# Patient Record
Sex: MALE | Race: WHITE | NOT HISPANIC OR LATINO | Employment: OTHER | URBAN - METROPOLITAN AREA
[De-identification: names, ages, dates, MRNs, and addresses within clinical notes are randomized per-mention and may not be internally consistent; named-entity substitution may affect disease eponyms.]

---

## 2017-01-18 ENCOUNTER — ALLSCRIPTS OFFICE VISIT (OUTPATIENT)
Dept: OTHER | Facility: OTHER | Age: 67
End: 2017-01-18

## 2017-01-29 ENCOUNTER — TELEPHONE (OUTPATIENT)
Dept: INPATIENT UNIT | Facility: HOSPITAL | Age: 67
End: 2017-01-29

## 2017-01-30 ENCOUNTER — ANESTHESIA (OUTPATIENT)
Dept: SURGERY | Facility: HOSPITAL | Age: 67
End: 2017-01-30
Payer: MEDICARE

## 2017-01-30 ENCOUNTER — GENERIC CONVERSION - ENCOUNTER (OUTPATIENT)
Dept: OTHER | Facility: OTHER | Age: 67
End: 2017-01-30

## 2017-01-30 ENCOUNTER — ANESTHESIA EVENT (OUTPATIENT)
Dept: SURGERY | Facility: HOSPITAL | Age: 67
End: 2017-01-30
Payer: MEDICARE

## 2017-01-30 ENCOUNTER — HOSPITAL ENCOUNTER (OUTPATIENT)
Dept: NON INVASIVE DIAGNOSTICS | Facility: HOSPITAL | Age: 67
Discharge: HOME | End: 2017-01-30
Attending: INTERNAL MEDICINE | Admitting: INTERNAL MEDICINE
Payer: MEDICARE

## 2017-01-30 VITALS
RESPIRATION RATE: 16 BRPM | BODY MASS INDEX: 31.81 KG/M2 | SYSTOLIC BLOOD PRESSURE: 116 MMHG | HEART RATE: 58 BPM | TEMPERATURE: 98 F | DIASTOLIC BLOOD PRESSURE: 74 MMHG | WEIGHT: 240 LBS | OXYGEN SATURATION: 95 % | HEIGHT: 73 IN

## 2017-01-30 DIAGNOSIS — I48.91 ATRIAL FIBRILLATION (HCC): ICD-10-CM

## 2017-01-30 LAB
ANION GAP SERPL CALCULATED.3IONS-SCNC: 9 MMOL/L (ref 4–13)
ATRIAL RATE: 288 BPM
ATRIAL RATE: 51 BPM
ATRIAL RATE: 58 BPM
BASOPHILS # BLD AUTO: 0.11 THOUSANDS/ΜL (ref 0–0.1)
BASOPHILS NFR BLD AUTO: 2 % (ref 0–1)
BUN SERPL-MCNC: 20 MG/DL (ref 5–25)
CALCIUM SERPL-MCNC: 8.7 MG/DL (ref 8.3–10.1)
CHLORIDE SERPL-SCNC: 106 MMOL/L (ref 100–108)
CO2 SERPL-SCNC: 25 MMOL/L (ref 21–32)
CREAT SERPL-MCNC: 1.06 MG/DL (ref 0.6–1.3)
EOSINOPHIL # BLD AUTO: 0.24 THOUSAND/ΜL (ref 0–0.61)
EOSINOPHIL NFR BLD AUTO: 4 % (ref 0–6)
ERYTHROCYTE [DISTWIDTH] IN BLOOD BY AUTOMATED COUNT: 12.9 % (ref 11.6–15.1)
GFR SERPL CREATININE-BSD FRML MDRD: >60 ML/MIN/1.73SQ M
GLUCOSE SERPL-MCNC: 100 MG/DL (ref 65–140)
HCT VFR BLD AUTO: 48.8 % (ref 36.5–49.3)
HGB BLD-MCNC: 17.1 G/DL (ref 12–17)
LYMPHOCYTES # BLD AUTO: 1.66 THOUSANDS/ΜL (ref 0.6–4.47)
LYMPHOCYTES NFR BLD AUTO: 25 % (ref 14–44)
MAGNESIUM SERPL-MCNC: 2.3 MG/DL (ref 1.6–2.6)
MCH RBC QN AUTO: 30.1 PG (ref 26.8–34.3)
MCHC RBC AUTO-ENTMCNC: 35 G/DL (ref 31.4–37.4)
MCV RBC AUTO: 86 FL (ref 82–98)
MONOCYTES # BLD AUTO: 0.61 THOUSAND/ΜL (ref 0.17–1.22)
MONOCYTES NFR BLD AUTO: 9 % (ref 4–12)
NEUTROPHILS # BLD AUTO: 4 THOUSANDS/ΜL (ref 1.85–7.62)
NEUTS SEG NFR BLD AUTO: 60 % (ref 43–75)
NRBC BLD AUTO-RTO: 0 /100 WBCS
P AXIS: 0 DEGREES
P AXIS: 2 DEGREES
PLATELET # BLD AUTO: 249 THOUSANDS/UL (ref 149–390)
PMV BLD AUTO: 10.5 FL (ref 8.9–12.7)
POTASSIUM SERPL-SCNC: 4.3 MMOL/L (ref 3.5–5.3)
PR INTERVAL: 244 MS
PR INTERVAL: 268 MS
QRS AXIS: -14 DEGREES
QRS AXIS: -30 DEGREES
QRS AXIS: -6 DEGREES
QRSD INTERVAL: 112 MS
QRSD INTERVAL: 116 MS
QRSD INTERVAL: 118 MS
QT INTERVAL: 456 MS
QT INTERVAL: 458 MS
QT INTERVAL: 470 MS
QTC INTERVAL: 422 MS
QTC INTERVAL: 447 MS
QTC INTERVAL: 461 MS
RBC # BLD AUTO: 5.68 MILLION/UL (ref 3.88–5.62)
SODIUM SERPL-SCNC: 140 MMOL/L (ref 136–145)
T WAVE AXIS: 16 DEGREES
T WAVE AXIS: 24 DEGREES
T WAVE AXIS: 6 DEGREES
VENTRICULAR RATE: 51 BPM
VENTRICULAR RATE: 58 BPM
VENTRICULAR RATE: 58 BPM
WBC # BLD AUTO: 6.65 THOUSAND/UL (ref 4.31–10.16)

## 2017-01-30 PROCEDURE — 93005 ELECTROCARDIOGRAM TRACING: CPT | Performed by: PHYSICIAN ASSISTANT

## 2017-01-30 PROCEDURE — 83735 ASSAY OF MAGNESIUM: CPT | Performed by: INTERNAL MEDICINE

## 2017-01-30 PROCEDURE — 80048 BASIC METABOLIC PNL TOTAL CA: CPT | Performed by: INTERNAL MEDICINE

## 2017-01-30 PROCEDURE — 93005 ELECTROCARDIOGRAM TRACING: CPT

## 2017-01-30 PROCEDURE — 92960 CARDIOVERSION ELECTRIC EXT: CPT | Performed by: INTERNAL MEDICINE

## 2017-01-30 PROCEDURE — 93005 ELECTROCARDIOGRAM TRACING: CPT | Performed by: INTERNAL MEDICINE

## 2017-01-30 PROCEDURE — 85025 COMPLETE CBC W/AUTO DIFF WBC: CPT | Performed by: INTERNAL MEDICINE

## 2017-01-30 RX ORDER — FLECAINIDE ACETATE 50 MG/1
50 TABLET ORAL 2 TIMES DAILY
COMMUNITY
End: 2018-03-18 | Stop reason: SDUPTHER

## 2017-01-30 RX ORDER — ATORVASTATIN CALCIUM 20 MG/1
20 TABLET, FILM COATED ORAL DAILY
COMMUNITY
End: 2017-08-05

## 2017-01-30 RX ORDER — METOPROLOL SUCCINATE 25 MG/1
25 TABLET, EXTENDED RELEASE ORAL DAILY
COMMUNITY
End: 2018-07-25

## 2017-01-30 RX ORDER — PROPOFOL 10 MG/ML
INJECTION, EMULSION INTRAVENOUS AS NEEDED
Status: DISCONTINUED | OUTPATIENT
Start: 2017-01-30 | End: 2017-01-30 | Stop reason: SURG

## 2017-01-30 RX ORDER — SODIUM CHLORIDE 9 MG/ML
50 INJECTION, SOLUTION INTRAVENOUS CONTINUOUS
Status: DISCONTINUED | OUTPATIENT
Start: 2017-01-30 | End: 2017-01-30 | Stop reason: HOSPADM

## 2017-01-30 RX ADMIN — SODIUM CHLORIDE 50 ML/HR: 0.9 INJECTION, SOLUTION INTRAVENOUS at 07:15

## 2017-01-30 RX ADMIN — PROPOFOL 80 MG: 10 INJECTION, EMULSION INTRAVENOUS at 08:25

## 2017-02-06 ENCOUNTER — ALLSCRIPTS OFFICE VISIT (OUTPATIENT)
Dept: OTHER | Facility: OTHER | Age: 67
End: 2017-02-06

## 2017-02-27 ENCOUNTER — ALLSCRIPTS OFFICE VISIT (OUTPATIENT)
Dept: OTHER | Facility: OTHER | Age: 67
End: 2017-02-27

## 2017-03-01 ENCOUNTER — ALLSCRIPTS OFFICE VISIT (OUTPATIENT)
Dept: OTHER | Facility: OTHER | Age: 67
End: 2017-03-01

## 2017-03-01 DIAGNOSIS — Z91.89 OTHER SPECIFIED PERSONAL RISK FACTORS, NOT ELSEWHERE CLASSIFIED: ICD-10-CM

## 2017-03-01 DIAGNOSIS — I71.2 THORACIC AORTIC ANEURYSM WITHOUT RUPTURE (HCC): ICD-10-CM

## 2017-03-01 DIAGNOSIS — I48.0 PAROXYSMAL ATRIAL FIBRILLATION (HCC): ICD-10-CM

## 2017-05-01 DIAGNOSIS — N40.0 ENLARGED PROSTATE WITHOUT LOWER URINARY TRACT SYMPTOMS (LUTS): ICD-10-CM

## 2017-05-01 DIAGNOSIS — E03.9 HYPOTHYROIDISM: ICD-10-CM

## 2017-05-01 DIAGNOSIS — I48.0 PAROXYSMAL ATRIAL FIBRILLATION (HCC): ICD-10-CM

## 2017-05-01 DIAGNOSIS — I10 ESSENTIAL (PRIMARY) HYPERTENSION: ICD-10-CM

## 2017-05-09 ENCOUNTER — ALLSCRIPTS OFFICE VISIT (OUTPATIENT)
Dept: OTHER | Facility: OTHER | Age: 67
End: 2017-05-09

## 2017-05-22 ENCOUNTER — HOSPITAL ENCOUNTER (OUTPATIENT)
Dept: NON INVASIVE DIAGNOSTICS | Facility: CLINIC | Age: 67
Discharge: HOME | End: 2017-05-22
Payer: MEDICARE

## 2017-05-22 DIAGNOSIS — I77.819 ECTATIC AORTA (HCC): ICD-10-CM

## 2017-05-22 DIAGNOSIS — I77.811 AORTIC ECTASIA, ABDOMINAL (HCC): ICD-10-CM

## 2017-05-22 DIAGNOSIS — I72.3 ILIAC ARTERY ANEURYSM, BILATERAL (HCC): ICD-10-CM

## 2017-05-22 PROCEDURE — 93978 VASCULAR STUDY: CPT

## 2017-05-22 PROCEDURE — 93923 UPR/LXTR ART STDY 3+ LVLS: CPT

## 2017-05-25 ENCOUNTER — ALLSCRIPTS OFFICE VISIT (OUTPATIENT)
Dept: OTHER | Facility: OTHER | Age: 67
End: 2017-05-25

## 2017-06-13 ENCOUNTER — APPOINTMENT (OUTPATIENT)
Dept: LAB | Facility: CLINIC | Age: 67
End: 2017-06-13
Payer: MEDICARE

## 2017-06-13 ENCOUNTER — GENERIC CONVERSION - ENCOUNTER (OUTPATIENT)
Dept: OTHER | Facility: OTHER | Age: 67
End: 2017-06-13

## 2017-06-13 DIAGNOSIS — I48.0 PAROXYSMAL ATRIAL FIBRILLATION (HCC): ICD-10-CM

## 2017-06-13 DIAGNOSIS — I10 ESSENTIAL (PRIMARY) HYPERTENSION: ICD-10-CM

## 2017-06-13 DIAGNOSIS — I71.2 THORACIC AORTIC ANEURYSM WITHOUT RUPTURE (HCC): ICD-10-CM

## 2017-06-13 DIAGNOSIS — E03.9 HYPOTHYROIDISM: ICD-10-CM

## 2017-06-13 DIAGNOSIS — N40.0 ENLARGED PROSTATE WITHOUT LOWER URINARY TRACT SYMPTOMS (LUTS): ICD-10-CM

## 2017-06-13 DIAGNOSIS — Z91.89 OTHER SPECIFIED PERSONAL RISK FACTORS, NOT ELSEWHERE CLASSIFIED: ICD-10-CM

## 2017-06-13 LAB
ALBUMIN SERPL BCP-MCNC: 3.8 G/DL (ref 3.5–5)
ALP SERPL-CCNC: 77 U/L (ref 46–116)
ALT SERPL W P-5'-P-CCNC: 36 U/L (ref 12–78)
ANION GAP SERPL CALCULATED.3IONS-SCNC: 7 MMOL/L (ref 4–13)
AST SERPL W P-5'-P-CCNC: 23 U/L (ref 5–45)
BASOPHILS # BLD AUTO: 0.05 THOUSANDS/ΜL (ref 0–0.1)
BASOPHILS NFR BLD AUTO: 1 % (ref 0–1)
BILIRUB SERPL-MCNC: 0.59 MG/DL (ref 0.2–1)
BUN SERPL-MCNC: 21 MG/DL (ref 5–25)
CALCIUM SERPL-MCNC: 8.6 MG/DL (ref 8.3–10.1)
CHLORIDE SERPL-SCNC: 108 MMOL/L (ref 100–108)
CHOLEST SERPL-MCNC: 115 MG/DL (ref 50–200)
CO2 SERPL-SCNC: 27 MMOL/L (ref 21–32)
CREAT SERPL-MCNC: 0.95 MG/DL (ref 0.6–1.3)
EOSINOPHIL # BLD AUTO: 0.18 THOUSAND/ΜL (ref 0–0.61)
EOSINOPHIL NFR BLD AUTO: 3 % (ref 0–6)
ERYTHROCYTE [DISTWIDTH] IN BLOOD BY AUTOMATED COUNT: 13 % (ref 11.6–15.1)
GFR SERPL CREATININE-BSD FRML MDRD: >60 ML/MIN/1.73SQ M
GLUCOSE P FAST SERPL-MCNC: 100 MG/DL (ref 65–99)
HCT VFR BLD AUTO: 43.8 % (ref 36.5–49.3)
HDLC SERPL-MCNC: 51 MG/DL (ref 40–60)
HGB BLD-MCNC: 14.8 G/DL (ref 12–17)
LDLC SERPL CALC-MCNC: 47 MG/DL (ref 0–100)
LYMPHOCYTES # BLD AUTO: 1.15 THOUSANDS/ΜL (ref 0.6–4.47)
LYMPHOCYTES NFR BLD AUTO: 20 % (ref 14–44)
MCH RBC QN AUTO: 30.1 PG (ref 26.8–34.3)
MCHC RBC AUTO-ENTMCNC: 33.8 G/DL (ref 31.4–37.4)
MCV RBC AUTO: 89 FL (ref 82–98)
MONOCYTES # BLD AUTO: 0.6 THOUSAND/ΜL (ref 0.17–1.22)
MONOCYTES NFR BLD AUTO: 10 % (ref 4–12)
NEUTROPHILS # BLD AUTO: 3.92 THOUSANDS/ΜL (ref 1.85–7.62)
NEUTS SEG NFR BLD AUTO: 66 % (ref 43–75)
NRBC BLD AUTO-RTO: 0 /100 WBCS
PLATELET # BLD AUTO: 245 THOUSANDS/UL (ref 149–390)
PMV BLD AUTO: 10.8 FL (ref 8.9–12.7)
POTASSIUM SERPL-SCNC: 4.7 MMOL/L (ref 3.5–5.3)
PROT SERPL-MCNC: 6.7 G/DL (ref 6.4–8.2)
PSA SERPL-MCNC: 0.7 NG/ML (ref 0–4)
RBC # BLD AUTO: 4.92 MILLION/UL (ref 3.88–5.62)
SODIUM SERPL-SCNC: 142 MMOL/L (ref 136–145)
T4 FREE SERPL-MCNC: 0.98 NG/DL (ref 0.76–1.46)
TRIGL SERPL-MCNC: 83 MG/DL
TSH SERPL DL<=0.05 MIU/L-ACNC: 2.14 UIU/ML (ref 0.36–3.74)
WBC # BLD AUTO: 5.91 THOUSAND/UL (ref 4.31–10.16)

## 2017-06-13 PROCEDURE — 84443 ASSAY THYROID STIM HORMONE: CPT

## 2017-06-13 PROCEDURE — 36415 COLL VENOUS BLD VENIPUNCTURE: CPT

## 2017-06-13 PROCEDURE — 84439 ASSAY OF FREE THYROXINE: CPT

## 2017-06-13 PROCEDURE — 80061 LIPID PANEL: CPT

## 2017-06-13 PROCEDURE — 80053 COMPREHEN METABOLIC PANEL: CPT

## 2017-06-13 PROCEDURE — 85025 COMPLETE CBC W/AUTO DIFF WBC: CPT

## 2017-06-13 PROCEDURE — 84153 ASSAY OF PSA TOTAL: CPT

## 2017-06-26 ENCOUNTER — ALLSCRIPTS OFFICE VISIT (OUTPATIENT)
Dept: OTHER | Facility: OTHER | Age: 67
End: 2017-06-26

## 2017-06-26 DIAGNOSIS — Z11.59 ENCOUNTER FOR SCREENING FOR OTHER VIRAL DISEASES: ICD-10-CM

## 2017-08-05 ENCOUNTER — APPOINTMENT (EMERGENCY)
Dept: RADIOLOGY | Facility: HOSPITAL | Age: 67
End: 2017-08-05
Payer: MEDICARE

## 2017-08-05 ENCOUNTER — HOSPITAL ENCOUNTER (EMERGENCY)
Facility: HOSPITAL | Age: 67
Discharge: HOME | End: 2017-08-05
Attending: EMERGENCY MEDICINE | Admitting: EMERGENCY MEDICINE
Payer: MEDICARE

## 2017-08-05 ENCOUNTER — GENERIC CONVERSION - ENCOUNTER (OUTPATIENT)
Dept: OTHER | Facility: OTHER | Age: 67
End: 2017-08-05

## 2017-08-05 VITALS
BODY MASS INDEX: 31.14 KG/M2 | SYSTOLIC BLOOD PRESSURE: 124 MMHG | DIASTOLIC BLOOD PRESSURE: 63 MMHG | TEMPERATURE: 97.6 F | HEIGHT: 73 IN | OXYGEN SATURATION: 93 % | WEIGHT: 235 LBS | RESPIRATION RATE: 16 BRPM | HEART RATE: 73 BPM

## 2017-08-05 DIAGNOSIS — I77.73 RENAL ARTERY DISSECTION (HCC): Primary | ICD-10-CM

## 2017-08-05 DIAGNOSIS — R10.32 LLQ PAIN: ICD-10-CM

## 2017-08-05 LAB
ALBUMIN SERPL BCP-MCNC: 3.8 G/DL (ref 3.5–5)
ALP SERPL-CCNC: 88 U/L (ref 46–116)
ALT SERPL W P-5'-P-CCNC: 39 U/L (ref 12–78)
ANION GAP BLD CALC-SCNC: 20 MMOL/L (ref 4–13)
ANION GAP SERPL CALCULATED.3IONS-SCNC: 8 MMOL/L (ref 4–13)
AST SERPL W P-5'-P-CCNC: 17 U/L (ref 5–45)
BASOPHILS # BLD AUTO: 0.02 THOUSANDS/ΜL (ref 0–0.1)
BASOPHILS NFR BLD AUTO: 0 % (ref 0–1)
BILIRUB SERPL-MCNC: 0.78 MG/DL (ref 0.2–1)
BILIRUB UR QL STRIP: NEGATIVE
BUN BLD-MCNC: 23 MG/DL (ref 5–25)
BUN SERPL-MCNC: 24 MG/DL (ref 5–25)
CA-I BLD-SCNC: 1.11 MMOL/L (ref 1.12–1.32)
CALCIUM SERPL-MCNC: 8.8 MG/DL (ref 8.3–10.1)
CHLORIDE BLD-SCNC: 101 MMOL/L (ref 100–108)
CHLORIDE SERPL-SCNC: 106 MMOL/L (ref 100–108)
CLARITY UR: CLEAR
CLARITY, POC: CLEAR
CO2 SERPL-SCNC: 25 MMOL/L (ref 21–32)
COLOR UR: YELLOW
COLOR, POC: YELLOW
CREAT BLD-MCNC: 0.9 MG/DL (ref 0.6–1.3)
CREAT SERPL-MCNC: 0.99 MG/DL (ref 0.6–1.3)
EOSINOPHIL # BLD AUTO: 0.03 THOUSAND/ΜL (ref 0–0.61)
EOSINOPHIL NFR BLD AUTO: 0 % (ref 0–6)
ERYTHROCYTE [DISTWIDTH] IN BLOOD BY AUTOMATED COUNT: 12.8 % (ref 11.6–15.1)
GFR SERPL CREATININE-BSD FRML MDRD: 78 ML/MIN/1.73SQ M
GFR SERPL CREATININE-BSD FRML MDRD: 88 ML/MIN/1.73SQ M
GLUCOSE SERPL-MCNC: 109 MG/DL (ref 65–140)
GLUCOSE SERPL-MCNC: 115 MG/DL (ref 65–140)
GLUCOSE UR STRIP-MCNC: NEGATIVE MG/DL
HCT VFR BLD AUTO: 44.6 % (ref 36.5–49.3)
HCT VFR BLD CALC: 47 % (ref 36.5–49.3)
HGB BLD-MCNC: 15.9 G/DL (ref 12–17)
HGB BLDA-MCNC: 16 G/DL (ref 12–17)
HGB UR QL STRIP.AUTO: NEGATIVE
KETONES UR STRIP-MCNC: NEGATIVE MG/DL
LEUKOCYTE ESTERASE UR QL STRIP: NEGATIVE
LIPASE SERPL-CCNC: 65 U/L (ref 73–393)
LYMPHOCYTES # BLD AUTO: 0.85 THOUSANDS/ΜL (ref 0.6–4.47)
LYMPHOCYTES NFR BLD AUTO: 7 % (ref 14–44)
MCH RBC QN AUTO: 30.5 PG (ref 26.8–34.3)
MCHC RBC AUTO-ENTMCNC: 35.7 G/DL (ref 31.4–37.4)
MCV RBC AUTO: 86 FL (ref 82–98)
MONOCYTES # BLD AUTO: 0.74 THOUSAND/ΜL (ref 0.17–1.22)
MONOCYTES NFR BLD AUTO: 6 % (ref 4–12)
NEUTROPHILS # BLD AUTO: 10.54 THOUSANDS/ΜL (ref 1.85–7.62)
NEUTS SEG NFR BLD AUTO: 87 % (ref 43–75)
NITRITE UR QL STRIP: NEGATIVE
NRBC BLD AUTO-RTO: 0 /100 WBCS
PCO2 BLD: 24 MMOL/L (ref 21–32)
PH UR STRIP.AUTO: 5.5 [PH] (ref 4.5–8)
PLATELET # BLD AUTO: 244 THOUSANDS/UL (ref 149–390)
PMV BLD AUTO: 10.3 FL (ref 8.9–12.7)
POTASSIUM BLD-SCNC: 4.2 MMOL/L (ref 3.5–5.3)
POTASSIUM SERPL-SCNC: 4.1 MMOL/L (ref 3.5–5.3)
PROT SERPL-MCNC: 6.9 G/DL (ref 6.4–8.2)
PROT UR STRIP-MCNC: NEGATIVE MG/DL
RBC # BLD AUTO: 5.21 MILLION/UL (ref 3.88–5.62)
SODIUM BLD-SCNC: 140 MMOL/L (ref 136–145)
SODIUM SERPL-SCNC: 139 MMOL/L (ref 136–145)
SP GR UR STRIP.AUTO: 1.01 (ref 1–1.03)
SPECIMEN SOURCE: ABNORMAL
SPECIMEN SOURCE: NORMAL
TROPONIN I BLD-MCNC: 0.01 NG/ML (ref 0–0.08)
TSH SERPL DL<=0.05 MIU/L-ACNC: 2.43 UIU/ML (ref 0.36–3.74)
UROBILINOGEN UR QL STRIP.AUTO: 0.2 E.U./DL
WBC # BLD AUTO: 12.22 THOUSAND/UL (ref 4.31–10.16)

## 2017-08-05 PROCEDURE — 71020 HB CHEST X-RAY 2VW FRONTAL&LATL: CPT

## 2017-08-05 PROCEDURE — 80047 BASIC METABLC PNL IONIZED CA: CPT

## 2017-08-05 PROCEDURE — 36415 COLL VENOUS BLD VENIPUNCTURE: CPT | Performed by: EMERGENCY MEDICINE

## 2017-08-05 PROCEDURE — 71275 CT ANGIOGRAPHY CHEST: CPT

## 2017-08-05 PROCEDURE — 81003 URINALYSIS AUTO W/O SCOPE: CPT

## 2017-08-05 PROCEDURE — 85014 HEMATOCRIT: CPT

## 2017-08-05 PROCEDURE — 93005 ELECTROCARDIOGRAM TRACING: CPT

## 2017-08-05 PROCEDURE — 81002 URINALYSIS NONAUTO W/O SCOPE: CPT | Performed by: EMERGENCY MEDICINE

## 2017-08-05 PROCEDURE — 83690 ASSAY OF LIPASE: CPT | Performed by: EMERGENCY MEDICINE

## 2017-08-05 PROCEDURE — 84443 ASSAY THYROID STIM HORMONE: CPT | Performed by: EMERGENCY MEDICINE

## 2017-08-05 PROCEDURE — 85025 COMPLETE CBC W/AUTO DIFF WBC: CPT | Performed by: EMERGENCY MEDICINE

## 2017-08-05 PROCEDURE — 80053 COMPREHEN METABOLIC PANEL: CPT | Performed by: EMERGENCY MEDICINE

## 2017-08-05 PROCEDURE — 99285 EMERGENCY DEPT VISIT HI MDM: CPT

## 2017-08-05 PROCEDURE — 84484 ASSAY OF TROPONIN QUANT: CPT

## 2017-08-05 PROCEDURE — 74174 CTA ABD&PLVS W/CONTRAST: CPT

## 2017-08-05 RX ORDER — MORPHINE SULFATE 4 MG/ML
4 INJECTION, SOLUTION INTRAMUSCULAR; INTRAVENOUS ONCE
Status: DISCONTINUED | OUTPATIENT
Start: 2017-08-05 | End: 2017-08-05

## 2017-08-05 RX ORDER — FLECAINIDE ACETATE 50 MG/1
50 TABLET ORAL ONCE
Status: COMPLETED | OUTPATIENT
Start: 2017-08-05 | End: 2017-08-05

## 2017-08-05 RX ORDER — ONDANSETRON 2 MG/ML
4 INJECTION INTRAMUSCULAR; INTRAVENOUS ONCE
Status: DISCONTINUED | OUTPATIENT
Start: 2017-08-05 | End: 2017-08-05 | Stop reason: HOSPADM

## 2017-08-05 RX ADMIN — FLECAINIDE ACETATE 50 MG: 50 TABLET ORAL at 14:57

## 2017-08-05 RX ADMIN — IOHEXOL 100 ML: 350 INJECTION, SOLUTION INTRAVENOUS at 09:14

## 2017-08-05 RX ADMIN — APIXABAN 5 MG: 5 TABLET, FILM COATED ORAL at 14:57

## 2017-08-06 LAB
ATRIAL RATE: 68 BPM
ATRIAL RATE: 68 BPM
P AXIS: 61 DEGREES
P AXIS: 67 DEGREES
PR INTERVAL: 200 MS
PR INTERVAL: 210 MS
QRS AXIS: -22 DEGREES
QRS AXIS: -31 DEGREES
QRSD INTERVAL: 96 MS
QRSD INTERVAL: 96 MS
QT INTERVAL: 410 MS
QT INTERVAL: 416 MS
QTC INTERVAL: 435 MS
QTC INTERVAL: 442 MS
T WAVE AXIS: 17 DEGREES
T WAVE AXIS: 20 DEGREES
VENTRICULAR RATE: 68 BPM
VENTRICULAR RATE: 68 BPM

## 2017-08-07 ENCOUNTER — ALLSCRIPTS OFFICE VISIT (OUTPATIENT)
Dept: OTHER | Facility: OTHER | Age: 67
End: 2017-08-07

## 2017-08-08 DIAGNOSIS — I77.79 DISSECTION OF OTHER SPECIFIED ARTERY (HCC): ICD-10-CM

## 2017-08-08 DIAGNOSIS — R00.1 BRADYCARDIA: ICD-10-CM

## 2017-08-23 ENCOUNTER — HOSPITAL ENCOUNTER (OUTPATIENT)
Dept: NON INVASIVE DIAGNOSTICS | Facility: CLINIC | Age: 67
Discharge: HOME | End: 2017-08-23
Payer: MEDICARE

## 2017-08-23 DIAGNOSIS — R00.1 BRADYCARDIA: ICD-10-CM

## 2017-08-23 PROCEDURE — 93226 XTRNL ECG REC<48 HR SCAN A/R: CPT

## 2017-08-23 PROCEDURE — 93225 XTRNL ECG REC<48 HRS REC: CPT

## 2017-09-07 ENCOUNTER — TRANSCRIBE ORDERS (OUTPATIENT)
Dept: ADMINISTRATIVE | Facility: HOSPITAL | Age: 67
End: 2017-09-07

## 2017-09-07 ENCOUNTER — ALLSCRIPTS OFFICE VISIT (OUTPATIENT)
Dept: OTHER | Facility: OTHER | Age: 67
End: 2017-09-07

## 2017-09-07 DIAGNOSIS — I77.79 HEPATIC ARTERY DISSECTION (HCC): Primary | ICD-10-CM

## 2017-09-20 ENCOUNTER — GENERIC CONVERSION - ENCOUNTER (OUTPATIENT)
Dept: OTHER | Facility: OTHER | Age: 67
End: 2017-09-20

## 2017-09-20 ENCOUNTER — ALLSCRIPTS OFFICE VISIT (OUTPATIENT)
Dept: OTHER | Facility: OTHER | Age: 67
End: 2017-09-20

## 2017-09-29 ENCOUNTER — ALLSCRIPTS OFFICE VISIT (OUTPATIENT)
Dept: OTHER | Facility: OTHER | Age: 67
End: 2017-09-29

## 2017-10-23 ENCOUNTER — ALLSCRIPTS OFFICE VISIT (OUTPATIENT)
Dept: OTHER | Facility: OTHER | Age: 67
End: 2017-10-23

## 2017-10-24 NOTE — PROGRESS NOTES
Assessment  1  Paroxysmal atrial fibrillation (427 31) (I48 0)   2  Hypothyroidism (244 9) (E03 9)   3  Hypertension (401 9) (I10)   4  AAA (abdominal aortic aneurysm) (441 4) (I71 4)   5  Candidate for statin therapy due to risk of future cardiovascular event (V49 89) (Z91 89)    Plan  Atrial fibrillation, persistent, Hepatic artery dissection    · Adult Aspirin EC Low Strength 81 MG Oral Tablet Delayed Release; TAKE 1 TABLET DAILY  Hypothyroidism    · Levothyroxine Sodium 75 MCG Oral Tablet; TAKE 1 TABLET DAILY    Discussion/Summary  Discussion Summary:   HTN: continue blood pressure medication(s)  BP is controlled  as per vasc, and continue statin and ASA  A fib: feels in NSR, continue Eliquis  continue medication, the thyroid replacement med should be taken on an empty stomach a half hour before breakfast for best results  Chief Complaint  Chief Complaint Chronic Condition [facility]: Patient is here today for follow up of chronic conditions described in HPI  Advance Directives  Advance Directive [facility]:   The patient is not in agreement to receive the Advance Care Planning service--    NO - Patient does not have an advance health care directive  History of Present Illness  HPI: Interval history: notes from Vascular, Cardiology, and GI reviewed, pt getting EGD in upcoming future  pain: not currently having abdominal pain  Holter reviewed, pt denies palpitations, no lightheadedness  patient reports compliance with med and no significant signs of overtreatment or undertreatment  fib: pt denies palpitations  Review of Systems  Complete-Male:   Constitutional: no fever,-- no chills-- and-- not feeling tired  Cardiovascular: the heart rate was not slow,-- no chest pain,-- the heart rate was not fast,-- no palpitations-- and-- no extremity edema  Respiratory: no shortness of breath,-- no cough,-- no wheezing-- and-- no shortness of breath during exertion     Gastrointestinal: no constipation-- and-- no diarrhea  Genitourinary: no dysuria  Psychiatric: no anxiety-- and-- no depression  Preventive Quality 65 Older:   Preventive Quality 65 and Older: Falls Risk: The patient fell 0 times in the past 12 months  Active Problems  1  AAA (abdominal aortic aneurysm) (441 4) (I71 4)   2  Abnormal CT scan, small bowel (793 4) (R93 3)   3  Abnormal CT scan, stomach (793 4) (R93 3)   4  Advance directive discussed with patient (V65 49) (Z71 89)   5  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 2) (I72 3)   6  Aneurysm of thoracic aorta (441 2) (I71 2)   7  Atypical cluster headache (339 00) (G44 009)   8  Benign prostatic hypertrophy without urinary obstruction (600 00) (N40 0)   9  Candidate for statin therapy due to risk of future cardiovascular event (V49 89) (Z91 89)   10  Chronic nonintractable headache, unspecified headache type (784 0) (R51)   11  Gastritis (535 50) (K29 70)   12  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)   13  History of colon polyps (V12 72) (Z86 010)   14  Hypertension (401 9) (I10)   15  Hypothyroidism (244 9) (E03 9)   16  Idiopathic peripheral neuropathy (356 9) (G60 9)   17  Left hip pain (719 45) (M25 552)   18  Left lower quadrant pain (789 04) (R10 32)   19  Lumbago (724 2) (M54 5)   20  Mild persistent asthma (493 90) (J45 30)   21  Need for hepatitis C screening test (V73 89) (Z11 59)   22  Need for prophylactic vaccination and inoculation against influenza (V04 81) (Z23)   23  Need for Tdap vaccination (V06 1) (Z23)   24  Need for vaccination with 13-polyvalent pneumococcal conjugate vaccine (V03 82) (Z23)   25  Palpitations (785 1) (R00 2)   26  Paroxysmal atrial fibrillation (427 31) (I48 0)   27  Rectal bleeding (569 3) (K62 5)   28  Screening for genitourinary condition (V81 6) (Z13 89)   29  Sinus bradycardia (427 89) (R00 1)   30  SSS (sick sinus syndrome) (427 81) (I49 5)   31  Tendonitis (216 90) (M77 9)    Past Medical History  1   History of neuropathy (V12 49) (Z86 69)    Surgical History  1  History of Cataract Surgery   2  History of Retinal Detachment Complete Air Fill Confirmed    Family History  Mother    1  Family history of cerebrovascular accident (CVA) (V17 1) (Z82 3)  Father    2  Family history of    3  Family history of abdominal aortic aneurysm (V17 49) (Z82 49)  Grandmother    4  Family history of malignant neoplasm (V16 9) (Z80 9)  Maternal Grandmother    5  Family history of malignant neoplasm (V16 9) (Z80 9)  Paternal Grandmother    10  Family history of malignant neoplasm (V16 9) (Z80 9)  Grandfather    7  Family history of malignant neoplasm (V16 9) (Z80 9)  Maternal Grandfather    8  Family history of malignant neoplasm (V16 9) (Z80 9)  Paternal Grandfather    5  Family history of malignant neoplasm (V16 9) (Z80 9)    Social History   · Never smoked cigarettes (V49 89) (Z78 9)  Social History Reviewed: The social history was reviewed and updated today  Current Meds   1  Atorvastatin Calcium 20 MG Oral Tablet; TAKE 1 TABLET DAILY; Therapy: 87IDC9767 to (Evaluate:16Psx0277)  Requested for: 75Ifl7524; Last Rx:29Pzo4875   Ordered   2  Co Q-10 100 MG Oral Capsule; 1 CAPSULE DAILY; Therapy: 01HRW9270 to (Evaluate:2016) Recorded   3  Daily Multivitamin TABS; TAKE 1 TABLET DAILY; Therapy: (Recorded:2015) to Recorded   4  Eliquis 5 MG Oral Tablet; Take 1 tablet twice daily  Requested for: 60WLO8294; Last Rx:2017   Ordered   5  Felodipine ER 5 MG Oral Tablet Extended Release 24 Hour; Take 1 tablet once daily; Therapy: 43KOX8370 to (Evaluate:18Lgo3595)  Requested for: 36Uat4126; Last Rx:01Ldb2771   Ordered   6  Fish Oil CAPS; Therapy: (Recorded:2015) to Recorded   7  Flecainide Acetate 50 MG Oral Tablet; TAKE 1 TABLET EVERY 12 HOURS DAILY; Therapy: 09KBI3165 to (Lyla Garcia)  Requested for: 00KQL6620; Last Rx:2017   Ordered   8  Gabapentin 100 MG Oral Capsule; TAKE  2 CAPSULES AT BEDTIME;    Therapy: 82JYD3069 to (Evaluate:23Idf0331)  Requested for: 01Oxu6749; Last Rx:05Jyy4366   Ordered   9  Levothyroxine Sodium 75 MCG Oral Tablet; TAKE 1 TABLET DAILY; Therapy: 05JAM9410 to (Evaluate:48Anf6173)  Requested for: 99Kak0259; Last Rx:11Ril8766   Ordered   10  Lutein CAPS; TAKE AS DIRECTED; Therapy: (Recorded:24Nov2015) to Recorded   11  Metoprolol Succinate ER 25 MG Oral Tablet Extended Release 24 Hour; TAKE 1 TABLET DAILY; Therapy: 32ZRX5754 to (Evaluate:37Jlo1025)  Requested for: 38Nll5305; Last Rx:92Tcx1378    Ordered   12  Zoster (Zostavax); INJECT 0 65  ML Subcutaneous; Therapy: 24JCC1108 to (Last Rx:26Jun2017) Ordered  Medication List Reviewed: The medication list was reviewed and updated today  Allergies  1  No Known Drug Allergies  2  Adhesive Tape    Vitals  Vital Signs    Recorded: 00OCB4625 09:41AM   Temperature 98 3 F   Heart Rate 51   Respiration 18   Systolic 275   Diastolic 84   Weight 203 lb    BMI Calculated 32 32   BSA Calculated 2 35   O2 Saturation 96     Physical Exam    Constitutional   General appearance: No acute distress, well appearing and well nourished  Head and Face   Head and face: Normal     Eyes   Conjunctiva and lids: No erythema, swelling or discharge  Ears, Nose, Mouth, and Throat   External inspection of ears and nose: Normal     Neck   Neck: Supple, symmetric, trachea midline, no masses  Thyroid: Normal, no thyromegaly  Pulmonary   Respiratory effort: No increased work of breathing or signs of respiratory distress  Auscultation of lungs: Clear to auscultation  Cardiovascular   Auscultation of heart: Normal rate and rhythm, normal S1 and S2, no murmurs  Carotid pulses: 2+ bilaterally  Examination of extremities for edema and/or varicosities: Normal     Neurologic   Cortical function: Normal mental status  Psychiatric   Judgment and insight: Normal     Orientation to person, place and time: Normal     Recent and remote memory: Intact  Mood and affect: Normal        Results/Data  PHQ-2 Adult Depression Screening 23Oct2017 09:43AM User, Ahs     Test Name Result Flag Reference   PHQ-2 Adult Depression Score 0     Over the last two weeks, how often have you been bothered by any of the following problems?   Little interest or pleasure in doing things: Not at all - 0  Feeling down, depressed, or hopeless: Not at all - 0   PHQ-2 Adult Depression Screening Negative         Future Appointments    Date/Time Provider Specialty Site   05/10/2018 10:00 AM Keo Bartlett MD Neurology [place] [address]   02/15/2018 10:00 AM Nate Vincent MD Vascular Surgery [facility]   11/10/2017 11:15 AM Yana Parker MD Gastroenterology Adult [address] [facility] [address]   12/04/2017 01:30 PM Yana Parker MD Gastroenterology Adult  [address] [facility]   12/13/2017 10:30 AM Kathryn Jimenez DO Cardiac Surgery CT SURGERY [facility]     Signatures   Electronically signed by : CRISSY Wright ; Oct 23 2017  9:56AM EST                       (Author)

## 2017-10-26 NOTE — PROGRESS NOTES
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)    Plan  Hepatic artery dissection    · (1) BASIC METABOLIC PROFILE; Status:Active; Requested WDH:16POR2672;    Perform:[facility]; UEQ:61LGD2041; Ordered;For:Hepatic artery dissection; Ordered By:Fili Esquivel;   · CTA ABDOMEN PELVIS W WO CONTRAST; Status:Hold For - Scheduling; Requested  for:07Mar2018; Perform:[facility]; Mongolian:17LVS8973;NXSQTZG;GWO:HJQRYZE artery dissection; Ordered By:Fili Esquivel; Idiopathic peripheral neuropathy    · Gabapentin 100 MG Oral Capsule; TAKE  2 CAPSULES AT BEDTIME   Rx By: Cezar Shaw; Dispense: 90 Days ; #:180 Capsule; Refill: 3;For: Idiopathic peripheral neuropathy; YOVANNY = N; Verified Transmission to [facility]; Last Updated By: [employer]; 9/5/2017 3:17:51 PM    Discussion/Summary  Discussion Summary:   Recent episode of left flank pain, went to the emergency room where CT angiogram was performed  There was evidence of common hepatic artery and left renal artery focal dissection  History of ascending aortic aneurysm now 4 9 cm and aortoiliac aneurysms which are on the smaller side however a right common iliac artery aneurysm is now 3 cm  He is currently on elbow quickest would like to start him on a baby aspirin at least 3 times a week or daily pending GI consult for upper abdominal discomfort  If okay with GI he will start a baby aspirin enteric-coated daily  He will have a follow-up CT angiogram in 6 months an office visit that time  Counseling Documentation With Imm: The patient, patient's family was counseled regarding diagnostic results,-- instructions for management,-- risk factor reductions,-- prognosis,-- risks and benefits of treatment options,-- importance of compliance with treatment  total time of encounter was 20 minutes-- and-- 15 minutes was spent counseling        Chief Complaint  Chief Complaint Free Text Note Form: follow up from ER visit [facility] er review CTA Chest abdomen pelvis 08/05/2017 Went to the ER for left flank pain  pain is no longer there  History of Present Illness  HPI: Recent episode of left flank pain      Review of Systems  Complete Male - Vasc:   Constitutional: No fever or chills, feels well, no tiredness, no recent weight gain or weight loss  Eyes: No sudden vision loss, no blurred vision, no double vision  ENT: no loss of hearing, no nosebleeds, no hoarseness  Cardiovascular: no chest pain, regular heart rate  Respiratory: No sob, no wheezing, no cough, no sob with exertion, no orthopnea  Gastrointestinal: No nausea, No vomiting, no diarrhea, no blood in stool  Genitourinary: no dysuria, no hematuria, No urinary incontinence, no erectile dysfunction  Musculoskeletal: no limb pain, no limb swelling  Integumentary: no rash, no lesions, no wounds, no ulcer  Neurological: no dementia, no headache, no numbness, no limb weakness, no dizziness, no difficulty walking  Psychiatric: no depression, no mood disorders, no anxiety  Hematologic/Lymphatic: no bleeding disorder, no easy bruising  ROS Reviewed:   ROS reviewed  Active Problems  1  AAA (abdominal aortic aneurysm) (441 4) (I71 4)   2  Advance directive discussed with patient (V65 49) (Z71 89)   3  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 2) (I72 3)   4  Aneurysm of thoracic aorta (441 2) (I71 2)   5  Atrial fibrillation, persistent (427 31) (I48 1)   6  Atypical cluster headache (339 00) (G44 009)   7  Benign prostatic hypertrophy without urinary obstruction (600 00) (N40 0)   8  Candidate for statin therapy due to risk of future cardiovascular event (V49 89) (Z91 89)   9  Chronic nonintractable headache, unspecified headache type (784 0) (R51)   10  Gastritis (535 50) (K29 70)   11  Hypertension (401 9) (I10)   12  Hypothyroidism (244 9) (E03 9)   13  Idiopathic peripheral neuropathy (356 9) (G60 9)   14  Left hip pain (719 45) (M25 552)   15   Left lower quadrant pain (789 04) (R10 32)   16  Lumbago (724 2) (M54 5)   17  Mild persistent asthma (493 90) (J45 30)   18  Need for hepatitis C screening test (V73 89) (Z11 59)   19  Need for prophylactic vaccination and inoculation against influenza (V04 81) (Z23)   20  Need for Tdap vaccination (V06 1) (Z23)   21  Need for vaccination with 13-polyvalent pneumococcal conjugate vaccine (V03 82) (Z23)   22  Palpitations (785 1) (R00 2)   23  Paroxysmal atrial fibrillation (427 31) (I48 0)   24  Rectal bleeding (569 3) (K62 5)   25  Screening for genitourinary condition (V81 6) (Z13 89)   26  Sinus bradycardia (427 89) (R00 1)   27  Tendonitis (726 90) (M77 9)    Past Medical History  1  History of neuropathy (V12 49) (Z86 69)  Active Problems And Past Medical History Reviewed: The active problems and past medical history were reviewed and updated today  Surgical History  1  History of Cataract Surgery   2  History of Retinal Detachment Complete Air Fill Confirmed  Surgical History Reviewed: The surgical history was reviewed and updated today  Family History  Mother    1  Family history of cerebrovascular accident (CVA) (V17 1) (Z82 3)  Father    2  Family history of    3  Family history of abdominal aortic aneurysm (V17 49) (Z82 49)  Grandmother    4  Family history of malignant neoplasm (V16 9) (Z80 9)  Maternal Grandmother    5  Family history of malignant neoplasm (V16 9) (Z80 9)  Paternal Grandmother    10  Family history of malignant neoplasm (V16 9) (Z80 9)  Grandfather    7  Family history of malignant neoplasm (V16 9) (Z80 9)  Maternal Grandfather    8  Family history of malignant neoplasm (V16 9) (Z80 9)  Paternal Grandfather    5  Family history of malignant neoplasm (V16 9) (Z80 9)  Family History Reviewed: The family history was reviewed and updated today  Social History   · Never smoked cigarettes (V49 89) (Z78 9)  Social History Reviewed: The social history was reviewed and updated today  Current Meds   1  Atorvastatin Calcium 20 MG Oral Tablet; TAKE 1 TABLET DAILY; Therapy: 17ACY9500 to (Evaluate:97Wdz3999)  Requested for: 21Mpo7906; Last   Rx:30Ija8824 Ordered   2  Co Q-10 100 MG Oral Capsule; 1 CAPSULE DAILY; Therapy: 03WDD1314 to (Evaluate:31Oct2016) Recorded   3  Daily Multivitamin TABS; TAKE 1 TABLET DAILY; Therapy: (Recorded:24Nov2015) to Recorded   4  Eliquis 5 MG Oral Tablet; Take 1 tablet twice daily  Requested for: 52YUK6825; Last   Rx:22Mar2017 Ordered   5  Felodipine ER 5 MG Oral Tablet Extended Release 24 Hour; Take 1 tablet once daily; Therapy: 95ZDY6723 to (Evaluate:36Ceq2537)  Requested for: 31Fmh5176; Last   Rx:92Xaz2107 Ordered   6  Fish Oil CAPS; Therapy: (Recorded:24Nov2015) to Recorded   7  Flecainide Acetate 50 MG Oral Tablet; TAKE 1 TABLET EVERY 12 HOURS DAILY; Therapy: 27DEG2473 to ([place])  Requested for: 18OPC7750; Last   Rx:31Mar2017 Ordered   8  Gabapentin 100 MG Oral Capsule; TAKE 1 TO 2 CAPSULES AT BEDTIME; Therapy: 70KKU5799 to [phone])  Requested for: 78OXA4725; Last   Rx:33Mri0409 Ordered   9  Levothyroxine Sodium 75 MCG Oral Tablet; TAKE 1 TABLET DAILY; Therapy: 33EEQ7966 to (Evaluate:68Gjg4203)  Requested for: 35Oip6554; Last   Rx:25Yrk7026 Ordered   10  Lutein CAPS; TAKE AS DIRECTED; Therapy: (Recorded:24Nov2015) to Recorded   11  Metoprolol Succinate ER 25 MG Oral Tablet Extended Release 24 Hour; TAKE 1 TABLET    DAILY; Therapy: 53PRB3921 to (Evaluate:77Smg2026)  Requested for: 73Teg5846; Last    Rx:09Lts3474 Ordered   12  Zoster (Zostavax); INJECT 0 65  ML Subcutaneous; Therapy: 74PLO2569 to (Last Rx:26Jun2017) Ordered  Medication List Reviewed: The medication list was reviewed and updated today  Allergies  1  No Known Drug Allergies  2   Adhesive Tape    Vitals  Vital Signs    Recorded: 07Sep2017 02:47PM   Heart Rate 64, R Radial   Pulse Quality Normal, R Radial   Respiration Quality Normal   Respiration 18   Systolic 994, RUE, Sitting   Diastolic 58, RUE, Sitting   Height 6 ft 1 in   Weight 249 lb 3 2 oz   BMI Calculated 32 88   BSA Calculated 2 36     Physical Exam    Carotid: right 2+,-- no bruit heard on the right,-- left 2+-- and-- no bruit on the left  Brachial: right 2+-- and-- left 2+  Radial: right 2+-- and-- left 2+  Femoral: right 2+,-- no bruit heard on the right,-- left 2+-- and-- no bruit on the left  Popliteal: right 2+-- and-- left 2+  Posterior tibialis: right 2+-- and-- left 2+  Dorsalis pedis: right 2+-- and-- left 2+  Distal Pulse Exam: Normal Capillary Refill  Pulmonary   Respiratory effort: No increased work of breathing or signs of respiratory distress  Abdomen   Abdomen: Abdomen soft, non-tender, no masses, non distended, no rebound tenderness  No palpable aneurysm  Musculoskeletal   Gait and station: Normal       Results/Data  Diagnostic Studies Reviewed Vasc: I personally reviewed the films/images/results in the office today  My interpretation follows  CT Scan Review CTA reviewed  Future Appointments    Date/Time Provider Specialty Site   05/10/2018 10:00 AM Cezar Shaw MD Neurology  [address] [facility]   09/29/2017 12:40 PM Juan Mike MD Gastroenterology Adult [address]   12/13/2017 10:30 AM Abelino Rogers DO Cardiac Surgery CT SURGERY [facility]   10/23/2017 09:30 AM CRISSY Gould  Internal Medicine [facility]   09/20/2017 11:00 AM CRISSY Morales   Cardiology [facility]     Signatures   Electronically signed by : Sharmila Ac MD; Sep  7 2017  3:34PM EST                       (Author)

## 2017-11-10 ENCOUNTER — HOSPITAL ENCOUNTER (OUTPATIENT)
Facility: HOSPITAL | Age: 67
Setting detail: OUTPATIENT SURGERY
Discharge: HOME | End: 2017-11-10
Attending: INTERNAL MEDICINE | Admitting: INTERNAL MEDICINE
Payer: MEDICARE

## 2017-11-10 ENCOUNTER — ANESTHESIA EVENT (OUTPATIENT)
Dept: GASTROENTEROLOGY | Facility: HOSPITAL | Age: 67
End: 2017-11-10
Payer: MEDICARE

## 2017-11-10 ENCOUNTER — ANESTHESIA (OUTPATIENT)
Dept: GASTROENTEROLOGY | Facility: HOSPITAL | Age: 67
End: 2017-11-10
Payer: MEDICARE

## 2017-11-10 VITALS
WEIGHT: 240 LBS | HEART RATE: 53 BPM | HEIGHT: 73 IN | SYSTOLIC BLOOD PRESSURE: 120 MMHG | RESPIRATION RATE: 16 BRPM | TEMPERATURE: 96.7 F | BODY MASS INDEX: 31.81 KG/M2 | DIASTOLIC BLOOD PRESSURE: 73 MMHG | OXYGEN SATURATION: 97 %

## 2017-11-10 DIAGNOSIS — R93.3 ABNORMAL FINDINGS ON DIAGNOSTIC IMAGING OF OTHER PARTS OF DIGESTIVE TRACT: ICD-10-CM

## 2017-11-10 PROCEDURE — 88342 IMHCHEM/IMCYTCHM 1ST ANTB: CPT | Performed by: INTERNAL MEDICINE

## 2017-11-10 PROCEDURE — 88305 TISSUE EXAM BY PATHOLOGIST: CPT | Performed by: INTERNAL MEDICINE

## 2017-11-10 RX ORDER — SODIUM CHLORIDE 9 MG/ML
50 INJECTION, SOLUTION INTRAVENOUS CONTINUOUS
Status: DISCONTINUED | OUTPATIENT
Start: 2017-11-10 | End: 2017-11-10 | Stop reason: HOSPADM

## 2017-11-10 RX ORDER — ASPIRIN 81 MG/1
81 TABLET ORAL DAILY
COMMUNITY

## 2017-11-10 RX ORDER — PROPOFOL 10 MG/ML
INJECTION, EMULSION INTRAVENOUS CONTINUOUS PRN
Status: DISCONTINUED | OUTPATIENT
Start: 2017-11-10 | End: 2017-11-10 | Stop reason: SURG

## 2017-11-10 RX ORDER — PROPOFOL 10 MG/ML
INJECTION, EMULSION INTRAVENOUS AS NEEDED
Status: DISCONTINUED | OUTPATIENT
Start: 2017-11-10 | End: 2017-11-10 | Stop reason: SURG

## 2017-11-10 RX ORDER — ATORVASTATIN CALCIUM 20 MG/1
20 TABLET, FILM COATED ORAL DAILY
COMMUNITY
End: 2019-01-02 | Stop reason: SDUPTHER

## 2017-11-10 RX ADMIN — PROPOFOL 50 MG: 10 INJECTION, EMULSION INTRAVENOUS at 11:15

## 2017-11-10 RX ADMIN — PROPOFOL 100 MCG/KG/MIN: 10 INJECTION, EMULSION INTRAVENOUS at 11:18

## 2017-11-10 RX ADMIN — PROPOFOL 50 MG: 10 INJECTION, EMULSION INTRAVENOUS at 11:17

## 2017-11-10 RX ADMIN — PROPOFOL 50 MG: 10 INJECTION, EMULSION INTRAVENOUS at 11:25

## 2017-11-10 RX ADMIN — SODIUM CHLORIDE 50 ML/HR: 0.9 INJECTION, SOLUTION INTRAVENOUS at 10:41

## 2017-11-10 NOTE — ANESTHESIA POSTPROCEDURE EVALUATION
Post-Op Assessment Note      CV Status:  Stable    Mental Status:  Awake    Hydration Status:  Euvolemic    PONV Controlled:  Controlled    Airway Patency:  Patent    Post Op Vitals Reviewed: Yes          Staff: CRNA           BP      Temp     Pulse    Resp      SpO2

## 2017-11-10 NOTE — ANESTHESIA PREPROCEDURE EVALUATION
Review of Systems/Medical History  Patient summary reviewed  Chart reviewed  No history of anesthetic complications     Cardiovascular  EKG reviewed, Exercise tolerance: good,  Hypertension controlled, Valvular heart disease , mitral regurgitation, Dysrhythmias, atrial fibrillation,    Pulmonary       GI/Hepatic            Endo/Other  History of thyroid disease , hypothyroidism,      GYN       Hematology   Musculoskeletal       Neurology   Psychology           Physical Exam    Airway    Mallampati score: II  TM Distance: >3 FB  Neck ROM: full     Dental   No notable dental hx     Cardiovascular      Pulmonary      Other Findings        Anesthesia Plan  ASA Score- 2       Anesthesia Type- IV sedation with anesthesia with ASA Monitors  Additional Monitors:   Airway Plan:           Induction- intravenous  Informed Consent- Anesthetic plan and risks discussed with patient and spouse  I personally reviewed this patient with the CRNA  Discussed and agreed on the Anesthesia Plan with the CRNA  Sagrario Abraham

## 2017-11-10 NOTE — OP NOTE
**** GI/ENDOSCOPY REPORT ****     PATIENT NAME: Gaudencio GUTIERREZ - VISIT ID:  Patient ID: [id number]   YOB: 1950     INTRODUCTION: Upper Endoscopic [ULTRASOUND] - A 79 male patient presents   for an outpatient Upper Endoscopic [ULTRASOUND] at [facility]  INDICATIONS: Abnormal CT scan (thickened wall of stomach and duodenum)  CONSENT: The benefits, risks, and alternatives to the procedure were   discussed and informed consent was obtained from the patient  PREPARATION:  EKG, pulse, pulse oximetry and blood pressure were monitored   throughout the procedure  ASA Classification: Class 2 - Patient has mild   to moderate systemic disturbance that may or may not be related to the   disorder requiring surgery  MEDICATIONS: Anesthesia-check records     PROCEDURE:  The ultrasound gastroscope was passed with ease through the   mouth under direct visualization and advanced to the 3rd portion of the   duodenum  The scope was withdrawn and the mucosa was carefully examined  The views were good  The patient's toleration of the procedure was good  FINDINGS: Visual Exam:   Esophagus: The esophagus appeared to be normal       GE junction: There was a medium-sized hiatus hernia visible in the GE   junction  Stomach: Gastritis was found in the stomach  Multiple   biopsies was taken  Duodenum: The duodenum appeared to be normal      Multiple biopsies was taken  EUS EXAM: There were no mass lesions in the   wall of the stomach or duodenum  The stomach and duodenal wall were not   thickened  There were no enlarged lymph nodes in the perigastric,   periduodenal, or celiac areas  COMPLICATIONS: There were no complications  IMPRESSIONS: Normal esophagus  A hiatus hernia found  Gastritis found  Multiple biopsies taken  Normal duodenum  Multiple biopsies taken  The   stomach and duodenal wall were not thickened       RECOMMENDATIONS: Follow-up on the results of the biopsy specimens  Follow-up appointment with endoscopist in 1 month  ESTIMATED BLOOD LOSS:     PATHOLOGY SPECIMENS: Multiple biopsies taken  Associated finding:   Gastritis  Multiple biopsies taken  Associated finding: Normal      PROCEDURE CODES:     ICD-9 Codes: 553 3 Diaphragmatic hernia without mention of obstruction or   gangrene 535 50 Unspecified gastritides and gastroduodenitis, without   mention of hemorrhage     ICD-10 Codes: K44 Diaphragmatic hernia K29 Gastritis and duodenitis     PERFORMED BY: CRISSY Olmedo  on 11/10/2017  Version 1, electronically signed by CRISSY Woo  on 11/10/2017   at 11:57

## 2017-11-15 ENCOUNTER — GENERIC CONVERSION - ENCOUNTER (OUTPATIENT)
Dept: OTHER | Facility: OTHER | Age: 67
End: 2017-11-15

## 2017-11-30 ENCOUNTER — APPOINTMENT (OUTPATIENT)
Dept: LAB | Facility: CLINIC | Age: 67
End: 2017-11-30
Payer: MEDICARE

## 2017-11-30 ENCOUNTER — GENERIC CONVERSION - ENCOUNTER (OUTPATIENT)
Dept: OTHER | Facility: OTHER | Age: 67
End: 2017-11-30

## 2017-11-30 DIAGNOSIS — Z11.59 ENCOUNTER FOR SCREENING FOR OTHER VIRAL DISEASES: ICD-10-CM

## 2017-11-30 DIAGNOSIS — I77.79 DISSECTION OF OTHER SPECIFIED ARTERY (HCC): ICD-10-CM

## 2017-11-30 LAB
ANION GAP SERPL CALCULATED.3IONS-SCNC: 4 MMOL/L (ref 4–13)
BUN SERPL-MCNC: 19 MG/DL (ref 5–25)
CALCIUM SERPL-MCNC: 8.9 MG/DL (ref 8.3–10.1)
CHLORIDE SERPL-SCNC: 108 MMOL/L (ref 100–108)
CO2 SERPL-SCNC: 30 MMOL/L (ref 21–32)
CREAT SERPL-MCNC: 0.9 MG/DL (ref 0.6–1.3)
GFR SERPL CREATININE-BSD FRML MDRD: 88 ML/MIN/1.73SQ M
GLUCOSE P FAST SERPL-MCNC: 100 MG/DL (ref 65–99)
HCV AB SER QL: NORMAL
POTASSIUM SERPL-SCNC: 4.9 MMOL/L (ref 3.5–5.3)
SODIUM SERPL-SCNC: 142 MMOL/L (ref 136–145)

## 2017-11-30 PROCEDURE — 36415 COLL VENOUS BLD VENIPUNCTURE: CPT

## 2017-11-30 PROCEDURE — 86803 HEPATITIS C AB TEST: CPT

## 2017-11-30 PROCEDURE — 80048 BASIC METABOLIC PNL TOTAL CA: CPT

## 2017-12-05 ENCOUNTER — HOSPITAL ENCOUNTER (OUTPATIENT)
Dept: RADIOLOGY | Age: 67
Discharge: HOME | End: 2017-12-05
Payer: MEDICARE

## 2017-12-05 DIAGNOSIS — I71.2 THORACIC AORTIC ANEURYSM WITHOUT RUPTURE (HCC): ICD-10-CM

## 2017-12-05 DIAGNOSIS — Z91.89 OTHER SPECIFIED PERSONAL RISK FACTORS, NOT ELSEWHERE CLASSIFIED: ICD-10-CM

## 2017-12-05 DIAGNOSIS — I48.0 PAROXYSMAL ATRIAL FIBRILLATION (HCC): ICD-10-CM

## 2017-12-05 PROCEDURE — 71275 CT ANGIOGRAPHY CHEST: CPT

## 2017-12-05 RX ADMIN — IOHEXOL 100 ML: 350 INJECTION, SOLUTION INTRAVENOUS at 08:49

## 2017-12-07 ENCOUNTER — GENERIC CONVERSION - ENCOUNTER (OUTPATIENT)
Dept: OTHER | Facility: OTHER | Age: 67
End: 2017-12-07

## 2017-12-13 ENCOUNTER — GENERIC CONVERSION - ENCOUNTER (OUTPATIENT)
Dept: OTHER | Facility: OTHER | Age: 67
End: 2017-12-13

## 2018-01-09 NOTE — MISCELLANEOUS
Provider Comments  Provider Comments:   Pt was a n/s  SWP he forgot, but he did set up a new appt        Signatures   Electronically signed by : CRISSY Zhu ; Oct 20 2016 11:51AM EST                       (Author)

## 2018-01-11 NOTE — PROGRESS NOTES
Chief Complaint  BP: 126/82, HR: 48  All medications and allergies reviewed  No complaints of chest pain, palpitations, swelling in LE  Pt states symptoms have improved since cardioversion  Active Problems    1  AAA (abdominal aortic aneurysm) (441 4) (I71 4)   2  Advance directive discussed with patient (V65 49) (Z71 89)   3  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 2) (I72 3)   4  Aneurysm of thoracic aorta (441 2) (I71 2)   5  Atrial fibrillation, persistent (427 31) (I48 1)   6  Atypical cluster headache (339 00) (G44 009)   7  Benign prostatic hypertrophy without urinary obstruction (600 00) (N40 0)   8  Candidate for statin therapy due to risk of future cardiovascular event (V49 89) (Z91 89)   9  Chronic nonintractable headache, unspecified headache type (784 0) (R51)   10  Hypertension (401 9) (I10)   11  Hypothyroidism (244 9) (E03 9)   12  Idiopathic peripheral neuropathy (356 9) (G60 9)   13  Lumbago (724 2) (M54 5)   14  Mild persistent asthma (493 90) (J45 30)   15  Need for prophylactic vaccination and inoculation against influenza (V04 81) (Z23)   16  Need for Tdap vaccination (V06 1) (Z23)   17  Need for vaccination with 13-polyvalent pneumococcal conjugate vaccine (V03 82) (Z23)   18  Palpitations (785 1) (R00 2)   19  Paroxysmal atrial fibrillation (427 31) (I48 0)   20  Rectal bleeding (569 3) (K62 5)   21  Screening for genitourinary condition (V81 6) (Z13 89)   22  Tendonitis (726 90) (M77 9)    Current Meds   1  Atorvastatin Calcium 20 MG Oral Tablet; TAKE 1 TABLET DAILY; Therapy: 20CXU8743 to (Evaluate:42Hzt9806)  Requested for: 28Mfh2861; Last   Rx:09Kwa8783 Ordered   2  Co Q-10 100 MG Oral Capsule; 1 CAPSULE DAILY; Therapy: 40GBQ3338 to (Evaluate:31Oct2016) Recorded   3  Daily Multivitamin TABS; TAKE 1 TABLET DAILY; Therapy: (Recorded:24Nov2015) to Recorded   4  Eliquis 5 MG Oral Tablet; Take 1 tablet twice daily  Requested for: 18DEL7360; Last   Rx:21Mar2016 Ordered   5   Felodipine ER 5 MG Oral Tablet Extended Release 24 Hour; Take 1 tablet daily; Therapy: 53NZQ6991 to (Evaluate:92Loz2733); Last Rx:64Fdk9512 Ordered   6  Fish Oil CAPS; Therapy: (Recorded:24Nov2015) to Recorded   7  Flecainide Acetate 100 MG Oral Tablet; TAKE 1 TABLET EVERY 12 HOURS DAILY; Therapy: 50RQV8906 to (Evaluate:19Mar2017)  Requested for: 39MGB7759; Last   Rx:18Jan2017 Ordered   8  Gabapentin 100 MG Oral Capsule; take 1 capsule daily; Therapy: 41OWQ0346 to  Requested for: 55KFP6914 Recorded   9  Levothyroxine Sodium 75 MCG Oral Tablet; TAKE 1 TABLET DAILY; Therapy: 26NYB7128 to (Evaluate:21Zwq1297)  Requested for: 00Pbs4503; Last   Rx:19Ola6364 Ordered   10  Lutein CAPS; TAKE AS DIRECTED; Therapy: (Recorded:24Nov2015) to Recorded   11  Metoprolol Succinate ER 25 MG Oral Tablet Extended Release 24 Hour; TAKE 1 TABLET    DAILY; Therapy: 57GMB7847 to (Evaluate:23Fvq2222)  Requested for: 28Saz7203; Last    Rx:65Zvh5142 Ordered    Allergies    1  No Known Drug Allergies    2  Adhesive Tape    Vitals  Signs    Heart Rate: 48  Systolic: 012, RUE, Sitting  Diastolic: 82, RUE, Sitting    Plan  Atrial fibrillation, persistent    · EKG/ECG- POC; Status:Complete;   Done: 60ASF9329    Future Appointments    Date/Time Provider Specialty Site   05/09/2017 10:00 AM Bonny Garrido MD Neurology [address]   02/27/2017 10:00 AM CRISSY Alvarez  Internal Medicine [facility]   03/01/2017 09:00 AM CRISSY Amado   Cardiology [facility]     Signatures   Electronically signed by : CRISSY Anna ; Feb 6 2017  3:47PM EST

## 2018-01-11 NOTE — RESULT NOTES
Discussion/Summary   Negative     Verified Results  (1) TISSUE EXAM [id number] 11:28AM Deon Koenig     Test Name Result Flag Reference   LAB AP CASE REPORT (Report)     Surgical Pathology Report             Case: [id number]                   Authorizing Provider: Livier Dawn MD      Collected:      11/10/2017 1128        Ordering Location:   [address] [place] [address]   Received:      11/10/2017 Alejandro Alvarez [facility]                               Pathologist:      Kandice Grace MD                             Specimens:  A) - Duodenum, r/o celiac                                       B) - Stomach, Gastritis r/o H pylori   LAB AP FINAL DIAGNOSIS (Report)     A  Duodenum, biopsy:  - Benign duodenal mucosa  - No villous atrophy, intraepithelial lymphocytosis or crypt hyperplasia;   negative for features of malabsorptive enteropathy   - Negative for chronic or active duodenitis, dysplasia or malignancy  B  Stomach, biopsy:  - Chronic inactive oxyntic and antral gastritis  - Negative for Helicobacter pylori, confirmed by immunohistochemical   stain, evaluated with appropriate positive controls  - Negative for atrophy, intestinal metaplasia, dysplasia or carcinoma  Electronically signed by Kandice Grace MD on 11/14/2017 at 3:54 PM   LAB AP SURGICAL ADDITIONAL INFORMATION (Report)     All controls performed with the immunohistochemical stains reported above   reacted appropriately  These tests were developed and their performance   characteristics determined by nelly [facility] or   [facility]  They may not be cleared or approved by the [place]  Food and Drug Administration  The FDA has determined that such clearance   or approval is not necessary  These tests are used for clinical purposes  They should not be regarded as investigational or for research   This   laboratory has been approved by CLIA 88, designated as a high-complexity   laboratory and is qualified to perform these tests  Interpretation performed at [facility], [address] [place] [address], [address]   LAB AP GROSS DESCRIPTION (Report)     A  The specimen is received in formalin, labeled with the patient's name   and hospital number, and is designated Duodenum rule out celiac  The   specimen consists of 2 tan soft tissue fragments each measuring 0 3 cm  Entirely submitted  One cassette  B  The specimen is received in formalin, labeled with the patient's name   and hospital number, and is designated Stomach gastritis rule out H    Pylori  The specimen consists of 2 tan soft tissue fragments measuring   0 2 and 0 4 cm  Entirely submitted  One cassette  Note: The estimated total formalin fixation time based upon information   provided by the submitting clinician and the standard processing schedule   is less than 72 hours      [facility]

## 2018-01-11 NOTE — PROCEDURES
Procedures by Lina Ridley MD  at 1/30/2017  8:45 AM      Author:  Lina Ridley MD Service:  Cardiology Author Type:  Physician     Filed:  1/30/2017  8:48 AM Date of Service:  1/30/2017  8:45 AM Status:  Signed     :  Lina Ridley MD (Physician)         Pre-procedure Diagnoses:       1  Atrial fibrillation [I48 91]                Post-procedure Diagnoses:       1  Atrial fibrillation status post cardioversion [I48 91]                Procedures:       1  ELECTRICAL CARDIOVERSION [EUO123 (Custom)]                   Indication: Symptomatic atrial flutter/ fibrillation with left ventricular systolic dysfunction  Anti-coagulation: Apixaban  Anesthesia: Conscious sedation provided by anesthesiology  propofol 80 mg IV  Electrical Pad Placement: Anteriorly LV apical region and upper sternum  Successful cardioversion following two synchronized biphasic shock at 175 and 300 J  Complications: None  Sinus rhythm documented by 12 lead ECG  Disposition: Cath lab recovery area    Further management per admitting team            Received for:Virgilio Lou MD  Jan 30 2017  8:49AM [place] Standard Time

## 2018-01-12 NOTE — RESULT NOTES
Verified Results  (1) LIPID PANEL FASTING W DIRECT LDL REFLEX [id number] 07:59AM Lian Hamilton   Triglyceride:         Normal              <150 mg/dl       Borderline High    150-199 mg/dl       High               200-499 mg/dl       Very High          >499 mg/dl  Cholesterol:         Desirable        <200 mg/dl      Borderline High  200-239 mg/dl      High             >239 mg/dl  HDL Cholesterol:        High    >59 mg/dL      Low     <41 mg/dL  LDL Cholesterol:        Optimal          <100 mg/dl         Near Optimal     100-129 mg/dl        Above Optimal          Borderline High   130-159 mg/dl          High              160-189 mg/dl          Very High        >189 mg/dl  LDL CALCULATED:    This screening LDL is a calculated result  It does not have the accuracy of the Direct Measured LDL in the monitoring of patients with hyperlipidemia and/or statin therapy  Direct Measure LDL (ZJV430) must be ordered separately in these patients  Test Name Result Flag Reference   CHOLESTEROL 120 mg/dL     LDL CHOLESTEROL CALCULATED 49 mg/dL  0-100   TRIGLYCERIDES 77 mg/dL  <=150   Specimen collection should occur prior to N-Acetylcysteine or Metamizole administration due to the potential for falsely depressed results  HDL,DIRECT 56 mg/dL  40-60   E550     (1) COMPREHENSIVE METABOLIC PANEL 58DYO9968 20:92PX [facility] Kidney Disease Education Program recommendations are as follows:  GFR calculation is accurate only with a steady state creatinine  Chronic Kidney disease less than 60 ml/min/1 73 sq  meters  Kidney failure less than 15 ml/min/1 73 sq  meters  Test Name Result Flag Reference   GLUCOSE,RANDM 94 mg/dL     If the patient is fasting, the ADA then defines impaired fasting glucose as > 100 mg/dL and diabetes as > or equal to 123 mg/dL     SODIUM 138 mmol/L  136-145   POTASSIUM 4 6 mmol/L  3 5-5 3   CHLORIDE 104 mmol/L  100-108   CARBON DIOXIDE 29 mmol/L  21-32   ANION GAP (CALC) 5 mmol/L  4-13   BLOOD UREA NITROGEN 18 mg/dL  5-25   CREATININE 0 95 mg/dL  0 60-1 30   Standardized to IDMS reference method   CALCIUM 8 7 mg/dL  8 3-10 1   BILI, TOTAL 0 97 mg/dL  0 20-1 00   ALK PHOSPHATAS 82 U/L     ALT (SGPT) 35 U/L  12-78   AST(SGOT) 18 U/L  5-45   ALBUMIN 3 9 g/dL  3 5-5 0   TOTAL PROTEIN 6 6 g/dL  6 4-8 2   eGFR Non-African American      >60 0 ml/min/1 73sq m

## 2018-01-12 NOTE — RESULT NOTES
Discussion/Summary   will discuss at upcoming appt     Verified Results  (1) T4, FREE 13Jun2017 07:55AM Ale Vuong Order Number: [id number]     Test Name Result Flag Reference   T4,FREE 0 98 ng/dL  0 76-1 46     (1) TSH 39MLR3625 07:55AM Ale Vuong Order Number: [id number]     Test Name Result Flag Reference   TSH 2 140 uIU/mL  0 358-3 740   - Patient Instructions: This bloodwork is non-fasting  Please drink two glasses of water morning of bloodwork  Patients undergoing fluorescein dye angiography may retain small amounts of fluorescein in the body for 48-72 hours post procedure  Samples containing fluorescein can produce falsely depressed TSH values  If the patient had this procedure,a specimen should be resubmitted post fluorescein clearance  (1) LIPID PANEL, FASTING 13Jun2017 07:55AM Ale Vuong Order Number: [id number]     Test Name Result Flag Reference   CHOLESTEROL 115 mg/dL     HDL,DIRECT 51 mg/dL  40-60   Specimen collection should occur prior to Metamizole administration due to the potential for falsely depressed results  LDL CHOLESTEROL CALCULATED 47 mg/dL  0-100   - Patient Instructions: This is a fasting blood test  Water,black tea or black  coffee only after 9:00pm the night before test   Drink 2 glasses of water the morning of test       Triglyceride:         Normal              <150 mg/dl       Borderline High    150-199 mg/dl       High               200-499 mg/dl       Very High          >499 mg/dl  Cholesterol:         Desirable        <200 mg/dl      Borderline High  200-239 mg/dl      High             >239 mg/dl  HDL Cholesterol:        High    >59 mg/dL      Low     <41 mg/dL  LDL CALCULATED:    This screening LDL is a calculated result  It does not have the accuracy of the Direct Measured LDL in the monitoring of patients with hyperlipidemia and/or statin therapy     Direct Measure LDL (HHK022) must be ordered separately in these patients  TRIGLYCERIDES 83 mg/dL  <=150   Specimen collection should occur prior to N-Acetylcysteine or Metamizole administration due to the potential for falsely depressed results  (1) CBC/PLT/DIFF 76PYH7227 07:55AM Awa Small Order Number: [id number]     Test Name Result Flag Reference   WBC COUNT 5 91 Thousand/uL  4 31-10 16   RBC COUNT 4 92 Million/uL  3 88-5 62   HEMOGLOBIN 14 8 g/dL  12 0-17 0   HEMATOCRIT 43 8 %  36 5-49 3   MCV 89 fL  82-98   MCH 30 1 pg  26 8-34 3   MCHC 33 8 g/dL  31 4-37 4   RDW 13 0 %  11 6-15 1   MPV 10 8 fL  8 9-12 7   PLATELET COUNT 997 Thousands/uL  149-390   nRBC AUTOMATED 0 /100 WBCs     NEUTROPHILS RELATIVE PERCENT 66 %  43-75   LYMPHOCYTES RELATIVE PERCENT 20 %  14-44   MONOCYTES RELATIVE PERCENT 10 %  4-12   EOSINOPHILS RELATIVE PERCENT 3 %  0-6   BASOPHILS RELATIVE PERCENT 1 %  0-1   NEUTROPHILS ABSOLUTE COUNT 3 92 Thousands/? ??L  1 85-7 62   LYMPHOCYTES ABSOLUTE COUNT 1 15 Thousands/? ??L  0 60-4 47   MONOCYTES ABSOLUTE COUNT 0 60 Thousand/? ??L  0 17-1 22   EOSINOPHILS ABSOLUTE COUNT 0 18 Thousand/? ??L  0 00-0 61   BASOPHILS ABSOLUTE COUNT 0 05 Thousands/? ??L  0 00-0 10   - Patient Instructions: This bloodwork is non-fasting  Please drink two glasses of water morning of bloodwork       (1) COMPREHENSIVE METABOLIC PANEL 71JPT0967 49:68PI Awa Small Order Number: [id number]     Test Name Result Flag Reference   SODIUM 142 mmol/L  136-145   POTASSIUM 4 7 mmol/L  3 5-5 3   CHLORIDE 108 mmol/L  100-108   CARBON DIOXIDE 27 mmol/L  21-32   ANION GAP (CALC) 7 mmol/L  4-13   BLOOD UREA NITROGEN 21 mg/dL  5-25   CREATININE 0 95 mg/dL  0 60-1 30   Standardized to IDMS reference method   CALCIUM 8 6 mg/dL  8 3-10 1   BILI, TOTAL 0 59 mg/dL  0 20-1 00   ALK PHOSPHATAS 77 U/L     ALT (SGPT) 36 U/L  12-78   AST(SGOT) 23 U/L  5-45   ALBUMIN 3 8 g/dL  3 5-5 0   TOTAL PROTEIN 6 7 g/dL  6 4-8 2   eGFR Non- >60 0 ml/min/1 73sq m   [facility] [employer] Disease Education Program recommendations are as follows:  GFR calculation is accurate only with a steady state creatinine  Chronic Kidney disease less than 60 ml/min/1 73 sq  meters  Kidney failure less than 15 ml/min/1 73 sq  meters  GLUCOSE FASTING 100 mg/dL H 65-99     (1) PSA, DIAGNOSTIC (FOLLOW-UP) [id number] 07:55AM Isla Cranker    Order Number: [id number]     Test Name Result Flag Reference   PSA 0 7 ng/mL  0 0-4 0   American Urological Association Guidelines define biochemical recurrence of prostate cancer as a detectable or rising PSA value post-radical prostatectomy that is greater than or equal to 0 2 ng/mL with a second confirmatory level of greater than or equal to 0 2 ng/mL

## 2018-01-13 VITALS
TEMPERATURE: 98.3 F | WEIGHT: 245 LBS | OXYGEN SATURATION: 96 % | HEART RATE: 51 BPM | BODY MASS INDEX: 32.32 KG/M2 | DIASTOLIC BLOOD PRESSURE: 84 MMHG | SYSTOLIC BLOOD PRESSURE: 120 MMHG | RESPIRATION RATE: 18 BRPM

## 2018-01-13 VITALS
WEIGHT: 246 LBS | RESPIRATION RATE: 16 BRPM | TEMPERATURE: 97.6 F | HEART RATE: 58 BPM | OXYGEN SATURATION: 97 % | BODY MASS INDEX: 32.6 KG/M2 | HEIGHT: 73 IN | DIASTOLIC BLOOD PRESSURE: 78 MMHG | SYSTOLIC BLOOD PRESSURE: 122 MMHG

## 2018-01-13 VITALS
DIASTOLIC BLOOD PRESSURE: 79 MMHG | WEIGHT: 245 LBS | SYSTOLIC BLOOD PRESSURE: 131 MMHG | HEIGHT: 73 IN | RESPIRATION RATE: 16 BRPM | BODY MASS INDEX: 32.47 KG/M2 | HEART RATE: 60 BPM

## 2018-01-13 VITALS
BODY MASS INDEX: 33.14 KG/M2 | HEART RATE: 57 BPM | HEIGHT: 73 IN | WEIGHT: 250.05 LBS | OXYGEN SATURATION: 97 % | RESPIRATION RATE: 16 BRPM | TEMPERATURE: 98 F | DIASTOLIC BLOOD PRESSURE: 80 MMHG | SYSTOLIC BLOOD PRESSURE: 118 MMHG

## 2018-01-13 VITALS
DIASTOLIC BLOOD PRESSURE: 88 MMHG | HEIGHT: 73 IN | HEART RATE: 54 BPM | WEIGHT: 247 LBS | BODY MASS INDEX: 32.74 KG/M2 | SYSTOLIC BLOOD PRESSURE: 130 MMHG

## 2018-01-13 VITALS
BODY MASS INDEX: 32.72 KG/M2 | WEIGHT: 248 LBS | RESPIRATION RATE: 16 BRPM | DIASTOLIC BLOOD PRESSURE: 78 MMHG | TEMPERATURE: 98 F | HEART RATE: 52 BPM | OXYGEN SATURATION: 95 % | SYSTOLIC BLOOD PRESSURE: 120 MMHG

## 2018-01-13 VITALS — HEART RATE: 48 BPM | SYSTOLIC BLOOD PRESSURE: 126 MMHG | DIASTOLIC BLOOD PRESSURE: 82 MMHG

## 2018-01-13 VITALS
HEART RATE: 57 BPM | BODY MASS INDEX: 32.74 KG/M2 | SYSTOLIC BLOOD PRESSURE: 124 MMHG | HEIGHT: 73 IN | WEIGHT: 247 LBS | DIASTOLIC BLOOD PRESSURE: 82 MMHG

## 2018-01-13 VITALS
DIASTOLIC BLOOD PRESSURE: 58 MMHG | SYSTOLIC BLOOD PRESSURE: 110 MMHG | RESPIRATION RATE: 18 BRPM | WEIGHT: 249.2 LBS | HEART RATE: 64 BPM | BODY MASS INDEX: 33.03 KG/M2 | HEIGHT: 73 IN

## 2018-01-13 NOTE — RESULT NOTES
Verified Results  CTA CHEST Kymberly Leghayde CONTRAST [id number] 08:04AM Susan Vivas Order Number: [id number]     Test Name Result Flag Reference   CTA CHEST WO W CONTRAST (Report)     CT ANGIOGRAM OF THE CHEST, WITH AND WITHOUT IV CONTRAST     INDICATION: Thoracic aneurysm follow-up     COMPARISON: March 4, 2016     TECHNIQUE: CT angiogram examination of the chest was performed according to standard protocol  Contrast as well as noncontrast images were obtained  This examination, like all CT scans performed in the [facility], was performed    utilizing techniques to minimize radiation dose exposure, including the use of iterative reconstruction and automated exposure control  3D reconstructions were performed an independent workstation, and are supplied for review  IV Contrast: iohexol (OMNIPAQUE) 350 MG/ML injection (MULTI-DOSE) 100 mL Note: (SINGLE DOSE/MULTI DOSE) information refers to the container from which the contrast was acquired  Contrast was injected one time intravenously without immediate    complication  FINDINGS:     VASCULAR STRUCTURES: There is a fusiform aneurysm of the ascending aorta which is stable from study performed earlier this year  Maximal diameter is measured at 46 mm in the mid descending segment  Proximal and distal measurements are 44 mm and 46 mm,    respectively there is a bovine type aortic arch with no significant proximal great vessel stenosis  The descending thoracic aorta is normal in caliber  No significant atherosclerotic disease is seen  There are separate origins of the common hepatic    artery and a splenogastric trunk  The common hepatic artery contains a short segment dissection proximally  There is a small saccular aneurysm at that level probably mildly dilated focal false lumen measuring 9mm in the coronal plane stable to slightly    decreased from previous  The superior mesenteric artery is widely patent   There is no renal artery stenosis  OTHER FINDINGS:      HEART: Normal cardiac size  No pericardial effusions  LUNGS: Unremarkable  PLEURA: No pleural effusion  MEDIASTINUM AND GERMÁN: No mass or significant lymphadenopathy  CHEST WALL AND LOWER NECK: Unremarkable  VISUALIZED STRUCTURES IN THE UPPER ABDOMEN: Unremarkable  IMPRESSION:       1  Stable 46 mm fusiform aneurysm of the ascending aorta   2  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

## 2018-01-14 VITALS
WEIGHT: 244.13 LBS | HEIGHT: 73 IN | HEART RATE: 58 BPM | SYSTOLIC BLOOD PRESSURE: 124 MMHG | BODY MASS INDEX: 32.36 KG/M2 | DIASTOLIC BLOOD PRESSURE: 82 MMHG

## 2018-01-14 VITALS
SYSTOLIC BLOOD PRESSURE: 123 MMHG | OXYGEN SATURATION: 96 % | DIASTOLIC BLOOD PRESSURE: 83 MMHG | WEIGHT: 242.5 LBS | TEMPERATURE: 98.1 F | HEART RATE: 51 BPM | BODY MASS INDEX: 32.14 KG/M2 | HEIGHT: 73 IN

## 2018-01-15 NOTE — PROGRESS NOTES
Assessment    1  Encounter for preventive health examination (V70 0) (Z00 00)    Plan  Benign prostatic hypertrophy without urinary obstruction    · (1) PSA, DIAGNOSTIC (FOLLOW-UP); Status:Active; Requested for:01May2017;   Hypertension    · (1) CBC/PLT/DIFF; Status:Active; Requested for:01May2017;    · (1) COMPREHENSIVE METABOLIC PANEL; Status:Active; Requested for:01May2017;   Hypertension, Paroxysmal atrial fibrillation    · (1) LIPID PANEL, FASTING; Status:Active; Requested for:01May2017;   Hypothyroidism    · (1) T4, FREE; Status:Active; Requested for:01May2017;    · (1) TSH; Status:Active; Requested for:01May2017;     Discussion/Summary  Impression: Subsequent Annual Wellness Visit, with preventive exam as well as age and risk appropriate counseling completed  Cardiovascular screening and counseling: the risks and benefits of screening were discussed and screening is current  Diabetes screening and counseling: the risks and benefits of screening were discussed and screening is current  Colorectal cancer screening and counseling: the risks and benefits of screening were discussed and screening is current  Prostate cancer screening and counseling: the risks and benefits of screening were discussed  Immunizations: the risks and benefits of influenza vaccination were discussed with the patient, the risks and benefits of pneumococcal vaccination were discussed with the patient, needs Pneumovax 23 in Dec 2017, the risks and benefits of the Zostavax vaccine were discussed with the patient, the risks and benefits of the Tdap vaccine were discussed with the patient and Tdap vaccination up to date  Chief Complaint  The patient presents today for annual Medicare wellness  History of Present Illness  Welcome to Medicare and Wellness Visits: The patient is being seen for the subsequent annual wellness visit     Medicare Screening and Risk Factors   Hospitalizations: he has been previously hospitalizied and one on past year  Medicare Screening Tests Risk Questions   Drug and Alcohol Use: The patient has never smoked cigarettes  The patient reports frequent alcohol use  Alcohol concern:   The patient has no concerns about alcohol abuse  Diet and Physical Activity: Current diet includes well balanced meals  He exercises daily  Exercise: walking, biking  Mood Disorder and Cognitive Impairment Screening: He denies feeling down, depressed, or hopeless over the past two weeks  He denies feeling little interest or pleasure in doing things over the past two weeks  Cognitive impairment screening: denies difficulty learning/retaining new information, denies difficulty handling complex tasks, denies difficulty with reasoning, denies difficulty with spatial ability and orientation, denies difficulty with language and denies difficulty with behavior  Functional Ability/Level of Safety: Hearing is normal bilaterally  The patient is currently able to do activities of daily living without limitations, able to do instrumental activities of daily living without limitations, able to participate in social activities without limitations and able to drive without limitations  Fall risk factors: The patient fell 0 times in the past 12 months  Home safety risk factors:  no grab bars in the bathroom, but no unfamiliar surroundings, no loose rugs, no poor household lighting, no uneven floors, no household clutter and handrails on the stairs  Co-Managers and Medical Equipment/Suppliers: See Patient Care Team      Patient Care Team    Care Team Member Role Specialty Office Number   Tamir Andino MD  Vascular Surgery [phone]   [facility]  Cardiology [phone] MD Referring Internal Medicine [phone], Travel Clinic Specialist  [phone]     Review of Systems    Constitutional: no fever, no chills and no fatigue     Cardiovascular: palpitations, but no chest pain, the heart is not racing, no lightheadedness and no lower extremity edema  Respiratory: no shortness of breath, no wheezing and no cough  Gastrointestinal: no abdominal pain, no nausea, no vomiting, no diarrhea and no constipation  Genitourinary: no dysuria  Psychiatric: no anxiety and no depression  Active Problems    1  AAA (abdominal aortic aneurysm) (441 4) (I71 4)   2  Advance directive discussed with patient (V65 49) (Z71 89)   3  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 2) (I72 3)   4  Aneurysm of thoracic aorta (441 2) (I71 2)   5  Atrial fibrillation, persistent (427 31) (I48 1)   6  Atypical cluster headache (339 00) (G44 009)   7  Benign prostatic hypertrophy without urinary obstruction (600 00) (N40 0)   8  Candidate for statin therapy due to risk of future cardiovascular event (V49 89) (Z91 89)   9  Chronic nonintractable headache, unspecified headache type (784 0) (R51)   10  Hypertension (401 9) (I10)   11  Hypothyroidism (244 9) (E03 9)   12  Idiopathic peripheral neuropathy (356 9) (G60 9)   13  Lumbago (724 2) (M54 5)   14  Mild persistent asthma (493 90) (J45 30)   15  Need for prophylactic vaccination and inoculation against influenza (V04 81) (Z23)   16  Need for Tdap vaccination (V06 1) (Z23)   17  Need for vaccination with 13-polyvalent pneumococcal conjugate vaccine (V03 82) (Z23)   18  Palpitations (785 1) (R00 2)   19  Paroxysmal atrial fibrillation (427 31) (I48 0)   20  Rectal bleeding (569 3) (K62 5)   21  Screening for genitourinary condition (V81 6) (Z13 89)   22   Tendonitis (726 90) (M77 9)    Past Medical History    · History of neuropathy (V12 49) (Z86 69)    Surgical History    · History of Cataract Surgery   · History of Retinal Detachment Complete Air Fill Confirmed    Family History  Mother    · Family history of cerebrovascular accident (CVA) (V17 1) (Z82 3)  Father    · Family history of    · Family history of abdominal aortic aneurysm (V17 49) (Z82 49)  Grandmother · Family history of malignant neoplasm (V16 9) (Z80 9)  Maternal Grandmother    · Family history of malignant neoplasm (V16 9) (Z80 9)  Paternal Grandmother    · Family history of malignant neoplasm (V16 9) (Z80 9)  Grandfather    · Family history of malignant neoplasm (V16 9) (Z80 9)  Maternal Grandfather    · Family history of malignant neoplasm (V16 9) (Z80 9)  Paternal Grandfather    · Family history of malignant neoplasm (V16 9) (Z80 9)    Social History    · Never smoked cigarettes (V49 89) (Z78 9)  The social history was reviewed and updated today  Current Meds   1  Atorvastatin Calcium 20 MG Oral Tablet; TAKE 1 TABLET DAILY; Therapy: 90PJF6139 to (Evaluate:41Jzn0433)  Requested for: 27Mii2212; Last   Rx:23Ezp7155 Ordered   2  Co Q-10 100 MG Oral Capsule; 1 CAPSULE DAILY; Therapy: 20OSN7296 to (Evaluate:31Oct2016) Recorded   3  Daily Multivitamin TABS; TAKE 1 TABLET DAILY; Therapy: (Recorded:24Nov2015) to Recorded   4  Eliquis 5 MG Oral Tablet; Take 1 tablet twice daily  Requested for: 05ICW9873; Last   Rx:21Mar2016 Ordered   5  Felodipine ER 5 MG Oral Tablet Extended Release 24 Hour; Take 1 tablet daily; Therapy: 20HJK7967 to (Evaluate:17Gpy6814); Last Rx:52Hle6030 Ordered   6  Fish Oil CAPS; Therapy: (Recorded:24Nov2015) to Recorded   7  Flecainide Acetate 100 MG Oral Tablet; Therapy: 53Fbb9759 to Recorded   8  Flecainide Acetate 50 MG Oral Tablet; TAKE 1 TABLET EVERY 12 HOURS DAILY; Therapy: 14PDO0190 to (Evaluate:07Jun2017)  Requested for: 60ZTK8348; Last   Rx:05Eap0246 Ordered   9  Gabapentin 100 MG Oral Capsule; take 1 capsule daily; Therapy: 43KXM3284 to  Requested for: 21VQW1967 Recorded   10  Levothyroxine Sodium 75 MCG Oral Tablet; TAKE 1 TABLET DAILY; Therapy: 39NAV2465 to (Evaluate:43Jht5305)  Requested for: 74Dqo2864; Last    Rx:40Iuc7834 Ordered   11  Lutein CAPS; TAKE AS DIRECTED; Therapy: (Recorded:24Nov2015) to Recorded   12   Metoprolol Succinate ER 25 MG Oral Tablet Extended Release 24 Hour; TAKE 1 TABLET    DAILY; Therapy: 09ZZM1452 to (Evaluate:29Sif3280)  Requested for: 46Ozy0496; Last    Rx:19Xxc6208 Ordered    The medication list was reviewed and updated today  Allergies    1  No Known Drug Allergies    2  Adhesive Tape    Immunizations   ** Printed in Appendix #1 below  Vitals  Signs    Temperature: 97 7 F, Oral  Heart Rate: 55  Respiration: 16  Systolic: 816, Sitting  Diastolic: 80, Sitting  Height: 6 ft 1 in  Weight: 248 lb 0 8 oz  BMI Calculated: 32 73  BSA Calculated: 2 36  O2 Saturation: 96    Physical Exam    Constitutional   General appearance: No acute distress, well appearing and well nourished  Head and Face   Head and face: Normal     Eyes   Conjunctiva and lids: No erythema, swelling or discharge  Ears, Nose, Mouth, and Throat   External inspection of ears and nose: Normal     Neck   Neck: Supple, symmetric, trachea midline, no masses  Thyroid: Normal, no thyromegaly  Pulmonary   Respiratory effort: No increased work of breathing or signs of respiratory distress  Auscultation of lungs: Clear to auscultation  Cardiovascular   Auscultation of heart: Normal rate and rhythm, normal S1 and S2, no murmurs  Carotid pulses: 2+ bilaterally  Examination of extremities for edema and/or varicosities: Normal     Musculoskeletal   Gait and station: Normal     Neurologic   Cortical function: Normal mental status  Psychiatric   Recent and remote memory: Intact  Mood and affect: Normal        Future Appointments    Date/Time Provider Specialty Site   05/09/2017 10:00 AM Murali Rivas MD Neurology [address]   03/01/2017 10:00 AM Woo Green DO Cardiology [facility] CARDIOLOG[facility]   03/01/2017 09:00 AM CRISSY Hickey   Cardiology [facility]     Signatures   Electronically signed by : CRISSY Lundy ; Feb 27 2017 10:19AM MOIRA (Author)    Appendix #1     Patient: Mily Boo ; : 1950; MRN: [id number]      1 2 3 4 5    Influenza  16-Nov-2010 19-Sep-2012 24-Sep-2014 16-Sep-2015 29-Nov-2016    PCV  2016        Tdap  2016

## 2018-01-17 NOTE — RESULT NOTES
Verified Results  NM MYOCARDIAL PERFUSION SPECT (RX STRESS AND/OR REST) [id number] 07:30AM Kamaljit Waldrop Order Number: [id number]     Test Name Result Flag Reference   NM MYOCARDIAL PERFUSION SPECT (RX STRESS AND/OR REST) (Report)     [facility] [address] [place] [address]   [phone]     Rest/Stress Gated SPECT Myocardial Perfusion Imaging After Exercise     Patient: Yvette Noguera   MR number: [id number]   Account number: [de-identified]   : 1950   Age: 77 years   Gender: Male   Status: Outpatient   Location: [address] [place] [address] [facility] [place]   Height: 73 in   Weight: 233 lb   BP: 144/ 100 mmHg     Allergies: NO KNOWN ALLERGIES     Referring Physician: Valarie Zarate DO   Primary Physician: Marlin Diggs MD   RN: Alejandro Florez RN   Group: [facility]   Report Prepared by[de-identified] Alejandro Florez RN   RN: Fallon Fine RN   Interpreting Physician: Josee Garcia MD     INDICATIONS: Detection of coronary artery disease  HISTORY: The patient is a 77year old  male  Chest pain status: no   chest pain  Cardiovascular history: arrhythmia  Afib with cardioversion  Previous test results: abnormal ECG  PHYSICAL EXAM: Baseline physical exam screening: normal cardiac exam      REST ECG: Normal sinus rhythm  The ECG showed occasional premature atrial   contractions  PRWP, cannot exclude old anterior infarct  PROCEDURE: The study was performed at the [address]  Treadmill exercise testing was performed, using the Oliverio protocol  Gated SPECT   myocardial perfusion imaging was performed after stress and at rest  Systolic   blood pressure was 144 mmHg, at the start of the study  Diastolic blood   pressure was 100 mmHg, at the start of the study  The heart rate was 68 bpm, at   the start of the study  IV double checked       OLIVERIO PROTOCOL:   HR bpm SBP mmHg DBP mmHg Symptoms   Baseline 68 144 100 none   Stage 1 121 182 98 --   Stage 2 150 184 96 mild fatigue   Recovery 1 114 158 90 subsiding   Recovery 2 100 150 90 none   No medications or fluids given  STRESS SUMMARY: Duration of exercise was 5 min and 30 sec  The patient   exercised to protocol stage 2  Maximal work rate was 7 METs  Maximal heart rate   during stress was 150 bpm ( 97 % of maximal predicted heart rate)  The   rate-pressure product for the peak heart rate and blood pressure was 01686  There was no chest pain during stress  The stress test was terminated due to   mild fatigue  The stress ECG was negative for ischemia  Arrhythmia during   stress:frequent isolated premature ventricular beats and pairs of premature   ventricular beats  ISOTOPE ADMINISTRATION:   Resting isotope administration Stress isotope administration   Agent Tetrofosmin Tetrofosmin   Dose 9 79 mCi 33 mCi   Date 02/09/2016 02/09/2016   Injection time 08:00 09:57   Imaging time 09:10 10:32   Injection-image interval 70 min 35 min     The radiopharmaceutical was injected one minute before the end of exercise  MYOCARDIAL PERFUSION IMAGING:   The image quality was fair  The left ventricle was mildly dilated  The TID   ratio was 1 07  PERFUSION DEFECTS:   - There were no perfusion defects  GATED SPECT:   The calculated left ventricular ejection fraction was 62 %  SUMMARY:   - Rest ECG: The ECG showed occasional premature atrial contractions  PRWP,   cannot exclude old anterior infarct  - Stress results: Duration of exercise   was 5 min and 30 sec  The patient exercised to protocol stage 2  Maximal work   rate was 7 METs  Maximal heart rate during stress was 150 bpm ( 97 % of maximal   predicted heart rate)  Target heart rate was achieved  Maximal systolic blood   pressure during stress was 184 mmHg  There was no chest pain during stress  -    ECG conclusions: The stress ECG was negative for ischemia   Arrhythmia during   stress:frequent isolated premature ventricular beats and pairs of premature   ventricular beats  - Perfusion imaging: The left ventricle was mildly dilated  The TID ratio was 1 07  There were no perfusion defects    - Gated SPECT: The calculated left ventricular ejection fraction was 62 %  IMPRESSIONS: Myocardial perfusion imaging was normal at rest and with stress       Prepared and signed by     Wilian Nassar MD   Signed 02/09/2016 11:46:42

## 2018-01-18 NOTE — RESULT NOTES
Discussion/Summary   Hep C test was negative     Verified Results  (1) HEP C ANTIBODY [id number] 07:16AM [facility] Order Number: [id number]     Test Name Result Flag Reference   HEPATITIS C ANTIBODY Non-reactive  Non-reactive

## 2018-01-22 VITALS
TEMPERATURE: 97.7 F | OXYGEN SATURATION: 96 % | RESPIRATION RATE: 16 BRPM | HEART RATE: 55 BPM | DIASTOLIC BLOOD PRESSURE: 80 MMHG | WEIGHT: 248.05 LBS | HEIGHT: 73 IN | BODY MASS INDEX: 32.87 KG/M2 | SYSTOLIC BLOOD PRESSURE: 122 MMHG

## 2018-01-22 VITALS
BODY MASS INDEX: 32.11 KG/M2 | WEIGHT: 242.31 LBS | HEART RATE: 57 BPM | DIASTOLIC BLOOD PRESSURE: 82 MMHG | SYSTOLIC BLOOD PRESSURE: 130 MMHG | HEIGHT: 73 IN

## 2018-01-23 NOTE — RESULT NOTES
Verified Results  CTA CHEST WO W CONTRAST [id number] 08:22AM Chester Reed Order Number: [id number]    - Patient Instructions: To schedule this appointment, please contact Central Scheduling at [phone]  Test Name Result Flag Reference   CTA CHEST WO W CONTRAST (Report)     CT ANGIOGRAM OF THE CHEST, WITH AND WITHOUT IV CONTRAST     INDICATION:        COMPARISON: August 5, 2017     TECHNIQUE: CT angiogram examination of the chest was performed according to standard protocol  Contrast as well as noncontrast images were obtained  This examination, like all CT scans performed in the [facility], was performed    utilizing techniques to minimize radiation dose exposure, including the use of iterative reconstruction and automated exposure control  3D reconstructions were performed an independent workstation, and are supplied for review  Rad dose 1625 mGy-cm      IV Contrast: 100 mL of iohexol (OMNIPAQUE)       FINDINGS:     VASCULAR STRUCTURES: There is aneurysmal dilatation of the ascending aorta which measures 48 x 47 mm in widest dimension, With no evidence of interval growth  Standard branching anatomy of the great vessels is noted with no stenosis  The descending    thoracic aorta is mildly ectatic, measuring approximately 30 mm  The abdominal aorta is normal in size  Evaluation of the visualized upper double structures demonstrates a small focal dissection involving the proximal common hepatic artery which has    direct origin from the aorta  This dissection appears slightly smaller and less prominent than previously seen  There is also 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 measuring approximately 12 6 mm  Additionally, a focal dissection noted   in the upper pole branch of the left renal artery is again noted, and is stable  OTHER FINDINGS:      HEART: The heart appears globally enlarged  This is a change from the prior study   No pericardial effusions  LUNGS: Unremarkable  PLEURA: No pleural effusion  MEDIASTINUM AND GERMÁN: No mass or significant lymphadenopathy  CHEST WALL AND LOWER NECK: Unremarkable  VISUALIZED STRUCTURES IN THE UPPER ABDOMEN: Unremarkable  IMPRESSION:       1  Ascending aortic aneurysm measuring 48 mm in widest dimension  2  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

## 2018-01-24 VITALS
HEIGHT: 73 IN | SYSTOLIC BLOOD PRESSURE: 140 MMHG | HEART RATE: 52 BPM | DIASTOLIC BLOOD PRESSURE: 76 MMHG | BODY MASS INDEX: 32.6 KG/M2 | WEIGHT: 246 LBS | RESPIRATION RATE: 14 BRPM | TEMPERATURE: 97.7 F | OXYGEN SATURATION: 96 %

## 2018-01-24 VITALS — SYSTOLIC BLOOD PRESSURE: 142 MMHG | DIASTOLIC BLOOD PRESSURE: 78 MMHG

## 2018-02-05 ENCOUNTER — APPOINTMENT (OUTPATIENT)
Dept: LAB | Facility: CLINIC | Age: 68
End: 2018-02-05
Payer: MEDICARE

## 2018-02-05 ENCOUNTER — TRANSCRIBE ORDERS (OUTPATIENT)
Dept: LAB | Facility: CLINIC | Age: 68
End: 2018-02-05

## 2018-02-05 DIAGNOSIS — I77.79 HEPATIC ARTERY DISSECTION (HCC): Primary | ICD-10-CM

## 2018-02-05 LAB
ANION GAP SERPL CALCULATED.3IONS-SCNC: 6 MMOL/L (ref 4–13)
BUN SERPL-MCNC: 19 MG/DL (ref 5–25)
CALCIUM SERPL-MCNC: 9.3 MG/DL (ref 8.3–10.1)
CHLORIDE SERPL-SCNC: 101 MMOL/L (ref 100–108)
CO2 SERPL-SCNC: 31 MMOL/L (ref 21–32)
CREAT SERPL-MCNC: 1.03 MG/DL (ref 0.6–1.3)
GFR SERPL CREATININE-BSD FRML MDRD: 74 ML/MIN/1.73SQ M
GLUCOSE P FAST SERPL-MCNC: 91 MG/DL (ref 65–99)
POTASSIUM SERPL-SCNC: 4.4 MMOL/L (ref 3.5–5.3)
SODIUM SERPL-SCNC: 138 MMOL/L (ref 136–145)

## 2018-02-05 PROCEDURE — 80048 BASIC METABOLIC PNL TOTAL CA: CPT

## 2018-02-08 ENCOUNTER — HOSPITAL ENCOUNTER (OUTPATIENT)
Dept: RADIOLOGY | Age: 68
Discharge: HOME | End: 2018-02-08
Payer: MEDICARE

## 2018-02-08 DIAGNOSIS — I77.79 DISSECTION OF OTHER SPECIFIED ARTERY (HCC): ICD-10-CM

## 2018-02-08 PROCEDURE — 74174 CTA ABD&PLVS W/CONTRAST: CPT

## 2018-02-08 RX ADMIN — IOHEXOL 100 ML: 350 INJECTION, SOLUTION INTRAVENOUS at 09:30

## 2018-02-19 ENCOUNTER — OFFICE VISIT (OUTPATIENT)
Dept: INTERNAL MEDICINE CLINIC | Facility: CLINIC | Age: 68
End: 2018-02-19
Payer: MEDICARE

## 2018-02-19 VITALS
TEMPERATURE: 98.3 F | WEIGHT: 243 LBS | SYSTOLIC BLOOD PRESSURE: 126 MMHG | DIASTOLIC BLOOD PRESSURE: 74 MMHG | HEART RATE: 65 BPM | OXYGEN SATURATION: 96 % | HEIGHT: 73 IN | BODY MASS INDEX: 32.2 KG/M2 | RESPIRATION RATE: 18 BRPM

## 2018-02-19 DIAGNOSIS — I10 HYPERTENSION, UNSPECIFIED TYPE: Primary | ICD-10-CM

## 2018-02-19 DIAGNOSIS — G62.9 NEUROPATHY: ICD-10-CM

## 2018-02-19 DIAGNOSIS — E03.9 HYPOTHYROIDISM, UNSPECIFIED TYPE: ICD-10-CM

## 2018-02-19 DIAGNOSIS — I48.19 ATRIAL FIBRILLATION, PERSISTENT (HCC): ICD-10-CM

## 2018-02-19 PROCEDURE — 99214 OFFICE O/P EST MOD 30 MIN: CPT | Performed by: INTERNAL MEDICINE

## 2018-02-19 NOTE — ASSESSMENT & PLAN NOTE
Patient feels like he is in Afib on exam today, continue current medications as per Cardiology  He will get a EKG when he sees cardiology

## 2018-02-19 NOTE — PROGRESS NOTES
Assessment/Plan:    Neuropathy  Continue Neurontin which he is also taking for the headaches  Hypertension    Controlled, continue meds  Hypothyroidism    Continue levothyroxine    Atrial fibrillation, persistent [facility])   Patient feels like he is in Afib on exam today, continue current medications as per Cardiology  He will get a EKG when he sees cardiology  Diagnoses and all orders for this visit:    Hypertension, unspecified type    Hypothyroidism, unspecified type    Atrial fibrillation, persistent ([address] )    Neuropathy          Subjective:      Patient ID: T[place]ana [place] is a 76 y o  male  Atrial fibrillation:  No bleeding problems or heart palpitations  hypothyroidism: Energy level is good, patient reports good compliance with thyroid med      HTN and Hyperchol: pt tolerating meds and reports compliance        The following portions of the patient's history were reviewed and updated as appropriate: allergies, current medications, past family history, past medical history, past social history, past surgical history and problem list     Family History   Problem Relation Age of Onset    Aortic aneurysm Father      abdominal    Aortic aneurysm Brother     Stroke Mother      CVA    Cancer Maternal Grandmother      malignant neoplasm    Cancer Maternal Grandfather      malignant neoplasm    Cancer Paternal Grandmother      malignant neoplasm    Cancer Paternal Grandfather      malignant neoplasm    Cancer Family      malignant neoplasm    Cancer Family      malignant neoplasm     Past Medical History:   Diagnosis Date    Arrhythmia     Disease of thyroid gland     Gastric wall thickening     Headache     Iliac artery aneurysm, bilateral ([address] )     Irregular heart beat     afib cardioversion 1/30/17    Neuropathy     bilateral feet     Social History     Social History    Marital status: /Civil Union     Spouse name: N/A    Number of children: N/A    Years of education: N/A Occupational History    Not on file  Social History Main Topics    Smoking status: Never Smoker    Smokeless tobacco: Never Used    Alcohol use Yes      Comment: socially     Drug use: No    Sexual activity: Not on file     Other Topics Concern    Not on file     Social History Narrative    No narrative on file       Current Outpatient Prescriptions:     apixaban (ELIQUIS) 5 mg, Take 5 mg by mouth 2 (two) times a day , Disp: , Rfl:     aspirin (ECOTRIN LOW STRENGTH) 81 mg EC tablet, Take 81 mg by mouth daily, Disp: , Rfl:     atorvastatin (LIPITOR) 20 mg tablet, Take 20 mg by mouth daily, Disp: , Rfl:     Co-Enzyme Q-10 100 MG CAPS, Take 100 mg by mouth daily, Disp: , Rfl:     felodipine (PLENDIL) 5 mg 24 hr tablet, Take 5 mg by mouth daily  , Disp: , Rfl:     flecainide (TAMBOCOR) 50 mg tablet, Take 50 mg by mouth 2 (two) times a day  , Disp: , Rfl:     gabapentin (NEURONTIN) 100 mg capsule, Take 100 mg by mouth daily Up to 300mg additional if headaches flaring , Disp: , Rfl:     levothyroxine 75 mcg tablet, Take 75 mcg by mouth daily  , Disp: , Rfl:     metoprolol succinate (TOPROL-XL) 25 mg 24 hr tablet, Take 25 mg by mouth daily, Disp: , Rfl:     Misc Natural Products (LUTEIN 20 PO), Take 20 mg by mouth daily  , Disp: , Rfl:     multivitamin (THERAGRAN) TABS, Take 1 tablet by mouth daily  , Disp: , Rfl:     Omega-3 Fatty Acids (FISH OIL) 1200 MG CAPS, Take 1,200 mg by mouth daily  , Disp: , Rfl:   Allergies   Allergen Reactions    Wound Dressing Adhesive        Review of Systems   Constitutional: Negative for chills, fatigue and fever  HENT: Negative for congestion, nosebleeds, postnasal drip and sore throat  Eyes: Negative for pain and visual disturbance  Respiratory: Negative for cough, chest tightness, shortness of breath and wheezing  Cardiovascular: Negative for chest pain, palpitations and leg swelling     Gastrointestinal: Negative for abdominal pain, constipation, diarrhea, nausea and vomiting  Endocrine: Negative for polydipsia and polyuria  Genitourinary: Negative for dysuria, flank pain and hematuria  Musculoskeletal: Negative for arthralgias  Skin: Negative for rash  Neurological: Positive for headaches (intermittent)  Negative for dizziness and tremors  Hematological: Does not bruise/bleed easily  Psychiatric/Behavioral: Negative for confusion and dysphoric mood  The patient is not nervous/anxious  Objective:      /74   Pulse 65   Temp 98 3 °F (36 8 °C) (Oral)   Resp 18   Ht 6' 1" (1 854 m)   Wt 110 kg (243 lb)   SpO2 96%   BMI 32 06 kg/m²          Physical Exam   Constitutional: He is oriented to person, place, and time  He appears well-developed and well-nourished  No distress  HENT:   Head: Normocephalic and atraumatic  Right Ear: External ear normal    Left Ear: External ear normal    Eyes: Conjunctivae are normal  No scleral icterus  Neck: Normal range of motion  Neck supple  No tracheal deviation present  No thyromegaly present  Cardiovascular: Normal rate and normal heart sounds  An irregularly irregular rhythm present  Pulmonary/Chest: Effort normal and breath sounds normal  No respiratory distress  He has no wheezes  He has no rales  Abdominal: Soft  Bowel sounds are normal  There is no tenderness  There is no rebound and no guarding  Musculoskeletal: He exhibits no edema  Lymphadenopathy:     He has no cervical adenopathy  Neurological: He is alert and oriented to person, place, and time  Psychiatric: He has a normal mood and affect  His behavior is normal  Judgment and thought content normal    Vitals reviewed

## 2018-03-07 DIAGNOSIS — I77.79 DISSECTION OF OTHER SPECIFIED ARTERY (HCC): ICD-10-CM

## 2018-03-08 ENCOUNTER — OFFICE VISIT (OUTPATIENT)
Dept: VASCULAR SURGERY | Facility: CLINIC | Age: 68
End: 2018-03-08
Payer: MEDICARE

## 2018-03-08 ENCOUNTER — TELEPHONE (OUTPATIENT)
Dept: ADMINISTRATIVE | Facility: HOSPITAL | Age: 68
End: 2018-03-08

## 2018-03-08 VITALS
HEIGHT: 73 IN | BODY MASS INDEX: 32.6 KG/M2 | SYSTOLIC BLOOD PRESSURE: 128 MMHG | HEART RATE: 76 BPM | DIASTOLIC BLOOD PRESSURE: 74 MMHG | WEIGHT: 246 LBS

## 2018-03-08 DIAGNOSIS — I72.3 ILIAC ARTERY ANEURYSM, BILATERAL (HCC): ICD-10-CM

## 2018-03-08 DIAGNOSIS — I72.3 ILIAC ARTERY ANEURYSM, RIGHT (HCC): Primary | ICD-10-CM

## 2018-03-08 PROCEDURE — 99214 OFFICE O/P EST MOD 30 MIN: CPT | Performed by: SURGERY

## 2018-03-08 NOTE — ASSESSMENT & PLAN NOTE
Right common iliac artery aneurysm with small focal dissection approximately 3 point 4 centimeters    Planning endo repair possible open [facility] hybrid OR with cardiac risk assessment    Hold Eliquis for 2 days 3rd day surgery

## 2018-03-08 NOTE — TELEPHONE ENCOUNTER
Patient had OV today with Dr Petra Arroyo will be having Enlarging right distal common iliac artery aneurysm with an area of old thrombotic dissection  Cardiac CLR needed prior to procedure appt schedule with Dr Adolfo Ansari on 03/19/2018 @ 2:20PM- [place] Location  Cardiac CLR form Faxed to Manpreet Bosch following fax number [phone]

## 2018-03-08 NOTE — PROGRESS NOTES
Chief Complaint:  "I am here to have my dissection checked and my aneurysms checked "    Patient had a CTA on 2/8/18  He denies any claudication or rest pain  He denies any abdominal pain  He does have periodical lower back pain  He notices that when he wakes up in the morning his back pain is worse  Assessment/Plan:    Iliac artery aneurysm, bilateral (HCC)  Right common iliac artery aneurysm with small focal dissection approximately 3 point 4 centimeters  Planning endo repair possible open [facility] hybrid OR with cardiac risk assessment    Hold Eliquis for 2 days 3rd day surgery       Diagnoses and all orders for this visit:    Iliac artery aneurysm, right [facility])  -     Case request operating room: REPAIR ANEURYSM ILIAC endovascular possible open with coil embolization right hypogastric artery  [facility] OR [facility]; Standing  -     Basic metabolic panel; Future  -     CBC and differential; Future  -     Ambulatory referral to Cardiology; Future  -     Case request operating room: REPAIR ANEURYSM ILIAC endovascular possible open with coil embolization right hypogastric artery  [facility] OR [facility]    Iliac artery aneurysm, bilateral ([address] )    Other orders  -     Diet NPO; Sips with meds; Standing  -     Void on call to OR; Standing  -     Insert peripheral IV; Standing  -     Place sequential compression device; Standing  -     ceFAZolin (ANCEF) IVPB (premix) 2,000 mg; Infuse 2,000 mg into a venous catheter once         Subjective:      Patient ID: Lenny Chavez is a 76 y o  male  HPI    The following portions of the patient's history were reviewed and updated as appropriate: allergies, current medications, past family history, past medical history, past social history, past surgical history and problem list     Review of Systems   Constitutional: Negative for appetite change, chills, fatigue and fever     HENT: Negative for ear pain, sinus pain, sneezing, sore throat and voice change  Eyes: Negative  Respiratory: Positive for shortness of breath (with activity )  Negative for cough and chest tightness  Cardiovascular: Negative for chest pain, palpitations and leg swelling  Irregular heart rate   Gastrointestinal: Negative for abdominal distention, abdominal pain, blood in stool, nausea and vomiting  Endocrine: Negative  Genitourinary: Negative for difficulty urinating, flank pain, frequency and urgency  Musculoskeletal: Positive for back pain  Negative for arthralgias, gait problem, myalgias, neck pain and neck stiffness  Skin: Negative  Allergic/Immunologic: Negative  Neurological: Negative  Hematological: Bruises/bleeds easily  Psychiatric/Behavioral: Negative  systems reviewed    Objective:      /74 (BP Location: Left arm, Patient Position: Sitting)   Pulse 76   Ht 6' 1" (1 854 m)   Wt 112 kg (246 lb)   BMI 32 46 kg/m²          Physical Exam    Carotid pulses equal bilaterally no bruits heard, heart regular rhythm  Abdomen soft without palpable masses, easily palpable femoral popliteal dorsalis pedis pulses no clinical evidence of peripheral aneurysms  Vitals:    03/08/18 1500   BP: 128/74   BP Location: Left arm   Patient Position: Sitting   Pulse: 76   Weight: 112 kg (246 lb)   Height: 6' 1" (1 854 m)       Patient Active Problem List   Diagnosis    Aortic aneurysm ([address] )    Hypertension    Atrial fibrillation, persistent ([address] )    Iliac artery aneurysm, bilateral ([address] )    Hypothyroidism    Neuropathy       Past Surgical History:   Procedure Laterality Date    CARDIOVERSION      CATARACT EXTRACTION      onset 11/17/2011    [place] EDG US EXAM SURGICAL ALTER STOM DUODENUM/JEJUNUM N/A 11/10/2017    Procedure: RADIAL ENDOSCOPIC U/S;  Surgeon: Cassandra Vaca MD;  [facility];   Service: Gastroenterology    RETINAL DETACHMENT SURGERY Right     onset 1992, retinal detachment complete air fill confirmed       Family History   Problem Relation Age of Onset    Aortic aneurysm Father      abdominal    Aortic aneurysm Brother     Stroke Mother      CVA    Cancer Maternal Grandmother      malignant neoplasm    Cancer Maternal Grandfather      malignant neoplasm    Cancer Paternal Grandmother      malignant neoplasm    Cancer Paternal Grandfather      malignant neoplasm    Cancer Family      malignant neoplasm    Cancer Family      malignant neoplasm       Social History     Social History    Marital status: /Civil Union     Spouse name: N/A    Number of children: N/A    Years of education: N/A     Occupational History    Not on file  Social History Main Topics    Smoking status: Never Smoker    Smokeless tobacco: Never Used    Alcohol use Yes      Comment: socially     Drug use: No    Sexual activity: Not on file     Other Topics Concern    Not on file     Social History Narrative    No narrative on file       Allergies   Allergen Reactions    Wound Dressing Adhesive          Current Outpatient Prescriptions:     apixaban (ELIQUIS) 5 mg, Take 5 mg by mouth 2 (two) times a day , Disp: , Rfl:     aspirin (ECOTRIN LOW STRENGTH) 81 mg EC tablet, Take 81 mg by mouth daily, Disp: , Rfl:     atorvastatin (LIPITOR) 20 mg tablet, Take 20 mg by mouth daily, Disp: , Rfl:     Co-Enzyme Q-10 100 MG CAPS, Take 100 mg by mouth daily, Disp: , Rfl:     felodipine (PLENDIL) 5 mg 24 hr tablet, Take 5 mg by mouth daily  , Disp: , Rfl:     flecainide (TAMBOCOR) 50 mg tablet, Take 50 mg by mouth 2 (two) times a day  , Disp: , Rfl:     gabapentin (NEURONTIN) 100 mg capsule, Take 100 mg by mouth daily Up to 300mg additional if headaches flaring , Disp: , Rfl:     levothyroxine 75 mcg tablet, Take 75 mcg by mouth daily  , Disp: , Rfl:     metoprolol succinate (TOPROL-XL) 25 mg 24 hr tablet, Take 25 mg by mouth daily, Disp: , Rfl:     Misc Natural Products (LUTEIN 20 PO), Take 20 mg by mouth daily  , Disp: , Rfl:    multivitamin (THERAGRAN) TABS, Take 1 tablet by mouth daily  , Disp: , Rfl:     Omega-3 Fatty Acids (FISH OIL) 1200 MG CAPS, Take 1,200 mg by mouth daily    , Disp: , Rfl:

## 2018-03-08 NOTE — LETTER
March 8, 2018     Luis Arnold, [id number] [id number]    Patient: Eric Sales   YOB: 1950   Date of Visit: 3/8/2018       Dear Dr Leydi Chanel: Thank you for referring Dunia Polanco to me for evaluation  Below are the relevant portions of my assessment and plan of care  Right common iliac artery aneurysm with small focal dissection approximately 3 point 4 centimeters  Planning endo repair possible open [facility] hybrid OR with cardiac risk assessment    Hold Eliquis for 2 days 3rd day surgery    Iliac artery aneurysm, right ([address] )  If you have questions, please do not hesitate to call me  I look forward to following Royal Thomas along with you           Sincerely,        Richard Mc MD        CC: Crow Batista, DO

## 2018-03-08 NOTE — PATIENT INSTRUCTIONS
Enlarging right distal common iliac artery aneurysm with an area of old thrombotic dissection  Long discussion with the patient and his wife in the office today we are going consider repair which is likely an endovascular with coil embolization of the hypogastric artery on the right  He will undergo cardiac risk assessment and then plan repair in the [address] [place] [address]

## 2018-03-18 DIAGNOSIS — I48.91 A-FIB (HCC): Primary | ICD-10-CM

## 2018-03-19 ENCOUNTER — OFFICE VISIT (OUTPATIENT)
Dept: CARDIOLOGY CLINIC | Facility: CLINIC | Age: 68
End: 2018-03-19
Payer: MEDICARE

## 2018-03-19 VITALS
HEIGHT: 73 IN | HEART RATE: 66 BPM | BODY MASS INDEX: 33.13 KG/M2 | WEIGHT: 250 LBS | SYSTOLIC BLOOD PRESSURE: 130 MMHG | DIASTOLIC BLOOD PRESSURE: 86 MMHG

## 2018-03-19 DIAGNOSIS — I48.0 PAROXYSMAL ATRIAL FIBRILLATION (HCC): Primary | ICD-10-CM

## 2018-03-19 DIAGNOSIS — Z01.810 PRE-OPERATIVE CARDIOVASCULAR EXAMINATION: ICD-10-CM

## 2018-03-19 DIAGNOSIS — I48.19 ATRIAL FIBRILLATION, PERSISTENT (HCC): ICD-10-CM

## 2018-03-19 DIAGNOSIS — I71.2 THORACIC AORTIC ANEURYSM WITHOUT RUPTURE (HCC): ICD-10-CM

## 2018-03-19 DIAGNOSIS — I72.3 ILIAC ARTERY ANEURYSM, BILATERAL (HCC): ICD-10-CM

## 2018-03-19 PROCEDURE — 93000 ELECTROCARDIOGRAM COMPLETE: CPT | Performed by: INTERNAL MEDICINE

## 2018-03-19 PROCEDURE — 99214 OFFICE O/P EST MOD 30 MIN: CPT | Performed by: INTERNAL MEDICINE

## 2018-03-19 RX ORDER — FLECAINIDE ACETATE 50 MG/1
TABLET ORAL
Qty: 180 TABLET | Refills: 3 | Status: SHIPPED | OUTPATIENT
Start: 2018-03-19 | End: 2018-07-25

## 2018-03-19 NOTE — PROGRESS NOTES
Cardiology Consultation      Filiberto Valladares  1950  [id number]  [address] [facility] Bianka Gabriel  [address] [place] [address] [address]    1  Paroxysmal atrial fibrillation (HCC)  POCT ECG    apixaban (ELIQUIS) 5 mg   2  Pre-operative cardiovascular examination  POCT ECG        History:     Patient presents today preoperatively for cardiac evaluation prior to planned endovascular repair of iliac aneurysm  Patient has been following electrophysiology as well as cardiac surgery  Has a history thoracic aortic aneurysm which has been stable by CT scanning o 48mm  He is asymptomatic  He has had 2 DC cardioversions for atrial fibrillation in 2016 and 2017  He is on flecainide low-dose  Higher doses cause bradycardia  Today's ECG does confirm recurrent atrial fibrillation  He does not feel much in the way of any symptoms  He remains on anticoagulation  Patient Active Problem List   Diagnosis    Aortic aneurysm ([address] )    Hypertension    Atrial fibrillation, persistent ([address] )    Iliac artery aneurysm, bilateral ([address] )    Hypothyroidism    Neuropathy     Past Medical History:   Diagnosis Date    Aneurysm of thoracic aorta ([address] )     Arrhythmia     Disease of thyroid gland     Gastric wall thickening     Headache     Hypertension     Iliac artery aneurysm, bilateral (HCC)     Irregular heart beat     afib cardioversion 1/30/17    Neuropathy     bilateral feet    Paroxysmal a-fib (HCC)     Prostatic hypertrophy      Social History     Social History    Marital status: /Civil Union     Spouse name: N/A    Number of children: N/A    Years of education: N/A     Occupational History    Not on file       Social History Main Topics    Smoking status: Never Smoker    Smokeless tobacco: Never Used    Alcohol use Yes      Comment: socially     Drug use: No    Sexual activity: Not on file     Other Topics Concern    Not on file     Social History Narrative    No narrative on file      Family History   Problem Relation Age of Onset    Aortic aneurysm Father      abdominal    Aortic aneurysm Brother     Stroke Mother      CVA    Cancer Maternal Grandmother      malignant neoplasm    Cancer Maternal Grandfather      malignant neoplasm    Cancer Paternal Grandmother      malignant neoplasm    Cancer Paternal Grandfather      malignant neoplasm    Cancer Family      malignant neoplasm    Cancer Family      malignant neoplasm     Past Surgical History:   Procedure Laterality Date    CARDIOVERSION      CATARACT EXTRACTION      onset 11/17/2011    [place] EDG US EXAM SURGICAL ALTER STOM DUODENUM/JEJUNUM N/A 11/10/2017    Procedure: RADIAL ENDOSCOPIC U/S;  Surgeon: Wagner Valdivia MD;  [facility]; Service: Gastroenterology    RETINAL DETACHMENT SURGERY Right     onset 1992, retinal detachment complete air fill confirmed       Current Outpatient Prescriptions:     apixaban (ELIQUIS) 5 mg, Take 1 tablet (5 mg total) by mouth 2 (two) times a day, Disp: 180 tablet, Rfl: 3    aspirin (ECOTRIN LOW STRENGTH) 81 mg EC tablet, Take 81 mg by mouth daily, Disp: , Rfl:     atorvastatin (LIPITOR) 20 mg tablet, Take 20 mg by mouth daily, Disp: , Rfl:     Co-Enzyme Q-10 100 MG CAPS, Take 100 mg by mouth daily, Disp: , Rfl:     felodipine (PLENDIL) 5 mg 24 hr tablet, Take 5 mg by mouth daily  , Disp: , Rfl:     flecainide (TAMBOCOR) 50 mg tablet, Take 50 mg by mouth 2 (two) times a day  , Disp: , Rfl:     gabapentin (NEURONTIN) 100 mg capsule, Take 100 mg by mouth daily Up to 300mg additional if headaches flaring , Disp: , Rfl:     levothyroxine 75 mcg tablet, Take 75 mcg by mouth daily  , Disp: , Rfl:     metoprolol succinate (TOPROL-XL) 25 mg 24 hr tablet, Take 25 mg by mouth daily, Disp: , Rfl:     Misc Natural Products (LUTEIN 20 PO), Take 20 mg by mouth daily  , Disp: , Rfl:     multivitamin (THERAGRAN) TABS, Take 1 tablet by mouth daily  , Disp: , Rfl:   Omega-3 Fatty Acids (FISH OIL) 1200 MG CAPS, Take 1,200 mg by mouth daily  , Disp: , Rfl:   Allergies   Allergen Reactions    Wound Dressing Adhesive        Labs:   Lab Results   Component Value Date     02/05/2018    K 4 4 02/05/2018     02/05/2018    CO2 31 02/05/2018    BUN 19 02/05/2018    CREATININE 1 03 02/05/2018    CREATININE 0 90 11/30/2017    GLUCOSE 115 08/05/2017    CALCIUM 9 3 02/05/2018       Lab Results   Component Value Date    WBC 12 22 (H) 08/05/2017    HGB 16 0 08/05/2017    HGB 15 9 08/05/2017    HCT 44 6 08/05/2017    HCT 43 8 06/13/2017     08/05/2017     06/13/2017       Lab Results   Component Value Date    CHOL 115 06/13/2017    CHOL 120 04/14/2016     Lab Results   Component Value Date    HDL 51 06/13/2017    HDL 56 04/14/2016     Lab Results   Component Value Date    LDLCALC 47 06/13/2017    LDLCALC 49 04/14/2016     Lab Results   Component Value Date    TRIG 83 06/13/2017    TRIG 77 04/14/2016       Lab Results   Component Value Date    ALT 39 08/05/2017    ALT 36 06/13/2017    AST 17 08/05/2017    AST 23 06/13/2017             No results found for: NTBNP    No results found for: HGBA1C    Imaging: Reviewed in epic    Afib, rate controlled, 66bpm    Review of Systems:  14 systems reviewed and negative with exception of the above  Review of Systems    PHYSICAL EXAM:    Physical Exam    Vitals:    03/19/18 1420   BP: 130/86   Pulse: 66     Body mass index is 32 98 kg/m²    Weight (last 2 days)     Date/Time   Weight    03/19/18 1420  113 (250)              Gen: No acute distress  HEENT: anicteric, mucous membranes moist  Neck: supple, no jugular venous distention, or carotid bruit  Heart: irreguar, no murmur  Lungs :clear to auscultation bilaterally, no rales/rhonchi or wheeze  Abdomen: soft nontender, normoactive bowel sounds, no organomegaly  Ext: warm and perfused, normal femoral pulses, no edema, or clubbing  Skin: warm, no rashes  Neuro: AAO x 3, no focal findings  Psychiatric: normal affect  Musculoskeletal: no obvious joint deformities  Discussion/Summary:    1  Persistent atrial fibrillation, recurrent, 2 prior cardioversions 2016 2017, on low-dose flecainide and anticoagulation  A  Normal LVEF, normal nuclear stress 2016    2  Preoperative endovascular repair of iliac aneurysms    3  Ascending aortic thoracic aneurysm, 48mm, stable by CT, follows with cardiac surgery    Plan:  From a cardiac standpoint, no contraindication for planned endovascular repair of iliac aneurysms, this will also serve as potential need for urgent open conversion  He does not he a testing or changes medications prior  I will have him follow with Dr Karin Fam regarding recurrent persistent atrial fibrillation  He has had 2 cardioversions performed in 2016 2017 and low-dose flecainide does not seem to hold him in sinus rhythm long-term  We discussed other antiarrhythmic agents which would need him to be hospitalized as well as consideration of ablation   He doesn't appear to be symptomatic from afib in terms of decreased exercise tolerance etc   This is something that can be discussed with Dr Karin Fam at a later date and his persistent rate controlled afib certainly is not prohibitive for planned vascular procedure

## 2018-03-22 NOTE — TELEPHONE ENCOUNTER
See Dr Nasir Rodríguez ov note, pt cleared  Routed to Dr Andrzej Balderas for rev and paperwork given to surgery sched

## 2018-03-23 ENCOUNTER — PREP FOR PROCEDURE (OUTPATIENT)
Dept: VASCULAR SURGERY | Facility: CLINIC | Age: 68
End: 2018-03-23

## 2018-03-23 DIAGNOSIS — I72.3 ANEURYSM OF ILIAC ARTERY (HCC): Primary | ICD-10-CM

## 2018-04-05 ENCOUNTER — APPOINTMENT (OUTPATIENT)
Dept: LAB | Facility: HOSPITAL | Age: 68
End: 2018-04-05
Attending: SURGERY
Payer: MEDICARE

## 2018-04-05 ENCOUNTER — HOSPITAL ENCOUNTER (OUTPATIENT)
Dept: RADIOLOGY | Facility: HOSPITAL | Age: 68
Discharge: HOME | End: 2018-04-05
Attending: SURGERY
Payer: MEDICARE

## 2018-04-05 ENCOUNTER — TRANSCRIBE ORDERS (OUTPATIENT)
Dept: RADIOLOGY | Facility: HOSPITAL | Age: 68
End: 2018-04-05

## 2018-04-05 DIAGNOSIS — I72.3 ANEURYSM OF ILIAC ARTERY (HCC): ICD-10-CM

## 2018-04-05 DIAGNOSIS — I72.3 ILIAC ARTERY ANEURYSM, RIGHT (HCC): ICD-10-CM

## 2018-04-05 LAB
ABO GROUP BLD: NORMAL
ANION GAP SERPL CALCULATED.3IONS-SCNC: 4 MMOL/L (ref 4–13)
BASOPHILS # BLD AUTO: 0.04 THOUSANDS/ΜL (ref 0–0.1)
BASOPHILS NFR BLD AUTO: 1 % (ref 0–1)
BLD GP AB SCN SERPL QL: NEGATIVE
BUN SERPL-MCNC: 20 MG/DL (ref 5–25)
CALCIUM SERPL-MCNC: 9 MG/DL (ref 8.3–10.1)
CHLORIDE SERPL-SCNC: 108 MMOL/L (ref 100–108)
CO2 SERPL-SCNC: 29 MMOL/L (ref 21–32)
CREAT SERPL-MCNC: 1.05 MG/DL (ref 0.6–1.3)
EOSINOPHIL # BLD AUTO: 0.19 THOUSAND/ΜL (ref 0–0.61)
EOSINOPHIL NFR BLD AUTO: 3 % (ref 0–6)
ERYTHROCYTE [DISTWIDTH] IN BLOOD BY AUTOMATED COUNT: 12.8 % (ref 11.6–15.1)
EST. AVERAGE GLUCOSE BLD GHB EST-MCNC: 117 MG/DL
GFR SERPL CREATININE-BSD FRML MDRD: 73 ML/MIN/1.73SQ M
GLUCOSE P FAST SERPL-MCNC: 91 MG/DL (ref 65–99)
HBA1C MFR BLD: 5.7 % (ref 4.2–6.3)
HCT VFR BLD AUTO: 47.5 % (ref 36.5–49.3)
HGB BLD-MCNC: 16.5 G/DL (ref 12–17)
LYMPHOCYTES # BLD AUTO: 1.57 THOUSANDS/ΜL (ref 0.6–4.47)
LYMPHOCYTES NFR BLD AUTO: 24 % (ref 14–44)
MCH RBC QN AUTO: 30.6 PG (ref 26.8–34.3)
MCHC RBC AUTO-ENTMCNC: 34.7 G/DL (ref 31.4–37.4)
MCV RBC AUTO: 88 FL (ref 82–98)
MONOCYTES # BLD AUTO: 0.54 THOUSAND/ΜL (ref 0.17–1.22)
MONOCYTES NFR BLD AUTO: 8 % (ref 4–12)
NEUTROPHILS # BLD AUTO: 4.09 THOUSANDS/ΜL (ref 1.85–7.62)
NEUTS SEG NFR BLD AUTO: 64 % (ref 43–75)
NRBC BLD AUTO-RTO: 0 /100 WBCS
PLATELET # BLD AUTO: 267 THOUSANDS/UL (ref 149–390)
PMV BLD AUTO: 10.5 FL (ref 8.9–12.7)
POTASSIUM SERPL-SCNC: 5.2 MMOL/L (ref 3.5–5.3)
RBC # BLD AUTO: 5.4 MILLION/UL (ref 3.88–5.62)
RH BLD: POSITIVE
SODIUM SERPL-SCNC: 141 MMOL/L (ref 136–145)
SPECIMEN EXPIRATION DATE: NORMAL
WBC # BLD AUTO: 6.45 THOUSAND/UL (ref 4.31–10.16)

## 2018-04-05 PROCEDURE — 83036 HEMOGLOBIN GLYCOSYLATED A1C: CPT

## 2018-04-05 PROCEDURE — 86850 RBC ANTIBODY SCREEN: CPT

## 2018-04-05 PROCEDURE — 86900 BLOOD TYPING SEROLOGIC ABO: CPT

## 2018-04-05 PROCEDURE — 80048 BASIC METABOLIC PNL TOTAL CA: CPT

## 2018-04-05 PROCEDURE — 36415 COLL VENOUS BLD VENIPUNCTURE: CPT

## 2018-04-05 PROCEDURE — 71046 X-RAY EXAM CHEST 2 VIEWS: CPT

## 2018-04-05 PROCEDURE — 86901 BLOOD TYPING SEROLOGIC RH(D): CPT

## 2018-04-05 PROCEDURE — 85025 COMPLETE CBC W/AUTO DIFF WBC: CPT

## 2018-04-09 ENCOUNTER — TELEPHONE (OUTPATIENT)
Dept: VASCULAR SURGERY | Facility: CLINIC | Age: 68
End: 2018-04-09

## 2018-04-09 NOTE — TELEPHONE ENCOUNTER
LMOM for pt to stop his Eliquis after his dose 4/10 in preparation for his surgery with Dr Deisy Lynn this Friday 4/13/18

## 2018-04-12 ENCOUNTER — ANESTHESIA EVENT (OUTPATIENT)
Dept: PERIOP | Facility: HOSPITAL | Age: 68
DRG: 270 | End: 2018-04-12
Payer: MEDICARE

## 2018-04-13 ENCOUNTER — APPOINTMENT (OUTPATIENT)
Dept: RADIOLOGY | Facility: HOSPITAL | Age: 68
DRG: 270 | End: 2018-04-13
Attending: SURGERY
Payer: MEDICARE

## 2018-04-13 ENCOUNTER — ANESTHESIA (OUTPATIENT)
Dept: PERIOP | Facility: HOSPITAL | Age: 68
DRG: 270 | End: 2018-04-13
Payer: MEDICARE

## 2018-04-13 ENCOUNTER — HOSPITAL ENCOUNTER (INPATIENT)
Facility: HOSPITAL | Age: 68
LOS: 1 days | Discharge: HOME | DRG: 270 | End: 2018-04-14
Attending: SURGERY | Admitting: SURGERY
Payer: MEDICARE

## 2018-04-13 DIAGNOSIS — I10 ESSENTIAL HYPERTENSION: ICD-10-CM

## 2018-04-13 DIAGNOSIS — I48.19 ATRIAL FIBRILLATION, PERSISTENT (HCC): Primary | ICD-10-CM

## 2018-04-13 DIAGNOSIS — I72.3 ILIAC ARTERY ANEURYSM (HCC): ICD-10-CM

## 2018-04-13 LAB
ANION GAP SERPL CALCULATED.3IONS-SCNC: 7 MMOL/L (ref 4–13)
BUN SERPL-MCNC: 17 MG/DL (ref 5–25)
CALCIUM SERPL-MCNC: 8 MG/DL
CHLORIDE SERPL-SCNC: 113 MMOL/L (ref 100–108)
CO2 SERPL-SCNC: 23 MMOL/L (ref 21–32)
CREAT SERPL-MCNC: 0.96 MG/DL (ref 0.6–1.3)
ERYTHROCYTE [DISTWIDTH] IN BLOOD BY AUTOMATED COUNT: 12.8 % (ref 11.6–15.1)
GFR SERPL CREATININE-BSD FRML MDRD: 81 ML/MIN/1.73SQ M
GLUCOSE SERPL-MCNC: 106 MG/DL (ref 65–140)
GLUCOSE SERPL-MCNC: 110 MG/DL (ref 65–140)
HCT VFR BLD AUTO: 41.4 % (ref 36.5–49.3)
HGB BLD-MCNC: 14.5 G/DL (ref 12–17)
MCH RBC QN AUTO: 30.7 PG (ref 26.8–34.3)
MCHC RBC AUTO-ENTMCNC: 35 G/DL (ref 31.4–37.4)
MCV RBC AUTO: 88 FL (ref 82–98)
PLATELET # BLD AUTO: 212 THOUSANDS/UL (ref 149–390)
PMV BLD AUTO: 9.9 FL (ref 8.9–12.7)
POTASSIUM SERPL-SCNC: 4.2 MMOL/L (ref 3.5–5.3)
RBC # BLD AUTO: 4.73 MILLION/UL (ref 3.88–5.62)
SODIUM SERPL-SCNC: 143 MMOL/L (ref 136–145)
WBC # BLD AUTO: 8.67 THOUSAND/UL (ref 4.31–10.16)

## 2018-04-13 PROCEDURE — 37242 VASC EMBOLIZE/OCCLUDE ARTERY: CPT | Performed by: RADIOLOGY

## 2018-04-13 PROCEDURE — C1769 GUIDE WIRE: HCPCS | Performed by: SURGERY

## 2018-04-13 PROCEDURE — C1894 INTRO/SHEATH, NON-LASER: HCPCS | Performed by: SURGERY

## 2018-04-13 PROCEDURE — 85027 COMPLETE CBC AUTOMATED: CPT | Performed by: SURGERY

## 2018-04-13 PROCEDURE — 82948 REAGENT STRIP/BLOOD GLUCOSE: CPT

## 2018-04-13 PROCEDURE — 36245 INS CATH ABD/L-EXT ART 1ST: CPT | Performed by: RADIOLOGY

## 2018-04-13 PROCEDURE — C1760 CLOSURE DEV, VASC: HCPCS | Performed by: SURGERY

## 2018-04-13 PROCEDURE — 76937 US GUIDE VASCULAR ACCESS: CPT

## 2018-04-13 PROCEDURE — 80048 BASIC METABOLIC PNL TOTAL CA: CPT | Performed by: SURGERY

## 2018-04-13 PROCEDURE — 34707 EVASC RPR ILIO-ILIAC NDGFT: CPT | Performed by: SURGERY

## 2018-04-13 PROCEDURE — C1894 INTRO/SHEATH, NON-LASER: HCPCS

## 2018-04-13 PROCEDURE — 04VE3DZ RESTRICTION OF RIGHT INTERNAL ILIAC ARTERY WITH INTRALUMINAL DEVICE, PERCUTANEOUS APPROACH: ICD-10-PCS | Performed by: SURGERY

## 2018-04-13 PROCEDURE — 85347 COAGULATION TIME ACTIVATED: CPT

## 2018-04-13 PROCEDURE — 76937 US GUIDE VASCULAR ACCESS: CPT | Performed by: RADIOLOGY

## 2018-04-13 PROCEDURE — 99232 SBSQ HOSP IP/OBS MODERATE 35: CPT | Performed by: INTERNAL MEDICINE

## 2018-04-13 DEVICE — PERCLOSE PROGLIDE™ SUTURE-MEDIATED CLOSURE SYSTEM
Type: IMPLANTABLE DEVICE | Site: ARTERIAL | Status: FUNCTIONAL
Brand: PERCLOSE PROGLIDE™

## 2018-04-13 RX ORDER — CHLORHEXIDINE GLUCONATE 0.12 MG/ML
15 RINSE ORAL ONCE
Status: DISCONTINUED | OUTPATIENT
Start: 2018-04-13 | End: 2018-04-13 | Stop reason: HOSPADM

## 2018-04-13 RX ORDER — DEXTROSE MONOHYDRATE AND SODIUM CHLORIDE 5; .45 G/100ML; G/100ML
125 INJECTION, SOLUTION INTRAVENOUS CONTINUOUS
Status: DISCONTINUED | OUTPATIENT
Start: 2018-04-13 | End: 2018-04-13

## 2018-04-13 RX ORDER — GABAPENTIN 100 MG/1
100 CAPSULE ORAL DAILY
Status: DISCONTINUED | OUTPATIENT
Start: 2018-04-13 | End: 2018-04-14 | Stop reason: HOSPADM

## 2018-04-13 RX ORDER — OXYCODONE HYDROCHLORIDE AND ACETAMINOPHEN 5; 325 MG/1; MG/1
1 TABLET ORAL EVERY 4 HOURS PRN
Status: DISCONTINUED | OUTPATIENT
Start: 2018-04-13 | End: 2018-04-14 | Stop reason: HOSPADM

## 2018-04-13 RX ORDER — FELODIPINE 5 MG/1
5 TABLET, EXTENDED RELEASE ORAL DAILY
Status: DISCONTINUED | OUTPATIENT
Start: 2018-04-14 | End: 2018-04-14 | Stop reason: HOSPADM

## 2018-04-13 RX ORDER — FENTANYL CITRATE/PF 50 MCG/ML
25 SYRINGE (ML) INJECTION
Status: DISCONTINUED | OUTPATIENT
Start: 2018-04-13 | End: 2018-04-13 | Stop reason: HOSPADM

## 2018-04-13 RX ORDER — METOPROLOL SUCCINATE 25 MG/1
25 TABLET, EXTENDED RELEASE ORAL DAILY
Status: DISCONTINUED | OUTPATIENT
Start: 2018-04-14 | End: 2018-04-14 | Stop reason: HOSPADM

## 2018-04-13 RX ORDER — SODIUM CHLORIDE, SODIUM LACTATE, POTASSIUM CHLORIDE, CALCIUM CHLORIDE 600; 310; 30; 20 MG/100ML; MG/100ML; MG/100ML; MG/100ML
INJECTION, SOLUTION INTRAVENOUS CONTINUOUS PRN
Status: DISCONTINUED | OUTPATIENT
Start: 2018-04-13 | End: 2018-04-13 | Stop reason: SURG

## 2018-04-13 RX ORDER — GLYCOPYRROLATE 0.2 MG/ML
INJECTION INTRAMUSCULAR; INTRAVENOUS AS NEEDED
Status: DISCONTINUED | OUTPATIENT
Start: 2018-04-13 | End: 2018-04-13 | Stop reason: SURG

## 2018-04-13 RX ORDER — HEPARIN SODIUM 1000 [USP'U]/ML
INJECTION, SOLUTION INTRAVENOUS; SUBCUTANEOUS AS NEEDED
Status: DISCONTINUED | OUTPATIENT
Start: 2018-04-13 | End: 2018-04-13 | Stop reason: SURG

## 2018-04-13 RX ORDER — ATORVASTATIN CALCIUM 20 MG/1
20 TABLET, FILM COATED ORAL
Status: DISCONTINUED | OUTPATIENT
Start: 2018-04-13 | End: 2018-04-14 | Stop reason: HOSPADM

## 2018-04-13 RX ORDER — SODIUM CHLORIDE 9 MG/ML
INJECTION, SOLUTION INTRAVENOUS CONTINUOUS PRN
Status: DISCONTINUED | OUTPATIENT
Start: 2018-04-13 | End: 2018-04-13 | Stop reason: SURG

## 2018-04-13 RX ORDER — FENTANYL CITRATE 50 UG/ML
INJECTION, SOLUTION INTRAMUSCULAR; INTRAVENOUS AS NEEDED
Status: DISCONTINUED | OUTPATIENT
Start: 2018-04-13 | End: 2018-04-13 | Stop reason: SURG

## 2018-04-13 RX ORDER — ATORVASTATIN CALCIUM 20 MG/1
20 TABLET, FILM COATED ORAL
Status: DISCONTINUED | OUTPATIENT
Start: 2018-04-14 | End: 2018-04-13

## 2018-04-13 RX ORDER — EPHEDRINE SULFATE 50 MG/ML
INJECTION, SOLUTION INTRAVENOUS AS NEEDED
Status: DISCONTINUED | OUTPATIENT
Start: 2018-04-13 | End: 2018-04-13 | Stop reason: SURG

## 2018-04-13 RX ORDER — FLECAINIDE ACETATE 50 MG/1
50 TABLET ORAL EVERY 12 HOURS
Status: DISCONTINUED | OUTPATIENT
Start: 2018-04-13 | End: 2018-04-14 | Stop reason: HOSPADM

## 2018-04-13 RX ORDER — ROCURONIUM BROMIDE 10 MG/ML
INJECTION, SOLUTION INTRAVENOUS AS NEEDED
Status: DISCONTINUED | OUTPATIENT
Start: 2018-04-13 | End: 2018-04-13 | Stop reason: SURG

## 2018-04-13 RX ORDER — SODIUM CHLORIDE, SODIUM LACTATE, POTASSIUM CHLORIDE, CALCIUM CHLORIDE 600; 310; 30; 20 MG/100ML; MG/100ML; MG/100ML; MG/100ML
20 INJECTION, SOLUTION INTRAVENOUS CONTINUOUS
Status: DISCONTINUED | OUTPATIENT
Start: 2018-04-13 | End: 2018-04-13

## 2018-04-13 RX ORDER — MIDAZOLAM HYDROCHLORIDE 1 MG/ML
INJECTION INTRAMUSCULAR; INTRAVENOUS AS NEEDED
Status: DISCONTINUED | OUTPATIENT
Start: 2018-04-13 | End: 2018-04-13 | Stop reason: SURG

## 2018-04-13 RX ORDER — LEVOTHYROXINE SODIUM 0.07 MG/1
75 TABLET ORAL
Status: DISCONTINUED | OUTPATIENT
Start: 2018-04-14 | End: 2018-04-14 | Stop reason: HOSPADM

## 2018-04-13 RX ORDER — ASPIRIN 81 MG/1
81 TABLET ORAL DAILY
Status: DISCONTINUED | OUTPATIENT
Start: 2018-04-14 | End: 2018-04-14 | Stop reason: HOSPADM

## 2018-04-13 RX ORDER — PROPOFOL 10 MG/ML
INJECTION, EMULSION INTRAVENOUS AS NEEDED
Status: DISCONTINUED | OUTPATIENT
Start: 2018-04-13 | End: 2018-04-13 | Stop reason: SURG

## 2018-04-13 RX ORDER — ONDANSETRON 2 MG/ML
4 INJECTION INTRAMUSCULAR; INTRAVENOUS ONCE AS NEEDED
Status: DISCONTINUED | OUTPATIENT
Start: 2018-04-13 | End: 2018-04-13 | Stop reason: HOSPADM

## 2018-04-13 RX ORDER — ONDANSETRON 2 MG/ML
INJECTION INTRAMUSCULAR; INTRAVENOUS AS NEEDED
Status: DISCONTINUED | OUTPATIENT
Start: 2018-04-13 | End: 2018-04-13 | Stop reason: SURG

## 2018-04-13 RX ORDER — LIDOCAINE HYDROCHLORIDE 10 MG/ML
INJECTION, SOLUTION INFILTRATION; PERINEURAL AS NEEDED
Status: DISCONTINUED | OUTPATIENT
Start: 2018-04-13 | End: 2018-04-13 | Stop reason: SURG

## 2018-04-13 RX ADMIN — DEXAMETHASONE SODIUM PHOSPHATE 5 MG: 10 INJECTION INTRAMUSCULAR; INTRAVENOUS at 08:29

## 2018-04-13 RX ADMIN — ROCURONIUM BROMIDE 10 MG: 10 INJECTION INTRAVENOUS at 09:15

## 2018-04-13 RX ADMIN — ROCURONIUM BROMIDE 50 MG: 10 INJECTION INTRAVENOUS at 07:59

## 2018-04-13 RX ADMIN — EPHEDRINE SULFATE 5 MG: 50 INJECTION, SOLUTION INTRAMUSCULAR; INTRAVENOUS; SUBCUTANEOUS at 08:55

## 2018-04-13 RX ADMIN — ATORVASTATIN CALCIUM 20 MG: 20 TABLET, FILM COATED ORAL at 18:49

## 2018-04-13 RX ADMIN — EPHEDRINE SULFATE 10 MG: 50 INJECTION, SOLUTION INTRAMUSCULAR; INTRAVENOUS; SUBCUTANEOUS at 08:37

## 2018-04-13 RX ADMIN — GLYCOPYRROLATE 0.6 MG: 0.2 INJECTION, SOLUTION INTRAMUSCULAR; INTRAVENOUS at 09:30

## 2018-04-13 RX ADMIN — LIDOCAINE HYDROCHLORIDE 50 MG: 10 INJECTION, SOLUTION INFILTRATION; PERINEURAL at 07:59

## 2018-04-13 RX ADMIN — MIDAZOLAM HYDROCHLORIDE 2 MG: 1 INJECTION, SOLUTION INTRAMUSCULAR; INTRAVENOUS at 07:51

## 2018-04-13 RX ADMIN — FLECAINIDE ACETATE 50 MG: 50 TABLET ORAL at 18:49

## 2018-04-13 RX ADMIN — ROCURONIUM BROMIDE 30 MG: 10 INJECTION INTRAVENOUS at 08:37

## 2018-04-13 RX ADMIN — SODIUM CHLORIDE, SODIUM LACTATE, POTASSIUM CHLORIDE, AND CALCIUM CHLORIDE: .6; .31; .03; .02 INJECTION, SOLUTION INTRAVENOUS at 07:35

## 2018-04-13 RX ADMIN — NEOSTIGMINE METHYLSULFATE 4 MG: 1 INJECTION, SOLUTION INTRAMUSCULAR; INTRAVENOUS; SUBCUTANEOUS at 09:30

## 2018-04-13 RX ADMIN — SODIUM CHLORIDE, SODIUM LACTATE, POTASSIUM CHLORIDE, AND CALCIUM CHLORIDE: .6; .31; .03; .02 INJECTION, SOLUTION INTRAVENOUS at 08:57

## 2018-04-13 RX ADMIN — HEPARIN SODIUM 6000 UNITS: 1000 INJECTION INTRAVENOUS; SUBCUTANEOUS at 09:06

## 2018-04-13 RX ADMIN — SODIUM CHLORIDE: 0.9 INJECTION, SOLUTION INTRAVENOUS at 07:55

## 2018-04-13 RX ADMIN — ONDANSETRON 4 MG: 2 INJECTION INTRAMUSCULAR; INTRAVENOUS at 09:30

## 2018-04-13 RX ADMIN — Medication 2000 MG: at 08:12

## 2018-04-13 RX ADMIN — GABAPENTIN 100 MG: 100 CAPSULE ORAL at 18:49

## 2018-04-13 RX ADMIN — EPHEDRINE SULFATE 10 MG: 50 INJECTION, SOLUTION INTRAMUSCULAR; INTRAVENOUS; SUBCUTANEOUS at 08:52

## 2018-04-13 RX ADMIN — PROPOFOL 200 MG: 10 INJECTION, EMULSION INTRAVENOUS at 07:59

## 2018-04-13 RX ADMIN — FENTANYL CITRATE 100 MCG: 50 INJECTION, SOLUTION INTRAMUSCULAR; INTRAVENOUS at 07:59

## 2018-04-13 RX ADMIN — DEXTROSE AND SODIUM CHLORIDE 125 ML/HR: 5; 450 INJECTION, SOLUTION INTRAVENOUS at 10:58

## 2018-04-13 NOTE — CONSULTS
Inpatient Medical Consultation - [address] [place] [address] Internal Medicine    Patient Information: Kasandra Mueller 76 y o  male MRN: [id number]  Unit/Bed#: [facility] 504-01 Encounter: [id number]  PCP: Dante Conteh MD  Date of Admission:  4/13/2018  Date of Consultation: 04/13/18  Requesting Physician: Luz Chou MD     Reason For Consultation:     Medical management  Assessment/Plan      1  Persistent atrial fibrillation,  status post 2 prior cardioversion,  maintained on low-dose flecainide, stable heart rate, monitor, Eliquis to be  started tomorrow  2  Hypertension, acceptable blood pressure, continue Plendil and Toprol XL  3  Hypothyroidism, continue levothyroxine  4  Neuropathy,  continue Neurontin  5  Asymptomatic Right iliac artery aneurysm status post repair today    Thank you, will follow with you    VTE Prophylaxis:   / sequential compression device     Counseling / Coordination of Care Time: 45 minutes  Greater than 50% of total time spent on patient counseling and coordination of care  Collaboration of Care: Were Recommendations Directly Discussed with Primary Treatment Team? - Yes     History of Present Illness:    Kasandra Mueller is a 76 y o  male who is originally admitted to the vascular service on 4/13/2018 due to elective asymptomatic right iliac artery aneurysm repair today  SLIM  consulted for medical management  Patient with underlying persistent atrial fibrillation with prior cardioversion maintained on low-dose flecainide and Eliquis, stable asymptomatic thoracic aortic aneurysm,  hypertension, hypothyroidism, and neuropathy    Chronic asymptomatic right iliac artery aneurysm with small focal dissection, was cleared by Cardiology for this elective procedure    Patient just came from surgery, denied chest pain or shortness of breath  No nausea vomiting, he is tolerating oral diet  Wife at bedside    Review of Systems:    Review of Systems   Constitutional: Negative for chills and fever     Respiratory: Negative for cough and shortness of breath  Cardiovascular: Negative for chest pain and palpitations  Gastrointestinal: Negative for abdominal pain, nausea and vomiting  Neurological: Negative for headaches  All other systems reviewed and are negative  Past Medical and Surgical History:     Past Medical History:   Diagnosis Date    Aneurysm of [facility] [address] [facility] )     Arrhythmia     Detached retina, right     Disease of thyroid gland     Gastric wall thickening     Headache     Hypertension     Iliac artery aneurysm, bilateral (HCC)     Irregular heart beat     afib cardioversion 1/30/17, x2     Neuropathy     bilateral feet    Paroxysmal A-fib ([facility] )     Prostatic hypertrophy     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[facility])        Past Surgical History:   Procedure Laterality Date    CARDIOVERSION      CATARACT EXTRACTION      onset 11/17/2011    COLONOSCOPY      [place] EDG US EXAM SURGICAL ALTER STOM DUODENUM/JEJUNUM N/A 11/10/2017    Procedure: RADIAL ENDOSCOPIC U/S;  Surgeon: Wild Noriega MD;  [facility]; Service: Gastroenterology    RETINAL DETACHMENT SURGERY Right     onset 1992, retinal detachment complete air fill confirmed       Meds/Allergies:    all medications and allergies reviewed    Allergies:    Allergies   Allergen Reactions    Wound Dressing Adhesive        Social History:     Marital Status: /Civil Union    Substance Use History:   History   Alcohol Use    Yes     Comment: socially      History   Smoking Status    Never Smoker   Smokeless Tobacco    Never Used     History   Drug Use No       Family History:    non-contributory    Physical Exam:     Vitals:   Blood Pressure: 118/70 (04/13/18 1600)  Pulse: 86 (04/13/18 1600)  Temperature: 97 9 °F (36 6 °C) (04/13/18 1640)  Temp Source: Oral (04/13/18 1640)  Respirations: (!) 24 (04/13/18 1600)  Height: 6' 1" (185 4 cm) (04/13/18 8121)  Weight - Scale: 111 kg (245 lb) (04/13/18 0651)  SpO2: 95 % (04/13/18 1600)    Physical Exam   Constitutional: He is oriented to person, place, and time  He appears well-developed  HENT:   Head: Normocephalic and atraumatic  Mouth/Throat: Oropharynx is clear and moist  No oropharyngeal exudate  Eyes: Conjunctivae and EOM are normal  No scleral icterus  Neck: Normal range of motion  Neck supple  No JVD present  Cardiovascular: Normal rate, normal heart sounds and intact distal pulses  No murmur heard  Irregular   Pulmonary/Chest: Effort normal and breath sounds normal  No respiratory distress  He has no wheezes  Clear to auscultation bilateral anteriorly   Abdominal: Soft  Bowel sounds are normal  There is no tenderness  There is no rebound  Obese abdomen   Musculoskeletal: Normal range of motion  He exhibits no edema or tenderness  Lymphadenopathy:     He has no cervical adenopathy  Neurological: He is alert and oriented to person, place, and time  No cranial nerve deficit  Skin: Skin is warm and dry  No rash noted  Additional Data:     Lab Results: I have personally reviewed pertinent reports  Results from last 7 days  Lab Units 04/13/18  1033   WBC Thousand/uL 8 67   HEMOGLOBIN g/dL 14 5   HEMATOCRIT % 41 4   PLATELETS Thousands/uL 212       Results from last 7 days  Lab Units 04/13/18  1033   SODIUM mmol/L 143   POTASSIUM mmol/L 4 2   CHLORIDE mmol/L 113*   CO2 mmol/L 23   BUN mg/dL 17   CREATININE mg/dL 0 96   CALCIUM mg/dL 8 0   GLUCOSE RANDOM mg/dL 110           Imaging: I have personally reviewed pertinent reports  Xr Chest Pa & Lateral    Result Date: 4/7/2018  Narrative: CHEST INDICATION:   I72 3: Aneurysm of iliac artery  Preop evaluation for aneurysm surgery COMPARISON:  August 5, 2017 EXAM PERFORMED/VIEWS:  XR CHEST PA & LATERAL FINDINGS: Heart upper limits of normal in size  Aorta is mildly ectatic  Hilar regions are unremarkable  The lungs are clear  No pneumothorax or pleural effusion   Osseous structures appear within normal limits for patient age  Impression: No acute cardiopulmonary disease  Workstation performed: [id number]       EKG, Pathology, and Other Studies Reviewed on Admission:   · yes    ** Please Note: This note has been constructed using a voice recognition system   **

## 2018-04-13 NOTE — ANESTHESIA PREPROCEDURE EVALUATION
Review of Systems/Medical History  Patient summary reviewed  Chart reviewed  No history of anesthetic complications     Cardiovascular  Exercise tolerance: good,  Hypertension controlled, Dysrhythmias, atrial fibrillation,   Comment: He has had two cardioversions, pAfib,  Pulmonary  Negative pulmonary ROS        GI/Hepatic  Negative GI/hepatic ROS               Endo/Other  History of thyroid disease , hypothyroidism,      GYN       Hematology   Musculoskeletal       Neurology    Headaches,    Psychology           Physical Exam    Airway    Mallampati score: II  TM Distance: >3 FB  Neck ROM: full     Dental   No notable dental hx     Cardiovascular      Pulmonary      Other Findings  beard      Anesthesia Plan  ASA Score- 3     Anesthesia Type- general with ASA Monitors  Additional Monitors: arterial line  Airway Plan:     Comment: General anesthesia, endotracheal tube; standard ASA monitors  Risks and benefits discussed with patient; patient consented and agrees to proceed  I saw and evaluated the patient  If seen with CRNA, we have discussed the anesthetic plan and I am in agreement that the plan is appropriate for the patient  [place]  Goal MAP > 65 mmHg  Plan Factors-    Induction- intravenous  Postoperative Plan- Plan for postoperative opioid use  Informed Consent- Anesthetic plan and risks discussed with patient  I personally reviewed this patient with the CRNA  Discussed and agreed on the Anesthesia Plan with the CRNA  Rosella Goldmann

## 2018-04-13 NOTE — ANESTHESIA PROCEDURE NOTES
Arterial Line Insertion  Date/Time: 4/13/2018 8:04 AM  Performed by: Manuel Lopez  Authorized by: Manuel Lopez   Consent: Verbal consent obtained  Written consent obtained  Risks and benefits: risks, benefits and alternatives were discussed  Consent given by: patient  Patient understanding: patient states understanding of the procedure being performed  Patient consent: the patient's understanding of the procedure matches consent given  Required items: required blood products, implants, devices, and special equipment available  Patient identity confirmed: arm band  Time out: Immediately prior to procedure a "time out" was called to verify the correct patient, procedure, equipment, support staff and site/side marked as required  Preparation: Patient was prepped and draped in the usual sterile fashion  Indications: multiple ABGs and hemodynamic monitoring  Orientation:  RightLocation: radial artery    Anesthesia:  Local anesthetic: ga    Needle gauge: 20  Seldinger technique: Seldinger technique used  Post-procedure: dressing applied  Post-procedure CNS: normal and unchanged  Patient tolerance: Patient tolerated the procedure well with no immediate complications  Comments: Post-induction

## 2018-04-13 NOTE — H&P (VIEW-ONLY)
Cardiology Consultation      Kera Ahuja  1950  [id number]  [address] [facility] Paolo Holley  [address] [place] [address] [address]    1  Paroxysmal atrial fibrillation (HCC)  POCT ECG    apixaban (ELIQUIS) 5 mg   2  Pre-operative cardiovascular examination  POCT ECG        History:     Patient presents today preoperatively for cardiac evaluation prior to planned endovascular repair of iliac aneurysm  Patient has been following electrophysiology as well as cardiac surgery  Has a history thoracic aortic aneurysm which has been stable by CT scanning o 48mm  He is asymptomatic  He has had 2 DC cardioversions for atrial fibrillation in 2016 and 2017  He is on flecainide low-dose  Higher doses cause bradycardia  Today's ECG does confirm recurrent atrial fibrillation  He does not feel much in the way of any symptoms  He remains on anticoagulation  Patient Active Problem List   Diagnosis    Aortic aneurysm ([address] )    Hypertension    Atrial fibrillation, persistent ([address] )    Iliac artery aneurysm, bilateral ([address] )    Hypothyroidism    Neuropathy     Past Medical History:   Diagnosis Date    Aneurysm of thoracic aorta ([address] )     Arrhythmia     Disease of thyroid gland     Gastric wall thickening     Headache     Hypertension     Iliac artery aneurysm, bilateral (HCC)     Irregular heart beat     afib cardioversion 1/30/17    Neuropathy     bilateral feet    Paroxysmal a-fib (HCC)     Prostatic hypertrophy      Social History     Social History    Marital status: /Civil Union     Spouse name: N/A    Number of children: N/A    Years of education: N/A     Occupational History    Not on file       Social History Main Topics    Smoking status: Never Smoker    Smokeless tobacco: Never Used    Alcohol use Yes      Comment: socially     Drug use: No    Sexual activity: Not on file     Other Topics Concern    Not on file     Social History Narrative    No narrative on file      Family History   Problem Relation Age of Onset    Aortic aneurysm Father      abdominal    Aortic aneurysm Brother     Stroke Mother      CVA    Cancer Maternal Grandmother      malignant neoplasm    Cancer Maternal Grandfather      malignant neoplasm    Cancer Paternal Grandmother      malignant neoplasm    Cancer Paternal Grandfather      malignant neoplasm    Cancer Family      malignant neoplasm    Cancer Family      malignant neoplasm     Past Surgical History:   Procedure Laterality Date    CARDIOVERSION      CATARACT EXTRACTION      onset 11/17/2011    AK EDG US EXAM SURGICAL ALTER STOM DUODENUM/JEJUNUM N/A 11/10/2017    Procedure: RADIAL ENDOSCOPIC U/S;  Surgeon: Merlin Lulas, MD;  Location: [facility]; Service: Gastroenterology    RETINAL DETACHMENT SURGERY Right     onset 1992, retinal detachment complete air fill confirmed       Current Outpatient Prescriptions:     apixaban (ELIQUIS) 5 mg, Take 1 tablet (5 mg total) by mouth 2 (two) times a day, Disp: 180 tablet, Rfl: 3    aspirin (ECOTRIN LOW STRENGTH) 81 mg EC tablet, Take 81 mg by mouth daily, Disp: , Rfl:     atorvastatin (LIPITOR) 20 mg tablet, Take 20 mg by mouth daily, Disp: , Rfl:     Co-Enzyme Q-10 100 MG CAPS, Take 100 mg by mouth daily, Disp: , Rfl:     felodipine (PLENDIL) 5 mg 24 hr tablet, Take 5 mg by mouth daily  , Disp: , Rfl:     flecainide (TAMBOCOR) 50 mg tablet, Take 50 mg by mouth 2 (two) times a day  , Disp: , Rfl:     gabapentin (NEURONTIN) 100 mg capsule, Take 100 mg by mouth daily Up to 300mg additional if headaches flaring , Disp: , Rfl:     levothyroxine 75 mcg tablet, Take 75 mcg by mouth daily  , Disp: , Rfl:     metoprolol succinate (TOPROL-XL) 25 mg 24 hr tablet, Take 25 mg by mouth daily, Disp: , Rfl:     Misc Natural Products (LUTEIN 20 PO), Take 20 mg by mouth daily  , Disp: , Rfl:     multivitamin (THERAGRAN) TABS, Take 1 tablet by mouth daily  , Disp: , Rfl:   Omega-3 Fatty Acids (FISH OIL) 1200 MG CAPS, Take 1,200 mg by mouth daily  , Disp: , Rfl:   Allergies   Allergen Reactions    Wound Dressing Adhesive        Labs:   Lab Results   Component Value Date     02/05/2018    K 4 4 02/05/2018     02/05/2018    CO2 31 02/05/2018    BUN 19 02/05/2018    CREATININE 1 03 02/05/2018    CREATININE 0 90 11/30/2017    GLUCOSE 115 08/05/2017    CALCIUM 9 3 02/05/2018       Lab Results   Component Value Date    WBC 12 22 (H) 08/05/2017    HGB 16 0 08/05/2017    HGB 15 9 08/05/2017    HCT 44 6 08/05/2017    HCT 43 8 06/13/2017     08/05/2017     06/13/2017       Lab Results   Component Value Date    CHOL 115 06/13/2017    CHOL 120 04/14/2016     Lab Results   Component Value Date    HDL 51 06/13/2017    HDL 56 04/14/2016     Lab Results   Component Value Date    LDLCALC 47 06/13/2017    LDLCALC 49 04/14/2016     Lab Results   Component Value Date    TRIG 83 06/13/2017    TRIG 77 04/14/2016       Lab Results   Component Value Date    ALT 39 08/05/2017    ALT 36 06/13/2017    AST 17 08/05/2017    AST 23 06/13/2017             No results found for: NTBNP    No results found for: HGBA1C    Imaging: Reviewed in epic    Afib, rate controlled, 66bpm    Review of Systems:  14 systems reviewed and negative with exception of the above  Review of Systems    PHYSICAL EXAM:    Physical Exam    Vitals:    03/19/18 1420   BP: 130/86   Pulse: 66     Body mass index is 32 98 kg/m²    Weight (last 2 days)     Date/Time   Weight    03/19/18 1420  113 (250)              Gen: No acute distress  HEENT: anicteric, mucous membranes moist  Neck: supple, no jugular venous distention, or carotid bruit  Heart: irreguar, no murmur  Lungs :clear to auscultation bilaterally, no rales/rhonchi or wheeze  Abdomen: soft nontender, normoactive bowel sounds, no organomegaly  Ext: warm and perfused, normal femoral pulses, no edema, or clubbing  Skin: warm, no rashes  Neuro: AAO x 3, no focal findings  Psychiatric: normal affect  Musculoskeletal: no obvious joint deformities  Discussion/Summary:    1  Persistent atrial fibrillation, recurrent, 2 prior cardioversions 2016 2017, on low-dose flecainide and anticoagulation  A  Normal LVEF, normal nuclear stress 2016    2  Preoperative endovascular repair of iliac aneurysms    3  Ascending aortic thoracic aneurysm, 48mm, stable by CT, follows with cardiac surgery    Plan:  From a cardiac standpoint, no contraindication for planned endovascular repair of iliac aneurysms, this will also serve as potential need for urgent open conversion  He does not he a testing or changes medications prior  I will have him follow with Dr Baljeet Ying regarding recurrent persistent atrial fibrillation  He has had 2 cardioversions performed in 2016 2017 and low-dose flecainide does not seem to hold him in sinus rhythm long-term  We discussed other antiarrhythmic agents which would need him to be hospitalized as well as consideration of ablation   He doesn't appear to be symptomatic from afib in terms of decreased exercise tolerance etc   This is something that can be discussed with Dr Baljeet Ying at a later date and his persistent rate controlled afib certainly is not prohibitive for planned vascular procedure

## 2018-04-13 NOTE — OP NOTE
OPERATIVE REPORT  PATIENT NAME: Katia Price    :  1950  MRN: [id number]  Pt Location: [facility]    SURGERY DATE: 2018    Surgeon(s) and Role:     * Samson Adams MD - Primary     * Yosvany Hartman MD - Assisting    Preop Diagnosis:  Iliac artery aneurysm, right ([address] ) [I72 3]    Post-Op Diagnosis Codes:     * Iliac artery aneurysm, right ([address] ) [I72 3]    Procedure(s) (LRB):  REPAIR ANEURYSM ILIAC endovascular  with coil embolization right hypogastric artery  (Right)    Specimen(s):  * No specimens in log *    Estimated Blood Loss:   Minimal    Drains:       Anesthesia Type:   General    Operative Indications:  Iliac artery aneurysm, right ([address] ) [I72 3]      Operative Findings:  See op report    Complications:   None      Procedure and Technique:    The patient Katia Price was identified in the operating room after adequate general endotracheal anesthesia was obtained the abdomen and bilateral groins were prepped and draped using chloro prep prep and sterile drapes  Under ultrasound guidance bilateral common femoral arteries were noted to be of good caliber without thrombosis, using a micropuncture needle wire and catheter access was obtained followed by Jr Sicks wire bilaterally  2 Pro-glide closure devices were set in the right groin but not deployed, 8 Western Nyla short sheaths were placed  Coil embolization of the right hypogastric artery was performed by Dr Airam Alamo will be dictated in a separate report  Using a Kumpe catheter a stiff Lunderquist  wire was positioned in the abdominal aorta  An arteriogram was performed identifying the iliac bifurcation  A pigtail catheter was inserted via the right groin for measurement purposes  A  16mm by 14 5 mm by 10 centimeter iliac limb was deployed successfully and post dilated with a Q50 balloon  Repeat arteriogram showed wide patency of the graft with no flow into the hypogastric artery      The  Pro-glide closure devices  worked successfully bilaterally and the patient had easily palpable dorsalis pedis pulses bilaterally  He was taken to the recovery room in stable condition         I was present for the entire procedure, A qualified resident physician was not available and A co-surgeon was required because of skills and techniques relevant to speciality    Patient Disposition:  Critical Care Unit and hemodynamically stable    SIGNATURE: Viktoria Humphrey MD  DATE: April 13, 2018  TIME: 9:59 AM

## 2018-04-13 NOTE — INTERVAL H&P NOTE
H&P reviewed  After examining the patient I find no changes in the patients condition since the H&P had been written  Repair right iliac aneurysm

## 2018-04-14 VITALS
HEIGHT: 73 IN | WEIGHT: 245 LBS | SYSTOLIC BLOOD PRESSURE: 156 MMHG | DIASTOLIC BLOOD PRESSURE: 85 MMHG | RESPIRATION RATE: 18 BRPM | HEART RATE: 63 BPM | OXYGEN SATURATION: 95 % | BODY MASS INDEX: 32.47 KG/M2 | TEMPERATURE: 98.2 F

## 2018-04-14 LAB
ANION GAP SERPL CALCULATED.3IONS-SCNC: 8 MMOL/L (ref 4–13)
BUN SERPL-MCNC: 15 MG/DL (ref 5–25)
CALCIUM SERPL-MCNC: 8.6 MG/DL
CHLORIDE SERPL-SCNC: 110 MMOL/L (ref 100–108)
CO2 SERPL-SCNC: 24 MMOL/L (ref 21–32)
CREAT SERPL-MCNC: 0.9 MG/DL (ref 0.6–1.3)
ERYTHROCYTE [DISTWIDTH] IN BLOOD BY AUTOMATED COUNT: 12.8 % (ref 11.6–15.1)
GFR SERPL CREATININE-BSD FRML MDRD: 87 ML/MIN/1.73SQ M
GLUCOSE SERPL-MCNC: 113 MG/DL (ref 65–140)
HCT VFR BLD AUTO: 45 % (ref 36.5–49.3)
HGB BLD-MCNC: 14.9 G/DL (ref 12–17)
MCH RBC QN AUTO: 29.7 PG (ref 26.8–34.3)
MCHC RBC AUTO-ENTMCNC: 33.1 G/DL (ref 31.4–37.4)
MCV RBC AUTO: 90 FL (ref 82–98)
PLATELET # BLD AUTO: 235 THOUSANDS/UL (ref 149–390)
PMV BLD AUTO: 9.9 FL (ref 8.9–12.7)
POTASSIUM SERPL-SCNC: 3.8 MMOL/L (ref 3.5–5.3)
RBC # BLD AUTO: 5.02 MILLION/UL (ref 3.88–5.62)
SODIUM SERPL-SCNC: 142 MMOL/L (ref 136–145)
WBC # BLD AUTO: 12.66 THOUSAND/UL (ref 4.31–10.16)

## 2018-04-14 PROCEDURE — 99232 SBSQ HOSP IP/OBS MODERATE 35: CPT | Performed by: INTERNAL MEDICINE

## 2018-04-14 PROCEDURE — 80048 BASIC METABOLIC PNL TOTAL CA: CPT | Performed by: SURGERY

## 2018-04-14 PROCEDURE — 85027 COMPLETE CBC AUTOMATED: CPT | Performed by: SURGERY

## 2018-04-14 PROCEDURE — 99024 POSTOP FOLLOW-UP VISIT: CPT | Performed by: SURGERY

## 2018-04-14 RX ORDER — POTASSIUM CHLORIDE 20 MEQ/1
20 TABLET, EXTENDED RELEASE ORAL ONCE
Status: COMPLETED | OUTPATIENT
Start: 2018-04-14 | End: 2018-04-14

## 2018-04-14 RX ADMIN — FLECAINIDE ACETATE 50 MG: 50 TABLET ORAL at 06:06

## 2018-04-14 RX ADMIN — APIXABAN 5 MG: 5 TABLET, FILM COATED ORAL at 08:20

## 2018-04-14 RX ADMIN — POTASSIUM CHLORIDE 20 MEQ: 1500 TABLET, EXTENDED RELEASE ORAL at 08:19

## 2018-04-14 RX ADMIN — LEVOTHYROXINE SODIUM 75 MCG: 75 TABLET ORAL at 06:05

## 2018-04-14 RX ADMIN — ASPIRIN 81 MG: 81 TABLET, COATED ORAL at 08:20

## 2018-04-14 RX ADMIN — FELODIPINE 5 MG: 5 TABLET, FILM COATED, EXTENDED RELEASE ORAL at 08:19

## 2018-04-14 NOTE — DISCHARGE INSTRUCTIONS
Please call the office to follow up with Dr Francia Aschoff  No dressing needed  Take ASA/Eliquis  Call with any concerns

## 2018-04-14 NOTE — PROGRESS NOTES
"Chief Complaint   Patient presents with     Kidney Transplant     POD 25       Initial /75  Pulse 55  Temp 98.1  F (36.7  C) (Oral)  Resp 16  LMP 05/15/2017  SpO2 100% Estimated body mass index is 19.57 kg/(m^2) as calculated from the following:    Height as of 5/16/17: 1.575 m (5' 2\").    Weight as of 5/16/17: 48.5 kg (107 lb).      " S/w Bambi Alejo, patient is ok to be off telemetry and step-down monitor  Will take off  No other orders at this time

## 2018-04-14 NOTE — PROGRESS NOTES
[facility] Progress Note  Patient: Lazaro Perry 76 y o  male   MRN: [id number]  PCP: Shalini Vivas MD  Unit/Bed#: [facility] 504-01 Encounter: [id number]  Date Of Visit: 18    Assessment:    Principal Problem:    Iliac artery aneurysm, right ([address] )      Plan:    1  Persistent atrial fibrillation,  s/p 2 prior cardioversion,  maintained on low-dose flecainide, stable heart rate, monitor, continue Eliquis   2  Hypertension, acceptable blood pressure, continue Plendil and Toprol XL  3  Hypothyroidism, continue levothyroxine  4  Neuropathy,  continue Neurontin  5  Asymptomatic Right iliac artery aneurysm s/p repair     Patient is stable from [facility] for discharge  SLIM will sign off,  please call with questions    VTE Pharmacologic Prophylaxis:   Pharmacologic: Apixaban (Eliquis)  Mechanical VTE Prophylaxis in Place: Yes    Patient Centered Rounds: I have performed bedside rounds with nursing staff today  Discussions with Specialists or Other Care Team Provider:     Education and Discussions with Family / Patient:  Patient and his wife    Time Spent for Care: 30 minutes  More than 50% of total time spent on counseling and coordination of care as described above  Current Length of Stay: 1 day(s)    Current Patient Status: Inpatient     Code Status: Prior      Subjective:   Patient seen and examined  Comfortable sitting in chair  Anxious to go home  No chest pain or shortness of breath    Objective:     Vitals:   Temp (24hrs), Av 2 °F (36 8 °C), Min:97 9 °F (36 6 °C), Max:98 4 °F (36 9 °C)    HR:  [63-92] 63  Resp:  [12-24] 18  BP: (108-156)/(68-93) 156/85  SpO2:  [93 %-97 %] 95 %  Body mass index is 32 32 kg/m²  Input and Output Summary (last 24 hours):        Intake/Output Summary (Last 24 hours) at 18 1116  Last data filed at 18 0930   Gross per 24 hour   Intake          2116 25 ml   Output             4545 ml   Net         -2428 75 ml       Physical Exam: Physical Exam  Patient is awake alert oriented in no acute distress  Lung clear to auscultation bilateral  Heart positive S1-S2 no murmur  Abdomen soft nontender positive bowel sounds  Lower extremities no edema    Additional Data:     Labs:      Results from last 7 days  Lab Units 04/14/18  0623   WBC Thousand/uL 12 66*   HEMOGLOBIN g/dL 14 9   HEMATOCRIT % 45 0   PLATELETS Thousands/uL 235       Results from last 7 days  Lab Units 04/14/18  0623   SODIUM mmol/L 142   POTASSIUM mmol/L 3 8   CHLORIDE mmol/L 110*   CO2 mmol/L 24   BUN mg/dL 15   CREATININE mg/dL 0 90   CALCIUM mg/dL 8 6   GLUCOSE RANDOM mg/dL 113           * I Have Reviewed All Lab Data Listed Above  * Additional Pertinent Lab Tests Reviewed: [facility] Admission Reviewed    Imaging:    Imaging Reports Reviewed Today Include:   Imaging Personally Reviewed by Myself Includes:     Recent Cultures (last 7 days):           Last 24 Hours Medication List:     Current Facility-Administered Medications:  apixaban 5 mg Oral BID Makenna Lay   aspirin 81 mg Oral Daily Ayana Fraire MD   atorvastatin 20 mg Oral Daily With [address]   felodipine 5 mg Oral Daily Ayana Fraire MD   flecainide 50 mg Oral Q12H Ayana Fraire MD   gabapentin 100 mg Oral Daily Ayana Fraire MD   heparin 2000 units and papaverine 60 mg in isolyte equivalent 500 mL irrigation bag  Irrigation Once Ayana Fraire MD   levothyroxine 75 mcg Oral Early Morning Ayana Fraire MD   metoprolol succinate 25 mg Oral Daily Ayana Fraire MD   oxyCODONE-acetaminophen 1 tablet Oral Q4H PRN Ayana Fraire MD        Today, Patient Was Seen By: Pete Majano DO    ** Please Note: This note has been constructed using a voice recognition system   **

## 2018-04-14 NOTE — DISCHARGE SUMMARY
Discharge Summary - Chad Castillo 76 y o  male MRN: [id number]    Unit/Bed#: [facility] 504-01 Encounter: [id number]    Admission Date:   Admission Orders     Ordered        04/13/18 1015  Inpatient Admission  Once               Admitting Diagnosis: Iliac artery aneurysm, right ([address] ) [I72 3]    HPI: Right common iliac artery aneurysm with small focal dissection approximately 3 point 4 centimeters  Planning endo repair possible open [facility] [address] [facility] hybrid OR with cardiac risk assessment  Pt was admitted for 4/13 Endovascular repair of right iliac artery aneurysm w/ coil embolization of right  hypogastric artery  Procedures Performed: No orders of the defined types were placed in this encounter  Hospital Course: Procedure well well, pt was extubated and went to PACU  He was made stepdown 2 and did well overnight  He was having no abdominal pain but complained of a focal spot of tenderness on his right thigh  POD1 parikh and a line were removed, he was made med surg  He tolerated a PO diet, voided on his own, ambulated on his own, eliquis was restarted and he was fit for d/c  He will follow up with Dr Lucina Medrano in 2 weeks in the office  Significant Findings, Care, Treatment and Services Provided: 4/13 Endovascular repair of right iliac artery aneurysm w/ coil embolization of right  hypogastric artery Dr Lucina Medrano    Complications: none    Discharge Diagnosis:  Iliac artery aneurysm, right [facility]) [I72 3]    Resolved Problems  Date Reviewed: 2/19/2018    None          Condition at Discharge: good     Discharge instructions/Information to patient and family:   See after visit summary for information provided to patient and family  Provisions for Follow-Up Care:  See after visit summary for information related to follow-up care and any pertinent home health orders  Disposition: See After Visit Summary for discharge disposition information      Planned Readmission: No    Discharge Statement   I spent 30 minutes discharging the patient  This time was spent on the day of discharge  I had direct contact with the patient on the day of discharge  Additional documentation is required if more than 30 minutes were spent on discharge  Discharge Medications:  See after visit summary for reconciled discharge medications provided to patient and family

## 2018-04-14 NOTE — PROGRESS NOTES
Progress Note - Vascular Surgery   Maykel Gallego 76 y o  male MRN: [id number]  Unit/Bed#: [facility] 504-01 Encounter: [id number]    Assessment:  Pt is a 70y  o  M s/p 4/13 endovascular repair of right iliac artery aneurysm with coil embolization of right hypogastric artery    Plan:  Diet as tolerated  D/c jeannie  D/c parikh  Prn pain control  IS/OOB/ambulate  eliquis to start today  ASA      Subjective/Objective   Chief Complaint:     Subjective: TANNER  B/l palp DP, groins c/d/i soft  Has some gas pain in RLQ  Objective:     Blood pressure 127/85, pulse 70, temperature 98 4 °F (36 9 °C), resp  rate 18, height 6' 1" (1 854 m), weight 111 kg (245 lb), SpO2 96 %  ,Body mass index is 32 32 kg/m²  Intake/Output Summary (Last 24 hours) at 04/14/18 2581  Last data filed at 04/14/18 0431   Gross per 24 hour   Intake          4058 25 ml   Output             5025 ml   Net          -966 75 ml       Invasive Devices     Peripheral Intravenous Line            Peripheral IV 04/13/18 Left Hand less than 1 day    Peripheral IV 04/13/18 Right Arm less than 1 day          Arterial Line            Arterial Line 04/13/18 Right Radial less than 1 day          Drain            Urethral Catheter Non-latex 16 Fr  less than 1 day                Physical Exam: NAD  AAOX3  Normal respiratory effort  Soft, NT, ND  B/l groins soft, c/d/i  B/l +2 DP  No c/c/e    Lab, Imaging and other studies:  I have personally reviewed pertinent lab results    , CBC:   Lab Results   Component Value Date    WBC 8 67 04/13/2018    HGB 14 5 04/13/2018    HCT 41 4 04/13/2018    MCV 88 04/13/2018     04/13/2018    MCH 30 7 04/13/2018    MCHC 35 0 04/13/2018    RDW 12 8 04/13/2018    MPV 9 9 04/13/2018   , CMP:   Lab Results   Component Value Date     04/13/2018    K 4 2 04/13/2018     (H) 04/13/2018    CO2 23 04/13/2018    ANIONGAP 7 04/13/2018    BUN 17 04/13/2018    CREATININE 0 96 04/13/2018    GLUCOSE 110 04/13/2018    CALCIUM 8 0 04/13/2018 EGFR 81 04/13/2018     VTE Pharmacologic Prophylaxis: Heparin  VTE Mechanical Prophylaxis: sequential compression device

## 2018-04-14 NOTE — CASE MANAGEMENT
Initial Clinical Review    Age/Sex: 76 y o  male    Surgery Date: 4/13    Procedure: *REPAIR ANEURYSM ILIAC endovascular  with coil embolization right hypogastric artery  (Right)       Anesthesia: general     Admission Orders: Date/Time/Statement: 4/13/18 @ 1015     Orders Placed This Encounter   Procedures    Inpatient Admission     Standing Status:   Standing     Number of Occurrences:   1     Order Specific Question:   Admitting Physician     Answer:   Richy Belcher     Order Specific Question:   Level of Care     Answer:   Level 1 Stepdown [13]     Order Specific Question:   Bed Type     Answer:   Negative Pressure [6]     Order Specific Question:   Estimated length of stay     Answer:   More than 2 Midnights     Order Specific Question:   Certification     Answer:   I certify that inpatient services are medically necessary for this patient for a duration of greater than two midnights  See H&P and MD Progress Notes for additional information about the patient's course of treatment  Vital Signs: /85   Pulse 63   Temp 98 2 °F (36 8 °C) (Oral)   Resp 18   Ht 6' 1" (1 854 m)   Wt 111 kg (245 lb)   SpO2 95%   BMI 32 32 kg/m²     Diet:        Diet Orders            Start     Ordered    04/13/18 1147  Diet Cardiac; Cardiac Step 1  Diet effective now     Question Answer Comment   Diet Type Cardiac    Cardiac Cardiac Step 1    RD to adjust diet per protocol? Yes        04/13/18 1147          Mobility: elevate HOB     DVT Prophylaxis:SCD  A-line monitoring   IR abd angiography    Neurovascular checks   Inc spirom   Cardiac diet     Pain Control:   Pain Medications             aspirin (ECOTRIN LOW STRENGTH) 81 mg EC tablet Take 81 mg by mouth daily    gabapentin (NEURONTIN) 100 mg capsule Take 100 mg by mouth daily Up to 300mg additional if headaches flaring         IM consult 4/13  Assessment/Plan        1    Persistent atrial fibrillation,  status post 2 prior cardioversion,  maintained on low-dose flecainide, stable heart rate, monitor, Eliquis to be  started tomorrow  2  Hypertension, acceptable blood pressure, continue Plendil and Toprol XL  3  Hypothyroidism, continue levothyroxine  4  Neuropathy,  continue Neurontin  5  Asymptomatic Right iliac artery aneurysm status post repair today     Thank you, will follow with you     VTE Prophylaxis:   / sequential compression device     Vascular note 4/14     Assessment:  Pt is a 68y  o  M s/p 4/13 endovascular repair of right iliac artery aneurysm with coil embolization of right hypogastric artery     Plan:  Diet as tolerated  D/c jeannie  D/c kati  Prn pain control  IS/OOB/ambulate  eliquis to start today  ASA

## 2018-04-16 ENCOUNTER — OFFICE VISIT (OUTPATIENT)
Dept: INTERNAL MEDICINE CLINIC | Facility: CLINIC | Age: 68
End: 2018-04-16
Payer: MEDICARE

## 2018-04-16 ENCOUNTER — TRANSITIONAL CARE MANAGEMENT (OUTPATIENT)
Dept: INTERNAL MEDICINE CLINIC | Facility: CLINIC | Age: 68
End: 2018-04-16

## 2018-04-16 VITALS
OXYGEN SATURATION: 96 % | DIASTOLIC BLOOD PRESSURE: 88 MMHG | WEIGHT: 243.2 LBS | HEIGHT: 73 IN | SYSTOLIC BLOOD PRESSURE: 122 MMHG | BODY MASS INDEX: 32.23 KG/M2 | HEART RATE: 80 BPM | TEMPERATURE: 98.5 F

## 2018-04-16 DIAGNOSIS — I72.3 ILIAC ARTERY ANEURYSM, RIGHT (HCC): ICD-10-CM

## 2018-04-16 DIAGNOSIS — I10 ESSENTIAL HYPERTENSION: ICD-10-CM

## 2018-04-16 DIAGNOSIS — E03.9 HYPOTHYROIDISM, UNSPECIFIED TYPE: ICD-10-CM

## 2018-04-16 DIAGNOSIS — I48.19 ATRIAL FIBRILLATION, PERSISTENT (HCC): Primary | ICD-10-CM

## 2018-04-16 LAB
KCT BLD-ACNC: 143 SEC (ref 89–137)
KCT BLD-ACNC: 177 SEC (ref 89–137)
SPECIMEN SOURCE: ABNORMAL
SPECIMEN SOURCE: ABNORMAL

## 2018-04-16 PROCEDURE — 99496 TRANSJ CARE MGMT HIGH F2F 7D: CPT | Performed by: INTERNAL MEDICINE

## 2018-04-24 ENCOUNTER — OFFICE VISIT (OUTPATIENT)
Dept: VASCULAR SURGERY | Facility: CLINIC | Age: 68
End: 2018-04-24

## 2018-04-24 VITALS
RESPIRATION RATE: 21 BRPM | DIASTOLIC BLOOD PRESSURE: 72 MMHG | HEART RATE: 64 BPM | SYSTOLIC BLOOD PRESSURE: 124 MMHG | BODY MASS INDEX: 32.6 KG/M2 | WEIGHT: 246 LBS | HEIGHT: 73 IN

## 2018-04-24 DIAGNOSIS — I72.3 ILIAC ARTERY ANEURYSM, RIGHT (HCC): Primary | ICD-10-CM

## 2018-04-24 PROCEDURE — 99024 POSTOP FOLLOW-UP VISIT: CPT | Performed by: SURGERY

## 2018-04-24 NOTE — PATIENT INSTRUCTIONS
Recent endovascular repair of right common iliac artery aneurysm  He underwent coil embolization of the right hypogastric artery and has had mild buttock claudication which seems to be improving    Follow-up CTA in 3 months and in general will see on an as needed basis

## 2018-04-24 NOTE — PROGRESS NOTES
Assessment/Plan:    Iliac artery aneurysm, right [facility])  Recent endovascular repair of right common iliac artery aneurysm  He underwent coil embolization of the right hypogastric artery and has had mild buttock claudication which seems to be improving  Follow-up CTA in 3 months and in general will see on an as needed basis     CTA was reviewed with patient and family  Diagnoses and all orders for this visit:    Iliac artery aneurysm, right ([address] )  -     CTA abdomen pelvis w wo contrast; Future  -     Basic metabolic panel; Future        Subjective:      Patient ID: Abhay Haines is a 76 y o  male  HPI doing well status post endovascular right common iliac artery repair with coil embolization right hypogastric artery  His buttock claudication seems to be improving  The following portions of the patient's history were reviewed and updated as appropriate: allergies, current medications, past family history, past medical history, past social history, past surgical history and problem list     Review of Systems  right buttock discomfort all other systems negative    Objective:      /72 (BP Location: Right arm, Patient Position: Sitting, Cuff Size: Standard)   Pulse 64   Resp 21   Ht 6' 1" (1 854 m)   Wt 112 kg (246 lb)   BMI 32 46 kg/m²          Physical Exam      Abdomen soft bilateral ecchymosis from access with no evidence of hematoma easily palpable femoral popliteal and dorsalis pedis on the left and posterior tibial on the right pulses      Vitals:    04/24/18 1429   BP: 124/72   BP Location: Right arm   Patient Position: Sitting   Cuff Size: Standard   Pulse: 64   Resp: 21   Weight: 112 kg (246 lb)   Height: 6' 1" (1 854 m)       Patient Active Problem List   Diagnosis    Aortic aneurysm (HCC)    Essential hypertension    Atrial fibrillation, persistent (HCC)    Iliac artery aneurysm, bilateral (HCC)    Hypothyroidism    Neuropathy    Pre-operative cardiovascular examination    Iliac artery aneurysm, right [facility])       Past Surgical History:   Procedure Laterality Date    ABDOMINAL AORTIC ANEURYSM REPAIR, ENDOVASCULAR Right 4/13/2018    Procedure: REPAIR ANEURYSM ILIAC endovascular  with coil embolization right hypogastric artery ;  Surgeon: Axel Pugh MD;  [facility];  Service: Vascular    CARDIOVERSION      CATARACT EXTRACTION      onset 11/17/2011    COLONOSCOPY      PA EDG US EXAM SURGICAL ALTER STOM DUODENUM/JEJUNUM N/A 11/10/2017    Procedure: RADIAL ENDOSCOPIC U/S;  Surgeon: Flor Ellison MD;  [facility]; Service: Gastroenterology    RETINAL DETACHMENT SURGERY Right     onset 1992, retinal detachment complete air fill confirmed       Family History   Problem Relation Age of Onset    Aortic aneurysm Father      abdominal    Aortic aneurysm Brother     Stroke Mother      CVA    Cancer Maternal Grandmother      malignant neoplasm    Cancer Maternal Grandfather      malignant neoplasm    Cancer Paternal Grandmother      malignant neoplasm    Cancer Paternal Grandfather      malignant neoplasm    Cancer Family      malignant neoplasm    Cancer Family      malignant neoplasm       Social History     Social History    Marital status: /Civil Union     Spouse name: N/A    Number of children: N/A    Years of education: N/A     Occupational History    Not on file       Social History Main Topics    Smoking status: Never Smoker    Smokeless tobacco: Never Used    Alcohol use Yes      Comment: socially     Drug use: No    Sexual activity: Not on file     Other Topics Concern    Not on file     Social History Narrative    No narrative on file       Allergies   Allergen Reactions    Wound Dressing Adhesive          Current Outpatient Prescriptions:     apixaban (ELIQUIS) 5 mg, Take 1 tablet (5 mg total) by mouth 2 (two) times a day (Patient taking differently: Take 5 mg by mouth 2 (two) times a day To stop 2 days prior to procedure on 4/13/18 ), Disp: 180 tablet, Rfl: 3    aspirin (ECOTRIN LOW STRENGTH) 81 mg EC tablet, Take 81 mg by mouth daily, Disp: , Rfl:     atorvastatin (LIPITOR) 20 mg tablet, Take 20 mg by mouth daily, Disp: , Rfl:     Co-Enzyme Q-10 100 MG CAPS, Take 100 mg by mouth daily, Disp: , Rfl:     felodipine (PLENDIL) 5 mg 24 hr tablet, Take 5 mg by mouth daily  , Disp: , Rfl:     flecainide (TAMBOCOR) 50 mg tablet, TAKE 1 TABLET EVERY 12 HOURS DAILY  , Disp: 180 tablet, Rfl: 3    gabapentin (NEURONTIN) 100 mg capsule, Take 100 mg by mouth daily Up to 300mg additional if headaches flaring , Disp: , Rfl:     levothyroxine 75 mcg tablet, Take 75 mcg by mouth daily  , Disp: , Rfl:     metoprolol succinate (TOPROL-XL) 25 mg 24 hr tablet, Take 25 mg by mouth daily, Disp: , Rfl:     Misc Natural Products (LUTEIN 20 PO), Take 20 mg by mouth daily  , Disp: , Rfl:     multivitamin (THERAGRAN) TABS, Take 1 tablet by mouth daily  , Disp: , Rfl:     Omega-3 Fatty Acids (FISH OIL) 1200 MG CAPS, Take 1,200 mg by mouth daily    , Disp: , Rfl:

## 2018-04-26 ENCOUNTER — TELEPHONE (OUTPATIENT)
Dept: VASCULAR SURGERY | Facility: CLINIC | Age: 68
End: 2018-04-26

## 2018-04-26 NOTE — TELEPHONE ENCOUNTER
Pt called to inform us that he will be on vacation the week of his CT of abd and pelvis that is scheduled for May 24th ( a month after his first post op appointment)( aneurysm repair done 4/13/18) Pt wants to know if he should reschedule CT before he goes on vacation or after he comes back which will be after June 1st      Please advise  Thank you!

## 2018-05-24 ENCOUNTER — PATIENT OUTREACH (OUTPATIENT)
Dept: INTERNAL MEDICINE CLINIC | Facility: CLINIC | Age: 68
End: 2018-05-24

## 2018-05-24 NOTE — PROGRESS NOTES
Outreach Tc to patient for Cm assessment  No answer to call  Left message requesting patient return a telephone call to Reji Farnsworth RN, BSN; Outpatient Care Manager @ [phone]  Home patient interview completed by chart review

## 2018-05-31 ENCOUNTER — APPOINTMENT (OUTPATIENT)
Dept: LAB | Facility: CLINIC | Age: 68
End: 2018-05-31
Payer: MEDICARE

## 2018-05-31 DIAGNOSIS — I71.2 THORACIC AORTIC ANEURYSM WITHOUT RUPTURE (HCC): ICD-10-CM

## 2018-05-31 DIAGNOSIS — I72.3 ILIAC ARTERY ANEURYSM, RIGHT (HCC): ICD-10-CM

## 2018-05-31 LAB
ANION GAP SERPL CALCULATED.3IONS-SCNC: 5 MMOL/L (ref 4–13)
BUN SERPL-MCNC: 20 MG/DL (ref 5–25)
CALCIUM SERPL-MCNC: 9.2 MG/DL (ref 8.3–10.1)
CHLORIDE SERPL-SCNC: 106 MMOL/L (ref 100–108)
CO2 SERPL-SCNC: 29 MMOL/L (ref 21–32)
CREAT SERPL-MCNC: 1.06 MG/DL (ref 0.6–1.3)
GFR SERPL CREATININE-BSD FRML MDRD: 72 ML/MIN/1.73SQ M
GLUCOSE P FAST SERPL-MCNC: 99 MG/DL (ref 65–99)
POTASSIUM SERPL-SCNC: 5 MMOL/L (ref 3.5–5.3)
SODIUM SERPL-SCNC: 140 MMOL/L (ref 136–145)

## 2018-05-31 PROCEDURE — 36415 COLL VENOUS BLD VENIPUNCTURE: CPT

## 2018-05-31 PROCEDURE — 80048 BASIC METABOLIC PNL TOTAL CA: CPT

## 2018-06-04 ENCOUNTER — PATIENT OUTREACH (OUTPATIENT)
Dept: INTERNAL MEDICINE CLINIC | Facility: CLINIC | Age: 68
End: 2018-06-04

## 2018-06-04 NOTE — PROGRESS NOTES
Outreach TC to patient for care   No answer to call  Left message with patient's spouse to have patient return a telephone call to [address]; Jesus Mullen RN, BSN @ [phone]

## 2018-06-05 ENCOUNTER — HOSPITAL ENCOUNTER (OUTPATIENT)
Dept: RADIOLOGY | Age: 68
Discharge: HOME | End: 2018-06-05
Payer: MEDICARE

## 2018-06-05 DIAGNOSIS — I72.3 ILIAC ARTERY ANEURYSM, RIGHT (HCC): ICD-10-CM

## 2018-06-05 PROCEDURE — 74174 CTA ABD&PLVS W/CONTRAST: CPT

## 2018-06-05 RX ADMIN — IOHEXOL 100 ML: 350 INJECTION, SOLUTION INTRAVENOUS at 09:56

## 2018-06-19 ENCOUNTER — PATIENT OUTREACH (OUTPATIENT)
Dept: INTERNAL MEDICINE CLINIC | Facility: CLINIC | Age: 68
End: 2018-06-19

## 2018-06-19 NOTE — PROGRESS NOTES
Outreach TC to patient for care   No answer to call  This is the third attempt to reach patient with no contact  Letter sent to patient regarding unable to reach  Will continue to follow case electronically as BPCI bundle does not close until 7/12  left message with contact information for CM

## 2018-07-03 ENCOUNTER — OFFICE VISIT (OUTPATIENT)
Dept: VASCULAR SURGERY | Facility: CLINIC | Age: 68
End: 2018-07-03

## 2018-07-03 VITALS
HEIGHT: 73 IN | TEMPERATURE: 97 F | HEART RATE: 76 BPM | DIASTOLIC BLOOD PRESSURE: 80 MMHG | SYSTOLIC BLOOD PRESSURE: 122 MMHG | RESPIRATION RATE: 20 BRPM | WEIGHT: 245 LBS | BODY MASS INDEX: 32.47 KG/M2

## 2018-07-03 DIAGNOSIS — I72.3 ILIAC ARTERY ANEURYSM, RIGHT (HCC): Primary | ICD-10-CM

## 2018-07-03 PROCEDURE — 99024 POSTOP FOLLOW-UP VISIT: CPT | Performed by: SURGERY

## 2018-07-03 NOTE — PATIENT INSTRUCTIONS
Doing well approximately 3 months status post right common external iliac artery aneurysm repair endovascular with successful coil embolization of the hypogastric artery  He continues to have right buttock claudication but it is improving and only under extreme exercise the seem to bother him  I feel that this will continue to improve to eventually have cessation of claudication symptom    He will have a follow-up CT angiogram in approximately 6 months

## 2018-07-03 NOTE — PROGRESS NOTES
Assessment/Plan:    Iliac artery aneurysm, right (HCC)  Doing well approximately 3 months status post right common external iliac artery aneurysm repair endovascular with successful coil embolization of the hypogastric artery  He continues to have right buttock claudication but it is improving and only under extreme exercise the seem to bother him  I feel that this will continue to improve to eventually have cessation of claudication symptom  He will have a follow-up CT angiogram in approximately 6 months         Diagnoses and all orders for this visit:    Iliac artery aneurysm, right ([address] )  -     Basic metabolic panel; Future  -     CTA abdomen pelvis w wo contrast; Future        Subjective:      Patient ID: Katia Price is a 76 y o  male  HPI    The following portions of the patient's history were reviewed and updated as appropriate: allergies, current medications, past family history, past medical history, past social history, past surgical history and problem list     Review of Systems      Objective:      /80 (BP Location: Right arm, Patient Position: Sitting, Cuff Size: Standard)   Pulse 76   Temp (!) 97 °F (36 1 °C)   Resp 20   Ht 6' 1" (1 854 m)   Wt 111 kg (245 lb)   BMI 32 32 kg/m²          Physical Exam       Physical Exam      Oriented x3 no evidence of clinical depression  Neck is supple carotid pulses equal bilaterally no bruits heard    Chest lungs clear, heart regular rhythm  Abdomen soft nontender no evidence of pulsatile masses  Pulses are palpable bilateral femoral popliteal dorsalis pedis and posterior tibial pulses    Neurological exam intact cranial nerves 2-12 grossly intact no gross motor sensory deficits detected    Review of CTA shows successful endovascular repair of a common iliac artery aneurysm with no endoleak      Vitals:    07/03/18 1133   BP: 122/80   BP Location: Right arm   Patient Position: Sitting   Cuff Size: Standard   Pulse: 76   Resp: 20   Temp: (!) 97 °F (36 1 °C)   Weight: 111 kg (245 lb)   Height: 6' 1" (1 854 m)       Patient Active Problem List   Diagnosis    Aortic aneurysm ([address] )    Essential hypertension    Atrial fibrillation, persistent (HCC)    Iliac artery aneurysm, bilateral (HCC)    Hypothyroidism    Neuropathy    Pre-operative cardiovascular examination    Iliac artery aneurysm, right [facility])       Past Surgical History:   Procedure Laterality Date    ABDOMINAL AORTIC ANEURYSM REPAIR, ENDOVASCULAR Right 4/13/2018    Procedure: REPAIR ANEURYSM ILIAC endovascular  with coil embolization right hypogastric artery ;  Surgeon: Lavell Wade MD;  [facility];  Service: Vascular    CARDIOVERSION      CATARACT EXTRACTION      onset 11/17/2011    COLONOSCOPY      [place] EDG US EXAM SURGICAL ALTER STOM DUODENUM/JEJUNUM N/A 11/10/2017    Procedure: RADIAL ENDOSCOPIC U/S;  Surgeon: Charbel Nunez MD;  [facility]; Service: Gastroenterology    RETINAL DETACHMENT SURGERY Right     onset 1992, retinal detachment complete air fill confirmed       Family History   Problem Relation Age of Onset    Aortic aneurysm Father         abdominal    Aortic aneurysm Brother     Stroke Mother         CVA    Cancer Maternal Grandmother         malignant neoplasm    Cancer Maternal Grandfather         malignant neoplasm    Cancer Paternal Grandmother         malignant neoplasm    Cancer Paternal Grandfather         malignant neoplasm    Cancer Family         malignant neoplasm    Cancer Family         malignant neoplasm       Social History     Social History    Marital status: /Civil Union     Spouse name: N/A    Number of children: N/A    Years of education: N/A     Occupational History    Not on file       Social History Main Topics    Smoking status: Never Smoker    Smokeless tobacco: Never Used    Alcohol use Yes      Comment: socially     Drug use: No    Sexual activity: Not on file     Other Topics Concern    Not on file Social History Narrative    No narrative on file       Allergies   Allergen Reactions    Wound Dressing Adhesive          Current Outpatient Prescriptions:     apixaban (ELIQUIS) 5 mg, Take 1 tablet (5 mg total) by mouth 2 (two) times a day (Patient taking differently: Take 5 mg by mouth 2 (two) times a day To stop 2 days prior to procedure on 4/13/18 ), Disp: 180 tablet, Rfl: 3    aspirin (ECOTRIN LOW STRENGTH) 81 mg EC tablet, Take 81 mg by mouth daily, Disp: , Rfl:     atorvastatin (LIPITOR) 20 mg tablet, Take 20 mg by mouth daily, Disp: , Rfl:     Co-Enzyme Q-10 100 MG CAPS, Take 100 mg by mouth daily, Disp: , Rfl:     felodipine (PLENDIL) 5 mg 24 hr tablet, Take 5 mg by mouth daily  , Disp: , Rfl:     flecainide (TAMBOCOR) 50 mg tablet, TAKE 1 TABLET EVERY 12 HOURS DAILY  , Disp: 180 tablet, Rfl: 3    gabapentin (NEURONTIN) 100 mg capsule, Take 100 mg by mouth daily Up to 300mg additional if headaches flaring , Disp: , Rfl:     levothyroxine 75 mcg tablet, Take 75 mcg by mouth daily  , Disp: , Rfl:     metoprolol succinate (TOPROL-XL) 25 mg 24 hr tablet, Take 25 mg by mouth daily, Disp: , Rfl:     Misc Natural Products (LUTEIN 20 PO), Take 20 mg by mouth daily  , Disp: , Rfl:     multivitamin (THERAGRAN) TABS, Take 1 tablet by mouth daily  , Disp: , Rfl:     Omega-3 Fatty Acids (FISH OIL) 1200 MG CAPS, Take 1,200 mg by mouth daily    , Disp: , Rfl:

## 2018-07-25 ENCOUNTER — OFFICE VISIT (OUTPATIENT)
Dept: CARDIOLOGY CLINIC | Facility: CLINIC | Age: 68
End: 2018-07-25
Payer: MEDICARE

## 2018-07-25 VITALS
DIASTOLIC BLOOD PRESSURE: 68 MMHG | HEART RATE: 47 BPM | WEIGHT: 245 LBS | BODY MASS INDEX: 32.47 KG/M2 | HEIGHT: 73 IN | SYSTOLIC BLOOD PRESSURE: 106 MMHG

## 2018-07-25 DIAGNOSIS — I10 ESSENTIAL HYPERTENSION: ICD-10-CM

## 2018-07-25 DIAGNOSIS — I48.19 PERSISTENT ATRIAL FIBRILLATION (HCC): Primary | ICD-10-CM

## 2018-07-25 PROCEDURE — 93000 ELECTROCARDIOGRAM COMPLETE: CPT | Performed by: INTERNAL MEDICINE

## 2018-07-25 PROCEDURE — 99215 OFFICE O/P EST HI 40 MIN: CPT | Performed by: INTERNAL MEDICINE

## 2018-07-25 NOTE — LETTER
July 25, 2018     Aracelis Larios DO  [facility] [address] [address]    Patient: Agueda Driver   YOB: 1950   Date of Visit: 7/25/2018       Dear Dr Jeffrey Roles:    Thank you for referring Gin Quintana to me for evaluation  Below are my notes for this consultation  If you have questions, please do not hesitate to call me  I look forward to following your patient along with you  Sincerely,        Karen Tavarez MD        CC: No Recipients  Karen Tavarez MD  7/25/2018  2:27 PM  Sign at close encounter  [id number]    Outpatient Follow-up  Today's Date: 07/25/18        Patient name: Agueda Driver  YOB: 1950  Sex: male         Chief Complaint: f/u afib    ASSESSMENT:  Problem List Items Addressed This Visit     None      Visit Diagnoses     Atrial fibrillation, unspecified type [facility])    -  Primary    Relevant Orders    POCT ECG          70yo male   1)Persistent afib, likely since December, 2017, noted to be in afib March 2018 EKG w Dr Jeffrey Roles, slow response  Minimal symptoms, maybe slight increase in fatigue but he doesn't feel his afib, initiallly found incidentally, had DCCV Jan 2016and went back into afib about 9 months later  Has not tried antiarrhythmic  He denies CP/SOB/palpitations, syncope  2)  Sick sinus/tachy anderson: HR 40bpm in ER duriing acute abdominal pain possibly related to renal artery dissection (vs gas, not sure if incidental finding) HR came up on his own  We did holter and HR as low as 39bpm during sleep but average 66bpm and up to 132bpm  Also had NSVT up to 5 beats, rates only 158bpm, no afib (reviewed and interpreted holter myself)   Asymptomatic during anderson    3) AnticoagulationCHADS2Vasc=3 (HTN, vascular disease w aortic anerysm and age) and on Eliquis   No CAD, 2+ MR and moderate LVH on ROSA, normal EF  ) HTN well controlled    4) 4 8cm ascending aortic aneurysm:  5) s/p endovascula repair iliac aneurysm      PLAN:  1  Went over details of Rhythm control vs Rate control strategy:  Advantage of rate control is we can just stop flecainide and metoprolol and likely he will be at good rate (too slow now w the meds) and less procedures  Minimal symptoms from afib  Advantage of rhythm control would be potential for less death, stroke, and heart failure if we can maintain NSR, but disadvantage is we would need likely a Pacemaker for tachy-anderson 1 or more ablations, and antiarrhythmics  Likely  So he would need to be motivated for this multi-step process  We went over various risks of the ablation and the pacemaker in detail  After long discussion he opted for rate control strategy and to remain in afib  I did explain over long term may be permanent afib so if he were to change his mind should do that in next few months  We will STOP flecainide/metoprolol and check 48 holter  Orders Placed This Encounter   Procedures    POCT ECG     There are no discontinued medications    HPI/Subjective:   68yo male   1)Persistent afib, likely since December, 2017, noted to be in afib March 2018 EKG w Dr Linda Reed, slow response  Minimal symptoms, maybe slight increase in fatigue but he doesn't feel his afib, initiallly found incidentally, had DCCV Jan 2016and went back into afib about 9 months later  Has not tried antiarrhythmic  He denies CP/SOB/palpitations, syncope  2)  Sick sinus/tachy anderson: HR 40bpm in ER duriing acute abdominal pain possibly related to renal artery dissection (vs gas, not sure if incidental finding) HR came up on his own  We did holter and HR as low as 39bpm during sleep but average 66bpm and up to 132bpm  Also had NSVT up to 5 beats, rates only 158bpm, no afib (reviewed and interpreted holter myself)    Asymptomatic during anderson    3) AnticoagulationCHADS2Vasc=3 (HTN, vascular disease w aortic anerysm and age) and on Eliquis  No CAD, 2+ MR and moderate LVH on ROSA, normal EF  ) HTN well controlled    4) 4 8cm ascending aortic aneurysm:  5) s/p endovascula repair iliac aneurysm  Please note HPI is listed by problem with with update following it, it is copied again in the assessment above and reflects medical decision making as well  Complete 12 point ROS reviewed and otherwise non pertinent or negative except as per HPI pertinent positives in Cardiovascular and Respiratory emphasized  Please see paper chart for outpatient clinic patients where the patient completed the 12 point ROS survey  Past Medical History:   Diagnosis Date    Aneur[address]m of [address] aorta ([facility] )     Arrhythmia     Detached retina, right     Disease of thyroid gland     Gastric wall thickening     Headache     Hypertension     Iliac artery aneurysm, bilateral (HCC)     Irregular heart beat     afib cardioversion 1/30/17, x2     Neuropathy     bilateral feet    Paroxysmal A-fib ([facility] )     Prostatic hypertrophy     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(HCC)        Allergies   Allergen Reactions    Wound Dressing Adhesive      I reviewed the Home Medication list and Allergies in the chart  Scheduled Meds:  Current Outpatient Prescriptions   Medication Sig Dispense Refill    apixaban (ELIQUIS) 5 mg Take 1 tablet (5 mg total) by mouth 2 (two) times a day (Patient taking differently: Take 5 mg by mouth 2 (two) times a day To stop 2 days prior to procedure on 4/13/18 ) 180 tablet 3    aspirin (ECOTRIN LOW STRENGTH) 81 mg EC tablet Take 81 mg by mouth daily      atorvastatin (LIPITOR) 20 mg tablet Take 20 mg by mouth daily      Co-Enzyme Q-10 100 MG CAPS Take 100 mg by mouth daily      felodipine (PLENDIL) 5 mg 24 hr tablet Take 5 mg by mouth daily   flecainide (TAMBOCOR) 50 mg tablet TAKE 1 TABLET EVERY 12 HOURS DAILY   180 tablet 3    gabapentin (NEURONTIN) 100 mg capsule Take 100 mg by mouth daily Up to 300mg additional if headaches flaring       levothyroxine 75 mcg tablet Take 75 mcg by mouth daily   metoprolol succinate (TOPROL-XL) 25 mg 24 hr tablet Take 25 mg by mouth daily      Misc Natural Products (LUTEIN 20 PO) Take 20 mg by mouth daily   multivitamin (THERAGRAN) TABS Take 1 tablet by mouth daily   Omega-3 Fatty Acids (FISH OIL) 1200 MG CAPS Take 1,200 mg by mouth daily  No current facility-administered medications for this visit  PRN Meds:         Family History   Problem Relation Age of Onset    Aortic aneurysm Father         abdominal    Aortic aneurysm Brother     Stroke Mother         CVA    Cancer Maternal Grandmother         malignant neoplasm    Cancer Maternal Grandfather         malignant neoplasm    Cancer Paternal Grandmother         malignant neoplasm    Cancer Paternal Grandfather         malignant neoplasm    Cancer Family         malignant neoplasm    Cancer Family         malignant neoplasm       Social History     Social History    Marital status: /Civil Union     Spouse name: N/A    Number of children: N/A    Years of education: N/A     Occupational History    Not on file  Social History Main Topics    Smoking status: Never Smoker    Smokeless tobacco: Never Used    Alcohol use Yes      Comment: socially     Drug use: No    Sexual activity: Not on file     Other Topics Concern    Not on file     Social History Narrative    No narrative on file         OBJECTIVE:    /68 (BP Location: Left arm, Patient Position: Sitting, Cuff Size: Large)   Pulse (!) 47   Ht 6' 1" (1 854 m)   Wt 111 kg (245 lb)   BMI 32 32 kg/m²    Vitals:    07/25/18 1310   Weight: 111 kg (245 lb)     GEN: No acute distress, Alert and oriented, well appearing  HEENT:Head, neck, ears, oral pharynx: Mucus membranes moist, oral pharynx clear, nares clear   External ears normal  EYES: Pupils equal, sclera anicteric, midline, normal conjuctiva  NECK: No JVD, supple, no obvious masses or thryomegaly or goiter  CARDIOVASCULAR: irreg irreg No murmur, rub, gallops S1,S2  LUNGS: Clear To auscultation bilaterally, normal effort, no rales, rhonchi, crackles  ABDOMEN:  nondistended,  without obvious organomegaly or ascites  EXTREMITIES/VASCULAR:  No edema  Radial pulses intact, pedal pulses difficult to palpate, warm an well perfused  PSYCH: Normal Affect, no overt suicidal ideation, linear speech pattern without evidence of psychosis  NEURO: Grossly intact, moving all extremiteis equal, face symmetric, alert and responsive, no obvious focal defecits  GAIT:  Ambulates normally without difficulty  HEME: No bleeding, bruising, petechia, purpura  SKIN: No significant rashes, warm, no diaphoresis or pallor  Lab Results:       LABS:      Chemistry        Component Value Date/Time     05/31/2018 0812     01/06/2016 1143    K 5 0 05/31/2018 0812    K 4 8 01/06/2016 1143     05/31/2018 0812     (H) 01/06/2016 1143    CO2 29 05/31/2018 0812    CO2 27 01/06/2016 1143    BUN 20 05/31/2018 0812    BUN 20 01/06/2016 1143    CREATININE 1 06 05/31/2018 0812    CREATININE 1 01 01/06/2016 1143        Component Value Date/Time    CALCIUM 9 2 05/31/2018 0812    CALCIUM 8 9 01/06/2016 1143    ALKPHOS 88 08/05/2017 0813    AST 17 08/05/2017 0813    ALT 39 08/05/2017 0813    BILITOT 0 78 08/05/2017 0813            Lab Results   Component Value Date    CHOL 115 06/13/2017    CHOL 120 04/14/2016     Lab Results   Component Value Date    HDL 51 06/13/2017    HDL 56 04/14/2016     Lab Results   Component Value Date    LDLCALC 47 06/13/2017    LDLCALC 49 04/14/2016     Lab Results   Component Value Date    TRIG 83 06/13/2017    TRIG 77 04/14/2016     No components found for: CHOLHDL    IMAGING: No results found  Cardiac testing:   No results found for this or any previous visit    No results found for this or any previous visit  No results found for this or any previous visit  No results found for this or any previous visit          I reviewed and interpreted the following LABS/EKG/TELE/IMAGING and below is summary of my interpretation (if data available):    LABS:   Current EKG and Rhythm Strip Slow afib 47 bpm  2 5s pause    Over 60 min spent on this encounter, 45 min face to face

## 2018-07-25 NOTE — PROGRESS NOTES
HEART AND VASCULAR  CARDIAC [address] [facility]    Outpatient Follow-up  Today's Date: 07/25/18        Patient name: Gisella Rodrigez  YOB: 1950  Sex: male         Chief Complaint: f/u afib    ASSESSMENT:  Problem List Items Addressed This Visit     None      Visit Diagnoses     Atrial fibrillation, unspecified type [facility])    -  Primary    Relevant Orders    POCT ECG          70yo male   1)Persistent afib, likely since December, 2017, noted to be in afib March 2018 EKG w Dr Emily Cedeno, slow response  Minimal symptoms, maybe slight increase in fatigue but he doesn't feel his afib, initiallly found incidentally, had DCCV Jan 2016and went back into afib about 9 months later  Has not tried antiarrhythmic  He denies CP/SOB/palpitations, syncope  2)  Sick sinus/tachy anderson: HR 40bpm in ER duriing acute abdominal pain possibly related to renal artery dissection (vs gas, not sure if incidental finding) HR came up on his own  We did holter and HR as low as 39bpm during sleep but average 66bpm and up to 132bpm  Also had NSVT up to 5 beats, rates only 158bpm, no afib (reviewed and interpreted holter myself)   Asymptomatic during anderson    3) AnticoagulationCHADS2Vasc=3 (HTN, vascular disease w aortic anerysm and age) and on Eliquis  No CAD, 2+ MR and moderate LVH on ROSA, normal EF  ) HTN well controlled    4) 4 8cm ascending aortic aneurysm:  5) s/p endovascula repair iliac aneurysm      PLAN:  1  Went over details of Rhythm control vs Rate control strategy:  Advantage of rate control is we can just stop flecainide and metoprolol and likely he will be at good rate (too slow now w the meds) and less procedures  Minimal symptoms from afib      Advantage of rhythm control would be potential for less death, stroke, and heart failure if we can maintain NSR, but disadvantage is we would need likely a Pacemaker for tachy-anderson 1 or more ablations, and antiarrhythmics Likely  So he would need to be motivated for this multi-step process  We went over various risks of the ablation and the pacemaker in detail  After long discussion he opted for rate control strategy and to remain in afib  I did explain over long term may be permanent afib so if he were to change his mind should do that in next few months  We will STOP flecainide/metoprolol and check 48 holter  Orders Placed This Encounter   Procedures    POCT ECG     There are no discontinued medications    HPI/Subjective:   70yo male   1)Persistent afib, likely since December, 2017, noted to be in afib March 2018 EKG w Dr Bora Benjamin, slow response  Minimal symptoms, maybe slight increase in fatigue but he doesn't feel his afib, initiallly found incidentally, had DCCV Jan 2016and went back into afib about 9 months later  Has not tried antiarrhythmic  He denies CP/SOB/palpitations, syncope  2)  Sick sinus/tachy anderson: HR 40bpm in ER duriing acute abdominal pain possibly related to renal artery dissection (vs gas, not sure if incidental finding) HR came up on his own  We did holter and HR as low as 39bpm during sleep but average 66bpm and up to 132bpm  Also had NSVT up to 5 beats, rates only 158bpm, no afib (reviewed and interpreted holter myself)   Asymptomatic during anderson    3) AnticoagulationCHADS2Vasc=3 (HTN, vascular disease w aortic anerysm and age) and on Eliquis  No CAD, 2+ MR and moderate LVH on ROSA, normal EF  ) HTN well controlled    4) 4 8cm ascending aortic aneurysm:  5) s/p endovascula repair iliac aneurysm  Please note HPI is listed by problem with with update following it, it is copied again in the assessment above and reflects medical decision making as well         Complete 12 point ROS reviewed and otherwise non pertinent or negative except as per HPI pertinent positives in Cardiovascular and Respiratory emphasized  Please see paper chart for outpatient clinic patients where the patient completed the 12 point ROS survey  Past Medical History:   Diagnosis Date    Aneurysm of thoracic aorta ([facility] [address] )     Arrhythmia     Detached retina, right     Disease of thyroid gland     Gastric wall thickening     Headache     Hypertension     Iliac artery aneurysm, bilateral (HCC)     Irregular heart beat     afib cardioversion 1/30/17, x2     Neuropathy     bilateral feet    Paroxysmal A-fib ([facility] [address] )     Prostatic hypertrophy     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(HCC)        Allergies   Allergen Reactions    Wound Dressing Adhesive      I reviewed the Home Medication list and Allergies in the chart  Scheduled Meds:  Current Outpatient Prescriptions   Medication Sig Dispense Refill    apixaban (ELIQUIS) 5 mg Take 1 tablet (5 mg total) by mouth 2 (two) times a day (Patient taking differently: Take 5 mg by mouth 2 (two) times a day To stop 2 days prior to procedure on 4/13/18 ) 180 tablet 3    aspirin (ECOTRIN LOW STRENGTH) 81 mg EC tablet Take 81 mg by mouth daily      atorvastatin (LIPITOR) 20 mg tablet Take 20 mg by mouth daily      Co-Enzyme Q-10 100 MG CAPS Take 100 mg by mouth daily      felodipine (PLENDIL) 5 mg 24 hr tablet Take 5 mg by mouth daily   flecainide (TAMBOCOR) 50 mg tablet TAKE 1 TABLET EVERY 12 HOURS DAILY  180 tablet 3    gabapentin (NEURONTIN) 100 mg capsule Take 100 mg by mouth daily Up to 300mg additional if headaches flaring       levothyroxine 75 mcg tablet Take 75 mcg by mouth daily   metoprolol succinate (TOPROL-XL) 25 mg 24 hr tablet Take 25 mg by mouth daily      Misc Natural Products (LUTEIN 20 PO) Take 20 mg by mouth daily   multivitamin (THERAGRAN) TABS Take 1 tablet by mouth daily   Omega-3 Fatty Acids (FISH OIL) 1200 MG CAPS Take 1,200 mg by mouth daily  No current facility-administered medications for this visit        PRN Meds:         Family History   Problem Relation Age of Onset    Aortic aneurysm Father         abdominal    Aortic aneurysm Brother     Stroke Mother         CVA    Cancer Maternal Grandmother         malignant neoplasm    Cancer Maternal Grandfather         malignant neoplasm    Cancer Paternal Grandmother         malignant neoplasm    Cancer Paternal Grandfather         malignant neoplasm    Cancer Family         malignant neoplasm    Cancer Family         malignant neoplasm       Social History     Social History    Marital status: /Civil Union     Spouse name: N/A    Number of children: N/A    Years of education: N/A     Occupational History    Not on file  Social History Main Topics    Smoking status: Never Smoker    Smokeless tobacco: Never Used    Alcohol use Yes      Comment: socially     Drug use: No    Sexual activity: Not on file     Other Topics Concern    Not on file     Social History Narrative    No narrative on file         OBJECTIVE:    /68 (BP Location: Left arm, Patient Position: Sitting, Cuff Size: Large)   Pulse (!) 47   Ht 6' 1" (1 854 m)   Wt 111 kg (245 lb)   BMI 32 32 kg/m²   Vitals:    07/25/18 1310   Weight: 111 kg (245 lb)     GEN: No acute distress, Alert and oriented, well appearing  HEENT:Head, neck, ears, oral pharynx: Mucus membranes moist, oral pharynx clear, nares clear  External ears normal  EYES: Pupils equal, sclera anicteric, midline, normal conjuctiva  NECK: No JVD, supple, no obvious masses or thryomegaly or goiter  CARDIOVASCULAR: irreg irreg No murmur, rub, gallops S1,S2  LUNGS: Clear To auscultation bilaterally, normal effort, no rales, rhonchi, crackles  ABDOMEN:  nondistended,  without obvious organomegaly or ascites  EXTREMITIES/VASCULAR:  No edema  Radial pulses intact, pedal pulses difficult to palpate, warm an well perfused  PSYCH: Normal Affect, no overt suicidal ideation, linear speech pattern without evidence of psychosis  NEURO: Grossly intact, moving all extremiteis equal, face symmetric, alert and responsive, no obvious focal defecits  GAIT:  Ambulates normally without difficulty  HEME: No bleeding, bruising, petechia, purpura  SKIN: No significant rashes, warm, no diaphoresis or pallor  Lab Results:       LABS:      Chemistry        Component Value Date/Time     05/31/2018 0812     01/06/2016 1143    K 5 0 05/31/2018 0812    K 4 8 01/06/2016 1143     05/31/2018 0812     (H) 01/06/2016 1143    CO2 29 05/31/2018 0812    CO2 27 01/06/2016 1143    BUN 20 05/31/2018 0812    BUN 20 01/06/2016 1143    CREATININE 1 06 05/31/2018 0812    CREATININE 1 01 01/06/2016 1143        Component Value Date/Time    CALCIUM 9 2 05/31/2018 0812    CALCIUM 8 9 01/06/2016 1143    ALKPHOS 88 08/05/2017 0813    AST 17 08/05/2017 0813    ALT 39 08/05/2017 0813    BILITOT 0 78 08/05/2017 0813            Lab Results   Component Value Date    CHOL 115 06/13/2017    CHOL 120 04/14/2016     Lab Results   Component Value Date    HDL 51 06/13/2017    HDL 56 04/14/2016     Lab Results   Component Value Date    LDLCALC 47 06/13/2017    LDLCALC 49 04/14/2016     Lab Results   Component Value Date    TRIG 83 06/13/2017    TRIG 77 04/14/2016     No components found for: CHOLHDL    IMAGING: No results found  Cardiac testing:   No results found for this or any previous visit  No results found for this or any previous visit  No results found for this or any previous visit  No results found for this or any previous visit          I reviewed and interpreted the following LABS/EKG/TELE/IMAGING and below is summary of my interpretation (if data available):    LABS:   Current EKG and Rhythm Strip Slow afib 47 bpm  2 5s pause    Over 60 min spent on this encounter, 45 min face to face

## 2018-07-27 ENCOUNTER — TELEPHONE (OUTPATIENT)
Dept: CARDIOLOGY CLINIC | Facility: CLINIC | Age: 68
End: 2018-07-27

## 2018-07-27 NOTE — TELEPHONE ENCOUNTER
Pt saw Dr Nissa Mccarty who agreed that he should stop his flecainide and also recommended stopping metoprolol  Blanquita Sagastume would like to know if you agree with stopping the metoprolol  Please advise

## 2018-07-30 NOTE — TELEPHONE ENCOUNTER
Only reason to keep metoprolol would be for Asc Ao aneurysm  If interferes with management of afib however can stop  Orie Coppell just let know if still want stop metoprolol when get back  I believe Dr Niyah Barros gets back next week as he is away until then  Can tell   Maritzanelly Briscoe stop until returns if that was the recommendation

## 2018-08-01 ENCOUNTER — OFFICE VISIT (OUTPATIENT)
Dept: INTERNAL MEDICINE CLINIC | Facility: CLINIC | Age: 68
End: 2018-08-01
Payer: MEDICARE

## 2018-08-01 VITALS
HEART RATE: 80 BPM | DIASTOLIC BLOOD PRESSURE: 84 MMHG | SYSTOLIC BLOOD PRESSURE: 142 MMHG | WEIGHT: 246 LBS | BODY MASS INDEX: 32.6 KG/M2 | TEMPERATURE: 98.3 F | OXYGEN SATURATION: 97 % | HEIGHT: 73 IN

## 2018-08-01 DIAGNOSIS — E03.9 ACQUIRED HYPOTHYROIDISM: ICD-10-CM

## 2018-08-01 DIAGNOSIS — Z23 NEED FOR SHINGLES VACCINE: ICD-10-CM

## 2018-08-01 DIAGNOSIS — Z12.5 SCREENING FOR PROSTATE CANCER: ICD-10-CM

## 2018-08-01 DIAGNOSIS — Z00.00 ENCOUNTER FOR MEDICARE ANNUAL WELLNESS EXAM: Primary | ICD-10-CM

## 2018-08-01 DIAGNOSIS — Z23 NEED FOR 23-POLYVALENT PNEUMOCOCCAL POLYSACCHARIDE VACCINE: ICD-10-CM

## 2018-08-01 DIAGNOSIS — I48.19 ATRIAL FIBRILLATION, PERSISTENT (HCC): ICD-10-CM

## 2018-08-01 DIAGNOSIS — I10 ESSENTIAL HYPERTENSION: ICD-10-CM

## 2018-08-01 PROCEDURE — G0439 PPPS, SUBSEQ VISIT: HCPCS | Performed by: INTERNAL MEDICINE

## 2018-08-01 PROCEDURE — 99214 OFFICE O/P EST MOD 30 MIN: CPT | Performed by: INTERNAL MEDICINE

## 2018-08-01 PROCEDURE — 90732 PPSV23 VACC 2 YRS+ SUBQ/IM: CPT

## 2018-08-01 PROCEDURE — G0009 ADMIN PNEUMOCOCCAL VACCINE: HCPCS

## 2018-08-01 NOTE — PROGRESS NOTES
Assessment/Plan:    Atrial fibrillation, persistent ([facility] [address] )  Follow up cardiology, discussed various options with pt  Discussed factors such as how patient felt on the anti rhythmic drug and whether not he would want to be put back on this, discussed potential return of Afib even after ablation, discussed factor of anticoagulation in both scenarios (lifelone without ablation, a couple years possibly with ablation) , the need for pacemaker after ablation  Acquired hypothyroidism  Continue levothyroxine    Essential hypertension  Controlled, continue meds    Encounter for Medicare annual wellness exam   Discussed preventive health, cancer screening, immunizations, and safety issues  Diagnoses and all orders for this visit:    Encounter for Medicare annual wellness exam    Atrial fibrillation, persistent ([facility] [address] )    Acquired hypothyroidism  -     CBC and differential; Future  -     Comprehensive metabolic panel; Future  -     Lipid Panel with Direct LDL reflex; Future  -     TSH, 3rd generation with Free T4 reflex; Future    Essential hypertension    Screening for prostate cancer  -     PSA, Total Screen; Future    Need for shingles vaccine  -     Zoster Vac Recomb Adjuvanted ([address]) 50 MCG SUSR; Inject 50 mcg into a muscle once for 1 dose Repeat one dose in 2 to 6 months    Need for 23-polyvalent pneumococcal polysaccharide vaccine  -     PNEUMOCOCCAL POLYSACCHARIDE VACCINE 23-VALENT =>1YO SQ IM          Subjective:      Patient ID: Monico Doss is a 76 y o  male  A fib: pt reports he is back in A fib  He has been seeing cardiology and discussing various options including possible ablation  Pt reports his A fib is asymptomatic  Pt is going to get a Holter monitor          The following portions of the patient's history were reviewed and updated as appropriate: allergies, current medications, past family history, past medical history, past social history, past surgical history and problem list     Review of Systems   Constitutional: Negative for chills, fatigue and fever  HENT: Negative for congestion, nosebleeds, postnasal drip, sore throat and trouble swallowing  Eyes: Negative for pain  Respiratory: Negative for cough, chest tightness, shortness of breath and wheezing  Cardiovascular: Negative for chest pain, palpitations and leg swelling  Gastrointestinal: Negative for abdominal pain, constipation, diarrhea, nausea and vomiting  Endocrine: Negative for polydipsia and polyuria  Genitourinary: Negative for dysuria, flank pain and hematuria  Musculoskeletal: Negative for arthralgias  Skin: Negative for rash  Neurological: Negative for dizziness, tremors and headaches  Hematological: Does not bruise/bleed easily  Psychiatric/Behavioral: Negative for confusion and dysphoric mood  The patient is not nervous/anxious  Objective:      /84   Pulse 80   Temp 98 3 °F (36 8 °C)   Ht 6' 1" (1 854 m)   Wt 112 kg (246 lb)   SpO2 97%   BMI 32 46 kg/m²          Physical Exam   Constitutional: He is oriented to person, place, and time  He appears well-developed and well-nourished  No distress  HENT:   Head: Normocephalic and atraumatic  Right Ear: External ear normal    Left Ear: External ear normal    Eyes: Conjunctivae are normal  No scleral icterus  Neck: Normal range of motion  Neck supple  No tracheal deviation present  No thyromegaly present  Cardiovascular: Normal rate and normal heart sounds  An irregularly irregular rhythm present  No murmur heard  Pulmonary/Chest: Effort normal and breath sounds normal  No respiratory distress  He has no wheezes  He has no rales  Abdominal: Soft  Bowel sounds are normal  There is no tenderness  There is no rebound and no guarding  Musculoskeletal: He exhibits no edema  Lymphadenopathy:     He has no cervical adenopathy  Neurological: He is alert and oriented to person, place, and time     Psychiatric: He has a normal mood and affect  His behavior is normal  Judgment and thought content normal    Vitals reviewed  Assessment and Plan:    Problem List Items Addressed This Visit     Essential hypertension     Controlled, continue meds         Atrial fibrillation, persistent ([address] )     Follow up cardiology, discussed various options with pt  Discussed factors such as how patient felt on the anti rhythmic drug and whether not he would want to be put back on this, discussed potential return of Afib even after ablation, discussed factor of anticoagulation in both scenarios (lifelone without ablation, a couple years possibly with ablation) , the need for pacemaker after ablation  Acquired hypothyroidism     Continue levothyroxine         Relevant Orders    CBC and differential    Comprehensive metabolic panel    Lipid Panel with Direct LDL reflex    TSH, 3rd generation with Free T4 reflex    Encounter for Medicare annual wellness exam - Primary      Discussed preventive health, cancer screening, immunizations, and safety issues  Other Visit Diagnoses     Screening for prostate cancer        Relevant Orders    PSA, Total Screen    Need for shingles vaccine        Relevant Medications    Zoster Vac Recomb Adjuvanted ([address]) 50 MCG SUSR    Need for 23-polyvalent pneumococcal polysaccharide vaccine        Relevant Orders    PNEUMOCOCCAL POLYSACCHARIDE VACCINE 23-VALENT =>3YO SQ IM        Health Maintenance Due   Topic Date Due    Depression Screening PHQ-9  1950    Fall Risk  01/19/2015    PNEUMOCOCCAL POLYSACCHARIDE VACCINE AGE 72 AND OVER  01/19/2015    CRC Screening: Colonoscopy  12/14/2016    GLAUCOMA SCREENING 65 + YR  01/19/2017         HPI:  Brigido Shaffer is a 76 y o  male here for his Subsequent Wellness Visit      Patient Active Problem List   Diagnosis    Aortic aneurysm ([facility] [address] )    Essential hypertension    Atrial fibrillation, persistent (HCC)    Iliac artery aneurysm, bilateral ([address] )    Acquired hypothyroidism    Neuropathy    Pre-operative cardiovascular examination    Iliac artery aneurysm, right ([facility] [address] )    Encounter for Medicare annual wellness exam     Past Medical History:   Diagnosis Date    Aneurysm of thoracic aorta ([facility] [address] )     Arrhythmia     Detached retina, right     Disease of thyroid gland     Gastric wall thickening     Headache     Hypertension     Iliac artery aneurysm, bilateral (HCC)     Irregular heart beat     afib cardioversion 1/30/17, x2     Neuropathy     bilateral feet    Paroxysmal A-fib ([facility] [address] )     Prostatic hypertrophy     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[facility])      Past Surgical History:   Procedure Laterality Date    ABDOMINAL AORTIC ANEURYSM REPAIR, ENDOVASCULAR Right 4/13/2018    Procedure: REPAIR ANEURYSM ILIAC endovascular  with coil embolization right hypogastric artery ;  Surgeon: Nereyda Wharton MD;  Location: [facility];  Service: Vascular    CARDIOVERSION      CATARACT EXTRACTION      onset 11/17/2011    COLONOSCOPY      OK EDG US EXAM SURGICAL ALTER STOM DUODENUM/JEJUNUM N/A 11/10/2017    Procedure: RADIAL ENDOSCOPIC U/S;  Surgeon: Ely Barnes MD;  Location: [facility];   Service: Gastroenterology    RETINAL DETACHMENT SURGERY Right     onset 1992, retinal detachment complete air fill confirmed     Family History   Problem Relation Age of Onset    Aortic aneurysm Father         abdominal    Aortic aneurysm Brother     Stroke Mother         CVA    Cancer Maternal Grandmother         malignant neoplasm    Cancer Maternal Grandfather         malignant neoplasm    Cancer Paternal Grandmother         malignant neoplasm    Cancer Paternal Grandfather         malignant neoplasm    Cancer Family         malignant neoplasm    Cancer Family         malignant neoplasm     History   Smoking Status    Never Smoker   Smokeless Tobacco    Never Used     History   Alcohol Use    Yes     Comment: socially       History   Drug Use No       Current Outpatient Prescriptions   Medication Sig Dispense Refill    apixaban (ELIQUIS) 5 mg Take 1 tablet (5 mg total) by mouth 2 (two) times a day (Patient taking differently: Take 5 mg by mouth 2 (two) times a day To stop 2 days prior to procedure on 4/13/18 ) 180 tablet 3    aspirin (ECOTRIN LOW STRENGTH) 81 mg EC tablet Take 81 mg by mouth daily      atorvastatin (LIPITOR) 20 mg tablet Take 20 mg by mouth daily      Co-Enzyme Q-10 100 MG CAPS Take 100 mg by mouth daily      felodipine (PLENDIL) 5 mg 24 hr tablet Take 5 mg by mouth daily   gabapentin (NEURONTIN) 100 mg capsule Take 100 mg by mouth daily Up to 300mg additional if headaches flaring       levothyroxine 75 mcg tablet Take 75 mcg by mouth daily   Misc Natural Products (LUTEIN 20 PO) Take 20 mg by mouth daily   multivitamin (THERAGRAN) TABS Take 1 tablet by mouth daily   Zoster Vac Recomb Adjuvanted (SHINGRIX) 50 MCG SUSR Inject 50 mcg into a muscle once for 1 dose Repeat one dose in 2 to 6 months 1 each 0     No current facility-administered medications for this visit        Allergies   Allergen Reactions    Wound Dressing Adhesive      Immunization History   Administered Date(s) Administered    H1N1, All Formulations 01/09/2010    Influenza 11/29/2016, 10/23/2017    Influenza Split High Dose Preservative Free IM 11/16/2010, 09/19/2012, 09/24/2014, 09/16/2015, 11/29/2016, 10/23/2017    Pneumococcal Conjugate 13-Valent 11/29/2016    Tdap 11/29/2016       Patient Care Team:  Marisol Cano MD as PCP - MD Aracelis Causey DO Deann Shearer, MD Cherrie Savannah, MD Megan Bobo, MD Alphonzo Hollingshead, RN as Care Manager      Medicare Screening Tests and Risk Assessments:  AWV Clinical     ISAR:   Previous hospitalizations?:  Yes       Once in a Lifetime Medicare Screening:       Medicare Screening Tests and Risk Assessment:   AAA Risk Assessment    Osteoporosis Risk Assessment    HIV Risk Assessment        Drug and Alcohol Use:   Tobacco use    Cigarettes:  never smoker    Tobacco use duration    Tobacco Cessation Readiness    Alcohol use    Alcohol use:  occasional use    Alcohol Treatment Readiness   Illicit Drug Use        Diet & Exercise:   Diet   What is your diet?:  Regular   How many servings a day of the following:   Exercise    Do you currently exercise?:  yes    Frequency:  daily    Type of exercise:  walking   biking        Cognitive Impairment Screening:   Cognitive Impairment Screening    Do you have difficulty learning or retaining new information?:  No Do you have difficulty handling new tasks?:  No   Do you have difficulty with reasoning?:  No Do you have difficulty with spatial ability and orientation?:  No   Do you have difficulty with language?:  No Do you have difficulty with behavior?:  No       Functional Ability/Level of Safety:   Hearing    Hearing difficulties:  No    Hearing Impairment Assessment    Current Activities    Status:  unlimited ADL's, unlimited driving, unlimited IADL's, unlimited social activities   Help needed with the folllowing:    Managing Medications:  No    ADL    Fall Risk   Have you fallen in the last 12 months?:  No    Injury History       Home Safety:   Do you feel unsteady when walking?:  No    Home Safety Risk Factors       Advanced Directives:   Advanced Directives    Living Will:  Yes Durable POA for healthcare:   Yes   Advanced directive:  Yes    Patient's End of Life Decisions        Urinary Incontinence:   Do you have urinary incontinence?:  No        Glaucoma:            Provider Screening     Preventative Screening/Counseling:   Cardiovascular Screening/Counseling:   (Labs Q5 years, EKG optional one-time)   General:  Risks and Benefits Discussed, Screening Current           Diabetes Screening/Counseling:   (2 tests/year if Pre-Diabetes or 1 test/year if no Diabetes)   General:  Risks and Benefits Discussed, Screening Current Colorectal Cancer Screening/Counseling:   (FOBT Q1 yr; Flex Sig Q4 yrs or Q10 yrs after Screening Colonoscopy; Screening Colonoscpy Q2 yrs High Risk or Q10 yrs Low Risk; Barium Enema Q2 yrs High Risk or Q4 yrs Low Risk)   General:  Risks and Benefits Discussed, Screening Current           Prostate Cancer Screening/Counseling:   (Annual)    General:  Risks and Benefits Discussed          Breast Cancer Screening/Counseling:   (Baseline Age 28 - 43; Annual Age 36+)         Cervical Cancer Screening/Counseling:   (Annual for High Risk or Childbearing Age with Abnormal Pap in Last 3 yrs; Every 2 all others)         Osteoporosis Screening/Counseling:   (Every 2 Yrs if at risk or more if medically necessary)         AAA Screening/Counseling:   (Once per Lifetime with risk factors)          Glaucoma Screening/Counseling:   (Annual)         HIV Screening/Counseling:   (Voluntary;  Once annually for high risk OR 3 times for Pregnancy at diagnosis of IUP; 3rd trimester; and at Labor         Hepatitis C Screening:             Immunizations:   Influenza (annual):  Risks & Benefits Discussed, Influenza Recommended Annually, Influenza UTD This Year   Pneumococcal (Once in a Lifetime):  Risks & Benefits Discussed   Zostavax (Medicare D Coverage, Pt >70 yo):  Risks & Benefits Discussed   Tdap (Non-Medicare Wellness Visit required):  Risks & Benefits Discussed, Tdap Vaccine UTD       Other Preventative Couseling (Non-Medicare Wellness Visit Required):       Referrals (Non-Medicare Wellness Visit Required):       Medical Equipment/Suppliers:

## 2018-08-01 NOTE — ASSESSMENT & PLAN NOTE
Follow up cardiology, discussed various options with pt  Discussed factors such as how patient felt on the anti rhythmic drug and whether not he would want to be put back on this, discussed potential return of Afib even after ablation, discussed factor of anticoagulation in both scenarios (lifelone without ablation, a couple years possibly with ablation) , the need for pacemaker after ablation

## 2018-08-01 NOTE — PATIENT INSTRUCTIONS
Problem List Items Addressed This Visit     Essential hypertension     Controlled, continue meds         Atrial fibrillation, persistent ([address] )     Follow up cardiology, discussed various options with pt  Discussed factors such as how patient felt on the anti rhythmic drug and whether not he would want to be put back on this, discussed potential return of Afib even after ablation, discussed factor of anticoagulation in both scenarios (lifelone without ablation, a couple years possibly with ablation) , the need for pacemaker after ablation  Acquired hypothyroidism     Continue levothyroxine         Relevant Orders    CBC and differential    Comprehensive metabolic panel    Lipid Panel with Direct LDL reflex    TSH, 3rd generation with Free T4 reflex    Encounter for Medicare annual wellness exam - Primary      Discussed preventive health, cancer screening, immunizations, and safety issues             Other Visit Diagnoses     Screening for prostate cancer        Relevant Orders    PSA, Total Screen    Need for shingles vaccine        Relevant Medications    Zoster Vac Recomb Adjuvanted (SHINGRIX) 50 MCG SUSR    Need for 23-polyvalent pneumococcal polysaccharide vaccine        Relevant Orders    PNEUMOCOCCAL POLYSACCHARIDE VACCINE 23-VALENT =>1YO SQ IM

## 2018-08-06 ENCOUNTER — TELEPHONE (OUTPATIENT)
Dept: INTERNAL MEDICINE CLINIC | Facility: CLINIC | Age: 68
End: 2018-08-06

## 2018-08-06 NOTE — TELEPHONE ENCOUNTER
For now lets stick w plan no metoprolol/flecaindie and repeat holter and see how he looks  I don't think worth placing a pacemaker just for metoprolol for aneurysm

## 2018-08-06 NOTE — TELEPHONE ENCOUNTER
Pt called in stating he [place] bike [place] the [place]    Wanted to know if he should alert dr Sheth Patient because he is on a bloodthinner  Discussed this with dr Sheth Patient and he advised me to tell pt to not take advil, take tylenol for pain, take half his dose of bloodthinners for 3 days, ice it and if he has breathing issues/severe pain to go to the ER; advised pt and he understood

## 2018-08-14 ENCOUNTER — PROCEDURE VISIT (OUTPATIENT)
Dept: CARDIOLOGY CLINIC | Facility: CLINIC | Age: 68
End: 2018-08-14
Payer: MEDICARE

## 2018-08-14 DIAGNOSIS — I48.91 ATRIAL FIBRILLATION, UNSPECIFIED TYPE (HCC): Primary | ICD-10-CM

## 2018-08-14 PROCEDURE — 93224 XTRNL ECG REC UP TO 48 HRS: CPT | Performed by: INTERNAL MEDICINE

## 2018-08-16 ENCOUNTER — APPOINTMENT (OUTPATIENT)
Dept: LAB | Facility: CLINIC | Age: 68
End: 2018-08-16
Payer: MEDICARE

## 2018-08-16 DIAGNOSIS — E03.9 ACQUIRED HYPOTHYROIDISM: ICD-10-CM

## 2018-08-16 DIAGNOSIS — I72.3 ILIAC ARTERY ANEURYSM, RIGHT (HCC): ICD-10-CM

## 2018-08-16 DIAGNOSIS — Z12.5 SCREENING FOR PROSTATE CANCER: ICD-10-CM

## 2018-08-16 LAB
ALBUMIN SERPL BCP-MCNC: 3.9 G/DL (ref 3.5–5)
ALP SERPL-CCNC: 110 U/L (ref 46–116)
ALT SERPL W P-5'-P-CCNC: 28 U/L (ref 12–78)
ANION GAP SERPL CALCULATED.3IONS-SCNC: 7 MMOL/L (ref 4–13)
AST SERPL W P-5'-P-CCNC: 18 U/L (ref 5–45)
BASOPHILS # BLD AUTO: 0.09 THOUSANDS/ΜL (ref 0–0.1)
BASOPHILS NFR BLD AUTO: 1 % (ref 0–1)
BILIRUB SERPL-MCNC: 1.06 MG/DL (ref 0.2–1)
BUN SERPL-MCNC: 20 MG/DL (ref 5–25)
CALCIUM SERPL-MCNC: 8.8 MG/DL (ref 8.3–10.1)
CHLORIDE SERPL-SCNC: 105 MMOL/L (ref 100–108)
CHOLEST SERPL-MCNC: 119 MG/DL (ref 50–200)
CO2 SERPL-SCNC: 27 MMOL/L (ref 21–32)
CREAT SERPL-MCNC: 0.99 MG/DL (ref 0.6–1.3)
EOSINOPHIL # BLD AUTO: 0.2 THOUSAND/ΜL (ref 0–0.61)
EOSINOPHIL NFR BLD AUTO: 3 % (ref 0–6)
ERYTHROCYTE [DISTWIDTH] IN BLOOD BY AUTOMATED COUNT: 12.4 % (ref 11.6–15.1)
GFR SERPL CREATININE-BSD FRML MDRD: 78 ML/MIN/1.73SQ M
GLUCOSE P FAST SERPL-MCNC: 98 MG/DL (ref 65–99)
HCT VFR BLD AUTO: 49.2 % (ref 36.5–49.3)
HDLC SERPL-MCNC: 52 MG/DL (ref 40–60)
HGB BLD-MCNC: 16.5 G/DL (ref 12–17)
IMM GRANULOCYTES # BLD AUTO: 0.02 THOUSAND/UL (ref 0–0.2)
IMM GRANULOCYTES NFR BLD AUTO: 0 % (ref 0–2)
LDLC SERPL CALC-MCNC: 44 MG/DL (ref 0–100)
LYMPHOCYTES # BLD AUTO: 0.98 THOUSANDS/ΜL (ref 0.6–4.47)
LYMPHOCYTES NFR BLD AUTO: 15 % (ref 14–44)
MCH RBC QN AUTO: 29.3 PG (ref 26.8–34.3)
MCHC RBC AUTO-ENTMCNC: 33.5 G/DL (ref 31.4–37.4)
MCV RBC AUTO: 87 FL (ref 82–98)
MONOCYTES # BLD AUTO: 0.65 THOUSAND/ΜL (ref 0.17–1.22)
MONOCYTES NFR BLD AUTO: 10 % (ref 4–12)
NEUTROPHILS # BLD AUTO: 4.81 THOUSANDS/ΜL (ref 1.85–7.62)
NEUTS SEG NFR BLD AUTO: 71 % (ref 43–75)
NRBC BLD AUTO-RTO: 0 /100 WBCS
PLATELET # BLD AUTO: 283 THOUSANDS/UL (ref 149–390)
PMV BLD AUTO: 9.8 FL (ref 8.9–12.7)
POTASSIUM SERPL-SCNC: 4.2 MMOL/L (ref 3.5–5.3)
PROT SERPL-MCNC: 6.9 G/DL (ref 6.4–8.2)
PSA SERPL-MCNC: 0.8 NG/ML (ref 0–4)
RBC # BLD AUTO: 5.64 MILLION/UL (ref 3.88–5.62)
SODIUM SERPL-SCNC: 139 MMOL/L (ref 136–145)
TRIGL SERPL-MCNC: 116 MG/DL
TSH SERPL DL<=0.05 MIU/L-ACNC: 2.03 UIU/ML (ref 0.36–3.74)
WBC # BLD AUTO: 6.75 THOUSAND/UL (ref 4.31–10.16)

## 2018-08-16 PROCEDURE — 84443 ASSAY THYROID STIM HORMONE: CPT

## 2018-08-16 PROCEDURE — G0103 PSA SCREENING: HCPCS

## 2018-08-16 PROCEDURE — 80061 LIPID PANEL: CPT

## 2018-08-16 PROCEDURE — 36415 COLL VENOUS BLD VENIPUNCTURE: CPT

## 2018-08-16 PROCEDURE — 85025 COMPLETE CBC W/AUTO DIFF WBC: CPT

## 2018-08-16 PROCEDURE — 80053 COMPREHEN METABOLIC PANEL: CPT

## 2018-08-17 ENCOUNTER — HOSPITAL ENCOUNTER (OUTPATIENT)
Dept: NON INVASIVE DIAGNOSTICS | Facility: HOSPITAL | Age: 68
Discharge: HOME | End: 2018-08-17
Payer: MEDICARE

## 2018-08-17 DIAGNOSIS — I48.19 PERSISTENT ATRIAL FIBRILLATION (HCC): ICD-10-CM

## 2018-08-17 PROCEDURE — 93226 XTRNL ECG REC<48 HR SCAN A/R: CPT

## 2018-08-17 PROCEDURE — 93225 XTRNL ECG REC<48 HRS REC: CPT

## 2018-08-17 PROCEDURE — 93227 XTRNL ECG REC<48 HR R&I: CPT | Performed by: INTERNAL MEDICINE

## 2018-08-28 DIAGNOSIS — E03.9 ACQUIRED HYPOTHYROIDISM: Primary | ICD-10-CM

## 2018-08-28 DIAGNOSIS — I10 ESSENTIAL HYPERTENSION: ICD-10-CM

## 2018-08-28 RX ORDER — FELODIPINE 5 MG/1
5 TABLET, EXTENDED RELEASE ORAL DAILY
Qty: 90 TABLET | Refills: 1 | Status: SHIPPED | OUTPATIENT
Start: 2018-08-28 | End: 2019-02-26 | Stop reason: SDUPTHER

## 2018-08-28 RX ORDER — LEVOTHYROXINE SODIUM 0.07 MG/1
75 TABLET ORAL DAILY
Qty: 90 TABLET | Refills: 1 | Status: SHIPPED | OUTPATIENT
Start: 2018-08-28 | End: 2019-01-21 | Stop reason: SDUPTHER

## 2018-10-09 ENCOUNTER — TELEPHONE (OUTPATIENT)
Dept: INTERNAL MEDICINE CLINIC | Facility: CLINIC | Age: 68
End: 2018-10-09

## 2018-10-10 ENCOUNTER — OFFICE VISIT (OUTPATIENT)
Dept: INTERNAL MEDICINE CLINIC | Facility: CLINIC | Age: 68
End: 2018-10-10
Payer: MEDICARE

## 2018-10-10 VITALS
SYSTOLIC BLOOD PRESSURE: 112 MMHG | HEART RATE: 58 BPM | HEIGHT: 73 IN | TEMPERATURE: 97.9 F | DIASTOLIC BLOOD PRESSURE: 72 MMHG | BODY MASS INDEX: 31.44 KG/M2 | WEIGHT: 237.2 LBS | OXYGEN SATURATION: 97 %

## 2018-10-10 DIAGNOSIS — T14.8XXA HEMATOMA: Primary | ICD-10-CM

## 2018-10-10 PROCEDURE — 99213 OFFICE O/P EST LOW 20 MIN: CPT | Performed by: INTERNAL MEDICINE

## 2018-10-10 RX ORDER — AMOXICILLIN AND CLAVULANATE POTASSIUM 875; 125 MG/1; MG/1
1 TABLET, FILM COATED ORAL EVERY 12 HOURS SCHEDULED
Qty: 14 TABLET | Refills: 0 | Status: SHIPPED | OUTPATIENT
Start: 2018-10-10 | End: 2018-10-17

## 2018-10-10 NOTE — PATIENT INSTRUCTIONS
Problem List Items Addressed This Visit        Other    Hematoma - Primary     Will refer to vascular for evaluation  Will mild erythema, will treat with Augmentin  Pt is up to date with tetanus shot

## 2018-10-10 NOTE — PROGRESS NOTES
Assessment/Plan:    Hematoma  Will refer to vascular for evaluation  Will mild erythema, will treat with Augmentin  Pt is up to date with tetanus shot  Diagnoses and all orders for this visit:    Hematoma  -     Ambulatory referral to Vascular Surgery; Future          Subjective:      Patient ID: Nata Edgar is a 76 y o  male  Pt was bit by a [place] 4 days ago  He was bit through a heavy pair of jeans, no definite skin break, pt has bruising in area, and dog was clamping down on his leg  Pt feels a lump under the skin  Pt is on Eliquis  No fevers, no chills, no sweats  Pt reports his left leg that was bit is larger than right leg  The following portions of the patient's history were reviewed and updated as appropriate: allergies, current medications, past family history, past medical history, past social history, past surgical history and problem list     Review of Systems   Constitutional: Negative for chills and fever  Respiratory: Negative for chest tightness and shortness of breath  Cardiovascular: Positive for leg swelling  Negative for chest pain  Musculoskeletal: Positive for myalgias  Objective:      /72   Pulse 58   Temp 97 9 °F (36 6 °C)   Ht 6' 1" (1 854 m)   Wt 108 kg (237 lb 3 2 oz)   SpO2 97%   BMI 31 29 kg/m²          Physical Exam   Constitutional: He is oriented to person, place, and time  He appears well-developed and well-nourished  Neurological: He is alert and oriented to person, place, and time  Skin:   3 inch x 4 inch raised smooth rounded area left upper thigh, some tiny scabs with some surrounding erythema, and extensive ecchymosis involving left upper thigh down to medial knee, no significant tenderness   Psychiatric: He has a normal mood and affect  His behavior is normal  Judgment and thought content normal    Vitals reviewed

## 2018-10-10 NOTE — ASSESSMENT & PLAN NOTE
Will refer to vascular for evaluation  Will mild erythema, will treat with Augmentin  Pt is up to date with tetanus shot

## 2018-10-11 ENCOUNTER — OFFICE VISIT (OUTPATIENT)
Dept: VASCULAR SURGERY | Facility: CLINIC | Age: 68
End: 2018-10-11
Payer: MEDICARE

## 2018-10-11 VITALS
BODY MASS INDEX: 31.41 KG/M2 | WEIGHT: 237 LBS | HEIGHT: 73 IN | SYSTOLIC BLOOD PRESSURE: 152 MMHG | DIASTOLIC BLOOD PRESSURE: 86 MMHG | TEMPERATURE: 97.1 F

## 2018-10-11 DIAGNOSIS — I72.3 ILIAC ARTERY ANEURYSM, RIGHT (HCC): ICD-10-CM

## 2018-10-11 DIAGNOSIS — I71.2 THORACIC AORTIC ANEURYSM WITHOUT RUPTURE (HCC): ICD-10-CM

## 2018-10-11 DIAGNOSIS — I48.19 ATRIAL FIBRILLATION, PERSISTENT (HCC): ICD-10-CM

## 2018-10-11 DIAGNOSIS — T14.8XXA HEMATOMA: Primary | ICD-10-CM

## 2018-10-11 PROCEDURE — 99214 OFFICE O/P EST MOD 30 MIN: CPT | Performed by: PHYSICIAN ASSISTANT

## 2018-10-11 NOTE — LETTER
October 11, 2018     [place], [address] [id number]    Patient: Wendi Stuart   YOB: 1950   Date of Visit: 10/11/2018       Dear Dr Iris Rivera: Thank you for referring Reza Bejarano to me for evaluation  Below are my notes for this consultation  If you have questions, please do not hesitate to call me  I look forward to following your patient along with you  Sincerely,        Chula De Luna PA-C        CC: No Recipients  Chula De Luna PA-C  10/11/2018  3:00 PM  Sign at close encounter  Hematoma  Traumatic L anterior thigh subcutaneous hematoma secondary to dog bite  -stable  -recommend supportive conservative measures  -no vascular intervention at the present time  -antibiotics per PCP  -rest LLE and elevated p r n  for symptomatic relief  -continue anticoagulation secondary to persistent atrial fibrillation  -return to office in 1 week for wound recheck  -instructed patient to contact the office in the interim with any questions, concerns, expanding hematoma, increasing pain or significant erythema    Iliac artery aneurysm, right (HCC)  3 cm R MINDY aneurysm, s/p R MINDY endovascular repair with endograft and coil embolization of hypogastric artery (JBF/PP) 4/13/2018  -continue ASA and statin therapy  -surveillance CTA scheduled for 1/3/2019    Aneurysm of thoracic aorta (HCC)  4 8 cm ascending aortic aneurysm  -follow-up with cardiac surgery as scheduled with 1 year surveillance CTA Dec '18    Atrial fibrillation, persistent ([address] )  -continue Eliquis      Assessment/Plan   Diagnoses and all orders for this visit:    Hematoma  -     Ambulatory referral to Vascular Surgery    Iliac artery aneurysm, right [facility])    Atrial fibrillation, persistent ([address] )    Thoracic aortic aneurysm without rupture ([address] )        No chief complaint on file  Subjective   Patient ID: Wendi Stuart is a 76 y o  male    Chief complaint: Pt was referred by PCP for left leg pain and swelling  Pt is on eliquis, asa and statin  25-year-old gentleman with a history of HTN, 4 8 cm ascending aortic aneurysm, hypothyroidism, persistent atrial fibrillation on chronic Eliquis therapy, 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 and R MINDY aneurysm, s/p endovascular R MINDY aneurysm repair with endograft and coil embolization of hypogastric by Dr Amira Bueno 4/13/2018 who is referred by his PCP for evaluation of traumatic left anterior thigh hematoma after dog bite 5 days ago  Patient denies paresthesias, numbness or radicular pain left lower extremity  He complains of resolving tenderness of the left anterior thigh  He reports that the hematoma has decreased slightly in the last 5 days and does not limit activity  He denies expanding thigh hematoma, fever, chills, hemorrhage or drainage  Patient reports over is a small excoriation of the skin but no significant puncture from the dog bite  Patient evaluated by his PCP and started on Augmentin  The following portions of the patient's history were reviewed and updated as appropriate: allergies, current medications, past family history, past medical history, past social history, past surgical history and problem list     Review of Systems   Constitutional: Negative  HENT: Negative  Eyes: Negative  Respiratory: Negative  Cardiovascular: Positive for leg swelling  Gastrointestinal: Negative  Endocrine: Negative  Genitourinary: Negative  Musculoskeletal: Positive for back pain  Leg pain   Skin: Positive for color change  Allergic/Immunologic: Negative  Neurological: Positive for numbness and headaches  Hematological: Negative  Psychiatric/Behavioral: Positive for sleep disturbance  I have personally reviewed the ROS entered by MA and agree as documented      Patient Active Problem List   Diagnosis    Aneurysm of thoracic aorta ([address] )    Essential hypertension    Atrial fibrillation, persistent ([address] )    Iliac artery aneurysm, bilateral ([address] )    Acquired hypothyroidism    Neuropathy    Pre-operative cardiovascular examination    Iliac artery aneurysm, right ([address] )    Encounter for Medicare annual wellness exam    Hematoma       Past Surgical History:   Procedure Laterality Date    ABDOMINAL AORTIC ANEURYSM REPAIR, ENDOVASCULAR Right 4/13/2018    Procedure: REPAIR ANEURYSM ILIAC endovascular  with coil embolization right hypogastric artery ;  Surgeon: Arlyn Renee MD;  [facility];  Service: Vascular    CARDIOVERSION      CATARACT EXTRACTION      onset 11/17/2011    COLONOSCOPY      ID EDG US EXAM SURGICAL ALTER STOM DUODENUM/JEJUNUM N/A 11/10/2017    Procedure: RADIAL ENDOSCOPIC U/S;  Surgeon: Faith Joseph MD;  [facility]; Service: Gastroenterology    RETINAL DETACHMENT SURGERY Right     onset 1992, retinal detachment complete air fill confirmed       Family History   Problem Relation Age of Onset    Aortic aneurysm Father         abdominal    Aortic aneurysm Brother     Stroke Mother         CVA    Cancer Maternal Grandmother         malignant neoplasm    Cancer Maternal Grandfather         malignant neoplasm    Cancer Paternal Grandmother         malignant neoplasm    Cancer Paternal Grandfather         malignant neoplasm    Cancer Family         malignant neoplasm    Cancer Family         malignant neoplasm       Social History     Social History    Marital status: /Civil Union     Spouse name: N/A    Number of children: N/A    Years of education: N/A     Occupational History    Not on file       Social History Main Topics    Smoking status: Never Smoker    Smokeless tobacco: Never Used    Alcohol use Yes      Comment: socially     Drug use: No    Sexual activity: Not on file     Other Topics Concern    Not on file     Social History Narrative    No narrative on file       Allergies   Allergen Reactions    Wound Dressing Adhesive Current Outpatient Prescriptions:     amoxicillin-clavulanate (AUGMENTIN) 875-125 mg per tablet, Take 1 tablet by mouth every 12 (twelve) hours for 7 days, Disp: 14 tablet, Rfl: 0    apixaban (ELIQUIS) 5 mg, Take 1 tablet (5 mg total) by mouth 2 (two) times a day (Patient taking differently: Take 5 mg by mouth 2 (two) times a day To stop 2 days prior to procedure on 4/13/18 ), Disp: 180 tablet, Rfl: 3    aspirin (ECOTRIN LOW STRENGTH) 81 mg EC tablet, Take 81 mg by mouth daily, Disp: , Rfl:     atorvastatin (LIPITOR) 20 mg tablet, Take 20 mg by mouth daily, Disp: , Rfl:     Co-Enzyme Q-10 100 MG CAPS, Take 100 mg by mouth daily, Disp: , Rfl:     felodipine (PLENDIL) 5 mg 24 hr tablet, Take 1 tablet (5 mg total) by mouth daily, Disp: 90 tablet, Rfl: 1    gabapentin (NEURONTIN) 100 mg capsule, Take 100 mg by mouth daily Up to 300mg additional if headaches flaring , Disp: , Rfl:     levothyroxine 75 mcg tablet, Take 1 tablet (75 mcg total) by mouth daily, Disp: 90 tablet, Rfl: 1    Misc Natural Products (LUTEIN 20 PO), Take 20 mg by mouth daily  , Disp: , Rfl:     multivitamin (THERAGRAN) TABS, Take 1 tablet by mouth daily  , Disp: , Rfl:     Objective     Physical Exam:    General appearance: alert and oriented, in no acute distress  Skin: Skin color, texture, turgor normal  No rashes or lesions  Neurologic: Grossly normal  Head: Normocephalic, without obvious abnormality, atraumatic  Eyes: PERRL, EOMI, sclerae nonicteric  Throat: lips, mucosa, and tongue normal; teeth and gums normal  Neck: no adenopathy, no carotid bruit, no JVD, supple, symmetrical, trachea midline and thyroid not enlarged, symmetric, no tenderness/mass/nodules  Back: symmetric, no curvature  ROM normal  No CVA tenderness    Lungs: clear to auscultation bilaterally  Chest wall: no tenderness  Heart: regular rate and rhythm, S1, S2 normal, no murmur, click, rub or gallop  Abdomen: soft, non-tender; bowel sounds normal; no masses,  no organomegaly and No abdominal bruits  Extremities: Please see Clinical images below of left thigh hematoma  Left thigh with 3 x 2 centimeter anterior subcutaneous hematoma, non fluctuant with surrounding ecchymosis  No significant erythema  No significant warmth  No drainage  Several small excoriations secondary to dog bite  Bilateral lower extremities motor and sensory intact  Left lower extremity warm, pink and well perfused      Pulse exam:  Radial: Right: 2+ Left[de-identified] 2+  Popliteal: Right: 1+ Left: 1+  DP: Right: 2+ Left: 2+  PT: Right: 2+ Left: 2+

## 2018-10-11 NOTE — ASSESSMENT & PLAN NOTE
Traumatic L anterior thigh subcutaneous hematoma secondary to dog bite  -stable  -recommend supportive conservative measures  -no vascular intervention at the present time  -antibiotics per PCP  -rest LLE and elevated p r n  for symptomatic relief  -continue anticoagulation secondary to persistent atrial fibrillation  -return to office in 1 week for wound recheck  -instructed patient to contact the office in the interim with any questions, concerns, expanding hematoma, increasing pain or significant erythema

## 2018-10-11 NOTE — PATIENT INSTRUCTIONS
-your left thigh hematoma appears stable  Your body will slowly reabsorbed the hematoma    -continue antibiotics as prescribed by her PCP  -return to office in 1 week for wound recheck  -please contact the office in the interim with any questions, concerns, expanding hematoma, redness or increasing pain

## 2018-10-11 NOTE — ASSESSMENT & PLAN NOTE
3 cm R MINDY aneurysm, s/p R MINDY endovascular repair with endograft and coil embolization of hypogastric artery (JBF/PP) 4/13/2018  -continue ASA and statin therapy  -surveillance CTA scheduled for 1/3/2019

## 2018-10-11 NOTE — PROGRESS NOTES
Hematoma  Traumatic L anterior thigh subcutaneous hematoma secondary to dog bite  -stable  -recommend supportive conservative measures  -no vascular intervention at the present time  -antibiotics per PCP  -rest LLE and elevated p r n  for symptomatic relief  -continue anticoagulation secondary to persistent atrial fibrillation  -return to office in 1 week for wound recheck  -instructed patient to contact the office in the interim with any questions, concerns, expanding hematoma, increasing pain or significant erythema    Iliac artery aneurysm, right (HCC)  3 cm R MNIDY aneurysm, s/p R MINDY endovascular repair with endograft and coil embolization of hypogastric artery (JBF/PP) 4/13/2018  -continue ASA and statin therapy  -surveillance CTA scheduled for 1/3/2019    Aneurysm of thoracic aorta (HCC)  4 8 cm ascending aortic aneurysm  -follow-up with cardiac surgery as scheduled with 1 year surveillance CTA Dec '18    Atrial fibrillation, persistent ([address] )  -continue Eliquis      Assessment/Plan   Diagnoses and all orders for this visit:    Hematoma  -     Ambulatory referral to Vascular Surgery    Iliac artery aneurysm, right [facility])    Atrial fibrillation, persistent ([address] )    Thoracic aortic aneurysm without rupture ([address] )        No chief complaint on file  Subjective   Patient ID: Filiberto Valladares is a 76 y o  male  Chief complaint: Pt was referred by PCP for left leg pain and swelling  Pt is on eliquis, asa and statin  45-year-old gentleman with a history of HTN, 4 8 cm ascending aortic aneurysm, hypothyroidism, persistent atrial fibrillation on chronic Eliquis therapy, 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 and R MINDY aneurysm, s/p endovascular R MINDY aneurysm repair with endograft and coil embolization of hypogastric by Dr Billie Calero 4/13/2018 who is referred by his PCP for evaluation of traumatic left anterior thigh hematoma after dog bite 5 days ago    Patient denies paresthesias, numbness or radicular pain left lower extremity  He complains of resolving tenderness of the left anterior thigh  He reports that the hematoma has decreased slightly in the last 5 days and does not limit activity  He denies expanding thigh hematoma, fever, chills, hemorrhage or drainage  Patient reports over is a small excoriation of the skin but no significant puncture from the dog bite  Patient evaluated by his PCP and started on Augmentin  The following portions of the patient's history were reviewed and updated as appropriate: allergies, current medications, past family history, past medical history, past social history, past surgical history and problem list     Review of Systems   Constitutional: Negative  HENT: Negative  Eyes: Negative  Respiratory: Negative  Cardiovascular: Positive for leg swelling  Gastrointestinal: Negative  Endocrine: Negative  Genitourinary: Negative  Musculoskeletal: Positive for back pain  Leg pain   Skin: Positive for color change  Allergic/Immunologic: Negative  Neurological: Positive for numbness and headaches  Hematological: Negative  Psychiatric/Behavioral: Positive for sleep disturbance  I have personally reviewed the ROS entered by MA and agree as documented      Patient Active Problem List   Diagnosis    [facility] [facility] [address]    Essential hypertension    Atrial fibrillation, persistent (HCC)    Iliac artery aneurysm, bilateral ([address] )    Acquired hypothyroidism    Neuropathy    Pre-operative cardiovascular examination    Iliac artery aneurysm, right ([facility] )    Encounter for Medicare annual wellness exam    Hematoma       Past Surgical History:   Procedure Laterality Date    ABDOMINAL AORTIC ANEURYSM REPAIR, ENDOVASCULAR Right 4/13/2018    Procedure: REPAIR ANEURYSM ILIAC endovascular  with coil embolization right hypogastric artery ;  Surgeon: Corrie Perales MD;  [facility];  Service: Vascular    CARDIOVERSION      CATARACT EXTRACTION      onset 11/17/2011    COLONOSCOPY      [place] EDG US EXAM SURGICAL ALTER STOM DUODENUM/JEJUNUM N/A 11/10/2017    Procedure: RADIAL ENDOSCOPIC U/S;  Surgeon: Vinnie Espinosa MD;  [facility]; Service: Gastroenterology    RETINAL DETACHMENT SURGERY Right     onset 1992, retinal detachment complete air fill confirmed       Family History   Problem Relation Age of Onset    Aortic aneurysm Father         abdominal    Aortic aneurysm Brother     Stroke Mother         CVA    Cancer Maternal Grandmother         malignant neoplasm    Cancer Maternal Grandfather         malignant neoplasm    Cancer Paternal Grandmother         malignant neoplasm    Cancer Paternal Grandfather         malignant neoplasm    Cancer Family         malignant neoplasm    Cancer Family         malignant neoplasm       Social History     Social History    Marital status: /Civil Union     Spouse name: N/A    Number of children: N/A    Years of education: N/A     Occupational History    Not on file       Social History Main Topics    Smoking status: Never Smoker    Smokeless tobacco: Never Used    Alcohol use Yes      Comment: socially     Drug use: No    Sexual activity: Not on file     Other Topics Concern    Not on file     Social History Narrative    No narrative on file       Allergies   Allergen Reactions    Wound Dressing Adhesive          Current Outpatient Prescriptions:     amoxicillin-clavulanate (AUGMENTIN) 875-125 mg per tablet, Take 1 tablet by mouth every 12 (twelve) hours for 7 days, Disp: 14 tablet, Rfl: 0    apixaban (ELIQUIS) 5 mg, Take 1 tablet (5 mg total) by mouth 2 (two) times a day (Patient taking differently: Take 5 mg by mouth 2 (two) times a day To stop 2 days prior to procedure on 4/13/18 ), Disp: 180 tablet, Rfl: 3    aspirin (ECOTRIN LOW STRENGTH) 81 mg EC tablet, Take 81 mg by mouth daily, Disp: , Rfl:     atorvastatin (LIPITOR) 20 mg tablet, Take 20 mg by mouth daily, Disp: , Rfl:     Co-Enzyme Q-10 100 MG CAPS, Take 100 mg by mouth daily, Disp: , Rfl:     felodipine (PLENDIL) 5 mg 24 hr tablet, Take 1 tablet (5 mg total) by mouth daily, Disp: 90 tablet, Rfl: 1    gabapentin (NEURONTIN) 100 mg capsule, Take 100 mg by mouth daily Up to 300mg additional if headaches flaring , Disp: , Rfl:     levothyroxine 75 mcg tablet, Take 1 tablet (75 mcg total) by mouth daily, Disp: 90 tablet, Rfl: 1    Misc Natural Products (LUTEIN 20 PO), Take 20 mg by mouth daily  , Disp: , Rfl:     multivitamin (THERAGRAN) TABS, Take 1 tablet by mouth daily  , Disp: , Rfl:     Objective     Physical Exam:    General appearance: alert and oriented, in no acute distress  Skin: Skin color, texture, turgor normal  No rashes or lesions  Neurologic: Grossly normal  Head: Normocephalic, without obvious abnormality, atraumatic  Eyes: PERRL, EOMI, sclerae nonicteric  Throat: lips, mucosa, and tongue normal; teeth and gums normal  Neck: no adenopathy, no carotid bruit, no JVD, supple, symmetrical, trachea midline and thyroid not enlarged, symmetric, no tenderness/mass/nodules  Back: symmetric, no curvature  ROM normal  No CVA tenderness  Lungs: clear to auscultation bilaterally  Chest wall: no tenderness  Heart: regular rate and rhythm, S1, S2 normal, no murmur, click, rub or gallop  Abdomen: soft, non-tender; bowel sounds normal; no masses,  no organomegaly and No abdominal bruits  Extremities: Please see Clinical images below of left thigh hematoma  Left thigh with 3 x 2 centimeter anterior subcutaneous hematoma, non fluctuant with surrounding ecchymosis  No significant erythema  No significant warmth  No drainage  Several small excoriations secondary to dog bite  Bilateral lower extremities motor and sensory intact  Left lower extremity warm, pink and well perfused       Left thigh      Left thigh    Pulse exam:  Radial: Right: 2+ Left[de-identified] 2+  Popliteal: Right: 1+ Left: 1+  DP: Right: 2+ Left: 2+  PT: Right: 2+ Left: 2+

## 2018-10-11 NOTE — ASSESSMENT & PLAN NOTE
4 8 cm ascending aortic aneurysm  -follow-up with cardiac surgery as scheduled with 1 year surveillance CTA Dec '18

## 2018-10-18 ENCOUNTER — OFFICE VISIT (OUTPATIENT)
Dept: VASCULAR SURGERY | Facility: CLINIC | Age: 68
End: 2018-10-18
Payer: MEDICARE

## 2018-10-18 VITALS
SYSTOLIC BLOOD PRESSURE: 130 MMHG | WEIGHT: 237 LBS | HEIGHT: 73 IN | TEMPERATURE: 96.2 F | BODY MASS INDEX: 31.41 KG/M2 | DIASTOLIC BLOOD PRESSURE: 72 MMHG

## 2018-10-18 DIAGNOSIS — T14.8XXA HEMATOMA: Primary | ICD-10-CM

## 2018-10-18 DIAGNOSIS — I72.3 ILIAC ARTERY ANEURYSM, RIGHT (HCC): ICD-10-CM

## 2018-10-18 PROCEDURE — 99213 OFFICE O/P EST LOW 20 MIN: CPT | Performed by: NURSE PRACTITIONER

## 2018-10-18 NOTE — PROGRESS NOTES
Assessment/Plan:    Hematoma  -traumatic hematoma to left anterior thigh s/p dog bite  -completed ABX per PCP  -no signs of infection, no pain  -recommend conservative therapy with warm compresses  -continue anticoagulation due to chronic Afib  -no vascular intervention  -will allow for self resolution of hematoma  If patient notices any signs of infection to include redness, pain, fever/chills he will notify office immediately      Iliac artery aneurysm, right ([address] )  -s/p repair of 3 cm R MINDY aneurysm with endograft and coil embolization of hypogastric artery 4/13/18 (JBF/PP)  -continue aspirin and statin therapy  -surveillance CT schedule for 1/3/19       Diagnoses and all orders for this visit:    Hematoma    Iliac artery aneurysm, right [facility])           Patient ID: Gerald Savage is a 76 y o  male  Chief complaint: Pt is here for a one week wound recheck on leg leg/ thigh area  Pt was bit by dog 12 days ago  Pt has hematoma and states nothing has changed  Pt states bruising has moved down to knee making knee stiff  Pt is on ASA, eliquis and statin  Patient seen for recheck of left anterior thigh hematoma  He reports being injured by his dog approximately 12 days ago, but denies that he was bit  He was treated with a course of antibiotics by PCP, completed yesterday  He denies any pain to the area, no pain to examination/touch  Denies any fever/chills  He is on Eliquis and aspirin chronic AFib  Denies any other complaints  History R MINDY aneurysm status post repair April 2018, routine follow-up scheduled with CTA in January 2019  Known thoracic aneurysm for which she follows with cardiology  The following portions of the patient's history were reviewed and updated as appropriate: allergies, current medications, past family history, past medical history, past social history, past surgical history and problem list     Review of Systems   Constitutional: Negative  HENT: Negative      Eyes: Negative  Respiratory: Negative  Gastrointestinal: Negative  Endocrine: Negative  Genitourinary: Negative  Musculoskeletal: Negative  Skin: Negative  Allergic/Immunologic: Negative  Neurological: Negative  Hematological: Bruises/bleeds easily  Psychiatric/Behavioral: The patient is nervous/anxious  Objective:       Physical Exam   Constitutional: He is oriented to person, place, and time  He appears well-nourished  No distress  HENT:   Head: Normocephalic and atraumatic  Eyes: No scleral icterus  Neck: Neck supple  Cardiovascular: Normal rate  An irregularly irregular rhythm present  Pulmonary/Chest: Effort normal and breath sounds normal    Abdominal: Soft  He exhibits no distension  There is no tenderness  Musculoskeletal: He exhibits no edema  Neurological: He is alert and oriented to person, place, and time  Skin:   Firm well circumscribed hematoma to left anterior thigh, non-painful, no heat, small dry eschar to periphery of hematoma with no bogginess or drainage   Psychiatric: He has a normal mood and affect           Vitals:    10/18/18 1012   BP: 130/72   BP Location: Right arm   Patient Position: Sitting   Cuff Size: Adult   Temp: (!) 96 2 °F (35 7 °C)   TempSrc: Tympanic   Weight: 108 kg (237 lb)   Height: 6' 1" (1 854 m)       Patient Active Problem List   Diagnosis    Aneurysm of thoracic aorta (HCC)    Essential hypertension    Atrial fibrillation, persistent (HCC)    Iliac artery aneurysm, bilateral (HCC)    Acquired hypothyroidism    Neuropathy    Pre-operative cardiovascular examination    Iliac artery aneurysm, right ([address] )    Encounter for Medicare annual wellness exam    Hematoma       Past Surgical History:   Procedure Laterality Date    ABDOMINAL AORTIC ANEURYSM REPAIR, ENDOVASCULAR Right 4/13/2018    Procedure: REPAIR ANEURYSM ILIAC endovascular  with coil embolization right hypogastric artery ;  Surgeon: Jeanette Gannon MD;  [facility];  Service: Vascular    CARDIOVERSION      CATARACT EXTRACTION      onset 11/17/2011    COLONOSCOPY      MN EDG US EXAM SURGICAL ALTER STOM DUODENUM/JEJUNUM N/A 11/10/2017    Procedure: RADIAL ENDOSCOPIC U/S;  Surgeon: Mick Kulkarni MD;  [facility]; Service: Gastroenterology    RETINAL DETACHMENT SURGERY Right     onset 1992, retinal detachment complete air fill confirmed       Family History   Problem Relation Age of Onset    Aortic aneurysm Father         abdominal    Aortic aneurysm Brother     Stroke Mother         CVA    Cancer Maternal Grandmother         malignant neoplasm    Cancer Maternal Grandfather         malignant neoplasm    Cancer Paternal Grandmother         malignant neoplasm    Cancer Paternal Grandfather         malignant neoplasm    Cancer Family         malignant neoplasm    Cancer Family         malignant neoplasm       Social History     Social History    Marital status: /Civil Union     Spouse name: N/A    Number of children: N/A    Years of education: N/A     Occupational History    Not on file       Social History Main Topics    Smoking status: Never Smoker    Smokeless tobacco: Never Used    Alcohol use Yes      Comment: socially     Drug use: No    Sexual activity: Not on file     Other Topics Concern    Not on file     Social History Narrative    No narrative on file       Allergies   Allergen Reactions    Wound Dressing Adhesive          Current Outpatient Prescriptions:     apixaban (ELIQUIS) 5 mg, Take 1 tablet (5 mg total) by mouth 2 (two) times a day (Patient taking differently: Take 5 mg by mouth 2 (two) times a day To stop 2 days prior to procedure on 4/13/18 ), Disp: 180 tablet, Rfl: 3    aspirin (ECOTRIN LOW STRENGTH) 81 mg EC tablet, Take 81 mg by mouth daily, Disp: , Rfl:     atorvastatin (LIPITOR) 20 mg tablet, Take 20 mg by mouth daily, Disp: , Rfl:     Co-Enzyme Q-10 100 MG CAPS, Take 100 mg by mouth daily, Disp: , Rfl:     felodipine (PLENDIL) 5 mg 24 hr tablet, Take 1 tablet (5 mg total) by mouth daily, Disp: 90 tablet, Rfl: 1    gabapentin (NEURONTIN) 100 mg capsule, Take 100 mg by mouth daily Up to 300mg additional if headaches flaring , Disp: , Rfl:     levothyroxine 75 mcg tablet, Take 1 tablet (75 mcg total) by mouth daily, Disp: 90 tablet, Rfl: 1    Misc Natural Products (LUTEIN 20 PO), Take 20 mg by mouth daily  , Disp: , Rfl:     multivitamin (THERAGRAN) TABS, Take 1 tablet by mouth daily  , Disp: , Rfl:

## 2018-10-18 NOTE — ASSESSMENT & PLAN NOTE
-s/p repair of 3 cm R MINDY aneurysm with endograft and coil embolization of hypogastric artery 4/13/18 (JBF/PP)  -continue aspirin and statin therapy  -surveillance CT schedule for 1/3/19

## 2018-10-18 NOTE — PATIENT INSTRUCTIONS
Hematoma to the left thigh  -advance your activities as tolerated, listen to your body    If you have pain scale back activity  -you can apply warm compresses to the area  -hematoma will take several weeks to months to reabsorb  -if you notice any signs of infection to include pain, fever/chills, or drainage notify the office immediately  -we will plan for follow up with Dr Carter Gan after your CT scan in January

## 2018-10-18 NOTE — ASSESSMENT & PLAN NOTE
-traumatic hematoma to left anterior thigh s/p dog bite  -completed ABX per PCP  -no signs of infection, no pain  -recommend conservative therapy with warm compresses  -continue anticoagulation due to chronic Afib  -no vascular intervention  -will allow for self resolution of hematoma    If patient notices any signs of infection to include redness, pain, fever/chills he will notify office immediately

## 2018-11-02 DIAGNOSIS — I71.2 THORACIC AORTIC ANEURYSM WITHOUT RUPTURE (HCC): Primary | ICD-10-CM

## 2018-11-20 ENCOUNTER — OFFICE VISIT (OUTPATIENT)
Dept: INTERNAL MEDICINE CLINIC | Facility: CLINIC | Age: 68
End: 2018-11-20
Payer: MEDICARE

## 2018-11-20 VITALS
HEIGHT: 73 IN | WEIGHT: 235 LBS | BODY MASS INDEX: 31.14 KG/M2 | SYSTOLIC BLOOD PRESSURE: 134 MMHG | DIASTOLIC BLOOD PRESSURE: 84 MMHG | HEART RATE: 72 BPM | TEMPERATURE: 97.9 F | RESPIRATION RATE: 14 BRPM

## 2018-11-20 DIAGNOSIS — J06.9 UPPER RESPIRATORY TRACT INFECTION, UNSPECIFIED TYPE: Primary | ICD-10-CM

## 2018-11-20 DIAGNOSIS — Z23 NEEDS FLU SHOT: ICD-10-CM

## 2018-11-20 DIAGNOSIS — I48.19 ATRIAL FIBRILLATION, PERSISTENT (HCC): ICD-10-CM

## 2018-11-20 PROCEDURE — 99213 OFFICE O/P EST LOW 20 MIN: CPT | Performed by: INTERNAL MEDICINE

## 2018-11-20 RX ORDER — AMOXICILLIN 500 MG/1
500 CAPSULE ORAL EVERY 8 HOURS SCHEDULED
Qty: 21 CAPSULE | Refills: 0 | Status: SHIPPED | OUTPATIENT
Start: 2018-11-20 | End: 2018-11-27

## 2018-11-20 RX ORDER — PROMETHAZINE HYDROCHLORIDE AND CODEINE PHOSPHATE 6.25; 1 MG/5ML; MG/5ML
5 SYRUP ORAL EVERY 4 HOURS PRN
Qty: 120 ML | Refills: 0 | Status: SHIPPED | OUTPATIENT
Start: 2018-11-20 | End: 2019-03-05

## 2018-11-20 NOTE — ASSESSMENT & PLAN NOTE
Most likely viral, no signs of bacterial infection on exam   Discussed normal course about 7-10 days with symptoms being the worst days 3-5  Patient can do symptomatic treatment for the cough, and follow up if he were to have a 2nd worsening  Written prescription for amoxicillin given in case he were to take a turn for the worse in the next couple of days

## 2018-11-20 NOTE — PROGRESS NOTES
Assessment/Plan:    URI (upper respiratory infection)  Most likely viral, no signs of bacterial infection on exam   Discussed normal course about 7-10 days with symptoms being the worst days 3-5  Patient can do symptomatic treatment for the cough, and follow up if he were to have a 2nd worsening  Written prescription for amoxicillin given in case he were to take a turn for the worse in the next couple of days  Atrial fibrillation, persistent ([address] )  Rate controlled       Diagnoses and all orders for this visit:    Upper respiratory tract infection, unspecified type  -     promethazine-codeine (PHENERGAN WITH CODEINE) 6 25-10 mg/5 mL syrup; Take 5 mL by mouth every 4 (four) hours as needed for cough  -     amoxicillin (AMOXIL) 500 mg capsule; Take 1 capsule (500 mg total) by mouth every 8 (eight) hours for 7 days    Atrial fibrillation, persistent (HCC)          Subjective:      Patient ID: Tony Hernandez is a 76 y o  male  One week ago pt started with sore throat and cough  He was raking leaves and thought it was from that  He has had some white and clear mucus  He is getting some violent coughing episodes, no blood in mucus  No pleuritic pain, no fevers chills or sweats, no aches, no facial pain or pressure  He does have some runny nose, and postnasal drip  The following portions of the patient's history were reviewed and updated as appropriate: allergies, current medications, past family history, past medical history, past social history, past surgical history and problem list     Review of Systems   Constitutional: Negative for chills and fever  HENT: Positive for postnasal drip and sore throat  Negative for sinus pain and sinus pressure  Respiratory: Positive for cough  Negative for shortness of breath  Cardiovascular: Negative for chest pain           Objective:      /84   Pulse 72   Temp 97 9 °F (36 6 °C)   Resp 14   Ht 6' 1" (1 854 m)   Wt 107 kg (235 lb)   BMI 31 00 kg/m²          Physical Exam   Constitutional: He appears well-developed and well-nourished  HENT:   Mouth/Throat: No uvula swelling  Posterior oropharyngeal erythema present  No oropharyngeal exudate  Eyes: Conjunctivae are normal    Neck: Normal range of motion  Neck supple  Cardiovascular: Normal rate  An irregularly irregular rhythm present  No murmur heard  Pulmonary/Chest: Effort normal and breath sounds normal  No respiratory distress  He has no wheezes  He has no rales

## 2018-11-20 NOTE — PATIENT INSTRUCTIONS
Problem List Items Addressed This Visit        Respiratory    URI (upper respiratory infection) - Primary     Most likely viral, no signs of bacterial infection on exam   Discussed normal course about 7-10 days with symptoms being the worst days 3-5  Patient can do symptomatic treatment for the cough, and follow up if he were to have a 2nd worsening  Written prescription for amoxicillin given in case he were to take a turn for the worse in the next couple of days           Relevant Medications    promethazine-codeine (PHENERGAN WITH CODEINE) 6 25-10 mg/5 mL syrup    amoxicillin (AMOXIL) 500 mg capsule       Cardiovascular and Mediastinum    Atrial fibrillation, persistent (HCC)     Rate controlled

## 2018-11-27 ENCOUNTER — APPOINTMENT (OUTPATIENT)
Dept: LAB | Facility: CLINIC | Age: 68
End: 2018-11-27
Payer: MEDICARE

## 2018-11-27 DIAGNOSIS — I71.2 THORACIC AORTIC ANEURYSM WITHOUT RUPTURE (HCC): ICD-10-CM

## 2018-11-27 LAB
ANION GAP SERPL CALCULATED.3IONS-SCNC: 4 MMOL/L (ref 4–13)
BUN SERPL-MCNC: 18 MG/DL (ref 5–25)
CALCIUM SERPL-MCNC: 9.1 MG/DL (ref 8.3–10.1)
CHLORIDE SERPL-SCNC: 105 MMOL/L (ref 100–108)
CO2 SERPL-SCNC: 28 MMOL/L (ref 21–32)
CREAT SERPL-MCNC: 0.91 MG/DL (ref 0.6–1.3)
GFR SERPL CREATININE-BSD FRML MDRD: 86 ML/MIN/1.73SQ M
GLUCOSE P FAST SERPL-MCNC: 95 MG/DL (ref 65–99)
POTASSIUM SERPL-SCNC: 4.6 MMOL/L (ref 3.5–5.3)
SODIUM SERPL-SCNC: 137 MMOL/L (ref 136–145)

## 2018-11-27 PROCEDURE — 36415 COLL VENOUS BLD VENIPUNCTURE: CPT

## 2018-11-27 PROCEDURE — 80048 BASIC METABOLIC PNL TOTAL CA: CPT

## 2018-12-06 ENCOUNTER — HOSPITAL ENCOUNTER (OUTPATIENT)
Dept: RADIOLOGY | Age: 68
Discharge: HOME | End: 2018-12-06
Payer: MEDICARE

## 2018-12-06 DIAGNOSIS — I71.2 THORACIC AORTIC ANEURYSM WITHOUT RUPTURE (HCC): ICD-10-CM

## 2018-12-06 PROCEDURE — 71275 CT ANGIOGRAPHY CHEST: CPT

## 2018-12-06 RX ADMIN — IOHEXOL 100 ML: 350 INJECTION, SOLUTION INTRAVENOUS at 10:37

## 2018-12-12 ENCOUNTER — OFFICE VISIT (OUTPATIENT)
Dept: CARDIAC SURGERY | Facility: CLINIC | Age: 68
End: 2018-12-12
Payer: MEDICARE

## 2018-12-12 VITALS
WEIGHT: 243.8 LBS | TEMPERATURE: 98.5 F | HEART RATE: 84 BPM | BODY MASS INDEX: 32.31 KG/M2 | RESPIRATION RATE: 18 BRPM | HEIGHT: 73 IN | DIASTOLIC BLOOD PRESSURE: 82 MMHG | SYSTOLIC BLOOD PRESSURE: 122 MMHG

## 2018-12-12 DIAGNOSIS — I71.2 THORACIC AORTIC ANEURYSM WITHOUT RUPTURE (HCC): Primary | ICD-10-CM

## 2018-12-12 PROCEDURE — 99213 OFFICE O/P EST LOW 20 MIN: CPT | Performed by: NURSE PRACTITIONER

## 2018-12-12 NOTE — LETTER
December 12, 2018     [facility]  [address]    Patient: Norma Uribe   YOB: 1950   Date of Visit: 12/12/2018       Dear Dr Nitesh Jurado:    Thank you for referring Kailee Sullivan to me for evaluation  Below are my notes for this consultation  If you have questions, please do not hesitate to call me  I look forward to following your patient along with you  Sincerely,        Loni Baez,         CC: Ofelia Saenz MD  [address]  12/12/2018  9:50 AM  Attested  A[facility] Clinic  Norma Uribe 76 y o  male MRN: [id number]      Reason for Consult / Principal Problem: Ascending aortic aneurysm    History of Present Illness: Norma Uribe is a 76y o  year old male who presents today for ongoing surveillance of an ascending aortic aneurysm  This was initially identified in 2016 and at that time his ascending aorta measure a maximal diameter of 40mm  CTA of the chest performed in August 2017 as routine surveillance demonstrated an increase in diameter to 48 mm  He was seen in our aortic clinic in December 2017 for initial out patient consultation  He is also followed by vascular surgery and underwent EVAR of R MINDY aneurysm and coil embolization of R hypogastric artery in April 2018  He returns today for 1 year interval follow up with repeat chest CTA  He reports doing well this past year  He recovered well from his vascular procedure in April  He denies chest pain, SOB,  Lightheadedness, syncope, limb pain or weakness  He has chronic LE neuropathy       Past Medical History:  Past Medical History:   Diagnosis Date    Aneurysm of thoracic aorta ([address] )     Arrhythmia     Detached retina, right     Disease of thyroid gland     Gastric wall thickening     Headache     Hypertension     Iliac artery aneurysm, bilateral (HCC)     Irregular heart beat     afib cardioversion 1/30/17, x2     Neuropathy     bilateral feet    Paroxysmal A-fib [facility])     Prostatic hypertrophy     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[facility])          Past Surgical History:   Past Surgical History:   Procedure Laterality Date    ABDOMINAL AORTIC ANEURYSM REPAIR, ENDOVASCULAR Right 4/13/2018    Procedure: REPAIR ANEURYSM ILIAC endovascular  with coil embolization right hypogastric artery ;  Surgeon: Terrance Dela Cruz MD;  [facility];  Service: Vascular    CARDIOVERSION      CATARACT EXTRACTION      onset 11/17/2011    COLONOSCOPY      AR EDG US EXAM SURGICAL ALTER STOM DUODENUM/JEJUNUM N/A 11/10/2017    Procedure: RADIAL ENDOSCOPIC U/S;  Surgeon: Vinnie Espinosa MD;  [facility]; Service: Gastroenterology    RETINAL DETACHMENT SURGERY Right     onset 1992, retinal detachment complete air fill confirmed         Family History:  Family History   Problem Relation Age of Onset    Aortic aneurysm Father         abdominal    Aortic aneurysm Brother     Stroke Mother         CVA    Cancer Maternal Grandmother         malignant neoplasm    Cancer Maternal Grandfather         malignant neoplasm    Cancer Paternal Grandmother         malignant neoplasm    Cancer Paternal Grandfather         malignant neoplasm    Cancer Family         malignant neoplasm    Cancer Family         malignant neoplasm         Social History:    History   Alcohol Use    Yes     Comment: socially      History   Drug Use No     History   Smoking Status    Never Smoker   Smokeless Tobacco    Never Used         Home Medications:   Prior to Admission medications    Medication Sig Start Date End Date Taking?  Authorizing Provider   apixaban (ELIQUIS) 5 mg Take 1 tablet (5 mg total) by mouth 2 (two) times a day  Patient taking differently: Take 5 mg by mouth 2 (two) times a day To stop 2 days prior to procedure on 4/13/18  3/19/18  Yes Garrett Maurer, DO   aspirin (ECOTRIN LOW STRENGTH) 81 mg EC tablet Take 81 mg by mouth daily   Yes Historical Provider, MD   atorvastatin (LIPITOR) 20 mg tablet Take 20 mg by mouth daily   Yes Historical Provider, MD   Co-Enzyme Q-10 100 MG CAPS Take 100 mg by mouth daily   Yes Historical Provider, MD   felodipine (PLENDIL) 5 mg 24 hr tablet Take 1 tablet (5 mg total) by mouth daily 8/28/18  Yes Luis Arnold MD   gabapentin (NEURONTIN) 100 mg capsule Take 100 mg by mouth daily Up to 300mg additional if headaches flaring    Yes Historical Provider, MD   levothyroxine 75 mcg tablet Take 1 tablet (75 mcg total) by mouth daily 8/28/18  Yes Luis Arnold MD   Misc Natural Products (LUTEIN 20 PO) Take 20 mg by mouth daily  Yes Historical Provider, MD   multivitamin (THERAGRAN) TABS Take 1 tablet by mouth daily  Yes Historical Provider, MD   promethazine-codeine (PHENERGAN WITH CODEINE) 6 25-10 mg/5 mL syrup Take 5 mL by mouth every 4 (four) hours as needed for cough  Patient not taking: Reported on 12/12/2018 11/20/18   Luis Arnold MD       Allergies:   Allergies   Allergen Reactions    Wound Dressing Adhesive        Review of Systems:   Review of Systems - History obtained from chart review and the patient  General ROS: negative  Psychological ROS: negative  Ophthalmic ROS: positive for - uses glasses  ENT ROS: negative  Hematological and Lymphatic ROS: negative for - bleeding problems, blood clots or bruising  Respiratory ROS: no cough, shortness of breath, or wheezing  Cardiovascular ROS: no chest pain or dyspnea on exertion  Gastrointestinal ROS: no abdominal pain, change in bowel habits, or black or bloody stools  Genito-Urinary ROS: no dysuria, trouble voiding, or hematuria  Musculoskeletal ROS: negative  Neurological ROS: no TIA or stroke symptoms    Vital Signs:   Vitals:    12/12/18 0858   BP: 122/82   BP Location: Left arm   Patient Position: Sitting   Cuff Size: Standard   Pulse: 84   Resp: 18   Temp: 98 5 °F (36 9 °C)   TempSrc: Tympanic   Weight: 111 kg (243 lb 12 8 oz)   Height: 6' 1" (1 854 m)       Physical Exam:  General: well developed, no acute distress  HEENT/NECK:  PERRLA  No jugular venous distention  Cardiac:Irregular rhythm, No murmurs rubs or gallops  Carotid arteries: 2+ pulses, no bruits  Pulmonary:  Breath sounds clear bilaterally  Abdomen:  Non-tender, Non-distended  Positive bowel sounds  Upper extremities: 2+ radial pulses; brisk capillary refill  Lower extremities: Extremities warm/dry  PT/DP pulses 2+ bilaterally  No edema B/L  Neuro: Alert and oriented X 3  Sensation is grossly intact  No focal deficits  Musculoskeletal: MAEE, stable gait  Skin: Warm/Dry, without rashes or lesions  Lab Results:       Lab Results   Component Value Date    HGBA1C 5 7 04/05/2018     Lab Results   Component Value Date    CKTOTAL 119 04/14/2016       Imaging Studies:     CT Chest:   Stable size of ascending aorta measuring 4 8 cm    I have personally reviewed pertinent reports  Assessment:  Patient Active Problem List    Diagnosis Date Noted    URI (upper respiratory infection) 11/20/2018    Hematoma 10/10/2018    Encounter for Medicare annual wellness exam 08/01/2018    Iliac artery aneurysm, right ([address] ) 03/23/2018    Pre-operative cardiovascular examination 03/19/2018    Neuropathy 02/19/2018    Iliac artery aneurysm, bilateral ([address] )     Aneurysm of thoracic aorta ([address] ) 01/20/2016    Essential hypertension 01/20/2016    Atrial fibrillation, persistent ([address] ) 01/20/2016    Acquired hypothyroidism 09/23/2014         Plan:    CT imaging performed prior to this visit demonstrates the ascending aorta measuring 48 mm in size at its greatest diameter  This is stable and unchanged in comparison to imaging one year ago  These findings were confirmed and shared with the patient today  He does not meet surgical criteria at this point  He is asymptomatic, with no family history, therefore follow-up monitoring is the treatment plan    Arrangements have been made for future surveillance to be completed with CT chest, without contrast in 1 ariana Mccabe was comfortable with our recommendations, and his questions were answered to his satisfaction  Thank you for allowing us to participate in the care of this patient  Aortic Aneurysm Instructions were provided to the patient as follows:    1  No lifting more than 50 pounds  Regular aerobic exercise permitted and recommended  2  Maintain a controlled blood pressure with a goal of less than 140/80  3  Follow up in Aortic Clinic as recommended with radiology follow up as instructed  4  Report to the ER or call 911 immediately with the following signs / symptoms: sudden onset of back pain, chest pain or shortness of breath  5  Refrain from tobacco use  LORI Chapman  DATE: December 12, 2018  TIME: 9:30 AM  Attestation signed by Magalys Pulido DO at 12/12/2018 10:14 AM:  The patient was seen and examined, and I agree with the midlevel's history, physical exam, assessment and plan with the following additions:    Dwayne was seen in the office today for ongoing surveillance of ascending aortic aneurysm  The CTA was reviewed again which is unchanged from 1 year prior  This still demonstrates 40 millimeters, and the patient has a trileaflet aortic valve  I would recommend continued ongoing surveillance and I have recommended a 1 year repeat CTA  Patient agrees with these recommendations

## 2018-12-12 NOTE — PROGRESS NOTES
Aortic Clinic  Woo Christopher 76 y o  male MRN: [id number]      Reason for Consult / Principal Problem: Ascending aortic aneurysm    History of Present Illness: Woo Christopher is a 76y o  year old male who presents today for ongoing surveillance of an ascending aortic aneurysm  This was initially identified in 2016 and at that time his ascending aorta measure a maximal diameter of 40mm  CTA of the chest performed in August 2017 as routine surveillance demonstrated an increase in diameter to 48 mm  He was seen in our aortic clinic in December 2017 for initial out patient consultation  He is also followed by vascular surgery and underwent EVAR of R MINDY aneurysm and coil embolization of R hypogastric artery in April 2018  He returns today for 1 year interval follow up with repeat chest CTA  He reports doing well this past year  He recovered well from his vascular procedure in April  He denies chest pain, SOB,  Lightheadedness, syncope, limb pain or weakness  He has chronic LE neuropathy       Past Medical History:  Past Medical History:   Diagnosis Date    Aneurysm of thoracic aorta ([address] )     Arrhythmia     Detached retina, right     Disease of thyroid gland     Gastric wall thickening     Headache     Hypertension     Iliac artery aneurysm, bilateral (HCC)     Irregular heart beat     afib cardioversion 1/30/17, x2     Neuropathy     bilateral feet    Paroxysmal A-fib ([address] )     Prostatic hypertrophy     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[facility])          Past Surgical History:   Past Surgical History:   Procedure Laterality Date    ABDOMINAL AORTIC ANEURYSM REPAIR, ENDOVASCULAR Right 4/13/2018    Procedure: REPAIR ANEURYSM ILIAC endovascular  with coil embolization right hypogastric artery ;  Surgeon: Claude Staggers, MD;  [facility];  Service: Vascular    CARDIOVERSION      CATARACT EXTRACTION      onset 11/17/2011    COLONOSCOPY      OK EDG US EXAM SURGICAL ALTER STOM DUODENUM/JEJUNUM N/A 11/10/2017 Procedure: RADIAL ENDOSCOPIC U/S;  Surgeon: Cassandra Vaca MD;  Location: [facility]; Service: Gastroenterology    RETINAL DETACHMENT SURGERY Right     onset 1992, retinal detachment complete air fill confirmed         Family History:  Family History   Problem Relation Age of Onset    Aortic aneurysm Father         abdominal    Aortic aneurysm Brother     Stroke Mother         CVA    Cancer Maternal Grandmother         malignant neoplasm    Cancer Maternal Grandfather         malignant neoplasm    Cancer Paternal Grandmother         malignant neoplasm    Cancer Paternal Grandfather         malignant neoplasm    Cancer Family         malignant neoplasm    Cancer Family         malignant neoplasm         Social History:    History   Alcohol Use    Yes     Comment: socially      History   Drug Use No     History   Smoking Status    Never Smoker   Smokeless Tobacco    Never Used         Home Medications:   Prior to Admission medications    Medication Sig Start Date End Date Taking?  Authorizing Provider   apixaban (ELIQUIS) 5 mg Take 1 tablet (5 mg total) by mouth 2 (two) times a day  Patient taking differently: Take 5 mg by mouth 2 (two) times a day To stop 2 days prior to procedure on 4/13/18  3/19/18  Yes Aleshia Cano, DO   aspirin (ECOTRIN LOW STRENGTH) 81 mg EC tablet Take 81 mg by mouth daily   Yes Historical Provider, MD   atorvastatin (LIPITOR) 20 mg tablet Take 20 mg by mouth daily   Yes Historical Provider, MD   Co-Enzyme Q-10 100 MG CAPS Take 100 mg by mouth daily   Yes Historical Provider, MD   felodipine (PLENDIL) 5 mg 24 hr tablet Take 1 tablet (5 mg total) by mouth daily 8/28/18  Yes Yocasta Echeverria MD   gabapentin (NEURONTIN) 100 mg capsule Take 100 mg by mouth daily Up to 300mg additional if headaches flaring    Yes Historical Provider, MD   levothyroxine 75 mcg tablet Take 1 tablet (75 mcg total) by mouth daily 8/28/18  Yes Yocasta Echeverria MD   Misc Natural Products (LUTEIN 20 PO) Take 20 mg by mouth daily  Yes Historical Provider, MD   multivitamin (THERAGRAN) TABS Take 1 tablet by mouth daily  Yes Historical Provider, MD   promethazine-codeine (PHENERGAN WITH CODEINE) 6 25-10 mg/5 mL syrup Take 5 mL by mouth every 4 (four) hours as needed for cough  Patient not taking: Reported on 12/12/2018 11/20/18   Ofelia Saenz MD       Allergies: Allergies   Allergen Reactions    Wound Dressing Adhesive        Review of Systems:   Review of Systems - History obtained from chart review and the patient  General ROS: negative  Psychological ROS: negative  Ophthalmic ROS: positive for - uses glasses  ENT ROS: negative  Hematological and Lymphatic ROS: negative for - bleeding problems, blood clots or bruising  Respiratory ROS: no cough, shortness of breath, or wheezing  Cardiovascular ROS: no chest pain or dyspnea on exertion  Gastrointestinal ROS: no abdominal pain, change in bowel habits, or black or bloody stools  Genito-Urinary ROS: no dysuria, trouble voiding, or hematuria  Musculoskeletal ROS: negative  Neurological ROS: no TIA or stroke symptoms    Vital Signs:   Vitals:    12/12/18 0858   BP: 122/82   BP Location: Left arm   Patient Position: Sitting   Cuff Size: Standard   Pulse: 84   Resp: 18   Temp: 98 5 °F (36 9 °C)   TempSrc: Tympanic   Weight: 111 kg (243 lb 12 8 oz)   Height: 6' 1" (1 854 m)       Physical Exam:  General: well developed, no acute distress  HEENT/NECK:  PERRLA  No jugular venous distention  Cardiac:Irregular rhythm, No murmurs rubs or gallops  Carotid arteries: 2+ pulses, no bruits  Pulmonary:  Breath sounds clear bilaterally  Abdomen:  Non-tender, Non-distended  Positive bowel sounds  Upper extremities: 2+ radial pulses; brisk capillary refill  Lower extremities: Extremities warm/dry  PT/DP pulses 2+ bilaterally  No edema B/L  Neuro: Alert and oriented X 3  Sensation is grossly intact  No focal deficits    Musculoskeletal: MAEE, stable gait  Skin: Warm/Dry, without rashes or lesions  Lab Results:       Lab Results   Component Value Date    HGBA1C 5 7 04/05/2018     Lab Results   Component Value Date    CKTOTAL 119 04/14/2016       Imaging Studies:     CT Chest:   Stable size of ascending aorta measuring 4 8 cm    I have personally reviewed pertinent reports  Assessment:  Patient Active Problem List    Diagnosis Date Noted    URI (upper respiratory infection) 11/20/2018    Hematoma 10/10/2018    Encounter for Medicare annual wellness exam 08/01/2018    Iliac artery aneurysm, right ([address] ) 03/23/2018    Pre-operative cardiovascular examination 03/19/2018    Neuropathy 02/19/2018    Iliac artery aneurysm, bilateral ([address] )     Aneurysm of [facility] ([facility] ) 01/20/2016    Essential hypertension 01/20/2016    Atrial fibrillation, persistent ([facility] ) 01/20/2016    Acquired hypothyroidism 09/23/2014         Plan:    CT imaging performed prior to this visit demonstrates the ascending aorta measuring 48 mm in size at its greatest diameter  This is stable and unchanged in comparison to imaging one year ago  These findings were confirmed and shared with the patient today  He does not meet surgical criteria at this point  He is asymptomatic, with no family history, therefore follow-up monitoring is the treatment plan  Arrangements have been made for future surveillance to be completed with CT chest, without contrast in 1 year  [place] was comfortable with our recommendations, and his questions were answered to his satisfaction  Thank you for allowing us to participate in the care of this patient  Aortic Aneurysm Instructions were provided to the patient as follows:    1  No lifting more than 50 pounds  Regular aerobic exercise permitted and recommended  2  Maintain a controlled blood pressure with a goal of less than 140/80  3  Follow up in Aortic Clinic as recommended with radiology follow up as instructed  4   Report to the ER or call 911 immediately with the following signs / symptoms: sudden onset of back pain, chest pain or shortness of breath  5  Refrain from tobacco use      SIGNATURE: LORI Monzon  DATE: December 12, 2018  TIME: 9:30 AM

## 2018-12-13 DIAGNOSIS — I71.2 THORACIC AORTIC ANEURYSM WITHOUT RUPTURE (HCC): ICD-10-CM

## 2019-01-02 DIAGNOSIS — I48.0 PAROXYSMAL ATRIAL FIBRILLATION (HCC): ICD-10-CM

## 2019-01-02 DIAGNOSIS — I10 HYPERTENSION, UNSPECIFIED TYPE: Primary | ICD-10-CM

## 2019-01-02 RX ORDER — ATORVASTATIN CALCIUM 20 MG/1
20 TABLET, FILM COATED ORAL DAILY
Qty: 90 TABLET | Refills: 3 | Status: SHIPPED | OUTPATIENT
Start: 2019-01-02 | End: 2019-01-17 | Stop reason: SDUPTHER

## 2019-01-03 ENCOUNTER — HOSPITAL ENCOUNTER (OUTPATIENT)
Dept: RADIOLOGY | Age: 69
Discharge: HOME | End: 2019-01-03
Payer: MEDICARE

## 2019-01-03 DIAGNOSIS — I72.3 ILIAC ARTERY ANEURYSM, RIGHT (HCC): ICD-10-CM

## 2019-01-03 PROCEDURE — 74174 CTA ABD&PLVS W/CONTRAST: CPT

## 2019-01-03 RX ADMIN — IOHEXOL 100 ML: 350 INJECTION, SOLUTION INTRAVENOUS at 10:20

## 2019-01-17 DIAGNOSIS — I10 HYPERTENSION, UNSPECIFIED TYPE: ICD-10-CM

## 2019-01-17 RX ORDER — ATORVASTATIN CALCIUM 20 MG/1
20 TABLET, FILM COATED ORAL DAILY
Qty: 30 TABLET | Refills: 11 | OUTPATIENT
Start: 2019-01-17 | End: 2020-01-02 | Stop reason: SDUPTHER

## 2019-01-17 NOTE — TELEPHONE ENCOUNTER
Pt called, asked for a 14 day refill in case his script do not come in tomorrow for some reason  Will run out of medication as of tomorrow       [facility]

## 2019-01-21 DIAGNOSIS — E03.9 ACQUIRED HYPOTHYROIDISM: ICD-10-CM

## 2019-01-21 DIAGNOSIS — G89.29 CHRONIC NONINTRACTABLE HEADACHE, UNSPECIFIED HEADACHE TYPE: Primary | ICD-10-CM

## 2019-01-21 DIAGNOSIS — R51.9 CHRONIC NONINTRACTABLE HEADACHE, UNSPECIFIED HEADACHE TYPE: Primary | ICD-10-CM

## 2019-01-21 RX ORDER — GABAPENTIN 100 MG/1
100 CAPSULE ORAL 2 TIMES DAILY
Qty: 180 CAPSULE | Refills: 0 | Status: SHIPPED | OUTPATIENT
Start: 2019-01-21 | End: 2019-01-22 | Stop reason: SDUPTHER

## 2019-01-21 RX ORDER — LEVOTHYROXINE SODIUM 0.07 MG/1
75 TABLET ORAL DAILY
Qty: 90 TABLET | Refills: 3 | Status: SHIPPED | OUTPATIENT
Start: 2019-01-21 | End: 2019-12-29 | Stop reason: SDUPTHER

## 2019-01-21 NOTE — TELEPHONE ENCOUNTER
Patient requesting refill on gabapentin 100 mg BID [facility]  He was a previous [address] patient  Advised him that he needs to make a f/u appt with one new physician, patient hasn't been seen since 5/2017  He has a f/u with Dr Oneal Osler on 4/5/19

## 2019-01-21 NOTE — TELEPHONE ENCOUNTER
I refilled his prescriptions and see he has an appt to see me  His main complaint is headaches, I would be happy to see him, but he would be better served if seen by one of the headache colleagues     Thanks

## 2019-01-22 RX ORDER — GABAPENTIN 100 MG/1
100 CAPSULE ORAL 2 TIMES DAILY
Qty: 180 CAPSULE | Refills: 0 | Status: SHIPPED | OUTPATIENT
Start: 2019-01-22 | End: 2019-04-05 | Stop reason: SDUPTHER

## 2019-01-22 NOTE — TELEPHONE ENCOUNTER
Dr Hector Talavera, patient called back stating he would like this prescription to go to [facility] mail order service instead of local [facility]   Order entered, please approve if appropriate

## 2019-02-26 DIAGNOSIS — I10 ESSENTIAL HYPERTENSION: ICD-10-CM

## 2019-02-26 RX ORDER — FELODIPINE 5 MG/1
5 TABLET, EXTENDED RELEASE ORAL DAILY
Qty: 90 TABLET | Refills: 2 | Status: SHIPPED | OUTPATIENT
Start: 2019-02-26 | End: 2020-11-09

## 2019-03-05 ENCOUNTER — APPOINTMENT (EMERGENCY)
Dept: RADIOLOGY | Facility: HOSPITAL | Age: 69
DRG: 415 | End: 2019-03-05
Payer: MEDICARE

## 2019-03-05 ENCOUNTER — HOSPITAL ENCOUNTER (INPATIENT)
Facility: HOSPITAL | Age: 69
LOS: 6 days | Discharge: HOME | DRG: 415 | End: 2019-03-12
Attending: EMERGENCY MEDICINE | Admitting: SURGERY
Payer: MEDICARE

## 2019-03-05 ENCOUNTER — APPOINTMENT (OUTPATIENT)
Dept: RADIOLOGY | Facility: HOSPITAL | Age: 69
DRG: 415 | End: 2019-03-05
Payer: MEDICARE

## 2019-03-05 DIAGNOSIS — N17.9 AKI (ACUTE KIDNEY INJURY) (HCC): Primary | ICD-10-CM

## 2019-03-05 DIAGNOSIS — I48.19 ATRIAL FIBRILLATION, PERSISTENT (HCC): ICD-10-CM

## 2019-03-05 DIAGNOSIS — R11.0 NAUSEA: ICD-10-CM

## 2019-03-05 DIAGNOSIS — R10.11 RIGHT UPPER QUADRANT ABDOMINAL PAIN: ICD-10-CM

## 2019-03-05 DIAGNOSIS — K81.9 CHOLECYSTITIS: ICD-10-CM

## 2019-03-05 DIAGNOSIS — I10 ESSENTIAL HYPERTENSION: ICD-10-CM

## 2019-03-05 PROBLEM — R74.01 ELEVATED AST (SGOT): Status: ACTIVE | Noted: 2019-03-05

## 2019-03-05 LAB
ALBUMIN SERPL BCP-MCNC: 4.2 G/DL (ref 3.5–5)
ALP SERPL-CCNC: 117 U/L (ref 46–116)
ALT SERPL W P-5'-P-CCNC: 43 U/L (ref 12–78)
ANION GAP SERPL CALCULATED.3IONS-SCNC: 9 MMOL/L (ref 4–13)
AST SERPL W P-5'-P-CCNC: 47 U/L (ref 5–45)
ATRIAL RATE: 267 BPM
BASOPHILS # BLD AUTO: 0.07 THOUSANDS/ΜL (ref 0–0.1)
BASOPHILS NFR BLD AUTO: 1 % (ref 0–1)
BILIRUB SERPL-MCNC: 0.46 MG/DL (ref 0.2–1)
BUN SERPL-MCNC: 26 MG/DL (ref 5–25)
CALCIUM SERPL-MCNC: 9 MG/DL (ref 8.3–10.1)
CHLORIDE SERPL-SCNC: 103 MMOL/L (ref 100–108)
CO2 SERPL-SCNC: 27 MMOL/L (ref 21–32)
CREAT SERPL-MCNC: 1.37 MG/DL (ref 0.6–1.3)
EOSINOPHIL # BLD AUTO: 0.25 THOUSAND/ΜL (ref 0–0.61)
EOSINOPHIL NFR BLD AUTO: 3 % (ref 0–6)
ERYTHROCYTE [DISTWIDTH] IN BLOOD BY AUTOMATED COUNT: 12.8 % (ref 11.6–15.1)
GFR SERPL CREATININE-BSD FRML MDRD: 52 ML/MIN/1.73SQ M
GLUCOSE SERPL-MCNC: 117 MG/DL (ref 65–140)
HCT VFR BLD AUTO: 49.6 % (ref 36.5–49.3)
HGB BLD-MCNC: 16.5 G/DL (ref 12–17)
IMM GRANULOCYTES # BLD AUTO: 0.01 THOUSAND/UL (ref 0–0.2)
IMM GRANULOCYTES NFR BLD AUTO: 0 % (ref 0–2)
LACTATE SERPL-SCNC: 1.8 MMOL/L (ref 0.5–2)
LIPASE SERPL-CCNC: 55 U/L (ref 73–393)
LYMPHOCYTES # BLD AUTO: 2.02 THOUSANDS/ΜL (ref 0.6–4.47)
LYMPHOCYTES NFR BLD AUTO: 27 % (ref 14–44)
MCH RBC QN AUTO: 30 PG (ref 26.8–34.3)
MCHC RBC AUTO-ENTMCNC: 33.3 G/DL (ref 31.4–37.4)
MCV RBC AUTO: 90 FL (ref 82–98)
MONOCYTES # BLD AUTO: 0.87 THOUSAND/ΜL (ref 0.17–1.22)
MONOCYTES NFR BLD AUTO: 12 % (ref 4–12)
NEUTROPHILS # BLD AUTO: 4.33 THOUSANDS/ΜL (ref 1.85–7.62)
NEUTS SEG NFR BLD AUTO: 57 % (ref 43–75)
NRBC BLD AUTO-RTO: 0 /100 WBCS
PLATELET # BLD AUTO: 232 THOUSANDS/UL (ref 149–390)
PMV BLD AUTO: 10 FL (ref 8.9–12.7)
POTASSIUM SERPL-SCNC: 4 MMOL/L (ref 3.5–5.3)
PROT SERPL-MCNC: 7.2 G/DL (ref 6.4–8.2)
QRS AXIS: -28 DEGREES
QRSD INTERVAL: 98 MS
QT INTERVAL: 344 MS
QTC INTERVAL: 376 MS
RBC # BLD AUTO: 5.5 MILLION/UL (ref 3.88–5.62)
SODIUM SERPL-SCNC: 139 MMOL/L (ref 136–145)
T WAVE AXIS: 23 DEGREES
TROPONIN I SERPL-MCNC: <0.02 NG/ML
VENTRICULAR RATE: 72 BPM
WBC # BLD AUTO: 7.55 THOUSAND/UL (ref 4.31–10.16)

## 2019-03-05 PROCEDURE — C9113 INJ PANTOPRAZOLE SODIUM, VIA: HCPCS | Performed by: INTERNAL MEDICINE

## 2019-03-05 PROCEDURE — 96374 THER/PROPH/DIAG INJ IV PUSH: CPT

## 2019-03-05 PROCEDURE — 83605 ASSAY OF LACTIC ACID: CPT | Performed by: EMERGENCY MEDICINE

## 2019-03-05 PROCEDURE — 80053 COMPREHEN METABOLIC PANEL: CPT | Performed by: EMERGENCY MEDICINE

## 2019-03-05 PROCEDURE — 96375 TX/PRO/DX INJ NEW DRUG ADDON: CPT

## 2019-03-05 PROCEDURE — 83690 ASSAY OF LIPASE: CPT | Performed by: EMERGENCY MEDICINE

## 2019-03-05 PROCEDURE — 93010 ELECTROCARDIOGRAM REPORT: CPT | Performed by: INTERNAL MEDICINE

## 2019-03-05 PROCEDURE — 99214 OFFICE O/P EST MOD 30 MIN: CPT | Performed by: INTERNAL MEDICINE

## 2019-03-05 PROCEDURE — 99220 PR INITIAL OBSERVATION CARE/DAY 70 MINUTES: CPT | Performed by: INTERNAL MEDICINE

## 2019-03-05 PROCEDURE — 76705 ECHO EXAM OF ABDOMEN: CPT

## 2019-03-05 PROCEDURE — 36415 COLL VENOUS BLD VENIPUNCTURE: CPT | Performed by: EMERGENCY MEDICINE

## 2019-03-05 PROCEDURE — 74174 CTA ABD&PLVS W/CONTRAST: CPT

## 2019-03-05 PROCEDURE — 99285 EMERGENCY DEPT VISIT HI MDM: CPT

## 2019-03-05 PROCEDURE — 85025 COMPLETE CBC W/AUTO DIFF WBC: CPT | Performed by: EMERGENCY MEDICINE

## 2019-03-05 PROCEDURE — 84484 ASSAY OF TROPONIN QUANT: CPT | Performed by: EMERGENCY MEDICINE

## 2019-03-05 PROCEDURE — 93005 ELECTROCARDIOGRAM TRACING: CPT

## 2019-03-05 PROCEDURE — 71275 CT ANGIOGRAPHY CHEST: CPT

## 2019-03-05 RX ORDER — FENTANYL CITRATE 50 UG/ML
75 INJECTION, SOLUTION INTRAMUSCULAR; INTRAVENOUS ONCE
Status: COMPLETED | OUTPATIENT
Start: 2019-03-05 | End: 2019-03-05

## 2019-03-05 RX ORDER — PANTOPRAZOLE SODIUM 40 MG/1
40 INJECTION, POWDER, FOR SOLUTION INTRAVENOUS ONCE
Status: COMPLETED | OUTPATIENT
Start: 2019-03-05 | End: 2019-03-05

## 2019-03-05 RX ORDER — ONDANSETRON 2 MG/ML
4 INJECTION INTRAMUSCULAR; INTRAVENOUS ONCE
Status: COMPLETED | OUTPATIENT
Start: 2019-03-05 | End: 2019-03-05

## 2019-03-05 RX ORDER — MORPHINE SULFATE 10 MG/ML
6 INJECTION, SOLUTION INTRAMUSCULAR; INTRAVENOUS
Status: DISCONTINUED | OUTPATIENT
Start: 2019-03-05 | End: 2019-03-05

## 2019-03-05 RX ORDER — LEVOTHYROXINE SODIUM 0.07 MG/1
75 TABLET ORAL
Status: DISCONTINUED | OUTPATIENT
Start: 2019-03-05 | End: 2019-03-12 | Stop reason: HOSPADM

## 2019-03-05 RX ORDER — PROMETHAZINE HYDROCHLORIDE 25 MG/ML
25 INJECTION, SOLUTION INTRAMUSCULAR; INTRAVENOUS ONCE
Status: COMPLETED | OUTPATIENT
Start: 2019-03-05 | End: 2019-03-05

## 2019-03-05 RX ORDER — MAGNESIUM HYDROXIDE/ALUMINUM HYDROXICE/SIMETHICONE 120; 1200; 1200 MG/30ML; MG/30ML; MG/30ML
30 SUSPENSION ORAL EVERY 6 HOURS PRN
Status: DISCONTINUED | OUTPATIENT
Start: 2019-03-05 | End: 2019-03-12 | Stop reason: HOSPADM

## 2019-03-05 RX ORDER — PROMETHAZINE HYDROCHLORIDE 25 MG/ML
12.5 INJECTION, SOLUTION INTRAMUSCULAR; INTRAVENOUS ONCE
Status: COMPLETED | OUTPATIENT
Start: 2019-03-05 | End: 2019-03-05

## 2019-03-05 RX ORDER — FELODIPINE 5 MG/1
5 TABLET, EXTENDED RELEASE ORAL DAILY
Status: DISCONTINUED | OUTPATIENT
Start: 2019-03-05 | End: 2019-03-12 | Stop reason: HOSPADM

## 2019-03-05 RX ORDER — ASPIRIN 81 MG/1
81 TABLET ORAL DAILY
Status: DISCONTINUED | OUTPATIENT
Start: 2019-03-05 | End: 2019-03-08

## 2019-03-05 RX ORDER — GABAPENTIN 100 MG/1
100 CAPSULE ORAL 2 TIMES DAILY
Status: DISCONTINUED | OUTPATIENT
Start: 2019-03-05 | End: 2019-03-12 | Stop reason: HOSPADM

## 2019-03-05 RX ORDER — SODIUM CHLORIDE 9 MG/ML
100 INJECTION, SOLUTION INTRAVENOUS CONTINUOUS
Status: DISPENSED | OUTPATIENT
Start: 2019-03-05 | End: 2019-03-06

## 2019-03-05 RX ORDER — DOCUSATE SODIUM 100 MG/1
100 CAPSULE, LIQUID FILLED ORAL 2 TIMES DAILY PRN
Status: DISCONTINUED | OUTPATIENT
Start: 2019-03-05 | End: 2019-03-12 | Stop reason: HOSPADM

## 2019-03-05 RX ORDER — CHOLECALCIFEROL (VITAMIN D3) 125 MCG
100 CAPSULE ORAL DAILY
Status: DISCONTINUED | OUTPATIENT
Start: 2019-03-05 | End: 2019-03-08

## 2019-03-05 RX ORDER — ATORVASTATIN CALCIUM 20 MG/1
20 TABLET, FILM COATED ORAL DAILY
Status: DISCONTINUED | OUTPATIENT
Start: 2019-03-05 | End: 2019-03-12 | Stop reason: HOSPADM

## 2019-03-05 RX ORDER — MORPHINE SULFATE 4 MG/ML
4 INJECTION, SOLUTION INTRAMUSCULAR; INTRAVENOUS ONCE
Status: COMPLETED | OUTPATIENT
Start: 2019-03-05 | End: 2019-03-05

## 2019-03-05 RX ORDER — MORPHINE SULFATE 4 MG/ML
4 INJECTION, SOLUTION INTRAMUSCULAR; INTRAVENOUS
Status: DISCONTINUED | OUTPATIENT
Start: 2019-03-05 | End: 2019-03-08

## 2019-03-05 RX ADMIN — SODIUM CHLORIDE 500 ML: 0.9 INJECTION, SOLUTION INTRAVENOUS at 02:36

## 2019-03-05 RX ADMIN — SODIUM CHLORIDE 100 ML/HR: 0.9 INJECTION, SOLUTION INTRAVENOUS at 16:51

## 2019-03-05 RX ADMIN — SODIUM CHLORIDE 100 ML/HR: 0.9 INJECTION, SOLUTION INTRAVENOUS at 06:24

## 2019-03-05 RX ADMIN — PROMETHAZINE HYDROCHLORIDE 25 MG: 25 INJECTION INTRAMUSCULAR; INTRAVENOUS at 04:00

## 2019-03-05 RX ADMIN — FELODIPINE 5 MG: 5 TABLET, FILM COATED, EXTENDED RELEASE ORAL at 09:22

## 2019-03-05 RX ADMIN — SODIUM CHLORIDE 100 ML/HR: 0.9 INJECTION, SOLUTION INTRAVENOUS at 04:32

## 2019-03-05 RX ADMIN — APIXABAN 5 MG: 5 TABLET, FILM COATED ORAL at 17:56

## 2019-03-05 RX ADMIN — Medication 1 TABLET: at 09:21

## 2019-03-05 RX ADMIN — APIXABAN 5 MG: 5 TABLET, FILM COATED ORAL at 09:21

## 2019-03-05 RX ADMIN — ONDANSETRON 4 MG: 2 INJECTION INTRAMUSCULAR; INTRAVENOUS at 01:19

## 2019-03-05 RX ADMIN — GABAPENTIN 100 MG: 100 CAPSULE ORAL at 17:56

## 2019-03-05 RX ADMIN — ATORVASTATIN CALCIUM 20 MG: 20 TABLET, FILM COATED ORAL at 17:56

## 2019-03-05 RX ADMIN — Medication 100 MG: at 09:21

## 2019-03-05 RX ADMIN — PROMETHAZINE HYDROCHLORIDE 12.5 MG: 25 INJECTION INTRAMUSCULAR; INTRAVENOUS at 03:08

## 2019-03-05 RX ADMIN — FENTANYL CITRATE 75 MCG: 50 INJECTION, SOLUTION INTRAMUSCULAR; INTRAVENOUS at 01:20

## 2019-03-05 RX ADMIN — GABAPENTIN 100 MG: 100 CAPSULE ORAL at 09:21

## 2019-03-05 RX ADMIN — MORPHINE SULFATE 4 MG: 4 INJECTION INTRAVENOUS at 02:52

## 2019-03-05 RX ADMIN — PANTOPRAZOLE SODIUM 40 MG: 40 INJECTION, POWDER, FOR SOLUTION INTRAVENOUS at 03:05

## 2019-03-05 RX ADMIN — ASPIRIN 81 MG: 81 TABLET, COATED ORAL at 09:21

## 2019-03-05 RX ADMIN — LEVOTHYROXINE SODIUM 75 MCG: 75 TABLET ORAL at 06:29

## 2019-03-05 RX ADMIN — IOHEXOL 100 ML: 350 INJECTION, SOLUTION INTRAVENOUS at 02:01

## 2019-03-05 NOTE — CONSULTS
Consultation - GI   Andrew Bonilla 71 y o  male MRN: [id number]  Unit/Bed#: [facility] 421-01 Encounter: [id number]    Assessment and Plan:   Right upper quadrant and epigastric pain more likely gastroenteritis  -AST and alk phos normal for male   -Ultrasound of the liver is pending, although not neccessary but ok to get per primary team   -Would continue symptomatic management  -Advance diet as tolerated   -Can have low residue low dairy diet     Hepatic steatosis on CT likely alcohol vs  Overweight   -Check A1c   -albumin 4 2,  platelets 378  -Hep C negative in 2017   -AST/Alk phos could be mildly elevated from this as well     Alcohol dependence   counseling provided, not willing to cut back at this time     Elevated AST/ALT  -Likely 2/2 Vomiting vs SAMARA vs gastritis vs  Hepatic steatosis   -Repeat out patient in 6 weeks     SAMARA on CKD   -Likely secondary to dehydration  -Continue gentle fluid hydration  -Avoid nephrotoxin substances    Atrial fibrillation  -Currently rate controlled on his own  -In the past he was on metoprolol and flecainide which were discontinued    History of Present Illness   Physician Requesting Consult: Judge Stepan MD  Reason for Consult / Principal Problem: RUQ pain   Hx and PE limited by: Nothing   HPI: Andrew Bonilla is a 71y o  year old male with past medical history of thoracic aortic aneurysm, bilateral iliac artery aneurysm, AAA repair, renal artery dissection, hypertension, atrial fibrillation currently anticoagulated on Eliquis who apparently presented to [facility] ED at 11 o'clock last night with complaint of right abdominal pain which woke him up from his sleep  Patient reports that he had pork and potatoes for dinner around 6:00 p m , he went to bed around 8:00 p m , and woke up around 10 30 to use the bathroom and then noticed the pain in his right upper quadrant epigastric area    The pain was 7/10 in intensity, did not radiate to the right shoulder but did go across his abdomen to the left side, patient reported the pain is still going on right now and has not really resolved but the intensity is less, the pain felt sharp and stabbing, sitting up made the pain better, patient also had multiple episodes of emesis at home which contained his dinner, having emesis did make the pain better  Patient reports that he also felt bloated at the time when he woke up and having emesis did take away some of the bloating as well  He denies any blood in his emesis, patient reports he had a bowel movement prior to coming to the hospital, he denied any blood in his bowel movement as well  Patient does not know if anything made the pain worse  He has never had pain like that before he does have history of left upper quadrant pain  Denies history of acid reflux  Pt does endorse alcohol use everyday of the week for many many years  He drinks 2 glasses of wine vs  1-2 beers everyday, he is not willing to cut back  On admissions vitals have been stable, CBC does not show an elevated WBC, on admission he did have mild elevation of AST 47, ALT 43, alk-phos 117, T bili 0 46, Lipase was 55  CTA chest abdomen pelvis showed hepatic steatosis, no calcified gallstones, no pericholecystic inflammatory changes and only post surgical changes in the right common iliac artery  EGD in November 2017 for abnormal CT findings which showed normal esophagus, hiatal hernia, gastritis, normal duodenum, the stomach and duodenal walls were normal and not thickened; pathology showed benign duodenal mucosa, negative for chronic or active duodenitis dysplasia or malignancy, his stomach biopsy was ineffective for antral gastritis, negative for H pylori, atrophy, intestinal metaplasia or dysplasia or carcinoma    Patient had a colonoscopy in July 2016 for average risk screening colonoscopy by Dr Michelle Jerez, that time the were no internal or external hemorrhoids, no prostatic nodules, mildly enlarged prostate, no evidence of polyps, colitis, tumors in the colon, mild diverticulosis was found in the sigmoid colon; at that time the recommendation was to have colonoscopy in 5 years 2021  Patient was admitted for right upper quadrant pain  History:  Marital Status:    Occupation:  retired, IT worker   Patient Pre-hospital Living Situation:  with wife at home   Patient Pre-hospital Level of Mobility:  mobile without assist device   Patient Pre-hospital Diet Restrictions:  none            Inpatient consult to gastroenterology     Performed by  Juliet Connelly DO     Authorized by Lory Schaefer MD              Review of Systems   Constitutional: Positive for appetite change (no appitite )  Negative for activity change, chills and fever  HENT: Negative for sneezing and sore throat  Eyes: Negative for photophobia and visual disturbance  Respiratory: Negative for cough, chest tightness, shortness of breath and wheezing  Cardiovascular: Negative for chest pain, palpitations and leg swelling  Gastrointestinal: Positive for abdominal distention, abdominal pain, nausea and vomiting  Negative for anal bleeding, blood in stool, constipation, diarrhea and rectal pain  Endocrine: Negative for polydipsia, polyphagia and polyuria  Genitourinary: Negative for difficulty urinating and dysuria  Nocturia    Neurological: Negative for dizziness, syncope and light-headedness         Historical Information   Past Medical History:   Diagnosis Date    Aneurysm of thoracic aorta ([address] )     Arrhythmia     Detached retina, right     Disease of thyroid gland     Gastric wall thickening     Headache     Hypertension     Iliac artery aneurysm, bilateral (HCC)     Irregular heart beat     afib cardioversion 1/30/17, x2     Neuropathy     bilateral feet    [facility]     Prostatic hypertrophy     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[facility])      Past Surgical History:   Procedure Laterality Date    ABDOMINAL AORTIC ANEURYSM REPAIR, ENDOVASCULAR Right 4/13/2018    Procedure: REPAIR ANEURYSM ILIAC endovascular  with coil embolization right hypogastric artery ;  Surgeon: Erin Clayton MD;  [facility];  Service: Vascular    CARDIOVERSION      CATARACT EXTRACTION      onset 11/17/2011    COLONOSCOPY      CT EDG US EXAM SURGICAL ALTER STOM DUODENUM/JEJUNUM N/A 11/10/2017    Procedure: RADIAL ENDOSCOPIC U/S;  Surgeon: Abner Neumann MD;  [facility]; Service: Gastroenterology    RETINAL DETACHMENT SURGERY Right     onset 1992, retinal detachment complete air fill confirmed     Social History   Social History     Substance and Sexual Activity   Alcohol Use Yes    Comment: socially      Social History     Substance and Sexual Activity   Drug Use No     Social History     Tobacco Use   Smoking Status Never Smoker   Smokeless Tobacco Never Used     Family History: non-contributory    Meds/Allergies   all current active meds have been reviewed    Allergies   Allergen Reactions    Wound Dressing Adhesive        Objective       Intake/Output Summary (Last 24 hours) at 3/5/2019 0923  Last data filed at 3/5/2019 0432  Gross per 24 hour   Intake 1500 ml   Output    Net 1500 ml       Invasive Devices:   Peripheral IV 03/05/19 Left Antecubital (Active)   Site Assessment Clean;Dry; Intact 3/5/2019  6:26 AM   Dressing Type Transparent 3/5/2019  6:26 AM   Line Status Flushed; Infusing 3/5/2019  6:26 AM   Dressing Status Clean;Dry; Intact 3/5/2019  6:26 AM       Physical Exam   Constitutional: He is oriented to person, place, and time  He appears well-developed and well-nourished  No distress  HENT:   Head: Normocephalic and atraumatic  Eyes: Right eye exhibits no discharge  Left eye exhibits no discharge  No scleral icterus  Neck: Normal range of motion  Neck supple  Pulmonary/Chest: Effort normal  No respiratory distress  He has no wheezes  Abdominal: Soft  He exhibits no distension   There is tenderness (right upper quadrant and epigatric )  There is no rebound and no guarding  Musculoskeletal: Normal range of motion  He exhibits no edema  Neurological: He is alert and oriented to person, place, and time  No cranial nerve deficit  Skin: Skin is warm and dry  He is not diaphoretic  No erythema  Lab Results: I have personally reviewed pertinent reports  Imaging Studies: I have personally reviewed pertinent reports  EKG, Pathology, and Other Studies: I have personally reviewed pertinent reports        VTE Prophylaxis: RX contraindicated due to: Patient is currently on Eliquis

## 2019-03-05 NOTE — ED ATTENDING ATTESTATION
I, [address] [place] [address], DO, saw and evaluated the patient  I have discussed the patient with the resident/non-physician practitioner and agree with the resident's/non-physician practitioner's findings, Plan of Care, and MDM as documented in the resident's/non-physician practitioner's note, except where noted  All available labs and Radiology studies were reviewed  I was present for key portions of any procedure(s) performed by the resident/non-physician practitioner and I was immediately available to provide assistance  At this point I agree with the current assessment done in the Emergency Department  I have conducted an independent evaluation of this patient a history and physical is as follows:    70-year-old male presents with abdominal pain  Patient states felt fine yesterday until after dinner when he felt a little bit full and then went to bed  Patient awoke with abdominal pain and had a bowel movement  Complaining of pain mostly on the right side, does not radiate to his back  Denies chest pain but admits to some shortness of breath  Denies history of similar pain  No fevers or chills, no urinary complaints  On exam-no acute distress, heart irregularly irregular, lungs clear, abdomen soft with tenderness on the right side, no guarding    Plan-CT abdomen, check labs including lactate    Critical Care Time  Procedures

## 2019-03-05 NOTE — SOCIAL WORK
CM met w/ pt to obtain info  Pt reported he resides w/ his wife only in a 1-story house w/ 12 steps down to basement and 2 steps to enter house at front door  Pt's wife Samantha Huizar (c: [phone]) is primary contact  Pt reported he is independent at baseline w/ ambulating and performing his ADLS  Pt reported no DME in home, no hx of HHC services, and no hx of i/p rehab placements  Pt reported no hx of mental health issues nor D&A issues  Pt reported [facility]  [facility] [address] [facility] his Rx needs  Pt reported he is retired and receives [employer] as a source of income  Pt is enrolled in Medicare for healthcare coverage and also has supplemental insurance through [employer] for secondary coverage and Rx benefits  Pt reported he does not have a legally pre-designated medical POA appointed for himself  Pt reported his wife can provide transportation for him at time of d/c     CM reviewed d/c planning process including the following: identifying help at home, patient preference for d/c planning needs, Discharge Lounge, [facility] Meds to Bed program, availability of treatment team to discuss questions or concerns patient and/or family may have regarding understanding medications and recognizing signs and symptoms once discharged  CM also encouraged patient to follow up with all recommended appointments after discharge  Patient advised of importance for patient and family to participate in managing patients medical well being  Patient/caregiver received discharge checklist  Content reviewed  Patient/caregiver encouraged to participate in discharge plan of care prior to discharge home  CM will reassess pt for d/c needs once recommendations for his aftercare are made by the tx team  CM to follow

## 2019-03-05 NOTE — ASSESSMENT & PLAN NOTE
Right upper quadrant discomfort may be secondary to biliary colic? AST ALT elevation noted very mild  GI consult  Continue clear liquids no fat  Intravenous fluids hydration  Monitor metabolic profile especially AST ALT  Pain control  Nausea control  Will continue morphine 4 mg every 3 hours as needed for pain; Zofran for nausea  Patient also given 1 dose of Phenergan 12 5 mg intravenously

## 2019-03-05 NOTE — H&P
H&P- Aster Mccray 1950, 71 y o  male MRN: [id number]    Unit/Bed#: ED 09 Encounter: [id number]    Primary Care Provider: Zuleima Painter MD   Date and time admitted to hospital: 3/5/2019 12:11 AM        * Right upper quadrant abdominal pain  Assessment & Plan  Right upper quadrant discomfort may be secondary to biliary colic? AST ALT elevation noted very mild  GI consult  Continue clear liquids no fat  Intravenous fluids hydration  Monitor metabolic profile especially AST ALT  Pain control  Nausea control  Will continue morphine 4 mg every 3 hours as needed for pain; Zofran for nausea  Patient also given 1 dose of Phenergan 12 5 mg intravenously  Atrial fibrillation, persistent ([address] )  Assessment & Plan  Currently with regular rate  Patient is also on Xarelto  Essential hypertension  Assessment & Plan  Will continue felodipine daily  Acquired hypothyroidism  Assessment & Plan  Continue levothyroxine  Elevated AST (SGOT)  Assessment & Plan  Monitor metabolic profile, AST ALT  GI consult  Clear liquids without fat  Right upper quadrant ultrasound  VTE Prophylaxis: Rivaroxaban (Xarelto)  / sequential compression device   Code Status: Prior full Code as discussed with patient  POLST: There is no POLST form on file for this patient (pre-hospital)    Anticipated Length of Stay:  Patient will be admitted on an Observation basis with an anticipated length of stay of  less than 2 midnights  Justification for Hospital Stay: Please see detailed plans noted above  Chief Complaint:     Right upper quadrant abdominal discomfort  History of Present Illness:  Aster Mccray is a 71 y o  male who has a past medical history of vasculopathy with status post right iliac grafting and stenting  With atrial fibrillation, he is currently on Xarelto  Essential hypertension and hypothyroidism or other medical problems    Patient comes in because of a sudden onset of right upper quadrant abdominal discomfort  Patient denied had pork with potatoes; he went to bed early tonight and was woken from sleep at approximately 11:00 p m  Complaining of right upper quadrant discomfort  There was nausea  One small vomitus of previously digested food  Patient claims no constipation  Had 2 bowel movements  Patient had no sick contacts and only lives with wife  He also drinks occasionally approximately 6 oz of wine and sometimes shot of scotch  Otherwise, the patient denies any chronic GI problems  CT scan of the abdomen did not reveal any acute vascular concerns  Also there was no inflammatory change around the gallbladder however noted presence of fatty liver  After morphine and fentanyl was given he still has a bit of a pain denying any nausea although he was seen to be dry heaving upon examination  Not in cardiorespiratory distress however is complaining of discomfort  Review of Systems:    Constitutional:  Denies fever or chills   Eyes:  Denies change in visual acuity   HENT:  Denies nasal congestion or sore throat   Respiratory:  Denies cough or shortness of breath   Cardiovascular:  Denies chest pain or edema   GI:  Abdominal discomfort right upper quadrant    Nausea, no recent diarrhea or constipation  :  Denies dysuria   Musculoskeletal:  Denies back pain or joint pain   Integument:  Denies rash   Neurologic:  Denies headache, focal weakness or sensory changes   Endocrine:  Denies polyuria or polydipsia   Lymphatic:  Denies swollen glands   Psychiatric:  Denies depression or anxiety     Past Medical and Surgical History:   Past Medical History:   Diagnosis Date    Aneurysm of thoracic aorta ([address] )     Arrhythmia     Detached retina, right     Disease of thyroid gland     Gastric wall thickening     Headache     Hypertension     Iliac artery aneurysm, bilateral (HCC)     Irregular heart beat     afib cardioversion 1/30/17, x2     Neuropathy     bilateral feet    Paroxysmal A-fib [facility])     Prostatic hypertrophy     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[facility])      Past Surgical History:   Procedure Laterality Date    ABDOMINAL AORTIC ANEURYSM REPAIR, ENDOVASCULAR Right 4/13/2018    Procedure: REPAIR ANEURYSM ILIAC endovascular  with coil embolization right hypogastric artery ;  Surgeon: Sebas Saravia MD;  Location: [facility];  Service: Vascular    CARDIOVERSION      CATARACT EXTRACTION      onset 11/17/2011    COLONOSCOPY      AK EDG US EXAM SURGICAL ALTER STOM DUODENUM/JEJUNUM N/A 11/10/2017    Procedure: RADIAL ENDOSCOPIC U/S;  Surgeon: Danielle Coughlin MD;  Location: [facility]; Service: Gastroenterology    RETINAL DETACHMENT SURGERY Right     onset 1992, retinal detachment complete air fill confirmed       Meds/Allergies:    (Not in a hospital admission)    Allergies: Allergies   Allergen Reactions    Wound Dressing Adhesive      apixaban (ELIQUIS) 5 mg Take 1 tablet (5 mg total) by mouth 2 (two) times a day Rancho Zapata MD Reordered   Ordered as: apixaban (ELIQUIS) tablet 5 mg - 5 mg, Oral, 2 times daily, First dose on Tue 3/5/19 at 0900 High alert medication    aspirin (ECOTRIN LOW STRENGTH) 81 mg EC tablet Take 81 mg by mouth daily Rancho Zapata MD Reordered   Ordered as: aspirin (ECOTRIN LOW STRENGTH) EC tablet 81 mg - 81 mg, Oral, Daily, First dose on Tue 3/5/19 at 0900 Do Not Crush - Enteric coated      atorvastatin (LIPITOR) 20 mg tablet Take 1 tablet (20 mg total) by mouth daily Rancho Zapata MD Reordered   Ordered as: atorvastatin (LIPITOR) tablet 20 mg - 20 mg, Oral, Daily, First dose on Tue 3/5/19 at 0900   Co-Enzyme Q-10 100 MG CAPS Take 100 mg by mouth daily Rancho Zapata MD Reordered   Ordered as: Co-Enzyme Q-10 CAPS 1 capsule - 1 capsule (100 mg), Oral, Daily, First dose on Tue 3/5/19 at 0900   felodipine (PLENDIL) 5 mg 24 hr tablet Take 1 tablet (5 mg total) by mouth daily Rancho Zapata MD Reordered   Ordered as: felodipine (PLENDIL) 24 hr tablet 5 mg - 5 mg, Oral, Daily, First dose on Tue 3/5/19 at 0900 Swallow whole; do not crush, chew or split  LOOK ALIKE SOUND ALIKE MED Hold for systolic blood pressure less than (mmHg): 110   gabapentin (NEURONTIN) 100 mg capsule Take 1 capsule (100 mg total) by mouth 2 (two) times a day Up to 300mg additional if headaches flaring Arlen Morton MD Reordered   Ordered as: gabapentin (NEURONTIN) capsule 100 mg - 100 mg, Oral, 2 times daily, First dose on Tue 3/5/19 at 0900 LOOK ALIKE SOUND ALIKE MED    levothyroxine 75 mcg tablet Take 1 tablet (75 mcg total) by mouth daily Arlen Morton MD Reordered   Ordered as: levothyroxine tablet 75 mcg - 75 mcg, Oral, Daily, First dose on Tue 3/5/19 at 0900 Administer in the morning on an empty stomach, at least 30 to 60 minutes before food  Tablets may be crushed and suspended in 5-10mls of water for administration  LOOK ALIKE SOUND ALIKE MED  Misc Natural Products (LUTEIN 20 PO) Take 20 mg by mouth daily  Arlen Morton MD Reordered   Ordered as: LUTEIN 20 CAPS 1 capsule - 1 capsule, Oral, Daily, First dose on Tue 3/5/19 at 0900   multivitamin (THERAGRAN) TABS Take 1 tablet by mouth daily   Arlen Morton MD Reordered   Ordered as: multivitamin [facility]) per tablet 1 tablet - 1 tablet, Oral, Daily, First dose on Tue 3/5/19 at 0900       History:  Marital Status: /Civil Union   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  Patient Pre-hospital Living Situation:  Lives at home with wife  Patient Pre-hospital Level of Mobility:  Ambulatory  Patient Pre-hospital Diet Restrictions:  Eats regular diet  Substance Use History:   Social History     Substance and Sexual Activity   Alcohol Use Yes    Comment: socially      Social History     Tobacco Use   Smoking Status Never Smoker   Smokeless Tobacco Never Used     Social History     Substance and Sexual Activity   Drug Use No       Family History:  Family History   Problem Relation Age of Onset    Aortic aneurysm Father abdominal    Aortic aneurysm Brother     Stroke Mother         CVA    Cancer Maternal Grandmother         malignant neoplasm    Cancer Maternal Grandfather         malignant neoplasm    Cancer Paternal Grandmother         malignant neoplasm    Cancer Paternal Grandfather         malignant neoplasm    Cancer Family         malignant neoplasm    Cancer Family         malignant neoplasm       Physical Exam:     Vitals:   Blood Pressure: 157/92 (03/05/19 0215)  Pulse: 62 (03/05/19 0215)  Temperature: 98 3 °F (36 8 °C) (03/05/19 0026)  Temp Source: Rectal (03/05/19 0026)  Respirations: 20 (03/05/19 0215)  Weight - Scale: 107 kg (235 lb) (03/05/19 0013)  SpO2: 97 % (03/05/19 0215)    Constitutional:  Well developed, well nourished, overweight, no acute distress, non-toxic appearance with dry heaving and mild nausea (although he denies this)  Eyes:  PERRL, conjunctiva normal no icterus noted  HENT:  Atraumatic, external ears normal, nose normal, oropharynx moist, no pharyngeal exudates  Neck- normal range of motion, no tenderness, supple   Respiratory:  No respiratory distress, normal breath sounds, no rales, no wheezing   Cardiovascular:  Normal rate, normal rhythm, no murmurs, no gallops, no rubs   GI:  Soft, nondistended, normal bowel sounds, nontender, although with discomfort right upper quadrant when pressed; and when pressed on the contralateral side will still complained of right upper quadrant discomfort  :  No costovertebral angle tenderness   Musculoskeletal:  No edema, no tenderness, no deformities  Back- no tenderness  Integument:  Well hydrated, no rash   Lymphatic:  No lymphadenopathy noted   Neurologic:  Alert &awake, communicative, CN 2-12 normal, normal motor function, normal sensory function, no focal deficits noted   Psychiatric:  Speech and behavior appropriate       Lab Results: I have personally reviewed pertinent reports        Results from last 7 days   Lab Units 03/05/19 0034 WBC Thousand/uL 7 55   HEMOGLOBIN g/dL 16 5   HEMATOCRIT % 49 6*   PLATELETS Thousands/uL 232   NEUTROS PCT % 57   LYMPHS PCT % 27   MONOS PCT % 12   EOS PCT % 3     Results from last 7 days   Lab Units 03/05/19  0034   POTASSIUM mmol/L 4 0   CHLORIDE mmol/L 103   CO2 mmol/L 27   BUN mg/dL 26*   CREATININE mg/dL 1 37*   CALCIUM mg/dL 9 0   ALK PHOS U/L 117*   ALT U/L 43   AST U/L 47*             Imaging: I have personally reviewed pertinent reports  Cta Dissection Protocol Chest Abdomen Pelvis W Wo Contrast    Result Date: 3/5/2019  Narrative: CT ANGIOGRAM OF THE CHEST, ABDOMEN AND PELVIS WITH AND WITHOUT IV CONTRAST INDICATION:  Abdominal pain COMPARISON: CT scan dated January 3, 2019 TECHNIQUE:  CT angiogram examination of the chest, abdomen and pelvis was performed according to standard protocol  This examination, like all CT scans performed [facility], was performed utilizing techniques to minimize radiation dose exposure, including the use of iterative reconstruction and automated exposure control  Contrast as well as noncontrast images were obtained  3D reconstructions were performed an independent workstation, and are supplied for review  Rad dose 3999 84 mGy-cm IV Contrast:  100 mL of iohexol (OMNIPAQUE) Enteric Contrast:  Not given FINDINGS: VASCULAR STRUCTURES:  Stable postsurgical changes of the right common iliac artery aneurysm stent graft placement  Stable plug within the proximal right internal iliac artery  There is a saccular stable aneurysm involving the left internal iliac artery  with penetrating ulcer  Again visualized bearing traditional anatomy with splenogastric trunk and separate origin of common hepatic artery  SMA, JENNIFER and bilateral renal arteries are patent  Focal area of dilatation of the left renal artery near the hilum is visualized  OTHER FINDINGS: CHEST: HEART: Heart is enlarged    LUNGS:  The trachea and central bronchial tree are patent  Atelectasis is seen within the lung bases  Robert Ingles PLEURA: No pleural effusion  MEDIASTINUM AND GERMÁN:  No mass or significant lymphadenopathy  CHEST WALL AND LOWER NECK: Normal  ABDOMEN LIVER/BILIARY TREE:  Hepatic steatosis GALLBLADDER:  No calcified gallstones  No pericholecystic inflammatory change  SPLEEN:  Unremarkable  Normal size  PANCREAS:  Unremarkable  ADRENAL GLANDS:  Unremarkable  KIDNEYS/URETERS:  No solid renal mass  No hydronephrosis  No urinary tract calculi  PELVIS REPRODUCTIVE ORGANS:  Unremarkable for patient's age  URINARY BLADDER:  Unremarkable  ADDITIONAL ABDOMINAL AND PELVIC STRUCTURES: STOMACH AND BOWEL:  Colonic diverticulosis without evidence of acute diverticulitis  ABDOMINOPELVIC CAVITY:  No pathologically enlarged mesenteric or retroperitoneal lymph nodes  No ascites or free intraperitoneal air  ABDOMINAL WALL/INGUINAL REGIONS:  Small fat-containing umbilical wall hernia is seen  OSSEOUS STRUCTURES:  Degenerative changes in the spine are visualized  Impression: Stable postsurgical changes in the right common iliac artery  Workstation performed: [id number]         ** Please Note: Dragon 360 Dictation voice to text software was used in the creation of this document   **

## 2019-03-05 NOTE — UTILIZATION REVIEW
Initial Clinical Review    Admission: Date/Time/Statement:  Start   Ordered   03/05/19 0259  Place in Observation Once     Transfer Service: General Medicine    Expected Discharge Time: Evening    Expected Discharge Date: 03/06/19       Question Answer Comment   Admitting Physician Lila Ponce    Level of Care Med Surg       Start Status    03/05/19 0259 Completed Details       03/05/19 0306       Orders Placed This Encounter   Procedures    Place in Observation     Standing Status:   Standing     Number of Occurrences:   1     Order Specific Question:   Admitting Physician     Answer:   Lila Ponce [[id number]]     Order Specific Question:   Level of Care     Answer:   Med Surg [16]     ED: Date/Time/Mode of Arrival:   ED Arrival Information     Expected Arrival Acuity Means of Arrival Escorted By Service Admission Type    - 3/5/2019 00:05 Emergent Walk-In Self Hospitalist Emergency    Arrival Complaint    abdominal pain         Chief Complaint:   Chief Complaint   Patient presents with    Abdominal Pain     Pt states he woke from sleep with abdominal pain  Pt had "small bowel movement " Pt states he was tired today and went to bed early  Pt states he tried to vomit but not much came up  Pt has pain in midle of abdomen that does not radiate        Assessment/Plan:   Patient is a 71-year-old male with past medical history of thoracic aortic aneurysm, bilateral iliac artery aneurysm, AAA repair, renal artery dissection, disease with her gland, gastric wall thickening, hypertension, atrial fibrillation presenting to the ED stating that around 2300 today he was woken out from sleep states that the pain is his right abdomen described as crampy/sharp/tightness 7/10 made worse with lying down also states he has been having some nausea with this with 1 episode of minimal vomitus of undigested food, some associated shortness of breath with this and 2 small bowel movements today that were nonbloody and nonmelanotic  TENDERNESS NOTED IN [facility] AND [facility]      ED Vital Signs:   ED Triage Vitals   Temperature Pulse Respirations Blood Pressure SpO2   03/05/19 0026 03/05/19 0013 03/05/19 0013 03/05/19 0013 03/05/19 0013   98 3 °F (36 8 °C) 57 18 (!) 164/101 98 %      Temp Source Heart Rate Source Patient Position - Orthostatic VS BP Location FiO2 (%)   03/05/19 0026 03/05/19 0215 03/05/19 0608 03/05/19 0215 --   Rectal Monitor Lying Right arm       Pain Score       03/05/19 0013       7        Wt Readings from Last 1 Encounters:   03/05/19 107 kg (235 lb)     BUN 26  CREATININE 1 37  AST 47  ALK PHOS 117  LIPASE 55 TROPINON <0 02    CTA DISSECTION PROTOCOL C/P          Stable postsurgical changes in the right common iliac artery         EKG   ED Treatment:   Medication Administration from 03/05/2019 0005 to 03/05/2019 0606       Date/Time Order Dose Route Action Action by Comments     03/05/2019 0119 ondansetron (ZOFRAN) injection 4 mg 4 mg Intravenous Given Alisha Bassett RN      03/05/2019 0120 fentanyl citrate (PF) 100 MCG/2ML 75 mcg 75 mcg Intravenous Given Alisha Bassett RN      03/05/2019 0201 iohexol (OMNIPAQUE) 350 MG/ML injection (MULTI-DOSE) 100 mL 100 mL Intravenous Given Pj Pickering      03/05/2019 0405 sodium chloride 0 9 % bolus 500 mL 0 mL Intravenous Stopped Alisha Bassett RN      03/05/2019 0236 sodium chloride 0 9 % bolus 500 mL 500 mL Intravenous [address] Alisha Bassett RN      03/05/2019 0252 morphine (PF) 4 mg/mL injection 4 mg 4 mg Intravenous Given Alisha Bassett RN      03/05/2019 0308 promethazine (PHENERGAN) injection 12 5 mg 12 5 mg Intravenous Given Alisha Bassett RN      03/05/2019 0305 pantoprazole (PROTONIX) injection 40 mg 40 mg Intravenous Given Alisha Bassett RN      03/05/2019 0432 sodium chloride 0 9 % infusion 100 mL/hr Intravenous [address] Alisha Bassett RN      03/05/2019 0400 promethazine (PHENERGAN) injection 25 mg 25 mg Intravenous Given Alisha Bassett RN         Past Medical/Surgical History:    Active Ambulatory Problems     Diagnosis Date Noted    [facility] ) 01/20/2016    Essential hypertension 01/20/2016    Atrial fibrillation, persistent ([facility] ) 01/20/2016    Iliac artery aneurysm, bilateral ([facility] )     Acquired hypothyroidism 09/23/2014    Neuropathy 02/19/2018    Pre-operative cardiovascular examination 03/19/2018    Iliac artery aneurysm, right ([facility] ) 03/23/2018    Encounter for Medicare annual wellness exam 08/01/2018    Hematoma 10/10/2018    URI (upper respiratory infection) 11/20/2018     Resolved Ambulatory Problems     Diagnosis Date Noted    No Resolved Ambulatory Problems     Past Medical History:   Diagnosis Date    [facility] )     Arrhythmia     Detached retina, right     Disease of thyroid gland     Gastric wall thickening     Headache     Hypertension     Iliac artery aneurysm, bilateral (HCC)     Irregular heart beat     Neuropathy     Paroxysmal A-fib (HCC)     Prostatic hypertrophy     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(HCC)      Admitting Diagnosis: Nausea [R11 0]  Abdominal pain [R10 9]  SAMARA (acute kidney injury) ([facility] ) [N17 9]  Right upper quadrant abdominal pain [R10 11]  Age/Sex: 71 y o  male  Admission Orders:  Scheduled Meds:   Current Facility-Administered Medications:  aluminum-magnesium hydroxide-simethicone 30 mL Oral Q6H PRN Arlen Morton MD    apixaban 5 mg Oral BID Arlen Morton MD    aspirin 81 mg Oral Daily Arlen Morton MD    atorvastatin 20 mg Oral Daily Arlen Morton MD    co-enzyme Q-10 100 mg Oral Daily Arlen Morton MD    docusate sodium 100 mg Oral BID PRN Arlen Morton MD    felodipine 5 mg Oral Daily Arlen Morton MD    gabapentin 100 mg Oral BID Arlen Morton MD    levothyroxine 75 mcg Oral Early Morning Arlen Morton MD    morphine injection 4 mg Intravenous Q3H PRN Arlen Morton MD    multivitamin-minerals 1 tablet Oral Daily Arlen Morton MD sodium chloride 100 mL/hr Intravenous Continuous Greyson Carr MD Last Rate: 100 mL/hr (03/05/19 0624)     NPO  DAILY WT  I/O  U/S RIGHT UPPER QUAD(LIVER)  SCD

## 2019-03-05 NOTE — ASSESSMENT & PLAN NOTE
Monitor metabolic profile, AST ALT  GI consult  Clear liquids without fat  Right upper quadrant ultrasound

## 2019-03-05 NOTE — PROGRESS NOTES
Post admit check  The patient is a 55-year-old male came to the hospital with right upper quadrant pain, nausea, vomiting shortly after his dinner last night  Reports significant improvement, but is currently NPO  Denies any vomiting today, denies any diarrhea, denies any fever, chills  Currently awaiting right upper quadrant ultrasound  Repeat labs in a m     Can likely start clear liquid diet for quadrant ultrasound was unremarkable

## 2019-03-05 NOTE — ED NOTES
Transferred pt into a hospital bed at this time and explained that he will be a hold in the ED tonight  Pt is resting comfortably with no complaints with wife at bedside  Call bell is within reach        Sophia Camacho RN  03/05/19 4240

## 2019-03-05 NOTE — ED PROVIDER NOTES
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, gastric wall thickening, hypertension, atrial fibrillation presenting to the ED stating that around 2300 today he was woken out from sleep states that the pain is his right abdomen described as crampy/sharp/tightness 7/10 made worse with lying down also states he has been having some nausea with this with 1 episode of minimal vomitus of undigested food, some associated shortness of breath with this and 2 small bowel movements today that were nonbloody and nonmelanotic  Denies ever having this pain before, states that him and his wife ate same food and his wife is fine, denies fever, night sweats, chills, headache, dizziness, chest pain, diaphoresis, weakness/fatigue, diarrhea or constipation, dysuria, hematuria, increased leg swelling  History provided by:  Patient   used: No        Prior to Admission Medications   Prescriptions Last Dose Informant Patient Reported? Taking? Co-Enzyme Q-10 100 MG CAPS 3/4/2019 at Unknown time Self Yes Yes   Sig: Take 100 mg by mouth daily   Misc Natural Products (LUTEIN 20 PO) 3/4/2019 at Unknown time Self Yes Yes   Sig: Take 20 mg by mouth daily     apixaban (ELIQUIS) 5 mg 3/4/2019 at Unknown time  No Yes   Sig: Take 1 tablet (5 mg total) by mouth 2 (two) times a day   aspirin (ECOTRIN LOW STRENGTH) 81 mg EC tablet 3/4/2019 at Unknown time Self Yes Yes   Sig: Take 81 mg by mouth daily   atorvastatin (LIPITOR) 20 mg tablet 3/4/2019 at Unknown time  No Yes   Sig: Take 1 tablet (20 mg total) by mouth daily   felodipine (PLENDIL) 5 mg 24 hr tablet 3/4/2019 at Unknown time  No Yes   Sig: Take 1 tablet (5 mg total) by mouth daily   gabapentin (NEURONTIN) 100 mg capsule 3/4/2019 at Unknown time  No Yes   Sig: Take 1 capsule (100 mg total) by mouth 2 (two) times a day Up to 300mg additional if headaches flaring   levothyroxine 75 mcg tablet 3/4/2019 at Unknown time  No Yes   Sig: Take 1 tablet (75 mcg total) by mouth daily   multivitamin (THERAGRAN) TABS 3/4/2019 at Unknown time Self Yes Yes   Sig: Take 1 tablet by mouth daily  promethazine-codeine (PHENERGAN WITH CODEINE) 6 25-10 mg/5 mL syrup Not Taking at Unknown time Self No No   Sig: Take 5 mL by mouth every 4 (four) hours as needed for cough   Patient not taking: Reported on 12/12/2018       Facility-Administered Medications: None       Past Medical History:   Diagnosis Date    Aneurysm of thoracic aorta ([facility] [address] )     Arrhythmia     Detached retina, right     Disease of thyroid gland     Gastric wall thickening     Headache     Hypertension     Iliac artery aneurysm, bilateral (HCC)     Irregular heart beat     afib cardioversion 1/30/17, x2     Neuropathy     bilateral feet    Paroxysmal A-fib ([facility] [address] )     Prostatic hypertrophy     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[facility])        Past Surgical History:   Procedure Laterality Date    ABDOMINAL AORTIC ANEURYSM REPAIR, ENDOVASCULAR Right 4/13/2018    Procedure: REPAIR ANEURYSM ILIAC endovascular  with coil embolization right hypogastric artery ;  Surgeon: Brigid Ramires MD;  [facility]: [facility];  Service: Vascular    CARDIOVERSION      CATARACT EXTRACTION      onset 11/17/2011    COLONOSCOPY      CT EDG US EXAM SURGICAL ALTER STOM DUODENUM/JEJUNUM N/A 11/10/2017    Procedure: RADIAL ENDOSCOPIC U/S;  Surgeon: Diaz Chávez MD;  Location: [facility];   Service: Gastroenterology    RETINAL DETACHMENT SURGERY Right     onset 1992, retinal detachment complete air fill confirmed       Family History   Problem Relation Age of Onset    Aortic aneurysm Father         abdominal    Aortic aneurysm Brother     Stroke Mother         CVA    Cancer Maternal Grandmother         malignant neoplasm    Cancer Maternal Grandfather         malignant neoplasm    Cancer Paternal Grandmother         malignant neoplasm    Cancer Paternal Grandfather         malignant neoplasm    Cancer Family         malignant neoplasm    Cancer Family         malignant neoplasm     I have reviewed and agree with the history as documented  Social History     Tobacco Use    Smoking status: Never Smoker    Smokeless tobacco: Never Used   Substance Use Topics    Alcohol use: Yes     Comment: socially     Drug use: No        Review of Systems   Constitutional: Negative for activity change, appetite change, chills, diaphoresis, fatigue and fever  HENT: Negative for congestion, postnasal drip, rhinorrhea, sinus pressure, sinus pain, sneezing and sore throat  Eyes: Negative for redness and visual disturbance  Respiratory: Positive for shortness of breath  Negative for apnea, cough, chest tightness, wheezing and stridor  Cardiovascular: Negative for chest pain, palpitations and leg swelling  Gastrointestinal: Positive for abdominal pain, nausea and vomiting  Negative for abdominal distention, constipation and diarrhea  Endocrine: Negative for polydipsia, polyphagia and polyuria  Genitourinary: Negative for difficulty urinating, dysuria, frequency and urgency  Musculoskeletal: Negative for arthralgias, back pain, gait problem, joint swelling, myalgias, neck pain and neck stiffness  Skin: Negative for color change, pallor, rash and wound  Neurological: Negative for dizziness, facial asymmetry, weakness, light-headedness, numbness and headaches         Physical Exam  ED Triage Vitals   Temperature Pulse Respirations Blood Pressure SpO2   03/05/19 0026 03/05/19 0013 03/05/19 0013 03/05/19 0013 03/05/19 0013   98 3 °F (36 8 °C) 57 18 (!) 164/101 98 %      Temp Source Heart Rate Source Patient Position - Orthostatic VS BP Location FiO2 (%)   03/05/19 0026 03/05/19 0215 -- 03/05/19 0215 --   Rectal Monitor  Right arm       Pain Score       03/05/19 0013       7           Orthostatic Vital Signs  Vitals:    03/05/19 0013 03/05/19 0215   BP: (!) 164/101 157/92   Pulse: 57 62       Physical Exam   Constitutional: He is oriented to person, place, and time  He appears well-developed and well-nourished  Non-toxic appearance  He does not appear ill  No distress  HENT:   Head: Normocephalic and atraumatic  Right Ear: External ear normal    Left Ear: External ear normal    Nose: Nose normal    Mouth/Throat: Oropharynx is clear and moist  No oropharyngeal exudate  Eyes: Pupils are equal, round, and reactive to light  Conjunctivae are normal  Right eye exhibits no discharge  Left eye exhibits no discharge  No scleral icterus  Neck: Normal range of motion  Neck supple  Cardiovascular: Normal rate, regular rhythm, normal heart sounds and intact distal pulses  Exam reveals no gallop and no friction rub  No murmur heard  Pulmonary/Chest: Effort normal and breath sounds normal  No respiratory distress  He has no wheezes  He has no rales  Abdominal: Soft  Normal appearance and bowel sounds are normal  He exhibits no distension and no mass  There is tenderness in the right upper quadrant, epigastric area and left upper quadrant  There is no rigidity, no rebound, no guarding, no CVA tenderness, no tenderness at McBurney's point and negative Cruz's sign  No hernia  Area of firmness noted on exam that patient states was not painful but is tender  Musculoskeletal: Normal range of motion  He exhibits no edema, tenderness or deformity  Neurological: He is alert and oriented to person, place, and time  Skin: Skin is warm and dry  Capillary refill takes less than 2 seconds  No rash noted  He is not diaphoretic  No erythema  No pallor  Psychiatric: He has a normal mood and affect  His behavior is normal  Judgment and thought content normal    Nursing note and vitals reviewed  ED Medications  Medications   sodium chloride 0 9 % bolus 500 mL (500 mL Intravenous New Bag 3/5/19 0236)   ondansetron (ZOFRAN) injection 4 mg (4 mg Intravenous Given 3/5/19 0119)   fentanyl citrate (PF) 100 MCG/2ML 75 mcg (75 mcg Intravenous Given 3/5/19 0120)   iohexol (OMNIPAQUE) 350 MG/ML injection (MULTI-DOSE) 100 mL (100 mL Intravenous Given 3/5/19 0201)       Diagnostic Studies  Results Reviewed     Procedure Component Value Units Date/Time    Comprehensive metabolic panel [[id number]]  (Abnormal) Collected:  03/05/19 0034    Lab Status:  Final result Specimen:  Blood from Arm, Left Updated:  03/05/19 0106     Sodium 139 mmol/L      Potassium 4 0 mmol/L      Chloride 103 mmol/L      CO2 27 mmol/L      ANION GAP 9 mmol/L      BUN 26 mg/dL      Creatinine 1 37 mg/dL      Glucose 117 mg/dL      Calcium 9 0 mg/dL      AST 47 U/L      ALT 43 U/L      Alkaline Phosphatase 117 U/L      Total Protein 7 2 g/dL      Albumin 4 2 g/dL      Total Bilirubin 0 46 mg/dL      eGFR 52 ml/min/1 73sq m     Narrative:       National Kidney Disease Education Program recommendations are as follows:  GFR calculation is accurate only with a steady state creatinine  Chronic Kidney disease less than 60 ml/min/1 73 sq  meters  Kidney failure less than 15 ml/min/1 73 sq  meters      Lipase [[id number]]  (Abnormal) Collected:  03/05/19 0034    Lab Status:  Final result Specimen:  Blood from Arm, Left Updated:  03/05/19 0106     Lipase 55 u/L     Troponin I [[id number]]  (Normal) Collected:  03/05/19 0034    Lab Status:  Final result Specimen:  Blood from Arm, Left Updated:  03/05/19 0102     Troponin I <0 02 ng/mL     Lactic acid, plasma [[id number]]  (Normal) Collected:  03/05/19 0034    Lab Status:  Final result Specimen:  Blood from Arm, Left Updated:  03/05/19 0101     LACTIC ACID 1 8 mmol/L     Narrative:       Result may be elevated if tourniquet was used during collection  CBC and differential [[id number]]  (Abnormal) Collected:  03/05/19 0034    Lab Status:  Final result Specimen:  Blood from Arm, Left Updated:  03/05/19 0044     WBC 7 55 Thousand/uL      RBC 5 50 Million/uL      Hemoglobin 16 5 g/dL      Hematocrit 49 6 %      MCV 90 fL      MCH 30 0 pg      MCHC 33 3 g/dL      RDW 12 8 %      MPV 10 0 fL      Platelets 057 Thousands/uL      nRBC 0 /100 WBCs      Neutrophils Relative 57 %      Immat GRANS % 0 %      Lymphocytes Relative 27 %      Monocytes Relative 12 %      Eosinophils Relative 3 %      Basophils Relative 1 %      Neutrophils Absolute 4 33 Thousands/µL      Immature Grans Absolute 0 01 Thousand/uL      Lymphocytes Absolute 2 02 Thousands/µL      Monocytes Absolute 0 87 Thousand/µL      Eosinophils Absolute 0 25 Thousand/µL      Basophils Absolute 0 07 Thousands/µL                  CTA dissection protocol chest abdomen pelvis w wo contrast   Final Result by Navin Cm DO (03/05 3706)      Stable postsurgical changes in the right common iliac artery                    Workstation performed: [id number]               Procedures  ECG 12 Lead Documentation  Date/Time: 3/5/2019 1:55 AM  Performed by: Jonathan Lao DO  Authorized by: Jonathan Lao DO     Indications / Diagnosis:  Epigastric pain w/ SOB  ECG reviewed by me, the ED Provider: yes    Patient location:  ED  Previous ECG:     Previous ECG:  Compared to current    Comparison ECG info:  A fib now currently seen    Similarity:  Changes noted    Comparison to cardiac monitor: Yes    Interpretation:     Interpretation: abnormal    Rate:     ECG rate:  72    ECG rate assessment: normal    Rhythm:     Rhythm: atrial fibrillation    Ectopy:     Ectopy: none    QRS:     QRS axis:  Normal    QRS intervals:  Normal  Conduction:     Conduction: normal    ST segments:     ST segments:  Normal  T waves:     T waves: normal            Phone Consults  ED Phone Contact    ED Course                               MDM    Disposition  Final diagnoses:   SAMARA (acute kidney injury) ([facility] [address] )   Right upper quadrant abdominal pain   Nausea     Time reflects when diagnosis was documented in both MDM as applicable and the Disposition within this note     Time User Action Codes Description Comment    3/5/2019  2:44 AM Francetta Maine Add [N17 9] SAMARA (acute kidney injury) ([facility] [address] )     3/5/2019  2:44 AM Francetta Maine Add [R10 11] Right upper quadrant abdominal pain     3/5/2019  2:44 AM Francetta Maine Add [R11 0] Nausea       ED Disposition     ED Disposition Condition Date/Time Comment    Admit Stable Tue Mar 5, 2019  2:44 AM Case was discussed with JANAK and the patient's admission status was agreed to be Admission Status: observation status to the service of Dr Nyasia Mercer   Follow-up Information    None         Patient's Medications   Discharge Prescriptions    No medications on file     No discharge procedures on file  ED Provider  Attending physically available and evaluated Suzie Kennedy I managed the patient along with the ED Attending      Electronically Signed by         Harsh Alvarenga DO  03/05/19 0309

## 2019-03-06 ENCOUNTER — APPOINTMENT (INPATIENT)
Dept: RADIOLOGY | Facility: HOSPITAL | Age: 69
DRG: 415 | End: 2019-03-06
Payer: MEDICARE

## 2019-03-06 LAB
ALBUMIN SERPL BCP-MCNC: 3.3 G/DL (ref 3.5–5)
ALP SERPL-CCNC: 81 U/L (ref 46–116)
ALT SERPL W P-5'-P-CCNC: 31 U/L (ref 12–78)
ANION GAP SERPL CALCULATED.3IONS-SCNC: 6 MMOL/L (ref 4–13)
AST SERPL W P-5'-P-CCNC: 17 U/L (ref 5–45)
BASOPHILS # BLD AUTO: 0.05 THOUSANDS/ΜL (ref 0–0.1)
BASOPHILS NFR BLD AUTO: 0 % (ref 0–1)
BILIRUB SERPL-MCNC: 1.39 MG/DL (ref 0.2–1)
BUN SERPL-MCNC: 13 MG/DL (ref 5–25)
CALCIUM SERPL-MCNC: 8.1 MG/DL (ref 8.3–10.1)
CHLORIDE SERPL-SCNC: 106 MMOL/L (ref 100–108)
CO2 SERPL-SCNC: 27 MMOL/L (ref 21–32)
CREAT SERPL-MCNC: 1.04 MG/DL (ref 0.6–1.3)
EOSINOPHIL # BLD AUTO: 0.01 THOUSAND/ΜL (ref 0–0.61)
EOSINOPHIL NFR BLD AUTO: 0 % (ref 0–6)
ERYTHROCYTE [DISTWIDTH] IN BLOOD BY AUTOMATED COUNT: 13 % (ref 11.6–15.1)
EST. AVERAGE GLUCOSE BLD GHB EST-MCNC: 117 MG/DL
FERRITIN SERPL-MCNC: 135 NG/ML (ref 8–388)
GFR SERPL CREATININE-BSD FRML MDRD: 73 ML/MIN/1.73SQ M
GGT SERPL-CCNC: 37 U/L (ref 5–85)
GLUCOSE SERPL-MCNC: 107 MG/DL (ref 65–140)
HBA1C MFR BLD: 5.7 % (ref 4.2–6.3)
HBV CORE AB SER QL: NORMAL
HBV CORE IGM SER QL: NORMAL
HBV SURFACE AG SER QL: NORMAL
HCT VFR BLD AUTO: 45.8 % (ref 36.5–49.3)
HCV AB SER QL: NORMAL
HGB BLD-MCNC: 15.2 G/DL (ref 12–17)
IMM GRANULOCYTES # BLD AUTO: 0.12 THOUSAND/UL (ref 0–0.2)
IMM GRANULOCYTES NFR BLD AUTO: 1 % (ref 0–2)
IRON SATN MFR SERPL: 8 %
IRON SERPL-MCNC: 24 UG/DL (ref 65–175)
LYMPHOCYTES # BLD AUTO: 0.67 THOUSANDS/ΜL (ref 0.6–4.47)
LYMPHOCYTES NFR BLD AUTO: 4 % (ref 14–44)
MAGNESIUM SERPL-MCNC: 2.3 MG/DL (ref 1.6–2.6)
MCH RBC QN AUTO: 29.9 PG (ref 26.8–34.3)
MCHC RBC AUTO-ENTMCNC: 33.2 G/DL (ref 31.4–37.4)
MCV RBC AUTO: 90 FL (ref 82–98)
MONOCYTES # BLD AUTO: 1.32 THOUSAND/ΜL (ref 0.17–1.22)
MONOCYTES NFR BLD AUTO: 8 % (ref 4–12)
NEUTROPHILS # BLD AUTO: 14.12 THOUSANDS/ΜL (ref 1.85–7.62)
NEUTS SEG NFR BLD AUTO: 87 % (ref 43–75)
NRBC BLD AUTO-RTO: 0 /100 WBCS
PLATELET # BLD AUTO: 210 THOUSANDS/UL (ref 149–390)
PMV BLD AUTO: 10.3 FL (ref 8.9–12.7)
POTASSIUM SERPL-SCNC: 4.3 MMOL/L (ref 3.5–5.3)
PROT SERPL-MCNC: 6 G/DL (ref 6.4–8.2)
RBC # BLD AUTO: 5.08 MILLION/UL (ref 3.88–5.62)
SODIUM SERPL-SCNC: 139 MMOL/L (ref 136–145)
TIBC SERPL-MCNC: 304 UG/DL (ref 250–450)
WBC # BLD AUTO: 16.29 THOUSAND/UL (ref 4.31–10.16)

## 2019-03-06 PROCEDURE — 83540 ASSAY OF IRON: CPT | Performed by: INTERNAL MEDICINE

## 2019-03-06 PROCEDURE — 99232 SBSQ HOSP IP/OBS MODERATE 35: CPT | Performed by: INTERNAL MEDICINE

## 2019-03-06 PROCEDURE — 86705 HEP B CORE ANTIBODY IGM: CPT | Performed by: INTERNAL MEDICINE

## 2019-03-06 PROCEDURE — 86803 HEPATITIS C AB TEST: CPT | Performed by: INTERNAL MEDICINE

## 2019-03-06 PROCEDURE — 85025 COMPLETE CBC W/AUTO DIFF WBC: CPT | Performed by: INTERNAL MEDICINE

## 2019-03-06 PROCEDURE — 87340 HEPATITIS B SURFACE AG IA: CPT | Performed by: INTERNAL MEDICINE

## 2019-03-06 PROCEDURE — 83550 IRON BINDING TEST: CPT | Performed by: INTERNAL MEDICINE

## 2019-03-06 PROCEDURE — A9537 TC99M MEBROFENIN: HCPCS

## 2019-03-06 PROCEDURE — 80053 COMPREHEN METABOLIC PANEL: CPT | Performed by: INTERNAL MEDICINE

## 2019-03-06 PROCEDURE — 78226 HEPATOBILIARY SYSTEM IMAGING: CPT

## 2019-03-06 PROCEDURE — 83735 ASSAY OF MAGNESIUM: CPT | Performed by: INTERNAL MEDICINE

## 2019-03-06 PROCEDURE — 83036 HEMOGLOBIN GLYCOSYLATED A1C: CPT | Performed by: INTERNAL MEDICINE

## 2019-03-06 PROCEDURE — 82977 ASSAY OF GGT: CPT | Performed by: INTERNAL MEDICINE

## 2019-03-06 PROCEDURE — 82728 ASSAY OF FERRITIN: CPT | Performed by: INTERNAL MEDICINE

## 2019-03-06 PROCEDURE — 86704 HEP B CORE ANTIBODY TOTAL: CPT | Performed by: INTERNAL MEDICINE

## 2019-03-06 RX ORDER — SODIUM CHLORIDE 9 MG/ML
100 INJECTION, SOLUTION INTRAVENOUS CONTINUOUS
Status: DISCONTINUED | OUTPATIENT
Start: 2019-03-06 | End: 2019-03-06

## 2019-03-06 RX ADMIN — SINCALIDE 2.2 MCG: 5 INJECTION, POWDER, LYOPHILIZED, FOR SOLUTION INTRAVENOUS at 12:20

## 2019-03-06 RX ADMIN — Medication 100 MG: at 19:43

## 2019-03-06 RX ADMIN — ASPIRIN 81 MG: 81 TABLET, COATED ORAL at 08:33

## 2019-03-06 RX ADMIN — PIPERACILLIN SODIUM AND TAZOBACTAM SODIUM 3.38 G: 36; 4.5 INJECTION, POWDER, FOR SOLUTION INTRAVENOUS at 19:44

## 2019-03-06 RX ADMIN — SODIUM CHLORIDE 100 ML/HR: 0.9 INJECTION, SOLUTION INTRAVENOUS at 04:10

## 2019-03-06 RX ADMIN — LEVOTHYROXINE SODIUM 75 MCG: 75 TABLET ORAL at 05:06

## 2019-03-06 RX ADMIN — ATORVASTATIN CALCIUM 20 MG: 20 TABLET, FILM COATED ORAL at 19:43

## 2019-03-06 RX ADMIN — WATER 5 ML: 1 INJECTION INTRAMUSCULAR; INTRAVENOUS; SUBCUTANEOUS at 12:00

## 2019-03-06 RX ADMIN — SODIUM CHLORIDE 100 ML/HR: 0.9 INJECTION, SOLUTION INTRAVENOUS at 14:00

## 2019-03-06 RX ADMIN — MORPHINE SULFATE 4 MG: 4 INJECTION INTRAVENOUS at 15:35

## 2019-03-06 RX ADMIN — FELODIPINE 5 MG: 5 TABLET, FILM COATED, EXTENDED RELEASE ORAL at 08:34

## 2019-03-06 RX ADMIN — APIXABAN 5 MG: 5 TABLET, FILM COATED ORAL at 08:33

## 2019-03-06 RX ADMIN — GABAPENTIN 100 MG: 100 CAPSULE ORAL at 17:30

## 2019-03-06 RX ADMIN — Medication 1 TABLET: at 08:33

## 2019-03-06 RX ADMIN — PIPERACILLIN SODIUM AND TAZOBACTAM SODIUM 3.38 G: 36; 4.5 INJECTION, POWDER, FOR SOLUTION INTRAVENOUS at 23:51

## 2019-03-06 NOTE — PLAN OF CARE
Problem: Potential for Falls  Goal: Patient will remain free of falls  Description  INTERVENTIONS:  - Assess patient frequently for physical needs  -  Identify cognitive and physical deficits and behaviors that affect risk of falls    -  [place] fall precautions as indicated by assessment   - Educate patient/family on patient safety including physical limitations  - Instruct patient to call for assistance with activity based on assessment  - Modify environment to reduce risk of injury  - Consider OT/PT consult to assist with strengthening/mobility  Outcome: Progressing

## 2019-03-06 NOTE — PLAN OF CARE
Problem: Potential for Falls  Goal: Patient will remain free of falls  Description  INTERVENTIONS:  - Assess patient frequently for physical needs  -  Identify cognitive and physical deficits and behaviors that affect risk of falls    -  [place] fall precautions as indicated by assessment   - Educate patient/family on patient safety including physical limitations  - Instruct patient to call for assistance with activity based on assessment  - Modify environment to reduce risk of injury  - Consider OT/PT consult to assist with strengthening/mobility  Outcome: Progressing     Problem: DISCHARGE PLANNING - CARE MANAGEMENT  Goal: Discharge to post-acute care or home with appropriate resources  Description  INTERVENTIONS:  - Conduct assessment to determine patient/family and health care team treatment goals, and need for post-acute services based on payer coverage, community resources, and patient preferences, and barriers to discharge  - Address psychosocial, clinical, and financial barriers to discharge as identified in assessment in conjunction with the patient/family and health care team  - Arrange appropriate level of post-acute services according to patient's   needs and preference and payer coverage in collaboration with the physician and health care team  - Communicate with and update the patient/family, physician, and health care team regarding progress on the discharge plan  - Arrange appropriate transportation to post-acute venues   Outcome: Progressing

## 2019-03-06 NOTE — PROGRESS NOTES
GI Progress Note   Unit/Bed#: [facility] 421-01 Encounter: [id number]      Viry Myers 71 y o  male [id number]    Hospital Stay Days: 0      Assessment/Plan:    Principal Problem:    Right upper quadrant abdominal pain  Active Problems:    Essential hypertension    Atrial fibrillation, persistent (HCC)    Acquired hypothyroidism    Elevated AST (SGOT)    Assessment and Plan:   Right upper quadrant and epigastric pain acute cholecystitis vs gastroenteritis  -AST and alk phos normal  -Ultrasound of the liver is negative   -Would continue symptomatic management  -Currently NPO for HIDA      Hepatic steatosis on CT likely alcohol vs  Overweight  -A1c 5 7% pre diabetes   -Albumin 4 2,  platelets 680  -Hep C negative in 2017, chronic hep panel is negative   -AST/Alk phos could be mildly elevated from this as well      Alcohol dependence   -Counseling provided     Elevated AST/ALT- resolved   -Likely 2/2 Vomiting vs SAMARA vs gastritis vs  Hepatic steatosis      SAMARA on CKD - improving   -Likely secondary to dehydration  -Continue gentle fluid hydration  -Avoid nephrotoxin substances     Atrial fibrillation  -Currently rate controlled on his own  -In the past he was on metoprolol and flecainide which were discontinued    Subjective: Pt was seen and examined this morning  He reports that his pain is almost resolved  He stated that he is no longer having nausea or vomiting  He stated that he would like to eat and he does not want to spend the night at the hospital anymore          Vitals: Temp (24hrs), Av 5 °F (36 9 °C), Min:97 4 °F (36 3 °C), Max:99 6 °F (37 6 °C)  Current: Temperature: (!) 97 4 °F (36 3 °C)  Vitals:    19 2300 19 0350 19 0600 19 0736   BP: 102/58   120/74   BP Location: Left arm   Right arm   Pulse: 73   88   Resp: 18   18   Temp: 98 °F (36 7 °C)   (!) 97 4 °F (36 3 °C)   TempSrc: Oral   Oral   SpO2: 90% 93%  93%   Weight:   111 kg (244 lb 7 8 oz)     Body mass index is 32 26 kg/m²  I/O last 24 hours: In: 3983 [P O :360; I V :2945]  Out: 9196 [Urine:1375]      Physical Exam   Constitutional: He is oriented to person, place, and time  He appears well-developed and well-nourished  No distress  HENT:   Head: Normocephalic and atraumatic  Eyes: Conjunctivae are normal  Right eye exhibits no discharge  Left eye exhibits no discharge  Neck: Neck supple  No JVD present  Pulmonary/Chest: No respiratory distress  He has no wheezes  Abdominal: Soft  He exhibits no distension  There is no tenderness  There is no rebound and no guarding  Musculoskeletal: Normal range of motion  He exhibits no edema  Neurological: He is alert and oriented to person, place, and time  No cranial nerve deficit  Skin: Skin is warm and dry  No rash noted  He is not diaphoretic  No erythema         Invasive Devices     Peripheral Intravenous Line            Peripheral IV 03/05/19 Left Antecubital 1 day                          Labs:   Recent Results (from the past 24 hour(s))   CBC and differential    Collection Time: 03/06/19 12:00 AM   Result Value Ref Range    WBC 16 29 (H) 4 31 - 10 16 Thousand/uL    RBC 5 08 3 88 - 5 62 Million/uL    Hemoglobin 15 2 12 0 - 17 0 g/dL    Hematocrit 45 8 36 5 - 49 3 %    MCV 90 82 - 98 fL    MCH 29 9 26 8 - 34 3 pg    MCHC 33 2 31 4 - 37 4 g/dL    RDW 13 0 11 6 - 15 1 %    MPV 10 3 8 9 - 12 7 fL    Platelets 704 436 - 964 Thousands/uL    nRBC 0 /100 WBCs    Neutrophils Relative 87 (H) 43 - 75 %    Immat GRANS % 1 0 - 2 %    Lymphocytes Relative 4 (L) 14 - 44 %    Monocytes Relative 8 4 - 12 %    Eosinophils Relative 0 0 - 6 %    Basophils Relative 0 0 - 1 %    Neutrophils Absolute 14 12 (H) 1 85 - 7 62 Thousands/µL    Immature Grans Absolute 0 12 0 00 - 0 20 Thousand/uL    Lymphocytes Absolute 0 67 0 60 - 4 47 Thousands/µL    Monocytes Absolute 1 32 (H) 0 17 - 1 22 Thousand/µL    Eosinophils Absolute 0 01 0 00 - 0 61 Thousand/µL    Basophils Absolute 0 05 0 00 - 0 10 Thousands/µL   Chronic Hepatitis Panel    Collection Time: 03/06/19  5:00 AM   Result Value Ref Range    Hepatitis B Surface Ag Non-reactive Non-reactive, NonReactive - Confirmed    Hepatitis C Ab Non-reactive Non-reactive    Hep B C IgM Non-reactive Non-reactive    Hep B Core Total Ab Non-reactive Non-reactive   Magnesium    Collection Time: 03/06/19  6:14 AM   Result Value Ref Range    Magnesium 2 3 1 6 - 2 6 mg/dL   Gamma GT    Collection Time: 03/06/19  6:14 AM   Result Value Ref Range    GGT 37 5 - 85 U/L   Iron Saturation %    Collection Time: 03/06/19  6:14 AM   Result Value Ref Range    Iron Saturation 8 %    TIBC 304 250 - 450 ug/dL    Iron 24 (L) 65 - 175 ug/dL   Ferritin    Collection Time: 03/06/19  6:14 AM   Result Value Ref Range    Ferritin 135 8 - 388 ng/mL   Comprehensive metabolic panel    Collection Time: 03/06/19  6:43 AM   Result Value Ref Range    Sodium 139 136 - 145 mmol/L    Potassium 4 3 3 5 - 5 3 mmol/L    Chloride 106 100 - 108 mmol/L    CO2 27 21 - 32 mmol/L    ANION GAP 6 4 - 13 mmol/L    BUN 13 5 - 25 mg/dL    Creatinine 1 04 0 60 - 1 30 mg/dL    Glucose 107 65 - 140 mg/dL    Calcium 8 1 (L) 8 3 - 10 1 mg/dL    AST 17 5 - 45 U/L    ALT 31 12 - 78 U/L    Alkaline Phosphatase 81 46 - 116 U/L    Total Protein 6 0 (L) 6 4 - 8 2 g/dL    Albumin 3 3 (L) 3 5 - 5 0 g/dL    Total Bilirubin 1 39 (H) 0 20 - 1 00 mg/dL    eGFR 73 ml/min/1 73sq m   Hemoglobin A1C    Collection Time: 03/06/19  6:43 AM   Result Value Ref Range    Hemoglobin A1C 5 7 4 2 - 6 3 %     mg/dl       Radiology Results: I have personally reviewed pertinent reports  Other Diagnostic Testing:   I have personally reviewed pertinent reports          Active Meds:   Current Facility-Administered Medications   Medication Dose Route Frequency    aluminum-magnesium hydroxide-simethicone (MYLANTA) 200-200-20 mg/5 mL oral suspension 30 mL  30 mL Oral Q6H PRN    apixaban (ELIQUIS) tablet 5 mg  5 mg Oral BID    aspirin (ECOTRIN LOW STRENGTH) EC tablet 81 mg  81 mg Oral Daily    atorvastatin (LIPITOR) tablet 20 mg  20 mg Oral Daily    co-enzyme Q-10 capsule 100 mg  100 mg Oral Daily    docusate sodium (COLACE) capsule 100 mg  100 mg Oral BID PRN    felodipine (PLENDIL) 24 hr tablet 5 mg  5 mg Oral Daily    gabapentin (NEURONTIN) capsule 100 mg  100 mg Oral BID    levothyroxine tablet 75 mcg  75 mcg Oral Early Morning    morphine (PF) 4 mg/mL injection 4 mg  4 mg Intravenous Q3H PRN    multivitamin-minerals (CENTRUM) tablet 1 tablet  1 tablet Oral Daily    sodium chloride 0 9 % infusion  100 mL/hr Intravenous Continuous         VTE Pharmacologic Prophylaxis: RX contraindicated due to: He is anticoagulated on Eliquis   VTE Mechanical Prophylaxis: sequential compression device    Geri Sampson DO

## 2019-03-06 NOTE — UTILIZATION REVIEW
Continued Stay Review   OBSERVATION 03/05/2019 @ 0306, CONVERTED TO INPATIENT ADMISSION 03/06/2019 @ 1117, DUE TO FURTHER DIAGNOSTIC WORKUP, REQUIRING AT LEAST 2 MIDNIGHT STAY  Date: 03/06/2019 03/06/19 1117  Inpatient Admission Once     Transfer Service: Hospitalist       Question Answer Comment   Admitting Physician Kaye Johnston    Level of Care Med Surg    Estimated length of stay More than 2 Midnights    Certification I certify that inpatient services are medically necessary for this patient for a duration of greater than two midnights  See H&P and MD Progress Notes for additional information about the patient's course of treatment            Vital Signs: /74 (BP Location: Right arm) Comment: Map93  Pulse 88   Temp (!) 97 4 °F (36 3 °C) (Oral)   Resp 18   Wt 111 kg (244 lb 7 8 oz)   SpO2 93%   BMI 32 26 kg/m²   Assessment/Plan:   Right upper quadrant and epigastric pain acute cholecystitis vs gastroenteritis  -AST and alk phos normal  -Ultrasound of the liver is negative   -Would continue symptomatic management  -Currently NPO for HIDA      Hepatic steatosis on CT likely alcohol vs  Overweight  -A1c 5 7% pre diabetes   -Albumin 4 2,  platelets 758  -Hep C negative in 2017, chronic hep panel is negative   -AST/Alk phos could be mildly elevated from this as well      Alcohol dependence   -Counseling provided     Elevated AST/ALT- resolved   -Likely 2/2 Vomiting vs SAMARA vs gastritis vs  Hepatic steatosis      SAMARA on CKD - improving   -Likely secondary to dehydration  -Continue gentle fluid hydration  -Avoid nephrotoxin substances    VTE Pharmacologic Prophylaxis: RX contraindicated due to: He is anticoagulated on Eliquis   VTE Mechanical Prophylaxis: sequential compression device  Medications:   Scheduled Meds:   Current Facility-Administered Medications:  aluminum-magnesium hydroxide-simethicone 30 mL Oral Q6H PRN Pinky Andino MD    apixaban 5 mg Oral BID Pinky Andino MD    aspirin 81 mg Oral Daily Sherri Median, MD    atorvastatin 20 mg Oral Daily Sherri Median, MD    co-enzyme Q-10 100 mg Oral Daily Sherri Median, MD    docusate sodium 100 mg Oral BID PRN Sherri Median, MD    felodipine 5 mg Oral Daily Sherri Median, MD    gabapentin 100 mg Oral BID Sherri Median, MD    levothyroxine 75 mcg Oral Early Morning Sherri Median, MD    morphine injection 4 mg Intravenous Q3H PRN Sherri Median, MD    multivitamin-minerals 1 tablet Oral Daily Sherri Median, MD    sodium chloride 100 mL/hr Intravenous Continuous Sherri Median, MD Last Rate: 100 mL/hr (03/06/19 0410)     Continuous Infusions:   sodium chloride 100 mL/hr Last Rate: 100 mL/hr (03/06/19 0410)   Pertinent Labs/Diagnostic Results:    Sodium 139 136 - 145 mmol/L     Potassium 4 3 3 5 - 5 3 mmol/L     Chloride 106 100 - 108 mmol/L     CO2 27 21 - 32 mmol/L     ANION GAP 6 4 - 13 mmol/L     BUN 13 5 - 25 mg/dL     Creatinine 1 04 0 60 - 1 30 mg/dL     Comment: Standardized to IDMS reference method       Glucose 107 65 - 140 mg/dL     Comment:   If the patient is fasting, the ADA then defines impaired fasting glucose as > 100 mg/dL and diabetes as > or equal to 123 mg/dL  Specimen collection should occur prior to Sulfasalazine administration due to the potential for falsely depressed results  Specimen collection should occur prior to Sulfapyridine administration due to the potential for falsely elevated results  Calcium 8 1Low  8 3 - 10 1 mg/dL     AST 17 5 - 45 U/L     Comment:   Specimen collection should occur prior to Sulfasalazine administration due to the potential for falsely depressed results  ALT 31 12 - 78 U/L     Comment:   Specimen collection should occur prior to Sulfasalazine and/or Sulfapyridine administration due to the potential for falsely depressed results          Alkaline Phosphatase 81 46 - 116 U/L     Total Protein 6 0Low  6 4 - 8 2 g/dL     Albumin 3 3Low  3 5 - 5 0 g/dL     Total Bilirubin 1 39High  0 20 - 1 00 mg/dL     eGFR 73 ml/min/1 73sq m    WBC 16 29  Hgl 15 2 / Hct 45 8  Platelets 730  Age/Sex: 71 y o  male   Discharge Plan: TBD    ==============================================================================  ===========================================================================

## 2019-03-07 PROBLEM — R94.5 ABNORMAL LIVER FUNCTION: Status: ACTIVE | Noted: 2019-03-05

## 2019-03-07 PROBLEM — K81.9 CHOLECYSTITIS: Status: ACTIVE | Noted: 2019-03-05

## 2019-03-07 LAB
ALBUMIN SERPL BCP-MCNC: 2.9 G/DL (ref 3.5–5)
ALP SERPL-CCNC: 108 U/L (ref 46–116)
ALT SERPL W P-5'-P-CCNC: 28 U/L (ref 12–78)
ANION GAP SERPL CALCULATED.3IONS-SCNC: 5 MMOL/L (ref 4–13)
AST SERPL W P-5'-P-CCNC: 18 U/L (ref 5–45)
BASOPHILS # BLD AUTO: 0.04 THOUSANDS/ΜL (ref 0–0.1)
BASOPHILS NFR BLD AUTO: 0 % (ref 0–1)
BILIRUB DIRECT SERPL-MCNC: 0.47 MG/DL (ref 0–0.2)
BILIRUB SERPL-MCNC: 1.87 MG/DL (ref 0.2–1)
BUN SERPL-MCNC: 13 MG/DL (ref 5–25)
CALCIUM SERPL-MCNC: 8.2 MG/DL (ref 8.3–10.1)
CHLORIDE SERPL-SCNC: 105 MMOL/L (ref 100–108)
CO2 SERPL-SCNC: 27 MMOL/L (ref 21–32)
CREAT SERPL-MCNC: 1.08 MG/DL (ref 0.6–1.3)
EOSINOPHIL # BLD AUTO: 0.01 THOUSAND/ΜL (ref 0–0.61)
EOSINOPHIL NFR BLD AUTO: 0 % (ref 0–6)
ERYTHROCYTE [DISTWIDTH] IN BLOOD BY AUTOMATED COUNT: 12.8 % (ref 11.6–15.1)
GFR SERPL CREATININE-BSD FRML MDRD: 70 ML/MIN/1.73SQ M
GLUCOSE SERPL-MCNC: 102 MG/DL (ref 65–140)
HCT VFR BLD AUTO: 43.4 % (ref 36.5–49.3)
HGB BLD-MCNC: 14.4 G/DL (ref 12–17)
IMM GRANULOCYTES # BLD AUTO: 0.12 THOUSAND/UL (ref 0–0.2)
IMM GRANULOCYTES NFR BLD AUTO: 1 % (ref 0–2)
LYMPHOCYTES # BLD AUTO: 0.67 THOUSANDS/ΜL (ref 0.6–4.47)
LYMPHOCYTES NFR BLD AUTO: 4 % (ref 14–44)
MCH RBC QN AUTO: 30 PG (ref 26.8–34.3)
MCHC RBC AUTO-ENTMCNC: 33.2 G/DL (ref 31.4–37.4)
MCV RBC AUTO: 90 FL (ref 82–98)
MONOCYTES # BLD AUTO: 1.27 THOUSAND/ΜL (ref 0.17–1.22)
MONOCYTES NFR BLD AUTO: 8 % (ref 4–12)
NEUTROPHILS # BLD AUTO: 13.45 THOUSANDS/ΜL (ref 1.85–7.62)
NEUTS SEG NFR BLD AUTO: 87 % (ref 43–75)
NRBC BLD AUTO-RTO: 0 /100 WBCS
PLATELET # BLD AUTO: 168 THOUSANDS/UL (ref 149–390)
PMV BLD AUTO: 10.3 FL (ref 8.9–12.7)
POTASSIUM SERPL-SCNC: 3.9 MMOL/L (ref 3.5–5.3)
PROT SERPL-MCNC: 6.2 G/DL (ref 6.4–8.2)
RBC # BLD AUTO: 4.8 MILLION/UL (ref 3.88–5.62)
SODIUM SERPL-SCNC: 137 MMOL/L (ref 136–145)
WBC # BLD AUTO: 15.56 THOUSAND/UL (ref 4.31–10.16)

## 2019-03-07 PROCEDURE — 80076 HEPATIC FUNCTION PANEL: CPT | Performed by: INTERNAL MEDICINE

## 2019-03-07 PROCEDURE — 85025 COMPLETE CBC W/AUTO DIFF WBC: CPT | Performed by: INTERNAL MEDICINE

## 2019-03-07 PROCEDURE — 99231 SBSQ HOSP IP/OBS SF/LOW 25: CPT | Performed by: INTERNAL MEDICINE

## 2019-03-07 PROCEDURE — 80048 BASIC METABOLIC PNL TOTAL CA: CPT | Performed by: INTERNAL MEDICINE

## 2019-03-07 PROCEDURE — 99222 1ST HOSP IP/OBS MODERATE 55: CPT | Performed by: SURGERY

## 2019-03-07 PROCEDURE — 99232 SBSQ HOSP IP/OBS MODERATE 35: CPT | Performed by: INTERNAL MEDICINE

## 2019-03-07 RX ADMIN — ATORVASTATIN CALCIUM 20 MG: 20 TABLET, FILM COATED ORAL at 20:18

## 2019-03-07 RX ADMIN — Medication 100 MG: at 20:18

## 2019-03-07 RX ADMIN — PIPERACILLIN SODIUM AND TAZOBACTAM SODIUM 3.38 G: 36; 4.5 INJECTION, POWDER, FOR SOLUTION INTRAVENOUS at 12:03

## 2019-03-07 RX ADMIN — Medication 1 TABLET: at 09:11

## 2019-03-07 RX ADMIN — FELODIPINE 5 MG: 5 TABLET, FILM COATED, EXTENDED RELEASE ORAL at 09:13

## 2019-03-07 RX ADMIN — GABAPENTIN 100 MG: 100 CAPSULE ORAL at 17:25

## 2019-03-07 RX ADMIN — LEVOTHYROXINE SODIUM 75 MCG: 75 TABLET ORAL at 05:06

## 2019-03-07 RX ADMIN — ASPIRIN 81 MG: 81 TABLET, COATED ORAL at 09:11

## 2019-03-07 RX ADMIN — PIPERACILLIN SODIUM AND TAZOBACTAM SODIUM 3.38 G: 36; 4.5 INJECTION, POWDER, FOR SOLUTION INTRAVENOUS at 05:06

## 2019-03-07 RX ADMIN — PIPERACILLIN SODIUM AND TAZOBACTAM SODIUM 3.38 G: 36; 4.5 INJECTION, POWDER, FOR SOLUTION INTRAVENOUS at 17:25

## 2019-03-07 RX ADMIN — GABAPENTIN 100 MG: 100 CAPSULE ORAL at 09:11

## 2019-03-07 NOTE — PROGRESS NOTES
Progress Note - GI   Anna Dietz 71 y o  male MRN: [id number]  Unit/Bed#: [facility] 421-01 Encounter: [id number]      Assessment/Plan        Right upper quadrant and epigastric pain- secondary to acute cholecystitis  · Right upper quadrant pain- Currently resolved  Leukocytosis is trending down  · Continue symptomatic management  Patient is currently hemodynamically stable, afebrile  · Ultrasound of the liver did not reveal any gallstones or choledocholithiasis or signs of cholecystitis  · 3/2019-HIDA scan confirmed diagnosis of acute cholecystitis  · From surgery standpoint will treat symptomatically with likely planned surgery in the future       Hepatic steatosis on CT likely 2/2 Alcohol vs  Obesity  · A1c 5 7% Pre Diabetes   · Albumin 2 9,  platelets 573  · Hep C negative in 2017, chronic hep panel is negative   · AST/Alk phos could be mildly elevated from this as well   · Calculated NAFLD fibrosis score F3-F4 (90% PPV)-      Alcohol dependence   · Counseling provided     Transaminitis  · Likely 2/2 Vomiting vs SAMARA vs gastritis vs  Hepatic steatosis   · Currently resolved       Will sign off at this time  Call with questions      Subjective:   Patient seen examined at bedside  No complaints reported today  All questions and concerns were answered  Review of Systems   Constitutional: Negative for activity change, appetite change, chills, fatigue and fever  Eyes: Negative for visual disturbance  Respiratory: Negative for cough, chest tightness and shortness of breath  Cardiovascular: Negative for chest pain and leg swelling  Gastrointestinal: Negative for abdominal pain, constipation, diarrhea, nausea and vomiting  Endocrine: Negative for cold intolerance and heat intolerance  Genitourinary: Negative for difficulty urinating  Musculoskeletal: Negative for gait problem  Skin: Negative for rash  Allergic/Immunologic: Negative  Neurological: Negative    Negative for dizziness, weakness and light-headedness  Hematological: Negative  Psychiatric/Behavioral: Negative  All other systems reviewed and are negative  Historical Information   Past Medical History:   Diagnosis Date    Aneurysm of thoracic aorta ([facility] [address] )     Arrhythmia     Detached retina, right     Disease of thyroid gland     Gastric wall thickening     Headache     Hypertension     Iliac artery aneurysm, bilateral (HCC)     Irregular heart beat     afib cardioversion 1/30/17, x2     Neuropathy     bilateral feet    Paroxysmal A-fib ([facility] [address] )     Prostatic hypertrophy     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[facility])      Past Surgical History:   Procedure Laterality Date    ABDOMINAL AORTIC ANEURYSM REPAIR, ENDOVASCULAR Right 4/13/2018    Procedure: REPAIR ANEURYSM ILIAC endovascular  with coil embolization right hypogastric artery ;  Surgeon: Arlyn Renee MD;  [facility];  Service: Vascular    CARDIOVERSION      CATARACT EXTRACTION      onset 11/17/2011    COLONOSCOPY      [place] EDG US EXAM SURGICAL ALTER STOM DUODENUM/JEJUNUM N/A 11/10/2017    Procedure: RADIAL ENDOSCOPIC U/S;  Surgeon: Faith Joseph MD;  [facility];   Service: Gastroenterology    RETINAL DETACHMENT SURGERY Right     onset 1992, retinal detachment complete air fill confirmed     Social History   Social History     Substance and Sexual Activity   Alcohol Use Yes    Comment: socially      Social History     Substance and Sexual Activity   Drug Use No     Social History     Tobacco Use   Smoking Status Never Smoker   Smokeless Tobacco Never Used     Family History: non-contributory    Meds/Allergies   all current active meds have been reviewed    Allergies   Allergen Reactions    Wound Dressing Adhesive        Objective       Intake/Output Summary (Last 24 hours) at 3/7/2019 1009  Last data filed at 3/7/2019 0700  Gross per 24 hour   Intake 1125 ml   Output 1625 ml   Net -500 ml       Invasive Devices:   Peripheral IV 03/05/19 Left Antecubital (Active)   Site Assessment Clean;Dry; Intact 3/7/2019  9:19 AM   Dressing Type Transparent 3/7/2019  9:19 AM   Line Status Saline locked; Flushed 3/7/2019  9:19 AM   Dressing Status Clean;Dry; Intact 3/7/2019  9:19 AM   Dressing Change Due 03/09/19 3/5/2019  4:00 PM   Reason Not Rotated Not due 3/6/2019  3:50 AM       Physical Exam   Constitutional: He is oriented to person, place, and time  He appears well-developed and well-nourished  HENT:   Head: Normocephalic  Cardiovascular: Normal rate, regular rhythm, normal heart sounds and intact distal pulses  Pulmonary/Chest: Effort normal and breath sounds normal    Abdominal: Soft  Bowel sounds are normal  He exhibits no distension  There is no tenderness  There is no guarding  Musculoskeletal: He exhibits no edema  Neurological: He is alert and oriented to person, place, and time  Skin: Skin is warm and dry  Lab Results: I have personally reviewed pertinent reports  Imaging Studies: I have personally reviewed pertinent reports  EKG, Pathology, and Other Studies: I have personally reviewed pertinent reports      VTE Prophylaxis: Reason for no pharmacologic prophylaxis HELD

## 2019-03-07 NOTE — PHYSICIAN ADVISOR
Current patient class: Inpatient  The patient is currently on Hospital Day: 2 at [address] [place] [address]      The patient was admitted to the hospital at [phone] on 3/6/19 for the following diagnosis:  Nausea [R11 0]  Abdominal pain [R10 9]  SAMARA (acute kidney injury) ([address] ) [N17 9]  Right upper quadrant abdominal pain [R10 11]       There is documentation in the medical record of an expected length of stay of at least 2 midnights  The patient is therefore expected to satisfy the 2 midnight benchmark and given the 2 midnight presumption is appropriate for INPATIENT ADMISSION  Given this expectation of a satisfying stay, CMS instructs us that the patient is most often appropriate for inpatient admission under part A provided medical necessity is documented in the chart  After review of the relevant documentation, labs, vital signs and test results, the patient is appropriate for INPATIENT ADMISSION  Admission to the hospital as an inpatient is a complex decision making process which requires the practitioner to consider the patients presenting complaint, history and physical examination and all relevant testing  With this in mind, in this case, the patient was deemed appropriate for INPATIENT ADMISSION  After review of the documentation and testing available at the time of the admission I concur with this clinical determination of medical necessity  Rationale is as follows: The patient is a 71 yrs old Male who presented to the ED at 3/5/2019 12:11 AM with a chief complaint of Abdominal Pain (Pt states he woke from sleep with abdominal pain  Pt had "small bowel movement " Pt states he was tired today and went to bed early  Pt states he tried to vomit but not much came up   Pt has pain in midle of abdomen that does not radiate  )     Given the need for further hospitalization, and along with the documentation of medical necessity present in the chart, the patient is appropriate for inpatient admission  The patient is expected to satisfy the 2 midnight benchmark, and will require further acute medical care  The patient does have comorbid conditions which increases the risk for significant adverse outcome  Given this the patient is appropriate for inpatient admission  The patients vitals on arrival were ED Triage Vitals   Temperature Pulse Respirations Blood Pressure SpO2   03/05/19 0026 03/05/19 0013 03/05/19 0013 03/05/19 0013 03/05/19 0013   98 3 °F (36 8 °C) 57 18 (!) 164/101 98 %      Temp Source Heart Rate Source Patient Position - Orthostatic VS BP Location FiO2 (%)   03/05/19 0026 03/05/19 0215 03/05/19 0608 03/05/19 0215 --   Rectal Monitor Lying Right arm       Pain Score       03/05/19 0013       7           Past Medical History:   Diagnosis Date    Aneurysm of thoracic aorta (HCC)     Arrhythmia     Detached retina, right     Disease of thyroid gland     Gastric wall thickening     Headache     Hypertension     Iliac artery aneurysm, bilateral (HCC)     Irregular heart beat     afib cardioversion 1/30/17, x2     Neuropathy     bilateral feet    Paroxysmal A-fib ([address] )     Prostatic hypertrophy     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[facility])      Past Surgical History:   Procedure Laterality Date    ABDOMINAL AORTIC ANEURYSM REPAIR, ENDOVASCULAR Right 4/13/2018    Procedure: REPAIR ANEURYSM ILIAC endovascular  with coil embolization right hypogastric artery ;  Surgeon: Brandy Conklin MD;  [facility];  Service: Vascular    CARDIOVERSION      CATARACT EXTRACTION      onset 11/17/2011    COLONOSCOPY      [place] EDG US EXAM SURGICAL ALTER STOM DUODENUM/JEJUNUM N/A 11/10/2017    Procedure: RADIAL ENDOSCOPIC U/S;  Surgeon: Radha Foster MD;  [facility];   Service: Gastroenterology    RETINAL DETACHMENT SURGERY Right     onset 1992, retinal detachment complete air fill confirmed           Consults have been placed to:   IP CONSULT TO GASTROENTEROLOGY  IP CONSULT TO ACUTE CARE SURGERY    Vitals:    03/06/19 0600 03/06/19 0736 03/06/19 1700 03/06/19 2311   BP:  120/74 115/62    BP Location:  Right arm Right arm    Pulse:  88 89 96   Resp:  18 18 18   Temp:  (!) 97 4 °F (36 3 °C) 97 9 °F (36 6 °C) 100 5 °F (38 1 °C)   TempSrc:  Oral Oral Oral   SpO2:  93% 92%    Weight: 111 kg (244 lb 7 8 oz)      Height:   6' 1" (1 854 m)        Most recent labs:    Recent Labs     03/05/19  0034 03/06/19 03/06/19  0643   WBC 7 55 16 29*  --    HGB 16 5 15 2  --    HCT 49 6* 45 8  --     210  --    K 4 0  --  4 3   CALCIUM 9 0  --  8 1*   BUN 26*  --  13   CREATININE 1 37*  --  1 04   LIPASE 55*  --   --    TROPONINI <0 02  --   --    AST 47*  --  17   ALT 43  --  31   ALKPHOS 117*  --  81       Scheduled Meds:  Current Facility-Administered Medications:  aluminum-magnesium hydroxide-simethicone 30 mL Oral Q6H PRN Lieutenant Josie MD    aspirin 81 mg Oral Daily Lieutenant Josie MD    atorvastatin 20 mg Oral Daily Lieutenant Josie MD    co-enzyme Q-10 100 mg Oral Daily Lieutenant Josie MD    docusate sodium 100 mg Oral BID PRN Lieutenant Josie MD    felodipine 5 mg Oral Daily Lieutenant Josie MD    gabapentin 100 mg Oral BID Lieutenant Josie MD    levothyroxine 75 mcg Oral Early Morning Lieutenant Josie MD    morphine injection 4 mg Intravenous Q3H PRN Lieutenant Josie MD    multivitamin-minerals 1 tablet Oral Daily Lieutenant Josie MD    piperacillin-tazobactam 3 375 g Intravenous Q6H Adrian Lemus MD Last Rate: 3 375 g (03/06/19 1944)     Continuous Infusions:   PRN Meds:   aluminum-magnesium hydroxide-simethicone    docusate sodium    morphine injection    Surgical procedures (if appropriate):

## 2019-03-07 NOTE — CONSULTS
Consultation - General Surgery   Woo Christopher 71 y o  male MRN: [id number]  Unit/Bed#: [facility] 421-01 Encounter: [id number]    Assessment/Plan     Assessment:  69M with abdominal pain, nausea, and vomiting 2 nights ago now without symptoms  Plan:  -Eliquis held since 3/4/19  -f/u AM labs  -NPO  History of Present Illness   HPI:  Woo Christopher is a 71 y o  male who presents with abdominal pain and an abnormal HIDA scan  Patient had 24 hours of abdominal pain associated with nausea and vomiting  Never had pain like this before  It occurred after dinner and persisted through the morning  A RUQ US was negative for acute cholecystitis  The patient's pain resolved and he toelrated a clear liquid diet, but a HIDA was obtained anyway  This demonstrated lack of GB filling  Inpatient consult to Acute Care Surgery     Date/Time 3/6/2019 10:07 PM     Performed by  Mukund Castillo MD     Authorized by Paloma Cortez MD              Review of Systems   Constitutional: Negative  Negative for activity change and appetite change  HENT: Negative  Negative for hearing loss and trouble swallowing  Eyes: Negative  Negative for photophobia and redness  Respiratory: Negative  Negative for chest tightness and shortness of breath  Cardiovascular: Negative  Negative for chest pain and palpitations  Gastrointestinal: Positive for abdominal pain and nausea  Negative for abdominal distention, constipation, diarrhea and vomiting  Endocrine: Negative  Negative for cold intolerance and heat intolerance  Genitourinary: Negative  Negative for flank pain and genital sores  Musculoskeletal: Negative  Negative for arthralgias and back pain  Skin: Negative  Negative for color change and pallor  Allergic/Immunologic: Negative  Neurological: Negative  Negative for facial asymmetry, speech difficulty and light-headedness  Hematological: Negative  Negative for adenopathy  Psychiatric/Behavioral: Negative  Negative for agitation and behavioral problems  Historical Information   Past Medical History:   Diagnosis Date    Aneurysm of [facility] aorta ([facility] [address] )     Arrhythmia     Detached retina, right     Disease of thyroid gland     Gastric wall thickening     Headache     Hypertension     Iliac artery aneurysm, bilateral (HCC)     Irregular heart beat     afib cardioversion 1/30/17, x2     Neuropathy     bilateral feet    Paroxysmal A-fib ([facility] [address] )     Prostatic hypertrophy     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[facility])      Past Surgical History:   Procedure Laterality Date    ABDOMINAL AORTIC ANEURYSM REPAIR, ENDOVASCULAR Right 4/13/2018    Procedure: REPAIR ANEURYSM ILIAC endovascular  with coil embolization right hypogastric artery ;  Surgeon: Adrienne Fry MD;  [facility];  Service: Vascular    CARDIOVERSION      CATARACT EXTRACTION      onset 11/17/2011    COLONOSCOPY      [place] EDG US EXAM SURGICAL ALTER STOM DUODENUM/JEJUNUM N/A 11/10/2017    Procedure: RADIAL ENDOSCOPIC U/S;  Surgeon: Jerrica Paez MD;  [facility];   Service: Gastroenterology    RETINAL DETACHMENT SURGERY Right     onset 1992, retinal detachment complete air fill confirmed     Social History   Social History     Substance and Sexual Activity   Alcohol Use Yes    Comment: socially      Social History     Substance and Sexual Activity   Drug Use No     Social History     Tobacco Use   Smoking Status Never Smoker   Smokeless Tobacco Never Used     Family History:   Family History   Problem Relation Age of Onset    Aortic aneurysm Father         abdominal    Aortic aneurysm Brother     Stroke Mother         CVA    Cancer Maternal Grandmother         malignant neoplasm    Cancer Maternal Grandfather         malignant neoplasm    Cancer Paternal Grandmother         malignant neoplasm    Cancer Paternal Grandfather         malignant neoplasm    Cancer Family         malignant neoplasm    Cancer Family         malignant neoplasm       Meds/Allergies   current meds:   Current Facility-Administered Medications   Medication Dose Route Frequency    aluminum-magnesium hydroxide-simethicone (MYLANTA) 200-200-20 mg/5 mL oral suspension 30 mL  30 mL Oral Q6H PRN    aspirin (ECOTRIN LOW STRENGTH) EC tablet 81 mg  81 mg Oral Daily    atorvastatin (LIPITOR) tablet 20 mg  20 mg Oral Daily    co-enzyme Q-10 capsule 100 mg  100 mg Oral Daily    docusate sodium (COLACE) capsule 100 mg  100 mg Oral BID PRN    felodipine (PLENDIL) 24 hr tablet 5 mg  5 mg Oral Daily    gabapentin (NEURONTIN) capsule 100 mg  100 mg Oral BID    levothyroxine tablet 75 mcg  75 mcg Oral Early Morning    morphine (PF) 4 mg/mL injection 4 mg  4 mg Intravenous Q3H PRN    multivitamin-minerals (CENTRUM) tablet 1 tablet  1 tablet Oral Daily    piperacillin-tazobactam (ZOSYN) 3 375 g in sodium chloride 0 9 % 50 mL IVPB  3 375 g Intravenous Q6H    and PTA meds:   Prior to Admission Medications   Prescriptions Last Dose Informant Patient Reported? Taking? Co-Enzyme Q-10 100 MG CAPS 3/4/2019 at Unknown time Self Yes Yes   Sig: Take 100 mg by mouth daily   Misc Natural Products (LUTEIN 20 PO) 3/4/2019 at Unknown time Self Yes Yes   Sig: Take 20 mg by mouth daily     apixaban (ELIQUIS) 5 mg 3/4/2019 at Unknown time  No Yes   Sig: Take 1 tablet (5 mg total) by mouth 2 (two) times a day   aspirin (ECOTRIN LOW STRENGTH) 81 mg EC tablet 3/4/2019 at Unknown time Self Yes Yes   Sig: Take 81 mg by mouth daily   atorvastatin (LIPITOR) 20 mg tablet 3/4/2019 at Unknown time  No Yes   Sig: Take 1 tablet (20 mg total) by mouth daily   felodipine (PLENDIL) 5 mg 24 hr tablet 3/4/2019 at Unknown time  No Yes   Sig: Take 1 tablet (5 mg total) by mouth daily   gabapentin (NEURONTIN) 100 mg capsule 3/4/2019 at Unknown time  No Yes   Sig: Take 1 capsule (100 mg total) by mouth 2 (two) times a day Up to 300mg additional if headaches flaring   levothyroxine 75 mcg tablet 3/4/2019 at Unknown time  No Yes   Sig: Take 1 tablet (75 mcg total) by mouth daily   multivitamin (THERAGRAN) TABS 3/4/2019 at Unknown time Self Yes Yes   Sig: Take 1 tablet by mouth daily  Facility-Administered Medications: None     Allergies   Allergen Reactions    Wound Dressing Adhesive        Objective   First Vitals:   Blood Pressure: (!) 164/101 (03/05/19 0013)  Pulse: 57 (03/05/19 0013)  Temperature: 98 3 °F (36 8 °C) (03/05/19 0026)  Temp Source: Rectal (03/05/19 0026)  Respirations: 18 (03/05/19 0013)  Height: 6' 1" (185 4 cm) (03/06/19 1700)  Weight - Scale: 107 kg (235 lb) (03/05/19 0013)  SpO2: 98 % (03/05/19 0013)    Current Vitals:   Blood Pressure: 115/62 (03/06/19 1700)  Pulse: 89 (03/06/19 1700)  Temperature: 97 9 °F (36 6 °C) (03/06/19 1700)  Temp Source: Oral (03/06/19 1700)  Respirations: 18 (03/06/19 1700)  Height: 6' 1" (185 4 cm) (03/06/19 1700)  Weight - Scale: 111 kg (244 lb 7 8 oz) (03/06/19 0600)  SpO2: 92 % (03/06/19 1700)      Intake/Output Summary (Last 24 hours) at 3/6/2019 2207  Last data filed at 3/6/2019 1730  Gross per 24 hour   Intake 1910 ml   Output 1225 ml   Net 685 ml       Invasive Devices     Peripheral Intravenous Line            Peripheral IV 03/05/19 Left Antecubital 1 day                Physical Exam   Constitutional: He appears well-developed and well-nourished  No distress  HENT:   Head: Normocephalic and atraumatic  Right Ear: External ear normal    Left Ear: External ear normal    Eyes: Pupils are equal, round, and reactive to light  EOM are normal  Right eye exhibits no discharge  Left eye exhibits no discharge  No scleral icterus  Neck: No tracheal deviation present  Cardiovascular: Normal rate  Pulmonary/Chest: Effort normal  No respiratory distress  Abdominal: Soft  He exhibits no distension and no mass  There is no tenderness  There is no rebound and no guarding  Neurological: He is alert  Coordination normal    Skin: No rash noted   He is not diaphoretic  Vitals reviewed  Lab Results:   CBC: No results found for: WBC, HGB, HCT, MCV, PLT, ADJUSTEDWBC, MCH, MCHC, RDW, MPV, NRBC, CMP:   Lab Results   Component Value Date    SODIUM 139 03/06/2019    K 4 3 03/06/2019     03/06/2019    CO2 27 03/06/2019    BUN 13 03/06/2019    CREATININE 1 04 03/06/2019    CALCIUM 8 1 (L) 03/06/2019    AST 17 03/06/2019    ALT 31 03/06/2019    ALKPHOS 81 03/06/2019    EGFR 73 03/06/2019     Imaging: I have personally reviewed pertinent reports  and I have personally reviewed pertinent films in PACS  EKG, Pathology, and Other Studies: I have personally reviewed pertinent reports

## 2019-03-07 NOTE — ASSESSMENT & PLAN NOTE
Non visualization of the gallbladder on HIDA scan  Continue IV Zosyn  Monitor LFTs  Monitor for tolerance with diet  May require cholecystectomy inpatient, yet to determine

## 2019-03-08 ENCOUNTER — ANESTHESIA (INPATIENT)
Dept: PERIOP | Facility: HOSPITAL | Age: 69
DRG: 415 | End: 2019-03-08
Payer: MEDICARE

## 2019-03-08 ENCOUNTER — ANESTHESIA EVENT (INPATIENT)
Dept: PERIOP | Facility: HOSPITAL | Age: 69
DRG: 415 | End: 2019-03-08
Payer: MEDICARE

## 2019-03-08 ENCOUNTER — APPOINTMENT (INPATIENT)
Dept: RADIOLOGY | Facility: HOSPITAL | Age: 69
DRG: 415 | End: 2019-03-08
Payer: MEDICARE

## 2019-03-08 PROBLEM — D62 ACUTE BLOOD LOSS ANEMIA: Status: ACTIVE | Noted: 2019-03-08

## 2019-03-08 LAB
ABO GROUP BLD: NORMAL
ALBUMIN SERPL BCP-MCNC: 3 G/DL (ref 3.5–5)
ALP SERPL-CCNC: 100 U/L (ref 46–116)
ALT SERPL W P-5'-P-CCNC: 50 U/L (ref 12–78)
ANION GAP SERPL CALCULATED.3IONS-SCNC: 7 MMOL/L (ref 4–13)
ANION GAP SERPL CALCULATED.3IONS-SCNC: 9 MMOL/L (ref 4–13)
ARTERIAL PATENCY WRIST A: YES
AST SERPL W P-5'-P-CCNC: 35 U/L (ref 5–45)
BASE EXCESS BLDA CALC-SCNC: -5 MMOL/L (ref -2–3)
BASE EXCESS BLDA CALC-SCNC: -5 MMOL/L (ref -2–3)
BASE EXCESS BLDA CALC-SCNC: -5.7 MMOL/L
BASOPHILS # BLD AUTO: 0.03 THOUSANDS/ΜL (ref 0–0.1)
BASOPHILS # BLD AUTO: 0.04 THOUSANDS/ΜL (ref 0–0.1)
BASOPHILS NFR BLD AUTO: 0 % (ref 0–1)
BASOPHILS NFR BLD AUTO: 0 % (ref 0–1)
BILIRUB DIRECT SERPL-MCNC: 0.35 MG/DL (ref 0–0.2)
BILIRUB SERPL-MCNC: 1.48 MG/DL (ref 0.2–1)
BLD GP AB SCN SERPL QL: NEGATIVE
BUN SERPL-MCNC: 15 MG/DL (ref 5–25)
BUN SERPL-MCNC: 19 MG/DL (ref 5–25)
CA-I BLD-SCNC: 1.04 MMOL/L (ref 1.12–1.32)
CA-I BLD-SCNC: 1.06 MMOL/L (ref 1.12–1.32)
CALCIUM SERPL-MCNC: 7.2 MG/DL (ref 8.3–10.1)
CALCIUM SERPL-MCNC: 8.4 MG/DL (ref 8.3–10.1)
CHLORIDE SERPL-SCNC: 106 MMOL/L (ref 100–108)
CHLORIDE SERPL-SCNC: 111 MMOL/L (ref 100–108)
CO2 SERPL-SCNC: 20 MMOL/L (ref 21–32)
CO2 SERPL-SCNC: 25 MMOL/L (ref 21–32)
CREAT SERPL-MCNC: 1.02 MG/DL (ref 0.6–1.3)
CREAT SERPL-MCNC: 1.15 MG/DL (ref 0.6–1.3)
EOSINOPHIL # BLD AUTO: 0 THOUSAND/ΜL (ref 0–0.61)
EOSINOPHIL # BLD AUTO: 0.03 THOUSAND/ΜL (ref 0–0.61)
EOSINOPHIL NFR BLD AUTO: 0 % (ref 0–6)
EOSINOPHIL NFR BLD AUTO: 0 % (ref 0–6)
ERYTHROCYTE [DISTWIDTH] IN BLOOD BY AUTOMATED COUNT: 12.4 % (ref 11.6–15.1)
ERYTHROCYTE [DISTWIDTH] IN BLOOD BY AUTOMATED COUNT: 12.6 % (ref 11.6–15.1)
GFR SERPL CREATININE-BSD FRML MDRD: 65 ML/MIN/1.73SQ M
GFR SERPL CREATININE-BSD FRML MDRD: 75 ML/MIN/1.73SQ M
GLUCOSE SERPL-MCNC: 105 MG/DL (ref 65–140)
GLUCOSE SERPL-MCNC: 148 MG/DL (ref 65–140)
GLUCOSE SERPL-MCNC: 149 MG/DL (ref 65–140)
GLUCOSE SERPL-MCNC: 170 MG/DL (ref 65–140)
HCO3 BLDA-SCNC: 18.5 MMOL/L (ref 22–28)
HCO3 BLDA-SCNC: 21.1 MMOL/L (ref 22–28)
HCO3 BLDA-SCNC: 22.1 MMOL/L (ref 22–28)
HCT VFR BLD AUTO: 32.6 % (ref 36.5–49.3)
HCT VFR BLD AUTO: 44.2 % (ref 36.5–49.3)
HCT VFR BLD CALC: 29 % (ref 36.5–49.3)
HCT VFR BLD CALC: 32 % (ref 36.5–49.3)
HGB BLD-MCNC: 10.8 G/DL (ref 12–17)
HGB BLD-MCNC: 14.8 G/DL (ref 12–17)
HGB BLDA-MCNC: 10.9 G/DL (ref 12–17)
HGB BLDA-MCNC: 9.9 G/DL (ref 12–17)
IMM GRANULOCYTES # BLD AUTO: 0.17 THOUSAND/UL (ref 0–0.2)
IMM GRANULOCYTES # BLD AUTO: 0.23 THOUSAND/UL (ref 0–0.2)
IMM GRANULOCYTES NFR BLD AUTO: 1 % (ref 0–2)
IMM GRANULOCYTES NFR BLD AUTO: 2 % (ref 0–2)
LACTATE SERPL-SCNC: 1.3 MMOL/L (ref 0.5–2)
LYMPHOCYTES # BLD AUTO: 0.48 THOUSANDS/ΜL (ref 0.6–4.47)
LYMPHOCYTES # BLD AUTO: 0.71 THOUSANDS/ΜL (ref 0.6–4.47)
LYMPHOCYTES NFR BLD AUTO: 4 % (ref 14–44)
LYMPHOCYTES NFR BLD AUTO: 5 % (ref 14–44)
MAGNESIUM SERPL-MCNC: 2.3 MG/DL (ref 1.6–2.6)
MCH RBC QN AUTO: 30.2 PG (ref 26.8–34.3)
MCH RBC QN AUTO: 30.4 PG (ref 26.8–34.3)
MCHC RBC AUTO-ENTMCNC: 33.1 G/DL (ref 31.4–37.4)
MCHC RBC AUTO-ENTMCNC: 33.5 G/DL (ref 31.4–37.4)
MCV RBC AUTO: 91 FL (ref 82–98)
MCV RBC AUTO: 91 FL (ref 82–98)
MONOCYTES # BLD AUTO: 0.43 THOUSAND/ΜL (ref 0.17–1.22)
MONOCYTES # BLD AUTO: 0.93 THOUSAND/ΜL (ref 0.17–1.22)
MONOCYTES NFR BLD AUTO: 3 % (ref 4–12)
MONOCYTES NFR BLD AUTO: 7 % (ref 4–12)
NASAL CANNULA: 3
NEUTROPHILS # BLD AUTO: 11.14 THOUSANDS/ΜL (ref 1.85–7.62)
NEUTROPHILS # BLD AUTO: 11.83 THOUSANDS/ΜL (ref 1.85–7.62)
NEUTS SEG NFR BLD AUTO: 86 % (ref 43–75)
NEUTS SEG NFR BLD AUTO: 92 % (ref 43–75)
NRBC BLD AUTO-RTO: 0 /100 WBCS
NRBC BLD AUTO-RTO: 0 /100 WBCS
O2 CT BLDA-SCNC: 15.7 ML/DL (ref 16–23)
OXYHGB MFR BLDA: 95.6 % (ref 94–97)
PCO2 BLD: 22 MMOL/L (ref 21–32)
PCO2 BLD: 23 MMOL/L (ref 21–32)
PCO2 BLD: 40.3 MM HG (ref 36–44)
PCO2 BLD: 47.5 MM HG (ref 36–44)
PCO2 BLDA: 31.8 MM HG (ref 36–44)
PH BLD: 7.28 [PH] (ref 7.35–7.45)
PH BLD: 7.33 [PH] (ref 7.35–7.45)
PH BLDA: 7.38 [PH] (ref 7.35–7.45)
PLATELET # BLD AUTO: 189 THOUSANDS/UL (ref 149–390)
PLATELET # BLD AUTO: 192 THOUSANDS/UL (ref 149–390)
PMV BLD AUTO: 10.1 FL (ref 8.9–12.7)
PMV BLD AUTO: 10.2 FL (ref 8.9–12.7)
PO2 BLD: 344 MM HG (ref 75–129)
PO2 BLD: 96 MM HG (ref 75–129)
PO2 BLDA: 98 MM HG (ref 75–129)
POTASSIUM BLD-SCNC: 4.1 MMOL/L (ref 3.5–5.3)
POTASSIUM BLD-SCNC: 4.6 MMOL/L (ref 3.5–5.3)
POTASSIUM SERPL-SCNC: 3.5 MMOL/L (ref 3.5–5.3)
POTASSIUM SERPL-SCNC: 4.3 MMOL/L (ref 3.5–5.3)
PROT SERPL-MCNC: 6.8 G/DL (ref 6.4–8.2)
RBC # BLD AUTO: 3.58 MILLION/UL (ref 3.88–5.62)
RBC # BLD AUTO: 4.87 MILLION/UL (ref 3.88–5.62)
RH BLD: POSITIVE
SAO2 % BLD FROM PO2: 100 % (ref 95–98)
SAO2 % BLD FROM PO2: 97 % (ref 95–98)
SODIUM BLD-SCNC: 138 MMOL/L (ref 136–145)
SODIUM BLD-SCNC: 141 MMOL/L (ref 136–145)
SODIUM SERPL-SCNC: 138 MMOL/L (ref 136–145)
SODIUM SERPL-SCNC: 140 MMOL/L (ref 136–145)
SPECIMEN EXPIRATION DATE: NORMAL
SPECIMEN SOURCE: ABNORMAL
WBC # BLD AUTO: 12.95 THOUSAND/UL (ref 4.31–10.16)
WBC # BLD AUTO: 13.07 THOUSAND/UL (ref 4.31–10.16)

## 2019-03-08 PROCEDURE — 84295 ASSAY OF SERUM SODIUM: CPT

## 2019-03-08 PROCEDURE — 85025 COMPLETE CBC W/AUTO DIFF WBC: CPT | Performed by: SURGERY

## 2019-03-08 PROCEDURE — 99232 SBSQ HOSP IP/OBS MODERATE 35: CPT | Performed by: INTERNAL MEDICINE

## 2019-03-08 PROCEDURE — 47600 CHOLECYSTECTOMY: CPT | Performed by: SURGERY

## 2019-03-08 PROCEDURE — 86901 BLOOD TYPING SEROLOGIC RH(D): CPT | Performed by: SURGERY

## 2019-03-08 PROCEDURE — 82803 BLOOD GASES ANY COMBINATION: CPT

## 2019-03-08 PROCEDURE — 83735 ASSAY OF MAGNESIUM: CPT | Performed by: SURGERY

## 2019-03-08 PROCEDURE — 82330 ASSAY OF CALCIUM: CPT

## 2019-03-08 PROCEDURE — 88304 TISSUE EXAM BY PATHOLOGIST: CPT | Performed by: PATHOLOGY

## 2019-03-08 PROCEDURE — 0DNW4ZZ RELEASE PERITONEUM, PERCUTANEOUS ENDOSCOPIC APPROACH: ICD-10-PCS | Performed by: SURGERY

## 2019-03-08 PROCEDURE — 80076 HEPATIC FUNCTION PANEL: CPT | Performed by: INTERNAL MEDICINE

## 2019-03-08 PROCEDURE — 0FT40ZZ RESECTION OF GALLBLADDER, OPEN APPROACH: ICD-10-PCS | Performed by: SURGERY

## 2019-03-08 PROCEDURE — 86900 BLOOD TYPING SEROLOGIC ABO: CPT | Performed by: SURGERY

## 2019-03-08 PROCEDURE — 80048 BASIC METABOLIC PNL TOTAL CA: CPT | Performed by: SURGERY

## 2019-03-08 PROCEDURE — 83605 ASSAY OF LACTIC ACID: CPT | Performed by: SURGERY

## 2019-03-08 PROCEDURE — 84132 ASSAY OF SERUM POTASSIUM: CPT

## 2019-03-08 PROCEDURE — 86923 COMPATIBILITY TEST ELECTRIC: CPT

## 2019-03-08 PROCEDURE — 82947 ASSAY GLUCOSE BLOOD QUANT: CPT

## 2019-03-08 PROCEDURE — 85014 HEMATOCRIT: CPT

## 2019-03-08 PROCEDURE — 82805 BLOOD GASES W/O2 SATURATION: CPT | Performed by: SURGERY

## 2019-03-08 PROCEDURE — 86850 RBC ANTIBODY SCREEN: CPT | Performed by: SURGERY

## 2019-03-08 RX ORDER — CEFAZOLIN SODIUM 2 G/50ML
SOLUTION INTRAVENOUS AS NEEDED
Status: DISCONTINUED | OUTPATIENT
Start: 2019-03-08 | End: 2019-03-08 | Stop reason: SURG

## 2019-03-08 RX ORDER — BUPIVACAINE HYDROCHLORIDE 5 MG/ML
INJECTION, SOLUTION PERINEURAL AS NEEDED
Status: DISCONTINUED | OUTPATIENT
Start: 2019-03-08 | End: 2019-03-08 | Stop reason: HOSPADM

## 2019-03-08 RX ORDER — HYDROMORPHONE HCL/PF 1 MG/ML
0.5 SYRINGE (ML) INJECTION
Status: DISCONTINUED | OUTPATIENT
Start: 2019-03-08 | End: 2019-03-08 | Stop reason: HOSPADM

## 2019-03-08 RX ORDER — ROCURONIUM BROMIDE 10 MG/ML
INJECTION, SOLUTION INTRAVENOUS AS NEEDED
Status: DISCONTINUED | OUTPATIENT
Start: 2019-03-08 | End: 2019-03-08 | Stop reason: SURG

## 2019-03-08 RX ORDER — FENTANYL CITRATE/PF 50 MCG/ML
25 SYRINGE (ML) INJECTION
Status: DISCONTINUED | OUTPATIENT
Start: 2019-03-08 | End: 2019-03-08 | Stop reason: HOSPADM

## 2019-03-08 RX ORDER — HYDROMORPHONE HCL/PF 1 MG/ML
0.5 SYRINGE (ML) INJECTION ONCE
Status: COMPLETED | OUTPATIENT
Start: 2019-03-08 | End: 2019-03-08

## 2019-03-08 RX ORDER — SODIUM CHLORIDE, SODIUM GLUCONATE, SODIUM ACETATE, POTASSIUM CHLORIDE, MAGNESIUM CHLORIDE, SODIUM PHOSPHATE, DIBASIC, AND POTASSIUM PHOSPHATE .53; .5; .37; .037; .03; .012; .00082 G/100ML; G/100ML; G/100ML; G/100ML; G/100ML; G/100ML; G/100ML
125 INJECTION, SOLUTION INTRAVENOUS CONTINUOUS
Status: DISCONTINUED | OUTPATIENT
Start: 2019-03-08 | End: 2019-03-09

## 2019-03-08 RX ORDER — ALBUMIN, HUMAN INJ 5% 5 %
SOLUTION INTRAVENOUS CONTINUOUS PRN
Status: DISCONTINUED | OUTPATIENT
Start: 2019-03-08 | End: 2019-03-08 | Stop reason: SURG

## 2019-03-08 RX ORDER — ACETAMINOPHEN 325 MG/1
650 TABLET ORAL EVERY 6 HOURS PRN
Status: DISCONTINUED | OUTPATIENT
Start: 2019-03-08 | End: 2019-03-12 | Stop reason: HOSPADM

## 2019-03-08 RX ORDER — ESMOLOL HYDROCHLORIDE 10 MG/ML
INJECTION INTRAVENOUS AS NEEDED
Status: DISCONTINUED | OUTPATIENT
Start: 2019-03-08 | End: 2019-03-08 | Stop reason: SURG

## 2019-03-08 RX ORDER — METOPROLOL TARTRATE 5 MG/5ML
INJECTION INTRAVENOUS AS NEEDED
Status: DISCONTINUED | OUTPATIENT
Start: 2019-03-08 | End: 2019-03-08 | Stop reason: SURG

## 2019-03-08 RX ORDER — ONDANSETRON 2 MG/ML
INJECTION INTRAMUSCULAR; INTRAVENOUS AS NEEDED
Status: DISCONTINUED | OUTPATIENT
Start: 2019-03-08 | End: 2019-03-08 | Stop reason: SURG

## 2019-03-08 RX ORDER — MAGNESIUM SULFATE HEPTAHYDRATE 40 MG/ML
2 INJECTION, SOLUTION INTRAVENOUS ONCE
Status: DISCONTINUED | OUTPATIENT
Start: 2019-03-08 | End: 2019-03-08

## 2019-03-08 RX ORDER — MAGNESIUM HYDROXIDE 1200 MG/15ML
LIQUID ORAL AS NEEDED
Status: DISCONTINUED | OUTPATIENT
Start: 2019-03-08 | End: 2019-03-08 | Stop reason: HOSPADM

## 2019-03-08 RX ORDER — SODIUM CHLORIDE 9 MG/ML
100 INJECTION, SOLUTION INTRAVENOUS CONTINUOUS
Status: DISCONTINUED | OUTPATIENT
Start: 2019-03-08 | End: 2019-03-08

## 2019-03-08 RX ORDER — SODIUM CHLORIDE 9 MG/ML
INJECTION, SOLUTION INTRAVENOUS CONTINUOUS PRN
Status: DISCONTINUED | OUTPATIENT
Start: 2019-03-08 | End: 2019-03-08 | Stop reason: SURG

## 2019-03-08 RX ORDER — PROPOFOL 10 MG/ML
INJECTION, EMULSION INTRAVENOUS AS NEEDED
Status: DISCONTINUED | OUTPATIENT
Start: 2019-03-08 | End: 2019-03-08 | Stop reason: SURG

## 2019-03-08 RX ORDER — HYDROMORPHONE HCL/PF 1 MG/ML
SYRINGE (ML) INJECTION AS NEEDED
Status: DISCONTINUED | OUTPATIENT
Start: 2019-03-08 | End: 2019-03-08 | Stop reason: SURG

## 2019-03-08 RX ORDER — HYDROMORPHONE HCL/PF 1 MG/ML
0.5 SYRINGE (ML) INJECTION ONCE
Status: COMPLETED | OUTPATIENT
Start: 2019-03-09 | End: 2019-03-09

## 2019-03-08 RX ORDER — ALBUTEROL SULFATE 2.5 MG/3ML
2.5 SOLUTION RESPIRATORY (INHALATION) ONCE AS NEEDED
Status: DISCONTINUED | OUTPATIENT
Start: 2019-03-08 | End: 2019-03-08 | Stop reason: HOSPADM

## 2019-03-08 RX ORDER — FENTANYL CITRATE 50 UG/ML
INJECTION, SOLUTION INTRAMUSCULAR; INTRAVENOUS AS NEEDED
Status: DISCONTINUED | OUTPATIENT
Start: 2019-03-08 | End: 2019-03-08 | Stop reason: SURG

## 2019-03-08 RX ADMIN — PROPOFOL 200 MG: 10 INJECTION, EMULSION INTRAVENOUS at 09:40

## 2019-03-08 RX ADMIN — METOROPROLOL TARTRATE 1 MG: 5 INJECTION, SOLUTION INTRAVENOUS at 10:58

## 2019-03-08 RX ADMIN — PHENYLEPHRINE HYDROCHLORIDE 100 MCG: 10 INJECTION INTRAVENOUS at 09:50

## 2019-03-08 RX ADMIN — FENTANYL CITRATE 25 MCG: 50 INJECTION, SOLUTION INTRAMUSCULAR; INTRAVENOUS at 13:52

## 2019-03-08 RX ADMIN — PHENYLEPHRINE HYDROCHLORIDE 100 MCG/MIN: 10 INJECTION INTRAVENOUS at 11:40

## 2019-03-08 RX ADMIN — METOROPROLOL TARTRATE 2 MG: 5 INJECTION, SOLUTION INTRAVENOUS at 10:12

## 2019-03-08 RX ADMIN — ALBUMIN (HUMAN): 12.5 INJECTION, SOLUTION INTRAVENOUS at 11:19

## 2019-03-08 RX ADMIN — PHENYLEPHRINE HYDROCHLORIDE 200 MCG: 10 INJECTION INTRAVENOUS at 11:28

## 2019-03-08 RX ADMIN — FELODIPINE 5 MG: 5 TABLET, FILM COATED, EXTENDED RELEASE ORAL at 08:18

## 2019-03-08 RX ADMIN — FENTANYL CITRATE 100 MCG: 50 INJECTION INTRAMUSCULAR; INTRAVENOUS at 09:40

## 2019-03-08 RX ADMIN — SODIUM CHLORIDE, SODIUM GLUCONATE, SODIUM ACETATE, POTASSIUM CHLORIDE, MAGNESIUM CHLORIDE, SODIUM PHOSPHATE, DIBASIC, AND POTASSIUM PHOSPHATE 125 ML/HR: .53; .5; .37; .037; .03; .012; .00082 INJECTION, SOLUTION INTRAVENOUS at 14:25

## 2019-03-08 RX ADMIN — PIPERACILLIN SODIUM AND TAZOBACTAM SODIUM 3.38 G: 36; 4.5 INJECTION, POWDER, FOR SOLUTION INTRAVENOUS at 17:55

## 2019-03-08 RX ADMIN — SODIUM CHLORIDE 100 ML/HR: 0.9 INJECTION, SOLUTION INTRAVENOUS at 07:05

## 2019-03-08 RX ADMIN — LEVOTHYROXINE SODIUM 75 MCG: 75 TABLET ORAL at 05:36

## 2019-03-08 RX ADMIN — PHENYLEPHRINE HYDROCHLORIDE 400 MCG: 10 INJECTION INTRAVENOUS at 11:36

## 2019-03-08 RX ADMIN — GABAPENTIN 100 MG: 100 CAPSULE ORAL at 17:55

## 2019-03-08 RX ADMIN — ROCURONIUM BROMIDE 40 MG: 10 INJECTION, SOLUTION INTRAVENOUS at 09:40

## 2019-03-08 RX ADMIN — ATORVASTATIN CALCIUM 20 MG: 20 TABLET, FILM COATED ORAL at 19:37

## 2019-03-08 RX ADMIN — SODIUM CHLORIDE: 0.9 INJECTION, SOLUTION INTRAVENOUS at 11:23

## 2019-03-08 RX ADMIN — PIPERACILLIN SODIUM AND TAZOBACTAM SODIUM 3.38 G: 36; 4.5 INJECTION, POWDER, FOR SOLUTION INTRAVENOUS at 05:34

## 2019-03-08 RX ADMIN — PHENYLEPHRINE HYDROCHLORIDE 100 MCG: 10 INJECTION INTRAVENOUS at 11:13

## 2019-03-08 RX ADMIN — Medication 1 TABLET: at 08:19

## 2019-03-08 RX ADMIN — HYDROMORPHONE HYDROCHLORIDE: 10 INJECTION, SOLUTION INTRAMUSCULAR; INTRAVENOUS; SUBCUTANEOUS at 13:45

## 2019-03-08 RX ADMIN — Medication 1 SPRAY: at 22:29

## 2019-03-08 RX ADMIN — ROCURONIUM BROMIDE 10 MG: 10 INJECTION, SOLUTION INTRAVENOUS at 10:12

## 2019-03-08 RX ADMIN — ALBUMIN (HUMAN): 12.5 INJECTION, SOLUTION INTRAVENOUS at 11:47

## 2019-03-08 RX ADMIN — ONDANSETRON 4 MG: 2 INJECTION INTRAMUSCULAR; INTRAVENOUS at 12:30

## 2019-03-08 RX ADMIN — HYDROMORPHONE HYDROCHLORIDE 0.5 MG: 1 INJECTION, SOLUTION INTRAMUSCULAR; INTRAVENOUS; SUBCUTANEOUS at 17:52

## 2019-03-08 RX ADMIN — ESMOLOL HYDROCHLORIDE 40 MG: 100 INJECTION, SOLUTION INTRAVENOUS at 09:58

## 2019-03-08 RX ADMIN — ASPIRIN 81 MG: 81 TABLET, COATED ORAL at 08:19

## 2019-03-08 RX ADMIN — CEFAZOLIN SODIUM 2000 MG: 2 SOLUTION INTRAVENOUS at 09:43

## 2019-03-08 RX ADMIN — GABAPENTIN 100 MG: 100 CAPSULE ORAL at 08:19

## 2019-03-08 RX ADMIN — PHENYLEPHRINE HYDROCHLORIDE 200 MCG: 10 INJECTION INTRAVENOUS at 09:54

## 2019-03-08 RX ADMIN — PHENYLEPHRINE HYDROCHLORIDE 200 MCG: 10 INJECTION INTRAVENOUS at 11:19

## 2019-03-08 RX ADMIN — METOROPROLOL TARTRATE 2 MG: 5 INJECTION, SOLUTION INTRAVENOUS at 10:43

## 2019-03-08 RX ADMIN — ALBUMIN (HUMAN): 12.5 INJECTION, SOLUTION INTRAVENOUS at 11:40

## 2019-03-08 RX ADMIN — Medication 4 MG: at 09:50

## 2019-03-08 RX ADMIN — SODIUM CHLORIDE: 0.9 INJECTION, SOLUTION INTRAVENOUS at 09:32

## 2019-03-08 RX ADMIN — PIPERACILLIN SODIUM AND TAZOBACTAM SODIUM 3.38 G: 36; 4.5 INJECTION, POWDER, FOR SOLUTION INTRAVENOUS at 00:54

## 2019-03-08 RX ADMIN — SODIUM CHLORIDE, SODIUM GLUCONATE, SODIUM ACETATE, POTASSIUM CHLORIDE, MAGNESIUM CHLORIDE, SODIUM PHOSPHATE, DIBASIC, AND POTASSIUM PHOSPHATE 125 ML/HR: .53; .5; .37; .037; .03; .012; .00082 INJECTION, SOLUTION INTRAVENOUS at 22:35

## 2019-03-08 RX ADMIN — HYDROMORPHONE HYDROCHLORIDE 1 MG: 1 INJECTION, SOLUTION INTRAMUSCULAR; INTRAVENOUS; SUBCUTANEOUS at 11:05

## 2019-03-08 RX ADMIN — ROCURONIUM BROMIDE 10 MG: 10 INJECTION, SOLUTION INTRAVENOUS at 11:42

## 2019-03-08 NOTE — PERIOPERATIVE NURSING NOTE
Nile Munguia MD, NOTIFIED THAT PATIENT HAS HX  OF A-FIB (AND IS CURRENTLY PRESENT AT THIS TIME), BUT PATIENT HAVING FREQUENT PVC'S; 22-25 OVER 6 SECONDS  NO NEW ORDERS AS MAGNESIUM AND POTASSIUM ARE WNL  OK TO SEND PATIENT TO THE FLOOR  WILL CONTINUE TO MONITOR

## 2019-03-08 NOTE — ANESTHESIA POSTPROCEDURE EVALUATION
Post-Op Assessment Note    CV Status:  Stable  Pain Score: 1    Pain management: adequate     Mental Status:  Alert and awake   Hydration Status:  Stable   PONV Controlled:  None   Airway Patency:  Patent   Post Op Vitals Reviewed: Yes      Staff: Anesthesiologist           BP   169/69   Temp      Pulse  89 SR   Resp   16   SpO2   93

## 2019-03-08 NOTE — ANESTHESIA PROCEDURE NOTES
Arterial Line Insertion  Performed by: Dallas Guzman CRNA  Authorized by: Eduar Dickson MD   Consent: Written consent obtained  Consent given by: patient  Patient identity confirmed: arm band  Preparation: Patient was prepped and draped in the usual sterile fashion    Indications: multiple ABGs and hemodynamic monitoring  Orientation:  Left  Location: radial artery  Procedure Details:  Needle gauge: 20  Seldinger technique: Seldinger technique used  Number of attempts: 1    Post-procedure:  Post-procedure: chlorhexidine patch applied  Waveform: good waveform  Post-procedure CNS: normal  Patient tolerance: Patient tolerated the procedure well with no immediate complications

## 2019-03-08 NOTE — PROGRESS NOTES
Progress Note - Lenny Chavez 1950, 71 y o  male MRN: [id number]    Unit/Bed#: [address] 421-01 Encounter: [id number]    Primary Care Provider: Sudarshan Fine MD   Date and time admitted to hospital: 3/5/2019 12:11 AM        * Cholecystitis  Assessment & Plan  Non visualization of the gallbladder on HIDA scan  Continue IV Zosyn  Monitor LFTs  Status post cholecystectomy 2019  Postop care per surgery  Currently NPO    Atrial fibrillation, persistent ([address] )  Assessment & Plan  Rate controlled  Continue felodipine  Anticoagulation held for cholecystectomy, restart when cleared from surgical service    Essential hypertension  Assessment & Plan  controlled, continue felodipine    Acute blood loss anemia  Assessment & Plan  Monitor hemoglobin postop  No indication for transfusion at this time        VTE Pharmacologic Prophylaxis:   Pharmacologic: Heparin  Mechanical VTE Prophylaxis in Place: Yes    Patient Centered Rounds: I have performed bedside rounds with nursing staff today  Discussions with Specialists or Other Care Team Provider:  Surgery    Education and Discussions with Family / Patient:  Patient and wife, plan of care    Time Spent for Care: 20 minutes  More than 50% of total time spent on counseling and coordination of care as described above  Current Length of Stay: 2 day(s)    Current Patient Status: Inpatient   Certification Statement: The patient will continue to require additional inpatient hospital stay due to [facility]    Discharge Plan:  Home when stable    Code Status: Level 1 - Full Code      Subjective:   Reports significant right upper quadrant and right shoulder pain as well as sore throat      Objective:     Vitals:   Temp (24hrs), Av °F (36 7 °C), Min:97 4 °F (36 3 °C), Max:99 5 °F (37 5 °C)    Temp:  [97 4 °F (36 3 °C)-99 5 °F (37 5 °C)] 97 8 °F (36 6 °C)  HR:  [78-94] 78  Resp:  [15-26] 19  BP: ()/(48-72) 105/59  SpO2:  [92 %-97 %] 95 %  Body mass index is 31 56 kg/m²  Input and Output Summary (last 24 hours): Intake/Output Summary (Last 24 hours) at 3/8/2019 1715  Last data filed at 3/8/2019 1426  Gross per 24 hour   Intake 3793 33 ml   Output 3225 ml   Net 568 33 ml       Physical Exam:     Physical Exam   Constitutional: He is oriented to person, place, and time  He appears well-developed and well-nourished  HENT:   Head: Normocephalic and atraumatic  Eyes: Pupils are equal, round, and reactive to light  EOM are normal    Neck: Neck supple  Pulmonary/Chest: Effort normal    Abdominal: He exhibits no distension  Musculoskeletal: He exhibits no edema  Neurological: He is alert and oriented to person, place, and time  Skin: Skin is warm and dry  Additional Data:     Labs:    Results from last 7 days   Lab Units 03/08/19  1341   WBC Thousand/uL 12 95*   HEMOGLOBIN g/dL 10 8*   HEMATOCRIT % 32 6*   PLATELETS Thousands/uL 192   NEUTROS PCT % 92*   LYMPHS PCT % 4*   MONOS PCT % 3*   EOS PCT % 0     Results from last 7 days   Lab Units 03/08/19  1331 03/08/19  0453   SODIUM mmol/L 140 138   POTASSIUM mmol/L 4 3 3 5   CHLORIDE mmol/L 111* 106   CO2 mmol/L 20* 25   BUN mg/dL 19 15   CREATININE mg/dL 1 02 1 15   ANION GAP mmol/L 9 7   CALCIUM mg/dL 7 2* 8 4   ALBUMIN g/dL  --  3 0*   TOTAL BILIRUBIN mg/dL  --  1 48*   ALK PHOS U/L  --  100   ALT U/L  --  50   AST U/L  --  35   GLUCOSE RANDOM mg/dL 170* 105             Results from last 7 days   Lab Units 03/06/19  0643   HEMOGLOBIN A1C % 5 7     Results from last 7 days   Lab Units 03/08/19  1331 03/05/19  0034   LACTIC ACID mmol/L 1 3 1 8           * I Have Reviewed All Lab Data Listed Above  * Additional Pertinent Lab Tests Reviewed:  All Labs Within Last 24 Hours Reviewed        Recent Cultures (last 7 days):           Last 24 Hours Medication List:     Current Facility-Administered Medications:  acetaminophen 650 mg Oral Q6H PRN Cayden Live MD    aluminum-magnesium hydroxide-simethicone 30 mL Oral Q6H PRN Danielle Viveros MD    atorvastatin 20 mg Oral Daily Danielle Viveros MD    docusate sodium 100 mg Oral BID PRN Danielle Viveros MD    felodipine 5 mg Oral Daily Danielle Viveros MD    gabapentin 100 mg Oral BID Danielle Viveros MD    HYDROmorphone  Intravenous Continuous Danielle Viveros MD    HYDROmorphone 0 5 mg Intravenous Once Quinn Srivastava MD    levothyroxine 75 mcg Oral Early Morning Danielle Viveros MD    multi-electrolyte 125 mL/hr Intravenous Continuous Danielle Viveros MD Last Rate: 125 mL/hr (03/08/19 1425)   phenol 1 spray Mouth/Throat Q2H PRN Quinn Srivastava MD    piperacillin-tazobactam 3 375 g Intravenous Q6H Danielle Viveros MD Last Rate: 3 375 g (03/08/19 0540)        Today, Patient Was Seen By: Yany Park MD    ** Please Note: Dictation voice to text software may have been used in the creation of this document   **

## 2019-03-08 NOTE — ASSESSMENT & PLAN NOTE
Rate controlled  Continue felodipine  Anticoagulation held for cholecystectomy, restart when cleared from surgical service

## 2019-03-08 NOTE — OP NOTE
OPERATIVE REPORT  PATIENT NAME: Olga Tolentino    :  1950  MRN: [id number]  Pt Location: BE OR ROOM 07    SURGERY DATE: 3/8/2019    Surgeon(s) and Role:     * Hesham Ferguson DO - Primary     * Cande Richardson MD - Assisting     * Dannie Blair PA-C - Assisting    Preop Diagnosis:  Cholecystitis [K81 9]    Post-Op Diagnosis Codes:     * Cholecystitis [K81 9]    Procedure(s) (LRB):  [id number] CHOLECYSTECTOMY (N/A)    Specimen(s):  ID Type Source Tests Collected by Time Destination   1 :  Tissue Gallbladder TISSUE EXAM Hesham Ferguson  3/8/2019 1014        Estimated Blood Loss:   1500 mL    Drains:  * No LDAs found *    Anesthesia Type:   General    Operative Indications:  Cholecystitis [K81 9]      Operative Findings:  Inflamed/gangrenous gallbladder    Complications:   none    Procedure and Technique:  After informed consent was obtained from the preoperative area, patient was taken and placed supine on the OR table  General anesthesia was induced and the patient was prepped and draped in usual fashion  A timeout was completed verifying correct patient, procedure, site and positioning  At this point, a infraumbilical curvilinear incision was made and taken down to the level of the fascia  The fascia was incised and finley cannula inserted  Pneumoperitoneum was achieved without any issues  Three 5mm trocars where then inserted along the right costal margin  There were dense adhesions in the right upper quadrant that were taken down with blunt and sharp dissection  The gallbladder was then identified and was acutely inflamed and gangrenous  A laparoscopic needle was introduced to decompress the gallbladder  The fundus was then grasped and retracted over the liver  However, due to dense adhesions and inability to safely visualize the proper structures the procedure was converted to an open cholecystectomy  A kocher incision was then made and the abdomen was entered   The gallbladder was dissected dome-down from the liver bed  At the time significant bleeding was encountered from the liver bed  The liver was packed temporarily while the gallbladder was dissected down to the cystic duct  The cystic duct and cystic artery where identified and doubly ligated w silk tie and clip  The gallbladder was removed and passed of the table as specimen  At the time attention was turned to the liver bed  Hemostasis was achieved with electrocautery and 10cc of floseal was placed with surgicel  The fascia was then closed with 1-0 PDS in two layers  The umbilical port site fascia was closed with 0-vicryl suture  The skin was then closed with staples  At the end of the procedure all sponge and needle counts where correct  I was present for the entire procedure      Patient Disposition:  PACU     SIGNATURE: Dany Akbar DO  DATE: March 8, 2019  TIME: 1:14 PM

## 2019-03-08 NOTE — PROGRESS NOTES
Progress Note - General Surgery   Bernardino Reasons 71 y o  male MRN: [id number]  Unit/Bed#: [facility] 421-01 Encounter: [id number]    Assessment/Plan:  71 y o  male with acute cholecystitis     · NPO for OR  · To OR for lap cholecystectomy w/ IOC today w/ Dr Bennett Pierson  · Holding Eliquis (Afib)  · Care per primary    Subjective/Objective     Subjective: No acute events overnight  Patient complaining of feeling of "fullness" in RUQ  Was tolerating diet prior to midnight  Denies fever/chills  Objective:     Vitals: Blood pressure 106/58, pulse 78, temperature 99 5 °F (37 5 °C), temperature source Oral, resp  rate 16, height 6' 1" (1 854 m), weight 109 kg (239 lb 3 2 oz), SpO2 94 %  ,Body mass index is 31 56 kg/m²  I/O       03/06 0701 - 03/07 0700 03/07 0701 - 03/08 0700 03/08 0701 - 03/09 0700    P  O  1060 320     I V  (mL/kg) 700 (6 4)      IV Piggyback 75 50     Total Intake(mL/kg) 1835 (16 7) 370 (3 4)     Urine (mL/kg/hr) 1625 (0 6) 1225 (0 5)     Stool  0     Total Output 1625 1225     Net +210 -855            Unmeasured Urine Occurrence  1 x     Unmeasured Stool Occurrence  1 x           Physical Exam:  GEN: NAD  HEENT: MMM  CV: RRR  Lung: Normal effort  Ab: Soft, NT/ND  Extrem: No CCE  Neuro:  A+Ox3    Lab, Imaging and other studies:   CBC with diff:   Lab Results   Component Value Date    WBC 13 07 (H) 03/08/2019    HGB 14 8 03/08/2019    HCT 44 2 03/08/2019    MCV 91 03/08/2019     03/08/2019    MCH 30 4 03/08/2019    MCHC 33 5 03/08/2019    RDW 12 4 03/08/2019    MPV 10 2 03/08/2019    NRBC 0 03/08/2019   , BMP/CMP:   Lab Results   Component Value Date    SODIUM 138 03/08/2019    K 3 5 03/08/2019     03/08/2019    CO2 25 03/08/2019    BUN 15 03/08/2019    CREATININE 1 15 03/08/2019    CALCIUM 8 4 03/08/2019    AST 35 03/08/2019    ALT 50 03/08/2019    ALKPHOS 100 03/08/2019    EGFR 65 03/08/2019   , Coags: No results found for: PT, PTT, INR  VTE Pharmacologic Prophylaxis: Eliquis (held)  VTE Mechanical Prophylaxis: sequential compression device

## 2019-03-08 NOTE — ANESTHESIA PREPROCEDURE EVALUATION
Review of Systems/Medical History          Cardiovascular  Hypertension , Dysrhythmias , atrial fibrillation,    Pulmonary  Negative pulmonary ROS        GI/Hepatic  Negative GI/hepatic ROS          Negative  ROS        Endo/Other  History of thyroid disease , hypothyroidism,      GYN  Negative gynecology ROS          Hematology  Negative hematology ROS      Musculoskeletal  Negative musculoskeletal ROS        Neurology    Headaches,    Psychology   Negative psychology ROS              Physical Exam    Airway    Mallampati score: II  TM Distance: >3 FB  Neck ROM: full     Dental   No notable dental hx     Cardiovascular      Pulmonary      Other Findings        Anesthesia Plan  ASA Score- 2     Anesthesia Type- general with ASA Monitors  Additional Monitors:   Airway Plan: ETT  Plan Factors-    Induction- intravenous      Postoperative Plan-     Informed Consent-

## 2019-03-08 NOTE — ASSESSMENT & PLAN NOTE
Non visualization of the gallbladder on HIDA scan  Continue IV Zosyn  Monitor LFTs  Status post cholecystectomy 03/8/2019  Postop care per surgery    Currently NPO

## 2019-03-09 LAB
ALBUMIN SERPL BCP-MCNC: 2.8 G/DL (ref 3.5–5)
ALP SERPL-CCNC: 114 U/L (ref 46–116)
ALT SERPL W P-5'-P-CCNC: 168 U/L (ref 12–78)
ANION GAP SERPL CALCULATED.3IONS-SCNC: 7 MMOL/L (ref 4–13)
AST SERPL W P-5'-P-CCNC: 129 U/L (ref 5–45)
BILIRUB DIRECT SERPL-MCNC: 0.39 MG/DL (ref 0–0.2)
BILIRUB SERPL-MCNC: 0.8 MG/DL (ref 0.2–1)
BUN SERPL-MCNC: 21 MG/DL (ref 5–25)
CALCIUM SERPL-MCNC: 7.8 MG/DL (ref 8.3–10.1)
CHLORIDE SERPL-SCNC: 111 MMOL/L (ref 100–108)
CO2 SERPL-SCNC: 25 MMOL/L (ref 21–32)
CREAT SERPL-MCNC: 1.12 MG/DL (ref 0.6–1.3)
ERYTHROCYTE [DISTWIDTH] IN BLOOD BY AUTOMATED COUNT: 12.8 % (ref 11.6–15.1)
GFR SERPL CREATININE-BSD FRML MDRD: 67 ML/MIN/1.73SQ M
GLUCOSE SERPL-MCNC: 138 MG/DL (ref 65–140)
HCT VFR BLD AUTO: 31 % (ref 36.5–49.3)
HGB BLD-MCNC: 10.3 G/DL (ref 12–17)
MCH RBC QN AUTO: 30.4 PG (ref 26.8–34.3)
MCHC RBC AUTO-ENTMCNC: 33.2 G/DL (ref 31.4–37.4)
MCV RBC AUTO: 91 FL (ref 82–98)
PLATELET # BLD AUTO: 236 THOUSANDS/UL (ref 149–390)
PMV BLD AUTO: 10 FL (ref 8.9–12.7)
POTASSIUM SERPL-SCNC: 4.9 MMOL/L (ref 3.5–5.3)
PROT SERPL-MCNC: 6 G/DL (ref 6.4–8.2)
RBC # BLD AUTO: 3.39 MILLION/UL (ref 3.88–5.62)
SODIUM SERPL-SCNC: 143 MMOL/L (ref 136–145)
WBC # BLD AUTO: 14.13 THOUSAND/UL (ref 4.31–10.16)

## 2019-03-09 PROCEDURE — 99024 POSTOP FOLLOW-UP VISIT: CPT | Performed by: SURGERY

## 2019-03-09 PROCEDURE — 99232 SBSQ HOSP IP/OBS MODERATE 35: CPT | Performed by: INTERNAL MEDICINE

## 2019-03-09 PROCEDURE — 80076 HEPATIC FUNCTION PANEL: CPT | Performed by: SURGERY

## 2019-03-09 PROCEDURE — 99221 1ST HOSP IP/OBS SF/LOW 40: CPT | Performed by: INTERNAL MEDICINE

## 2019-03-09 PROCEDURE — 80048 BASIC METABOLIC PNL TOTAL CA: CPT | Performed by: SURGERY

## 2019-03-09 PROCEDURE — 85027 COMPLETE CBC AUTOMATED: CPT | Performed by: SURGERY

## 2019-03-09 RX ORDER — ACETAMINOPHEN 325 MG/1
650 TABLET ORAL EVERY 6 HOURS SCHEDULED
Status: DISCONTINUED | OUTPATIENT
Start: 2019-03-09 | End: 2019-03-12 | Stop reason: HOSPADM

## 2019-03-09 RX ORDER — HYDROMORPHONE HCL/PF 1 MG/ML
0.5 SYRINGE (ML) INJECTION EVERY 4 HOURS PRN
Status: DISCONTINUED | OUTPATIENT
Start: 2019-03-09 | End: 2019-03-12 | Stop reason: HOSPADM

## 2019-03-09 RX ORDER — HYDROMORPHONE HCL/PF 1 MG/ML
0.5 SYRINGE (ML) INJECTION ONCE
Status: COMPLETED | OUTPATIENT
Start: 2019-03-09 | End: 2019-03-09

## 2019-03-09 RX ORDER — DIPHENHYDRAMINE HYDROCHLORIDE 50 MG/ML
25 INJECTION INTRAMUSCULAR; INTRAVENOUS EVERY 6 HOURS PRN
Status: DISCONTINUED | OUTPATIENT
Start: 2019-03-09 | End: 2019-03-09

## 2019-03-09 RX ORDER — HEPARIN SODIUM 5000 [USP'U]/ML
5000 INJECTION, SOLUTION INTRAVENOUS; SUBCUTANEOUS EVERY 8 HOURS SCHEDULED
Status: DISCONTINUED | OUTPATIENT
Start: 2019-03-09 | End: 2019-03-09

## 2019-03-09 RX ORDER — OXYCODONE HYDROCHLORIDE 5 MG/1
5 TABLET ORAL EVERY 4 HOURS PRN
Status: DISCONTINUED | OUTPATIENT
Start: 2019-03-09 | End: 2019-03-12 | Stop reason: HOSPADM

## 2019-03-09 RX ORDER — METOPROLOL TARTRATE 5 MG/5ML
5 INJECTION INTRAVENOUS EVERY 6 HOURS PRN
Status: DISCONTINUED | OUTPATIENT
Start: 2019-03-09 | End: 2019-03-12 | Stop reason: HOSPADM

## 2019-03-09 RX ORDER — HEPARIN SODIUM 5000 [USP'U]/ML
5000 INJECTION, SOLUTION INTRAVENOUS; SUBCUTANEOUS EVERY 8 HOURS SCHEDULED
Status: DISCONTINUED | OUTPATIENT
Start: 2019-03-09 | End: 2019-03-12 | Stop reason: HOSPADM

## 2019-03-09 RX ORDER — DIPHENHYDRAMINE HCL 25 MG
25 TABLET ORAL EVERY 6 HOURS PRN
Status: DISCONTINUED | OUTPATIENT
Start: 2019-03-09 | End: 2019-03-12 | Stop reason: HOSPADM

## 2019-03-09 RX ORDER — OXYCODONE HYDROCHLORIDE 10 MG/1
10 TABLET ORAL EVERY 4 HOURS PRN
Status: DISCONTINUED | OUTPATIENT
Start: 2019-03-09 | End: 2019-03-12 | Stop reason: HOSPADM

## 2019-03-09 RX ORDER — DEXTROSE, SODIUM CHLORIDE, AND POTASSIUM CHLORIDE 5; .45; .15 G/100ML; G/100ML; G/100ML
75 INJECTION INTRAVENOUS CONTINUOUS
Status: DISCONTINUED | OUTPATIENT
Start: 2019-03-09 | End: 2019-03-10

## 2019-03-09 RX ORDER — LORATADINE 10 MG/1
10 TABLET ORAL DAILY
Status: DISCONTINUED | OUTPATIENT
Start: 2019-03-09 | End: 2019-03-12 | Stop reason: HOSPADM

## 2019-03-09 RX ADMIN — ACETAMINOPHEN 650 MG: 325 TABLET ORAL at 17:34

## 2019-03-09 RX ADMIN — HEPARIN SODIUM 5000 UNITS: 5000 INJECTION INTRAVENOUS; SUBCUTANEOUS at 21:55

## 2019-03-09 RX ADMIN — LEVOTHYROXINE SODIUM 75 MCG: 75 TABLET ORAL at 05:37

## 2019-03-09 RX ADMIN — PIPERACILLIN SODIUM AND TAZOBACTAM SODIUM 3.38 G: 36; 4.5 INJECTION, POWDER, FOR SOLUTION INTRAVENOUS at 05:37

## 2019-03-09 RX ADMIN — GABAPENTIN 100 MG: 100 CAPSULE ORAL at 17:34

## 2019-03-09 RX ADMIN — HYDROMORPHONE HYDROCHLORIDE 0.5 MG: 1 INJECTION, SOLUTION INTRAMUSCULAR; INTRAVENOUS; SUBCUTANEOUS at 00:08

## 2019-03-09 RX ADMIN — ACETAMINOPHEN 650 MG: 325 TABLET ORAL at 02:01

## 2019-03-09 RX ADMIN — PIPERACILLIN SODIUM AND TAZOBACTAM SODIUM 3.38 G: 36; 4.5 INJECTION, POWDER, FOR SOLUTION INTRAVENOUS at 12:41

## 2019-03-09 RX ADMIN — ACETAMINOPHEN 650 MG: 325 TABLET ORAL at 09:23

## 2019-03-09 RX ADMIN — HEPARIN SODIUM 5000 UNITS: 5000 INJECTION INTRAVENOUS; SUBCUTANEOUS at 09:23

## 2019-03-09 RX ADMIN — LORATADINE 10 MG: 10 TABLET ORAL at 12:30

## 2019-03-09 RX ADMIN — SODIUM CHLORIDE, SODIUM GLUCONATE, SODIUM ACETATE, POTASSIUM CHLORIDE, MAGNESIUM CHLORIDE, SODIUM PHOSPHATE, DIBASIC, AND POTASSIUM PHOSPHATE 125 ML/HR: .53; .5; .37; .037; .03; .012; .00082 INJECTION, SOLUTION INTRAVENOUS at 07:21

## 2019-03-09 RX ADMIN — GABAPENTIN 100 MG: 100 CAPSULE ORAL at 09:24

## 2019-03-09 RX ADMIN — HYDROMORPHONE HYDROCHLORIDE 0.5 MG: 1 INJECTION, SOLUTION INTRAMUSCULAR; INTRAVENOUS; SUBCUTANEOUS at 05:36

## 2019-03-09 RX ADMIN — HEPARIN SODIUM 5000 UNITS: 5000 INJECTION INTRAVENOUS; SUBCUTANEOUS at 15:00

## 2019-03-09 RX ADMIN — PIPERACILLIN SODIUM AND TAZOBACTAM SODIUM 3.38 G: 36; 4.5 INJECTION, POWDER, FOR SOLUTION INTRAVENOUS at 00:08

## 2019-03-09 RX ADMIN — ATORVASTATIN CALCIUM 20 MG: 20 TABLET, FILM COATED ORAL at 21:55

## 2019-03-09 RX ADMIN — DIPHENHYDRAMINE HCL 25 MG: 25 TABLET, FILM COATED ORAL at 17:34

## 2019-03-09 RX ADMIN — DEXTROSE, SODIUM CHLORIDE, AND POTASSIUM CHLORIDE 75 ML/HR: 5; .45; .15 INJECTION INTRAVENOUS at 10:05

## 2019-03-09 NOTE — PROGRESS NOTES
Progress Note - Filiberto Valladares 1950, 71 y o  male MRN: [id number]    Unit/Bed#: [address]01 Encounter: [id number]    Primary Care Provider: Jose Marte MD   Date and time admitted to hospital: 3/5/2019 12:11 AM        * Cholecystitis  Assessment & Plan  Non visualization of the gallbladder on HIDA scan  Status post cholecystectomy on 03/8/2019  Postop care per surgery  Advancing diet per surgical recommendation    Atrial fibrillation, persistent ([address] )  Assessment & Plan  Was formerly taking flecainide and metoprolol was discontinued due to bradycardia  Rates it to be elevated postoperatively today  Will consult Cardiology  Continue to monitor on telemetry  May need intermittent boluses of metoprolol for rate control  Restart anticoagulation when cleared from surgical point of view    Essential hypertension  Assessment & Plan  controlled, continue felodipine    Acute blood loss anemia  Assessment & Plan  Hemoglobin stabilizing around 10  Will continue to monitor  No indication for transfusion at this time        VTE Pharmacologic Prophylaxis:   Pharmacologic: Heparin  Mechanical VTE Prophylaxis in Place: Yes    Patient Centered Rounds: I have performed bedside rounds with nursing staff today  Education and Discussions with Family / Patient:  Patient's wife, plan of care    Time Spent for Care: 20 minutes  More than 50% of total time spent on counseling and coordination of care as described above  Current Length of Stay: 3 day(s)    Current Patient Status: Inpatient   Certification Statement: The patient will continue to require additional inpatient hospital stay due to Postop management    Discharge Plan: To be determined    Code Status: Level 1 - Full Code      Subjective:   Reports that pain is better controlled but does note generalized itching use without rash  He is passing gas but has not bowel movements  He does have an appetite and denies nausea vomiting      Objective:     Vitals: Temp (24hrs), Av 1 °F (36 7 °C), Min:97 7 °F (36 5 °C), Max:98 9 °F (37 2 °C)    Temp:  [97 7 °F (36 5 °C)-98 9 °F (37 2 °C)] 97 7 °F (36 5 °C)  HR:  [] 108  Resp:  [12-19] 18  BP: ()/(48-80) 116/76  SpO2:  [93 %-96 %] 93 %  Body mass index is 31 53 kg/m²  Input and Output Summary (last 24 hours): Intake/Output Summary (Last 24 hours) at 3/9/2019 1651  Last data filed at 3/9/2019 1543  Gross per 24 hour   Intake 2732 4 ml   Output 770 ml   Net 1962 4 ml       Physical Exam:     Physical Exam   Constitutional: He is oriented to person, place, and time  He appears well-developed and well-nourished  HENT:   Head: Normocephalic and atraumatic  Eyes: Pupils are equal, round, and reactive to light  EOM are normal    Neck: Neck supple  Cardiovascular:   Tachycardic  Irregularly irregular   Pulmonary/Chest: Effort normal    Abdominal: Soft  He exhibits no distension  There is tenderness  Musculoskeletal: Normal range of motion  He exhibits no edema  Neurological: He is alert and oriented to person, place, and time  No cranial nerve deficit  Skin: Skin is warm and dry         Additional Data:     Labs:    Results from last 7 days   Lab Units 19  0457 19  1341   WBC Thousand/uL 14 13* 12 95*   HEMOGLOBIN g/dL 10 3* 10 8*   HEMATOCRIT % 31 0* 32 6*   PLATELETS Thousands/uL 236 192   NEUTROS PCT %  --  92*   LYMPHS PCT %  --  4*   MONOS PCT %  --  3*   EOS PCT %  --  0     Results from last 7 days   Lab Units 19  0948 19  0457   SODIUM mmol/L  --  143   POTASSIUM mmol/L  --  4 9   CHLORIDE mmol/L  --  111*   CO2 mmol/L  --  25   BUN mg/dL  --  21   CREATININE mg/dL  --  1 12   ANION GAP mmol/L  --  7   CALCIUM mg/dL  --  7 8*   ALBUMIN g/dL 2 8*  --    TOTAL BILIRUBIN mg/dL 0 80  --    ALK PHOS U/L 114  --    ALT U/L 168*  --    AST U/L 129*  --    GLUCOSE RANDOM mg/dL  --  138             Results from last 7 days   Lab Units 19  0643   HEMOGLOBIN A1C % 5 7 Results from last 7 days   Lab Units 03/08/19  1331 03/05/19  0034   LACTIC ACID mmol/L 1 3 1 8           * I Have Reviewed All Lab Data Listed Above  * Additional Pertinent Lab Tests Reviewed: All Labs Within Last 24 Hours Reviewed        Recent Cultures (last 7 days):           Last 24 Hours Medication List:     Current Facility-Administered Medications:  acetaminophen 650 mg Oral Q6H PRN Katie Sorto MD    acetaminophen 650 mg Oral Q6H Jayshree Macdonald MD    aluminum-magnesium hydroxide-simethicone 30 mL Oral Q6H PRN Nestor Briceño, MD    atorvastatin 20 mg Oral Daily Nestor Briceño MD    dextrose 5 % and sodium chloride 0 45 % with KCl 20 mEq/L 75 mL/hr Intravenous Continuous Nestor Briceño MD Last Rate: 75 mL/hr (03/09/19 1005)   diphenhydrAMINE 25 mg Intravenous Q6H PRN Katie Sorto MD    docusate sodium 100 mg Oral BID PRN Nestor Briceño MD    felodipine 5 mg Oral Daily Nestor Briceño MD    gabapentin 100 mg Oral BID Nestor Briceño MD    heparin (porcine) 5,000 Units Subcutaneous [facility] David Edgar MD    HYDROmorphone 0 5 mg Intravenous Q4H PRN Nestor Briceño MD    levothyroxine 75 mcg Oral Early Morning Nestor Briceño MD    loratadine 10 mg Oral Daily Katie Sorto MD    metoprolol 5 mg Intravenous Q6H PRN Teri Dotson MD    oxyCODONE 10 mg Oral Q4H PRN Nestor Briceño MD    oxyCODONE 5 mg Oral Q4H PRN Nestor Briceño MD    phenol 1 spray Mouth/Throat Q2H PRN Katie Sorto MD    piperacillin-tazobactam 3 375 g Intravenous Q6H Nestor Briceño MD Last Rate: 3 375 g (03/09/19 1241)        Today, Patient Was Seen By: Rebekah Crabtree MD    ** Please Note: Dictation voice to text software may have been used in the creation of this document   **

## 2019-03-09 NOTE — PROGRESS NOTES
Pt went into 4 beat run of v-tach  Vitals stable  Pt asymptomatic  Red surgery paged  No interventions at this time

## 2019-03-09 NOTE — PROGRESS NOTES
At 2110 pt's BP was 70/43 with BP cuff and 90/54 via A-line  BP was rechecked at 2114: BP cuff was 86/48 map 57 and A-line was 105/66 map 81  125 Isolyte infusing along with Dilaudid gtt  Pt voided 125 ml in [place]  Pt otherwise asymptomatic and sleeping in room  Faina Fatima from [facility] AND [facility] Surgery in room assessing patient  Surgical in nature  No interventions needed at this time

## 2019-03-09 NOTE — PROGRESS NOTES
Progress Note - General Surgery   Aster Mccray 71 y o  male MRN: [id number]  Unit/Bed#: [facility] 421-01 Encounter: [id number]    Assessment:  69M s/p lap converted to open gilma  Pain issues overnight    Plan:  -hold Eliquis  -advance diet to clears as tolerated  -OOB AAT  -consider APS consult    Subjective/Objective   Subjective: Passed some gas  Wants to drink water  OOB to chair this AM    Objective:   Blood pressure 120/72, pulse (!) 110, temperature 97 7 °F (36 5 °C), temperature source Oral, resp  rate 18, height 6' 1" (1 854 m), weight 108 kg (239 lb), SpO2 94 %  ,Body mass index is 31 53 kg/m²  Intake/Output Summary (Last 24 hours) at 3/9/2019 0131  Last data filed at 3/9/2019 0556  Gross per 24 hour   Intake 4722 5 ml   Output 3120 ml   Net 1602 5 ml       Invasive Devices     Peripheral Intravenous Line            Peripheral IV 03/07/19 Left Wrist 1 day    Peripheral IV 03/08/19 Left Hand less than 1 day          Arterial Line            Arterial Line 03/08/19 Left Radial less than 1 day          Drain            Urethral Catheter 16 Fr  less than 1 day                Physical Exam:   General: No acute distress  HEENT: Normocephalic, atraumatic  Neck: Normal ROM  No tracheal deviation  Cardio: Normal rate, regular rhythm  Pulm: Normal respiratory effort  Abdomen: Soft, appropriately tender, non-distended   Incisions all CDI  Extremities: MA, No edema  Neuro: Cranial nerves II-XII intact  Psych: Normal affect      Lab, Imaging and other studies:  CBC:   Lab Results   Component Value Date    WBC 14 13 (H) 03/09/2019    HGB 10 3 (L) 03/09/2019    HCT 31 0 (L) 03/09/2019    MCV 91 03/09/2019     03/09/2019    MCH 30 4 03/09/2019    MCHC 33 2 03/09/2019    RDW 12 8 03/09/2019    MPV 10 0 03/09/2019    NRBC 0 03/08/2019   , CMP:   Lab Results   Component Value Date    SODIUM 143 03/09/2019    K 4 9 03/09/2019     (H) 03/09/2019    CO2 25 03/09/2019    CO2 23 03/08/2019    BUN 21 03/09/2019 CREATININE 1 12 03/09/2019    GLUCOSE 149 (H) 03/08/2019    CALCIUM 7 8 (L) 03/09/2019    EGFR 67 03/09/2019     VTE Pharmacologic Prophylaxis: Heparin  VTE Mechanical Prophylaxis: sequential compression device

## 2019-03-09 NOTE — ASSESSMENT & PLAN NOTE
Was formerly taking flecainide and metoprolol was discontinued due to bradycardia  Rates it to be elevated postoperatively today  Will consult Cardiology  Continue to monitor on telemetry    May need intermittent boluses of metoprolol for rate control  Restart anticoagulation when cleared from surgical point of view

## 2019-03-09 NOTE — ASSESSMENT & PLAN NOTE
Non visualization of the gallbladder on HIDA scan  Status post cholecystectomy on 03/8/2019  Postop care per surgery    Advancing diet per surgical recommendation

## 2019-03-09 NOTE — CONSULTS
Consultation - Cardiology   Woo Christopher 71 y o  male MRN: [id number]  Unit/Bed#: [facility] 421-01 Encounter: [id number]      Assessment:  Principal Problem:    Cholecystitis  Active Problems:    Essential hypertension    Atrial fibrillation, persistent (HCC)    Acquired hypothyroidism    Right upper quadrant abdominal pain    Abnormal liver function    Acute blood loss anemia    Assessment and Plan     1  Persistent atrial fibrillation:  Closed in 2016, has had cardioversions in 2016 and 2017, bounced back into AFib after a few months each time, was previously on flecainide and metoprolol which was discontinued in 07/2018 slow ventricular response  A subsequent 48 hour Holter after discontinuation of flecainide and metoprolol revealed persistent atrial fibrillation with average heart rate of 68 beats per minute, with heart rate  beats per minute  He is asymptomatic with Afib and his ventricular rates have been controlled prior to this hospitalization  His rapid rates this a m   in the setting of recent acute cholecystitis, stress of surgery  Continue to monitor on tele, if ventricular rates persistently elevated > 110 bpm, would treat with p r n  IV metoprolol 5 mg  Based on course, would decide patient would need to be on a standing rate control therapy  CHADS2 Vasc score is 3, Restart Eliquis when deemed safe from a surgical standpoint  NSVT - maximal 8 contiguous beats noted on tele  A nuclear stress test in 2016 with no perfusion defects  A pre cardioversion ROSA in 2016 revealed LVEF 65%, moderate concentric hypertrophy, biatrial dilation, moderate MR, trace TR  Replete lytes prn   2  Acute Cholecystitis:  Status post laparoscopic cholecystectomy on 03/08/2019   3  Ascending aortic aneurysm 48 mm, follows up with CT surgery outpatient, undergoing surveillance, last seen by CT surgery in 12/2018, a 1 year repeat CTA recommended  4  Hypertension:  BP goal < 140/90 mmHg, On felodipine at home     5  Hypothyroidism: On Synthroid, recent TFTs within normal limits  6  Iliac artery aneurysm s/p right iliac stent  7  History of renal artery dissection      History of Present Illness   Physician Requesting Consult: Rashid Fajardo DO  Reason for Consult / Principal Problem:   HPI: Vickie Martins is a 71y o  year old male with a past medical history of persistent atrial fibrillation since December 2017, cardioversion x2 2016 and 2017 was in sinus rhythm for 9 and 11 months respectively, currently on anticoagulation with Eliquis at home, previously on flecainide and metoprolol, noted to have slow ventricular response, flecainide and metoprolol were discontinued in 07/2018, a 48 hour Holter in 08/2018 after discontinuation of flecainide and metoprolol revealed persistent atrial fibrillation with average heart rate of 63 beats per minute, currently not on any rate control medications, history of hypothyroidism, hypertension, iliac artery aneurysm status post right iliac stent, ascending aortic aneurysm 48 mm undergoing surveillance, outpatient cardiologist Dr Karin Fam and Dr Vijay Dolan  Patient presented on 03/05/2019 with sudden onset right upper quadrant abdominal pain with nausea and vomiting  Was found to have elevated transaminases, a subsequent HIDA scan was consistent with acute cholecystitis, underwent laparoscopic cholecystectomy yesterday  His Eliquis was held on 03/06/2019 for the surgery  Lajean Cassette He currently notes improving abdominal pain, denies any palpitations, lightheadedness, chest pain  Notes he has been asymptomatic with AFib rates have always been under control on his outpatient clinic visits even after discontinuation of his flecainide and metoprolol           Inpatient consult to Cardiology     Performed by  Prashant Rueda MD     Authorized by Lowell Miranda MD              Review of Systems:  Review of Systems    As per HPI     Historical Information   Past Medical History: Diagnosis Date    Aneurysm of thoracic aorta (HCC)     Arrhythmia     Detached retina, right     Disease of thyroid gland     Gastric wall thickening     Headache     Hypertension     Iliac artery aneurysm, bilateral (HCC)     Irregular heart beat     afib cardioversion 1/30/17, x2     Neuropathy     bilateral feet    Paroxysmal A-fib ([address] )     Prostatic hypertrophy     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[facility])      Past Surgical History:   Procedure Laterality Date    ABDOMINAL AORTIC ANEURYSM REPAIR, ENDOVASCULAR Right 4/13/2018    Procedure: REPAIR ANEURYSM ILIAC endovascular  with coil embolization right hypogastric artery ;  Surgeon: Juan Devine MD;  Location: [facility];  Service: Vascular    CARDIOVERSION      CATARACT EXTRACTION      onset 11/17/2011    COLONOSCOPY      ND EDG US EXAM SURGICAL ALTER STOM DUODENUM/JEJUNUM N/A 11/10/2017    Procedure: RADIAL ENDOSCOPIC U/S;  Surgeon: Robert Salgado MD;  Location: [facility]; Service: Gastroenterology    RETINAL DETACHMENT SURGERY Right     onset 1992, retinal detachment complete air fill confirmed     Social History     Substance and Sexual Activity   Alcohol Use Yes    Comment: socially      Social History     Substance and Sexual Activity   Drug Use No     Social History     Tobacco Use   Smoking Status Never Smoker   Smokeless Tobacco Never Used     Family History: non-contributory    Meds/Allergies   PTA meds:   Prior to Admission Medications   Prescriptions Last Dose Informant Patient Reported? Taking? Co-Enzyme Q-10 100 MG CAPS 3/4/2019 at Unknown time Self Yes Yes   Sig: Take 100 mg by mouth daily   Misc Natural Products (LUTEIN 20 PO) 3/4/2019 at Unknown time Self Yes Yes   Sig: Take 20 mg by mouth daily     apixaban (ELIQUIS) 5 mg 3/4/2019 at Unknown time  No Yes   Sig: Take 1 tablet (5 mg total) by mouth 2 (two) times a day   aspirin (ECOTRIN LOW STRENGTH) 81 mg EC tablet 3/4/2019 at Unknown time Self Yes Yes   Sig: Take 81 mg by mouth daily atorvastatin (LIPITOR) 20 mg tablet 3/4/2019 at Unknown time  No Yes   Sig: Take 1 tablet (20 mg total) by mouth daily   felodipine (PLENDIL) 5 mg 24 hr tablet 3/4/2019 at Unknown time  No Yes   Sig: Take 1 tablet (5 mg total) by mouth daily   gabapentin (NEURONTIN) 100 mg capsule 3/4/2019 at Unknown time  No Yes   Sig: Take 1 capsule (100 mg total) by mouth 2 (two) times a day Up to 300mg additional if headaches flaring   levothyroxine 75 mcg tablet 3/4/2019 at Unknown time  No Yes   Sig: Take 1 tablet (75 mcg total) by mouth daily   multivitamin (THERAGRAN) TABS 3/4/2019 at Unknown time Self Yes Yes   Sig: Take 1 tablet by mouth daily  Facility-Administered Medications: None     Allergies   Allergen Reactions    Wound Dressing Adhesive        Objective   Vitals: Blood pressure 111/67, pulse 96, temperature 98 9 °F (37 2 °C), temperature source Oral, resp  rate 18, height 6' 1" (1 854 m), weight 108 kg (239 lb), SpO2 96 %  , Body mass index is 31 53 kg/m² ,   Orthostatic Blood Pressures      Most Recent Value   Blood Pressure  111/67 filed at 03/09/2019 0720   Patient Position - Orthostatic VS  Sitting filed at 03/09/2019 0720            Intake/Output Summary (Last 24 hours) at 3/9/2019 1223  Last data filed at 3/9/2019 0556  Gross per 24 hour   Intake 1929 17 ml   Output 1420 ml   Net 509 17 ml       Invasive Devices     Peripheral Intravenous Line            Peripheral IV 03/07/19 Left Wrist 1 day          Arterial Line            Arterial Line 03/08/19 Left Radial 1 day          Drain            Urethral Catheter 16 Fr  less than 1 day                  Physical Exam    Gen: No acute distress  HEENT: anicteric, mucous membranes moist  Neck: supple, no jugular venous distention, or carotid bruit  Heart:  Irregularly irregular, no murmur/rub or gallop  Lungs :clear to auscultation bilaterally, no rales/rhonchi or wheeze  Abdomen:  Soft, mild right upper quadrant tenderness, nondistended     Ext: warm and perfused, normal femoral pulses, no edema, clubbing  Skin: warm, no rashes  Neuro: AAO x 3, no focal findings  Psychiatric: normal affect  Musculoskeletal: no obvious joint deformities  Lab Results:     Lab Results   Component Value Date    CKTOTAL 119 04/14/2016    TROPONINI <0 02 03/05/2019       Lab Results   Component Value Date    GLUCOSE 149 (H) 03/08/2019    CALCIUM 7 8 (L) 03/09/2019     01/06/2016    K 4 9 03/09/2019    CO2 25 03/09/2019     (H) 03/09/2019    BUN 21 03/09/2019    CREATININE 1 12 03/09/2019       Lab Results   Component Value Date    WBC 14 13 (H) 03/09/2019    HGB 10 3 (L) 03/09/2019    HCT 31 0 (L) 03/09/2019    MCV 91 03/09/2019     03/09/2019       No results found for: CHOL  Lab Results   Component Value Date    HDL 52 08/16/2018    HDL 51 06/13/2017    HDL 56 04/14/2016     Lab Results   Component Value Date    LDLCALC 44 08/16/2018    LDLCALC 47 06/13/2017    LDLCALC 49 04/14/2016     Lab Results   Component Value Date    TRIG 116 08/16/2018    TRIG 83 06/13/2017    TRIG 77 04/14/2016       Lab Results   Component Value Date     (H) 03/09/2019     (H) 03/09/2019               Imaging: I have personally reviewed pertinent reports

## 2019-03-09 NOTE — ASSESSMENT & PLAN NOTE
Hemoglobin stabilizing around 10  Will continue to monitor    No indication for transfusion at this time

## 2019-03-10 PROBLEM — E83.39 HYPOPHOSPHATEMIA: Status: ACTIVE | Noted: 2019-03-10

## 2019-03-10 LAB
ALBUMIN SERPL BCP-MCNC: 2.4 G/DL (ref 3.5–5)
ALP SERPL-CCNC: 105 U/L (ref 46–116)
ALT SERPL W P-5'-P-CCNC: 106 U/L (ref 12–78)
ANION GAP SERPL CALCULATED.3IONS-SCNC: 4 MMOL/L (ref 4–13)
AST SERPL W P-5'-P-CCNC: 64 U/L (ref 5–45)
BASOPHILS # BLD AUTO: 0.02 THOUSANDS/ΜL (ref 0–0.1)
BASOPHILS NFR BLD AUTO: 0 % (ref 0–1)
BILIRUB DIRECT SERPL-MCNC: 0.3 MG/DL (ref 0–0.2)
BILIRUB SERPL-MCNC: 0.75 MG/DL (ref 0.2–1)
BUN SERPL-MCNC: 18 MG/DL (ref 5–25)
CALCIUM SERPL-MCNC: 7.7 MG/DL (ref 8.3–10.1)
CHLORIDE SERPL-SCNC: 105 MMOL/L (ref 100–108)
CO2 SERPL-SCNC: 28 MMOL/L (ref 21–32)
CREAT SERPL-MCNC: 0.99 MG/DL (ref 0.6–1.3)
EOSINOPHIL # BLD AUTO: 0.06 THOUSAND/ΜL (ref 0–0.61)
EOSINOPHIL NFR BLD AUTO: 1 % (ref 0–6)
ERYTHROCYTE [DISTWIDTH] IN BLOOD BY AUTOMATED COUNT: 12.5 % (ref 11.6–15.1)
GFR SERPL CREATININE-BSD FRML MDRD: 77 ML/MIN/1.73SQ M
GLUCOSE SERPL-MCNC: 110 MG/DL (ref 65–140)
HCT VFR BLD AUTO: 24.4 % (ref 36.5–49.3)
HCT VFR BLD AUTO: 25 % (ref 36.5–49.3)
HGB BLD-MCNC: 8.2 G/DL (ref 12–17)
HGB BLD-MCNC: 8.4 G/DL (ref 12–17)
IMM GRANULOCYTES # BLD AUTO: 0.04 THOUSAND/UL (ref 0–0.2)
IMM GRANULOCYTES NFR BLD AUTO: 1 % (ref 0–2)
LYMPHOCYTES # BLD AUTO: 0.75 THOUSANDS/ΜL (ref 0.6–4.47)
LYMPHOCYTES NFR BLD AUTO: 9 % (ref 14–44)
MAGNESIUM SERPL-MCNC: 2.7 MG/DL (ref 1.6–2.6)
MCH RBC QN AUTO: 30.4 PG (ref 26.8–34.3)
MCHC RBC AUTO-ENTMCNC: 33.6 G/DL (ref 31.4–37.4)
MCV RBC AUTO: 90 FL (ref 82–98)
MONOCYTES # BLD AUTO: 0.77 THOUSAND/ΜL (ref 0.17–1.22)
MONOCYTES NFR BLD AUTO: 9 % (ref 4–12)
NEUTROPHILS # BLD AUTO: 6.89 THOUSANDS/ΜL (ref 1.85–7.62)
NEUTS SEG NFR BLD AUTO: 80 % (ref 43–75)
NRBC BLD AUTO-RTO: 0 /100 WBCS
PHOSPHATE SERPL-MCNC: 1.6 MG/DL (ref 2.3–4.1)
PLATELET # BLD AUTO: 187 THOUSANDS/UL (ref 149–390)
PMV BLD AUTO: 9.9 FL (ref 8.9–12.7)
POTASSIUM SERPL-SCNC: 3.8 MMOL/L (ref 3.5–5.3)
PROT SERPL-MCNC: 5.1 G/DL (ref 6.4–8.2)
RBC # BLD AUTO: 2.7 MILLION/UL (ref 3.88–5.62)
SODIUM SERPL-SCNC: 137 MMOL/L (ref 136–145)
WBC # BLD AUTO: 8.53 THOUSAND/UL (ref 4.31–10.16)

## 2019-03-10 PROCEDURE — 84100 ASSAY OF PHOSPHORUS: CPT | Performed by: INTERNAL MEDICINE

## 2019-03-10 PROCEDURE — 85025 COMPLETE CBC W/AUTO DIFF WBC: CPT | Performed by: INTERNAL MEDICINE

## 2019-03-10 PROCEDURE — 83735 ASSAY OF MAGNESIUM: CPT | Performed by: INTERNAL MEDICINE

## 2019-03-10 PROCEDURE — 85018 HEMOGLOBIN: CPT | Performed by: SURGERY

## 2019-03-10 PROCEDURE — 99232 SBSQ HOSP IP/OBS MODERATE 35: CPT | Performed by: INTERNAL MEDICINE

## 2019-03-10 PROCEDURE — 80048 BASIC METABOLIC PNL TOTAL CA: CPT | Performed by: INTERNAL MEDICINE

## 2019-03-10 PROCEDURE — 99231 SBSQ HOSP IP/OBS SF/LOW 25: CPT | Performed by: INTERNAL MEDICINE

## 2019-03-10 PROCEDURE — 99024 POSTOP FOLLOW-UP VISIT: CPT | Performed by: SURGERY

## 2019-03-10 PROCEDURE — 80076 HEPATIC FUNCTION PANEL: CPT | Performed by: INTERNAL MEDICINE

## 2019-03-10 PROCEDURE — 85014 HEMATOCRIT: CPT | Performed by: SURGERY

## 2019-03-10 RX ADMIN — GABAPENTIN 100 MG: 100 CAPSULE ORAL at 08:52

## 2019-03-10 RX ADMIN — FELODIPINE 5 MG: 5 TABLET, FILM COATED, EXTENDED RELEASE ORAL at 08:52

## 2019-03-10 RX ADMIN — ACETAMINOPHEN 650 MG: 325 TABLET ORAL at 13:45

## 2019-03-10 RX ADMIN — ACETAMINOPHEN 650 MG: 325 TABLET ORAL at 00:52

## 2019-03-10 RX ADMIN — HEPARIN SODIUM 5000 UNITS: 5000 INJECTION INTRAVENOUS; SUBCUTANEOUS at 21:01

## 2019-03-10 RX ADMIN — HEPARIN SODIUM 5000 UNITS: 5000 INJECTION INTRAVENOUS; SUBCUTANEOUS at 06:06

## 2019-03-10 RX ADMIN — GABAPENTIN 100 MG: 100 CAPSULE ORAL at 17:03

## 2019-03-10 RX ADMIN — POTASSIUM & SODIUM PHOSPHATES POWDER PACK 280-160-250 MG 2 PACKET: 280-160-250 PACK at 08:52

## 2019-03-10 RX ADMIN — DEXTROSE, SODIUM CHLORIDE, AND POTASSIUM CHLORIDE 75 ML/HR: 5; .45; .15 INJECTION INTRAVENOUS at 00:40

## 2019-03-10 RX ADMIN — ATORVASTATIN CALCIUM 20 MG: 20 TABLET, FILM COATED ORAL at 20:43

## 2019-03-10 RX ADMIN — HEPARIN SODIUM 5000 UNITS: 5000 INJECTION INTRAVENOUS; SUBCUTANEOUS at 13:45

## 2019-03-10 RX ADMIN — ACETAMINOPHEN 650 MG: 325 TABLET ORAL at 17:03

## 2019-03-10 RX ADMIN — ACETAMINOPHEN 650 MG: 325 TABLET ORAL at 06:05

## 2019-03-10 RX ADMIN — LEVOTHYROXINE SODIUM 75 MCG: 75 TABLET ORAL at 06:05

## 2019-03-10 NOTE — ASSESSMENT & PLAN NOTE
Hemoglobin decreased today to 8 2 with recheck at 8 4   Will continue to monitor  Hemodynamically stable, no indication for transfusion at this time

## 2019-03-10 NOTE — PROGRESS NOTES
Notified by PCA, pt O2 sat 87%, BP 83/48  Reassessed pt  bp sitting - 122/80  Initiated O2 - 2L NC and O2 remained below 90%  Increased to [facility] and O2 sat increased to 94%  Notified Red sx of decreased O2 saturation as per order to maintain O2 sat above 96%  Per red sx, order to be reviewed in am but verbal to maintain O2 above 92%

## 2019-03-10 NOTE — PROGRESS NOTES
Progress Note - Gerald Savage 1950, 71 y o  male MRN: [id number]    Unit/Bed#: [address] 421-01 Encounter: [id number]    Primary Care Provider: Allen Peralta MD   Date and time admitted to hospital: 3/5/2019 12:11 AM        * Cholecystitis  Assessment & Plan  Non visualization of the gallbladder on HIDA scan  Status post cholecystectomy on 03/8/2019  Postop care per surgery  Tolerating solid diet  Continue to monitor inpatient until hemoglobin stabilizes    Atrial fibrillation, persistent (HCC)  Assessment & Plan  Was formerly taking flecainide and metoprolol was discontinued due to bradycardia  Heart rates improving, continue to monitor at this time  Restart anticoagulation when stable from surgical point of view    Essential hypertension  Assessment & Plan  controlled, continue felodipine with holding parameters    Hypophosphatemia  Assessment & Plan  Likely due to missing multiple meals  Continue supplementation and monitor daily until stabilized  Acute blood loss anemia  Assessment & Plan  Hemoglobin decreased today to 8 2 with recheck at 8 4   Will continue to monitor  Hemodynamically stable, no indication for transfusion at this time  VTE Pharmacologic Prophylaxis:   Pharmacologic: Heparin  Mechanical VTE Prophylaxis in Place: Yes    Patient Centered Rounds: I have performed bedside rounds with nursing staff today  Discussions with Specialists or Other Care Team Provider: surgery, plan of care    Education and Discussions with Family / Patient: patient, plan of care    Time Spent for Care: 20 minutes  More than 50% of total time spent on counseling and coordination of care as described above      Current Length of Stay: 4 day(s)    Current Patient Status: Inpatient   Certification Statement: The patient will continue to require additional inpatient hospital stay due to post op care    Discharge Plan: TBD    Code Status: Level 1 - Full Code      Subjective:   Reports significant improvement to right upper quadrant pain  Denies any nausea, vomiting, diarrhea  Tolerating solid diet  Objective:     Vitals:   Temp (24hrs), Av 5 °F (36 9 °C), Min:97 4 °F (36 3 °C), Max:99 4 °F (37 4 °C)    Temp:  [97 4 °F (36 3 °C)-99 4 °F (37 4 °C)] 98 5 °F (36 9 °C)  HR:  [75-88] 79  Resp:  [18-20] 18  BP: ()/(48-80) 120/76  SpO2:  [87 %-96 %] 93 %  Body mass index is 32 94 kg/m²  Input and Output Summary (last 24 hours): Intake/Output Summary (Last 24 hours) at 3/10/2019 1444  Last data filed at 3/10/2019 1426  Gross per 24 hour   Intake 2236 25 ml   Output 4685 ml   Net -2448 75 ml       Physical Exam:     Physical Exam   Constitutional: He is oriented to person, place, and time  He appears well-developed and well-nourished  HENT:   Head: Normocephalic and atraumatic  Eyes: Pupils are equal, round, and reactive to light  EOM are normal  No scleral icterus  Neck: Neck supple  Cardiovascular: Normal rate and regular rhythm  Pulmonary/Chest: Effort normal    Abdominal: Soft  Musculoskeletal: Normal range of motion  He exhibits no edema  Neurological: He is alert and oriented to person, place, and time  Skin: Skin is warm and dry         Additional Data:     Labs:    Results from last 7 days   Lab Units 03/10/19  1203 03/10/19  0500   WBC Thousand/uL  --  8 53   HEMOGLOBIN g/dL 8 4* 8 2*   HEMATOCRIT % 25 0* 24 4*   PLATELETS Thousands/uL  --  187   NEUTROS PCT %  --  80*   LYMPHS PCT %  --  9*   MONOS PCT %  --  9   EOS PCT %  --  1     Results from last 7 days   Lab Units 03/10/19  0500   SODIUM mmol/L 137   POTASSIUM mmol/L 3 8   CHLORIDE mmol/L 105   CO2 mmol/L 28   BUN mg/dL 18   CREATININE mg/dL 0 99   ANION GAP mmol/L 4   CALCIUM mg/dL 7 7*   ALBUMIN g/dL 2 4*   TOTAL BILIRUBIN mg/dL 0 75   ALK PHOS U/L 105   ALT U/L 106*   AST U/L 64*   GLUCOSE RANDOM mg/dL 110             Results from last 7 days   Lab Units 19  0643   HEMOGLOBIN A1C % 5 7     Results from last 7 days   Lab Units 03/08/19  1331 03/05/19  0034   LACTIC ACID mmol/L 1 3 1 8           * I Have Reviewed All Lab Data Listed Above  * Additional Pertinent Lab Tests Reviewed: All Labs Within Last 24 Hours Reviewed      Recent Cultures (last 7 days):           Last 24 Hours Medication List:     Current Facility-Administered Medications:  acetaminophen 650 mg Oral Q6H PRN Natali Copeland MD   acetaminophen 650 mg Oral Q6H Bipin De Oliveira MD   aluminum-magnesium hydroxide-simethicone 30 mL Oral Q6H PRN Denisa Seen, MD   atorvastatin 20 mg Oral Daily Denisa Seen, MD   diphenhydrAMINE 25 mg Oral Q6H PRN Natali Copeland MD   docusate sodium 100 mg Oral BID PRN Denisa Seen, MD   felodipine 5 mg Oral Daily Denisa Seen, MD   gabapentin 100 mg Oral BID Ternelly Seen, MD   heparin (porcine) 5,000 Units Subcutaneous [facility] Germania Allen MD   HYDROmorphone 0 5 mg Intravenous Q4H PRN Denisa Seen, MD   levothyroxine 75 mcg Oral Early Morning Denisa Seen, MD   loratadine 10 mg Oral Daily Natali Copeland MD   metoprolol 5 mg Intravenous Q6H PRN Annie Cons, MD   oxyCODONE 10 mg Oral Q4H PRN Denisa Seen, MD   oxyCODONE 5 mg Oral Q4H PRN Denisa Seen, MD   phenol 1 spray Mouth/Throat Q2H PRN Natali Copeland MD        Today, Patient Was Seen By: Aleida Gee MD    ** Please Note: Dictation voice to text software may have been used in the creation of this document   **

## 2019-03-10 NOTE — ASSESSMENT & PLAN NOTE
Was formerly taking flecainide and metoprolol was discontinued due to bradycardia  Heart rates improving, continue to monitor at this time  Restart anticoagulation when stable from surgical point of view

## 2019-03-10 NOTE — PROGRESS NOTES
General Cardiology Progress Note   Samantha Head 71 y o  male MRN: [id number]  Unit/Bed#: [facility] 421-01 Encounter: [id number]      Assessment:  Principal Problem:    Cholecystitis  Active Problems:    Essential hypertension    Atrial fibrillation, persistent (HCC)    Acute blood loss anemia      Impression:     Cholecystitis status post cholecystectomy   Chronic atrial fibrillation with RVR postoperatively likely driven by pain  o Not on any AV judy blockers as an outpatient, heart rate intrinsically controlled  o Had significant bradycardia with beta-blockers in the past  o Off anticoagulation due to surgery    Plan:     Continue to withhold any AV judy blockers, heart rate is already improved, received no p r n  Drugs yesterday   I suspect as he improves postoperatively, his AFib will become less of an issue   Restart Eliquis when okay with surgery   We will sign off, please call with any questions    Subjective:   Patient seen and examined  AFib has improved nicely overnight, heart rates now mostly 80s, no AV judy blockers given    ROS:     Complains of abdominal pain, splinting, unable to take a deep breath due to abdominal discomfort   No chest pain, no palpitations, no lightheadedness, no shortness of breath    Objective   Vitals: Blood pressure 118/71, pulse 80, temperature 98 7 °F (37 1 °C), temperature source Oral, resp  rate 20, height 6' 1" (1 854 m), weight 113 kg (249 lb 11 2 oz), SpO2 95 %  , Body mass index is 32 94 kg/m² , I/O last 3 completed shifts:   In: 3972 8 [P O :480; I V :3492 8]  Out: 7315 [KWDV]  I/O this shift:  In: 107 5 [I V :107 5]  Out: 500 [Urine:500]  Wt Readings from Last 3 Encounters:   03/10/19 113 kg (249 lb 11 2 oz)   18 111 kg (243 lb 12 8 oz)   18 107 kg (235 lb)       Intake/Output Summary (Last 24 hours) at 3/10/2019 1023  Last data filed at 3/10/2019 1007  Gross per 24 hour   Intake 2082 4 ml   Output 3485 ml   Net -1402 6 ml     I/O last 3 completed shifts:   In: 3972 8 [P O :480; I V :3492 8]  Out: 6180 [[id number]]    Telemetry shows AFib with controlled ventricular response    Physical Exam:    GEN: Lis Fierro appears well, alert and oriented x 3, pleasant and cooperative   HEART: normal rate, irregular rhythm, normal S1 and S2, no murmur  LUNGS: clear to auscultation bilaterally; no wheezes, rales, or rhonchi   ABDOMEN:  Normal bowel sounds, mildly tender  EXTREMITIES: peripheral pulses normal; no clubbing, cyanosis, or edema    Meds/Allergies   Allergies   Allergen Reactions    Wound Dressing Adhesive        Current Facility-Administered Medications:     acetaminophen (TYLENOL) tablet 650 mg, 650 mg, Oral, Q6H PRN, Petra Cruz MD, 650 mg at 03/09/19 0923    acetaminophen (TYLENOL) tablet 650 mg, 650 mg, Oral, Q6H [facility], Charles Santana MD, 650 mg at 03/10/19 0605    aluminum-magnesium hydroxide-simethicone (MYLANTA) 200-200-20 mg/5 mL oral suspension 30 mL, 30 mL, Oral, Q6H PRN, Charles Santana MD    atorvastatin (LIPITOR) tablet 20 mg, 20 mg, Oral, Daily, Charles Santana MD, 20 mg at 03/09/19 2155    diphenhydrAMINE (BENADRYL) tablet 25 mg, 25 mg, Oral, Q6H PRN, Petra Cruz MD, 25 mg at 03/09/19 1734    docusate sodium (COLACE) capsule 100 mg, 100 mg, Oral, BID PRN, Charles Santana MD    felodipine (PLENDIL) 24 hr tablet 5 mg, 5 mg, Oral, Daily, Charles Santana MD, 5 mg at 03/10/19 2073    gabapentin (NEURONTIN) capsule 100 mg, 100 mg, Oral, BID, Charles Santana MD, 100 mg at 03/10/19 0854    heparin (porcine) subcutaneous injection 5,000 Units, 5,000 Units, Subcutaneous, Q8H [facility], Alisha Vazquez MD, 5,000 Units at 03/10/19 0606    HYDROmorphone (DILAUDID) injection 0 5 mg, 0 5 mg, Intravenous, Q4H PRN, Charles Santana MD    levothyroxine tablet 75 mcg, 75 mcg, Oral, Early Morning, Charles Santana MD, 75 mcg at 03/10/19 0605    loratadine (CLARITIN) tablet 10 mg, 10 mg, Oral, Daily, Petra Cruz MD, 10 mg at 03/09/19 1230    metoprolol (LOPRESSOR) injection 5 mg, 5 mg, Intravenous, Q6H PRN, Louie Gomez MD    oxyCODONE (ROXICODONE) immediate release tablet 10 mg, 10 mg, Oral, Q4H PRN, Juan Grissom MD    oxyCODONE (ROXICODONE) IR tablet 5 mg, 5 mg, Oral, Q4H PRN, Juan Grissom MD    phenol (CHLORASEPTIC) 1 4 % mucosal liquid 1 spray, 1 spray, Mouth/Throat, Q2H PRN, Danielle Sparks MD, 1 spray at 03/08/19 9619    Laboratory Results:  Results from last 7 days   Lab Units 03/05/19  0034   TROPONIN I ng/mL <0 02       CBC with diff:   Results from last 7 days   Lab Units 03/10/19  0500 03/09/19  0457 03/08/19  1341 03/08/19  1252 03/08/19  1148 03/08/19  0453 03/07/19  0438 03/06/19 03/05/19  0034   WBC Thousand/uL 8 53 14 13* 12 95*  --   --  13 07* 15 56* 16 29* 7 55   HEMOGLOBIN g/dL 8 2* 10 3* 10 8*  --   --  14 8 14 4 15 2 16 5   I STAT HEMOGLOBIN g/dl  --   --   --  9 9* 10 9*  --   --   --   --    HEMATOCRIT % 24 4* 31 0* 32 6*  --   --  44 2 43 4 45 8 49 6*   HEMATOCRIT, ISTAT %  --   --   --  29* 32*  --   --   --   --    MCV fL 90 91 91  --   --  91 90 90 90   PLATELETS Thousands/uL 187 236 192  --   --  189 168 210 232   MCH pg 30 4 30 4 30 2  --   --  30 4 30 0 29 9 30 0   MCHC g/dL 33 6 33 2 33 1  --   --  33 5 33 2 33 2 33 3   RDW % 12 5 12 8 12 6  --   --  12 4 12 8 13 0 12 8   MPV fL 9 9 10 0 10 1  --   --  10 2 10 3 10 3 10 0   NRBC AUTO /100 WBCs 0  --  0  --   --  0 0 0 0       CMP:  Results from last 7 days   Lab Units 03/10/19  0500 03/09/19  0948 03/09/19  0457 03/08/19  1331 03/08/19  1252 03/08/19  1148 03/08/19  0453 03/07/19  0438 03/06/19  0643 03/05/19  0034   POTASSIUM mmol/L 3 8  --  4 9 4 3  --   --  3 5 3 9 4 3 4 0   CHLORIDE mmol/L 105  --  111* 111*  --   --  106 105 106 103   CO2 mmol/L 28  --  25 20*  --   --  25 27 27 27   CO2, I-STAT mmol/L  --   --   --   --  23 22  --   --   --   --    BUN mg/dL 18  --  21 19  --   --  15 13 13 26*   CREATININE mg/dL 0 99  -- 1  12 1 02  --   --  1 15 1 08 1 04 1 37*   GLUCOSE, ISTAT mg/dl  --   --   --   --  149* 148*  --   --   --   --    CALCIUM mg/dL 7 7*  --  7 8* 7 2*  --   --  8 4 8 2* 8 1* 9 0   AST U/L 64* 129*  --   --   --   --  35 18 17 47*   ALT U/L 106* 168*  --   --   --   --  50 28 31 43   ALK PHOS U/L 105 114  --   --   --   --  100 108 81 117*   EGFR ml/min/1 73sq m 77  --  67 75  --   --  65 70 73 52       BMP:  Results from last 7 days   Lab Units 03/10/19  0500 03/09/19  0457 03/08/19  1331 03/08/19  1252 03/08/19  1148  03/08/19  0453 03/07/19  0438 03/06/19  0643 03/05/19  0034   POTASSIUM mmol/L 3 8 4 9 4 3  --   --   --  3 5 3 9 4 3 4 0   CHLORIDE mmol/L 105 111* 111*  --   --   --  106 105 106 103   CO2 mmol/L 28 25 20*  --   --   --  25 27 27 27   CO2, I-STAT mmol/L  --   --   --  23 22  --   --   --   --   --    BUN mg/dL 18 21 19  --   --   --  15 13 13 26*   CREATININE mg/dL 0 99 1 12 1 02  --   --   --  1 15 1 08 1 04 1 37*   GLUCOSE, ISTAT mg/dl  --   --   --  149* 148*   < >  --   --   --   --    CALCIUM mg/dL 7 7* 7 8* 7 2*  --   --   --  8 4 8 2* 8 1* 9 0    < > = values in this interval not displayed  NT-proBNP: No results for input(s): NTBNP in the last 72 hours       Magnesium:   Results from last 7 days   Lab Units 03/10/19  0500 03/08/19  1331 03/06/19  0614   MAGNESIUM mg/dL 2 7* 2 3 2 3       Coags:       TSH:        Hemoglobin A1C )  Results from last 7 days   Lab Units 03/06/19  0643   HEMOGLOBIN A1C % 5 7       Lipid Profile:   No results found for: CHOL  Lab Results   Component Value Date    HDL 52 08/16/2018    HDL 51 06/13/2017    HDL 56 04/14/2016     Lab Results   Component Value Date    LDLCALC 44 08/16/2018    LDLCALC 47 06/13/2017    LDLCALC 49 04/14/2016     No results found for: LDLDIRECT  Lab Results   Component Value Date    TRIG 116 08/16/2018    TRIG 83 06/13/2017    TRIG 77 04/14/2016       Cardiac testing:   EKG personally reviewed by Sandeep Zabala MD      No results found for this or any previous visit  No results found for this or any previous visit  No results found for this or any previous visit  No results found for this or any previous visit  No results found for this or any previous visit  Results for orders placed during the hospital encounter of 16   NM myocardial perfusion spect (rx stress and/or rest)    Adilson Anand [address]  [address] [place] [address]  [facility], [address] [place] [address]  [phone]    Rest/Stress Gated SPECT Myocardial Perfusion Imaging After Exercise    Patient: Bernard Mattson  MR number: [id number]  Account number: [de-identified]  : 1950  Age: 77 years  Gender: Male  Status: Outpatient  Location: [address] [place] [address] [facility]  Height: 73 in  Weight: 233 lb  BP: 144/ 100 mmHg    Allergies: NO KNOWN ALLERGIES    Referring Physician:  Crow Batista DO  Primary Physician:  Jorge Kessler MD  RN:  Rhonda Keller RN  Group:  [facility]  Report Prepared by[de-identified]  Rhonda Keller RN  RN:  Anastacia Kocher, RN  Interpreting Physician:  Marti Escobar MD    INDICATIONS: Detection of coronary artery disease  HISTORY: The patient is a 77year old  male  Chest pain status: no  chest pain  Cardiovascular history: arrhythmia  Afib with cardioversion  Previous test results: abnormal ECG  PHYSICAL EXAM: Baseline physical exam screening: normal cardiac exam     REST ECG: Normal sinus rhythm  The ECG showed occasional premature atrial  contractions  PRWP, cannot exclude old anterior infarct  PROCEDURE: The study was performed at the [address]  Treadmill exercise testing was performed, using the Oliverio protocol  Gated SPECT  myocardial perfusion imaging was performed after stress and at rest  Systolic  blood pressure was 144 mmHg, at the start of the study  Diastolic blood  pressure was 100 mmHg, at the start of the study  The heart rate was 68 bpm, at  the start of the study  IV double checked  KHOA PROTOCOL:  HR bpm SBP mmHg DBP mmHg Symptoms  Baseline 68 144 100 none  Stage 1 121 182 98 --  Stage 2 150 184 96 mild fatigue  Recovery 1 114 158 90 subsiding  Recovery 2 100 150 90 none  No medications or fluids given  STRESS SUMMARY: Duration of exercise was 5 min and 30 sec  The patient  exercised to protocol stage 2  Maximal work rate was 7 METs  Maximal heart rate  during stress was 150 bpm ( 97 % of maximal predicted heart rate)  The  rate-pressure product for the peak heart rate and blood pressure was 39331  There was no chest pain during stress  The stress test was terminated due to  mild fatigue  The stress ECG was negative for ischemia  Arrhythmia during  stress:frequent isolated premature ventricular beats and pairs of premature  ventricular beats  ISOTOPE ADMINISTRATION:  Resting isotope administration Stress isotope administration  Agent Tetrofosmin Tetrofosmin  Dose 9 79 mCi 33 mCi  Date 02/09/2016 02/09/2016  Injection time 08:00 09:57  Imaging time 09:10 10:32  Injection-image interval 70 min 35 min    The radiopharmaceutical was injected one minute before the end of exercise  MYOCARDIAL PERFUSION IMAGING:  The image quality was fair  The left ventricle was mildly dilated  The TID  ratio was 1 07  PERFUSION DEFECTS:  -  There were no perfusion defects  GATED SPECT:  The calculated left ventricular ejection fraction was 62 %  SUMMARY:  -  Rest ECG: The ECG showed occasional premature atrial contractions  PRWP,  cannot exclude old anterior infarct  -  Stress results: Duration of exercise  was 5 min and 30 sec  The patient exercised to protocol stage 2  Maximal work  rate was 7 METs  Maximal heart rate during stress was 150 bpm ( 97 % of maximal  predicted heart rate)  Target heart rate was achieved  Maximal systolic blood  pressure during stress was 184 mmHg  There was no chest pain during stress  -   ECG conclusions:  The stress ECG was negative for ischemia  Arrhythmia during  stress:frequent isolated premature ventricular beats and pairs of premature  ventricular beats  -  Perfusion imaging: The left ventricle was mildly dilated  The TID ratio was 1 07  There were no perfusion defects   -  Gated SPECT: The calculated left ventricular ejection fraction was 62 %  IMPRESSIONS: Myocardial perfusion imaging was normal at rest and with stress  Prepared and signed by    Carmina Cummins MD  Signed 02/09/2016 11:46:42             Nancy Vallejo MD    Portions of the record may have been created with voice recognition software  Occasional wrong word or "sound a like" substitutions may have occurred due to the inherent limitations of voice recognition software  Read the chart carefully and recognize, using context, where substitutions have occurred

## 2019-03-10 NOTE — ASSESSMENT & PLAN NOTE
Non visualization of the gallbladder on HIDA scan  Status post cholecystectomy on 03/8/2019  Postop care per surgery  Tolerating solid diet    Continue to monitor inpatient until hemoglobin stabilizes

## 2019-03-10 NOTE — PROGRESS NOTES
Progress Note - General Surgery   Areli Griffith 71 y o  male MRN: [id number]  Unit/Bed#: [facility] 421-01 Encounter: [id number]    Assessment:  69M s/p lap converted to open gilma  Pain issues overnight    Plan:  Advance to regular diet  D/c IVF  Continue to hold eliquis  DVT PPx SQH  OOB  Scheduled tylenol, PRN pain meds  Appreciate internal medicine input    Subjective/Objective   Subjective:   No acute events  Passing gas  No longer burping  Hungry  Tolerating clears  Objective:   Blood pressure 122/80, pulse 81, temperature 98 9 °F (37 2 °C), temperature source Oral, resp  rate 20, height 6' 1" (1 854 m), weight 113 kg (249 lb 11 2 oz), SpO2 94 %  ,Body mass index is 32 94 kg/m²        Intake/Output Summary (Last 24 hours) at 3/10/2019 0644  Last data filed at 3/10/2019 0330  Gross per 24 hour   Intake 1801 15 ml   Output 3035 ml   Net -1233 85 ml       Invasive Devices     Peripheral Intravenous Line            Peripheral IV 03/07/19 Left Wrist 2 days                Physical Exam:   Gen: NAD, AAOx3  CV: RRR  Pulm: no resp distress  Abd: Soft, non-distended, non-tender, incisions c/d/i      Lab, Imaging and other studies:  CBC:   Lab Results   Component Value Date    WBC 8 53 03/10/2019    HGB 8 2 (L) 03/10/2019    HCT 24 4 (L) 03/10/2019    MCV 90 03/10/2019     03/10/2019    MCH 30 4 03/10/2019    MCHC 33 6 03/10/2019    RDW 12 5 03/10/2019    MPV 9 9 03/10/2019    NRBC 0 03/10/2019   , CMP:   Lab Results   Component Value Date    SODIUM 137 03/10/2019    K 3 8 03/10/2019     03/10/2019    CO2 28 03/10/2019    BUN 18 03/10/2019    CREATININE 0 99 03/10/2019    CALCIUM 7 7 (L) 03/10/2019    AST 64 (H) 03/10/2019     (H) 03/10/2019    ALKPHOS 105 03/10/2019    EGFR 77 03/10/2019     VTE Pharmacologic Prophylaxis: Heparin  VTE Mechanical Prophylaxis: sequential compression device

## 2019-03-11 LAB
ABO GROUP BLD BPU: NORMAL
ABO GROUP BLD BPU: NORMAL
ANION GAP SERPL CALCULATED.3IONS-SCNC: 8 MMOL/L (ref 4–13)
BASOPHILS # BLD AUTO: 0.04 THOUSANDS/ΜL (ref 0–0.1)
BASOPHILS NFR BLD AUTO: 1 % (ref 0–1)
BPU ID: NORMAL
BPU ID: NORMAL
BUN SERPL-MCNC: 12 MG/DL (ref 5–25)
CALCIUM SERPL-MCNC: 8 MG/DL (ref 8.3–10.1)
CHLORIDE SERPL-SCNC: 108 MMOL/L (ref 100–108)
CO2 SERPL-SCNC: 27 MMOL/L (ref 21–32)
CREAT SERPL-MCNC: 0.71 MG/DL (ref 0.6–1.3)
CROSSMATCH: NORMAL
CROSSMATCH: NORMAL
EOSINOPHIL # BLD AUTO: 0.15 THOUSAND/ΜL (ref 0–0.61)
EOSINOPHIL NFR BLD AUTO: 3 % (ref 0–6)
ERYTHROCYTE [DISTWIDTH] IN BLOOD BY AUTOMATED COUNT: 12.1 % (ref 11.6–15.1)
GFR SERPL CREATININE-BSD FRML MDRD: 96 ML/MIN/1.73SQ M
GLUCOSE SERPL-MCNC: 102 MG/DL (ref 65–140)
HCT VFR BLD AUTO: 26.6 % (ref 36.5–49.3)
HGB BLD-MCNC: 9 G/DL (ref 12–17)
IMM GRANULOCYTES # BLD AUTO: 0.03 THOUSAND/UL (ref 0–0.2)
IMM GRANULOCYTES NFR BLD AUTO: 1 % (ref 0–2)
LYMPHOCYTES # BLD AUTO: 1.21 THOUSANDS/ΜL (ref 0.6–4.47)
LYMPHOCYTES NFR BLD AUTO: 20 % (ref 14–44)
MCH RBC QN AUTO: 30.2 PG (ref 26.8–34.3)
MCHC RBC AUTO-ENTMCNC: 33.8 G/DL (ref 31.4–37.4)
MCV RBC AUTO: 89 FL (ref 82–98)
MONOCYTES # BLD AUTO: 0.48 THOUSAND/ΜL (ref 0.17–1.22)
MONOCYTES NFR BLD AUTO: 8 % (ref 4–12)
NEUTROPHILS # BLD AUTO: 4.08 THOUSANDS/ΜL (ref 1.85–7.62)
NEUTS SEG NFR BLD AUTO: 67 % (ref 43–75)
NRBC BLD AUTO-RTO: 0 /100 WBCS
PHOSPHATE SERPL-MCNC: 2 MG/DL (ref 2.3–4.1)
PLATELET # BLD AUTO: 257 THOUSANDS/UL (ref 149–390)
PMV BLD AUTO: 9.6 FL (ref 8.9–12.7)
POTASSIUM SERPL-SCNC: 3.4 MMOL/L (ref 3.5–5.3)
RBC # BLD AUTO: 2.98 MILLION/UL (ref 3.88–5.62)
SODIUM SERPL-SCNC: 143 MMOL/L (ref 136–145)
UNIT DISPENSE STATUS: NORMAL
UNIT DISPENSE STATUS: NORMAL
UNIT PRODUCT CODE: NORMAL
UNIT PRODUCT CODE: NORMAL
UNIT RH: NORMAL
UNIT RH: NORMAL
WBC # BLD AUTO: 5.99 THOUSAND/UL (ref 4.31–10.16)

## 2019-03-11 PROCEDURE — 97161 PT EVAL LOW COMPLEX 20 MIN: CPT

## 2019-03-11 PROCEDURE — 99232 SBSQ HOSP IP/OBS MODERATE 35: CPT | Performed by: INTERNAL MEDICINE

## 2019-03-11 PROCEDURE — 85025 COMPLETE CBC W/AUTO DIFF WBC: CPT | Performed by: SURGERY

## 2019-03-11 PROCEDURE — G8980 MOBILITY D/C STATUS: HCPCS

## 2019-03-11 PROCEDURE — 99024 POSTOP FOLLOW-UP VISIT: CPT | Performed by: SURGERY

## 2019-03-11 PROCEDURE — G8978 MOBILITY CURRENT STATUS: HCPCS

## 2019-03-11 PROCEDURE — 84100 ASSAY OF PHOSPHORUS: CPT | Performed by: INTERNAL MEDICINE

## 2019-03-11 PROCEDURE — 80048 BASIC METABOLIC PNL TOTAL CA: CPT | Performed by: SURGERY

## 2019-03-11 RX ORDER — POTASSIUM CHLORIDE 20 MEQ/1
40 TABLET, EXTENDED RELEASE ORAL ONCE
Status: COMPLETED | OUTPATIENT
Start: 2019-03-11 | End: 2019-03-11

## 2019-03-11 RX ORDER — DOCUSATE SODIUM 100 MG/1
100 CAPSULE, LIQUID FILLED ORAL 2 TIMES DAILY
Status: DISCONTINUED | OUTPATIENT
Start: 2019-03-11 | End: 2019-03-12 | Stop reason: HOSPADM

## 2019-03-11 RX ORDER — SENNOSIDES 8.6 MG
1 TABLET ORAL
Status: DISCONTINUED | OUTPATIENT
Start: 2019-03-11 | End: 2019-03-12 | Stop reason: HOSPADM

## 2019-03-11 RX ORDER — ASPIRIN 81 MG/1
81 TABLET ORAL DAILY
Status: DISCONTINUED | OUTPATIENT
Start: 2019-03-11 | End: 2019-03-12 | Stop reason: HOSPADM

## 2019-03-11 RX ADMIN — POTASSIUM CHLORIDE 40 MEQ: 1500 TABLET, EXTENDED RELEASE ORAL at 08:13

## 2019-03-11 RX ADMIN — STANDARDIZED SENNA CONCENTRATE 8.6 MG: 8.6 TABLET ORAL at 22:33

## 2019-03-11 RX ADMIN — ATORVASTATIN CALCIUM 20 MG: 20 TABLET, FILM COATED ORAL at 19:42

## 2019-03-11 RX ADMIN — HEPARIN SODIUM 5000 UNITS: 5000 INJECTION INTRAVENOUS; SUBCUTANEOUS at 22:34

## 2019-03-11 RX ADMIN — LEVOTHYROXINE SODIUM 75 MCG: 75 TABLET ORAL at 05:57

## 2019-03-11 RX ADMIN — ACETAMINOPHEN 650 MG: 325 TABLET ORAL at 00:29

## 2019-03-11 RX ADMIN — HEPARIN SODIUM 5000 UNITS: 5000 INJECTION INTRAVENOUS; SUBCUTANEOUS at 05:57

## 2019-03-11 RX ADMIN — DOCUSATE SODIUM 100 MG: 100 CAPSULE, LIQUID FILLED ORAL at 08:13

## 2019-03-11 RX ADMIN — ACETAMINOPHEN 650 MG: 325 TABLET ORAL at 13:20

## 2019-03-11 RX ADMIN — ACETAMINOPHEN 650 MG: 325 TABLET ORAL at 05:56

## 2019-03-11 RX ADMIN — ACETAMINOPHEN 650 MG: 325 TABLET ORAL at 18:44

## 2019-03-11 RX ADMIN — DIBASIC SODIUM PHOSPHATE, MONOBASIC POTASSIUM PHOSPHATE AND MONOBASIC SODIUM PHOSPHATE 1 TABLET: 852; 155; 130 TABLET ORAL at 19:42

## 2019-03-11 RX ADMIN — ASPIRIN 81 MG: 81 TABLET, COATED ORAL at 08:12

## 2019-03-11 RX ADMIN — GABAPENTIN 100 MG: 100 CAPSULE ORAL at 08:12

## 2019-03-11 RX ADMIN — GABAPENTIN 100 MG: 100 CAPSULE ORAL at 18:44

## 2019-03-11 RX ADMIN — HEPARIN SODIUM 5000 UNITS: 5000 INJECTION INTRAVENOUS; SUBCUTANEOUS at 13:20

## 2019-03-11 RX ADMIN — FELODIPINE 5 MG: 5 TABLET, FILM COATED, EXTENDED RELEASE ORAL at 08:13

## 2019-03-11 NOTE — PHYSICAL THERAPY NOTE
Physical Therapy Evaluation     Patient's Name: Lis Fierro    Admitting Diagnosis  Nausea [R11 0]  Abdominal pain [R10 9]  SAMARA (acute kidney injury) ([address] ) [N17 9]  Right upper quadrant abdominal pain [R10 11]    Problem List  Patient Active Problem List   Diagnosis    Aneurysm of thoracic aorta ([address] )    Essential hypertension    Atrial fibrillation, persistent ([address] )    Iliac artery aneurysm, bilateral ([address] )    Acquired hypothyroidism    Neuropathy    Pre-operative cardiovascular examination    Iliac artery aneurysm, right ([address] )    Encounter for Medicare annual wellness exam    Hematoma    URI (upper respiratory infection)    Cholecystitis    Acute blood loss anemia    Hypophosphatemia       Past Medical History  Past Medical History:   Diagnosis Date    Aneurysm of thoracic aorta ([address] )     Arrhythmia     Detached retina, right     Disease of thyroid gland     Gastric wall thickening     Headache     Hypertension     Iliac artery aneurysm, bilateral (HCC)     Irregular heart beat     afib cardioversion 1/30/17, x2     Neuropathy     bilateral feet    [facility]oxysmal A-fib [facility] )     Prostatic hypertrophy     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[facility])        Past Surgical History  Past Surgical History:   Procedure Laterality Date    ABDOMINAL AORTIC ANEURYSM REPAIR, ENDOVASCULAR Right 4/13/2018    Procedure: REPAIR ANEURYSM ILIAC endovascular  with coil embolization right hypogastric artery ;  Surgeon: Norma Mckinnon MD;  [facility];  Service: Vascular    CARDIOVERSION      CATARACT EXTRACTION      onset 11/17/2011    CHOLECYSTECTOMY LAPAROSCOPIC N/A 3/8/2019    Procedure: [id number] CHOLECYSTECTOMY;  Surgeon: Carola Tyson DO;  [facility];  Service: General    COLONOSCOPY      [place] EDG US EXAM SURGICAL ALTER STOM DUODENUM/JEJUNUM N/A 11/10/2017    Procedure: RADIAL ENDOSCOPIC U/S;  Surgeon: Jered John MD;  Location: [facility];   Service: Gastroenterology   Monique Pham RETINAL DETACHMENT SURGERY Right     onset 1992, retinal detachment complete air fill confirmed        03/11/19 1046   Note Type   Note type Eval only   Pain Assessment   Pain Assessment 0-10   Pain Score 2   Pain Type Surgical pain   Pain Location Incision   Pain Orientation Right   Pain Descriptors Aching   Pain Frequency Intermittent   Home Living   Type of [address] One level;Stairs to enter without rails   [address]   (none)   Prior Function   Level of [place] Independent with ADLs and functional mobility   Lives With Spouse   Receives Help From Family   ADL Assistance Independent   IADLs Independent   Falls in the last 6 months 0   Vocational Retired   Comments bikes ~2000miles/yr   Restrictions/Precautions   Wells Sam Bearing Precautions Per Order No   Other Precautions Pain   General   Family/Caregiver Present Yes  (wife)   Cognition   Overall Cognitive Status WFL   Arousal/Participation Cooperative   Orientation Level Oriented X4   Memory Within functional limits   Following Commands Follows all commands and directions without difficulty   RLE Assessment   RLE Assessment WFL   LLE Assessment   LLE Assessment WFL   Coordination   Movements are Fluid and Coordinated 1   Sensation   (neuropathy in feet at baseline)   Bed Mobility   Additional Comments received seated OOB   Transfers   Sit to Stand 5  Supervision   Additional items Armrests   Stand to Sit 5  Supervision   Additional items Armrests   Ambulation/Elevation   Gait pattern Wide LOYD  (toe out R>L)   Gait Assistance 5  Supervision   Assistive Device None   Distance 400'   Stair Management Assistance 5  Supervision   Additional items Assist x 1   Stair Management Technique One rail R;Reciprocal  (increased effort to descend reciprocally)   Number of Stairs 20   Balance   Static Sitting Normal   Dynamic Sitting Good   Static Standing Good   Dynamic Standing Fair +   Ambulatory Good   Endurance Deficit   Endurance Deficit Yes   Endurance Deficit Description not yet amb community distances   Activity Tolerance   Activity Tolerance Patient tolerated treatment well   Nurse Made Aware ok to see per Jud HAMILTON   Assessment   Prognosis Good   Problem List Decreased strength;Decreased endurance; Impaired balance;Decreased mobility;Pain   Assessment Pt is 71 y o  male seen for PT evaluation s/p admit to [facility] on 3/5/2019 w/ Cholecystitis  PT consulted to assess pt's functional mobility and d/c needs  Order placed for PT eval and tx, w/ up and OOB as tolerated order  Comorbidities affecting pt's physical performance at time of assessment include: afib, HTN, anemia  PTA, pt was independent w/ all functional mobility w/ no assistive device    Personal factors affecting pt at time of IE include: inability to navigate community distances and unable to perform dynamic tasks in community  Please find objective findings from PT assessment regarding body systems outlined above with impairments and limitations including weakness, impaired balance, decreased endurance, pain and decreased activity tolerance  The following objective measures performed on IE also reveal limitations: Barthel Index: 70/100  Pt's clinical presentation is currently stable   Pt is cleared from PT as pt has no further skilled needs  He and his wife have no concerns regarding mobility and he has been ambulating in the hallways with his wife  There is no immediate need for pt to go to the basement upon returning home and his wife can provide S and A as needed  From PT/mobility standpoint, recommendation at time of d/c would be home with family support  Barriers to Discharge None   Goals   Patient Goals to go home   STG Expiration Date 03/11/19   Short Term Goal #1 Pt has no additional therapy needs  DC from PT services     Treatment Day 0   Recommendation   Recommendation Home with family support   Equipment Recommended   (none) PT - OK to Discharge Yes   Barthel Index   Feeding 10   Bathing 0   Grooming Score 0   Dressing Score 5   Bladder Score 10   Bowels Score 10   Toilet Use Score 10   Transfers (Bed/Chair) Score 10   Mobility (Level Surface) Score 10   Stairs Score 5   Barthel Index Score 70             Elly Griffin, PT

## 2019-03-11 NOTE — OCCUPATIONAL THERAPY NOTE
Occupational Therapy Cancellation Note        Patient Name: Jerry Hdz  DSVHO'W Date: 3/11/2019    Consult received, chart reviewed  Pt received seated in chair on laptop with wife present  Pt declining evaluation this date and stating "I can do all of that" (toileting, dressing -wife will assist per pt/wife)  Educated pt on compensatory techniques for dressing and toileting, pt verbalize understanding, pt and wife with no further questions  Will discharge pt from caseload

## 2019-03-11 NOTE — PLAN OF CARE
Problem: PHYSICAL THERAPY ADULT  Goal: Performs mobility at highest level of function for planned discharge setting  See evaluation for individualized goals  Description     Equipment Recommended: (none)       See flowsheet documentation for full assessment, interventions and recommendations  Note:   Prognosis: Good  Problem List: Decreased strength, Decreased endurance, Impaired balance, Decreased mobility, Pain  Assessment: Pt is 71 y o  male seen for PT evaluation s/p admit to [facility] on 3/5/2019 w/ Cholecystitis  PT consulted to assess pt's functional mobility and d/c needs  Order placed for PT eval and tx, w/ up and OOB as tolerated order  Comorbidities affecting pt's physical performance at time of assessment include: afib, HTN, anemia  PTA, pt was independent w/ all functional mobility w/ no assistive device    Personal factors affecting pt at time of IE include: inability to navigate community distances and unable to perform dynamic tasks in community  Please find objective findings from PT assessment regarding body systems outlined above with impairments and limitations including weakness, impaired balance, decreased endurance, pain and decreased activity tolerance  The following objective measures performed on IE also reveal limitations: Barthel Index: 70/100  Pt's clinical presentation is currently stable   Pt is cleared from PT as pt has no further skilled needs  He and his wife have no concerns regarding mobility and he has been ambulating in the hallways with his wife  There is no immediate need for pt to go to the basement upon returning home and his wife can provide S and A as needed  From PT/mobility standpoint, recommendation at time of d/c would be home with family support  Barriers to Discharge: None     Recommendation: Home with family support     PT - OK to Discharge: Yes    See flowsheet documentation for full assessment

## 2019-03-11 NOTE — ASSESSMENT & PLAN NOTE
Was formerly taking flecainide and metoprolol, were discontinued due to bradycardia  Heart rates of stabilized  Restart anticoagulation when stable from surgical point of view

## 2019-03-11 NOTE — ASSESSMENT & PLAN NOTE
Significant blood loss noted during surgery  Hemoglobin appears to have stabilized    Will continue to monitor hemoglobin daily until discharge

## 2019-03-11 NOTE — ASSESSMENT & PLAN NOTE
Non visualization of the gallbladder on HIDA scan  Status post cholecystectomy on 03/8/2019  Postop care per surgery  Tolerating solid diet

## 2019-03-11 NOTE — PROGRESS NOTES
Progress Note - General Surgery   Narciso Morrow 71 y o  male MRN: [id number]  Unit/Bed#: [facility] 421-01 Encounter: [id number]    Assessment:  69M s/p lap converted to open gilma  POD3    - Passing gas  Tolerating regular diet  Ambulating   - Hgb stable this AM 9 from 8 4 yesterday afternoon from 8 2 yesterday AM     Plan:  Continue regular diet  Continue to hold Eliquis  Restart ASA  40 PO potassium this AM    Subjective/Objective   Subjective: Tolerating diet  Passing gas  No BM  Ambulating  Objective:   Blood pressure 111/57, pulse 97, temperature 98 2 °F (36 8 °C), temperature source Oral, resp  rate 18, height 6' 1" (1 854 m), weight 113 kg (249 lb 11 2 oz), SpO2 97 %  ,Body mass index is 32 94 kg/m²        Intake/Output Summary (Last 24 hours) at 3/11/2019 0617  Last data filed at 3/11/2019 0440  Gross per 24 hour   Intake 857 5 ml   Output 3475 ml   Net -2617 5 ml       Invasive Devices     Peripheral Intravenous Line            Peripheral IV 03/10/19 Right Forearm less than 1 day                Physical Exam:   Gen: NAD, AAOx3  CV: RRR  Pulm: no resp distress  Abd: Soft, non-distended, appropriately tender, incisions c/d/i        Lab, Imaging and other studies:  CBC:   Lab Results   Component Value Date    WBC 5 99 03/11/2019    HGB 9 0 (L) 03/11/2019    HCT 26 6 (L) 03/11/2019    MCV 89 03/11/2019     03/11/2019    MCH 30 2 03/11/2019    MCHC 33 8 03/11/2019    RDW 12 1 03/11/2019    MPV 9 6 03/11/2019    NRBC 0 03/11/2019   , CMP:   Lab Results   Component Value Date    SODIUM 143 03/11/2019    K 3 4 (L) 03/11/2019     03/11/2019    CO2 27 03/11/2019    BUN 12 03/11/2019    CREATININE 0 71 03/11/2019    CALCIUM 8 0 (L) 03/11/2019    EGFR 96 03/11/2019     VTE Pharmacologic Prophylaxis: Heparin  VTE Mechanical Prophylaxis: sequential compression device

## 2019-03-11 NOTE — PROGRESS NOTES
Progress Note - Paula Quijano 1950, 71 y o  male MRN: [id number]    Unit/Bed#: [address] 421-01 Encounter: [id number]    Primary Care Provider: Sherron Mccabe MD   Date and time admitted to hospital: 3/5/2019 12:11 AM        * Cholecystitis  Assessment & Plan  Non visualization of the gallbladder on HIDA scan  Status post cholecystectomy on 03/8/2019  Postop care per surgery  Tolerating solid diet  Atrial fibrillation, persistent ([address] )  Assessment & Plan  Was formerly taking flecainide and metoprolol, were discontinued due to bradycardia  Heart rates of stabilized  Restart anticoagulation when stable from surgical point of view    Essential hypertension  Assessment & Plan  controlled, continue felodipine with holding parameters    Hypophosphatemia  Assessment & Plan  Likely due to missing multiple meals  Continue supplementation and monitor daily until stabilized  Acute blood loss anemia  Assessment & Plan  Significant blood loss noted during surgery  Hemoglobin appears to have stabilized  Will continue to monitor hemoglobin daily until discharge          VTE Pharmacologic Prophylaxis:   Pharmacologic: Heparin  Mechanical VTE Prophylaxis in Place: Yes    Patient Centered Rounds: I have performed bedside rounds with nursing staff today  Education and Discussions with Family / Patient:  Patient wife, plan of care    Time Spent for Care: 20 minutes  More than 50% of total time spent on counseling and coordination of care as described above  Current Length of Stay: 5 day(s)    Current Patient Status: Inpatient   Certification Statement: The patient will continue to require additional inpatient hospital stay due to Awaiting postop stabilization    Discharge Plan:  Home when stable    Code Status: Level 1 - Full Code      Subjective:   Reports that his incisional pain has significantly improved but is still present especially when coughing  Tolerating diet without difficulty    Denies nausea or vomiting  Objective:     Vitals:   Temp (24hrs), Av 1 °F (36 7 °C), Min:98 °F (36 7 °C), Max:98 2 °F (36 8 °C)    Temp:  [98 °F (36 7 °C)-98 2 °F (36 8 °C)] 98 °F (36 7 °C)  HR:  [88-97] 95  Resp:  [18] 18  BP: (111-122)/(57-74) 115/70  SpO2:  [96 %-97 %] 96 %  Body mass index is 31 97 kg/m²  Input and Output Summary (last 24 hours): Intake/Output Summary (Last 24 hours) at 3/11/2019 1651  Last data filed at 3/11/2019 1511  Gross per 24 hour   Intake 1930 ml   Output 1775 ml   Net 155 ml       Physical Exam:     Physical Exam   Constitutional: He is oriented to person, place, and time  He appears well-developed and well-nourished  HENT:   Head: Normocephalic and atraumatic  Eyes: Pupils are equal, round, and reactive to light  EOM are normal    Neck: Neck supple  Cardiovascular: Normal rate and regular rhythm  Pulmonary/Chest: Effort normal  He has no wheezes  He has no rales  Musculoskeletal: He exhibits no edema  Neurological: He is alert and oriented to person, place, and time  Skin: Skin is warm and dry       Additional Data:     Labs:    Results from last 7 days   Lab Units 19  0444   WBC Thousand/uL 5 99   HEMOGLOBIN g/dL 9 0*   HEMATOCRIT % 26 6*   PLATELETS Thousands/uL 257   NEUTROS PCT % 67   LYMPHS PCT % 20   MONOS PCT % 8   EOS PCT % 3     Results from last 7 days   Lab Units 19  0441 03/10/19  0500   SODIUM mmol/L 143 137   POTASSIUM mmol/L 3 4* 3 8   CHLORIDE mmol/L 108 105   CO2 mmol/L 27 28   BUN mg/dL 12 18   CREATININE mg/dL 0 71 0 99   ANION GAP mmol/L 8 4   CALCIUM mg/dL 8 0* 7 7*   ALBUMIN g/dL  --  2 4*   TOTAL BILIRUBIN mg/dL  --  0 75   ALK PHOS U/L  --  105   ALT U/L  --  106*   AST U/L  --  64*   GLUCOSE RANDOM mg/dL 102 110             Results from last 7 days   Lab Units 19  0643   HEMOGLOBIN A1C % 5 7     Results from last 7 days   Lab Units 19  1331 19  0034   LACTIC ACID mmol/L 1 3 1 8           * I Have Reviewed All Lab Data Listed Above  * Additional Pertinent Lab Tests Reviewed: All Labs Within Last 24 Hours Reviewed      Recent Cultures (last 7 days):           Last 24 Hours Medication List:     Current Facility-Administered Medications:  acetaminophen 650 mg Oral Q6H PRN Jefe Mesa MD   acetaminophen 650 mg Oral Q6H [facility] Curtiss Mata Perez MD   aluminum-magnesium hydroxide-simethicone 30 mL Oral Q6H PRN Mata Perez MD   aspirin 81 mg Oral Daily Talita Nixon MD   atorvastatin 20 mg Oral Daily Mata Perez MD   diphenhydrAMINE 25 mg Oral Q6H PRN Jefe Mesa MD   docusate sodium 100 mg Oral BID PRN Mata Perez MD   docusate sodium 100 mg Oral BID Mata Perez MD   felodipine 5 mg Oral Daily Mata Perez MD   gabapentin 100 mg Oral BID Mata Perez MD   heparin (porcine) 5,000 Units Subcutaneous [facility] Herlinda Corcoran MD   HYDROmorphone 0 5 mg Intravenous Q4H PRN Mata Perez MD   levothyroxine 75 mcg Oral Early Morning Mata Perez MD   loratadine 10 mg Oral Daily Jefe Mesa MD   metoprolol 5 mg Intravenous Q6H PRN Anamika Caldera MD   oxyCODONE 10 mg Oral Q4H PRN Mata Perez MD   oxyCODONE 5 mg Oral Q4H PRN Mata Perez MD   phenol 1 spray Mouth/Throat Q2H PRN Jefe Mesa MD   potassium-sodium phosphateS 1 tablet Oral TID With Meals Jefe Mesa MD   senna 1 tablet Oral HS Mata Perez MD        Today, Patient Was Seen By: Olimpia Cooper MD    ** Please Note: Dictation voice to text software may have been used in the creation of this document   **

## 2019-03-12 ENCOUNTER — TRANSITIONAL CARE MANAGEMENT (OUTPATIENT)
Dept: INTERNAL MEDICINE CLINIC | Facility: CLINIC | Age: 69
End: 2019-03-12

## 2019-03-12 VITALS
WEIGHT: 238.54 LBS | BODY MASS INDEX: 31.61 KG/M2 | SYSTOLIC BLOOD PRESSURE: 111 MMHG | RESPIRATION RATE: 18 BRPM | OXYGEN SATURATION: 98 % | DIASTOLIC BLOOD PRESSURE: 65 MMHG | TEMPERATURE: 98.2 F | HEIGHT: 73 IN | HEART RATE: 92 BPM

## 2019-03-12 LAB
ALBUMIN SERPL BCP-MCNC: 2.8 G/DL (ref 3.5–5)
ALP SERPL-CCNC: 115 U/L (ref 46–116)
ALT SERPL W P-5'-P-CCNC: 94 U/L (ref 12–78)
ANION GAP SERPL CALCULATED.3IONS-SCNC: 6 MMOL/L (ref 4–13)
AST SERPL W P-5'-P-CCNC: 50 U/L (ref 5–45)
BASOPHILS # BLD AUTO: 0.04 THOUSANDS/ΜL (ref 0–0.1)
BASOPHILS NFR BLD AUTO: 1 % (ref 0–1)
BILIRUB DIRECT SERPL-MCNC: 0.18 MG/DL (ref 0–0.2)
BILIRUB SERPL-MCNC: 0.49 MG/DL (ref 0.2–1)
BUN SERPL-MCNC: 13 MG/DL (ref 5–25)
CALCIUM SERPL-MCNC: 8.3 MG/DL (ref 8.3–10.1)
CHLORIDE SERPL-SCNC: 110 MMOL/L (ref 100–108)
CO2 SERPL-SCNC: 28 MMOL/L (ref 21–32)
CREAT SERPL-MCNC: 0.86 MG/DL (ref 0.6–1.3)
EOSINOPHIL # BLD AUTO: 0.26 THOUSAND/ΜL (ref 0–0.61)
EOSINOPHIL NFR BLD AUTO: 3 % (ref 0–6)
ERYTHROCYTE [DISTWIDTH] IN BLOOD BY AUTOMATED COUNT: 12.5 % (ref 11.6–15.1)
GFR SERPL CREATININE-BSD FRML MDRD: 88 ML/MIN/1.73SQ M
GLUCOSE SERPL-MCNC: 100 MG/DL (ref 65–140)
HCT VFR BLD AUTO: 30.1 % (ref 36.5–49.3)
HGB BLD-MCNC: 9.9 G/DL (ref 12–17)
IMM GRANULOCYTES # BLD AUTO: 0.12 THOUSAND/UL (ref 0–0.2)
IMM GRANULOCYTES NFR BLD AUTO: 2 % (ref 0–2)
LYMPHOCYTES # BLD AUTO: 1.32 THOUSANDS/ΜL (ref 0.6–4.47)
LYMPHOCYTES NFR BLD AUTO: 17 % (ref 14–44)
MCH RBC QN AUTO: 30.1 PG (ref 26.8–34.3)
MCHC RBC AUTO-ENTMCNC: 32.9 G/DL (ref 31.4–37.4)
MCV RBC AUTO: 92 FL (ref 82–98)
MONOCYTES # BLD AUTO: 0.75 THOUSAND/ΜL (ref 0.17–1.22)
MONOCYTES NFR BLD AUTO: 10 % (ref 4–12)
NEUTROPHILS # BLD AUTO: 5.42 THOUSANDS/ΜL (ref 1.85–7.62)
NEUTS SEG NFR BLD AUTO: 67 % (ref 43–75)
NRBC BLD AUTO-RTO: 0 /100 WBCS
PHOSPHATE SERPL-MCNC: 2.4 MG/DL (ref 2.3–4.1)
PLATELET # BLD AUTO: 335 THOUSANDS/UL (ref 149–390)
PMV BLD AUTO: 9.9 FL (ref 8.9–12.7)
POTASSIUM SERPL-SCNC: 4 MMOL/L (ref 3.5–5.3)
PROT SERPL-MCNC: 6 G/DL (ref 6.4–8.2)
RBC # BLD AUTO: 3.29 MILLION/UL (ref 3.88–5.62)
SODIUM SERPL-SCNC: 144 MMOL/L (ref 136–145)
WBC # BLD AUTO: 7.91 THOUSAND/UL (ref 4.31–10.16)

## 2019-03-12 PROCEDURE — 85025 COMPLETE CBC W/AUTO DIFF WBC: CPT | Performed by: SURGERY

## 2019-03-12 PROCEDURE — 99024 POSTOP FOLLOW-UP VISIT: CPT | Performed by: SURGERY

## 2019-03-12 PROCEDURE — 99233 SBSQ HOSP IP/OBS HIGH 50: CPT | Performed by: INTERNAL MEDICINE

## 2019-03-12 PROCEDURE — 80048 BASIC METABOLIC PNL TOTAL CA: CPT | Performed by: SURGERY

## 2019-03-12 PROCEDURE — 80076 HEPATIC FUNCTION PANEL: CPT | Performed by: INTERNAL MEDICINE

## 2019-03-12 PROCEDURE — 84100 ASSAY OF PHOSPHORUS: CPT | Performed by: INTERNAL MEDICINE

## 2019-03-12 RX ORDER — ACETAMINOPHEN 325 MG/1
650 TABLET ORAL EVERY 6 HOURS PRN
Qty: 30 TABLET | Refills: 0 | Status: SHIPPED | OUTPATIENT
Start: 2019-03-12

## 2019-03-12 RX ORDER — OXYCODONE HYDROCHLORIDE 5 MG/1
5 TABLET ORAL EVERY 4 HOURS PRN
Qty: 30 TABLET | Refills: 0 | Status: SHIPPED | OUTPATIENT
Start: 2019-03-12 | End: 2019-03-22

## 2019-03-12 RX ORDER — SENNOSIDES 8.6 MG
1 TABLET ORAL
Qty: 120 EACH | Refills: 0 | Status: SHIPPED | OUTPATIENT
Start: 2019-03-12 | End: 2019-07-16 | Stop reason: ALTCHOICE

## 2019-03-12 RX ORDER — OXYCODONE HYDROCHLORIDE 5 MG/1
5 TABLET ORAL EVERY 4 HOURS PRN
Qty: 30 TABLET | Refills: 0 | Status: SHIPPED | OUTPATIENT
Start: 2019-03-12 | End: 2019-03-12

## 2019-03-12 RX ADMIN — ASPIRIN 81 MG: 81 TABLET, COATED ORAL at 08:11

## 2019-03-12 RX ADMIN — DOCUSATE SODIUM 100 MG: 100 CAPSULE, LIQUID FILLED ORAL at 08:11

## 2019-03-12 RX ADMIN — DIBASIC SODIUM PHOSPHATE, MONOBASIC POTASSIUM PHOSPHATE AND MONOBASIC SODIUM PHOSPHATE 1 TABLET: 852; 155; 130 TABLET ORAL at 08:11

## 2019-03-12 RX ADMIN — LEVOTHYROXINE SODIUM 75 MCG: 75 TABLET ORAL at 05:13

## 2019-03-12 RX ADMIN — GABAPENTIN 100 MG: 100 CAPSULE ORAL at 08:11

## 2019-03-12 RX ADMIN — ACETAMINOPHEN 650 MG: 325 TABLET ORAL at 05:13

## 2019-03-12 RX ADMIN — HEPARIN SODIUM 5000 UNITS: 5000 INJECTION INTRAVENOUS; SUBCUTANEOUS at 05:13

## 2019-03-12 RX ADMIN — APIXABAN 5 MG: 5 TABLET, FILM COATED ORAL at 11:44

## 2019-03-12 RX ADMIN — FELODIPINE 5 MG: 5 TABLET, FILM COATED, EXTENDED RELEASE ORAL at 08:11

## 2019-03-12 NOTE — PROGRESS NOTES
Nurse / Provider rounds completed    Patient being discharged to home, instructions reviewed with patient and wife

## 2019-03-12 NOTE — DISCHARGE INSTRUCTIONS
Cholecystitis   WHAT YOU NEED TO KNOW:   Cholecystitis is inflammation of your gallbladder  Your gallbladder stores bile, which helps break down the fat that you eat  Your gallbladder also helps remove certain chemicals from your body  You may have a sudden, severe attack (acute cholecystitis) or several mild attacks (chronic cholecystitis)  DISCHARGE INSTRUCTIONS:   Call 911 for any of the following:   · You have chest pain or trouble breathing  Return to the emergency department if:   · You have severe pain in your abdomen  · You urinate less than usual   Contact your healthcare provider if:   · You have a fever or chills  · You have pain when you urinate  · Your skin or eyes turn yellow  · You have questions or concerns about your condition or care  Medicines: You may need any of the following:  · Antibiotics  treat a bacterial infection  · Prescription pain medicine  may be given  Ask your healthcare provider how to take this medicine safely  Some prescription pain medicines contain acetaminophen  Do not take other medicines that contain acetaminophen without talking to your healthcare provider  Too much acetaminophen may cause liver damage  Prescription pain medicine may cause constipation  Ask your healthcare provider how to prevent or treat constipation  · NSAIDs , such as ibuprofen, help decrease swelling, pain, and fever  This medicine is available with or without a doctor's order  NSAIDs can cause stomach bleeding or kidney problems in certain people  If you take blood thinner medicine, always ask your healthcare provider if NSAIDs are safe for you  Always read the medicine label and follow directions  · Take your medicine as directed  Contact your healthcare provider if you think your medicine is not helping or if you have side effects  Tell him of her if you are allergic to any medicine  Keep a list of the medicines, vitamins, and herbs you take   Include the amounts, and when and why you take them  Bring the list or the pill bottles to follow-up visits  Carry your medicine list with you in case of an emergency  Eat a variety of healthy foods: This may decrease your symptoms  Healthy foods include fruit, vegetables, whole-grain breads, low-fat dairy products, beans, lean meat, and fish  Ask if you need to be on a special diet  Follow up with your healthcare provider as directed:  Write down your questions so you remember to ask them during your visits  © 2017 [employer] Information is for End User's use only and may not be sold, redistributed or otherwise used for commercial purposes  All illustrations and images included in CareNotes® are the copyrighted property of A [employer] M [employer] Inc  or Carmine Jackson  The above information is an  only  It is not intended as medical advice for individual conditions or treatments  Talk to your doctor, nurse or pharmacist before following any medical regimen to see if it is safe and effective for you

## 2019-03-12 NOTE — NURSING NOTE
Patient and wife have multiple questions about plan for discharge, I spoke with red surgery who stated they would be by before lunch time to speak with the patient and wife  Noted  I relayed this information to the patient

## 2019-03-12 NOTE — DISCHARGE SUMMARY
Discharge Summary - Nata Edgar 71 y o  male MRN: [id number]    Unit/Bed#: [facility] 421-01 Encounter: [id number]    Admission Date:   Admission Orders (From admission, onward)    Ordered        03/08/19 1308  Admit Patient to  Once     Order ID Start Status   [id number] 03/08/19 1309 Completed          03/08/19 1253  Admit Patient to  Once     Order ID Start Status   [id number] 03/08/19 1254 Completed          03/06/19 1117  Inpatient Admission  Once     Order ID Start Status   [id number] 03/06/19 1118 Completed          03/05/19 0306  Place in Observation  Once     Order ID Start Status   [id number] 03/05/19 0259 Completed                Admitting Diagnosis: Nausea [R11 0]  Abdominal pain [R10 9]  SAMARA (acute kidney injury) ([address] ) [N17 9]  Right upper quadrant abdominal pain [R10 11]    HPI: per resident Tamica Pill:  "Nata Edgar is a 71 y o  male who presents with abdominal pain and an abnormal HIDA scan  Patient had 24 hours of abdominal pain associated with nausea and vomiting  Never had pain like this before  It occurred after dinner and persisted through the morning  A RUQ US was negative for acute cholecystitis  The patient's pain resolved and he toelrated a clear liquid diet, but a HIDA was obtained anyway  This demonstrated lack of GB filling "          Procedures Performed:   Orders Placed This Encounter   Procedures    ED ECG Documentation Only       Summary of Hospital Course: 70 y/o male admitted with abdominal pain and an abnormal Hida scan  US demonstrated acute cholecystitis, Hida demonstrated lack of GB filling  Had a lap converted ti an open gilma  Did well, tolerated a diet post-op and will be discharged  For details please refer to medical records  Follow up with surgeon in 2 weeks      Significant Findings, Care, Treatment and Services Provided: Nm Hepatobiliary    Result Date: 3/6/2019  Impression: Gallbladder was nonvisualized even after administration of morphine compatible with acute cholecystitis  Enterogastric reflux  These findings were directly related to clinician caring for patient at time of dictation Workstation performed: [id number]     Cta Dissection Protocol Chest Abdomen Pelvis W Wo Contrast    Result Date: 3/5/2019  Impression: Stable postsurgical changes in the right common iliac artery  Workstation performed: [id number]     Us Right Upper Quadrant    Result Date: 3/6/2019  Impression: There appears to be minimal sludge in the gallbladder  There is hepatomegaly  No evidence of acute cholecystitis based on this exam  Workstation performed: [id number]       Complications: none    Discharge Diagnosis: Abdominal Pain  Lap Terrie converted to open Cholecyectomy    Resolved Problems  Date Reviewed: 3/12/2019    None          Condition at Discharge: stable         Discharge instructions/Information to patient and family:   See after visit summary for information provided to patient and family  Provisions for Follow-Up Care:  See after visit summary for information related to follow-up care and any pertinent home health orders  PCP: Rosalia Salamanca MD    Disposition: Home    Planned Readmission: No    Discharge Statement   I spent 30 minutes discharging the patient  This time was spent on the day of discharge  I had direct contact with the patient on the day of discharge  Additional documentation is required if more than 30 minutes were spent on discharge  Discharge Medications:  See after visit summary for reconciled discharge medications provided to patient and family

## 2019-03-12 NOTE — PROGRESS NOTES
Progress Note - General Surgery   Diamond Alvarez 71 y o  male MRN: [id number]  Unit/Bed#: [facility] 421-01 Encounter: [id number]    Assessment:  69M s/p lap converted to open gilma  POD4    - Tolerating diet  Having bowel movements    Plan:  Discharge planning  Continue regular diet  Will restart Eliquis if Hgb stable on this morning CBC  Cont ASA      Subjective/Objective   Subjective: Tolerating diet  Having bowel function  Objective:   Blood pressure 105/56, pulse 86, temperature 98 2 °F (36 8 °C), temperature source Oral, resp  rate 18, height 6' 1" (1 854 m), weight 108 kg (238 lb 8 6 oz), SpO2 95 %  ,Body mass index is 31 47 kg/m²        Intake/Output Summary (Last 24 hours) at 3/12/2019 0551  Last data filed at 3/12/2019 0408  Gross per 24 hour   Intake 1480 ml   Output 1225 ml   Net 255 ml       Invasive Devices     Peripheral Intravenous Line            Peripheral IV 03/10/19 Right Forearm 1 day                Physical Exam:   Gen: NAD, AAOx3  CV: RRR  Pulm: no resp distress  Abd: Soft, non-distended, appropriately tender, incisions c/d/i        Lab, Imaging and other studies:  CBC:   No results found for: WBC, HGB, HCT, MCV, PLT, ADJUSTEDWBC, MCH, MCHC, RDW, MPV, NRBC, CMP:   Lab Results   Component Value Date    SODIUM 144 03/12/2019    K 4 0 03/12/2019     (H) 03/12/2019    CO2 28 03/12/2019    BUN 13 03/12/2019    CREATININE 0 86 03/12/2019    CALCIUM 8 3 03/12/2019    EGFR 88 03/12/2019     VTE Pharmacologic Prophylaxis: Heparin  VTE Mechanical Prophylaxis: sequential compression device

## 2019-03-12 NOTE — PROGRESS NOTES
No significant events occurred during the shift  Pt's abdominal incision stapled, approximated, and MAISHA with no drainage  Vital signs WNL  Pt expressed minimal incisional pain 2/10 with current pain regimen being effective  Hemoglobin stable at 9 0  Pt ready to go home when medically stable

## 2019-03-12 NOTE — PLAN OF CARE
Problem: Potential for Falls  Goal: Patient will remain free of falls  Description  INTERVENTIONS:  - Assess patient frequently for physical needs  -  Identify cognitive and physical deficits and behaviors that affect risk of falls    -  [place] fall precautions as indicated by assessment   - Educate patient/family on patient safety including physical limitations  - Instruct patient to call for assistance with activity based on assessment  - Modify environment to reduce risk of injury  - Consider OT/PT consult to assist with strengthening/mobility  Outcome: Progressing

## 2019-03-12 NOTE — PROGRESS NOTES
Progress Note - Diamond Alvarez 1950, 71 y o  male MRN: [id number]    Unit/Bed#: [address] 421-01 Encounter: [id number]    Primary Care Provider: Lisa Tyler MD   Date and time admitted to hospital: 3/5/2019 12:11 AM        Acute blood loss anemia  Assessment & Plan  Significant blood loss noted during surgery  Hemoglobin appears to have stabilized  Will continue to monitor hemoglobin daily until discharge      Atrial fibrillation, persistent ([address] )  Assessment & Plan  Was formerly taking flecainide and metoprolol, were discontinued due to bradycardia  Heart rates of stabilized  Restart anticoagulation when stable from surgical point of view    Essential hypertension  Assessment & Plan  controlled, continue felodipine with holding parameters    * Cholecystitis  Assessment & Plan  Non visualization of the gallbladder on HIDA scan  Status post cholecystectomy on 03/8/2019  Postop care per surgery  Tolerating solid diet

## 2019-03-12 NOTE — DISCHARGE INSTR - DIET
Please contact Central Scheduling at [phone] to schedule an appointment with a registered dietitian to address any further questions you have on your specific diet

## 2019-03-19 ENCOUNTER — OFFICE VISIT (OUTPATIENT)
Dept: INTERNAL MEDICINE CLINIC | Facility: CLINIC | Age: 69
End: 2019-03-19
Payer: MEDICARE

## 2019-03-19 VITALS
HEART RATE: 77 BPM | HEIGHT: 73 IN | TEMPERATURE: 97.6 F | SYSTOLIC BLOOD PRESSURE: 113 MMHG | OXYGEN SATURATION: 95 % | BODY MASS INDEX: 31.09 KG/M2 | WEIGHT: 234.6 LBS | DIASTOLIC BLOOD PRESSURE: 82 MMHG

## 2019-03-19 DIAGNOSIS — K81.9 CHOLECYSTITIS: ICD-10-CM

## 2019-03-19 DIAGNOSIS — I48.19 ATRIAL FIBRILLATION, PERSISTENT (HCC): Primary | ICD-10-CM

## 2019-03-19 DIAGNOSIS — I10 ESSENTIAL HYPERTENSION: ICD-10-CM

## 2019-03-19 DIAGNOSIS — E03.9 ACQUIRED HYPOTHYROIDISM: ICD-10-CM

## 2019-03-19 PROCEDURE — 1111F DSCHRG MED/CURRENT MED MERGE: CPT | Performed by: INTERNAL MEDICINE

## 2019-03-19 PROCEDURE — 99495 TRANSJ CARE MGMT MOD F2F 14D: CPT | Performed by: INTERNAL MEDICINE

## 2019-03-19 NOTE — PROGRESS NOTES
Assessment/Plan:    Atrial fibrillation, persistent (HCC)  Rate controlled, continue meds including anticoagulation    Essential hypertension  Controlled, continue med    Acquired hypothyroidism  Continue levothyroxine    Cholecystitis  Pt doing well after cholecystectomy       Diagnoses and all orders for this visit:    Atrial fibrillation, persistent ([address] )    Essential hypertension    Acquired hypothyroidism    Cholecystitis          Subjective:   TCM Call (since 2/16/2019)     Date and time call was made  3/12/2019  1:47 PM    Hospital care reviewed  Records reviewed    Patient was hospitialized at  [facility]    Date of Admission  03/05/19    Date of discharge  03/12/19    Diagnosis  Cholecystitis    Disposition  Home    Were the patients medications reviewed and updated  No    Current Symptoms  Fatigue      TCM Call (since 2/16/2019)     Post hospital issues  None    Should patient be enrolled in anticoag monitoring? No    Scheduled for follow up? Yes    I have advised the patient to call PCP with any new or worsening symptoms  Kristine Govea,     Are you recieving any outpatient services  No    Are you recieving home care services  No    Are you using any community resources  No    Have you fallen in the last 12 months  No    Interperter language line needed  No    Counseling  Patient             Patient ID: Harinder Chi is a 71 y o  male  I reviewed patient's hospitalization for abdominal pain with abnormal HIDA scan showing lack of gallbladder filling, he had the lap cholecystectomy converted to open cholecystectomy  Since home, patient denies any fevers chills or sweats, no urine or bowel problems, no calf pain or swelling, no cardiopulmonary complaints  He does have some mild incisional pain for which she takes Tylenol and it helps  He is tolerating a regular diet  He did not fill a prescription for the narcotic      Atrial fibrillation:  No palpitations, no bleeding problems  The following portions of the patient's history were reviewed and updated as appropriate: allergies, current medications, past family history, past medical history, past social history, past surgical history and problem list     Review of Systems   Constitutional: Negative for chills, fatigue and fever  HENT: Negative for congestion, nosebleeds, postnasal drip, sore throat and trouble swallowing  Eyes: Negative for pain  Respiratory: Negative for cough, chest tightness, shortness of breath and wheezing  Cardiovascular: Negative for chest pain, palpitations and leg swelling  Gastrointestinal: Negative for abdominal pain, constipation, diarrhea, nausea and vomiting  Endocrine: Negative for polydipsia and polyuria  Genitourinary: Negative for dysuria, flank pain and hematuria  Musculoskeletal: Negative for arthralgias  Skin: Negative for rash  Neurological: Negative for dizziness, tremors and headaches  Hematological: Does not bruise/bleed easily  Psychiatric/Behavioral: Negative for confusion and dysphoric mood  The patient is not nervous/anxious  Objective:      /82   Pulse 77   Temp 97 6 °F (36 4 °C)   Ht 6' 1" (1 854 m)   Wt 106 kg (234 lb 9 6 oz)   SpO2 95%   BMI 30 95 kg/m²          Physical Exam   Constitutional: He is oriented to person, place, and time  He appears well-developed and well-nourished  No distress  HENT:   Head: Normocephalic and atraumatic  Right Ear: External ear normal    Left Ear: External ear normal    Eyes: Conjunctivae are normal  No scleral icterus  Neck: Normal range of motion  Neck supple  No tracheal deviation present  No thyromegaly present  Cardiovascular: Normal rate and normal heart sounds  An irregularly irregular rhythm present  No murmur heard  Pulmonary/Chest: Effort normal and breath sounds normal  No respiratory distress  He has no wheezes  He has no rales  Abdominal: Soft   Bowel sounds are normal  There is no tenderness  There is no rebound and no guarding  Musculoskeletal: He exhibits no edema  Lymphadenopathy:     He has no cervical adenopathy  Neurological: He is alert and oriented to person, place, and time  Skin:   Incision intact with no significant erythema, no drainage, he does have a slight amount of erythema at each of the staple insertion points   Psychiatric: He has a normal mood and affect  His behavior is normal  Judgment and thought content normal    Vitals reviewed

## 2019-03-19 NOTE — PATIENT INSTRUCTIONS
Problem List Items Addressed This Visit        Digestive    Cholecystitis     Pt doing well after cholecystectomy            Endocrine    Acquired hypothyroidism     Continue levothyroxine            Cardiovascular and Mediastinum    Essential hypertension     Controlled, continue med         Atrial fibrillation, persistent (HCC) - Primary     Rate controlled, continue meds including anticoagulation

## 2019-03-28 ENCOUNTER — OFFICE VISIT (OUTPATIENT)
Dept: SURGERY | Facility: CLINIC | Age: 69
End: 2019-03-28

## 2019-03-28 VITALS
HEIGHT: 73 IN | WEIGHT: 237.4 LBS | BODY MASS INDEX: 31.46 KG/M2 | SYSTOLIC BLOOD PRESSURE: 118 MMHG | DIASTOLIC BLOOD PRESSURE: 88 MMHG | TEMPERATURE: 96.7 F

## 2019-03-28 DIAGNOSIS — K81.9 CHOLECYSTITIS: Primary | ICD-10-CM

## 2019-03-28 PROCEDURE — 99024 POSTOP FOLLOW-UP VISIT: CPT | Performed by: SURGERY

## 2019-03-28 PROCEDURE — 1124F ACP DISCUSS-NO DSCNMKR DOCD: CPT | Performed by: SURGERY

## 2019-04-04 ENCOUNTER — OFFICE VISIT (OUTPATIENT)
Dept: VASCULAR SURGERY | Facility: CLINIC | Age: 69
End: 2019-04-04
Payer: MEDICARE

## 2019-04-04 VITALS
SYSTOLIC BLOOD PRESSURE: 116 MMHG | TEMPERATURE: 97.1 F | DIASTOLIC BLOOD PRESSURE: 82 MMHG | BODY MASS INDEX: 31.14 KG/M2 | RESPIRATION RATE: 18 BRPM | HEIGHT: 73 IN | HEART RATE: 72 BPM | WEIGHT: 235 LBS

## 2019-04-04 DIAGNOSIS — I72.3 ILIAC ARTERY ANEURYSM, RIGHT (HCC): Primary | ICD-10-CM

## 2019-04-04 DIAGNOSIS — I72.3 ILIAC ARTERY ANEURYSM, BILATERAL (HCC): ICD-10-CM

## 2019-04-04 PROCEDURE — 99213 OFFICE O/P EST LOW 20 MIN: CPT | Performed by: SURGERY

## 2019-04-04 RX ORDER — SENNA PLUS 8.6 MG/1
TABLET ORAL
Refills: 0 | COMMUNITY
Start: 2019-03-12 | End: 2019-07-16 | Stop reason: ALTCHOICE

## 2019-04-05 ENCOUNTER — OFFICE VISIT (OUTPATIENT)
Dept: NEUROLOGY | Facility: CLINIC | Age: 69
End: 2019-04-05
Payer: MEDICARE

## 2019-04-05 VITALS
SYSTOLIC BLOOD PRESSURE: 118 MMHG | DIASTOLIC BLOOD PRESSURE: 73 MMHG | WEIGHT: 230.8 LBS | BODY MASS INDEX: 30.59 KG/M2 | HEART RATE: 75 BPM | HEIGHT: 73 IN

## 2019-04-05 DIAGNOSIS — G89.29 CHRONIC NONINTRACTABLE HEADACHE, UNSPECIFIED HEADACHE TYPE: ICD-10-CM

## 2019-04-05 DIAGNOSIS — G62.9 NEUROPATHY: Primary | ICD-10-CM

## 2019-04-05 DIAGNOSIS — R51.9 CHRONIC NONINTRACTABLE HEADACHE, UNSPECIFIED HEADACHE TYPE: ICD-10-CM

## 2019-04-05 PROBLEM — G44.89 OTHER HEADACHE SYNDROME: Status: ACTIVE | Noted: 2019-04-05

## 2019-04-05 PROCEDURE — 99214 OFFICE O/P EST MOD 30 MIN: CPT | Performed by: PSYCHIATRY & NEUROLOGY

## 2019-04-05 RX ORDER — GABAPENTIN 100 MG/1
100 CAPSULE ORAL 2 TIMES DAILY
Qty: 180 CAPSULE | Refills: 3 | Status: SHIPPED | OUTPATIENT
Start: 2019-04-05 | End: 2019-10-15 | Stop reason: SDUPTHER

## 2019-07-16 ENCOUNTER — OFFICE VISIT (OUTPATIENT)
Dept: INTERNAL MEDICINE CLINIC | Facility: CLINIC | Age: 69
End: 2019-07-16
Payer: MEDICARE

## 2019-07-16 VITALS
OXYGEN SATURATION: 96 % | HEIGHT: 73 IN | HEART RATE: 79 BPM | SYSTOLIC BLOOD PRESSURE: 114 MMHG | BODY MASS INDEX: 31.44 KG/M2 | TEMPERATURE: 98.5 F | WEIGHT: 237.2 LBS | DIASTOLIC BLOOD PRESSURE: 80 MMHG

## 2019-07-16 DIAGNOSIS — I48.19 ATRIAL FIBRILLATION, PERSISTENT (HCC): ICD-10-CM

## 2019-07-16 DIAGNOSIS — R21 RASH: ICD-10-CM

## 2019-07-16 DIAGNOSIS — Z12.5 SCREENING FOR PROSTATE CANCER: ICD-10-CM

## 2019-07-16 DIAGNOSIS — E03.9 ACQUIRED HYPOTHYROIDISM: Primary | ICD-10-CM

## 2019-07-16 DIAGNOSIS — I10 ESSENTIAL HYPERTENSION: ICD-10-CM

## 2019-07-16 PROCEDURE — 99214 OFFICE O/P EST MOD 30 MIN: CPT | Performed by: INTERNAL MEDICINE

## 2019-07-16 RX ORDER — NYSTATIN 100000 [USP'U]/G
POWDER TOPICAL 2 TIMES DAILY
Qty: 60 G | Refills: 1 | Status: SHIPPED | OUTPATIENT
Start: 2019-07-16 | End: 2020-11-17 | Stop reason: SDUPTHER

## 2019-07-16 NOTE — PROGRESS NOTES
Assessment/Plan:    Acquired hypothyroidism  Continue levothyroxine, and will recheck TFTs before next visit  Atrial fibrillation, persistent (HCC)  Rate controlled, continue meds including anticoagulation    Essential hypertension  Controlled, continue current meds along with healthy diet and exercise    Rash  Will treat with nystatin powder, follow up if not improved       Diagnoses and all orders for this visit:    Acquired hypothyroidism  -     CBC and differential; Future  -     Comprehensive metabolic panel; Future  -     Lipid Panel with Direct LDL reflex; Future  -     TSH, 3rd generation with Free T4 reflex; Future    Atrial fibrillation, persistent ([address] )    Essential hypertension    Screening for prostate cancer  -     PSA, Total Screen; Future    Rash  -     nystatin (MYCOSTATIN) powder; Apply topically 2 (two) times a day          Subjective:      Patient ID: Katie Medley is a 71 y o  male  Atrial fibrillation:  No bleeding problems, no palpitations  Peripheral neuropathy:  Patient has been following with Neurology for this, he is on gabapentin for this, and this was increased from 100 mg daily to 200 mg daily  Hypothyroidism:  Patient reports compliance with thyroid med, does have some mild fatigue, no constipation, no cold intolerance, no depression  Hypertension:  Patient reports compliance with meds, no ankle swelling, no constipation, no significant lightheadedness  The following portions of the patient's history were reviewed and updated as appropriate: allergies, current medications, past family history, past medical history, past social history, past surgical history and problem list     Review of Systems   Constitutional: Positive for fatigue (mild)  Negative for chills and fever  HENT: Negative for congestion, nosebleeds, postnasal drip, sore throat and trouble swallowing  Eyes: Negative for pain     Respiratory: Negative for cough, chest tightness, shortness of breath and wheezing  Cardiovascular: Negative for chest pain, palpitations and leg swelling  Gastrointestinal: Negative for abdominal pain, constipation, diarrhea, nausea and vomiting  Endocrine: Negative for polydipsia and polyuria  Genitourinary: Negative for dysuria, flank pain and hematuria  Musculoskeletal: Negative for arthralgias  Skin: Positive for rash  Neurological: Negative for dizziness, tremors and headaches  Hematological: Does not bruise/bleed easily  Psychiatric/Behavioral: Negative for confusion and dysphoric mood  The patient is not nervous/anxious  Objective:      /80   Pulse 79   Temp 98 5 °F (36 9 °C)   Ht 6' 1" (1 854 m)   Wt 108 kg (237 lb 3 2 oz)   SpO2 96%   BMI 31 29 kg/m²          Physical Exam   Constitutional: He is oriented to person, place, and time  He appears well-developed and well-nourished  No distress  HENT:   Head: Normocephalic and atraumatic  Right Ear: External ear normal    Left Ear: External ear normal    Eyes: Conjunctivae are normal  No scleral icterus  Neck: Normal range of motion  Neck supple  No tracheal deviation present  No thyromegaly present  Cardiovascular: Normal rate and normal heart sounds  An irregularly irregular rhythm present  No murmur heard  Pulmonary/Chest: Effort normal and breath sounds normal  No respiratory distress  He has no wheezes  He has no rales  Abdominal: Soft  Bowel sounds are normal  There is no tenderness  There is no rebound and no guarding  Musculoskeletal: He exhibits no edema  Lymphadenopathy:     He has no cervical adenopathy  Neurological: He is alert and oriented to person, place, and time  Skin:   Erythematous patches in creases in groin   Psychiatric: He has a normal mood and affect  His behavior is normal  Judgment and thought content normal    Vitals reviewed  BMI Counseling: Body mass index is 31 29 kg/m²  Discussed the patient's BMI with him  The BMI is above average  BMI counseling and education was provided to the patient  Nutrition recommendations include reducing portion sizes, decreasing overall calorie intake, 3-5 servings of fruits/vegetables daily, reducing fast food intake, consuming healthier snacks, decreasing soda and/or juice intake, moderation in carbohydrate intake and increasing intake of lean protein  Exercise recommendations include exercising 3-5 times per week

## 2019-07-16 NOTE — PATIENT INSTRUCTIONS
Problem List Items Addressed This Visit        Endocrine    Acquired hypothyroidism - Primary     Continue levothyroxine, and will recheck TFTs before next visit           Relevant Orders    CBC and differential    Comprehensive metabolic panel    Lipid Panel with Direct LDL reflex    TSH, 3rd generation with Free T4 reflex       Cardiovascular and Mediastinum    Essential hypertension     Controlled, continue current meds along with healthy diet and exercise         Atrial fibrillation, persistent (HCC)     Rate controlled, continue meds including anticoagulation            Musculoskeletal and Integument    Rash     Will treat with nystatin powder, follow up if not improved         Relevant Medications    nystatin (MYCOSTATIN) powder      Other Visit Diagnoses     Screening for prostate cancer        Relevant Orders    PSA, Total Screen

## 2019-09-10 ENCOUNTER — TELEPHONE (OUTPATIENT)
Dept: NEUROLOGY | Facility: CLINIC | Age: 69
End: 2019-09-10

## 2019-09-24 NOTE — TELEPHONE ENCOUNTER
Patient called back to r/s for Dr Chris Chanel 4/7/20 @ [facility] for Narendra to put patient in

## 2019-10-04 ENCOUNTER — LAB (OUTPATIENT)
Dept: LAB | Facility: CLINIC | Age: 69
End: 2019-10-04
Payer: MEDICARE

## 2019-10-04 ENCOUNTER — HOSPITAL ENCOUNTER (OUTPATIENT)
Dept: NON INVASIVE DIAGNOSTICS | Facility: CLINIC | Age: 69
Discharge: HOME | End: 2019-10-04
Payer: MEDICARE

## 2019-10-04 DIAGNOSIS — Z12.5 SCREENING FOR PROSTATE CANCER: ICD-10-CM

## 2019-10-04 DIAGNOSIS — E03.9 ACQUIRED HYPOTHYROIDISM: ICD-10-CM

## 2019-10-04 DIAGNOSIS — I72.3 ILIAC ARTERY ANEURYSM, BILATERAL (HCC): ICD-10-CM

## 2019-10-04 LAB
ALBUMIN SERPL BCP-MCNC: 4.1 G/DL (ref 3.5–5)
ALP SERPL-CCNC: 101 U/L (ref 46–116)
ALT SERPL W P-5'-P-CCNC: 34 U/L (ref 12–78)
ANION GAP SERPL CALCULATED.3IONS-SCNC: 4 MMOL/L (ref 4–13)
AST SERPL W P-5'-P-CCNC: 22 U/L (ref 5–45)
BASOPHILS # BLD AUTO: 0.06 THOUSANDS/ΜL (ref 0–0.1)
BASOPHILS NFR BLD AUTO: 1 % (ref 0–1)
BILIRUB SERPL-MCNC: 0.83 MG/DL (ref 0.2–1)
BUN SERPL-MCNC: 16 MG/DL (ref 5–25)
CALCIUM SERPL-MCNC: 9.3 MG/DL (ref 8.3–10.1)
CHLORIDE SERPL-SCNC: 108 MMOL/L (ref 100–108)
CHOLEST SERPL-MCNC: 149 MG/DL (ref 50–200)
CO2 SERPL-SCNC: 29 MMOL/L (ref 21–32)
CREAT SERPL-MCNC: 0.99 MG/DL (ref 0.6–1.3)
EOSINOPHIL # BLD AUTO: 0.16 THOUSAND/ΜL (ref 0–0.61)
EOSINOPHIL NFR BLD AUTO: 3 % (ref 0–6)
ERYTHROCYTE [DISTWIDTH] IN BLOOD BY AUTOMATED COUNT: 15.8 % (ref 11.6–15.1)
GFR SERPL CREATININE-BSD FRML MDRD: 77 ML/MIN/1.73SQ M
GLUCOSE P FAST SERPL-MCNC: 98 MG/DL (ref 65–99)
HCT VFR BLD AUTO: 50.6 % (ref 36.5–49.3)
HDLC SERPL-MCNC: 66 MG/DL (ref 40–60)
HGB BLD-MCNC: 16 G/DL (ref 12–17)
IMM GRANULOCYTES # BLD AUTO: 0.01 THOUSAND/UL (ref 0–0.2)
IMM GRANULOCYTES NFR BLD AUTO: 0 % (ref 0–2)
LDLC SERPL CALC-MCNC: 57 MG/DL (ref 0–100)
LYMPHOCYTES # BLD AUTO: 0.97 THOUSANDS/ΜL (ref 0.6–4.47)
LYMPHOCYTES NFR BLD AUTO: 19 % (ref 14–44)
MCH RBC QN AUTO: 27.9 PG (ref 26.8–34.3)
MCHC RBC AUTO-ENTMCNC: 31.6 G/DL (ref 31.4–37.4)
MCV RBC AUTO: 88 FL (ref 82–98)
MONOCYTES # BLD AUTO: 0.45 THOUSAND/ΜL (ref 0.17–1.22)
MONOCYTES NFR BLD AUTO: 9 % (ref 4–12)
NEUTROPHILS # BLD AUTO: 3.43 THOUSANDS/ΜL (ref 1.85–7.62)
NEUTS SEG NFR BLD AUTO: 68 % (ref 43–75)
NRBC BLD AUTO-RTO: 0 /100 WBCS
PLATELET # BLD AUTO: 256 THOUSANDS/UL (ref 149–390)
PMV BLD AUTO: 10.1 FL (ref 8.9–12.7)
POTASSIUM SERPL-SCNC: 4.9 MMOL/L (ref 3.5–5.3)
PROT SERPL-MCNC: 7 G/DL (ref 6.4–8.2)
PSA SERPL-MCNC: 0.8 NG/ML (ref 0–4)
RBC # BLD AUTO: 5.73 MILLION/UL (ref 3.88–5.62)
SODIUM SERPL-SCNC: 141 MMOL/L (ref 136–145)
TRIGL SERPL-MCNC: 128 MG/DL
TSH SERPL DL<=0.05 MIU/L-ACNC: 2.45 UIU/ML (ref 0.36–3.74)
WBC # BLD AUTO: 5.08 THOUSAND/UL (ref 4.31–10.16)

## 2019-10-04 PROCEDURE — 84443 ASSAY THYROID STIM HORMONE: CPT

## 2019-10-04 PROCEDURE — 80061 LIPID PANEL: CPT

## 2019-10-04 PROCEDURE — 36415 COLL VENOUS BLD VENIPUNCTURE: CPT

## 2019-10-04 PROCEDURE — 93978 VASCULAR STUDY: CPT | Performed by: SURGERY

## 2019-10-04 PROCEDURE — 85025 COMPLETE CBC W/AUTO DIFF WBC: CPT

## 2019-10-04 PROCEDURE — 80053 COMPREHEN METABOLIC PANEL: CPT

## 2019-10-04 PROCEDURE — 93978 VASCULAR STUDY: CPT

## 2019-10-04 PROCEDURE — G0103 PSA SCREENING: HCPCS

## 2019-10-04 PROCEDURE — 93924 LWR XTR VASC STDY BILAT: CPT

## 2019-10-15 ENCOUNTER — CONSULT (OUTPATIENT)
Dept: INTERNAL MEDICINE CLINIC | Facility: CLINIC | Age: 69
End: 2019-10-15
Payer: MEDICARE

## 2019-10-15 VITALS
SYSTOLIC BLOOD PRESSURE: 132 MMHG | RESPIRATION RATE: 16 BRPM | BODY MASS INDEX: 31.78 KG/M2 | HEART RATE: 55 BPM | WEIGHT: 239.8 LBS | OXYGEN SATURATION: 97 % | DIASTOLIC BLOOD PRESSURE: 84 MMHG | HEIGHT: 73 IN

## 2019-10-15 DIAGNOSIS — I48.19 ATRIAL FIBRILLATION, PERSISTENT (HCC): ICD-10-CM

## 2019-10-15 DIAGNOSIS — Z01.818 PREOP EXAMINATION: Primary | ICD-10-CM

## 2019-10-15 DIAGNOSIS — R51.9 CHRONIC NONINTRACTABLE HEADACHE, UNSPECIFIED HEADACHE TYPE: ICD-10-CM

## 2019-10-15 DIAGNOSIS — Z23 NEEDS FLU SHOT: ICD-10-CM

## 2019-10-15 DIAGNOSIS — I10 ESSENTIAL HYPERTENSION: ICD-10-CM

## 2019-10-15 DIAGNOSIS — G89.29 CHRONIC NONINTRACTABLE HEADACHE, UNSPECIFIED HEADACHE TYPE: ICD-10-CM

## 2019-10-15 PROCEDURE — 90662 IIV NO PRSV INCREASED AG IM: CPT

## 2019-10-15 PROCEDURE — 99214 OFFICE O/P EST MOD 30 MIN: CPT | Performed by: INTERNAL MEDICINE

## 2019-10-15 PROCEDURE — G0008 ADMIN INFLUENZA VIRUS VAC: HCPCS

## 2019-10-15 RX ORDER — NEPAFENAC 0.3 %
SUSPENSION, DROPS(FINAL DOSAGE FORM)(ML) OPHTHALMIC (EYE)
Refills: 0 | COMMUNITY
Start: 2019-09-26 | End: 2019-12-11

## 2019-10-15 RX ORDER — GABAPENTIN 100 MG/1
100 CAPSULE ORAL 2 TIMES DAILY
Qty: 180 CAPSULE | Refills: 3 | Status: SHIPPED | OUTPATIENT
Start: 2019-10-15 | End: 2020-09-14 | Stop reason: SDUPTHER

## 2019-10-15 RX ORDER — TRIPROLIDINE/PSEUDOEPHEDRINE 2.5MG-60MG
TABLET ORAL
Refills: 0 | COMMUNITY
Start: 2019-09-26 | End: 2019-12-11

## 2019-10-15 RX ORDER — CYCLOPENTOLATE HYDROCHLORIDE 10 MG/ML
SOLUTION/ DROPS OPHTHALMIC
Refills: 0 | COMMUNITY
Start: 2019-09-26 | End: 2019-12-11

## 2019-10-15 RX ORDER — BESIFLOXACIN 6 MG/ML
SUSPENSION OPHTHALMIC
Refills: 0 | COMMUNITY
Start: 2019-09-26 | End: 2019-12-11

## 2019-10-15 NOTE — TELEPHONE ENCOUNTER
Patient called requesting refill for gabapentin 90 day supply [facility]  Please sign off If agreeable

## 2019-10-15 NOTE — PROGRESS NOTES
Assessment/Plan:    Preop examination  Patient is medically cleared for cataract surgery  No acute cardiopulmonary complaints, exam today is normal for him  He does have chronic AFib and his rhythm is a regular, but his rate is well controlled  Patient is on anticoagulation with Eliquis, and he was instructed to continue this medication  His other chronic conditions of hypothyroidism, hypertension, and hypercholesterolemia are well controlled, and he was instructed to continue those medications as well  Atrial fibrillation, persistent (HCC)  Rate controlled, continue current medications and follow-up Cardiology    Essential hypertension  Controlled, continue current medication along with healthy diet and exercise       Diagnoses and all orders for this visit:    Needs flu shot  -     influenza vaccine, [id number], high-dose, PF 0 5 mL (FLUZONE HIGH-DOSE)    Preop examination    Atrial fibrillation, persistent ([address] )    Essential hypertension    Other orders  -     ILEVRO 0 3 % SUSP; INSTILL 1 DROP IN LEFT EYE NIGHTLY  -     DUREZOL 0 05 % EMUL; INSTILL 1 DROP INTO LEFT EYE TWICE A DAY  -     cyclopentolate (CYCLOGYL) 1 % ophthalmic solution; INSTILL 1 DROP INTO LEFT EYE 3 TIMES A DAY  -     BESIVANCE 0 6 % SUSP; INSTILL 1 DROP INTO LEFT EYE TWICE A DAY          Subjective:      Patient ID: Shanthi Tinsley is a 71 y o  male  Patient here for preop for cataract surgery  No acute cardiopulmonary complaints, no chest pain, no shortness of breath, no lightheadedness, no bleeding problems  Patient denies any significant palpitations  Patient is on Eliquis for AFib  The following portions of the patient's history were reviewed and updated as appropriate: allergies, current medications, past family history, past medical history, past social history, past surgical history and problem list     Review of Systems   Constitutional: Negative for chills, fatigue and fever     HENT: Negative for congestion, nosebleeds, postnasal drip, sore throat and trouble swallowing  Eyes: Negative for pain  Respiratory: Negative for cough, chest tightness, shortness of breath and wheezing  Cardiovascular: Negative for chest pain, palpitations and leg swelling  Gastrointestinal: Negative for abdominal pain, constipation, diarrhea, nausea and vomiting  Endocrine: Negative for polydipsia and polyuria  Genitourinary: Negative for dysuria, flank pain and hematuria  Musculoskeletal: Negative for arthralgias  Skin: Negative for rash  Neurological: Negative for dizziness, tremors and headaches  Hematological: Does not bruise/bleed easily  Psychiatric/Behavioral: Negative for confusion and dysphoric mood  The patient is not nervous/anxious  Objective:      /84   Pulse 55   Resp 16   Ht 6' 1" (1 854 m)   Wt 109 kg (239 lb 12 8 oz)   SpO2 97%   BMI 31 64 kg/m²          Physical Exam   Constitutional: He is oriented to person, place, and time  He appears well-developed and well-nourished  No distress  HENT:   Head: Normocephalic and atraumatic  Right Ear: External ear normal    Left Ear: External ear normal    Eyes: Conjunctivae are normal  No scleral icterus  Neck: Normal range of motion  Neck supple  No tracheal deviation present  No thyromegaly present  Cardiovascular: Normal rate and normal heart sounds  An irregularly irregular rhythm present  No murmur heard  Pulmonary/Chest: Effort normal and breath sounds normal  No respiratory distress  He has no wheezes  He has no rales  Abdominal: Soft  Bowel sounds are normal  There is no tenderness  There is no rebound and no guarding  Musculoskeletal: He exhibits no edema  Lymphadenopathy:     He has no cervical adenopathy  Neurological: He is alert and oriented to person, place, and time  Psychiatric: He has a normal mood and affect  His behavior is normal  Judgment and thought content normal    Vitals reviewed

## 2019-10-15 NOTE — PATIENT INSTRUCTIONS
Problem List Items Addressed This Visit        Cardiovascular and Mediastinum    Essential hypertension     Controlled, continue current medication along with healthy diet and exercise         Atrial fibrillation, persistent (HCC)     Rate controlled, continue current medications and follow-up Cardiology            Other    Preop examination     Patient is medically cleared for cataract surgery  No acute cardiopulmonary complaints, exam today is normal for him  He does have chronic AFib and his rhythm is a regular, but his rate is well controlled  Patient is on anticoagulation with Eliquis, and he was instructed to continue this medication  His other chronic conditions of hypothyroidism, hypertension, and hypercholesterolemia are well controlled, and he was instructed to continue those medications as well             Other Visit Diagnoses     Needs flu shot    -  Primary    Relevant Orders    influenza vaccine, [id number], high-dose, PF 0 5 mL (FLUZONE HIGH-DOSE)

## 2019-10-15 NOTE — ASSESSMENT & PLAN NOTE
Patient is medically cleared for cataract surgery  No acute cardiopulmonary complaints, exam today is normal for him  He does have chronic AFib and his rhythm is a regular, but his rate is well controlled  Patient is on anticoagulation with Eliquis, and he was instructed to continue this medication  His other chronic conditions of hypothyroidism, hypertension, and hypercholesterolemia are well controlled, and he was instructed to continue those medications as well

## 2019-10-16 ENCOUNTER — TELEPHONE (OUTPATIENT)
Dept: NEUROLOGY | Facility: CLINIC | Age: 69
End: 2019-10-16

## 2019-10-16 NOTE — TELEPHONE ENCOUNTER
Patient called in, states he left a message on the refill line to have his gabapentin refilled and sent to [facility], mail in pharmacy  He just got notified by his local [facility] it was called in and filled there  Asked that this be cancelled and [facility] as he had asked, this is more cost effective  Call to [facility] pharmacy, and spoke with [facility]  She is able to cancel the prescription but cannot fax to [facility], they require office to call in  Call to [facility], reached [facility], pharmacist  Order given for gabapentin 100mg capsule with directions listed as Take 1 capsule (100 mg total) by mouth 2 (two) times a day Up to 300mg additional if headaches flaring  Dose 100mg  Quantity 180  Refills 3    She states that this will be available to patient in 5-7 business days

## 2019-11-11 ENCOUNTER — OFFICE VISIT (OUTPATIENT)
Dept: INTERNAL MEDICINE CLINIC | Facility: CLINIC | Age: 69
End: 2019-11-11
Payer: MEDICARE

## 2019-11-11 VITALS
RESPIRATION RATE: 18 BRPM | SYSTOLIC BLOOD PRESSURE: 120 MMHG | DIASTOLIC BLOOD PRESSURE: 90 MMHG | BODY MASS INDEX: 31.41 KG/M2 | HEIGHT: 73 IN | HEART RATE: 63 BPM | TEMPERATURE: 97.5 F | WEIGHT: 237 LBS | OXYGEN SATURATION: 96 %

## 2019-11-11 DIAGNOSIS — Z00.00 ENCOUNTER FOR MEDICARE ANNUAL WELLNESS EXAM: Primary | ICD-10-CM

## 2019-11-11 PROCEDURE — G0439 PPPS, SUBSEQ VISIT: HCPCS | Performed by: INTERNAL MEDICINE

## 2019-11-11 NOTE — ASSESSMENT & PLAN NOTE
Discussed preventative health, cancer screening, immunizations, and safety issues  Patient is up-to-date with everything except the new shingles shot  I recommend getting the Shingrix shot to help prevent Shingles  You can get it a pharmacy, and they can administer it there  It is a two shot series with the second shot needed between 2-6 months after the first shot  I would not recommend getting the shot before an important or fun event in case you were to have a reaction to the shot like a sore arm or flu-like symptoms  I make the same recommendation about any shot, as people can have a reaction to any shot

## 2019-11-11 NOTE — PATIENT INSTRUCTIONS
Problem List Items Addressed This Visit        Other    Encounter for Medicare annual wellness exam - Primary     Discussed preventative health, cancer screening, immunizations, and safety issues  Patient is up-to-date with everything except the new shingles shot  I recommend getting the Shingrix shot to help prevent Shingles  You can get it a pharmacy, and they can administer it there  It is a two shot series with the second shot needed between 2-6 months after the first shot  I would not recommend getting the shot before an important or fun event in case you were to have a reaction to the shot like a sore arm or flu-like symptoms  I make the same recommendation about any shot, as people can have a reaction to any shot  Medicare Preventive Visit Patient Instructions  Thank you for completing your Welcome to Medicare Visit or Medicare Annual Wellness Visit today  Your next wellness visit will be due in one year (11/11/2020)  The screening/preventive services that you may require over the next 5-10 years are detailed below  Some tests may not apply to you based off risk factors and/or age  Screening tests ordered at today's visit but not completed yet may show as past due  Also, please note that scanned in results may not display below  Preventive Screenings:  Service Recommendations Previous Testing/Comments   Colorectal Cancer Screening  · Colonoscopy    · Fecal Occult Blood Test (FOBT)/Fecal Immunochemical Test (FIT)  · Fecal DNA/Cologuard Test  · Flexible Sigmoidoscopy Age: 54-65 years old   Colonoscopy: every 10 years (May be performed more frequently if at higher risk)  OR  FOBT/FIT: every 1 year  OR  Cologuard: every 3 years  OR  Sigmoidoscopy: every 5 years  Screening may be recommended earlier than age 48 if at higher risk for colorectal cancer   Also, an individualized decision between you and your healthcare provider will decide whether screening between the ages of 74-80 would be appropriate  Colonoscopy: 07/13/2016  FOBT/FIT: Not on file  Cologuard: Not on file  Sigmoidoscopy: Not on file    Screening Current     Prostate Cancer Screening Individualized decision between patient and health care provider in men between ages of 53-78   Medicare will cover every 12 months beginning on the day after your 50th birthday PSA: 0 8 ng/mL     Screening Current     Hepatitis C Screening Once for adults born between 1945 and 1965  More frequently in patients at high risk for Hepatitis C Hep C Antibody: 03/06/2019    Screening Current   Diabetes Screening 1-2 times per year if you're at risk for diabetes or have pre-diabetes Fasting glucose: 98 mg/dL   A1C: 5 7 %    Screening Current   Cholesterol Screening Once every 5 years if you don't have a lipid disorder  May order more often based on risk factors  Lipid panel: 10/04/2019    Screening Current      Other Preventive Screenings Covered by Medicare:  1  Abdominal Aortic Aneurysm (AAA) Screening: covered once if your at risk  You're considered to be at risk if you have a family history of AAA or a male between the age of 73-68 who smoking at least 100 cigarettes in your lifetime  2  Lung Cancer Screening: covers low dose CT scan once per year if you meet all of the following conditions: (1) Age 50-69; (2) No signs or symptoms of lung cancer; (3) Current smoker or have quit smoking within the last 15 years; (4) You have a tobacco smoking history of at least 30 pack years (packs per day x number of years you smoked); (5) You get a written order from a healthcare provider  3  Glaucoma Screening: covered annually if you're considered high risk: (1) You have diabetes OR (2) Family history of glaucoma OR (3)  aged 48 and older OR (3)  American aged 72 and older  3   Osteoporosis Screening: covered every 2 years if you meet one of the following conditions: (1) Have a vertebral abnormality; (2) On glucocorticoid therapy for more than 3 months; (3) Have primary hyperparathyroidism; (4) On osteoporosis medications and need to assess response to drug therapy  5  HIV Screening: covered annually if you're between the age of 12-76  Also covered annually if you are younger than 13 and older than 72 with risk factors for HIV infection  For pregnant patients, it is covered up to 3 times per pregnancy  Immunizations:  Immunization Recommendations   Influenza Vaccine Annual influenza vaccination during flu season is recommended for all persons aged >= 6 months who do not have contraindications   Pneumococcal Vaccine (Prevnar and Pneumovax)  * Prevnar = PCV13  * Pneumovax = PPSV23 Adults 25-60 years old: 1-3 doses may be recommended based on certain risk factors  Adults 72 years old: Prevnar (PCV13) vaccine recommended followed by Pneumovax (PPSV23) vaccine  If already received PPSV23 since turning 65, then PCV13 recommended at least one year after PPSV23 dose  Hepatitis B Vaccine 3 dose series if at intermediate or high risk (ex: diabetes, end stage renal disease, liver disease)   Tetanus (Td) Vaccine - COST NOT COVERED BY MEDICARE PART B Following completion of primary series, a booster dose should be given every 10 years to maintain immunity against tetanus  Td may also be given as tetanus wound prophylaxis  Tdap Vaccine - COST NOT COVERED BY MEDICARE PART B Recommended at least once for all adults  For pregnant patients, recommended with each pregnancy  Shingles Vaccine (Shingrix) - COST NOT COVERED BY MEDICARE PART B  2 shot series recommended in those aged 48 and above     Health Maintenance Due:      Topic Date Due    CRC Screening: Colonoscopy  07/13/2021    Hepatitis C Screening  Completed     Immunizations Due:      Topic Date Due    HEPATITIS B VACCINES (1 of 3 - Risk 3-dose series) 01/19/1969     Advance Directives   What are advance directives?   Advance directives are legal documents that state your wishes and plans for medical care  These plans are made ahead of time in case you lose your ability to make decisions for yourself  Advance directives can apply to any medical decision, such as the treatments you want, and if you want to donate organs  What are the types of advance directives? There are many types of advance directives, and each state has rules about how to use them  You may choose a combination of any of the following:  · Living will: This is a written record of the treatment you want  You can also choose which treatments you do not want, which to limit, and which to stop at a certain time  This includes surgery, medicine, IV fluid, and tube feedings  · Durable power of  for healthcare [facility]): This is a written record that states who you want to make healthcare choices for you when you are unable to make them for yourself  This person, called a proxy, is usually a family member or a friend  You may choose more than 1 proxy  · Do not resuscitate (DNR) order:  A DNR order is used in case your heart stops beating or you stop breathing  It is a request not to have certain forms of treatment, such as CPR  A DNR order may be included in other types of advance directives  · Medical directive: This covers the care that you want if you are in a coma, near death, or unable to make decisions for yourself  You can list the treatments you want for each condition  Treatment may include pain medicine, surgery, blood transfusions, dialysis, IV or tube feedings, and a ventilator (breathing machine)  · Values history: This document has questions about your views, beliefs, and how you feel and think about life  This information can help others choose the care that you would choose  Why are advance directives important? An advance directive helps you control your care  Although spoken wishes may be used, it is better to have your wishes written down  Spoken wishes can be misunderstood, or not followed   Treatments may be given even if you do not want them  An advance directive may make it easier for your family to make difficult choices about your care  Weight Management   Why it is important to manage your weight:  Being overweight increases your risk of health conditions such as heart disease, high blood pressure, type 2 diabetes, and certain types of cancer  It can also increase your risk for osteoarthritis, sleep apnea, and other respiratory problems  Aim for a slow, steady weight loss  Even a small amount of weight loss can lower your risk of health problems  How to lose weight safely:  A safe and healthy way to lose weight is to eat fewer calories and get regular exercise  You can lose up about 1 pound a week by decreasing the number of calories you eat by 500 calories each day  Healthy meal plan for weight management:  A healthy meal plan includes a variety of foods, contains fewer calories, and helps you stay healthy  A healthy meal plan includes the following:  · Eat whole-grain foods more often  A healthy meal plan should contain fiber  Fiber is the part of grains, fruits, and vegetables that is not broken down by your body  Whole-grain foods are healthy and provide extra fiber in your diet  Some examples of whole-grain foods are whole-wheat breads and pastas, oatmeal, brown rice, and bulgur  · Eat a variety of vegetables every day  Include dark, leafy greens such as spinach, kale, danielle greens, and mustard greens  Eat yellow and orange vegetables such as carrots, sweet potatoes, and winter squash  · Eat a variety of fruits every day  Choose fresh or canned fruit (canned in its own juice or light syrup) instead of juice  Fruit juice has very little or no fiber  · Eat low-fat dairy foods  Drink fat-free (skim) milk or 1% milk  Eat fat-free yogurt and low-fat cottage cheese  Try low-fat cheeses such as mozzarella and other reduced-fat cheeses  · Choose meat and other protein foods that are low in fat    Choose beans or other legumes such as split peas or lentils  Choose fish, skinless poultry (chicken or turkey), or lean cuts of red meat (beef or pork)  Before you cook meat or poultry, cut off any visible fat  · Use less fat and oil  Try baking foods instead of frying them  Add less fat, such as margarine, sour cream, regular salad dressing and mayonnaise to foods  Eat fewer high-fat foods  Some examples of high-fat foods include french fries, doughnuts, ice cream, and cakes  · Eat fewer sweets  Limit foods and drinks that are high in sugar  This includes candy, cookies, regular soda, and sweetened drinks  Exercise:  Exercise at least 30 minutes per day on most days of the week  Some examples of exercise include walking, biking, dancing, and swimming  You can also fit in more physical activity by taking the stairs instead of the elevator or parking farther away from stores  Ask your healthcare provider about the best exercise plan for you  © Copyright [employer] 2018 Information is for End User's use only and may not be sold, redistributed or otherwise used for commercial purposes   All illustrations and images included in CareNotes® are the copyrighted property of [employer]  or [address] [place] [address]

## 2019-11-16 DIAGNOSIS — I10 ESSENTIAL HYPERTENSION: Primary | ICD-10-CM

## 2019-11-17 RX ORDER — FELODIPINE 5 MG/1
TABLET, EXTENDED RELEASE ORAL
Qty: 90 TABLET | Refills: 3 | Status: SHIPPED | OUTPATIENT
Start: 2019-11-17 | End: 2019-12-11 | Stop reason: SDUPTHER

## 2019-12-02 ENCOUNTER — HOSPITAL ENCOUNTER (OUTPATIENT)
Dept: RADIOLOGY | Age: 69
Discharge: HOME | End: 2019-12-02
Payer: MEDICARE

## 2019-12-02 DIAGNOSIS — I71.2 THORACIC AORTIC ANEURYSM WITHOUT RUPTURE (HCC): ICD-10-CM

## 2019-12-02 PROCEDURE — 71250 CT THORAX DX C-: CPT

## 2019-12-11 ENCOUNTER — OFFICE VISIT (OUTPATIENT)
Dept: CARDIAC SURGERY | Facility: CLINIC | Age: 69
End: 2019-12-11
Payer: MEDICARE

## 2019-12-11 VITALS
RESPIRATION RATE: 14 BRPM | HEART RATE: 66 BPM | WEIGHT: 237 LBS | BODY MASS INDEX: 31.41 KG/M2 | OXYGEN SATURATION: 96 % | HEIGHT: 73 IN | SYSTOLIC BLOOD PRESSURE: 116 MMHG | DIASTOLIC BLOOD PRESSURE: 72 MMHG | TEMPERATURE: 96.9 F

## 2019-12-11 DIAGNOSIS — I71.2 THORACIC AORTIC ANEURYSM WITHOUT RUPTURE (HCC): Primary | ICD-10-CM

## 2019-12-11 PROCEDURE — 99213 OFFICE O/P EST LOW 20 MIN: CPT | Performed by: PHYSICIAN ASSISTANT

## 2019-12-11 NOTE — PROGRESS NOTES
Consultation - Cardiothoracic Surgery   Steven Smith 71 y o  male MRN: [id number]      Reason for Consult / Principal Problem: Ascending Aortic aneurysm    History of Present Illness: Steven Smith is a 71y o  year old male who presents today for ongoing surveillance of their ascending aortic aneurysm  He was initially seen in our office in 2017  Steven Smith was most recently evaluated in our office in 12/2018  At that time the maximal ascending aortic diameter was measureed at 48 mm  He presents today following a CT chest, which demonstrates a stable ascending aortic aneurysm measuring 48 mm maximally  Since last seen in our office, patient underwent open cholecystectomy in march 2019  He follows with Vascular surgery for history of EVAR of R MINDY aneurysm and coil embolization of R hypogastric artery in April 2018  He follows with neurology for lower extremity neuropathy and headaches with no clear etiology, but is likely due to PAD  He takes Eliquis for chronic atrial fibrillation s/p cardioversion in January 2017           Past Medical History:  Past Medical History:   Diagnosis Date    Aneurysm of thoracic aorta ([address] )     Arrhythmia     Detached retina, right     Disease of thyroid gland     Gastric wall thickening     Headache     Hypertension     Iliac artery aneurysm, bilateral (HCC)     Irregular heart beat     afib cardioversion 1/30/17, x2     Neuropathy     bilateral feet    Paroxysmal A-fib ([address] )     Prostatic hypertrophy     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[facility])          Past Surgical History:   Past Surgical History:   Procedure Laterality Date    ABDOMINAL AORTIC ANEURYSM REPAIR, ENDOVASCULAR Right 4/13/2018    Procedure: REPAIR ANEURYSM ILIAC endovascular  with coil embolization right hypogastric artery ;  Surgeon: Joanna Cushing, MD;  [facility];  Service: Vascular    CARDIOVERSION      CATARACT EXTRACTION      onset 11/17/2011    CHOLECYSTECTOMY LAPAROSCOPIC N/A 3/8/2019    Procedure: [id number] CHOLECYSTECTOMY;  Surgeon: Jessica Banks DO;  [facility];  Service: General    COLONOSCOPY  07/13/2016    CA EDG US EXAM SURGICAL ALTER STOM DUODENUM/JEJUNUM N/A 11/10/2017    Procedure: RADIAL ENDOSCOPIC U/S;  Surgeon: Андрей Carlos MD;  [facility]; Service: Gastroenterology    RETINAL DETACHMENT SURGERY Right     onset 1992, retinal detachment complete air fill confirmed         Family History:  Family History   Problem Relation Age of Onset    Aortic aneurysm Father         abdominal    Aortic aneurysm Brother     Stroke Mother         CVA    Cancer Maternal Grandmother         malignant neoplasm    Cancer Maternal Grandfather         malignant neoplasm    Cancer Paternal Grandmother         malignant neoplasm    Cancer Paternal Grandfather         malignant neoplasm    Cancer Family         malignant neoplasm    Cancer Family         malignant neoplasm         Social History:    Social History     Substance and Sexual Activity   Alcohol Use Yes    Comment: socially      Social History     Substance and Sexual Activity   Drug Use No     Social History     Tobacco Use   Smoking Status Never Smoker   Smokeless Tobacco Never Used           Home Medications:   Prior to Admission medications    Medication Sig Start Date End Date Taking?  Authorizing Provider   acetaminophen (TYLENOL) 325 mg tablet Take 2 tablets (650 mg total) by mouth every 6 (six) hours as needed for mild pain or fever 3/12/19   LORI Dao   apixaban (ELIQUIS) 5 mg Take 1 tablet (5 mg total) by mouth 2 (two) times a day 1/2/19   Imani Samaniego DO   aspirin (ECOTRIN LOW STRENGTH) 81 mg EC tablet Take 81 mg by mouth daily    Historical Provider, MD   atorvastatin (LIPITOR) 20 mg tablet Take 1 tablet (20 mg total) by mouth daily 1/17/19   Imani Samaniego DO   BESIVANCE 0 6 % SUSP INSTILL 1 DROP INTO LEFT EYE TWICE A DAY 9/26/19   Historical Provider, MD   Co-Enzyme Q-10 100 MG CAPS Take 100 mg by mouth daily    Historical Provider, MD   cyclopentolate (CYCLOGYL) 1 % ophthalmic solution INSTILL 1 DROP INTO LEFT EYE 3 TIMES A DAY 9/26/19   Historical Provider, MD   DUREZOL 0 05 % EMUL INSTILL 1 DROP INTO LEFT EYE TWICE A DAY 9/26/19   Historical Provider, MD   felodipine (PLENDIL) 5 mg 24 hr tablet Take 1 tablet (5 mg total) by mouth daily 2/26/19   Gurpreet Roger MD   felodipine (PLENDIL) 5 mg 24 hr tablet TAKE 1 TABLET DAILY 11/17/19   Gurpreet Roger MD   gabapentin (NEURONTIN) 100 mg capsule Take 1 capsule (100 mg total) by mouth 2 (two) times a day Up to 300mg additional if headaches flaring 10/15/19 1/13/20  Deya Valdivia MD   ILEVRO 0 3 % SUSP INSTILL 1 DROP IN LEFT EYE NIGHTLY 9/26/19   Historical Provider, MD   levothyroxine 75 mcg tablet Take 1 tablet (75 mcg total) by mouth daily 1/21/19   Gurpreet Roger MD   Misc Natural Products (LUTEIN 20 PO) Take 20 mg by mouth daily  Historical Provider, MD   multivitamin (THERAGRAN) TABS Take 1 tablet by mouth daily  Historical Provider, MD   nystatin (MYCOSTATIN) powder Apply topically 2 (two) times a day 7/16/19   Gurpreet Roger MD       Allergies:   Allergies   Allergen Reactions    Wound Dressing Adhesive        Review of Systems:  Review of Systems - History obtained from chart review and the patient  General ROS: negative for - chills, fatigue or fever  Psychological ROS: negative  ENT ROS: negative  Endocrine ROS: negative  Respiratory ROS: no cough, shortness of breath, or wheezing  Cardiovascular ROS: positive for - irregular heartbeat  negative for - chest pain or loss of consciousness  Gastrointestinal ROS: no abdominal pain, change in bowel habits, or black or bloody stools  Genito-Urinary ROS: no dysuria, trouble voiding, or hematuria  Musculoskeletal ROS: negative  Neurological ROS: no TIA or stroke symptoms  Dermatological ROS: negative    Vital Signs:     Vitals:    12/11/19 1000 12/11/19 1031   BP: 118/78 116/72   BP Location: Left arm Right arm   Cuff Size: Adult Adult   Pulse: 66    Resp: 14    Temp: (!) 96 9 °F (36 1 °C)    TempSrc: Tympanic    SpO2: 96%    Weight: 108 kg (237 lb)    Height: 6' 1" (1 854 m)      Invasive Devices     Peripheral Intravenous Line            Peripheral IV 03/10/19 Right Forearm 275 days                Physical Exam:    HEENT/NECK:  PERRLA  No jugular venous distention  Cardiac:Irregular rhythm  Pulmonary:  Breath sounds clear bilaterally  Abdomen:  Non-tender, Non-distended  Positive bowel sounds  Lower extremities: Extremities warm/dry  Radial/PT/DP pulses 1+ bilaterally  No edema B/L  Neuro: Alert and oriented X 3  Sensation is grossly intact  No focal deficits  Skin: Warm/Dry, without rashes or lesions  Imaging Studies:     CT Chest: 12/2/2019  IMPRESSION:  1  Ascending aorta measures 4 7 cm at the level of the right pulmonary artery  This is minimally increased in the interval   2   Bibasilar dependent atelectasis  No suspicious pulmonary nodule or mass          I have personally reviewed pertinent films in PACS    Assessment:  Patient Active Problem List    Diagnosis Date Noted    Preop examination 10/15/2019    Rash 07/16/2019    Other headache syndrome 04/05/2019    Hypophosphatemia 03/10/2019    Acute blood loss anemia 03/08/2019    Cholecystitis 03/05/2019    URI (upper respiratory infection) 11/20/2018    Hematoma 10/10/2018    Encounter for Medicare annual wellness exam 08/01/2018    Iliac artery aneurysm, right ([facility] ) 03/23/2018    Pre-operative cardiovascular examination 03/19/2018    Neuropathy 02/19/2018    Iliac artery aneurysm, bilateral ([address] )     Aneurysm of thoracic aorta ([address] ) 01/20/2016    Essential hypertension 01/20/2016    Atrial fibrillation, persistent ([address] ) 01/20/2016    Acquired hypothyroidism 09/23/2014     Ascending aortic aneurysm;  Ongoing surveillance    Plan:    CT imaging performed prior to this visit demonstrates the ascending aorta measuring 48 mm in size at its greatest diameter  These findings were confirmed and shared with the patient today  As they are asymptomatic, with no family history, follow-up monitoring is the treatment plan  Arrangements have been made for future surveillance to be completed with CTA chest to be completed in conjunction with CTA ordered by vascular surgery in 2 Years  Robert Gao was comfortable with our recommendations, and their questions were answered to their satisfaction  Thank you for allowing us to participate in the care of this patient  Aortic Aneurysm Instructions were provided to the patient as follows:    1  No lifting more than 50 pounds  2  Maintain a controlled blood pressure with a goal of 120/80  3  Follow up in Aortic Clinic as recommended with radiology follow up as instructed  4  Report to the ER or call 911 immediately with the following signs / symptoms: sudden onset of back pain, chest pain or shortness of breath  5  Tobacco cessation discussed      SIGNATURE: Theo Hassan PA-C  DATE: December 11, 2019  TIME: 10:27 AM

## 2019-12-11 NOTE — LETTER
December 11, 2019     [place] Palma [place]  [address] [facility]  [address]    Patient: Vickie Hardy   YOB: 1950   Date of Visit: 12/11/2019       Dear Dr Georgiana Snellen: Thank you for referring Catracho Swain to me for evaluation  Below are my notes for this consultation  If you have questions, please do not hesitate to call me  I look forward to following your patient along with you  Sincerely,        Edson Moreno,         CC: MD Abilio Gutierrez PA-C  12/11/2019 12:14 PM  Attested  Consultation - Cardiothoracic Surgery   Vickie Hardy 71 y o  male MRN: [id number]      Reason for Consult / Principal Problem: Ascending Aortic aneurysm    History of Present Illness: Vickie Hardy is a 71y o  year old male who presents today for ongoing surveillance of their ascending aortic aneurysm  He was initially seen in our office in 2017  Vickie Hardy was most recently evaluated in our office in 12/2018  At that time the maximal ascending aortic diameter was measureed at 48 mm  He presents today following a CT chest, which demonstrates a stable ascending aortic aneurysm measuring 48 mm maximally  Since last seen in our office, patient underwent open cholecystectomy in march 2019  He follows with Vascular surgery for history of EVAR of R MINDY aneurysm and coil embolization of R hypogastric artery in April 2018  He follows with neurology for lower extremity neuropathy and headaches with no clear etiology, but is likely due to PAD  He takes Eliquis for chronic atrial fibrillation s/p cardioversion in January 2017           Past Medical History:  Past Medical History:   Diagnosis Date    Aneurysm of thoracic aorta ([address] )     Arrhythmia     Detached retina, right     Disease of thyroid gland     Gastric wall thickening     Headache     Hypertension     Iliac artery aneurysm, bilateral (HCC)     Irregular heart beat     afib cardioversion 1/30/17, x2     Neuropathy bilateral feet    Paroxysmal A-fib ([address] )     Prostatic hypertrophy     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[facility])          Past Surgical History:   Past Surgical History:   Procedure Laterality Date    ABDOMINAL AORTIC ANEURYSM REPAIR, ENDOVASCULAR Right 4/13/2018    Procedure: REPAIR ANEURYSM ILIAC endovascular  with coil embolization right hypogastric artery ;  Surgeon: Gabriela Bartlett MD;  [facility];  Service: Vascular    CARDIOVERSION      CATARACT EXTRACTION      onset 11/17/2011    CHOLECYSTECTOMY LAPAROSCOPIC N/A 3/8/2019    Procedure: [id number] CHOLECYSTECTOMY;  Surgeon: Deana Rodríguez DO;  [facility];  Service: General    COLONOSCOPY  07/13/2016    IA EDG US EXAM SURGICAL ALTER STOM DUODENUM/JEJUNUM N/A 11/10/2017    Procedure: RADIAL ENDOSCOPIC U/S;  Surgeon: Talita Strickland MD;  [facility]; Service: Gastroenterology    RETINAL DETACHMENT SURGERY Right     onset 1992, retinal detachment complete air fill confirmed         Family History:  Family History   Problem Relation Age of Onset    Aortic aneurysm Father         abdominal    Aortic aneurysm Brother     Stroke Mother         CVA    Cancer Maternal Grandmother         malignant neoplasm    Cancer Maternal Grandfather         malignant neoplasm    Cancer Paternal Grandmother         malignant neoplasm    Cancer Paternal Grandfather         malignant neoplasm    Cancer Family         malignant neoplasm    Cancer Family         malignant neoplasm         Social History:    Social History     Substance and Sexual Activity   Alcohol Use Yes    Comment: socially      Social History     Substance and Sexual Activity   Drug Use No     Social History     Tobacco Use   Smoking Status Never Smoker   Smokeless Tobacco Never Used           Home Medications:   Prior to Admission medications    Medication Sig Start Date End Date Taking?  Authorizing Provider   acetaminophen (TYLENOL) 325 mg tablet Take 2 tablets (650 mg total) by mouth every 6 (six) hours as needed for mild pain or fever 3/12/19   LORI Grant   apixaban (ELIQUIS) 5 mg Take 1 tablet (5 mg total) by mouth 2 (two) times a day 1/2/19   Edith Hidalgo, DO   aspirin (ECOTRIN LOW STRENGTH) 81 mg EC tablet Take 81 mg by mouth daily    Historical Provider, MD   atorvastatin (LIPITOR) 20 mg tablet Take 1 tablet (20 mg total) by mouth daily 1/17/19   Edith Hidalgo, DO   BESIVANCE 0 6 % SUSP INSTILL 1 DROP INTO LEFT EYE TWICE A DAY 9/26/19   Historical Provider, MD   Co-Enzyme Q-10 100 MG CAPS Take 100 mg by mouth daily    Historical Provider, MD   cyclopentolate (CYCLOGYL) 1 % ophthalmic solution INSTILL 1 DROP INTO LEFT EYE 3 TIMES A DAY 9/26/19   Historical Provider, MD   DUREZOL 0 05 % EMUL INSTILL 1 DROP INTO LEFT EYE TWICE A DAY 9/26/19   Historical Provider, MD   felodipine (PLENDIL) 5 mg 24 hr tablet Take 1 tablet (5 mg total) by mouth daily 2/26/19   Riri Gomez MD   felodipine (PLENDIL) 5 mg 24 hr tablet TAKE 1 TABLET DAILY 11/17/19   Riri Gomez MD   gabapentin (NEURONTIN) 100 mg capsule Take 1 capsule (100 mg total) by mouth 2 (two) times a day Up to 300mg additional if headaches flaring 10/15/19 1/13/20  Luann Bonilla MD   ILEVRO 0 3 % SUSP INSTILL 1 DROP IN LEFT EYE NIGHTLY 9/26/19   Historical Provider, MD   levothyroxine 75 mcg tablet Take 1 tablet (75 mcg total) by mouth daily 1/21/19   Riri Gomez MD   Misc Natural Products (LUTEIN 20 PO) Take 20 mg by mouth daily  Historical Provider, MD   multivitamin (THERAGRAN) TABS Take 1 tablet by mouth daily  Historical Provider, MD   nystatin (MYCOSTATIN) powder Apply topically 2 (two) times a day 7/16/19   Riri Gomez MD       Allergies:   Allergies   Allergen Reactions    Wound Dressing Adhesive        Review of Systems:  Review of Systems - History obtained from chart review and the patient  General ROS: negative for - chills, fatigue or fever  Psychological ROS: negative  ENT ROS: negative  Endocrine ROS: negative  Respiratory ROS: no cough, shortness of breath, or wheezing  Cardiovascular ROS: positive for - irregular heartbeat  negative for - chest pain or loss of consciousness  Gastrointestinal ROS: no abdominal pain, change in bowel habits, or black or bloody stools  Genito-Urinary ROS: no dysuria, trouble voiding, or hematuria  Musculoskeletal ROS: negative  Neurological ROS: no TIA or stroke symptoms  Dermatological ROS: negative    Vital Signs:     Vitals:    12/11/19 1000 12/11/19 1031   BP: 118/78 116/72   BP Location: Left arm Right arm   Cuff Size: Adult Adult   Pulse: 66    Resp: 14    Temp: (!) 96 9 °F (36 1 °C)    TempSrc: Tympanic    SpO2: 96%    Weight: 108 kg (237 lb)    Height: 6' 1" (1 854 m)      Invasive Devices     Peripheral Intravenous Line            Peripheral IV 03/10/19 Right Forearm 275 days                Physical Exam:    HEENT/NECK:  PERRLA  No jugular venous distention  Cardiac:Irregular rhythm  Pulmonary:  Breath sounds clear bilaterally  Abdomen:  Non-tender, Non-distended  Positive bowel sounds  Lower extremities: Extremities warm/dry  Radial/PT/DP pulses 1+ bilaterally  No edema B/L  Neuro: Alert and oriented X 3  Sensation is grossly intact  No focal deficits  Skin: Warm/Dry, without rashes or lesions  Imaging Studies:     CT Chest: 12/2/2019  IMPRESSION:  1  Ascending aorta measures 4 7 cm at the level of the right pulmonary artery  This is minimally increased in the interval   2   Bibasilar dependent atelectasis    No suspicious pulmonary nodule or mass          I have personally reviewed pertinent films in PACS    Assessment:  Patient Active Problem List    Diagnosis Date Noted    Preop examination 10/15/2019    Rash 07/16/2019    Other headache syndrome 04/05/2019    Hypophosphatemia 03/10/2019    Acute blood loss anemia 03/08/2019    Cholecystitis 03/05/2019    URI (upper respiratory infection) 11/20/2018  Hematoma 10/10/2018    Encounter for Medicare annual wellness exam 08/01/2018    Iliac artery aneurysm, right ([facility] ) 03/23/2018    Pre-operative cardiovascular examination 03/19/2018    Neuropathy 02/19/2018    Iliac artery aneurysm, bilateral ([facility] )     Aneurysm of thoracic aorta ([facility] ) 01/20/2016    Essential hypertension 01/20/2016    Atrial fibrillation, persistent ([facility] ) 01/20/2016    Acquired hypothyroidism 09/23/2014     Ascending aortic aneurysm; Ongoing surveillance    Plan:    CT imaging performed prior to this visit demonstrates the ascending aorta measuring 48 mm in size at its greatest diameter  These findings were confirmed and shared with the patient today  As they are asymptomatic, with no family history, follow-up monitoring is the treatment plan  Arrangements have been made for future surveillance to be completed with CTA chest to be completed in conjunction with CTA ordered by vascular surgery in 2 Years  Heron Padilla was comfortable with our recommendations, and their questions were answered to their satisfaction  Thank you for allowing us to participate in the care of this patient  Aortic Aneurysm Instructions were provided to the patient as follows:    1  No lifting more than 50 pounds  2  Maintain a controlled blood pressure with a goal of 120/80  3  Follow up in Aortic Clinic as recommended with radiology follow up as instructed  4  Report to the ER or call 911 immediately with the following signs / symptoms: sudden onset of back pain, chest pain or shortness of breath  5  Tobacco cessation discussed      SIGNATURE: Jaymie Pena PA-C  DATE: December 11, 2019  TIME: 10:27 AM  Attestation signed by Nitza Silva DO at 12/11/2019  1:24 PM:  The patient was seen and examined, and I agree with the midlevel's history, physical exam, assessment and plan with the following additions:    Shannan Estrella was seen in the office today for a 1 year follow-up regarding his ascending aortic aneurysm  CT scan was reviewed by myself personally and discussed with him  This demonstrates a 48 x 49 mm ascending aorta  Essentially unchanged from 47 mm seen year prior  I recommended a 2 year follow-up CT scan

## 2019-12-29 DIAGNOSIS — I48.0 PAROXYSMAL ATRIAL FIBRILLATION (HCC): ICD-10-CM

## 2019-12-29 DIAGNOSIS — E03.9 ACQUIRED HYPOTHYROIDISM: ICD-10-CM

## 2019-12-29 DIAGNOSIS — I10 HYPERTENSION, UNSPECIFIED TYPE: ICD-10-CM

## 2019-12-29 RX ORDER — LEVOTHYROXINE SODIUM 0.07 MG/1
TABLET ORAL
Qty: 90 TABLET | Refills: 3 | Status: SHIPPED | OUTPATIENT
Start: 2019-12-29 | End: 2020-12-14

## 2020-01-02 RX ORDER — ATORVASTATIN CALCIUM 20 MG/1
TABLET, FILM COATED ORAL
Qty: 90 TABLET | Refills: 3 | Status: SHIPPED | OUTPATIENT
Start: 2020-01-02 | End: 2020-01-07 | Stop reason: SDUPTHER

## 2020-01-02 RX ORDER — APIXABAN 5 MG/1
TABLET, FILM COATED ORAL
Qty: 180 TABLET | Refills: 3 | Status: SHIPPED | OUTPATIENT
Start: 2020-01-02 | End: 2020-01-07 | Stop reason: SDUPTHER

## 2020-01-07 DIAGNOSIS — I10 HYPERTENSION, UNSPECIFIED TYPE: ICD-10-CM

## 2020-01-07 DIAGNOSIS — I48.0 PAROXYSMAL ATRIAL FIBRILLATION (HCC): ICD-10-CM

## 2020-01-07 RX ORDER — ATORVASTATIN CALCIUM 20 MG/1
20 TABLET, FILM COATED ORAL DAILY
Qty: 90 TABLET | Refills: 3 | Status: SHIPPED | OUTPATIENT
Start: 2020-01-07 | End: 2020-12-14

## 2020-03-18 ENCOUNTER — OFFICE VISIT (OUTPATIENT)
Dept: INTERNAL MEDICINE CLINIC | Facility: CLINIC | Age: 70
End: 2020-03-18
Payer: MEDICARE

## 2020-03-18 VITALS
BODY MASS INDEX: 31.91 KG/M2 | SYSTOLIC BLOOD PRESSURE: 124 MMHG | TEMPERATURE: 98 F | WEIGHT: 240.8 LBS | DIASTOLIC BLOOD PRESSURE: 84 MMHG | HEIGHT: 73 IN | OXYGEN SATURATION: 95 % | HEART RATE: 74 BPM

## 2020-03-18 DIAGNOSIS — I48.19 ATRIAL FIBRILLATION, PERSISTENT (HCC): ICD-10-CM

## 2020-03-18 DIAGNOSIS — R05.9 COUGH: ICD-10-CM

## 2020-03-18 DIAGNOSIS — E78.2 MIXED HYPERLIPIDEMIA: ICD-10-CM

## 2020-03-18 DIAGNOSIS — E03.9 ACQUIRED HYPOTHYROIDISM: Primary | ICD-10-CM

## 2020-03-18 DIAGNOSIS — I10 ESSENTIAL HYPERTENSION: ICD-10-CM

## 2020-03-18 PROCEDURE — 3079F DIAST BP 80-89 MM HG: CPT | Performed by: INTERNAL MEDICINE

## 2020-03-18 PROCEDURE — 4040F PNEUMOC VAC/ADMIN/RCVD: CPT | Performed by: INTERNAL MEDICINE

## 2020-03-18 PROCEDURE — 3074F SYST BP LT 130 MM HG: CPT | Performed by: INTERNAL MEDICINE

## 2020-03-18 PROCEDURE — 1160F RVW MEDS BY RX/DR IN RCRD: CPT | Performed by: INTERNAL MEDICINE

## 2020-03-18 PROCEDURE — 1036F TOBACCO NON-USER: CPT | Performed by: INTERNAL MEDICINE

## 2020-03-18 PROCEDURE — 3008F BODY MASS INDEX DOCD: CPT | Performed by: INTERNAL MEDICINE

## 2020-03-18 PROCEDURE — 99214 OFFICE O/P EST MOD 30 MIN: CPT | Performed by: INTERNAL MEDICINE

## 2020-03-18 RX ORDER — PROMETHAZINE HYDROCHLORIDE AND CODEINE PHOSPHATE 6.25; 1 MG/5ML; MG/5ML
5 SYRUP ORAL EVERY 4 HOURS PRN
Qty: 120 ML | Refills: 0 | Status: SHIPPED | OUTPATIENT
Start: 2020-03-18 | End: 2021-09-20

## 2020-03-18 NOTE — PATIENT INSTRUCTIONS
Problem List Items Addressed This Visit        Endocrine    Acquired hypothyroidism - Primary     Continue levothyroxine along with monitoring            Cardiovascular and Mediastinum    Essential hypertension     Well controlled, continue fallopian along with healthy diet and exercise         Atrial fibrillation, persistent (HCC)     Rate controlled, continue meds and follow up cardiology            Other    Mixed hyperlipidemia     Continue statin along with healthy diet and exercise           Other Visit Diagnoses     Cough        Relevant Medications    promethazine-codeine (PHENERGAN WITH CODEINE) 6 25-10 mg/5 mL syrup

## 2020-03-18 NOTE — PROGRESS NOTES
Assessment/Plan:    Acquired hypothyroidism  Continue levothyroxine along with monitoring    Essential hypertension  Well controlled, continue fallopian along with healthy diet and exercise    Atrial fibrillation, persistent (HCC)  Rate controlled, continue meds and follow up cardiology    Mixed hyperlipidemia  Continue statin along with healthy diet and exercise       Diagnoses and all orders for this visit:    Acquired hypothyroidism    Essential hypertension    Atrial fibrillation, persistent (HCC)    Mixed hyperlipidemia    Cough  -     promethazine-codeine (PHENERGAN WITH CODEINE) 6 25-10 mg/5 mL syrup; Take 5 mL by mouth every 4 (four) hours as needed for cough        BMI Counseling: Body mass index is 31 77 kg/m²  The BMI is above normal  Nutrition recommendations include encouraging healthy choices of fruits and vegetables and moderation in carbohydrate intake  Exercise recommendations include exercising 3-5 times per week  Subjective:      Patient ID: Malia Pace is a 79 y o  male  Hypertension:  Patient reports compliance with loaded PN, no ankle swelling, no constipation, no lightheadedness  Hypothyroidism:  Patient reports compliance with levothyroxine, no fatigue, no constipation, no cold intolerance  Hypercholesterolemia:  Patient tolerating statin without any significant muscle aches in the legs  Atrial fibrillation:  No significant palpitations, no bleeding problems      The following portions of the patient's history were reviewed and updated as appropriate: allergies, current medications, past family history, past medical history, past social history, past surgical history and problem list     Review of Systems   Constitutional: Negative for chills, fatigue and fever  HENT: Negative for congestion, nosebleeds, postnasal drip, sore throat and trouble swallowing  Eyes: Negative for pain  Respiratory: Negative for cough, chest tightness, shortness of breath and wheezing  Cardiovascular: Negative for chest pain, palpitations and leg swelling  Gastrointestinal: Negative for abdominal pain, constipation, diarrhea, nausea and vomiting  Endocrine: Negative for cold intolerance, polydipsia and polyuria  Genitourinary: Negative for dysuria, flank pain and hematuria  Musculoskeletal: Negative for arthralgias  Skin: Negative for rash  Neurological: Negative for dizziness, tremors, light-headedness and headaches  Hematological: Does not bruise/bleed easily  Psychiatric/Behavioral: Negative for confusion and dysphoric mood  The patient is not nervous/anxious  Objective:      /84   Pulse 74   Temp 98 °F (36 7 °C)   Ht 6' 1" (1 854 m)   Wt 109 kg (240 lb 12 8 oz)   SpO2 95%   BMI 31 77 kg/m²          Physical Exam   Constitutional: He is oriented to person, place, and time  He appears well-developed and well-nourished  No distress  HENT:   Head: Normocephalic and atraumatic  Right Ear: External ear normal    Left Ear: External ear normal    Eyes: Conjunctivae are normal  No scleral icterus  Neck: Normal range of motion  Neck supple  No tracheal deviation present  No thyromegaly present  Cardiovascular: Normal rate and normal heart sounds  An irregularly irregular rhythm present  No murmur heard  Pulmonary/Chest: Effort normal and breath sounds normal  No respiratory distress  He has no wheezes  He has no rales  Abdominal: Soft  Bowel sounds are normal  There is no tenderness  There is no rebound and no guarding  Musculoskeletal: He exhibits no edema  Lymphadenopathy:     He has no cervical adenopathy  Neurological: He is alert and oriented to person, place, and time  Psychiatric: He has a normal mood and affect  His behavior is normal  Judgment and thought content normal    Vitals reviewed

## 2020-04-07 ENCOUNTER — TELEPHONE (OUTPATIENT)
Dept: NEUROLOGY | Facility: CLINIC | Age: 70
End: 2020-04-07

## 2020-04-07 ENCOUNTER — TELEMEDICINE (OUTPATIENT)
Dept: NEUROLOGY | Facility: CLINIC | Age: 70
End: 2020-04-07
Payer: MEDICARE

## 2020-04-07 DIAGNOSIS — G44.89 OTHER HEADACHE SYNDROME: ICD-10-CM

## 2020-04-07 DIAGNOSIS — G62.9 NEUROPATHY: Primary | ICD-10-CM

## 2020-04-07 PROCEDURE — 99214 OFFICE O/P EST MOD 30 MIN: CPT | Performed by: PSYCHIATRY & NEUROLOGY

## 2020-04-07 RX ORDER — GABAPENTIN 100 MG/1
CAPSULE ORAL
COMMUNITY
Start: 2020-04-02 | End: 2020-08-21

## 2020-06-16 ENCOUNTER — TELEPHONE (OUTPATIENT)
Dept: INTERNAL MEDICINE CLINIC | Facility: CLINIC | Age: 70
End: 2020-06-16

## 2020-06-18 ENCOUNTER — OFFICE VISIT (OUTPATIENT)
Dept: INTERNAL MEDICINE CLINIC | Facility: CLINIC | Age: 70
End: 2020-06-18
Payer: MEDICARE

## 2020-06-18 VITALS
SYSTOLIC BLOOD PRESSURE: 128 MMHG | OXYGEN SATURATION: 93 % | HEIGHT: 73 IN | WEIGHT: 232.4 LBS | DIASTOLIC BLOOD PRESSURE: 88 MMHG | TEMPERATURE: 97.7 F | HEART RATE: 64 BPM | RESPIRATION RATE: 16 BRPM | BODY MASS INDEX: 30.8 KG/M2

## 2020-06-18 DIAGNOSIS — I10 ESSENTIAL HYPERTENSION: ICD-10-CM

## 2020-06-18 DIAGNOSIS — E03.9 ACQUIRED HYPOTHYROIDISM: Primary | ICD-10-CM

## 2020-06-18 DIAGNOSIS — E78.2 MIXED HYPERLIPIDEMIA: ICD-10-CM

## 2020-06-18 DIAGNOSIS — Z12.5 SCREENING FOR PROSTATE CANCER: ICD-10-CM

## 2020-06-18 PROCEDURE — 3008F BODY MASS INDEX DOCD: CPT | Performed by: INTERNAL MEDICINE

## 2020-06-18 PROCEDURE — 3079F DIAST BP 80-89 MM HG: CPT | Performed by: INTERNAL MEDICINE

## 2020-06-18 PROCEDURE — 1036F TOBACCO NON-USER: CPT | Performed by: INTERNAL MEDICINE

## 2020-06-18 PROCEDURE — 1160F RVW MEDS BY RX/DR IN RCRD: CPT | Performed by: INTERNAL MEDICINE

## 2020-06-18 PROCEDURE — 99214 OFFICE O/P EST MOD 30 MIN: CPT | Performed by: INTERNAL MEDICINE

## 2020-06-18 PROCEDURE — 3074F SYST BP LT 130 MM HG: CPT | Performed by: INTERNAL MEDICINE

## 2020-06-18 PROCEDURE — 4040F PNEUMOC VAC/ADMIN/RCVD: CPT | Performed by: INTERNAL MEDICINE

## 2020-07-15 ENCOUNTER — HOSPITAL ENCOUNTER (OUTPATIENT)
Dept: NON INVASIVE DIAGNOSTICS | Facility: CLINIC | Age: 70
Discharge: HOME | End: 2020-07-15
Payer: MEDICARE

## 2020-07-15 DIAGNOSIS — I77.811 AORTIC ECTASIA, ABDOMINAL (HCC): ICD-10-CM

## 2020-07-15 DIAGNOSIS — I72.3 ILIAC ARTERY ANEURYSM, BILATERAL (HCC): ICD-10-CM

## 2020-07-15 DIAGNOSIS — I77.819 ECTATIC AORTA (HCC): ICD-10-CM

## 2020-07-15 PROCEDURE — 93978 VASCULAR STUDY: CPT

## 2020-07-15 PROCEDURE — 93978 VASCULAR STUDY: CPT | Performed by: SURGERY

## 2020-07-21 ENCOUNTER — TRANSCRIBE ORDERS (OUTPATIENT)
Dept: ADMINISTRATIVE | Facility: HOSPITAL | Age: 70
End: 2020-07-21

## 2020-07-21 DIAGNOSIS — I72.3 ILIAC ARTERY ANEURYSM, BILATERAL (HCC): Primary | ICD-10-CM

## 2020-08-21 ENCOUNTER — OFFICE VISIT (OUTPATIENT)
Dept: INTERNAL MEDICINE CLINIC | Facility: CLINIC | Age: 70
End: 2020-08-21
Payer: MEDICARE

## 2020-08-21 VITALS
WEIGHT: 229.6 LBS | DIASTOLIC BLOOD PRESSURE: 98 MMHG | HEIGHT: 73 IN | BODY MASS INDEX: 30.43 KG/M2 | HEART RATE: 52 BPM | SYSTOLIC BLOOD PRESSURE: 138 MMHG | TEMPERATURE: 97.8 F | OXYGEN SATURATION: 96 %

## 2020-08-21 DIAGNOSIS — M54.50 CHRONIC BILATERAL LOW BACK PAIN WITHOUT SCIATICA: Primary | ICD-10-CM

## 2020-08-21 DIAGNOSIS — G89.29 CHRONIC BILATERAL LOW BACK PAIN WITHOUT SCIATICA: Primary | ICD-10-CM

## 2020-08-21 PROCEDURE — 1160F RVW MEDS BY RX/DR IN RCRD: CPT | Performed by: NURSE PRACTITIONER

## 2020-08-21 PROCEDURE — 3075F SYST BP GE 130 - 139MM HG: CPT | Performed by: NURSE PRACTITIONER

## 2020-08-21 PROCEDURE — 99213 OFFICE O/P EST LOW 20 MIN: CPT | Performed by: NURSE PRACTITIONER

## 2020-08-21 PROCEDURE — 4040F PNEUMOC VAC/ADMIN/RCVD: CPT | Performed by: NURSE PRACTITIONER

## 2020-08-21 PROCEDURE — 1036F TOBACCO NON-USER: CPT | Performed by: NURSE PRACTITIONER

## 2020-08-21 PROCEDURE — 3080F DIAST BP >= 90 MM HG: CPT | Performed by: NURSE PRACTITIONER

## 2020-08-21 RX ORDER — CYCLOBENZAPRINE HCL 5 MG
5 TABLET ORAL
Qty: 20 TABLET | Refills: 0 | Status: SHIPPED | OUTPATIENT
Start: 2020-08-21 | End: 2021-09-20

## 2020-08-21 NOTE — PROGRESS NOTES
Assessment/Plan:    Chronic bilateral low back pain without sciatica  Back spasms- treat with flexeril  Start PT if not improving       Diagnoses and all orders for this visit:    Chronic bilateral low back pain without sciatica  -     cyclobenzaprine (FLEXERIL) 5 mg tablet; Take 1 tablet (5 mg total) by mouth daily at bedtime          Subjective:      Patient ID: Suzie Kennedy is a 79 y o  male  Patient complains of one week of lower back pain  nonradiating  8/10  Poor posture, limited ROM  He is already on gabapentin      The following portions of the patient's history were reviewed and updated as appropriate: allergies, current medications, past family history, past medical history, past social history, past surgical history and problem list     Review of Systems   Constitutional: Negative  HENT: Negative  Eyes: Negative  Respiratory: Negative  Cardiovascular: Negative  Gastrointestinal: Negative  Musculoskeletal: Positive for back pain  Neurological: Negative  Objective:      /98   Pulse (!) 52   Temp 97 8 °F (36 6 °C)   Ht 6' 1" (1 854 m)   Wt 104 kg (229 lb 9 6 oz)   SpO2 96%   BMI 30 29 kg/m²          Physical Exam  Vitals signs and nursing note reviewed  Constitutional:       Appearance: He is well-developed  HENT:      Head: Normocephalic and atraumatic  Right Ear: External ear normal       Left Ear: External ear normal       Nose: Nose normal    Eyes:      Conjunctiva/sclera: Conjunctivae normal       Pupils: Pupils are equal, round, and reactive to light  Neck:      Musculoskeletal: Normal range of motion and neck supple  Cardiovascular:      Rate and Rhythm: Normal rate and regular rhythm  Pulmonary:      Effort: Pulmonary effort is normal       Breath sounds: Normal breath sounds  Musculoskeletal:      Lumbar back: He exhibits decreased range of motion, pain and spasm  Skin:     General: Skin is warm and dry     Neurological:      Mental Status: He is alert and oriented to person, place, and time

## 2020-08-23 PROBLEM — M54.50 CHRONIC BILATERAL LOW BACK PAIN WITHOUT SCIATICA: Status: ACTIVE | Noted: 2020-08-23

## 2020-08-23 PROBLEM — G89.29 CHRONIC BILATERAL LOW BACK PAIN WITHOUT SCIATICA: Status: ACTIVE | Noted: 2020-08-23

## 2020-09-03 ENCOUNTER — OFFICE VISIT (OUTPATIENT)
Dept: INTERNAL MEDICINE CLINIC | Facility: CLINIC | Age: 70
End: 2020-09-03
Payer: MEDICARE

## 2020-09-03 VITALS
BODY MASS INDEX: 30.32 KG/M2 | HEIGHT: 73 IN | SYSTOLIC BLOOD PRESSURE: 132 MMHG | RESPIRATION RATE: 16 BRPM | HEART RATE: 59 BPM | WEIGHT: 228.8 LBS | TEMPERATURE: 97.9 F | DIASTOLIC BLOOD PRESSURE: 88 MMHG | OXYGEN SATURATION: 95 %

## 2020-09-03 DIAGNOSIS — M54.50 CHRONIC BILATERAL LOW BACK PAIN WITHOUT SCIATICA: Primary | ICD-10-CM

## 2020-09-03 DIAGNOSIS — G89.29 CHRONIC BILATERAL LOW BACK PAIN WITHOUT SCIATICA: Primary | ICD-10-CM

## 2020-09-03 PROCEDURE — 99214 OFFICE O/P EST MOD 30 MIN: CPT | Performed by: INTERNAL MEDICINE

## 2020-09-03 RX ORDER — METHYLPREDNISOLONE 4 MG/1
TABLET ORAL
Qty: 21 EACH | Refills: 0 | Status: SHIPPED | OUTPATIENT
Start: 2020-09-03 | End: 2021-09-20

## 2020-09-03 NOTE — PATIENT INSTRUCTIONS
Problem List Items Addressed This Visit        Other    Chronic bilateral low back pain without sciatica - Primary     History and exam consistent with muscle strain, continue Flexeril as needed, heating pad, can also use topical lidocaine with something like Aspercreme  Will refer patient to Physical therapy for evaluation and treatment 2  Would like to avoid NSAIDs with patient being on a blood thinner, will give a short course of steroids also            Relevant Medications    methylPREDNISolone 4 MG tablet therapy pack    Other Relevant Orders    Ambulatory referral to Physical Therapy

## 2020-09-03 NOTE — ASSESSMENT & PLAN NOTE
History and exam consistent with muscle strain, continue Flexeril as needed, heating pad, can also use topical lidocaine with something like Aspercreme  Will refer patient to Physical therapy for evaluation and treatment 2  Would like to avoid NSAIDs with patient being on a blood thinner, will give a short course of steroids also

## 2020-09-03 NOTE — PROGRESS NOTES
Assessment/Plan:    Chronic bilateral low back pain without sciatica  History and exam consistent with muscle strain, continue Flexeril as needed, heating pad, can also use topical lidocaine with something like Aspercreme  Will refer patient to Physical therapy for evaluation and treatment 2  Would like to avoid NSAIDs with patient being on a blood thinner, will give a short course of steroids also  Diagnoses and all orders for this visit:    Chronic bilateral low back pain without sciatica  -     methylPREDNISolone 4 MG tablet therapy pack; Use as directed on package  -     Ambulatory referral to Physical Therapy; Future          Subjective:      Patient ID: Vickie Martins is a 79 y o  male  Pt having back spasms, onset about 3 weeks ago, symptoms come and go  He has been using a heating pad and intermittent flexeril  Pain located lower back, midline, no radiations down legs, no saddle anesthesia, no bladder or bowel incontinence, no fevers  Pt may have strained it working in the garden, no fall  No change in urine or bowels  The following portions of the patient's history were reviewed and updated as appropriate: allergies, current medications, past family history, past medical history, past social history, past surgical history and problem list     Review of Systems   Constitutional: Negative for chills, fatigue and fever  HENT: Negative for congestion, nosebleeds, postnasal drip, sore throat and trouble swallowing  Eyes: Negative for pain  Respiratory: Negative for cough, chest tightness, shortness of breath and wheezing  Cardiovascular: Negative for chest pain, palpitations and leg swelling  Gastrointestinal: Negative for abdominal pain, constipation, diarrhea, nausea and vomiting  Endocrine: Negative for polydipsia and polyuria  Genitourinary: Negative for dysuria, flank pain and hematuria  Musculoskeletal: Positive for back pain  Negative for arthralgias     Skin: Negative for rash  Neurological: Negative for dizziness, tremors and headaches  Hematological: Does not bruise/bleed easily  Psychiatric/Behavioral: Negative for confusion and dysphoric mood  The patient is not nervous/anxious  Objective:      /88   Pulse 59   Temp 97 9 °F (36 6 °C) (Tympanic)   Resp 16   Ht 6' 1" (1 854 m)   Wt 104 kg (228 lb 12 8 oz)   SpO2 95%   BMI 30 19 kg/m²          Physical Exam  Constitutional:       General: He is not in acute distress  Appearance: Normal appearance  He is well-developed  HENT:      Head: Normocephalic and atraumatic  Right Ear: External ear normal       Left Ear: External ear normal    Eyes:      General: No scleral icterus  Conjunctiva/sclera: Conjunctivae normal    Neck:      Musculoskeletal: Normal range of motion and neck supple  Thyroid: No thyromegaly  Trachea: No tracheal deviation  Cardiovascular:      Rate and Rhythm: Normal rate  Rhythm irregularly irregular  Heart sounds: Normal heart sounds  No murmur  Pulmonary:      Effort: Pulmonary effort is normal  No respiratory distress  Breath sounds: Normal breath sounds  No wheezing or rales  Abdominal:      General: Bowel sounds are normal       Palpations: Abdomen is soft  Tenderness: There is no abdominal tenderness  There is no guarding or rebound  Musculoskeletal:      Right lower leg: No edema  Left lower leg: No edema  Comments: Straight leg raise tests negative, 5/5 strength in lower extremity, no tenderness over spine, negative WEI test   Lymphadenopathy:      Cervical: No cervical adenopathy  Neurological:      General: No focal deficit present  Mental Status: He is alert and oriented to person, place, and time  Psychiatric:         Mood and Affect: Mood normal          Behavior: Behavior normal          Thought Content:  Thought content normal          Judgment: Judgment normal

## 2020-09-09 ENCOUNTER — EVALUATION (OUTPATIENT)
Dept: PHYSICAL THERAPY | Facility: REHABILITATION | Age: 70
End: 2020-09-09
Payer: MEDICARE

## 2020-09-09 DIAGNOSIS — G89.29 CHRONIC BILATERAL LOW BACK PAIN WITHOUT SCIATICA: Primary | ICD-10-CM

## 2020-09-09 DIAGNOSIS — M54.50 CHRONIC BILATERAL LOW BACK PAIN WITHOUT SCIATICA: Primary | ICD-10-CM

## 2020-09-09 PROCEDURE — 97110 THERAPEUTIC EXERCISES: CPT | Performed by: PHYSICAL THERAPIST

## 2020-09-09 PROCEDURE — 97161 PT EVAL LOW COMPLEX 20 MIN: CPT | Performed by: PHYSICAL THERAPIST

## 2020-09-09 NOTE — PROGRESS NOTES
PT Evaluation     Today's date: 2020  Patient name: Lenny Chavez  : 1950  MRN: [id number]  Referring provider: Phillip Hough MD  Dx: No diagnosis found  Assessment  Assessment details: Lenny Chavez is a pleasant 79 y o  male who presents with acute low back pain  The patient's greatest concerns are the pain she is experiencing, worry over not knowing what's wrong, fear of not being able to keep active and future ill health (and wanting to prevent it)  No further referral appears necessary at this time based upon examination results  Primary movement impairment diagnosis of mobility deficit of the lumbar spine into flexion, resulting in pathoanatomical symptoms of acute low back pain, which is limiting his ability to perform bending activities, ride his bike, garden, stand while cooking, and perform other activities of daily living  The patient presented with a 3 week history of low back pain that started after a lot of gardening activity at his home  Since then, he has had medication prescribed including a steroid oral pack, which he reported did help improve his symptoms  Objectively, the patient presented with significantly increased thoracic kyphosis, forward head, rounded shoulders, and poor posture  His lumbar range of motion was slightly limited, mostly with flexion, and poor lumbopelvic movement coordination  Based on repetitive flexion activity of gardening and cycling often, this may have led to his acute episode of low back pain, in addition to other impairments found upon examination  These include reduced hip mobility especially into flexion and internal rotation, poor core engagement, poor hip strength, joint hypomobility of his thoracic and lumbar spine, and poor body mechanics  He will benefit specifically from improving his mobility first globally, followed by specific core interventions and functional movements to restore his PLOF and improve QOL  Primary Impairments:  1) mobility deficits of the lumbar spine into flexion   2) poor movement coordination and motor control     Etiologic factors include acute episode of LBP after gardening  Impairments: abnormal coordination, abnormal muscle firing, abnormal muscle tone, abnormal or restricted ROM, abnormal movement, activity intolerance, difficulty understanding, impaired physical strength, lacks appropriate home exercise program, pain with function, poor posture  and poor body mechanics    Symptom irritability: moderateUnderstanding of Dx/Px/POC: good   Prognosis: good  Prognosis details: Positive prognostic indicators include positive attitude toward recovery  Negative prognostic indicators include chronicity of symptoms, age, and other comorbid medical conditions  Goals  Impairment Based Goals: (6 weeks)   Patient will improve lumbar ROM to WNL   Patient will improve hip strength by at least 1/2 MMT grade   Patient will improve FOTO score greater than predicted increase  Functional Based Goals: (8 weeks)    Patient will be independent with home exercise program    Patient will be able to manage symptoms independently  Patient will be able to cycle without limitations  Patient will be able to garden without limitations  Patient will be able to perform standing activities without limitations  Patient will be able to perform bending activities without limitations  Plan  Plan details: Prognosis above is given PT services 2x/week tapering to 1x/week over the next 2 months and home program adherence    Patient would benefit from: skilled physical therapy  Referral necessary: No  Planned modality interventions: thermotherapy: hydrocollator packs  Planned therapy interventions: activity modification, joint mobilization, manual therapy, motor coordination training, neuromuscular re-education, patient education, self care, therapeutic activities, therapeutic exercise, graded activity, home exercise program, behavior modification, flexibility, functional ROM exercises, stretching and strengthening  Plan of Care beginning date: 2020  Plan of Care expiration date: 2020  Treatment plan discussed with: patient        Subjective Evaluation    History of Present Illness  Mechanism of injury: I have had some low back pain that started about 3 weeks ago, I believe from gardening and doing a lot of bending over  I usually am wearing a back brace and I was not wearing it during that time  The next day I started having some spasming in the back  It did not clear itself up  By the end of the week I called my primary care office, and saw a nurse practitioner  She did give me a muscle relaxer prescription, which did help some at first, but then after a few days, it returned to the pain I had  I had a lot of trouble getting up out of a sitting position, standing for any length of time, and bending activities  I did return to my primary care, and did have a short course of steroids which I completed yesterday  Since then, I have been feeling much much better  I have gotten back on my bicycle and that went well  I do have some little occasional twinges here and there if I am doing certain movements  I want to follow through with this to make sure that this does not happen again  About 10 years ago, I did have some persistent back pain, but it wasn't like the sharp spasm pain I had with this episode     Pain  Current pain ratin  At best pain ratin  At worst pain ratin  Location: Right in the middle of my lower back   Quality: sharp, dull ache, discomfort, pulling and tight  Relieving factors: heat (sitting, but I also think a certain amount of movement is somewhat helpful )  Exacerbated by: bending forward, I would not want to pick anything up that has weight     Treatments  Previous treatment: medication (steroid pack )  Current treatment: medication and physical therapy  Patient Goals  Patient goals for therapy: decreased pain, increased motion, increased strength, independence with ADLs/IADLs and return to sport/leisure activities  Patient goal: I would like to follow through and ensure my back is 100 percent better, and ensure that this does not happen again  Objective     General Comments:      Lumbar Comments  Standing Posture: significant increase in thoracic kyphosis, rounded shoulders, forward head, increased hip external rotation      Red Flags:   No night pain, no previous history of cancer, no unexplained weight loss, negative Babinski and Ortez pathological reflexes bilaterally, no signs of systemic infection (fever, chills)     Manual Muscle Testing:   Hip Extension Knee Bent: R  4-/5    L  4-/5   Hip Extension Knee Straight: R 4-/5 L 4-/5   Hip Abduction: R  4/5  L  4/5     Palpation Assessment: mild tenderness to palpation of lumbar spine with CPA's throughout L1-L5     Joint Play: Hypomobile lumbar spine      Range of Motion:   Lumbar Spine Flexion: 25% limited   Lumbar Spine Extension: WNL   Lumbar Spine Sidebending: R 25% limited           L 25% limited     Thoracic Spine Mobility:   Rotation: 25% limited bilaterally   Extension: 50% limited      Special Tests   WEI: negative      Active SLR: negative      Hip Passive Range of Motion:   50% limited hip flexion and IR bilaterally   Passive hip flexion reproduced patient's low back pain in supine     Repeated Movements:     FIS: worse                EIS: same                EIL: same                             Precautions: N/A     Manuals 9/8            Lumbar rotational mobs             Hip Long Axis Distraction                                        Neuro Re-Ed             TrA bracing             Multifidus Activation             Bird Dogs             Paloff Press             Squats             RDL             Storks             Ther Ex             NuStep             Repeated Standing Ext  20x            Seated Thoracic Ext  10x5"            1/2 Kneel Hip Flex Stretch  5x10"             Clamshells             Prone Press Ups             Rows/LPD             Hip Ext   Cable Column             Ther Activity             Lifting Mechanics                          Gait Training                                       Modalities

## 2020-09-14 ENCOUNTER — OFFICE VISIT (OUTPATIENT)
Dept: PHYSICAL THERAPY | Facility: REHABILITATION | Age: 70
End: 2020-09-14
Payer: MEDICARE

## 2020-09-14 DIAGNOSIS — G89.29 CHRONIC BILATERAL LOW BACK PAIN WITHOUT SCIATICA: Primary | ICD-10-CM

## 2020-09-14 DIAGNOSIS — G89.29 CHRONIC NONINTRACTABLE HEADACHE, UNSPECIFIED HEADACHE TYPE: ICD-10-CM

## 2020-09-14 DIAGNOSIS — R51.9 CHRONIC NONINTRACTABLE HEADACHE, UNSPECIFIED HEADACHE TYPE: ICD-10-CM

## 2020-09-14 DIAGNOSIS — M54.50 CHRONIC BILATERAL LOW BACK PAIN WITHOUT SCIATICA: Primary | ICD-10-CM

## 2020-09-14 PROCEDURE — 97112 NEUROMUSCULAR REEDUCATION: CPT | Performed by: PHYSICAL THERAPIST

## 2020-09-14 PROCEDURE — 97110 THERAPEUTIC EXERCISES: CPT | Performed by: PHYSICAL THERAPIST

## 2020-09-14 RX ORDER — GABAPENTIN 100 MG/1
100 CAPSULE ORAL 2 TIMES DAILY
Qty: 180 CAPSULE | Refills: 3 | Status: SHIPPED | OUTPATIENT
Start: 2020-09-14 | End: 2020-09-23 | Stop reason: SDUPTHER

## 2020-09-14 NOTE — PROGRESS NOTES
Daily Note     Today's date: 2020  Patient name: Olga Tolentino  : 1950  MRN: [id number]  Referring provider: Svetlana Shields MD  Dx:   Encounter Diagnosis     ICD-10-CM    1  Chronic bilateral low back pain without sciatica  M54 5     G89 29                   Subjective: My back was a bit sore on Saturday but I think it was because we were out at an event on Friday evening  I did my exercises and the pain reduced  Today I am feeling pretty good  Objective: See treatment diary below      Assessment: Has an intolerance to lie supine as this reproduces his symptoms  Attempted bridges for core stability and strengthening of hip extensors yet was unable to stay in this position  Did tolerate other exercises well with good progressions today  Will need to keep working on mobility, core stability, and functional exercise progressions  Would continue to benefit from PT  Plan: Continue per plan of care  Precautions: N/A     Manuals            Lumbar rotational mobs  SE 8' Gr IV            Hip Long Axis Distraction                                        Neuro Re-Ed             TrA bracing  attempted*           bridges  attempted*           Ronceverte Financial Press  2x12 btb           Squats             RDL             Storks             Ther Ex             NuStep  10' L6            Repeated Standing Ext  20x 20x           Seated Thoracic Ext  10x5" 15x5"            1/2 Kneel Hip Flex Stretch  5x10"  np*           Clamshells  2x12 gtb           Prone Press Ups             Rows/LPD             Hip Ext   Cable Column             Ther Activity             Lifting Mechanics                          Gait Training                                       Modalities

## 2020-09-14 NOTE — TELEPHONE ENCOUNTER
Patient calling in for Gabapentin refill  Patient requested 90 day supply to [facility] mail order

## 2020-09-17 ENCOUNTER — OFFICE VISIT (OUTPATIENT)
Dept: PHYSICAL THERAPY | Facility: REHABILITATION | Age: 70
End: 2020-09-17
Payer: MEDICARE

## 2020-09-17 DIAGNOSIS — G89.29 CHRONIC BILATERAL LOW BACK PAIN WITHOUT SCIATICA: Primary | ICD-10-CM

## 2020-09-17 DIAGNOSIS — M54.50 CHRONIC BILATERAL LOW BACK PAIN WITHOUT SCIATICA: Primary | ICD-10-CM

## 2020-09-17 PROCEDURE — 97110 THERAPEUTIC EXERCISES: CPT | Performed by: PHYSICAL THERAPIST

## 2020-09-17 PROCEDURE — 97112 NEUROMUSCULAR REEDUCATION: CPT | Performed by: PHYSICAL THERAPIST

## 2020-09-17 NOTE — PROGRESS NOTES
Daily Note     Today's date: 2020  Patient name: Lis Fierro  : 1950  MRN: [id number]  Referring provider: Leonard Chao MD  Dx:   Encounter Diagnosis     ICD-10-CM    1  Chronic bilateral low back pain without sciatica  M54 5     G89 29                   Subjective: Back is feeling ok today  I had some mild soreness after last session but it eventually passed and got better  Objective: See treatment diary below      Assessment: Did have a mild increase in low back pain after performing prone press up repeatedly today, yet it was centralized and nothing distal  Continued to progress well with core stabilization exercises today  Did have moderate difficulty with standing abduction with core activation utilizing hand press into unstable physioball surface, as he had LOB 2-3 times and poor single leg hip stability  Also required moderate cueing for cat/cow to work on segmental mobility and had compensations of whole body movement rather than isolated spinal movement  Would continue to benefit from PT to address current deficits, improve QOL, and restore PLOF  Plan: Continue per plan of care  Precautions: N/A         Manuals           Lumbar rotational mobs  SE 8' Gr IV            Hip Long Axis Distraction                                        Neuro Re-Ed             TrA bracing  attempted*           bridges  attempted*           i3 membrane Financial Press  2x12 btb 2x12 btb          Squats             RDL             Storks             Standing physioball TrA press with opp arm lift   10x B           Standing hip abduction hands on physioball    2x10 B                                                  Ther Ex             NuStep  10' L6  Bike 8'           Repeated Standing Ext  20x 20x 20x          Seated Thoracic Ext  10x5" 15x5"            1/2 Kneel Hip Flex Stretch    5x10"  np*           Clamshells  2x12 gtb 2x12 gtb           Prone Press Ups   2x10 Rows/LPD             Hip Ext   Cable Column             Seated cat/cow   20x                                                              Ther Activity             Lifting Mechanics                          Gait Training                                       Modalities

## 2020-09-21 ENCOUNTER — APPOINTMENT (OUTPATIENT)
Dept: PHYSICAL THERAPY | Facility: REHABILITATION | Age: 70
End: 2020-09-21
Payer: MEDICARE

## 2020-09-23 DIAGNOSIS — G89.29 CHRONIC NONINTRACTABLE HEADACHE, UNSPECIFIED HEADACHE TYPE: ICD-10-CM

## 2020-09-23 DIAGNOSIS — R51.9 CHRONIC NONINTRACTABLE HEADACHE, UNSPECIFIED HEADACHE TYPE: ICD-10-CM

## 2020-09-23 NOTE — TELEPHONE ENCOUNTER
Pt called re: gabapentin script  Sig states to take 1 capsule (100 mg total) by mouth 2 (two) times a day Up to 300mg additional if headaches flaring  Pharmacy is filling 180 caps for 30 days  He takes 1 cap bid  Very seldom that he takes extra 1 tab  Pt states that he has so much meds left at home  Pt is requesting sig to say take 1 cap bid  Quantity 180 (90 days supply) w/3 refills  rx entered   If agreeable, pls sign off     thanks

## 2020-09-24 ENCOUNTER — OFFICE VISIT (OUTPATIENT)
Dept: PHYSICAL THERAPY | Facility: REHABILITATION | Age: 70
End: 2020-09-24
Payer: MEDICARE

## 2020-09-24 DIAGNOSIS — G89.29 CHRONIC BILATERAL LOW BACK PAIN WITHOUT SCIATICA: Primary | ICD-10-CM

## 2020-09-24 DIAGNOSIS — M54.50 CHRONIC BILATERAL LOW BACK PAIN WITHOUT SCIATICA: Primary | ICD-10-CM

## 2020-09-24 PROCEDURE — 97110 THERAPEUTIC EXERCISES: CPT

## 2020-09-24 PROCEDURE — 97112 NEUROMUSCULAR REEDUCATION: CPT

## 2020-09-24 RX ORDER — GABAPENTIN 100 MG/1
CAPSULE ORAL
Qty: 180 CAPSULE | Refills: 3 | Status: SHIPPED | OUTPATIENT
Start: 2020-09-24 | End: 2021-09-28 | Stop reason: SDUPTHER

## 2020-09-24 NOTE — PROGRESS NOTES
Daily Note     Today's date: 2020  Patient name: Filiberto Valladares  : 1950  MRN: [id number]  Referring provider: Sylvia Carroll MD  Dx:   Encounter Diagnosis     ICD-10-CM    1  Chronic bilateral low back pain without sciatica  M54 5     G89 29                   Subjective: Pt reports his back is doing okay today, no issues over the past week  Objective: See treatment diary below      Assessment: Tolerated treatment well  Patient would benefit from continued PT  Pt performed all exercises without an increase in pain during or after treatment session  Pt had minor balance deficits during standing pball exercises, required close supervision  Pt was 1:1 with PTA for entirety  Plan: Continue per plan of care  Precautions: N/A            Manuals          Lumbar rotational mobs  SE 8' Gr IV            Hip Long Axis Distraction                                        Neuro Re-Ed             TrA bracing  attempted*           bridges  attempted*           Bird Dogs             Paloff Press  2x12 btb 2x12 btb 2x10 btb         Squats             RDL             Storks             Standing physioball TrA press with opp arm lift   10x B  2x10 B         Standing hip abduction hands on physioball    2x10 B  2x10 B                                                Ther Ex             NuStep  10' L6  Bike 8'  Bike 8'         Repeated Standing Ext  20x 20x 20x 20x         Seated Thoracic Ext  10x5" 15x5"            1/2 Kneel Hip Flex Stretch  5x10"  np*           Clamshells  2x12 gtb 2x12 gtb  2x15 gtb         Prone Press Ups   2x10  2x10         Rows/LPD             Hip Ext   Cable Column             Seated cat/cow   20x 20x                                                             Ther Activity             Lifting Mechanics                          Gait Training                                       Modalities

## 2020-09-28 ENCOUNTER — OFFICE VISIT (OUTPATIENT)
Dept: PHYSICAL THERAPY | Facility: REHABILITATION | Age: 70
End: 2020-09-28
Payer: MEDICARE

## 2020-09-28 DIAGNOSIS — M54.50 CHRONIC BILATERAL LOW BACK PAIN WITHOUT SCIATICA: Primary | ICD-10-CM

## 2020-09-28 DIAGNOSIS — G89.29 CHRONIC BILATERAL LOW BACK PAIN WITHOUT SCIATICA: Primary | ICD-10-CM

## 2020-09-28 PROCEDURE — 97110 THERAPEUTIC EXERCISES: CPT

## 2020-09-28 PROCEDURE — 97112 NEUROMUSCULAR REEDUCATION: CPT

## 2020-09-28 NOTE — PROGRESS NOTES
Daily Note     Today's date: 2020  Patient name: Filiberto Valladares  : 1950  MRN: [id number]  Referring provider: Sylvia Carroll MD  Dx:   Encounter Diagnosis     ICD-10-CM    1  Chronic bilateral low back pain without sciatica  M54 5     G89 29                   Subjective: Pt reported he had long car rides this weekend that presented some pain after extended sitting  Pt reports that pain has mostly resolved upon arrival       Objective: See treatment diary below      Assessment: Tolerated treatment well  Patient would benefit from continued PT  Pt had some minor pain with supine TA contraction and push ups, able to complete all other exercises with no increase in pain during or after session  Pt discussed use of lumbar cushion for car, and discussed using back brace for heavier work around home  Pt  1:1 with PTA for entirety  Plan: Continue per plan of care  Precautions: N/A            Manuals         Lumbar rotational mobs  SE 8' Gr IV            Hip Long Axis Distraction                                        Neuro Re-Ed             TrA bracing  attempted*   10x3"        bridges  attempted*           Bird Dogs             Paloff Press  2x12 btb 2x12 btb 2x10 btb 20x ea btb        Squats             RDL             Storks             Standing physioball TrA press with opp arm lift   10x B  2x10 B 20x ea b/l        Standing hip abduction hands on physioball    2x10 B  2x10 B 20x ea b/l                                               Ther Ex             NuStep  10' L6  Bike 8'  Bike 8' RB 10' L3        Repeated Standing Ext  20x 20x 20x 20x 20x        Seated Thoracic Ext  10x5" 15x5"            1/2 Kneel Hip Flex Stretch  5x10"  np*           Clamshells  2x12 gtb 2x12 gtb  2x15 gtb 2x15 ea gtb        Prone Press Ups   2x10  2x10 2x10        Rows/LPD             Hip Ext   Cable Column             Seated cat/cow   20x 20x 20x Ther Activity             Lifting Mechanics                          Gait Training                                       Modalities

## 2020-10-01 ENCOUNTER — OFFICE VISIT (OUTPATIENT)
Dept: PHYSICAL THERAPY | Facility: REHABILITATION | Age: 70
End: 2020-10-01
Payer: MEDICARE

## 2020-10-01 DIAGNOSIS — M54.50 CHRONIC BILATERAL LOW BACK PAIN WITHOUT SCIATICA: Primary | ICD-10-CM

## 2020-10-01 DIAGNOSIS — G89.29 CHRONIC BILATERAL LOW BACK PAIN WITHOUT SCIATICA: Primary | ICD-10-CM

## 2020-10-01 PROCEDURE — 97112 NEUROMUSCULAR REEDUCATION: CPT

## 2020-10-01 PROCEDURE — 97110 THERAPEUTIC EXERCISES: CPT

## 2020-10-05 ENCOUNTER — OFFICE VISIT (OUTPATIENT)
Dept: PHYSICAL THERAPY | Facility: REHABILITATION | Age: 70
End: 2020-10-05
Payer: MEDICARE

## 2020-10-05 DIAGNOSIS — M54.50 CHRONIC BILATERAL LOW BACK PAIN WITHOUT SCIATICA: Primary | ICD-10-CM

## 2020-10-05 DIAGNOSIS — G89.29 CHRONIC BILATERAL LOW BACK PAIN WITHOUT SCIATICA: Primary | ICD-10-CM

## 2020-10-05 PROCEDURE — 97110 THERAPEUTIC EXERCISES: CPT | Performed by: PHYSICAL THERAPIST

## 2020-10-05 PROCEDURE — 97112 NEUROMUSCULAR REEDUCATION: CPT | Performed by: PHYSICAL THERAPIST

## 2020-10-08 ENCOUNTER — APPOINTMENT (OUTPATIENT)
Dept: PHYSICAL THERAPY | Facility: REHABILITATION | Age: 70
End: 2020-10-08
Payer: MEDICARE

## 2020-10-12 ENCOUNTER — APPOINTMENT (OUTPATIENT)
Dept: PHYSICAL THERAPY | Facility: REHABILITATION | Age: 70
End: 2020-10-12
Payer: MEDICARE

## 2020-10-15 ENCOUNTER — APPOINTMENT (OUTPATIENT)
Dept: PHYSICAL THERAPY | Facility: REHABILITATION | Age: 70
End: 2020-10-15
Payer: MEDICARE

## 2020-10-26 ENCOUNTER — OFFICE VISIT (OUTPATIENT)
Dept: PHYSICAL THERAPY | Facility: REHABILITATION | Age: 70
End: 2020-10-26
Payer: MEDICARE

## 2020-10-26 DIAGNOSIS — G89.29 CHRONIC BILATERAL LOW BACK PAIN WITHOUT SCIATICA: Primary | ICD-10-CM

## 2020-10-26 DIAGNOSIS — M54.50 CHRONIC BILATERAL LOW BACK PAIN WITHOUT SCIATICA: Primary | ICD-10-CM

## 2020-10-26 PROCEDURE — 97110 THERAPEUTIC EXERCISES: CPT | Performed by: PHYSICAL THERAPIST

## 2020-10-26 PROCEDURE — 97112 NEUROMUSCULAR REEDUCATION: CPT | Performed by: PHYSICAL THERAPIST

## 2020-11-06 ENCOUNTER — LAB (OUTPATIENT)
Dept: LAB | Facility: CLINIC | Age: 70
End: 2020-11-06
Payer: MEDICARE

## 2020-11-06 DIAGNOSIS — E03.9 ACQUIRED HYPOTHYROIDISM: ICD-10-CM

## 2020-11-06 DIAGNOSIS — E78.2 MIXED HYPERLIPIDEMIA: ICD-10-CM

## 2020-11-06 DIAGNOSIS — Z12.5 SCREENING FOR PROSTATE CANCER: ICD-10-CM

## 2020-11-06 LAB
ALBUMIN SERPL BCP-MCNC: 3.9 G/DL (ref 3.5–5)
ALP SERPL-CCNC: 99 U/L (ref 46–116)
ALT SERPL W P-5'-P-CCNC: 37 U/L (ref 12–78)
ANION GAP SERPL CALCULATED.3IONS-SCNC: 1 MMOL/L (ref 4–13)
AST SERPL W P-5'-P-CCNC: 30 U/L (ref 5–45)
BASOPHILS # BLD AUTO: 0.08 THOUSANDS/ΜL (ref 0–0.1)
BASOPHILS NFR BLD AUTO: 1 % (ref 0–1)
BILIRUB SERPL-MCNC: 1.02 MG/DL (ref 0.2–1)
BUN SERPL-MCNC: 21 MG/DL (ref 5–25)
CALCIUM SERPL-MCNC: 9.1 MG/DL (ref 8.3–10.1)
CHLORIDE SERPL-SCNC: 110 MMOL/L (ref 100–108)
CHOLEST SERPL-MCNC: 121 MG/DL (ref 50–200)
CO2 SERPL-SCNC: 29 MMOL/L (ref 21–32)
CREAT SERPL-MCNC: 0.99 MG/DL (ref 0.6–1.3)
EOSINOPHIL # BLD AUTO: 0.2 THOUSAND/ΜL (ref 0–0.61)
EOSINOPHIL NFR BLD AUTO: 3 % (ref 0–6)
ERYTHROCYTE [DISTWIDTH] IN BLOOD BY AUTOMATED COUNT: 13.1 % (ref 11.6–15.1)
GFR SERPL CREATININE-BSD FRML MDRD: 77 ML/MIN/1.73SQ M
GLUCOSE P FAST SERPL-MCNC: 105 MG/DL (ref 65–99)
HCT VFR BLD AUTO: 48.1 % (ref 36.5–49.3)
HDLC SERPL-MCNC: 63 MG/DL
HGB BLD-MCNC: 15.9 G/DL (ref 12–17)
IMM GRANULOCYTES # BLD AUTO: 0.03 THOUSAND/UL (ref 0–0.2)
IMM GRANULOCYTES NFR BLD AUTO: 1 % (ref 0–2)
LDLC SERPL CALC-MCNC: 44 MG/DL (ref 0–100)
LYMPHOCYTES # BLD AUTO: 0.94 THOUSANDS/ΜL (ref 0.6–4.47)
LYMPHOCYTES NFR BLD AUTO: 15 % (ref 14–44)
MCH RBC QN AUTO: 30.8 PG (ref 26.8–34.3)
MCHC RBC AUTO-ENTMCNC: 33.1 G/DL (ref 31.4–37.4)
MCV RBC AUTO: 93 FL (ref 82–98)
MONOCYTES # BLD AUTO: 0.66 THOUSAND/ΜL (ref 0.17–1.22)
MONOCYTES NFR BLD AUTO: 11 % (ref 4–12)
NEUTROPHILS # BLD AUTO: 4.2 THOUSANDS/ΜL (ref 1.85–7.62)
NEUTS SEG NFR BLD AUTO: 69 % (ref 43–75)
NRBC BLD AUTO-RTO: 0 /100 WBCS
PLATELET # BLD AUTO: 255 THOUSANDS/UL (ref 149–390)
PMV BLD AUTO: 10.1 FL (ref 8.9–12.7)
POTASSIUM SERPL-SCNC: 5.3 MMOL/L (ref 3.5–5.3)
PROT SERPL-MCNC: 6.4 G/DL (ref 6.4–8.2)
PSA SERPL-MCNC: 0.9 NG/ML (ref 0–4)
RBC # BLD AUTO: 5.17 MILLION/UL (ref 3.88–5.62)
SODIUM SERPL-SCNC: 140 MMOL/L (ref 136–145)
TRIGL SERPL-MCNC: 68 MG/DL
TSH SERPL DL<=0.05 MIU/L-ACNC: 3.05 UIU/ML (ref 0.36–3.74)
WBC # BLD AUTO: 6.11 THOUSAND/UL (ref 4.31–10.16)

## 2020-11-06 PROCEDURE — G0103 PSA SCREENING: HCPCS

## 2020-11-06 PROCEDURE — 80053 COMPREHEN METABOLIC PANEL: CPT

## 2020-11-06 PROCEDURE — 80061 LIPID PANEL: CPT

## 2020-11-06 PROCEDURE — 36415 COLL VENOUS BLD VENIPUNCTURE: CPT

## 2020-11-06 PROCEDURE — 85025 COMPLETE CBC W/AUTO DIFF WBC: CPT

## 2020-11-06 PROCEDURE — 84443 ASSAY THYROID STIM HORMONE: CPT

## 2020-11-08 DIAGNOSIS — I10 ESSENTIAL HYPERTENSION: ICD-10-CM

## 2020-11-09 RX ORDER — FELODIPINE 5 MG/1
TABLET, EXTENDED RELEASE ORAL
Qty: 90 TABLET | Refills: 3 | Status: SHIPPED | OUTPATIENT
Start: 2020-11-09 | End: 2021-10-31

## 2020-11-16 ENCOUNTER — TELEPHONE (OUTPATIENT)
Dept: INTERNAL MEDICINE CLINIC | Facility: CLINIC | Age: 70
End: 2020-11-16

## 2020-11-17 ENCOUNTER — OFFICE VISIT (OUTPATIENT)
Dept: INTERNAL MEDICINE CLINIC | Facility: CLINIC | Age: 70
End: 2020-11-17
Payer: MEDICARE

## 2020-11-17 VITALS
HEART RATE: 60 BPM | TEMPERATURE: 98 F | RESPIRATION RATE: 16 BRPM | HEIGHT: 73 IN | SYSTOLIC BLOOD PRESSURE: 122 MMHG | WEIGHT: 231.4 LBS | DIASTOLIC BLOOD PRESSURE: 72 MMHG | BODY MASS INDEX: 30.67 KG/M2

## 2020-11-17 DIAGNOSIS — Z00.00 MEDICARE ANNUAL WELLNESS VISIT, SUBSEQUENT: Primary | ICD-10-CM

## 2020-11-17 DIAGNOSIS — Z00.00 ENCOUNTER FOR MEDICARE ANNUAL WELLNESS EXAM: ICD-10-CM

## 2020-11-17 DIAGNOSIS — R21 RASH: ICD-10-CM

## 2020-11-17 PROCEDURE — G0439 PPPS, SUBSEQ VISIT: HCPCS | Performed by: INTERNAL MEDICINE

## 2020-11-17 RX ORDER — NYSTATIN 100000 [USP'U]/G
POWDER TOPICAL 2 TIMES DAILY
Qty: 60 G | Refills: 1 | Status: SHIPPED | OUTPATIENT
Start: 2020-11-17 | End: 2021-09-20

## 2020-12-14 DIAGNOSIS — I10 HYPERTENSION, UNSPECIFIED TYPE: ICD-10-CM

## 2020-12-14 DIAGNOSIS — E03.9 ACQUIRED HYPOTHYROIDISM: ICD-10-CM

## 2020-12-14 RX ORDER — LEVOTHYROXINE SODIUM 0.07 MG/1
TABLET ORAL
Qty: 90 TABLET | Refills: 3 | Status: SHIPPED | OUTPATIENT
Start: 2020-12-14 | End: 2021-12-02

## 2020-12-14 RX ORDER — ATORVASTATIN CALCIUM 20 MG/1
TABLET, FILM COATED ORAL
Qty: 90 TABLET | Refills: 3 | Status: SHIPPED | OUTPATIENT
Start: 2020-12-14 | End: 2021-12-02 | Stop reason: SDUPTHER

## 2020-12-31 DIAGNOSIS — I48.0 PAROXYSMAL ATRIAL FIBRILLATION (HCC): ICD-10-CM

## 2020-12-31 RX ORDER — APIXABAN 5 MG/1
TABLET, FILM COATED ORAL
Qty: 180 TABLET | Refills: 3 | Status: SHIPPED | OUTPATIENT
Start: 2020-12-31 | End: 2021-12-21

## 2021-01-20 NOTE — TELEPHONE ENCOUNTER
Dr Domo Beckett, Dr Suresh Cote asking for [facility] team f/u for this pt  Are you agreeable to seeing this pt?   Please advise No

## 2021-02-12 ENCOUNTER — TELEPHONE (OUTPATIENT)
Dept: INTERNAL MEDICINE CLINIC | Facility: CLINIC | Age: 71
End: 2021-02-12

## 2021-02-12 NOTE — TELEPHONE ENCOUNTER
The patient is getting his covid vaccine next Tuesday  He would like to know if he needs to stop taking his Asprin before the vaccine  He also would like to  know when he care resume taking it  Please advise  Thank you

## 2021-02-17 ENCOUNTER — OFFICE VISIT (OUTPATIENT)
Dept: INTERNAL MEDICINE CLINIC | Facility: CLINIC | Age: 71
End: 2021-02-17
Payer: MEDICARE

## 2021-02-17 VITALS
SYSTOLIC BLOOD PRESSURE: 122 MMHG | WEIGHT: 240 LBS | TEMPERATURE: 97.5 F | DIASTOLIC BLOOD PRESSURE: 66 MMHG | BODY MASS INDEX: 31.81 KG/M2 | HEART RATE: 58 BPM | HEIGHT: 73 IN | OXYGEN SATURATION: 98 %

## 2021-02-17 DIAGNOSIS — R21 RASH: ICD-10-CM

## 2021-02-17 DIAGNOSIS — I10 ESSENTIAL HYPERTENSION: ICD-10-CM

## 2021-02-17 DIAGNOSIS — I48.19 ATRIAL FIBRILLATION, PERSISTENT (HCC): Primary | ICD-10-CM

## 2021-02-17 DIAGNOSIS — E03.9 ACQUIRED HYPOTHYROIDISM: ICD-10-CM

## 2021-02-17 DIAGNOSIS — E78.2 MIXED HYPERLIPIDEMIA: ICD-10-CM

## 2021-02-17 PROCEDURE — 99214 OFFICE O/P EST MOD 30 MIN: CPT | Performed by: INTERNAL MEDICINE

## 2021-02-17 NOTE — PROGRESS NOTES
Assessment/Plan:    Atrial fibrillation, persistent ([address] )  Rate controlled, continue anticoagulation    Essential hypertension  Well controlled, continue meds along with healthy diet and exercise    Acquired hypothyroidism  Last thyroid function test in November were normal, continue medication along with monitoring    Mixed hyperlipidemia  Continue statin along with healthy diet and exercise       Diagnoses and all orders for this visit:    Atrial fibrillation, persistent ([address] )    Essential hypertension    Acquired hypothyroidism    Mixed hyperlipidemia    Rash  -     Ambulatory referral to Dermatology; Future        BMI Counseling: Body mass index is 31 66 kg/m²  The BMI is above normal  Nutrition recommendations include encouraging healthy choices of fruits and vegetables and moderation in carbohydrate intake  Exercise recommendations include exercising 3-5 times per week  Subjective:      Patient ID: Shalini Pickering is a 70 y o  male  Hypertension:  Patient reports compliance with meds, no constipation, no lightheadedness  Atrial fibrillation:  No palpitations, no bleeding problems  Hypercholesterolemia:  Patient tolerating statin without any significant muscle aches  Hypothyroidism:  Patient reports compliance with med, no fatigue, no constipation, no cold intolerance  The following portions of the patient's history were reviewed and updated as appropriate: allergies, current medications, past family history, past medical history, past social history, past surgical history and problem list     Review of Systems   Constitutional: Negative for chills, fatigue and fever  HENT: Negative for congestion, nosebleeds, postnasal drip, sore throat and trouble swallowing  Eyes: Negative for pain  Respiratory: Negative for cough, chest tightness, shortness of breath and wheezing  Cardiovascular: Negative for chest pain, palpitations and leg swelling     Gastrointestinal: Negative for abdominal pain, constipation, diarrhea, nausea and vomiting  Endocrine: Negative for cold intolerance, polydipsia and polyuria  Genitourinary: Negative for dysuria, flank pain and hematuria  Musculoskeletal: Negative for arthralgias  Skin: Negative for rash  Neurological: Negative for dizziness, tremors and headaches  Hematological: Does not bruise/bleed easily  Psychiatric/Behavioral: Negative for confusion and dysphoric mood  The patient is not nervous/anxious  Objective:      /66   Pulse 58   Temp 97 5 °F (36 4 °C) (Temporal)   Ht 6' 1" (1 854 m)   Wt 109 kg (240 lb)   SpO2 98%   BMI 31 66 kg/m²          Physical Exam  Vitals signs reviewed  Constitutional:       General: He is not in acute distress  Appearance: Normal appearance  He is well-developed  HENT:      Head: Normocephalic and atraumatic  Right Ear: External ear normal       Left Ear: External ear normal    Eyes:      General: No scleral icterus  Conjunctiva/sclera: Conjunctivae normal    Neck:      Musculoskeletal: Normal range of motion and neck supple  Thyroid: No thyromegaly  Trachea: No tracheal deviation  Cardiovascular:      Rate and Rhythm: Normal rate  Rhythm irregular  Heart sounds: Normal heart sounds  No murmur  Pulmonary:      Effort: Pulmonary effort is normal  No respiratory distress  Breath sounds: Normal breath sounds  No wheezing or rales  Abdominal:      General: Bowel sounds are normal       Palpations: Abdomen is soft  Tenderness: There is no abdominal tenderness  There is no guarding or rebound  Musculoskeletal:      Right lower leg: No edema  Left lower leg: No edema  Lymphadenopathy:      Cervical: No cervical adenopathy  Neurological:      General: No focal deficit present  Mental Status: He is alert and oriented to person, place, and time     Psychiatric:         Mood and Affect: Mood normal          Behavior: Behavior normal  Thought Content:  Thought content normal          Judgment: Judgment normal

## 2021-02-17 NOTE — PATIENT INSTRUCTIONS
Problem List Items Addressed This Visit        Endocrine    Acquired hypothyroidism     Last thyroid function test in November were normal, continue medication along with monitoring            Cardiovascular and Mediastinum    Essential hypertension     Well controlled, continue meds along with healthy diet and exercise         Atrial fibrillation, persistent ([address] ) - Primary     Rate controlled, continue anticoagulation            Other    Mixed hyperlipidemia     Continue statin along with healthy diet and exercise

## 2021-03-04 DIAGNOSIS — Z23 ENCOUNTER FOR IMMUNIZATION: ICD-10-CM

## 2021-04-09 PROCEDURE — 88341 IMHCHEM/IMCYTCHM EA ADD ANTB: CPT | Performed by: STUDENT IN AN ORGANIZED HEALTH CARE EDUCATION/TRAINING PROGRAM

## 2021-04-09 PROCEDURE — 88342 IMHCHEM/IMCYTCHM 1ST ANTB: CPT | Performed by: STUDENT IN AN ORGANIZED HEALTH CARE EDUCATION/TRAINING PROGRAM

## 2021-04-09 PROCEDURE — 88305 TISSUE EXAM BY PATHOLOGIST: CPT | Performed by: STUDENT IN AN ORGANIZED HEALTH CARE EDUCATION/TRAINING PROGRAM

## 2021-04-12 ENCOUNTER — LAB REQUISITION (OUTPATIENT)
Dept: LAB | Facility: HOSPITAL | Age: 71
End: 2021-04-12
Payer: MEDICARE

## 2021-04-12 DIAGNOSIS — D22.5 MELANOCYTIC NEVI OF TRUNK: ICD-10-CM

## 2021-05-12 PROCEDURE — 88305 TISSUE EXAM BY PATHOLOGIST: CPT | Performed by: STUDENT IN AN ORGANIZED HEALTH CARE EDUCATION/TRAINING PROGRAM

## 2021-05-13 ENCOUNTER — LAB REQUISITION (OUTPATIENT)
Dept: LAB | Facility: HOSPITAL | Age: 71
End: 2021-05-13
Payer: MEDICARE

## 2021-05-13 DIAGNOSIS — D22.5 MELANOCYTIC NEVI OF TRUNK: ICD-10-CM

## 2021-05-20 ENCOUNTER — OFFICE VISIT (OUTPATIENT)
Dept: INTERNAL MEDICINE CLINIC | Facility: CLINIC | Age: 71
End: 2021-05-20
Payer: MEDICARE

## 2021-05-20 VITALS
BODY MASS INDEX: 31.68 KG/M2 | DIASTOLIC BLOOD PRESSURE: 80 MMHG | SYSTOLIC BLOOD PRESSURE: 130 MMHG | HEART RATE: 78 BPM | WEIGHT: 239 LBS | OXYGEN SATURATION: 97 % | TEMPERATURE: 97.6 F | HEIGHT: 73 IN

## 2021-05-20 DIAGNOSIS — Z12.11 SCREENING FOR COLON CANCER: ICD-10-CM

## 2021-05-20 DIAGNOSIS — E78.2 MIXED HYPERLIPIDEMIA: Primary | ICD-10-CM

## 2021-05-20 DIAGNOSIS — E03.9 ACQUIRED HYPOTHYROIDISM: ICD-10-CM

## 2021-05-20 DIAGNOSIS — I48.19 ATRIAL FIBRILLATION, PERSISTENT (HCC): ICD-10-CM

## 2021-05-20 PROBLEM — J06.9 URI (UPPER RESPIRATORY INFECTION): Status: RESOLVED | Noted: 2018-11-20 | Resolved: 2021-05-20

## 2021-05-20 PROCEDURE — 99214 OFFICE O/P EST MOD 30 MIN: CPT | Performed by: INTERNAL MEDICINE

## 2021-05-20 RX ORDER — KETOCONAZOLE 20 MG/G
CREAM TOPICAL
COMMUNITY
Start: 2021-04-09

## 2021-05-20 NOTE — PATIENT INSTRUCTIONS
Problem List Items Addressed This Visit        Endocrine    Acquired hypothyroidism     Continue levothyroxine along with monitoring, will recheck in the fall            Cardiovascular and Mediastinum    Atrial fibrillation, persistent (HCC)     Rate controlled, continue anticoagulation            Other    Mixed hyperlipidemia - Primary     Continue statin along with healthy diet and exercise           Other Visit Diagnoses     Screening for colon cancer        Relevant Orders    Ambulatory referral to Colorectal Surgery

## 2021-05-20 NOTE — PROGRESS NOTES
Assessment/Plan:    Mixed hyperlipidemia  Continue statin along with healthy diet and exercise    Acquired hypothyroidism  Continue levothyroxine along with monitoring, will recheck in the fall    Atrial fibrillation, persistent ([facility] [address] )  Rate controlled, continue anticoagulation       Diagnoses and all orders for this visit:    Mixed hyperlipidemia    Acquired hypothyroidism    Atrial fibrillation, persistent ([facility] [address] )    Screening for colon cancer  -     Ambulatory referral to Colorectal Surgery; Future    Other orders  -     Cancel: Ambulatory referral to Gastroenterology; Future  -     ketoconazole (NIZORAL) 2 % cream; As needed  -     hydrocortisone 2 5 % cream; Apply 1 application topically 2 (two) times a day To affected area as needed          Subjective:      Patient ID: Tony Hernandez is a 70 y o  male  Hypothyroidism:  Patient reports compliance with medication, no fatigue, no significant constipation, no cold intolerance  Hypertension:  Patient reports compliance with meds, no lightheadedness  Atrial fibrillation:  Patient has occasional palpitations, no heart racing, no bleeding problems      The following portions of the patient's history were reviewed and updated as appropriate: allergies, current medications, past family history, past medical history, past social history, past surgical history and problem list     Review of Systems   Constitutional: Negative for chills, fatigue and fever  HENT: Negative for congestion, nosebleeds, postnasal drip, sore throat and trouble swallowing  Eyes: Negative for pain  Respiratory: Negative for cough, chest tightness, shortness of breath and wheezing  Cardiovascular: Negative for chest pain, palpitations and leg swelling  Gastrointestinal: Negative for abdominal pain, constipation, diarrhea, nausea and vomiting  Endocrine: Negative for cold intolerance, polydipsia and polyuria  Genitourinary: Negative for dysuria, flank pain and hematuria  Musculoskeletal: Negative for arthralgias  Skin: Negative for rash  Neurological: Negative for dizziness, tremors, light-headedness and headaches  Hematological: Does not bruise/bleed easily  Psychiatric/Behavioral: Negative for confusion and dysphoric mood  The patient is not nervous/anxious  Objective:      /80   Pulse 78   Temp 97 6 °F (36 4 °C) (Temporal)   Ht 6' 1" (1 854 m)   Wt 108 kg (239 lb)   SpO2 97%   BMI 31 53 kg/m²          Physical Exam  Constitutional:       General: He is not in acute distress  Appearance: Normal appearance  He is well-developed  HENT:      Head: Normocephalic and atraumatic  Right Ear: External ear normal       Left Ear: External ear normal    Eyes:      General: No scleral icterus  Conjunctiva/sclera: Conjunctivae normal    Neck:      Musculoskeletal: Normal range of motion and neck supple  Thyroid: No thyromegaly  Trachea: No tracheal deviation  Cardiovascular:      Rate and Rhythm: Normal rate  Rhythm irregular  Heart sounds: Normal heart sounds  No murmur  Pulmonary:      Effort: Pulmonary effort is normal  No respiratory distress  Breath sounds: Normal breath sounds  No wheezing or rales  Abdominal:      General: Bowel sounds are normal       Palpations: Abdomen is soft  Tenderness: There is no abdominal tenderness  There is no guarding or rebound  Musculoskeletal:      Right lower leg: No edema  Left lower leg: No edema  Lymphadenopathy:      Cervical: No cervical adenopathy  Neurological:      General: No focal deficit present  Mental Status: He is alert and oriented to person, place, and time  Psychiatric:         Mood and Affect: Mood normal          Behavior: Behavior normal          Thought Content:  Thought content normal          Judgment: Judgment normal

## 2021-07-21 ENCOUNTER — HOSPITAL ENCOUNTER (OUTPATIENT)
Dept: NON INVASIVE DIAGNOSTICS | Facility: CLINIC | Age: 71
Discharge: HOME | End: 2021-07-21
Payer: MEDICARE

## 2021-07-21 DIAGNOSIS — I72.3 ILIAC ARTERY ANEURYSM, BILATERAL (HCC): ICD-10-CM

## 2021-07-21 PROCEDURE — 93923 UPR/LXTR ART STDY 3+ LVLS: CPT

## 2021-07-21 PROCEDURE — 93978 VASCULAR STUDY: CPT

## 2021-07-23 PROCEDURE — 93978 VASCULAR STUDY: CPT | Performed by: SURGERY

## 2021-07-26 ENCOUNTER — TELEPHONE (OUTPATIENT)
Dept: CARDIOLOGY CLINIC | Facility: CLINIC | Age: 71
End: 2021-07-26

## 2021-07-26 NOTE — TELEPHONE ENCOUNTER
[facility] requesting how long to hold   Eliquis 5 mg 1 tab twice a day? Colonoscopy scheduled 8/09/21  Fax# [phone]    Please advise  Thank you

## 2021-09-20 ENCOUNTER — OFFICE VISIT (OUTPATIENT)
Dept: INTERNAL MEDICINE CLINIC | Facility: CLINIC | Age: 71
End: 2021-09-20
Payer: MEDICARE

## 2021-09-20 VITALS
HEART RATE: 71 BPM | BODY MASS INDEX: 31.46 KG/M2 | HEIGHT: 73 IN | WEIGHT: 237.4 LBS | SYSTOLIC BLOOD PRESSURE: 126 MMHG | OXYGEN SATURATION: 97 % | DIASTOLIC BLOOD PRESSURE: 72 MMHG

## 2021-09-20 DIAGNOSIS — R73.01 IMPAIRED FASTING GLUCOSE: ICD-10-CM

## 2021-09-20 DIAGNOSIS — Z12.5 SCREENING FOR PROSTATE CANCER: ICD-10-CM

## 2021-09-20 DIAGNOSIS — M21.961 DEFORMITY OF RIGHT FOOT: ICD-10-CM

## 2021-09-20 DIAGNOSIS — I10 ESSENTIAL HYPERTENSION: Primary | ICD-10-CM

## 2021-09-20 DIAGNOSIS — E78.2 MIXED HYPERLIPIDEMIA: ICD-10-CM

## 2021-09-20 DIAGNOSIS — E55.9 VITAMIN D DEFICIENCY: ICD-10-CM

## 2021-09-20 DIAGNOSIS — E03.9 ACQUIRED HYPOTHYROIDISM: ICD-10-CM

## 2021-09-20 PROCEDURE — 99214 OFFICE O/P EST MOD 30 MIN: CPT | Performed by: INTERNAL MEDICINE

## 2021-09-20 NOTE — PATIENT INSTRUCTIONS
Problem List Items Addressed This Visit        Endocrine    Acquired hypothyroidism      Continue levothyroxine along with monitoring, will check thyroid function test before next visit            Cardiovascular and Mediastinum    Essential hypertension - Primary      Well controlled, continue medications along with healthy diet and exercise            Other    Mixed hyperlipidemia      Continue statin and coenzyme Q10 along with healthy diet exercise         Relevant Orders    CBC and differential    Comprehensive metabolic panel    Lipid Panel with Direct LDL reflex    TSH, 3rd generation with Free T4 reflex      Other Visit Diagnoses     Deformity of right foot        Relevant Orders    Ambulatory referral to Physical Medicine Rehab    Impaired fasting glucose        Relevant Orders    Hemoglobin A1C    Screening for prostate cancer        Relevant Orders    PSA, Total Screen    Vitamin D deficiency        Relevant Orders    Vitamin D 25 hydroxy

## 2021-09-20 NOTE — PROGRESS NOTES
Assessment/Plan:    Acquired hypothyroidism   Continue levothyroxine along with monitoring, will check thyroid function test before next visit    Essential hypertension   Well controlled, continue medications along with healthy diet and exercise    Mixed hyperlipidemia   Continue statin and coenzyme Q10 along with healthy diet exercise       Diagnoses and all orders for this visit:    Essential hypertension    Deformity of right foot  -     Ambulatory referral to Physical Medicine Rehab; Future    Acquired hypothyroidism    Mixed hyperlipidemia  -     CBC and differential; Future  -     Comprehensive metabolic panel; Future  -     Lipid Panel with Direct LDL reflex; Future  -     TSH, 3rd generation with Free T4 reflex; Future    Impaired fasting glucose  -     Hemoglobin A1C; Future    Screening for prostate cancer  -     PSA, Total Screen; Future    Vitamin D deficiency  -     Vitamin D 25 hydroxy; Future          Subjective:      Patient ID: Monico Doss is a 70 y o  male  Pt has been noticing that his right foot points laterally some when he walks  He denies knee or hip pain with walking, but he gets a little pain in right knee off and on, more like the upper shin  HTN: Patient reports compliance with meds, no lightheadedness    Hypothyroidism: Patient reports compliance with levothyroxine, no fatigue, no cold intolerance, no constipation  Hypercholesterolemia:  Patient tolerating statin, no significant muscle aches          The following portions of the patient's history were reviewed and updated as appropriate: allergies, current medications, past family history, past medical history, past social history, past surgical history and problem list     Review of Systems   Constitutional: Negative for chills, fatigue and fever  HENT: Negative for congestion, nosebleeds, postnasal drip, sore throat and trouble swallowing  Eyes: Negative for pain     Respiratory: Negative for cough, chest tightness, shortness of breath and wheezing  Cardiovascular: Negative for chest pain, palpitations and leg swelling  Gastrointestinal: Negative for abdominal pain, constipation, diarrhea, nausea and vomiting  Endocrine: Negative for cold intolerance, polydipsia and polyuria  Genitourinary: Negative for dysuria, flank pain and hematuria  Musculoskeletal: Negative for arthralgias and myalgias  Skin: Negative for rash  Neurological: Negative for dizziness, tremors and headaches  Hematological: Does not bruise/bleed easily  Psychiatric/Behavioral: Negative for confusion and dysphoric mood  The patient is not nervous/anxious  Objective:      /72   Pulse 71   Ht 6' 1" (1 854 m)   Wt 108 kg (237 lb 6 4 oz)   SpO2 97%   BMI 31 32 kg/m²          Physical Exam  Vitals reviewed  Constitutional:       General: He is not in acute distress  Appearance: Normal appearance  He is well-developed  HENT:      Head: Normocephalic and atraumatic  Right Ear: External ear normal       Left Ear: External ear normal       Nose: Nose normal    Eyes:      General: No scleral icterus  Conjunctiva/sclera: Conjunctivae normal    Neck:      Thyroid: No thyromegaly  Trachea: No tracheal deviation  Cardiovascular:      Rate and Rhythm: Normal rate and regular rhythm  Heart sounds: Normal heart sounds  No murmur heard  Pulmonary:      Effort: Pulmonary effort is normal  No respiratory distress  Breath sounds: Normal breath sounds  No wheezing or rales  Abdominal:      General: Bowel sounds are normal       Palpations: Abdomen is soft  Tenderness: There is no abdominal tenderness  There is no guarding or rebound  Musculoskeletal:      Cervical back: Normal range of motion and neck supple  Right lower leg: No edema  Left lower leg: No edema        Comments:  When patient's feet are held together parallel, right knee cap is rotated inward   Lymphadenopathy: Cervical: No cervical adenopathy  Skin:     Coloration: Skin is not jaundiced or pale  Neurological:      General: No focal deficit present  Mental Status: He is alert and oriented to person, place, and time  Psychiatric:         Mood and Affect: Mood normal          Behavior: Behavior normal          Thought Content:  Thought content normal          Judgment: Judgment normal

## 2021-09-28 ENCOUNTER — TELEPHONE (OUTPATIENT)
Dept: INTERNAL MEDICINE CLINIC | Facility: CLINIC | Age: 71
End: 2021-09-28

## 2021-09-28 ENCOUNTER — TELEPHONE (OUTPATIENT)
Dept: NEUROLOGY | Facility: CLINIC | Age: 71
End: 2021-09-28

## 2021-09-28 DIAGNOSIS — G89.29 CHRONIC NONINTRACTABLE HEADACHE, UNSPECIFIED HEADACHE TYPE: ICD-10-CM

## 2021-09-28 DIAGNOSIS — R51.9 CHRONIC NONINTRACTABLE HEADACHE, UNSPECIFIED HEADACHE TYPE: ICD-10-CM

## 2021-09-28 RX ORDER — GABAPENTIN 100 MG/1
100 CAPSULE ORAL 3 TIMES DAILY
Qty: 270 CAPSULE | Refills: 1 | Status: SHIPPED | OUTPATIENT
Start: 2021-09-28 | End: 2021-09-30 | Stop reason: SDUPTHER

## 2021-09-28 NOTE — TELEPHONE ENCOUNTER
Let him know that is correct, Physical Medicine and Rehabilitation physicians are part of [facility]  The following are some of the doctors names: Edgar Lehman, Gene Cancer

## 2021-09-28 NOTE — TELEPHONE ENCOUNTER
Patient calling in for refill of gabapentin and dose change  Patient states that over the summer he has experimented with his dosing of gabapentin and found that gabapentin 100 mg TID works best for his neuropathy and headaches  Patient [facility] 4/2020   Schedule him for next available on 3/3/22 with Dr Correa      If agreeable, please send to [phone]     Please advise

## 2021-09-28 NOTE — TELEPHONE ENCOUNTER
Patient has questions in regards to the referral for Physical Medicine Rehab  Patient has tried calling the number and it comes up as [facility]  Please specify a doctor for the patient          Please advise

## 2021-09-30 DIAGNOSIS — R51.9 CHRONIC NONINTRACTABLE HEADACHE, UNSPECIFIED HEADACHE TYPE: ICD-10-CM

## 2021-09-30 DIAGNOSIS — G89.29 CHRONIC NONINTRACTABLE HEADACHE, UNSPECIFIED HEADACHE TYPE: ICD-10-CM

## 2021-10-01 RX ORDER — GABAPENTIN 100 MG/1
100 CAPSULE ORAL 3 TIMES DAILY
Qty: 270 CAPSULE | Refills: 1 | Status: SHIPPED | OUTPATIENT
Start: 2021-10-01 | End: 2022-05-31 | Stop reason: SDUPTHER

## 2021-10-15 ENCOUNTER — APPOINTMENT (EMERGENCY)
Dept: RADIOLOGY | Facility: HOSPITAL | Age: 71
End: 2021-10-15
Payer: MEDICARE

## 2021-10-15 ENCOUNTER — NURSE TRIAGE (OUTPATIENT)
Dept: OTHER | Facility: OTHER | Age: 71
End: 2021-10-15

## 2021-10-15 ENCOUNTER — HOSPITAL ENCOUNTER (EMERGENCY)
Facility: HOSPITAL | Age: 71
Discharge: HOME | End: 2021-10-15
Attending: SURGERY | Admitting: SURGERY
Payer: MEDICARE

## 2021-10-15 ENCOUNTER — TELEPHONE (OUTPATIENT)
Dept: INTERNAL MEDICINE CLINIC | Facility: CLINIC | Age: 71
End: 2021-10-15

## 2021-10-15 VITALS
RESPIRATION RATE: 37 BRPM | HEART RATE: 110 BPM | TEMPERATURE: 98.9 F | SYSTOLIC BLOOD PRESSURE: 142 MMHG | OXYGEN SATURATION: 93 % | DIASTOLIC BLOOD PRESSURE: 87 MMHG
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
## 2021-10-15 PROBLEM — I47.2 NSVT (NONSUSTAINED VENTRICULAR TACHYCARDIA) (HCC): Status: ACTIVE | Noted: 2021-10-15

## 2021-10-15 PROBLEM — I47.29 NSVT (NONSUSTAINED VENTRICULAR TACHYCARDIA): Status: ACTIVE | Noted: 2021-10-15

## 2021-10-15 PROBLEM — E83.39 HYPOPHOSPHATEMIA: Status: RESOLVED | Noted: 2019-03-10 | Resolved: 2021-10-15

## 2021-10-15 LAB
ALBUMIN SERPL BCP-MCNC: 3.7 G/DL (ref 3.5–5)
ALP SERPL-CCNC: 120 U/L (ref 46–116)
ALT SERPL W P-5'-P-CCNC: 39 U/L (ref 12–78)
ANION GAP SERPL CALCULATED.3IONS-SCNC: 7 MMOL/L (ref 4–13)
AST SERPL W P-5'-P-CCNC: 23 U/L (ref 5–45)
ATRIAL RATE: 340 BPM
BASOPHILS # BLD AUTO: 0.08 THOUSANDS/ΜL (ref 0–0.1)
BASOPHILS NFR BLD AUTO: 1 % (ref 0–1)
BILIRUB SERPL-MCNC: 0.78 MG/DL (ref 0.2–1)
BUN SERPL-MCNC: 21 MG/DL (ref 5–25)
CALCIUM SERPL-MCNC: 9.4 MG/DL (ref 8.3–10.1)
CHLORIDE SERPL-SCNC: 108 MMOL/L (ref 100–108)
CO2 SERPL-SCNC: 24 MMOL/L (ref 21–32)
CREAT SERPL-MCNC: 1.12 MG/DL (ref 0.6–1.3)
EOSINOPHIL # BLD AUTO: 0.2 THOUSAND/ΜL (ref 0–0.61)
EOSINOPHIL NFR BLD AUTO: 3 % (ref 0–6)
ERYTHROCYTE [DISTWIDTH] IN BLOOD BY AUTOMATED COUNT: 12.8 % (ref 11.6–15.1)
GFR SERPL CREATININE-BSD FRML MDRD: 66 ML/MIN/1.73SQ M
GLUCOSE SERPL-MCNC: 174 MG/DL (ref 65–140)
HCT VFR BLD AUTO: 48.3 % (ref 36.5–49.3)
HGB BLD-MCNC: 16.5 G/DL (ref 12–17)
HOLD SPECIMEN: NORMAL
IMM GRANULOCYTES # BLD AUTO: 0.03 THOUSAND/UL (ref 0–0.2)
IMM GRANULOCYTES NFR BLD AUTO: 0 % (ref 0–2)
LYMPHOCYTES # BLD AUTO: 1.12 THOUSANDS/ΜL (ref 0.6–4.47)
LYMPHOCYTES NFR BLD AUTO: 15 % (ref 14–44)
MAGNESIUM SERPL-MCNC: 2.4 MG/DL (ref 1.6–2.6)
MCH RBC QN AUTO: 31 PG (ref 26.8–34.3)
MCHC RBC AUTO-ENTMCNC: 34.2 G/DL (ref 31.4–37.4)
MCV RBC AUTO: 91 FL (ref 82–98)
MONOCYTES # BLD AUTO: 0.72 THOUSAND/ΜL (ref 0.17–1.22)
MONOCYTES NFR BLD AUTO: 9 % (ref 4–12)
NEUTROPHILS # BLD AUTO: 5.59 THOUSANDS/ΜL (ref 1.85–7.62)
NEUTS SEG NFR BLD AUTO: 72 % (ref 43–75)
NRBC BLD AUTO-RTO: 0 /100 WBCS
PHOSPHATE SERPL-MCNC: 3 MG/DL (ref 2.3–4.1)
PLATELET # BLD AUTO: 271 THOUSANDS/UL (ref 149–390)
PMV BLD AUTO: 9.8 FL (ref 8.9–12.7)
POTASSIUM SERPL-SCNC: 4.4 MMOL/L (ref 3.5–5.3)
PROT SERPL-MCNC: 6.4 G/DL (ref 6.4–8.2)
QRS AXIS: -31 DEGREES
QRSD INTERVAL: 96 MS
QT INTERVAL: 366 MS
QTC INTERVAL: 462 MS
RBC # BLD AUTO: 5.32 MILLION/UL (ref 3.88–5.62)
SODIUM SERPL-SCNC: 139 MMOL/L (ref 136–145)
T WAVE AXIS: 19 DEGREES
TSH SERPL DL<=0.05 MIU/L-ACNC: 1.59 UIU/ML (ref 0.36–3.74)
VENTRICULAR RATE: 96 BPM
WBC # BLD AUTO: 7.74 THOUSAND/UL (ref 4.31–10.16)

## 2021-10-15 PROCEDURE — 99284 EMERGENCY DEPT VISIT MOD MDM: CPT | Performed by: SURGERY

## 2021-10-15 PROCEDURE — 93005 ELECTROCARDIOGRAM TRACING: CPT

## 2021-10-15 PROCEDURE — 85025 COMPLETE CBC W/AUTO DIFF WBC: CPT | Performed by: SURGERY

## 2021-10-15 PROCEDURE — 84100 ASSAY OF PHOSPHORUS: CPT | Performed by: FAMILY MEDICINE

## 2021-10-15 PROCEDURE — 70450 CT HEAD/BRAIN W/O DYE: CPT

## 2021-10-15 PROCEDURE — 99285 EMERGENCY DEPT VISIT HI MDM: CPT

## 2021-10-15 PROCEDURE — NC001 PR NO CHARGE: Performed by: INTERNAL MEDICINE

## 2021-10-15 PROCEDURE — 80053 COMPREHEN METABOLIC PANEL: CPT | Performed by: SURGERY

## 2021-10-15 PROCEDURE — 83735 ASSAY OF MAGNESIUM: CPT | Performed by: FAMILY MEDICINE

## 2021-10-15 PROCEDURE — 93308 TTE F-UP OR LMTD: CPT | Performed by: SURGERY

## 2021-10-15 PROCEDURE — 84443 ASSAY THYROID STIM HORMONE: CPT | Performed by: FAMILY MEDICINE

## 2021-10-15 PROCEDURE — 72125 CT NECK SPINE W/O DYE: CPT

## 2021-10-15 PROCEDURE — 71045 X-RAY EXAM CHEST 1 VIEW: CPT

## 2021-10-15 PROCEDURE — 36415 COLL VENOUS BLD VENIPUNCTURE: CPT | Performed by: SURGERY

## 2021-10-15 PROCEDURE — 93010 ELECTROCARDIOGRAM REPORT: CPT | Performed by: INTERNAL MEDICINE

## 2021-10-15 PROCEDURE — NC001 PR NO CHARGE: Performed by: EMERGENCY MEDICINE

## 2021-10-15 PROCEDURE — 76705 ECHO EXAM OF ABDOMEN: CPT | Performed by: SURGERY

## 2021-10-15 PROCEDURE — 70486 CT MAXILLOFACIAL W/O DYE: CPT

## 2021-10-27 ENCOUNTER — OFFICE VISIT (OUTPATIENT)
Dept: CARDIOLOGY CLINIC | Facility: CLINIC | Age: 71
End: 2021-10-27
Payer: MEDICARE

## 2021-10-27 VITALS
DIASTOLIC BLOOD PRESSURE: 84 MMHG | SYSTOLIC BLOOD PRESSURE: 152 MMHG | BODY MASS INDEX: 31.33 KG/M2 | HEART RATE: 82 BPM | HEIGHT: 73 IN | WEIGHT: 236.4 LBS

## 2021-10-27 DIAGNOSIS — I77.79 HEPATIC ARTERY DISSECTION (HCC): ICD-10-CM

## 2021-10-27 DIAGNOSIS — I48.19 ATRIAL FIBRILLATION, PERSISTENT (HCC): Primary | ICD-10-CM

## 2021-10-27 PROCEDURE — 99214 OFFICE O/P EST MOD 30 MIN: CPT | Performed by: INTERNAL MEDICINE

## 2021-10-27 PROCEDURE — 93000 ELECTROCARDIOGRAM COMPLETE: CPT | Performed by: INTERNAL MEDICINE

## 2021-10-27 RX ORDER — METOPROLOL SUCCINATE 25 MG/1
25 TABLET, EXTENDED RELEASE ORAL DAILY
Qty: 90 TABLET | Refills: 3 | Status: SHIPPED | OUTPATIENT
Start: 2021-10-27 | End: 2021-11-30

## 2021-10-29 ENCOUNTER — HOSPITAL ENCOUNTER (OUTPATIENT)
Dept: NON INVASIVE DIAGNOSTICS | Facility: CLINIC | Age: 71
Discharge: HOME | End: 2021-10-29
Payer: MEDICARE

## 2021-10-29 VITALS
HEART RATE: 60 BPM | SYSTOLIC BLOOD PRESSURE: 152 MMHG | WEIGHT: 236 LBS | HEIGHT: 73 IN | BODY MASS INDEX: 31.28 KG/M2 | DIASTOLIC BLOOD PRESSURE: 84 MMHG

## 2021-10-29 DIAGNOSIS — I48.19 ATRIAL FIBRILLATION, PERSISTENT (HCC): ICD-10-CM

## 2021-10-29 LAB
AORTIC ROOT: 3.9 CM
APICAL FOUR CHAMBER EJECTION FRACTION: 52 %
FRACTIONAL SHORTENING: 24 % (ref 28–44)
INTERVENTRICULAR SEPTUM IN DIASTOLE (PARASTERNAL SHORT AXIS VIEW): 1.2 CM
LEFT INTERNAL DIMENSION IN SYSTOLE: 3.7 CM (ref 2.1–4)
LEFT VENTRICULAR INTERNAL DIMENSION IN DIASTOLE: 4.9 CM (ref 8.39–12.51)
LEFT VENTRICULAR POSTERIOR WALL IN END DIASTOLE: 1.2 CM
LEFT VENTRICULAR STROKE VOLUME: 57 ML
PA SYSTOLIC PRESSURE: 21 MMHG
RIGHT VENTRICLE ID DIMENSION: 6.2 CM
SL CV LV EF: 60
SL CV PED ECHO LEFT VENTRICLE DIASTOLIC VOLUME (MOD BIPLANE) 2D: 114 ML
SL CV PED ECHO LEFT VENTRICLE SYSTOLIC VOLUME (MOD BIPLANE) 2D: 57 ML
TR PEAK VELOCITY: 2.1 M/S
TRICUSPID VALVE PEAK REGURGITATION VELOCITY: 2.12 M/S
TRICUSPID VALVE S': 2.1 CM/S
TV PEAK GRADIENT: 18 MMHG
Z-SCORE OF LEFT VENTRICULAR DIMENSION IN END SYSTOLE: -7.38

## 2021-10-29 PROCEDURE — 93306 TTE W/DOPPLER COMPLETE: CPT

## 2021-10-29 PROCEDURE — 93306 TTE W/DOPPLER COMPLETE: CPT | Performed by: INTERNAL MEDICINE

## 2021-10-31 DIAGNOSIS — I10 ESSENTIAL HYPERTENSION: ICD-10-CM

## 2021-10-31 RX ORDER — FELODIPINE 5 MG/1
TABLET, EXTENDED RELEASE ORAL
Qty: 90 TABLET | Refills: 3 | Status: SHIPPED | OUTPATIENT
Start: 2021-10-31

## 2021-11-03 ENCOUNTER — TELEPHONE (OUTPATIENT)
Dept: CARDIOLOGY CLINIC | Facility: CLINIC | Age: 71
End: 2021-11-03

## 2021-11-03 ENCOUNTER — HOSPITAL ENCOUNTER (OUTPATIENT)
Dept: NON INVASIVE DIAGNOSTICS | Facility: CLINIC | Age: 71
Discharge: HOME | End: 2021-11-03
Payer: MEDICARE

## 2021-11-03 DIAGNOSIS — I48.19 ATRIAL FIBRILLATION, PERSISTENT (HCC): ICD-10-CM

## 2021-11-03 DIAGNOSIS — I47.2 NSVT (NONSUSTAINED VENTRICULAR TACHYCARDIA) (HCC): Primary | ICD-10-CM

## 2021-11-03 DIAGNOSIS — I71.2 THORACIC AORTIC ANEURYSM WITHOUT RUPTURE (HCC): ICD-10-CM

## 2021-11-03 DIAGNOSIS — I10 ESSENTIAL HYPERTENSION: ICD-10-CM

## 2021-11-03 PROCEDURE — 93225 XTRNL ECG REC<48 HRS REC: CPT

## 2021-11-03 PROCEDURE — 93226 XTRNL ECG REC<48 HR SCAN A/R: CPT

## 2021-11-10 PROCEDURE — 93227 XTRNL ECG REC<48 HR R&I: CPT | Performed by: INTERNAL MEDICINE

## 2021-11-11 ENCOUNTER — TELEPHONE (OUTPATIENT)
Dept: CARDIOLOGY CLINIC | Facility: CLINIC | Age: 71
End: 2021-11-11

## 2021-11-15 ENCOUNTER — HOSPITAL ENCOUNTER (OUTPATIENT)
Dept: NON INVASIVE DIAGNOSTICS | Facility: CLINIC | Age: 71
Discharge: HOME | End: 2021-11-15
Payer: MEDICARE

## 2021-11-15 DIAGNOSIS — I48.19 ATRIAL FIBRILLATION, PERSISTENT (HCC): ICD-10-CM

## 2021-11-15 DIAGNOSIS — I71.2 THORACIC AORTIC ANEURYSM WITHOUT RUPTURE (HCC): ICD-10-CM

## 2021-11-15 DIAGNOSIS — I10 ESSENTIAL HYPERTENSION: ICD-10-CM

## 2021-11-15 DIAGNOSIS — I47.2 NSVT (NONSUSTAINED VENTRICULAR TACHYCARDIA) (HCC): ICD-10-CM

## 2021-11-15 LAB
RATE PRESSURE PRODUCT: NORMAL
SL CV REST NUCLEAR ISOTOPE DOSE: 10.06 MCI
SL CV STRESS NUCLEAR ISOTOPE DOSE: 30.7 MCI
SL CV STRESS RECOVERY BP: NORMAL MMHG
SL CV STRESS RECOVERY HR: 108 BPM
SL CV STRESS RECOVERY O2 SAT: 98 %
STRESS ANGINA INDEX: 0
STRESS BASELINE BP: NORMAL MMHG
STRESS BASELINE HR: 113 BPM
STRESS DUKE TREADMILL SCORE: 3
STRESS O2 SAT REST: 98 %
STRESS PEAK HR: 153 BPM
STRESS POST EXERCISE DUR MIN: 3 MIN
STRESS POST EXERCISE DUR SEC: 0 SEC
STRESS POST O2 SAT PEAK: 99 %
STRESS POST PEAK BP: 160 MMHG
STRESS ST DEPRESSION: 0 MM
STRESS TARGET HR: 153 BPM
STRESS/REST PERFUSION RATIO: 10.1

## 2021-11-15 PROCEDURE — 93017 CV STRESS TEST TRACING ONLY: CPT

## 2021-11-15 PROCEDURE — G1004 CDSM NDSC: HCPCS

## 2021-11-15 PROCEDURE — 78452 HT MUSCLE IMAGE SPECT MULT: CPT | Performed by: INTERNAL MEDICINE

## 2021-11-15 PROCEDURE — 93018 CV STRESS TEST I&R ONLY: CPT | Performed by: INTERNAL MEDICINE

## 2021-11-15 PROCEDURE — A9502 TC99M TETROFOSMIN: HCPCS

## 2021-11-15 PROCEDURE — 78452 HT MUSCLE IMAGE SPECT MULT: CPT

## 2021-11-15 PROCEDURE — 93016 CV STRESS TEST SUPVJ ONLY: CPT | Performed by: INTERNAL MEDICINE

## 2021-11-15 RX ADMIN — REGADENOSON 0.4 MG: 0.08 INJECTION, SOLUTION INTRAVENOUS at 14:04

## 2021-11-16 LAB
CHEST PAIN STATEMENT: NORMAL
CHEST PAIN STATEMENT: NORMAL
MAX DIASTOLIC BP: 70 MMHG
MAX DIASTOLIC BP: 92 MMHG
MAX HEART RATE: 133 BPM
MAX HEART RATE: 153 BPM
MAX PREDICTED HEART RATE: 149 BPM
MAX PREDICTED HEART RATE: 149 BPM
MAX. SYSTOLIC BP: 160 MMHG
MAX. SYSTOLIC BP: 160 MMHG
PROTOCOL NAME: NORMAL
PROTOCOL NAME: NORMAL
REASON FOR TERMINATION: NORMAL
REASON FOR TERMINATION: NORMAL
TARGET HR FORMULA: NORMAL
TARGET HR FORMULA: NORMAL
TEST INDICATION: NORMAL
TEST INDICATION: NORMAL
TIME IN EXERCISE PHASE: NORMAL
TIME IN EXERCISE PHASE: NORMAL

## 2021-11-29 ENCOUNTER — RA CDI HCC (OUTPATIENT)
Dept: OTHER | Facility: HOSPITAL | Age: 71
End: 2021-11-29

## 2021-11-29 ENCOUNTER — LAB (OUTPATIENT)
Dept: LAB | Facility: CLINIC | Age: 71
End: 2021-11-29
Payer: MEDICARE

## 2021-11-29 DIAGNOSIS — E55.9 VITAMIN D DEFICIENCY: ICD-10-CM

## 2021-11-29 DIAGNOSIS — Z12.5 SCREENING FOR PROSTATE CANCER: ICD-10-CM

## 2021-11-29 DIAGNOSIS — E78.2 MIXED HYPERLIPIDEMIA: ICD-10-CM

## 2021-11-29 DIAGNOSIS — R73.01 IMPAIRED FASTING GLUCOSE: ICD-10-CM

## 2021-11-29 LAB
25(OH)D3 SERPL-MCNC: 22.6 NG/ML (ref 30–100)
ALBUMIN SERPL BCP-MCNC: 4.1 G/DL (ref 3.5–5)
ALP SERPL-CCNC: 104 U/L (ref 46–116)
ALT SERPL W P-5'-P-CCNC: 46 U/L (ref 12–78)
ANION GAP SERPL CALCULATED.3IONS-SCNC: 6 MMOL/L (ref 4–13)
AST SERPL W P-5'-P-CCNC: 23 U/L (ref 5–45)
BASOPHILS # BLD AUTO: 0.1 THOUSANDS/ΜL (ref 0–0.1)
BASOPHILS NFR BLD AUTO: 2 % (ref 0–1)
BILIRUB SERPL-MCNC: 1.2 MG/DL (ref 0.2–1)
BUN SERPL-MCNC: 23 MG/DL (ref 5–25)
CALCIUM SERPL-MCNC: 9.2 MG/DL (ref 8.3–10.1)
CHLORIDE SERPL-SCNC: 104 MMOL/L (ref 100–108)
CHOLEST SERPL-MCNC: 171 MG/DL
CO2 SERPL-SCNC: 29 MMOL/L (ref 21–32)
CREAT SERPL-MCNC: 1 MG/DL (ref 0.6–1.3)
EOSINOPHIL # BLD AUTO: 0.22 THOUSAND/ΜL (ref 0–0.61)
EOSINOPHIL NFR BLD AUTO: 4 % (ref 0–6)
ERYTHROCYTE [DISTWIDTH] IN BLOOD BY AUTOMATED COUNT: 12.5 % (ref 11.6–15.1)
EST. AVERAGE GLUCOSE BLD GHB EST-MCNC: 117 MG/DL
GFR SERPL CREATININE-BSD FRML MDRD: 75 ML/MIN/1.73SQ M
GLUCOSE P FAST SERPL-MCNC: 100 MG/DL (ref 65–99)
HBA1C MFR BLD: 5.7 %
HCT VFR BLD AUTO: 52 % (ref 36.5–49.3)
HDLC SERPL-MCNC: 68 MG/DL
HGB BLD-MCNC: 17 G/DL (ref 12–17)
IMM GRANULOCYTES # BLD AUTO: 0.02 THOUSAND/UL (ref 0–0.2)
IMM GRANULOCYTES NFR BLD AUTO: 0 % (ref 0–2)
LDLC SERPL CALC-MCNC: 75 MG/DL (ref 0–100)
LYMPHOCYTES # BLD AUTO: 1.04 THOUSANDS/ΜL (ref 0.6–4.47)
LYMPHOCYTES NFR BLD AUTO: 18 % (ref 14–44)
MCH RBC QN AUTO: 29.9 PG (ref 26.8–34.3)
MCHC RBC AUTO-ENTMCNC: 32.7 G/DL (ref 31.4–37.4)
MCV RBC AUTO: 92 FL (ref 82–98)
MONOCYTES # BLD AUTO: 0.63 THOUSAND/ΜL (ref 0.17–1.22)
MONOCYTES NFR BLD AUTO: 11 % (ref 4–12)
NEUTROPHILS # BLD AUTO: 3.85 THOUSANDS/ΜL (ref 1.85–7.62)
NEUTS SEG NFR BLD AUTO: 65 % (ref 43–75)
NRBC BLD AUTO-RTO: 0 /100 WBCS
PLATELET # BLD AUTO: 276 THOUSANDS/UL (ref 149–390)
PMV BLD AUTO: 10.2 FL (ref 8.9–12.7)
POTASSIUM SERPL-SCNC: 4.4 MMOL/L (ref 3.5–5.3)
PROT SERPL-MCNC: 7 G/DL (ref 6.4–8.2)
PSA SERPL-MCNC: 1 NG/ML (ref 0–4)
RBC # BLD AUTO: 5.68 MILLION/UL (ref 3.88–5.62)
SODIUM SERPL-SCNC: 139 MMOL/L (ref 136–145)
TRIGL SERPL-MCNC: 138 MG/DL
TSH SERPL DL<=0.05 MIU/L-ACNC: 3.35 UIU/ML (ref 0.36–3.74)
WBC # BLD AUTO: 5.86 THOUSAND/UL (ref 4.31–10.16)

## 2021-11-29 PROCEDURE — G0103 PSA SCREENING: HCPCS

## 2021-11-29 PROCEDURE — 83036 HEMOGLOBIN GLYCOSYLATED A1C: CPT

## 2021-11-29 PROCEDURE — 80053 COMPREHEN METABOLIC PANEL: CPT

## 2021-11-29 PROCEDURE — 80061 LIPID PANEL: CPT

## 2021-11-29 PROCEDURE — 82306 VITAMIN D 25 HYDROXY: CPT

## 2021-11-29 PROCEDURE — 84443 ASSAY THYROID STIM HORMONE: CPT

## 2021-11-29 PROCEDURE — 85025 COMPLETE CBC W/AUTO DIFF WBC: CPT

## 2021-11-29 PROCEDURE — 36415 COLL VENOUS BLD VENIPUNCTURE: CPT

## 2021-11-30 ENCOUNTER — HOSPITAL ENCOUNTER (OUTPATIENT)
Dept: RADIOLOGY | Facility: HOSPITAL | Age: 71
Discharge: HOME | End: 2021-11-30
Attending: SURGERY
Payer: MEDICARE

## 2021-11-30 ENCOUNTER — OFFICE VISIT (OUTPATIENT)
Dept: CARDIOLOGY CLINIC | Facility: CLINIC | Age: 71
End: 2021-11-30
Payer: MEDICARE

## 2021-11-30 VITALS
SYSTOLIC BLOOD PRESSURE: 146 MMHG | WEIGHT: 237.7 LBS | HEART RATE: 90 BPM | BODY MASS INDEX: 31.5 KG/M2 | DIASTOLIC BLOOD PRESSURE: 78 MMHG | HEIGHT: 73 IN

## 2021-11-30 DIAGNOSIS — I48.19 ATRIAL FIBRILLATION, PERSISTENT (HCC): Primary | ICD-10-CM

## 2021-11-30 DIAGNOSIS — I71.2 THORACIC AORTIC ANEURYSM WITHOUT RUPTURE (HCC): ICD-10-CM

## 2021-11-30 PROCEDURE — 93000 ELECTROCARDIOGRAM COMPLETE: CPT | Performed by: INTERNAL MEDICINE

## 2021-11-30 PROCEDURE — 99214 OFFICE O/P EST MOD 30 MIN: CPT | Performed by: INTERNAL MEDICINE

## 2021-11-30 PROCEDURE — 74174 CTA ABD&PLVS W/CONTRAST: CPT

## 2021-11-30 PROCEDURE — 71275 CT ANGIOGRAPHY CHEST: CPT

## 2021-11-30 RX ORDER — METOPROLOL SUCCINATE 25 MG/1
25 TABLET, EXTENDED RELEASE ORAL 2 TIMES DAILY
Qty: 180 TABLET | Refills: 3 | Status: SHIPPED | OUTPATIENT
Start: 2021-11-30

## 2021-11-30 RX ADMIN — IOHEXOL 100 ML: 350 INJECTION, SOLUTION INTRAVENOUS at 11:29

## 2021-12-02 DIAGNOSIS — I10 HYPERTENSION, UNSPECIFIED TYPE: ICD-10-CM

## 2021-12-02 DIAGNOSIS — E03.9 ACQUIRED HYPOTHYROIDISM: ICD-10-CM

## 2021-12-02 RX ORDER — LEVOTHYROXINE SODIUM 0.07 MG/1
TABLET ORAL
Qty: 90 TABLET | Refills: 3 | Status: SHIPPED | OUTPATIENT
Start: 2021-12-02

## 2021-12-02 RX ORDER — ATORVASTATIN CALCIUM 20 MG/1
TABLET, FILM COATED ORAL
Qty: 90 TABLET | Refills: 3 | Status: SHIPPED | OUTPATIENT
Start: 2021-12-02

## 2021-12-06 ENCOUNTER — OFFICE VISIT (OUTPATIENT)
Dept: INTERNAL MEDICINE CLINIC | Facility: CLINIC | Age: 71
End: 2021-12-06
Payer: MEDICARE

## 2021-12-06 VITALS
BODY MASS INDEX: 30.69 KG/M2 | HEIGHT: 73 IN | WEIGHT: 231.6 LBS | OXYGEN SATURATION: 97 % | SYSTOLIC BLOOD PRESSURE: 126 MMHG | HEART RATE: 56 BPM | DIASTOLIC BLOOD PRESSURE: 82 MMHG

## 2021-12-06 DIAGNOSIS — I10 ESSENTIAL HYPERTENSION: ICD-10-CM

## 2021-12-06 DIAGNOSIS — E03.9 ACQUIRED HYPOTHYROIDISM: Primary | ICD-10-CM

## 2021-12-06 DIAGNOSIS — Z00.00 ENCOUNTER FOR MEDICARE ANNUAL WELLNESS EXAM: ICD-10-CM

## 2021-12-06 DIAGNOSIS — E78.2 MIXED HYPERLIPIDEMIA: ICD-10-CM

## 2021-12-06 DIAGNOSIS — I48.19 ATRIAL FIBRILLATION, PERSISTENT (HCC): ICD-10-CM

## 2021-12-06 DIAGNOSIS — Z00.00 MEDICARE ANNUAL WELLNESS VISIT, SUBSEQUENT: ICD-10-CM

## 2021-12-06 DIAGNOSIS — E55.9 VITAMIN D DEFICIENCY: ICD-10-CM

## 2021-12-06 PROCEDURE — 1123F ACP DISCUSS/DSCN MKR DOCD: CPT | Performed by: INTERNAL MEDICINE

## 2021-12-06 PROCEDURE — G0438 PPPS, INITIAL VISIT: HCPCS | Performed by: INTERNAL MEDICINE

## 2021-12-06 PROCEDURE — 99214 OFFICE O/P EST MOD 30 MIN: CPT | Performed by: INTERNAL MEDICINE

## 2021-12-13 ENCOUNTER — PROCEDURE VISIT (OUTPATIENT)
Dept: CARDIOLOGY CLINIC | Facility: CLINIC | Age: 71
End: 2021-12-13
Payer: MEDICARE

## 2021-12-13 DIAGNOSIS — I48.91 ATRIAL FIBRILLATION, UNSPECIFIED TYPE (HCC): Primary | ICD-10-CM

## 2021-12-13 PROCEDURE — 93224 XTRNL ECG REC UP TO 48 HRS: CPT | Performed by: INTERNAL MEDICINE

## 2021-12-15 ENCOUNTER — OFFICE VISIT (OUTPATIENT)
Dept: CARDIAC SURGERY | Facility: CLINIC | Age: 71
End: 2021-12-15
Payer: MEDICARE

## 2021-12-15 VITALS
TEMPERATURE: 97.4 F | HEART RATE: 42 BPM | SYSTOLIC BLOOD PRESSURE: 143 MMHG | BODY MASS INDEX: 31.68 KG/M2 | OXYGEN SATURATION: 98 % | DIASTOLIC BLOOD PRESSURE: 72 MMHG | HEIGHT: 73 IN | RESPIRATION RATE: 18 BRPM | WEIGHT: 239 LBS

## 2021-12-15 DIAGNOSIS — I72.3 ILIAC ARTERY ANEURYSM, RIGHT (HCC): ICD-10-CM

## 2021-12-15 DIAGNOSIS — Z13.6 ENCOUNTER FOR SPECIAL SCREENING EXAMINATION FOR CARDIOVASCULAR DISORDER: ICD-10-CM

## 2021-12-15 DIAGNOSIS — I71.2 THORACIC AORTIC ANEURYSM WITHOUT RUPTURE (HCC): Primary | ICD-10-CM

## 2021-12-15 PROCEDURE — 99214 OFFICE O/P EST MOD 30 MIN: CPT | Performed by: PHYSICIAN ASSISTANT

## 2021-12-15 RX ORDER — MELATONIN
1000 DAILY
COMMUNITY

## 2021-12-17 ENCOUNTER — HOSPITAL ENCOUNTER (OUTPATIENT)
Dept: NON INVASIVE DIAGNOSTICS | Facility: HOSPITAL | Age: 71
Discharge: HOME | End: 2021-12-17
Payer: MEDICARE

## 2021-12-17 DIAGNOSIS — I48.19 ATRIAL FIBRILLATION, PERSISTENT (HCC): ICD-10-CM

## 2021-12-17 PROCEDURE — 93226 XTRNL ECG REC<48 HR SCAN A/R: CPT

## 2021-12-17 PROCEDURE — 93225 XTRNL ECG REC<48 HRS REC: CPT

## 2021-12-17 PROCEDURE — 93227 XTRNL ECG REC<48 HR R&I: CPT | Performed by: INTERNAL MEDICINE

## 2021-12-21 DIAGNOSIS — I48.0 PAROXYSMAL ATRIAL FIBRILLATION (HCC): ICD-10-CM

## 2021-12-21 RX ORDER — APIXABAN 5 MG/1
TABLET, FILM COATED ORAL
Qty: 180 TABLET | Refills: 3 | Status: SHIPPED | OUTPATIENT
Start: 2021-12-21

## 2022-01-06 ENCOUNTER — OFFICE VISIT (OUTPATIENT)
Dept: CARDIOLOGY CLINIC | Facility: CLINIC | Age: 72
End: 2022-01-06
Payer: MEDICARE

## 2022-01-06 VITALS
WEIGHT: 241.8 LBS | SYSTOLIC BLOOD PRESSURE: 142 MMHG | DIASTOLIC BLOOD PRESSURE: 88 MMHG | BODY MASS INDEX: 32.05 KG/M2 | HEART RATE: 61 BPM | HEIGHT: 73 IN

## 2022-01-06 DIAGNOSIS — I48.19 ATRIAL FIBRILLATION, PERSISTENT (HCC): Primary | ICD-10-CM

## 2022-01-06 PROCEDURE — 99214 OFFICE O/P EST MOD 30 MIN: CPT | Performed by: PHYSICIAN ASSISTANT

## 2022-01-06 PROCEDURE — 93000 ELECTROCARDIOGRAM COMPLETE: CPT | Performed by: PHYSICIAN ASSISTANT

## 2022-01-06 NOTE — PROGRESS NOTES
Electrophysiology Office Note    Filiberto Valladares  1950  [id number]  HEART & VASCULAR [phone] [id number]        Assessment/Plan     Primary diagnosis:   1  WCT               * patient presents today for f/u regarding holter monitor review, he is a patient of dr Chelsea Fuentes    * reviewed holter monitor with patient showing likely NSVT and periods of bradycardia prolonged pause during sleep period  Discussed with patient Dr Chelsea Fuentes and I have discussed implanting PPM to allow further up titration of BB to suppress his likely NSVT  * patient does wish to have PPM implanted at this time; he has cut down on his alcohol by at least 50% in the last 1 month    * ECG today is much improved then our last visit he has multifocal PVC's but no NSVT/WCT on rhythm strip today    * perhaps his alcohol reduction has reduced his ectopy  Discussed with Dr Chelsea Fuentes we will plan for additional 48H holter to monitor again and have him f/u with Dr Chelsea Fuentes to review and make further tx plan              * reviewed recent echo and NM stress with normal EF and no ischemia               * WCT either NSVT or Afib with abberant conduction ; either way he is completely asymptomatic               * of note recent TSH and T4 WNL at end of Nov 2021    Secondary diagnosis: 1  Permanent atrial fibrillation, asymptomatic               * last NSR was in 2017 after flecainide and DCCV, unfortunately in NSR was markedly bradycardic in the 40's, thus AAD's discontinued               * continue eliquis for [facility]  2  SSS  3  Hypothyroidism   4  HLD   5  Stable ascending aortic aneurysm  6   S/p endovascular repair of iliac artery aneurysm                Rhythm History:   Atrial fibrillation:     Atrial flutter:     SVT:     VT/VF:     Device history:   Pacemaker:    Defibrillator:    BIV PPM:    BIV ICD:    ILR:      Cardiac Testing:     ECHO: No results found for this or any previous visit     CATH/STRESS TEST:     EKG:   Atrial fibrillation w/ CVR   Multifocal PVC's present         History of Present Illness     HPI/INTERVAL HISTORY: Eric Sales is a 70 y o  male with history as above who presents to [facility] EP office today under direction of Dr Adolfo Chew for 1 month follow up  Since our last office visit patient has no new concerns or complaints  He has cut down on his alcohol intake by at least 50% since our last visit  He is not having any palpitations, LH, dizziness, SOB or chest discomfort  Review of Systems  ROS as noted above, otherwise 12 point review of systems was performed and is negative         Historical Information   Social History     Socioeconomic History    Marital status: /Civil Union     Spouse name: Not on file    Number of children: Not on file    Years of education: Not on file    Highest education level: Not on file   Occupational History    Not on file   Tobacco Use    Smoking status: Never Smoker    Smokeless tobacco: Never Used   Vaping Use    Vaping Use: Never used   Substance and Sexual Activity    Alcohol use: Yes     Comment: socially     Drug use: No    Sexual activity: Not Currently   Other Topics Concern    Not on file   Social History Narrative    Not on file     Social Determinants of Health     Financial Resource Strain: Not on file   Food Insecurity: Not on file   Transportation Needs: Not on file   Physical Activity: Not on file   Stress: Not on file   Social Connections: Not on file   Intimate Partner Violence: Not on file   Housing Stability: Not on file     Past Medical History:   Diagnosis Date    Aneurysm of thoracic aorta ([address] )     Arrhythmia     Detached retina, right     Disease of thyroid gland     Gastric wall thickening     Headache     Hypertension     Iliac artery aneurysm, bilateral ([address] )     Irregular heart beat     afib cardioversion 1/30/17, x2     Neuropathy     bilateral feet    Paroxysmal A-fib [facility])     Prostatic hypertrophy     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[facility])      Past Surgical History:   Procedure Laterality Date    ABDOMINAL AORTIC ANEURYSM REPAIR, ENDOVASCULAR Right 4/13/2018    Procedure: REPAIR ANEURYSM ILIAC endovascular  with coil embolization right hypogastric artery ;  Surgeon: Diane Calles MD;  [facility];  Service: Vascular    CARDIOVERSION      CATARACT EXTRACTION Left 10/20/2019    Right eye 11/17/2011    CHOLECYSTECTOMY LAPAROSCOPIC N/A 3/8/2019    Procedure: [id number] CHOLECYSTECTOMY;  Surgeon: Wilmer Wu DO;  [facility];  Service: General    COLONOSCOPY  07/13/2016    MOLE EXCISION  2021    OR EDG US EXAM SURGICAL ALTER STOM DUODENUM/JEJUNUM N/A 11/10/2017    Procedure: RADIAL ENDOSCOPIC U/S;  Surgeon: Mick Kulkarni MD;  Location: [facility];   Service: Gastroenterology    RETINAL DETACHMENT SURGERY Right     onset 1992, retinal detachment complete air fill confirmed     Social History     Substance and Sexual Activity   Alcohol Use Yes    Comment: socially      Social History     Substance and Sexual Activity   Drug Use No     Social History     Tobacco Use   Smoking Status Never Smoker   Smokeless Tobacco Never Used     Family History   Problem Relation Age of Onset    Aortic aneurysm Father         abdominal    Aortic aneurysm Brother     Stroke Mother         CVA    Cancer Maternal Grandmother         malignant neoplasm    Cancer Maternal Grandfather         malignant neoplasm    Cancer Paternal Grandmother         malignant neoplasm    Cancer Paternal Grandfather         malignant neoplasm    Cancer Family         malignant neoplasm    Cancer Family         malignant neoplasm       Meds/Allergies       Current Outpatient Medications:     acetaminophen (TYLENOL) 325 mg tablet, Take 2 tablets (650 mg total) by mouth every 6 (six) hours as needed for mild pain or fever, Disp: 30 tablet, Rfl: 0    aspirin (ECOTRIN LOW STRENGTH) 81 mg EC tablet, Take 81 mg by mouth daily, Disp: , Rfl:     atorvastatin (LIPITOR) 20 mg tablet, TAKE 1 TABLET DAILY, Disp: 90 tablet, Rfl: 3    cholecalciferol (VITAMIN D3) 1,000 units tablet, Take 1,000 Units by mouth daily, Disp: , Rfl:     Co-Enzyme Q-10 100 MG CAPS, Take 100 mg by mouth daily, Disp: , Rfl:     Eliquis 5 MG, TAKE 1 TABLET TWICE A DAY, Disp: 180 tablet, Rfl: 3    felodipine (PLENDIL) 5 mg 24 hr tablet, TAKE 1 TABLET DAILY, Disp: 90 tablet, Rfl: 3    gabapentin (NEURONTIN) 100 mg capsule, Take 1 capsule (100 mg total) by mouth 3 (three) times a day, Disp: 270 capsule, Rfl: 1    hydrocortisone 2 5 % cream, Apply 1 application topically 2 (two) times a day To affected area as needed, Disp: , Rfl:     ketoconazole (NIZORAL) 2 % cream, As needed, Disp: , Rfl:     levothyroxine 75 mcg tablet, TAKE 1 TABLET DAILY FOR    ACQUIRED HYPOTHYROIDISM, Disp: 90 tablet, Rfl: 3    metoprolol succinate (TOPROL-XL) 25 mg 24 hr tablet, Take 1 tablet (25 mg total) by mouth 2 (two) times a day, Disp: 180 tablet, Rfl: 3    Misc Natural Products (LUTEIN 20 PO), Take 20 mg by mouth daily  , Disp: , Rfl:     multivitamin (THERAGRAN) TABS, Take 1 tablet by mouth daily  , Disp: , Rfl:     Allergies   Allergen Reactions    Wound Dressing Adhesive        Objective   Vitals: Blood pressure 142/88, pulse 61, height 6' 1" (1 854 m), weight 110 kg (241 lb 12 8 oz)  Physical Exam  Constitutional:       Appearance: He is well-developed  HENT:      Head: Normocephalic and atraumatic  Eyes:      Pupils: Pupils are equal, round, and reactive to light  Cardiovascular:      Rate and Rhythm: Normal rate and regular rhythm  Frequent extrasystoles are present  Pulmonary:      Effort: Pulmonary effort is normal       Breath sounds: Normal breath sounds  Abdominal:      General: Bowel sounds are normal       Palpations: Abdomen is soft  Musculoskeletal:         General: Normal range of motion  Cervical back: Normal range of motion and neck supple  Skin:     General: Skin is warm and dry  Neurological:      Mental Status: He is alert and oriented to person, place, and time  Labs:  Lab on 11/29/2021   Component Date Value    WBC 11/29/2021 5 86     RBC 11/29/2021 5 68*    Hemoglobin 11/29/2021 17 0     Hematocrit 11/29/2021 52 0*    MCV 11/29/2021 92     MCH 11/29/2021 29 9     MCHC 11/29/2021 32 7     RDW 11/29/2021 12 5     MPV 11/29/2021 10 2     Platelets 92/82/6510 276     nRBC 11/29/2021 0     Neutrophils Relative 11/29/2021 65     Immat GRANS % 11/29/2021 0     Lymphocytes Relative 11/29/2021 18     Monocytes Relative 11/29/2021 11     Eosinophils Relative 11/29/2021 4     Basophils Relative 11/29/2021 2*    Neutrophils Absolute 11/29/2021 3 85     Immature Grans Absolute 11/29/2021 0 02     Lymphocytes Absolute 11/29/2021 1 04     Monocytes Absolute 11/29/2021 0 63     Eosinophils Absolute 11/29/2021 0 22     Basophils Absolute 11/29/2021 0 10     Sodium 11/29/2021 139     Potassium 11/29/2021 4 4     Chloride 11/29/2021 104     CO2 11/29/2021 29     ANION GAP 11/29/2021 6     BUN 11/29/2021 23     Creatinine 11/29/2021 1 00     Glucose, Fasting 11/29/2021 100*    Calcium 11/29/2021 9 2     AST 11/29/2021 23     ALT 11/29/2021 46     Alkaline Phosphatase 11/29/2021 104     Total Protein 11/29/2021 7 0     Albumin 11/29/2021 4 1     Total Bilirubin 11/29/2021 1 20*    eGFR 11/29/2021 75     Cholesterol 11/29/2021 171     Triglycerides 11/29/2021 138     HDL, Direct 11/29/2021 68     LDL Calculated 11/29/2021 75     TSH 3RD GENERATON 11/29/2021 3 350     Vit D, 25-Hydroxy 11/29/2021 22 6*    PSA 11/29/2021 1 0     Hemoglobin A1C 11/29/2021 5 7*    EAG 11/29/2021 117    Hospital Outpatient Visit on 11/15/2021   Component Date Value    Protocol Name 11/15/2021 REYNALDO SIT     Time In Exercise Phase 11/15/2021 00:03:00     MAX   SYSTOLIC BP 11/15/2021 458     Max Diastolic Bp 51/93/8715 70     Max Heart Rate 11/15/2021 153     Max Predicted Heart Rate 11/15/2021 149     Reason for Termination 11/15/2021 TEST COMPLETE        Test Indication 11/15/2021 SVT     Target Hr Formular 11/15/2021 (220 - Age)*100%     Chest Pain Statement 11/15/2021 none     Protocol Name 11/15/2021 REYNALDO SIT     Time In Exercise Phase 11/15/2021 00:00:00     MAX  SYSTOLIC BP 42/95/1015 228     Max Diastolic Bp 73/13/3953 92     Max Heart Rate 11/15/2021 133     Max Predicted Heart Rate 11/15/2021 149     Reason for Termination 11/15/2021 Abn baseline EKG     Test Indication 11/15/2021 SVT     Target Hr Formular 11/15/2021 (220 - Age)*100%     Chest Pain Statement 11/15/2021 none    Hospital Outpatient Visit on 11/15/2021   Component Date Value    Baseline HR 11/15/2021 113     Baseline BP 11/15/2021 160/92     O2 sat rest 11/15/2021 98     Stress peak HR 11/15/2021 153     Post peak BP 11/15/2021 160     Rate Pressure Product 11/15/2021 24,480 0     O2 sat peak 11/15/2021 99     Recovery HR 11/15/2021 108     Recovery BP 11/15/2021 150/80     O2 sat recovery 11/15/2021 98     Target HR 11/15/2021 153     Exercise duration (min) 11/15/2021 3     Exercise duration (sec) 11/15/2021 0     Angina Index 11/15/2021 0     Rest Nuclear Isotope Dose 11/15/2021 10 06     Stress Nuclear Isotope D* 11/15/2021 30 70     Duke Treadmill Score 11/15/2021 3     ST Depression (mm) 11/15/2021 0     Stress/rest perfusion ra* 11/15/2021 10 10        Imaging: I have personally reviewed pertinent reports

## 2022-03-03 ENCOUNTER — TELEPHONE (OUTPATIENT)
Dept: NEUROLOGY | Facility: CLINIC | Age: 72
End: 2022-03-03

## 2022-03-03 ENCOUNTER — RA CDI HCC (OUTPATIENT)
Dept: OTHER | Facility: HOSPITAL | Age: 72
End: 2022-03-03

## 2022-03-03 NOTE — TELEPHONE ENCOUNTER
Called pt to reschedule appointment due to [place] being out of the office today  Pt accepted appointment with Jose Tuttle on 03/08

## 2022-03-03 NOTE — PROGRESS NOTES
[facility]  coding opportunities             Chart Reviewed * (Number of) Inbasket suggestions sent to Provider: 2      I71 2  I72 3    Appt:3/10/2022            Patients insurance company: [employer]

## 2022-03-07 NOTE — PRE-PROCEDURE INSTRUCTIONS
From: Roopa Pompa  To: Tito Chavez MD  Sent: 3/7/2022 1:58 PM EST  Subject: New MRI    I have had another MRI done of my whole spine. The herniation at L5-S1 is there and just as bad as before the surgery. I also have small herniations done my entire thoracic spine   Rona wife is  in waiting room

## 2022-03-08 ENCOUNTER — OFFICE VISIT (OUTPATIENT)
Dept: NEUROLOGY | Facility: CLINIC | Age: 72
End: 2022-03-08
Payer: MEDICARE

## 2022-03-08 VITALS
HEART RATE: 64 BPM | WEIGHT: 243 LBS | HEIGHT: 73 IN | SYSTOLIC BLOOD PRESSURE: 140 MMHG | BODY MASS INDEX: 32.2 KG/M2 | DIASTOLIC BLOOD PRESSURE: 78 MMHG | TEMPERATURE: 97.6 F

## 2022-03-08 DIAGNOSIS — M21.961 DEFORMITY OF RIGHT FOOT: ICD-10-CM

## 2022-03-08 DIAGNOSIS — R51.9 CHRONIC NONINTRACTABLE HEADACHE, UNSPECIFIED HEADACHE TYPE: ICD-10-CM

## 2022-03-08 DIAGNOSIS — G89.29 CHRONIC NONINTRACTABLE HEADACHE, UNSPECIFIED HEADACHE TYPE: ICD-10-CM

## 2022-03-08 DIAGNOSIS — G44.89 OTHER HEADACHE SYNDROME: Primary | ICD-10-CM

## 2022-03-08 DIAGNOSIS — G62.9 NEUROPATHY: ICD-10-CM

## 2022-03-08 PROCEDURE — 99214 OFFICE O/P EST MOD 30 MIN: CPT | Performed by: NURSE PRACTITIONER

## 2022-03-08 NOTE — ASSESSMENT & PLAN NOTE
Patient returns for follow up, overall stable from a neuropathy standpoint since last seen  He continues to note numbness mainly in the soles of the feet and toes  Denies any progression of symptoms or muscle weakness  Exam remains stable  He denies any need for any adjustment of his neuropathic pain medications  We did discuss causes of neuropathy, he does have borderline a1c for the past few years, however his neuropathy could also be consider idiopathic or age appropriate  He was encouraged to remain active  Plan for follow up in 1 year, To contact the office sooner with any concerns or worsening symptoms

## 2022-03-08 NOTE — PROGRESS NOTES
Patient ID: Gavin Alicia is a 67 y o  male  Assessment/Plan:    Neuropathy  Patient returns for follow up, overall stable from a neuropathy standpoint since last seen  He continues to note numbness mainly in the soles of the feet and toes  Denies any progression of symptoms or muscle weakness  Exam remains stable  He denies any need for any adjustment of his neuropathic pain medications  We did discuss causes of neuropathy, he does have borderline a1c for the past few years, however his neuropathy could also be consider idiopathic or age appropriate  He was encouraged to remain active  Plan for follow up in 1 year, To contact the office sooner with any concerns or worsening symptoms  Other headache syndrome  Continues with intermittent short episodes of pain in the left temple region  He has had work up in the past MRI/MRA brain and temporal artery biopsy which were negative  He has found stress reduction as well as gabapentin to be helpful with symptoms  Will continue current dose of gabapentin, did discuss if worsens in the future we could consider increase  Diagnoses and all orders for this visit:    Other headache syndrome    Deformity of right foot  -     Ambulatory referral to Physical Medicine Rehab    Neuropathy    Chronic nonintractable headache, unspecified headache type           Subjective:    HPI    Gavin Alicia is a 67 y o  male wiht PMH hypertension, thoracic aortic aneurysm, iliac artery aneurysm status post stent , history of migraines in the past, recent cholecystectomy, atrial fibrillation, hypothyroidism   Most recent CT scan of the chest showed stable aneurysm   who is here today for a follow-up in the [facility]uscular [facility]   As you know, he is a 68-year-old man with history of neuropathy, and headaches in the left temporal region        To review, he had been having intermittent sharp electric sensations in the left side of the temple region, had the workup including a temporal artery biopsy and MRI/MRA which were negative  Last office visit 4/2020 in which he felt symptoms were stable  Headaches continued to be occasional       Interval History:  His headaches are mainly stress related he finds  He is less stressed now that he is retired  If he has a flare, he will take extra gabapentin which will help  Headache located mainly in the left temple, pounding/pressure  Lasts for about 20-30 secs  It used to follow a pattern if it occurred once he will have multiple episodes per day  Gabapentin seems to help lessen this  He finds headaches occurring about once monthly  He remains on gabapentin 100 mg BID, however as previously stated he will take an extra dose if he is having a flare  He continues with numbness and tingling in the soles of the feet and toes  Has not worsened since last seen  He feels his walking and balance is not what it used to be however no change since last seen  Feels overall good  He denies any muscle weakness  He did have a fall in 10/2021 in which he presented to ER due to anticoagulation use-was running to catch a bus and tripped over his bike  His PCP did recently start a vitamin d supplement on him  Prior work up:  Recent labs 11/2021: a1c 5 7, vit d 22 6, tsh normal  He had MRI of the brain in 2015, was reported to show mild small vessel disease and mild atrophy, age appropriate  MRA of the brain was normal       2 hour glucose tolerance 97, fasting glucose 96, folate greater than 24  B12 390, TSH normal, SPEP showed no monoclonal bands, vitamin B 6 32 6  Objective:    Blood pressure 140/78, pulse 64, temperature 97 6 °F (36 4 °C), temperature source Temporal, height 6' 1" (1 854 m), weight 110 kg (243 lb)  Physical Exam  Constitutional:       General: He is awake     HENT:      Right Ear: Hearing normal       Left Ear: Hearing normal    Eyes:      General: Lids are normal       Extraocular Movements: Extraocular movements intact  Pupils: Pupils are equal, round, and reactive to light  Neurological:      Mental Status: He is alert  Deep Tendon Reflexes:      Reflex Scores:       Bicep reflexes are 2+ on the right side and 2+ on the left side  Brachioradialis reflexes are 2+ on the right side and 2+ on the left side  Patellar reflexes are 3+ on the right side and 3+ on the left side  Achilles reflexes are 1+ on the right side and 1+ on the left side  Psychiatric:         Speech: Speech normal          Neurological Exam  Mental Status  Awake and alert  Speech is normal  Language is fluent with no aphasia  Cranial Nerves  CN III, IV, VI: Extraocular movements intact bilaterally  Normal lids and orbits bilaterally  Pupils equal round and reactive to light bilaterally  CN V:  Right: Facial sensation is normal   Left: Facial sensation is normal on the left  CN VII:  Right: There is no facial weakness  Left: There is no facial weakness  CN VIII:  Right: Hearing is normal   Left: Hearing is normal   CN XI:  Right: Trapezius strength is normal   Left: Trapezius strength is normal   CN XII: Tongue midline without atrophy or fasciculations  Motor  Normal muscle bulk throughout                                               Right                     Left  Elbow flexion                         5                          5  Elbow extension                    5                          5  Wrist flexion                           5                          5  Wrist extension                      5                          5  Finger abduction                    5                          5  Hip flexion                              5                          5  Knee flexion                           5                          5  Knee extension                      5                          5  Ankle inversion                      5                          5  Ankle eversion                       5 5  Plantarflexion                         5                          5  Dorsiflexion                            5                          5    Sensory  Light touch is normal in upper and lower extremities  Pinprick abnormality: Normal in bilateral UE, diminished to the ankles in b/l LE    Temperature abnormality: Normal in bilateral UE, diminished to the mid foot in b/l LE    Vibration abnormality: Absent at the great toes, reduced at the ankles to 5-6 seconds  Reflexes                                           Right                      Left  Brachioradialis                    2+                         2+  Biceps                                 2+                         2+  Patellar                                3+                         3+  Achilles                                1+                         1+    Gait  Casual gait is normal including stance, stride, and arm swing  Able to rise from chair without using arms  I have personally reviewed the ROS performed by the MA     ROS:    Review of Systems   Constitutional: Negative  Negative for appetite change and fever  HENT: Negative  Negative for hearing loss, tinnitus, trouble swallowing and voice change  Eyes: Negative  Negative for photophobia and pain  Respiratory: Negative  Negative for shortness of breath  Cardiovascular: Negative  Negative for palpitations  Gastrointestinal: Negative  Negative for nausea and vomiting  Endocrine: Negative  Negative for cold intolerance  Genitourinary: Negative  Negative for dysuria, frequency and urgency  Musculoskeletal: Negative  Negative for myalgias and neck pain  Skin: Negative  Negative for rash  Neurological: Positive for numbness (no progression in both feet and toes ) and headaches (once a month )  Negative for dizziness, tremors, seizures, syncope, facial asymmetry, speech difficulty, weakness and light-headedness  Hematological: Negative    Does not bruise/bleed easily  Psychiatric/Behavioral: Negative  Negative for confusion, hallucinations and sleep disturbance

## 2022-03-08 NOTE — ASSESSMENT & PLAN NOTE
Continues with intermittent short episodes of pain in the left temple region  He has had work up in the past MRI/MRA brain and temporal artery biopsy which were negative  He has found stress reduction as well as gabapentin to be helpful with symptoms  Will continue current dose of gabapentin, did discuss if worsens in the future we could consider increase

## 2022-03-10 ENCOUNTER — OFFICE VISIT (OUTPATIENT)
Dept: INTERNAL MEDICINE CLINIC | Facility: CLINIC | Age: 72
End: 2022-03-10
Payer: MEDICARE

## 2022-03-10 VITALS
HEIGHT: 73 IN | DIASTOLIC BLOOD PRESSURE: 84 MMHG | SYSTOLIC BLOOD PRESSURE: 148 MMHG | HEART RATE: 47 BPM | OXYGEN SATURATION: 99 % | BODY MASS INDEX: 31.81 KG/M2 | WEIGHT: 240 LBS

## 2022-03-10 DIAGNOSIS — E78.2 MIXED HYPERLIPIDEMIA: ICD-10-CM

## 2022-03-10 DIAGNOSIS — E03.9 ACQUIRED HYPOTHYROIDISM: Primary | ICD-10-CM

## 2022-03-10 DIAGNOSIS — I48.19 ATRIAL FIBRILLATION, PERSISTENT (HCC): ICD-10-CM

## 2022-03-10 DIAGNOSIS — I10 ESSENTIAL HYPERTENSION: ICD-10-CM

## 2022-03-10 PROCEDURE — 99214 OFFICE O/P EST MOD 30 MIN: CPT | Performed by: INTERNAL MEDICINE

## 2022-03-10 NOTE — ASSESSMENT & PLAN NOTE
Borderline, I recommend patient get some outpatient readings to see how he is before making any med changes

## 2022-03-10 NOTE — PATIENT INSTRUCTIONS
Problem List Items Addressed This Visit        Endocrine    Acquired hypothyroidism - Primary     Last thyroid function testing November 2021 was normal, continue levothyroxine 75 mcg daily,            Cardiovascular and Mediastinum    Essential hypertension     Borderline, I recommend patient get some outpatient readings to see how he is before making any med changes         Atrial fibrillation, persistent (HCC)     No bleeding problems, no palpitations, continue anticoagulation            Other    Mixed hyperlipidemia     Continue statin along with healthy diet and exercise

## 2022-03-10 NOTE — PROGRESS NOTES
Assessment/Plan:    Acquired hypothyroidism  Last thyroid function testing November 2021 was normal, continue levothyroxine 75 mcg daily,    Essential hypertension  Borderline, I recommend patient get some outpatient readings to see how he is before making any med changes    Mixed hyperlipidemia  Continue statin along with healthy diet and exercise    Atrial fibrillation, persistent (HCC)  No bleeding problems, no palpitations, continue anticoagulation       Diagnoses and all orders for this visit:    Acquired hypothyroidism    Essential hypertension    Mixed hyperlipidemia    Atrial fibrillation, persistent (HCC)        BMI Counseling: Body mass index is 31 66 kg/m²  The BMI is above normal  Nutrition recommendations include encouraging healthy choices of fruits and vegetables and moderation in carbohydrate intake  Exercise recommendations include exercising 3-5 times per week  Rationale for BMI follow-up plan is due to patient being overweight or obese  Depression Screening and Follow-up Plan: Patient was screened for depression during today's encounter  They screened negative with a PHQ-2 score of 0  Subjective:      Patient ID: Gavin Alicia is a 67 y o  male  Here for regular follow-up      The following portions of the patient's history were reviewed and updated as appropriate: allergies, current medications, past family history, past medical history, past social history, past surgical history and problem list     Review of Systems   Constitutional: Negative for chills, fatigue and fever  HENT: Negative for congestion, nosebleeds, postnasal drip, sore throat and trouble swallowing  Eyes: Negative for pain  Respiratory: Negative for cough, chest tightness, shortness of breath and wheezing  Cardiovascular: Negative for chest pain, palpitations and leg swelling  Gastrointestinal: Negative for abdominal pain, constipation, diarrhea, nausea and vomiting     Endocrine: Negative for polydipsia and polyuria  Genitourinary: Negative for dysuria, flank pain and hematuria  Musculoskeletal: Positive for arthralgias and back pain  Skin: Negative for rash  Neurological: Negative for dizziness, tremors, light-headedness and headaches  Hematological: Does not bruise/bleed easily  Psychiatric/Behavioral: Negative for confusion and dysphoric mood  The patient is not nervous/anxious  Objective:      /84 (BP Location: Left arm, Patient Position: Sitting, Cuff Size: Standard)   Pulse (!) 47   Ht 6' 1" (1 854 m)   Wt 109 kg (240 lb)   SpO2 99%   BMI 31 66 kg/m²          Physical Exam  Vitals reviewed  Constitutional:       General: He is not in acute distress  Appearance: Normal appearance  He is well-developed  HENT:      Head: Normocephalic and atraumatic  Right Ear: External ear normal       Left Ear: External ear normal       Nose: Nose normal    Eyes:      General: No scleral icterus  Conjunctiva/sclera: Conjunctivae normal    Neck:      Thyroid: No thyromegaly  Trachea: No tracheal deviation  Cardiovascular:      Rate and Rhythm: Normal rate  Rhythm irregular  Heart sounds: Normal heart sounds  No murmur heard  Pulmonary:      Effort: Pulmonary effort is normal  No respiratory distress  Breath sounds: Normal breath sounds  No wheezing or rales  Abdominal:      General: Bowel sounds are normal       Palpations: Abdomen is soft  Tenderness: There is no abdominal tenderness  There is no guarding or rebound  Musculoskeletal:      Cervical back: Normal range of motion and neck supple  Right lower leg: No edema  Left lower leg: No edema  Lymphadenopathy:      Cervical: No cervical adenopathy  Skin:     Coloration: Skin is not jaundiced or pale  Neurological:      General: No focal deficit present  Mental Status: He is alert and oriented to person, place, and time     Psychiatric:         Mood and Affect: Mood normal  Behavior: Behavior normal          Thought Content:  Thought content normal          Judgment: Judgment normal

## 2022-05-31 DIAGNOSIS — G89.29 CHRONIC NONINTRACTABLE HEADACHE, UNSPECIFIED HEADACHE TYPE: ICD-10-CM

## 2022-05-31 DIAGNOSIS — R51.9 CHRONIC NONINTRACTABLE HEADACHE, UNSPECIFIED HEADACHE TYPE: ICD-10-CM

## 2022-05-31 NOTE — TELEPHONE ENCOUNTER
Pt  Calling in requesting a refill on his Gabapentin 100 mg 1 capsule TID to [facility]    Please refill if agreeable

## 2022-06-01 RX ORDER — GABAPENTIN 100 MG/1
100 CAPSULE ORAL 3 TIMES DAILY
Qty: 270 CAPSULE | Refills: 1 | Status: SHIPPED | OUTPATIENT
Start: 2022-06-01 | End: 2022-08-30

## 2022-06-24 ENCOUNTER — OFFICE VISIT (OUTPATIENT)
Dept: INTERNAL MEDICINE CLINIC | Facility: CLINIC | Age: 72
End: 2022-06-24
Payer: MEDICARE

## 2022-06-24 VITALS
TEMPERATURE: 98.1 F | OXYGEN SATURATION: 97 % | DIASTOLIC BLOOD PRESSURE: 64 MMHG | HEIGHT: 73 IN | HEART RATE: 52 BPM | SYSTOLIC BLOOD PRESSURE: 118 MMHG | BODY MASS INDEX: 31.09 KG/M2 | WEIGHT: 234.6 LBS | RESPIRATION RATE: 16 BRPM

## 2022-06-24 DIAGNOSIS — E78.2 MIXED HYPERLIPIDEMIA: ICD-10-CM

## 2022-06-24 DIAGNOSIS — I48.19 ATRIAL FIBRILLATION, PERSISTENT (HCC): ICD-10-CM

## 2022-06-24 DIAGNOSIS — E03.9 ACQUIRED HYPOTHYROIDISM: Primary | ICD-10-CM

## 2022-06-24 DIAGNOSIS — I10 ESSENTIAL HYPERTENSION: ICD-10-CM

## 2022-06-24 PROCEDURE — 99214 OFFICE O/P EST MOD 30 MIN: CPT | Performed by: INTERNAL MEDICINE

## 2022-06-24 NOTE — PROGRESS NOTES
Assessment/Plan:    Acquired hypothyroidism  No fatigue, no constipation, no cold intolerance, last TSH ok, Continue levothyroxine 75 mcg daily along with monitoring    Essential hypertension   Well controlled, continue current meds along with healthy diet and exercise    Mixed hyperlipidemia   Continue healthy diet, exercise, and statin    Atrial fibrillation, persistent (HCC)  No palpitations, rate controlled,  No significant bleeding problems, continue anticoagulation       Diagnoses and all orders for this visit:    Acquired hypothyroidism    Essential hypertension    Mixed hyperlipidemia    Atrial fibrillation, persistent ([address] )        Subjective:      Patient ID: Naina Foreman is a 67 y o  male  Pt here for follow up chronic condtions      The following portions of the patient's history were reviewed and updated as appropriate: allergies, current medications, past family history, past medical history, past social history, past surgical history and problem list     Review of Systems   Constitutional: Negative for chills, fatigue and fever  HENT: Negative for congestion, nosebleeds, postnasal drip, sore throat and trouble swallowing  Eyes: Negative for pain  Respiratory: Negative for cough, chest tightness, shortness of breath and wheezing  Cardiovascular: Negative for chest pain, palpitations and leg swelling  Gastrointestinal: Negative for abdominal pain, constipation, diarrhea, nausea and vomiting  Endocrine: Negative for polydipsia and polyuria  Genitourinary: Negative for dysuria, flank pain and hematuria  Musculoskeletal: Negative for arthralgias  Skin: Negative for rash  Neurological: Negative for dizziness, tremors, light-headedness and headaches  Hematological: Does not bruise/bleed easily  Psychiatric/Behavioral: Negative for confusion and dysphoric mood  The patient is not nervous/anxious            Objective:      /64   Pulse (!) 52   Temp 98 1 °F (36 7 °C)   Resp 16    6' 1" (1 854 m)   Wt 106 kg (234 lb 9 6 oz)   SpO2 97%   BMI 30 95 kg/m²          Physical Exam  Vitals reviewed  Constitutional:       General: He is not in acute distress  Appearance: Normal appearance  He is well-developed  HENT:      Head: Normocephalic and atraumatic  Right Ear: External ear normal       Left Ear: External ear normal       Nose: Nose normal    Eyes:      General: No scleral icterus  Conjunctiva/sclera: Conjunctivae normal    Neck:      Thyroid: No thyromegaly  Trachea: No tracheal deviation  Cardiovascular:      Rate and Rhythm: Normal rate  Rhythm irregular  Heart sounds: Normal heart sounds  No murmur heard  Pulmonary:      Effort: Pulmonary effort is normal  No respiratory distress  Breath sounds: Normal breath sounds  No wheezing or rales  Abdominal:      General: Bowel sounds are normal       Palpations: Abdomen is soft  Tenderness: There is no abdominal tenderness  There is no guarding or rebound  Musculoskeletal:      Cervical back: Normal range of motion and neck supple  Right lower leg: No edema  Left lower leg: No edema  Lymphadenopathy:      Cervical: No cervical adenopathy  Skin:     Coloration: Skin is not jaundiced or pale  Neurological:      General: No focal deficit present  Mental Status: He is alert and oriented to person, place, and time  Psychiatric:         Mood and Affect: Mood normal          Behavior: Behavior normal          Thought Content:  Thought content normal          Judgment: Judgment normal

## 2022-06-24 NOTE — ASSESSMENT & PLAN NOTE
No fatigue, no constipation, no cold intolerance, last TSH ok, Continue levothyroxine 75 mcg daily along with monitoring

## 2022-07-22 ENCOUNTER — HOSPITAL ENCOUNTER (OUTPATIENT)
Dept: NON INVASIVE DIAGNOSTICS | Facility: CLINIC | Age: 72
Discharge: HOME | End: 2022-07-22
Payer: MEDICARE

## 2022-07-22 DIAGNOSIS — I71.40 AAA (ABDOMINAL AORTIC ANEURYSM): ICD-10-CM

## 2022-07-22 DIAGNOSIS — I72.3 ANEURYSM OF ILIAC ARTERY (HCC): ICD-10-CM

## 2022-07-22 PROCEDURE — 93978 VASCULAR STUDY: CPT

## 2022-07-22 PROCEDURE — 93978 VASCULAR STUDY: CPT | Performed by: SURGERY

## 2022-07-25 ENCOUNTER — TRANSCRIBE ORDERS (OUTPATIENT)
Dept: VASCULAR SURGERY | Facility: CLINIC | Age: 72
End: 2022-07-25

## 2022-07-25 DIAGNOSIS — I72.3 ILIAC ARTERY ANEURYSM, BILATERAL (HCC): Primary | ICD-10-CM

## 2022-10-05 ENCOUNTER — RA CDI HCC (OUTPATIENT)
Dept: OTHER | Facility: HOSPITAL | Age: 72
End: 2022-10-05

## 2022-10-05 NOTE — PROGRESS NOTES
[address]  coding opportunities       Chart reviewed, no opportunity found: CHART REVIEWED, NO OPPORTUNITY FOUND        Patients Insurance     Medicare Insurance: Medicare

## 2022-10-12 ENCOUNTER — OFFICE VISIT (OUTPATIENT)
Dept: INTERNAL MEDICINE CLINIC | Facility: CLINIC | Age: 72
End: 2022-10-12
Payer: MEDICARE

## 2022-10-12 VITALS
WEIGHT: 233 LBS | DIASTOLIC BLOOD PRESSURE: 88 MMHG | HEIGHT: 73 IN | OXYGEN SATURATION: 97 % | BODY MASS INDEX: 30.88 KG/M2 | HEART RATE: 62 BPM | SYSTOLIC BLOOD PRESSURE: 136 MMHG

## 2022-10-12 DIAGNOSIS — Z12.5 SCREENING FOR PROSTATE CANCER: ICD-10-CM

## 2022-10-12 DIAGNOSIS — E55.9 VITAMIN D DEFICIENCY: ICD-10-CM

## 2022-10-12 DIAGNOSIS — I48.19 ATRIAL FIBRILLATION, PERSISTENT (HCC): ICD-10-CM

## 2022-10-12 DIAGNOSIS — E03.9 ACQUIRED HYPOTHYROIDISM: Primary | ICD-10-CM

## 2022-10-12 DIAGNOSIS — Z23 NEEDS FLU SHOT: ICD-10-CM

## 2022-10-12 DIAGNOSIS — E78.2 MIXED HYPERLIPIDEMIA: ICD-10-CM

## 2022-10-12 DIAGNOSIS — I10 ESSENTIAL HYPERTENSION: ICD-10-CM

## 2022-10-12 DIAGNOSIS — I77.79 HEPATIC ARTERY DISSECTION (HCC): ICD-10-CM

## 2022-10-12 DIAGNOSIS — R73.03 PREDIABETES: ICD-10-CM

## 2022-10-12 PROBLEM — K81.9 CHOLECYSTITIS: Status: RESOLVED | Noted: 2019-03-05 | Resolved: 2022-10-12

## 2022-10-12 PROBLEM — T14.8XXA HEMATOMA: Status: RESOLVED | Noted: 2018-10-10 | Resolved: 2022-10-12

## 2022-10-12 PROBLEM — D62 ACUTE BLOOD LOSS ANEMIA: Status: RESOLVED | Noted: 2019-03-08 | Resolved: 2022-10-12

## 2022-10-12 PROBLEM — E11.9 TYPE 2 DIABETES MELLITUS WITHOUT COMPLICATION, WITHOUT LONG-TERM CURRENT USE OF INSULIN (HCC): Status: ACTIVE | Noted: 2022-10-12

## 2022-10-12 PROCEDURE — 90662 IIV NO PRSV INCREASED AG IM: CPT

## 2022-10-12 PROCEDURE — 99214 OFFICE O/P EST MOD 30 MIN: CPT | Performed by: INTERNAL MEDICINE

## 2022-10-12 PROCEDURE — G0008 ADMIN INFLUENZA VIRUS VAC: HCPCS

## 2022-10-12 NOTE — PROGRESS NOTES
Name: Justin Quarles      : 1950      MRN: [id number]  Encounter Provider: Durga Shannon MD  Encounter Date: 10/12/2022   [facility]    Assessment & Plan     1  Acquired hypothyroidism  Assessment & Plan:  Continue levothyroxine 75 mcg daily along with monitoring, no significant fatigue, constipation, or cold intolerance      2  Essential hypertension  Assessment & Plan:  Controlled, continue current meds along with healthy diet and exercise      3  Atrial fibrillation, persistent (HCC)  Assessment & Plan:  Continue anticoagulation, rate controlled      4  Mixed hyperlipidemia  Assessment & Plan:  Continue statin along with healthy diet and exercise    Orders:  -     CBC and differential; Future  -     Comprehensive metabolic panel; Future  -     Lipid Panel with Direct LDL reflex; Future  -     TSH, 3rd generation with Free T4 reflex; Future    5  Needs flu shot  -     influenza vaccine, high-dose, PF 0 7 mL (FLUZONE HIGH-DOSE)    6  Hepatic artery dissection (HCC)  Assessment & Plan:  Follow-up vascular      7  Prediabetes  Assessment & Plan:  Continue with healthy diet and exercise    Orders:  -     Hemoglobin A1C; Future    8  Vitamin D deficiency  -     Vitamin D 25 hydroxy; Future    9  Screening for prostate cancer  -     PSA, Total Screen; Future           Subjective     Pt here for regular follow up    Review of Systems   Constitutional: Negative for chills, fatigue and fever  HENT: Negative for congestion, nosebleeds, postnasal drip, sore throat and trouble swallowing  Eyes: Negative for pain  Respiratory: Negative for cough, chest tightness, shortness of breath and wheezing  Cardiovascular: Negative for chest pain, palpitations and leg swelling  Gastrointestinal: Negative for abdominal pain, constipation, diarrhea, nausea and vomiting  Endocrine: Negative for polydipsia and polyuria     Genitourinary: Negative for dysuria, flank pain and hematuria  Musculoskeletal: Negative for arthralgias  Skin: Negative for rash  Neurological: Negative for dizziness, tremors, light-headedness and headaches  Hematological: Does not bruise/bleed easily  Psychiatric/Behavioral: Negative for confusion and dysphoric mood  The patient is not nervous/anxious  Past Medical History:   Diagnosis Date   • Acute blood loss anemia 3/8/2019   • Aneurysm of thoracic aorta    • Arrhythmia    • Cholecystitis 3/5/2019    Added automatically from request for surgery [id number]   • Detached retina, right    • Disease of thyroid gland    • Gastric wall thickening    • Headache    • Hematoma 10/10/2018   • Hypertension    • Iliac artery aneurysm, bilateral (HCC)    • Irregular heart beat     afib cardioversion 1/30/17, x2    • Neuropathy     bilateral feet   • Paroxysmal A-fib [facility])    • Prostatic hypertrophy    • 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[facility])      Past Surgical History:   Procedure Laterality Date   • ABDOMINAL AORTIC ANEURYSM REPAIR, ENDOVASCULAR Right 4/13/2018    Procedure: REPAIR ANEURYSM ILIAC endovascular  with coil embolization right hypogastric artery ;  Surgeon: Margarito Shukla MD;  Location: [facility];  Service: Vascular   • CARDIOVERSION     • CATARACT EXTRACTION Left 10/20/2019    Right eye 11/17/2011   • CHOLECYSTECTOMY LAPAROSCOPIC N/A 3/8/2019    Procedure: [id number] CHOLECYSTECTOMY;  Surgeon: Marge Ascencio DO;  Location: [facility];  Service: General   • COLONOSCOPY  07/13/2016   • MOLE EXCISION  2021   • KS EDG US EXAM SURGICAL ALTER STOM DUODENUM/JEJUNUM N/A 11/10/2017    Procedure: RADIAL ENDOSCOPIC U/S;  Surgeon: Wagner Valdivia MD;  Location: [facility];   Service: Gastroenterology   • RETINAL DETACHMENT SURGERY Right     onset 1992, retinal detachment complete air fill confirmed     Family History   Problem Relation Age of Onset   • Stroke Mother         CVA   • Aortic aneurysm Father         abdominal   • Aortic aneurysm Brother    • Basal cell carcinoma Brother    • Cancer Maternal Grandmother         malignant neoplasm   • Cancer Maternal Grandfather         malignant neoplasm   • Cancer Paternal Grandmother         malignant neoplasm   • Cancer Paternal Grandfather         malignant neoplasm   • Cancer Family         malignant neoplasm   • Cancer Family         malignant neoplasm     Social History     Socioeconomic History   • Marital status: /Civil Union     Spouse name: None   • Number of children: None   • Years of education: None   • Highest education level: None   Occupational History   • None   Tobacco Use   • Smoking status: Never Smoker   • Smokeless tobacco: Never Used   Vaping Use   • Vaping Use: Never used   Substance and Sexual Activity   • Alcohol use: Yes     Comment: socially    • Drug use: No   • Sexual activity: Not Currently   Other Topics Concern   • None   Social History Narrative   • None     Social Determinants of Health     Financial Resource Strain: Not on file   Food Insecurity: Not on file   Transportation Needs: Not on file   Physical Activity: Not on file   Stress: Not on file   Social Connections: Not on file   Intimate Partner Violence: Not on file   Housing Stability: Not on file     Current Outpatient Medications on File Prior to Visit   Medication Sig   • acetaminophen (TYLENOL) 325 mg tablet Take 2 tablets (650 mg total) by mouth every 6 (six) hours as needed for mild pain or fever   • aspirin (ECOTRIN LOW STRENGTH) 81 mg EC tablet Take 81 mg by mouth daily   • atorvastatin (LIPITOR) 20 mg tablet TAKE 1 TABLET DAILY   • cholecalciferol (VITAMIN D3) 1,000 units tablet Take 1,000 Units by mouth daily   • Co-Enzyme Q-10 100 MG CAPS Take 100 mg by mouth daily   • Eliquis 5 MG TAKE 1 TABLET TWICE A DAY   • felodipine (PLENDIL) 5 mg 24 hr tablet TAKE 1 TABLET DAILY   • hydrocortisone 2 5 % cream Apply 1 application topically 2 (two) times a day To affected area as needed   • ketoconazole (NIZORAL) 2 % cream As needed   • levothyroxine 75 mcg tablet TAKE 1 TABLET DAILY FOR    ACQUIRED HYPOTHYROIDISM   • metoprolol succinate (TOPROL-XL) 25 mg 24 hr tablet Take 1 tablet (25 mg total) by mouth 2 (two) times a day   • Misc Natural Products (LUTEIN 20 PO) Take 20 mg by mouth daily  • multivitamin (THERAGRAN) TABS Take 1 tablet by mouth daily  • gabapentin (NEURONTIN) 100 mg capsule Take 1 capsule (100 mg total) by mouth 3 (three) times a day     Allergies   Allergen Reactions   • Wound Dressing Adhesive      Immunization History   Administered Date(s) Administered   • COVID-19 MODERNA VACC 0 5 ML IM 02/16/2021, 03/23/2021, 11/09/2021   • H1N1, All Formulations 01/09/2010   • INFLUENZA 11/29/2016, 10/23/2017, 10/16/2020   • Influenza Split High Dose Preservative Free IM 11/16/2010, 09/19/2012, 09/24/2014, 09/16/2015, 11/29/2016, 10/23/2017   • Influenza, high dose seasonal 0 7 mL 10/15/2019, 11/09/2021   • Pneumococcal Conjugate 13-Valent 11/29/2016   • Pneumococcal Polysaccharide PPV23 08/01/2018   • Tdap 11/29/2016   • Zoster Vaccine Recombinant 10/16/2020, 12/28/2020   • influenza, trivalent, adjuvanted 10/16/2020       Objective     /88 (BP Location: Left arm, Patient Position: Sitting, Cuff Size: Standard)   Pulse 62   Ht 6' 1" (1 854 m)   Wt 106 kg (233 lb)   SpO2 97%   BMI 30 74 kg/m²     Physical Exam  Vitals reviewed  Constitutional:       General: He is not in acute distress  Appearance: Normal appearance  He is well-developed  HENT:      Head: Normocephalic and atraumatic  Right Ear: External ear normal       Left Ear: External ear normal       Nose: Nose normal    Eyes:      General: No scleral icterus  Conjunctiva/sclera: Conjunctivae normal    Neck:      Thyroid: No thyromegaly  Trachea: No tracheal deviation  Cardiovascular:      Rate and Rhythm: Normal rate  Rhythm irregular  Heart sounds: Normal heart sounds  No murmur heard    Pulmonary:      Effort: Pulmonary effort is normal  No respiratory distress  Breath sounds: Normal breath sounds  No wheezing or rales  Abdominal:      General: Bowel sounds are normal       Palpations: Abdomen is soft  Tenderness: There is no abdominal tenderness  There is no guarding or rebound  Musculoskeletal:      Cervical back: Normal range of motion and neck supple  Right lower leg: No edema  Left lower leg: No edema  Lymphadenopathy:      Cervical: No cervical adenopathy  Skin:     Coloration: Skin is not jaundiced or pale  Neurological:      General: No focal deficit present  Mental Status: He is alert and oriented to person, place, and time  Psychiatric:         Mood and Affect: Mood normal          Behavior: Behavior normal          Thought Content:  Thought content normal          Judgment: Judgment normal        Lu Rangel MD

## 2022-10-12 NOTE — PATIENT INSTRUCTIONS
Problem List Items Addressed This Visit          Endocrine    Acquired hypothyroidism - Primary     Continue levothyroxine 75 mcg daily along with monitoring, no significant fatigue, constipation, or cold intolerance              Cardiovascular and Mediastinum    Essential hypertension     Controlled, continue current meds along with healthy diet and exercise           Atrial fibrillation, persistent (HCC)     Continue anticoagulation, rate controlled           Hepatic artery dissection (HCC)     Follow-up vascular              Other    Mixed hyperlipidemia     Continue statin along with healthy diet and exercise           Prediabetes     Continue with healthy diet and exercise                 Other Visit Diagnoses       Needs flu shot        Relevant Orders    influenza vaccine, high-dose, PF 0 7 mL (FLUZONE HIGH-DOSE)

## 2022-10-12 NOTE — ASSESSMENT & PLAN NOTE
Continue levothyroxine 75 mcg daily along with monitoring, no significant fatigue, constipation, or cold intolerance

## 2022-10-19 DIAGNOSIS — I10 ESSENTIAL HYPERTENSION: ICD-10-CM

## 2022-10-19 RX ORDER — FELODIPINE 5 MG/1
TABLET, EXTENDED RELEASE ORAL
Qty: 90 TABLET | Refills: 3 | Status: SHIPPED | OUTPATIENT
Start: 2022-10-19

## 2022-11-08 DIAGNOSIS — I10 HYPERTENSION, UNSPECIFIED TYPE: ICD-10-CM

## 2022-11-08 DIAGNOSIS — G89.29 CHRONIC NONINTRACTABLE HEADACHE, UNSPECIFIED HEADACHE TYPE: ICD-10-CM

## 2022-11-08 DIAGNOSIS — E03.9 ACQUIRED HYPOTHYROIDISM: ICD-10-CM

## 2022-11-08 DIAGNOSIS — R51.9 CHRONIC NONINTRACTABLE HEADACHE, UNSPECIFIED HEADACHE TYPE: ICD-10-CM

## 2022-11-08 DIAGNOSIS — I48.19 ATRIAL FIBRILLATION, PERSISTENT (HCC): ICD-10-CM

## 2022-11-08 RX ORDER — LEVOTHYROXINE SODIUM 0.07 MG/1
TABLET ORAL
Qty: 90 TABLET | Refills: 3 | Status: SHIPPED | OUTPATIENT
Start: 2022-11-08

## 2022-11-08 RX ORDER — GABAPENTIN 100 MG/1
CAPSULE ORAL
Qty: 270 CAPSULE | Refills: 1 | Status: SHIPPED | OUTPATIENT
Start: 2022-11-08

## 2022-11-08 RX ORDER — METOPROLOL SUCCINATE 25 MG/1
TABLET, EXTENDED RELEASE ORAL
Qty: 180 TABLET | Refills: 3 | Status: SHIPPED | OUTPATIENT
Start: 2022-11-08

## 2022-11-08 RX ORDER — ATORVASTATIN CALCIUM 20 MG/1
TABLET, FILM COATED ORAL
Qty: 90 TABLET | Refills: 3 | Status: SHIPPED | OUTPATIENT
Start: 2022-11-08

## 2022-11-26 ENCOUNTER — NURSE TRIAGE (OUTPATIENT)
Dept: OTHER | Facility: OTHER | Age: 72
End: 2022-11-26

## 2022-11-26 ENCOUNTER — DOCUMENTATION (OUTPATIENT)
Dept: FAMILY MEDICINE CLINIC | Facility: CLINIC | Age: 72
End: 2022-11-26

## 2022-11-26 DIAGNOSIS — U07.1 COVID-19: Primary | ICD-10-CM

## 2022-11-26 RX ORDER — NIRMATRELVIR AND RITONAVIR 300-100 MG
3 KIT ORAL 2 TIMES DAILY
Qty: 30 TABLET | Refills: 0 | Status: SHIPPED | OUTPATIENT
Start: 2022-11-26 | End: 2022-12-01

## 2022-11-26 RX ORDER — BENZONATATE 200 MG/1
200 CAPSULE ORAL 3 TIMES DAILY PRN
Qty: 20 CAPSULE | Refills: 0 | Status: SHIPPED | OUTPATIENT
Start: 2022-11-26 | End: 2022-12-03

## 2022-11-27 NOTE — TELEPHONE ENCOUNTER
COVID Positive 11/26 Symptom’s onset 11/25: Cough, fever 100 0  Denies Chest pain, SOB, or breathing difficulty  Seeking PAXLOVID order to be sent to Pharmacy  GFR 75 on 11/29/2021   On call provider contacted and informed of patient’s concerns and status  Provider spoke to patient

## 2022-11-27 NOTE — TELEPHONE ENCOUNTER
Reason for Disposition  • HIGH RISK for severe COVID complications (e g , weak immune system, age > 59 years, obesity with BMI > 25, pregnant, chronic lung disease or other chronic medical condition)  (Exception: Already seen by PCP and no new or worsening symptoms )    Answer Assessment - Initial Assessment Questions  Were you within 6 feet or less, for up to 15 minutes or more with a person that has a confirmed COVID-19 test?                   What was the date of your exposure?                    Are you experiencing any symptoms attributed to the virus?  (Assess for SOB, cough, fever, difficulty breathing)        COVID Positive 11/26  Symptom's onset 11/25: Cough, fever 100 0           HIGH RISK: Do you have any history heart or lung conditions, weakened immune system, diabetes, Asthma, CHF, HIV, COPD, Chemo, renal failure, sickle cell, etc?          Cardiac issues    Protocols used: CORONAVIRUS (COVID-19) DIAGNOSED OR SUSPECTED-ADULT-

## 2022-11-27 NOTE — PROGRESS NOTES
Covid symptoms since last night with mild cold symptoms and dry cough due to throat tickle  No headache or loss of appetite  No diarrhea or vomiting  Received [id number] booster shot 7 days ago  Patient would like to go on paxlovid  Possible benefits vs side effects were discussed with the patient   Advised to hold atorvastatin for 5 days and reduce eliquis to 1/2 pill bid for 5 days

## 2022-11-27 NOTE — TELEPHONE ENCOUNTER
Regarding: COVID medical advice - cough, fever, sore throat  ----- Message from Carmen Fernandez sent at 11/26/2022  4:14 PM EST -----  "he just tested positive for COVID with an at home test  He has symptoms of cough, fever 100 (oral), he had little sore throat last night   He is high risk  "

## 2022-12-05 ENCOUNTER — RA CDI HCC (OUTPATIENT)
Dept: OTHER | Facility: HOSPITAL | Age: 72
End: 2022-12-05

## 2022-12-08 ENCOUNTER — LAB (OUTPATIENT)
Dept: LAB | Facility: CLINIC | Age: 72
End: 2022-12-08

## 2022-12-08 DIAGNOSIS — Z12.5 SCREENING FOR PROSTATE CANCER: ICD-10-CM

## 2022-12-08 DIAGNOSIS — E78.2 MIXED HYPERLIPIDEMIA: ICD-10-CM

## 2022-12-08 DIAGNOSIS — R73.03 PREDIABETES: ICD-10-CM

## 2022-12-08 DIAGNOSIS — E55.9 VITAMIN D DEFICIENCY: ICD-10-CM

## 2022-12-08 LAB
25(OH)D3 SERPL-MCNC: 33.2 NG/ML (ref 30–100)
ALBUMIN SERPL BCP-MCNC: 3.8 G/DL (ref 3.5–5)
ALP SERPL-CCNC: 94 U/L (ref 46–116)
ALT SERPL W P-5'-P-CCNC: 33 U/L (ref 12–78)
ANION GAP SERPL CALCULATED.3IONS-SCNC: 3 MMOL/L (ref 4–13)
AST SERPL W P-5'-P-CCNC: 15 U/L (ref 5–45)
BASOPHILS # BLD AUTO: 0.06 THOUSANDS/ÂΜL (ref 0–0.1)
BASOPHILS NFR BLD AUTO: 1 % (ref 0–1)
BILIRUB SERPL-MCNC: 0.75 MG/DL (ref 0.2–1)
BUN SERPL-MCNC: 23 MG/DL (ref 5–25)
CALCIUM SERPL-MCNC: 9 MG/DL (ref 8.3–10.1)
CHLORIDE SERPL-SCNC: 109 MMOL/L (ref 96–108)
CHOLEST SERPL-MCNC: 122 MG/DL
CO2 SERPL-SCNC: 27 MMOL/L (ref 21–32)
CREAT SERPL-MCNC: 0.88 MG/DL (ref 0.6–1.3)
EOSINOPHIL # BLD AUTO: 0.16 THOUSAND/ÂΜL (ref 0–0.61)
EOSINOPHIL NFR BLD AUTO: 3 % (ref 0–6)
ERYTHROCYTE [DISTWIDTH] IN BLOOD BY AUTOMATED COUNT: 12.2 % (ref 11.6–15.1)
EST. AVERAGE GLUCOSE BLD GHB EST-MCNC: 117 MG/DL
GFR SERPL CREATININE-BSD FRML MDRD: 85 ML/MIN/1.73SQ M
GLUCOSE P FAST SERPL-MCNC: 98 MG/DL (ref 65–99)
HBA1C MFR BLD: 5.7 %
HCT VFR BLD AUTO: 49.2 % (ref 36.5–49.3)
HDLC SERPL-MCNC: 57 MG/DL
HGB BLD-MCNC: 16.2 G/DL (ref 12–17)
IMM GRANULOCYTES # BLD AUTO: 0.02 THOUSAND/UL (ref 0–0.2)
IMM GRANULOCYTES NFR BLD AUTO: 0 % (ref 0–2)
LDLC SERPL CALC-MCNC: 41 MG/DL (ref 0–100)
LYMPHOCYTES # BLD AUTO: 1.12 THOUSANDS/ÂΜL (ref 0.6–4.47)
LYMPHOCYTES NFR BLD AUTO: 19 % (ref 14–44)
MCH RBC QN AUTO: 30.1 PG (ref 26.8–34.3)
MCHC RBC AUTO-ENTMCNC: 32.9 G/DL (ref 31.4–37.4)
MCV RBC AUTO: 91 FL (ref 82–98)
MONOCYTES # BLD AUTO: 0.6 THOUSAND/ÂΜL (ref 0.17–1.22)
MONOCYTES NFR BLD AUTO: 10 % (ref 4–12)
NEUTROPHILS # BLD AUTO: 4.03 THOUSANDS/ÂΜL (ref 1.85–7.62)
NEUTS SEG NFR BLD AUTO: 67 % (ref 43–75)
NRBC BLD AUTO-RTO: 0 /100 WBCS
PLATELET # BLD AUTO: 276 THOUSANDS/UL (ref 149–390)
PMV BLD AUTO: 10.1 FL (ref 8.9–12.7)
POTASSIUM SERPL-SCNC: 4.7 MMOL/L (ref 3.5–5.3)
PROT SERPL-MCNC: 6.7 G/DL (ref 6.4–8.4)
PSA SERPL-MCNC: 1.9 NG/ML (ref 0–4)
RBC # BLD AUTO: 5.38 MILLION/UL (ref 3.88–5.62)
SODIUM SERPL-SCNC: 139 MMOL/L (ref 135–147)
TRIGL SERPL-MCNC: 122 MG/DL
TSH SERPL DL<=0.05 MIU/L-ACNC: 3.05 UIU/ML (ref 0.45–4.5)
WBC # BLD AUTO: 5.99 THOUSAND/UL (ref 4.31–10.16)

## 2022-12-12 ENCOUNTER — OFFICE VISIT (OUTPATIENT)
Dept: INTERNAL MEDICINE CLINIC | Facility: CLINIC | Age: 72
End: 2022-12-12

## 2022-12-12 VITALS
DIASTOLIC BLOOD PRESSURE: 76 MMHG | HEART RATE: 44 BPM | HEIGHT: 73 IN | SYSTOLIC BLOOD PRESSURE: 110 MMHG | BODY MASS INDEX: 30.35 KG/M2 | WEIGHT: 229 LBS | OXYGEN SATURATION: 98 %

## 2022-12-12 DIAGNOSIS — Z00.00 ENCOUNTER FOR MEDICARE ANNUAL WELLNESS EXAM: Primary | ICD-10-CM

## 2022-12-12 NOTE — PROGRESS NOTES
Assessment and Plan:     Problem List Items Addressed This Visit        Other    Encounter for Medicare annual wellness exam - Primary     Discussed preventative health, cancer screening, immunizations, and safety issues  Pt up to date with colorectal cancer screening done 8/9/21 with recommendations to recheck in 5 years            Depression Screening and Follow-up Plan: Patient was screened for depression during today's encounter  They screened negative with a PHQ-2 score of 0  Falls Plan of Care: balance, strength, and gait training instructions were provided  Preventive health issues were discussed with patient, and age appropriate screening tests were ordered as noted in patient's After Visit Summary  Personalized health advice and appropriate referrals for health education or preventive services given if needed, as noted in patient's After Visit Summary       History of Present Illness:     Patient presents for a Medicare Wellness Visit    HPI   Patient Care Team:  Phyllis Vincent MD as PCP - MD Pavan Maldonado DO Cordie Mares, MD Arlyne Leff, MD Glennie Moots, MD     Review of Systems:     Review of Systems     Problem List:     Patient Active Problem List   Diagnosis   • Aneurysm of thoracic aorta   • Essential hypertension   • Atrial fibrillation, persistent [facility])   • Iliac artery aneurysm, bilateral [facility])   • Acquired hypothyroidism   • Neuropathy   • Iliac artery aneurysm, right ([facility] [address] )   • Encounter for Medicare annual wellness exam   • Other headache syndrome   • Rash   • Mixed hyperlipidemia   • Chronic bilateral low back pain without sciatica   • NSVT (nonsustained ventricular tachycardia)   • Hepatic artery dissection (HCC)   • Vitamin D deficiency   • Prediabetes      Past Medical and Surgical History:     Past Medical History:   Diagnosis Date   • Acute blood loss anemia 3/8/2019   • Aneurysm of thoracic aorta    • Arrhythmia • Cholecystitis 3/5/2019    Added automatically from request for surgery [id number]   • Detached retina, right    • Disease of thyroid gland    • Gastric wall thickening    • Headache    • Hematoma 10/10/2018   • Hypertension    • Iliac artery aneurysm, bilateral (HCC)    • Irregular heart beat     afib cardioversion 1/30/17, x2    • Neuropathy     bilateral feet   • Paroxysmal A-fib [facility])    • Prostatic hypertrophy    • 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[facility])      Past Surgical History:   Procedure Laterality Date   • ABDOMINAL AORTIC ANEURYSM REPAIR, ENDOVASCULAR Right 4/13/2018    Procedure: REPAIR ANEURYSM ILIAC endovascular  with coil embolization right hypogastric artery ;  Surgeon: Margarito Shukla MD;  [facility];  Service: Vascular   • CARDIOVERSION     • CATARACT EXTRACTION Left 10/20/2019    Right eye 11/17/2011   • CHOLECYSTECTOMY LAPAROSCOPIC N/A 3/8/2019    Procedure: [id number] CHOLECYSTECTOMY;  Surgeon: Marge Ascencio DO;  Location[facility];  Service: General   • COLONOSCOPY  07/13/2016   • MOLE EXCISION  2021   • WI EDG US EXAM SURGICAL ALTER STOM DUODENUM/JEJUNUM N/A 11/10/2017    Procedure: RADIAL ENDOSCOPIC U/S;  Surgeon: Wagner Valdivia MD;  Location: [facility];   Service: Gastroenterology   • RETINAL DETACHMENT SURGERY Right     onset 1992, retinal detachment complete air fill confirmed      Family History:     Family History   Problem Relation Age of Onset   • Stroke Mother         CVA   • Aortic aneurysm Father         abdominal   • Aortic aneurysm Brother    • Basal cell carcinoma Brother    • Cancer Maternal Grandmother         malignant neoplasm   • Cancer Maternal Grandfather         malignant neoplasm   • Cancer Paternal Grandmother         malignant neoplasm   • Cancer Paternal Grandfather         malignant neoplasm   • Cancer Family         malignant neoplasm   • Cancer Family         malignant neoplasm      Social History:     Social History     Socioeconomic History • Marital status: /Civil Union     Spouse name: None   • Number of children: None   • Years of education: None   • Highest education level: None   Occupational History   • None   Tobacco Use   • Smoking status: Never   • Smokeless tobacco: Never   Vaping Use   • Vaping Use: Never used   Substance and Sexual Activity   • Alcohol use: Yes     Comment: socially    • Drug use: No   • Sexual activity: Not Currently   Other Topics Concern   • None   Social History Narrative   • None     Social Determinants of Health     Financial Resource Strain: Low Risk    • Difficulty of Paying Living Expenses: Not hard at all   Food Insecurity: Not on file   Transportation Needs: No Transportation Needs   • Lack of Transportation (Medical): No   • Lack of Transportation (Non-Medical):  No   Physical Activity: Not on file   Stress: Not on file   Social Connections: Not on file   Intimate Partner Violence: Not on file   Housing Stability: Not on file      Medications and Allergies:     Current Outpatient Medications   Medication Sig Dispense Refill   • acetaminophen (TYLENOL) 325 mg tablet Take 2 tablets (650 mg total) by mouth every 6 (six) hours as needed for mild pain or fever 30 tablet 0   • aspirin (ECOTRIN LOW STRENGTH) 81 mg EC tablet Take 81 mg by mouth daily     • atorvastatin (LIPITOR) 20 mg tablet TAKE 1 TABLET DAILY 90 tablet 3   • cholecalciferol (VITAMIN D3) 1,000 units tablet Take 1,000 Units by mouth daily     • Co-Enzyme Q-10 100 MG CAPS Take 100 mg by mouth daily     • Eliquis 5 MG TAKE 1 TABLET TWICE A  tablet 3   • felodipine (PLENDIL) 5 mg 24 hr tablet TAKE 1 TABLET DAILY 90 tablet 3   • gabapentin (NEURONTIN) 100 mg capsule TAKE 1 CAPSULE 3 TIMES A    capsule 1   • hydrocortisone 2 5 % cream Apply 1 application topically 2 (two) times a day To affected area as needed     • ketoconazole (NIZORAL) 2 % cream As needed     • levothyroxine 75 mcg tablet TAKE 1 TABLET DAILY FOR    ACQUIRED HYPOTHYROIDISM 90 tablet 3   • metoprolol succinate (TOPROL-XL) 25 mg 24 hr tablet TAKE 1 TABLET TWICE A  tablet 3   • Misc Natural Products (LUTEIN 20 PO) Take 20 mg by mouth daily  • multivitamin (THERAGRAN) TABS Take 1 tablet by mouth daily  No current facility-administered medications for this visit  Allergies   Allergen Reactions   • Wound Dressing Adhesive       Immunizations:     Immunization History   Administered Date(s) Administered   • COVID-19 MODERNA VACC 0 5 ML IM 02/16/2021, 03/23/2021, 11/09/2021   • H1N1, All Formulations 01/09/2010   • INFLUENZA 11/29/2016, 10/23/2017, 10/16/2020   • Influenza Split High Dose Preservative Free IM 11/16/2010, 09/19/2012, 09/24/2014, 09/16/2015, 11/29/2016, 10/23/2017   • Influenza, high dose seasonal 0 7 mL 10/15/2019, 11/09/2021, 10/12/2022   • Pneumococcal Conjugate 13-Valent 11/29/2016   • Pneumococcal Polysaccharide PPV23 08/01/2018   • Tdap 11/29/2016   • Zoster Vaccine Recombinant 10/16/2020, 12/28/2020   • influenza, trivalent, adjuvanted 10/16/2020      Health Maintenance:         Topic Date Due   • Colorectal Cancer Screening  08/09/2026   • Hepatitis C Screening  Completed         Topic Date Due   • Hepatitis B Vaccine (1 of 3 - 3-dose series) Never done   • COVID-19 Vaccine (4 - Booster for Bernadine Gonzalez series) 03/09/2022      Medicare Screening Tests and Risk Assessments:     Neri Salmeron is here for his Subsequent Wellness visit  Health Risk Assessment:   Patient rates overall health as good  Patient feels that their physical health rating is same  Patient is satisfied with their life  Eyesight was rated as same  Hearing was rated as same  Patient feels that their emotional and mental health rating is same  Patients states they are never, rarely angry  Patient states they are never, rarely unusually tired/fatigued  Pain experienced in the last 7 days has been some  Patient's pain rating has been 2/10   Patient states that he has experienced no weight loss or gain in last 6 months  Depression Screening:   PHQ-2 Score: 0      Fall Risk Screening: In the past year, patient has experienced: history of falling in past year    Number of falls: 1  Injured during fall?: No    Feels unsteady when standing or walking?: Yes    Worried about falling?: No      Home Safety:  Patient does not have trouble with stairs inside or outside of their home  Patient has working smoke alarms and has working carbon monoxide detector  Home safety hazards include: household clutter  Nutrition:   Current diet is Regular  Medications:   Patient is currently taking over-the-counter supplements  OTC medications include: see medication list  Patient is able to manage medications  Activities of Daily Living (ADLs)/Instrumental Activities of Daily Living (IADLs):   Walk and transfer into and out of bed and chair?: Yes  Dress and groom yourself?: Yes    Bathe or shower yourself?: Yes    Feed yourself? Yes  Do your laundry/housekeeping?: Yes  Manage your money, pay your bills and track your expenses?: Yes  Make your own meals?: Yes    Do your own shopping?: Yes    Previous Hospitalizations:   Any hospitalizations or ED visits within the last 12 months?: No      Advance Care Planning:   Living will: Yes    Durable POA for healthcare:  Yes    Advanced directive: Yes      Cognitive Screening:   Provider or family/friend/caregiver concerned regarding cognition?: No    PREVENTIVE SCREENINGS      Cardiovascular Screening:    General: Screening Not Indicated, History Lipid Disorder, Risks and Benefits Discussed and Screening Current      Diabetes Screening:     General: Screening Current and Risks and Benefits Discussed      Colorectal Cancer Screening:     General: Screening Current      Prostate Cancer Screening:    General: Screening Current and Risks and Benefits Discussed      Osteoporosis Screening:    General: Screening Not Indicated      Abdominal Aortic Aneurysm (AAA) Screening:    Risk factors include: age between 73-67 yo        General: Screening Not Indicated and History AAA      Lung Cancer Screening:     General: Screening Not Indicated      Hepatitis C Screening:    General: Screening Current    Screening, Brief Intervention, and Referral to Treatment (SBIRT)    Screening  Typical number of drinks in a day: 0  Typical number of drinks in a week: 5  Interpretation: Low risk drinking behavior  Single Item Drug Screening:  How often have you used an illegal drug (including marijuana) or a prescription medication for non-medical reasons in the past year? never    Single Item Drug Screen Score: 0  Interpretation: Negative screen for possible drug use disorder    Brief Intervention  Alcohol & drug use screenings were reviewed  No concerns regarding substance use disorder identified  Other Counseling Topics:   Car/seat belt/driving safety, skin self-exam and sunscreen  No results found  Physical Exam:     /76   Pulse (!) 44   Ht 6' 1" (1 854 m)   Wt 104 kg (229 lb)   SpO2 98%   BMI 30 21 kg/m²     Physical Exam  Constitutional:       Appearance: Normal appearance  Pulmonary:      Effort: Pulmonary effort is normal  No respiratory distress  Breath sounds: Normal breath sounds  Neurological:      Mental Status: He is alert            Jayshree Live MD

## 2022-12-12 NOTE — PATIENT INSTRUCTIONS
Problem List Items Addressed This Visit          Other    Encounter for Medicare annual wellness exam - Primary     Discussed preventative health, cancer screening, immunizations, and safety issues  Pt up to date with colorectal cancer screening done 8/9/21 with recommendations to recheck in 5 years            Medicare Preventive Visit Patient Instructions  Thank you for completing your Welcome to Medicare Visit or Medicare Annual Wellness Visit today  Your next wellness visit will be due in one year (12/13/2023)  The screening/preventive services that you may require over the next 5-10 years are detailed below  Some tests may not apply to you based off risk factors and/or age  Screening tests ordered at today's visit but not completed yet may show as past due  Also, please note that scanned in results may not display below  Preventive Screenings:  Service Recommendations Previous Testing/Comments   Colorectal Cancer Screening  Colonoscopy    Fecal Occult Blood Test (FOBT)/Fecal Immunochemical Test (FIT)  Fecal DNA/Cologuard Test  Flexible Sigmoidoscopy Age: 39-70 years old   Colonoscopy: every 10 years (May be performed more frequently if at higher risk)  OR  FOBT/FIT: every 1 year  OR  Cologuard: every 3 years  OR  Sigmoidoscopy: every 5 years  Screening may be recommended earlier than age 39 if at higher risk for colorectal cancer  Also, an individualized decision between you and your healthcare provider will decide whether screening between the ages of 74-80 would be appropriate   Colonoscopy: 08/09/2021  FOBT/FIT: Not on file  Cologuard: Not on file  Sigmoidoscopy: Not on file          Prostate Cancer Screening Individualized decision between patient and health care provider in men between ages of 53-78   Medicare will cover every 12 months beginning on the day after your 50th birthday PSA: 1 9 ng/mL           Hepatitis C Screening Once for adults born between 1945 and 1965  More frequently in patients at high risk for Hepatitis C Hep C Antibody: 11/30/2017        Diabetes Screening 1-2 times per year if you're at risk for diabetes or have pre-diabetes Fasting glucose: 98 mg/dL (12/8/2022)  A1C: 5 7 % (12/8/2022)      Cholesterol Screening Once every 5 years if you don't have a lipid disorder  May order more often based on risk factors  Lipid panel: 12/08/2022         Other Preventive Screenings Covered by Medicare:  Abdominal Aortic Aneurysm (AAA) Screening: covered once if your at risk  You're considered to be at risk if you have a family history of AAA or a male between the age of 73-68 who smoking at least 100 cigarettes in your lifetime  Lung Cancer Screening: covers low dose CT scan once per year if you meet all of the following conditions: (1) Age 50-69; (2) No signs or symptoms of lung cancer; (3) Current smoker or have quit smoking within the last 15 years; (4) You have a tobacco smoking history of at least 20 pack years (packs per day x number of years you smoked); (5) You get a written order from a healthcare provider  Glaucoma Screening: covered annually if you're considered high risk: (1) You have diabetes OR (2) Family history of glaucoma OR (3)  aged 48 and older OR (3)  American aged 72 and older  Osteoporosis Screening: covered every 2 years if you meet one of the following conditions: (1) Have a vertebral abnormality; (2) On glucocorticoid therapy for more than 3 months; (3) Have primary hyperparathyroidism; (4) On osteoporosis medications and need to assess response to drug therapy  HIV Screening: covered annually if you're between the age of 12-76  Also covered annually if you are younger than 13 and older than 72 with risk factors for HIV infection  For pregnant patients, it is covered up to 3 times per pregnancy      Immunizations:  Immunization Recommendations   Influenza Vaccine Annual influenza vaccination during flu season is recommended for all persons aged >= 6 months who do not have contraindications   Pneumococcal Vaccine   * Pneumococcal conjugate vaccine = PCV13 (Prevnar 13), PCV15 (Vaxneuvance), PCV20 (Prevnar 20)  * Pneumococcal polysaccharide vaccine = PPSV23 (Pneumovax) Adults 2364 years old: 1-3 doses may be recommended based on certain risk factors  Adults 72 years old: 1-2 doses may be recommended based off what pneumonia vaccine you previously received   Hepatitis B Vaccine 3 dose series if at intermediate or high risk (ex: diabetes, end stage renal disease, liver disease)   Tetanus (Td) Vaccine - COST NOT COVERED BY MEDICARE PART B Following completion of primary series, a booster dose should be given every 10 years to maintain immunity against tetanus  Td may also be given as tetanus wound prophylaxis  Tdap Vaccine - COST NOT COVERED BY MEDICARE PART B Recommended at least once for all adults  For pregnant patients, recommended with each pregnancy  Shingles Vaccine (Shingrix) - COST NOT COVERED BY MEDICARE PART B  2 shot series recommended in those aged 48 and above     Health Maintenance Due:      Topic Date Due    Colorectal Cancer Screening  08/09/2026    Hepatitis C Screening  Completed     Immunizations Due:      Topic Date Due    Hepatitis B Vaccine (1 of 3 - 3-dose series) Never done    COVID-19 Vaccine (4 - Booster for Moderna series) 03/09/2022     Advance Directives   What are advance directives? Advance directives are legal documents that state your wishes and plans for medical care  These plans are made ahead of time in case you lose your ability to make decisions for yourself  Advance directives can apply to any medical decision, such as the treatments you want, and if you want to donate organs  What are the types of advance directives? There are many types of advance directives, and each state has rules about how to use them  You may choose a combination of any of the following:  Living will:   This is a written record of the treatment you want  You can also choose which treatments you do not want, which to limit, and which to stop at a certain time  This includes surgery, medicine, IV fluid, and tube feedings  Durable power of  for healthcare [place] [facility]): This is a written record that states who you want to make healthcare choices for you when you are unable to make them for yourself  This person, called a proxy, is usually a family member or a friend  You may choose more than 1 proxy  Do not resuscitate (DNR) order:  A DNR order is used in case your heart stops beating or you stop breathing  It is a request not to have certain forms of treatment, such as CPR  A DNR order may be included in other types of advance directives  Medical directive: This covers the care that you want if you are in a coma, near death, or unable to make decisions for yourself  You can list the treatments you want for each condition  Treatment may include pain medicine, surgery, blood transfusions, dialysis, IV or tube feedings, and a ventilator (breathing machine)  Values history: This document has questions about your views, beliefs, and how you feel and think about life  This information can help others choose the care that you would choose  Why are advance directives important? An advance directive helps you control your care  Although spoken wishes may be used, it is better to have your wishes written down  Spoken wishes can be misunderstood, or not followed  Treatments may be given even if you do not want them  An advance directive may make it easier for your family to make difficult choices about your care  Weight Management   Why it is important to manage your weight:  Being overweight increases your risk of health conditions such as heart disease, high blood pressure, type 2 diabetes, and certain types of cancer  It can also increase your risk for osteoarthritis, sleep apnea, and other respiratory problems  Aim for a slow, steady weight loss   Even a small amount of weight loss can lower your risk of health problems  How to lose weight safely:  A safe and healthy way to lose weight is to eat fewer calories and get regular exercise  You can lose up about 1 pound a week by decreasing the number of calories you eat by 500 calories each day  Healthy meal plan for weight management:  A healthy meal plan includes a variety of foods, contains fewer calories, and helps you stay healthy  A healthy meal plan includes the following:  Eat whole-grain foods more often  A healthy meal plan should contain fiber  Fiber is the part of grains, fruits, and vegetables that is not broken down by your body  Whole-grain foods are healthy and provide extra fiber in your diet  Some examples of whole-grain foods are whole-wheat breads and pastas, oatmeal, brown rice, and bulgur  Eat a variety of vegetables every day  Include dark, leafy greens such as spinach, kale, danielle greens, and mustard greens  Eat yellow and orange vegetables such as carrots, sweet potatoes, and winter squash  Eat a variety of fruits every day  Choose fresh or canned fruit (canned in its own juice or light syrup) instead of juice  Fruit juice has very little or no fiber  Eat low-fat dairy foods  Drink fat-free (skim) milk or 1% milk  Eat fat-free yogurt and low-fat cottage cheese  Try low-fat cheeses such as mozzarella and other reduced-fat cheeses  Choose meat and other protein foods that are low in fat  Choose beans or other legumes such as split peas or lentils  Choose fish, skinless poultry (chicken or turkey), or lean cuts of red meat (beef or pork)  Before you cook meat or poultry, cut off any visible fat  Use less fat and oil  Try baking foods instead of frying them  Add less fat, such as margarine, sour cream, regular salad dressing and mayonnaise to foods  Eat fewer high-fat foods  Some examples of high-fat foods include french fries, doughnuts, ice cream, and cakes  Eat fewer sweets  Limit foods and drinks that are high in sugar  This includes candy, cookies, regular soda, and sweetened drinks  Exercise:  Exercise at least 30 minutes per day on most days of the week  Some examples of exercise include walking, biking, dancing, and swimming  You can also fit in more physical activity by taking the stairs instead of the elevator or parking farther away from stores  Ask your healthcare provider about the best exercise plan for you  © Copyright [employer] 2018 Information is for End User's use only and may not be sold, redistributed or otherwise used for commercial purposes   All illustrations and images included in CareNotes® are the copyrighted property of [employer]  or [address] [place] [address]

## 2022-12-12 NOTE — ASSESSMENT & PLAN NOTE
Discussed preventative health, cancer screening, immunizations, and safety issues    Pt up to date with colorectal cancer screening done 8/9/21 with recommendations to recheck in 5 years

## 2022-12-26 DIAGNOSIS — I48.0 PAROXYSMAL ATRIAL FIBRILLATION (HCC): ICD-10-CM

## 2022-12-27 RX ORDER — APIXABAN 5 MG/1
TABLET, FILM COATED ORAL
Qty: 180 TABLET | Refills: 3 | Status: SHIPPED | OUTPATIENT
Start: 2022-12-27

## 2023-03-13 ENCOUNTER — RA CDI HCC (OUTPATIENT)
Dept: OTHER | Facility: HOSPITAL | Age: 73
End: 2023-03-13

## 2023-03-13 NOTE — PROGRESS NOTES
[facility] [[email or web address]] 75  coding opportunities          Chart Reviewed number of suggestions sent to Provider: 2   I71 2  I71 3    Patients Insurance     Medicare Insurance: Medicare

## 2023-03-16 ENCOUNTER — TELEPHONE (OUTPATIENT)
Dept: NEUROLOGY | Facility: CLINIC | Age: 73
End: 2023-03-16

## 2023-03-16 NOTE — TELEPHONE ENCOUNTER
Call complete to pt   Currently unable to schedule 1 year follow up   Stated he would call us back to schedule 1 year follow up with Klever Miller   Please reschedule with soonest available on Irais's schedule

## 2023-03-20 ENCOUNTER — OFFICE VISIT (OUTPATIENT)
Dept: INTERNAL MEDICINE CLINIC | Facility: CLINIC | Age: 73
End: 2023-03-20

## 2023-03-20 VITALS
OXYGEN SATURATION: 97 % | HEART RATE: 51 BPM | BODY MASS INDEX: 29.76 KG/M2 | SYSTOLIC BLOOD PRESSURE: 122 MMHG | WEIGHT: 225.6 LBS | DIASTOLIC BLOOD PRESSURE: 72 MMHG

## 2023-03-20 DIAGNOSIS — I48.19 ATRIAL FIBRILLATION, PERSISTENT (HCC): Primary | ICD-10-CM

## 2023-03-20 DIAGNOSIS — E78.2 MIXED HYPERLIPIDEMIA: ICD-10-CM

## 2023-03-20 DIAGNOSIS — R73.03 PREDIABETES: ICD-10-CM

## 2023-03-20 DIAGNOSIS — I10 ESSENTIAL HYPERTENSION: ICD-10-CM

## 2023-03-20 DIAGNOSIS — E03.9 ACQUIRED HYPOTHYROIDISM: ICD-10-CM

## 2023-03-20 NOTE — PATIENT INSTRUCTIONS
Problem List Items Addressed This Visit          Endocrine    Acquired hypothyroidism     Continue levothyroxine along with monitoring, last thyroid function tests were normal in December            Cardiovascular and Mediastinum    Essential hypertension     Well-controlled, continue meds along with healthy diet and exercise         Atrial fibrillation, persistent ([address] ) - Primary     Patient does have some heart palpitations, chronic, no bleeding problems, continue anticoagulation            Other    Mixed hyperlipidemia     Continue atorvastatin along with healthy diet and exercise         Prediabetes     Last A1C good, continue with healthy diet and exercise

## 2023-03-20 NOTE — PROGRESS NOTES
Name: Chey Pineda      : 1950      MRN: [id number]  Encounter Provider: Jayshree Live MD  Encounter Date: 3/20/2023   Encounter department: [facility] [address]     1  Atrial fibrillation, persistent ([address] )  Assessment & Plan:  Patient does have some heart palpitations, chronic, no bleeding problems, continue anticoagulation      2  Mixed hyperlipidemia  Assessment & Plan:  Continue atorvastatin along with healthy diet and exercise      3  Essential hypertension  Assessment & Plan:  Well-controlled, continue meds along with healthy diet and exercise      4  Acquired hypothyroidism  Assessment & Plan:  Continue levothyroxine along with monitoring, last thyroid function tests were normal in   Prediabetes  Assessment & Plan:  Last A1C good, continue with healthy diet and exercise  BMI Counseling: Body mass index is 29 76 kg/m²  The BMI is above normal  Nutrition recommendations include encouraging healthy choices of fruits and vegetables and moderation in carbohydrate intake  Exercise recommendations include exercising 3-5 times per week  Rationale for BMI follow-up plan is due to patient being overweight or obese  Subjective     Pt here for follow up    Review of Systems   Constitutional: Negative for chills, fatigue and fever  HENT: Negative for congestion, nosebleeds, postnasal drip, sore throat and trouble swallowing  Eyes: Negative for pain  Respiratory: Negative for cough, chest tightness, shortness of breath and wheezing  Cardiovascular: Positive for palpitations  Negative for chest pain and leg swelling  Gastrointestinal: Negative for abdominal pain, constipation, diarrhea, nausea and vomiting  Endocrine: Negative for polydipsia and polyuria  Genitourinary: Negative for dysuria, flank pain and hematuria  Musculoskeletal: Positive for back pain (intermittent)  Negative for arthralgias and myalgias     Skin: Negative for rash    Neurological: Negative for dizziness, tremors, light-headedness and headaches  Hematological: Does not bruise/bleed easily  Psychiatric/Behavioral: Negative for confusion and dysphoric mood  The patient is not nervous/anxious  Past Medical History:   Diagnosis Date   • Acute blood loss anemia 3/8/2019   • Aneurysm of thoracic aorta    • Arrhythmia    • Cholecystitis 3/5/2019    Added automatically from request for surgery [id number]   • Detached retina, right    • Disease of thyroid gland    • Gastric wall thickening    • Headache    • Hematoma 10/10/2018   • Hypertension    • Iliac artery aneurysm, bilateral (HCC)    • Irregular heart beat     afib cardioversion 1/30/17, x2    • Neuropathy     bilateral feet   • Paroxysmal A-fib [facility])    • Prostatic hypertrophy    • 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[facility])      Past Surgical History:   Procedure Laterality Date   • ABDOMINAL AORTIC ANEURYSM REPAIR, ENDOVASCULAR Right 4/13/2018    Procedure: REPAIR ANEURYSM ILIAC endovascular  with coil embolization right hypogastric artery ;  Surgeon: Brigid Ramires MD;  Location: [facility];  Service: Vascular   • CARDIOVERSION     • CATARACT EXTRACTION Left 10/20/2019    Right eye 11/17/2011   • CHOLECYSTECTOMY LAPAROSCOPIC N/A 3/8/2019    Procedure: [id number] CHOLECYSTECTOMY;  Surgeon: Desirae Juarez DO;  Location: [facility];  Service: General   • COLONOSCOPY  07/13/2016   • MOLE EXCISION  2021   • OK EDG US EXAM SURGICAL ALTER STOM DUODENUM/JEJUNUM N/A 11/10/2017    Procedure: RADIAL ENDOSCOPIC U/S;  Surgeon: Diaz Chávez MD;  Location: [facility];   Service: Gastroenterology   • RETINAL DETACHMENT SURGERY Right     onset 1992, retinal detachment complete air fill confirmed     Family History   Problem Relation Age of Onset   • Stroke Mother         CVA   • Aortic aneurysm Father         abdominal   • Aortic aneurysm Brother    • Basal cell carcinoma Brother    • Cancer Maternal Grandmother malignant neoplasm   • Cancer Maternal Grandfather         malignant neoplasm   • Cancer Paternal Grandmother         malignant neoplasm   • Cancer Paternal Grandfather         malignant neoplasm   • Cancer Family         malignant neoplasm   • Cancer Family         malignant neoplasm     Social History     Socioeconomic History   • Marital status: /Civil Union     Spouse name: None   • Number of children: None   • Years of education: None   • Highest education level: None   Occupational History   • None   Tobacco Use   • Smoking status: Never   • Smokeless tobacco: Never   Vaping Use   • Vaping Use: Never used   Substance and Sexual Activity   • Alcohol use: Yes     Comment: socially    • Drug use: No   • Sexual activity: Not Currently   Other Topics Concern   • None   Social History Narrative   • None     Social Determinants of Health     Financial Resource Strain: Low Risk    • Difficulty of Paying Living Expenses: Not hard at all   Food Insecurity: Not on file   Transportation Needs: No Transportation Needs   • Lack of Transportation (Medical): No   • Lack of Transportation (Non-Medical):  No   Physical Activity: Not on file   Stress: Not on file   Social Connections: Not on file   Intimate Partner Violence: Not on file   Housing Stability: Not on file     Current Outpatient Medications on File Prior to Visit   Medication Sig   • acetaminophen (TYLENOL) 325 mg tablet Take 2 tablets (650 mg total) by mouth every 6 (six) hours as needed for mild pain or fever   • aspirin (ECOTRIN LOW STRENGTH) 81 mg EC tablet Take 81 mg by mouth daily   • atorvastatin (LIPITOR) 20 mg tablet TAKE 1 TABLET DAILY   • cholecalciferol (VITAMIN D3) 1,000 units tablet Take 1,000 Units by mouth daily   • Co-Enzyme Q-10 100 MG CAPS Take 100 mg by mouth daily   • Eliquis 5 MG TAKE 1 TABLET TWICE A DAY   • felodipine (PLENDIL) 5 mg 24 hr tablet TAKE 1 TABLET DAILY   • gabapentin (NEURONTIN) 100 mg capsule TAKE 1 CAPSULE 3 TIMES A   DAY • hydrocortisone 2 5 % cream Apply 1 application topically 2 (two) times a day To affected area as needed   • ketoconazole (NIZORAL) 2 % cream As needed   • levothyroxine 75 mcg tablet TAKE 1 TABLET DAILY FOR    ACQUIRED HYPOTHYROIDISM   • metoprolol succinate (TOPROL-XL) 25 mg 24 hr tablet TAKE 1 TABLET TWICE A DAY   • Misc Natural Products (LUTEIN 20 PO) Take 20 mg by mouth daily  • multivitamin (THERAGRAN) TABS Take 1 tablet by mouth daily  Allergies   Allergen Reactions   • Wound Dressing Adhesive      Immunization History   Administered Date(s) Administered   • COVID-19 MODERNA VACC 0 5 ML IM 02/16/2021, 03/23/2021, 11/09/2021   • H1N1, All Formulations 01/09/2010   • INFLUENZA 11/29/2016, 10/23/2017, 10/16/2020   • Influenza Split High Dose Preservative Free IM 11/16/2010, 09/19/2012, 09/24/2014, 09/16/2015, 11/29/2016, 10/23/2017   • Influenza, high dose seasonal 0 7 mL 10/15/2019, 11/09/2021, 10/12/2022   • Pneumococcal Conjugate 13-Valent 11/29/2016   • Pneumococcal Polysaccharide PPV23 08/01/2018   • Tdap 11/29/2016   • Zoster Vaccine Recombinant 10/16/2020, 12/28/2020   • influenza, trivalent, adjuvanted 10/16/2020       Objective     /72   Pulse (!) 51   Wt 102 kg (225 lb 9 6 oz)   SpO2 97%   BMI 29 76 kg/m²     Physical Exam  Vitals reviewed  Constitutional:       General: He is not in acute distress  Appearance: Normal appearance  He is well-developed  HENT:      Head: Normocephalic and atraumatic  Right Ear: External ear normal       Left Ear: External ear normal    Eyes:      General: No scleral icterus  Conjunctiva/sclera: Conjunctivae normal    Neck:      Thyroid: No thyromegaly  Trachea: No tracheal deviation  Cardiovascular:      Rate and Rhythm: Normal rate  Rhythm irregular  Heart sounds: Normal heart sounds  No murmur heard  Pulmonary:      Effort: Pulmonary effort is normal  No respiratory distress  Breath sounds: Normal breath sounds   No wheezing or rales  Abdominal:      General: Bowel sounds are normal       Palpations: Abdomen is soft  Tenderness: There is no abdominal tenderness  There is no guarding or rebound  Musculoskeletal:      Cervical back: Normal range of motion and neck supple  Right lower leg: No edema  Left lower leg: No edema  Lymphadenopathy:      Cervical: No cervical adenopathy  Skin:     Coloration: Skin is not jaundiced or pale  Neurological:      General: No focal deficit present  Mental Status: He is alert and oriented to person, place, and time  Psychiatric:         Mood and Affect: Mood normal          Behavior: Behavior normal          Thought Content:  Thought content normal          Judgment: Judgment normal        Ajith Sams MD

## 2023-04-24 ENCOUNTER — NURSE TRIAGE (OUTPATIENT)
Dept: OTHER | Facility: OTHER | Age: 73
End: 2023-04-24

## 2023-04-24 ENCOUNTER — HOSPITAL ENCOUNTER (EMERGENCY)
Facility: HOSPITAL | Age: 73
Discharge: HOME | End: 2023-04-25
Attending: EMERGENCY MEDICINE

## 2023-04-24 VITALS
TEMPERATURE: 97.6 F | HEART RATE: 56 BPM | BODY MASS INDEX: 31 KG/M2 | OXYGEN SATURATION: 97 % | DIASTOLIC BLOOD PRESSURE: 99 MMHG | WEIGHT: 235 LBS | SYSTOLIC BLOOD PRESSURE: 161 MMHG | RESPIRATION RATE: 18 BRPM

## 2023-04-24 DIAGNOSIS — R31.9 HEMATURIA: Primary | ICD-10-CM

## 2023-04-25 ENCOUNTER — TELEPHONE (OUTPATIENT)
Dept: UROLOGY | Facility: AMBULATORY SURGERY CENTER | Age: 73
End: 2023-04-25

## 2023-04-25 ENCOUNTER — TELEPHONE (OUTPATIENT)
Dept: INTERNAL MEDICINE CLINIC | Facility: CLINIC | Age: 73
End: 2023-04-25

## 2023-04-25 LAB
ANION GAP SERPL CALCULATED.3IONS-SCNC: 6 MMOL/L (ref 4–13)
APTT PPP: 30 SECONDS (ref 23–37)
BACTERIA UR QL AUTO: ABNORMAL /HPF
BASOPHILS # BLD AUTO: 0.07 THOUSANDS/ΜL (ref 0–0.1)
BASOPHILS NFR BLD AUTO: 1 % (ref 0–1)
BILIRUB UR QL STRIP: NEGATIVE
BUN SERPL-MCNC: 25 MG/DL (ref 5–25)
CALCIUM SERPL-MCNC: 9.3 MG/DL (ref 8.3–10.1)
CHLORIDE SERPL-SCNC: 109 MMOL/L (ref 96–108)
CLARITY UR: ABNORMAL
CO2 SERPL-SCNC: 25 MMOL/L (ref 21–32)
COLOR UR: ABNORMAL
CREAT SERPL-MCNC: 1 MG/DL (ref 0.6–1.3)
EOSINOPHIL # BLD AUTO: 0.12 THOUSAND/ΜL (ref 0–0.61)
EOSINOPHIL NFR BLD AUTO: 2 % (ref 0–6)
ERYTHROCYTE [DISTWIDTH] IN BLOOD BY AUTOMATED COUNT: 12.2 % (ref 11.6–15.1)
GFR SERPL CREATININE-BSD FRML MDRD: 74 ML/MIN/1.73SQ M
GLUCOSE SERPL-MCNC: 110 MG/DL (ref 65–140)
GLUCOSE UR STRIP-MCNC: NEGATIVE MG/DL
HCT VFR BLD AUTO: 48.3 % (ref 36.5–49.3)
HGB BLD-MCNC: 16.3 G/DL (ref 12–17)
HGB UR QL STRIP.AUTO: ABNORMAL
IMM GRANULOCYTES # BLD AUTO: 0.02 THOUSAND/UL (ref 0–0.2)
IMM GRANULOCYTES NFR BLD AUTO: 0 % (ref 0–2)
INR PPP: 1.17 (ref 0.84–1.19)
KETONES UR STRIP-MCNC: NEGATIVE MG/DL
LEUKOCYTE ESTERASE UR QL STRIP: ABNORMAL
LYMPHOCYTES # BLD AUTO: 1.31 THOUSANDS/ΜL (ref 0.6–4.47)
LYMPHOCYTES NFR BLD AUTO: 17 % (ref 14–44)
MCH RBC QN AUTO: 30.2 PG (ref 26.8–34.3)
MCHC RBC AUTO-ENTMCNC: 33.7 G/DL (ref 31.4–37.4)
MCV RBC AUTO: 89 FL (ref 82–98)
MONOCYTES # BLD AUTO: 0.66 THOUSAND/ΜL (ref 0.17–1.22)
MONOCYTES NFR BLD AUTO: 9 % (ref 4–12)
NEUTROPHILS # BLD AUTO: 5.59 THOUSANDS/ΜL (ref 1.85–7.62)
NEUTS SEG NFR BLD AUTO: 71 % (ref 43–75)
NITRITE UR QL STRIP: NEGATIVE
NON-SQ EPI CELLS URNS QL MICRO: ABNORMAL /HPF
NRBC BLD AUTO-RTO: 0 /100 WBCS
PH UR STRIP.AUTO: 6.5 [PH]
PLATELET # BLD AUTO: 237 THOUSANDS/UL (ref 149–390)
PMV BLD AUTO: 9.7 FL (ref 8.9–12.7)
POTASSIUM SERPL-SCNC: 4.1 MMOL/L (ref 3.5–5.3)
PROT UR STRIP-MCNC: ABNORMAL MG/DL
PROTHROMBIN TIME: 15.1 SECONDS (ref 11.6–14.5)
RBC # BLD AUTO: 5.4 MILLION/UL (ref 3.88–5.62)
RBC #/AREA URNS AUTO: ABNORMAL /HPF
SODIUM SERPL-SCNC: 140 MMOL/L (ref 135–147)
SP GR UR STRIP.AUTO: 1.01 (ref 1–1.03)
UROBILINOGEN UR STRIP-ACNC: <2 MG/DL
WBC # BLD AUTO: 7.77 THOUSAND/UL (ref 4.31–10.16)
WBC #/AREA URNS AUTO: ABNORMAL /HPF

## 2023-04-25 NOTE — TELEPHONE ENCOUNTER
"Regarding: a lot of blood in urine since 5pm  ----- Message from Gayathri Barlow sent at 4/24/2023  9:17 PM EDT -----  Pt called \"Since about 5pm I have had a lot of blood in my urine  \"    "

## 2023-04-25 NOTE — TELEPHONE ENCOUNTER
The patient was in the Er yesterday for blood in his urine  He was told to stop taking eliquis for a few days  When should he resume taking it? He is scheduled with urology tomorrow  His urine is better than it was  It is now pink  Please advise  Thank you

## 2023-04-25 NOTE — TELEPHONE ENCOUNTER
New Patient    What is the reason for the patient’s appointment?: ER f/u R31 9 (ICD-10-CM) - Hematuria    Pt states the blood his urine  He stated that it is still red but is turning more to a pink color  Enlarged prostate and he is not emptying his bladder completely  They attempted to place a catheter but it didn't work       What office location does the patient prefer?:    Does patient have Imaging/Lab Results: [address] and labs    Have patient records been requested?: no  If No, are the records showing in Epic: yes      INSURANCE:  Do we accept the patient's insurance or is the patient Self-Pay?: yes    Insurance Provider: medicare   Plan Type/Number:  Member ID#:       HISTORY:   Has the patient had any previous Urologist(s)?: no    Was the patient seen in the ED?: yes    Has the patient had any outside testing done?: no    Does the patient have a personal history of cancer?: no    Patient can be reached at [phone]

## 2023-04-25 NOTE — TELEPHONE ENCOUNTER
Susy Tanner and scheduled him at first in the [place] office but it was not until May 30 where is able to schedule him at the [place] office on May11 at 11:00 he is aware of the time and location

## 2023-04-25 NOTE — TELEPHONE ENCOUNTER
Upon call back patient notes he is currently awaiting evaluation at the ED   Will f/u with PCP upon discharge

## 2023-04-25 NOTE — DISCHARGE INSTRUCTIONS
You were seen in the Emergency Department today for blood in your urine  Please hold your Eliquis for 2 days  Please follow up with Urology in tomorrow  Please return to the Emergency Department if you experience worsening of your current symptoms, chest pain, shortness of breath, lightheadedness, inability yo urinate, or any other concerning symptoms

## 2023-04-25 NOTE — ED ATTENDING ATTESTATION
4/24/2023  I, Radha Baker MD, saw and evaluated the patient  I have discussed the patient with the resident/non-physician practitioner and agree with the resident's/non-physician practitioner's findings, Plan of Care, and MDM as documented in the resident's/non-physician practitioner's note, except where noted  All available labs and Radiology studies were reviewed  I was present for key portions of any procedure(s) performed by the resident/non-physician practitioner and I was immediately available to provide assistance  At this point I agree with the current assessment done in the Emergency Department  I have conducted an independent evaluation of this patient a history and physical is as follows: This is a 68 y o  old male who presents to the ED for evaluation of hematuria  Started today  Is on eliquis  No clots  No complaints of retention  VS and nursing notes reviewed  General: Appears in NAD, awake, alert, speaking normally in full sentences  Well-nourished, well-developed  Appears stated age  Head: Normocephalic, atraumatic  Eyes: EOMI  Vision grossly normal  No subconjunctival hemorrhages or occular discharge noted  Symmetrical lids  ENT: Atraumatic external nose and ears  No stridor  Normal phonation  No drooling  Normal swallowing  Neck: No JVD  FROM  No goiter  CV: No pallor  Normal rate  Lungs: No tachypnea  No respiratory distress  MSK: Moving all extremities equally, no peripheral edema  Skin: Dry, intact  No cyanosis  Neuro: Awake, alert, GCS15  CN II-XII grossly intact  Grossly normal gait  Psychiatric/Behavioral: Appropriate mood and affect  A/P: This is a 68 y o  male who presents to the ED for evaluation of hematuria  Will check labs, UA for infection, US for clot burden  Doubt stone due to lack of pain  Will likely need 3way with CBI due to volume of bleeding if showing evidence of outflow obstruction       ED Course       Critical Care Time  Procedures

## 2023-04-25 NOTE — ED PROVIDER NOTES
History  Chief Complaint   Patient presents with    Blood in Urine     Pt states x6 hrs x4 urination have had bright red blood  Denies any other symptoms at this time  Pt is on elquis for a-fib and asprin  Patient is a 77-year-old male with past medical history of atrial fibrillation on Eliquis, hypertension, and BPH who presented with hematuria  Patient states about 5 hours prior to arrival he had an episode of hematuria  He then had 3-4 more episodes of hematuria throughout the evening  He has never experienced this before  He does not feel like he is retaining urine any more so than he typically does with his BPH  He feels like he is urinating normally  He denies burning with lightheadedness, chest pain, shortness of breath, burning with urination and abdominal pain  Prior to Admission Medications   Prescriptions Last Dose Informant Patient Reported? Taking? Co-Enzyme Q-10 100 MG CAPS   Yes No   Sig: Take 100 mg by mouth daily   Eliquis 5 MG   No No   Sig: TAKE 1 TABLET TWICE A DAY   Misc Natural Products (LUTEIN 20 PO)   Yes No   Sig: Take 20 mg by mouth daily     acetaminophen (TYLENOL) 325 mg tablet   No No   Sig: Take 2 tablets (650 mg total) by mouth every 6 (six) hours as needed for mild pain or fever   aspirin (ECOTRIN LOW STRENGTH) 81 mg EC tablet   Yes No   Sig: Take 81 mg by mouth daily   atorvastatin (LIPITOR) 20 mg tablet   No No   Sig: TAKE 1 TABLET DAILY   cholecalciferol (VITAMIN D3) 1,000 units tablet   Yes No   Sig: Take 1,000 Units by mouth daily   felodipine (PLENDIL) 5 mg 24 hr tablet   No No   Sig: TAKE 1 TABLET DAILY   gabapentin (NEURONTIN) 100 mg capsule   No No   Sig: TAKE 1 CAPSULE 3 TIMES A   DAY   hydrocortisone 2 5 % cream   Yes No   Sig: Apply 1 application topically 2 (two) times a day To affected area as needed   ketoconazole (NIZORAL) 2 % cream   Yes No   Sig: As needed   levothyroxine 75 mcg tablet   No No   Sig: TAKE 1 TABLET DAILY FOR    ACQUIRED HYPOTHYROIDISM   metoprolol succinate (TOPROL-XL) 25 mg 24 hr tablet   No No   Sig: TAKE 1 TABLET TWICE A DAY   multivitamin (THERAGRAN) TABS   Yes No   Sig: Take 1 tablet by mouth daily  Facility-Administered Medications: None       Past Medical History:   Diagnosis Date    Acute blood loss anemia 3/8/2019    Aneurysm [facility] [address] ([facility] [address] )     Arrhythmia     Cholecystitis 3/5/2019    Added automatically from request for surgery [id number]    Detached retina, right     Disease of thyroid gland     Gastric wall thickening     Headache     Hematoma 10/10/2018    Hypertension     Iliac artery aneurysm, bilateral (HCC)     Irregular heart beat     afib cardioversion 1/30/17, x2     Neuropathy     bilateral feet    Paroxysmal A-fib ([facility] [address] )     Prostatic hypertrophy     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[facility])        Past Surgical History:   Procedure Laterality Date    ABDOMINAL AORTIC ANEURYSM REPAIR, ENDOVASCULAR Right 4/13/2018    Procedure: REPAIR ANEURYSM ILIAC endovascular  with coil embolization right hypogastric artery ;  Surgeon: Rory Akhtar MD;  [facility];  Service: Vascular    CARDIOVERSION      CATARACT EXTRACTION Left 10/20/2019    Right eye 11/17/2011    CHOLECYSTECTOMY LAPAROSCOPIC N/A 3/8/2019    Procedure: [id number] CHOLECYSTECTOMY;  Surgeon: Belem Strickland DO;  [facility];  Service: General    COLONOSCOPY  07/13/2016    MOLE EXCISION  2021    ID EDG US EXAM SURGICAL ALTER STOM DUODENUM/JEJUNUM N/A 11/10/2017    Procedure: RADIAL ENDOSCOPIC U/S;  Surgeon: Amandeep Hanson MD;  Location: [facility];   Service: Gastroenterology    RETINAL DETACHMENT SURGERY Right     onset 1992, retinal detachment complete air fill confirmed       Family History   Problem Relation Age of Onset    Stroke Mother         CVA    Aortic aneurysm Father         abdominal    Aortic aneurysm Brother     Basal cell carcinoma Brother     Cancer Maternal Grandmother malignant neoplasm    Cancer Maternal Grandfather         malignant neoplasm    Cancer Paternal Grandmother         malignant neoplasm    Cancer Paternal Grandfather         malignant neoplasm    Cancer Family         malignant neoplasm    Cancer Family         malignant neoplasm     I have reviewed and agree with the history as documented  E-Cigarette/Vaping    E-Cigarette Use Never User      E-Cigarette/Vaping Substances    Nicotine No     THC No     CBD No     Flavoring No     Other No     Unknown No      Social History     Tobacco Use    Smoking status: Never    Smokeless tobacco: Never   Vaping Use    Vaping Use: Never used   Substance Use Topics    Alcohol use: Yes     Comment: socially     Drug use: No        Review of Systems   Constitutional: Negative for chills and fever  HENT: Negative for ear pain and sore throat  Eyes: Negative for pain and visual disturbance  Respiratory: Negative for cough and shortness of breath  Cardiovascular: Negative for chest pain and palpitations  Gastrointestinal: Negative for abdominal pain and vomiting  Genitourinary: Positive for hematuria  Negative for dysuria  Musculoskeletal: Negative for back pain and neck pain  Skin: Negative for rash and wound  Neurological: Negative for dizziness and syncope  All other systems reviewed and are negative  Physical Exam  ED Triage Vitals [04/24/23 2147]   Temperature Pulse Respirations Blood Pressure SpO2   97 6 °F (36 4 °C) 56 18 161/99 97 %      Temp Source Heart Rate Source Patient Position - Orthostatic VS BP Location FiO2 (%)   Oral Monitor Lying Right arm --      Pain Score       No Pain             Orthostatic Vital Signs  Vitals:    04/24/23 2147   BP: 161/99   Pulse: 56   Patient Position - Orthostatic VS: Lying       Physical Exam  Vitals and nursing note reviewed  Constitutional:       General: He is not in acute distress  Appearance: He is well-developed     HENT: Head: Normocephalic and atraumatic  Nose: No congestion or rhinorrhea  Mouth/Throat:      Mouth: Mucous membranes are moist    Eyes:      Extraocular Movements: Extraocular movements intact  Conjunctiva/sclera: Conjunctivae normal    Cardiovascular:      Rate and Rhythm: Normal rate and regular rhythm  Heart sounds: No murmur heard  Pulmonary:      Effort: Pulmonary effort is normal  No respiratory distress  Breath sounds: Normal breath sounds  Abdominal:      Palpations: Abdomen is soft  Tenderness: There is no abdominal tenderness  Musculoskeletal:      Cervical back: Neck supple  Right lower leg: No edema  Left lower leg: No edema  Skin:     General: Skin is warm and dry  Capillary Refill: Capillary refill takes less than 2 seconds  Neurological:      Mental Status: He is alert     Psychiatric:         Mood and Affect: Mood normal          ED Medications  Medications - No data to display    Diagnostic Studies  Results Reviewed     Procedure Component Value Units Date/Time    Urine Microscopic [[id number]]  (Abnormal) Collected: 04/25/23 0029    Lab Status: Final result Specimen: Urine, Clean Catch Updated: 04/25/23 0124     RBC, UA Innumerable /hpf      WBC, UA 4-10 /hpf      Epithelial Cells None Seen /hpf      Bacteria, UA None Seen /hpf     UA w Reflex to Microscopic w Reflex to Culture [[id number]]  (Abnormal) Collected: 04/25/23 0029    Lab Status: Final result Specimen: Urine, Clean Catch Updated: 04/25/23 0121     Color, UA Red     Clarity, UA Extra Turbid     Specific Gravity, UA 1 009     pH, UA 6 5     Leukocytes, UA Trace     Nitrite, UA Negative     Protein,  (2+) mg/dl      Glucose, UA Negative mg/dl      Ketones, UA Negative mg/dl      Urobilinogen, UA <2 0 mg/dl      Bilirubin, UA Negative     Occult Blood, UA Large    Basic metabolic panel [[id number]]  (Abnormal) Collected: 04/25/23 0029    Lab Status: Final result Specimen: Blood from Arm, Left Updated: 04/25/23 0101     Sodium 140 mmol/L      Potassium 4 1 mmol/L      Chloride 109 mmol/L      CO2 25 mmol/L      ANION GAP 6 mmol/L      BUN 25 mg/dL      Creatinine 1 00 mg/dL      Glucose 110 mg/dL      Calcium 9 3 mg/dL      eGFR 74 ml/min/1 73sq m     Narrative:      [place] guidelines for Chronic Kidney Disease (CKD):     Stage 1 with normal or high GFR (GFR > 90 mL/min/1 73 square meters)    Stage 2 Mild CKD (GFR = 60-89 mL/min/1 73 square meters)    Stage 3A Moderate CKD (GFR = 45-59 mL/min/1 73 square meters)    Stage 3B Moderate CKD (GFR = 30-44 mL/min/1 73 square meters)    Stage 4 Severe CKD (GFR = 15-29 mL/min/1 73 square meters)    Stage 5 End Stage CKD (GFR <15 mL/min/1 73 square meters)  Note: GFR calculation is accurate only with a steady state creatinine    Protime-INR [[id number]]  (Abnormal) Collected: 04/25/23 0029    Lab Status: Final result Specimen: Blood from Arm, Left Updated: 04/25/23 0053     Protime 15 1 seconds      INR 1 17    APTT [[id number]]  (Normal) Collected: 04/25/23 0029    Lab Status: Final result Specimen: Blood from Arm, Left Updated: 04/25/23 0053     PTT 30 seconds     CBC and differential [[id number]] Collected: 04/25/23 0029    Lab Status: Final result Specimen: Blood from Arm, Left Updated: 04/25/23 0036     WBC 7 77 Thousand/uL      RBC 5 40 Million/uL      Hemoglobin 16 3 g/dL      Hematocrit 48 3 %      MCV 89 fL      MCH 30 2 pg      MCHC 33 7 g/dL      RDW 12 2 %      MPV 9 7 fL      Platelets 920 Thousands/uL      nRBC 0 /100 WBCs      Neutrophils Relative 71 %      Immat GRANS % 0 %      Lymphocytes Relative 17 %      Monocytes Relative 9 %      Eosinophils Relative 2 %      Basophils Relative 1 %      Neutrophils Absolute 5 59 Thousands/µL      Immature Grans Absolute 0 02 Thousand/uL      Lymphocytes Absolute 1 31 Thousands/µL      Monocytes Absolute 0 66 Thousand/µL      Eosinophils Absolute 0 12 Thousand/µL Basophils Absolute 0 07 Thousands/µL                  No orders to display         Procedures  Procedures      ED Course               Identification of Seniors at [facility] Most Recent Value   (ISAR) Identification of Seniors at Risk    Before the illness or injury that brought you to the Emergency, did you need someone to help you on a regular basis? 0 Filed at: 04/24/2023 2149   In the last 24 hours, have you needed more help than usual? 0 Filed at: 04/24/2023 2149   Have you been hospitalized for one or more nights during the past 6 months? 0 Filed at: 04/24/2023 2149   In general, do you see well? 0 Filed at: 04/24/2023 2149   In general, do you have serious problems with your memory? 0 Filed at: 04/24/2023 2149   Do you take more than three different medications every day? 1 Filed at: 04/24/2023 2149   ISAR Score 1 Filed at: 04/24/2023 2149                              Medical Decision Making  Patient is a 66-year-old male with a past medical history of atrial fibrillation on Eliquis, hypertension, and BPH who presented with hematuria  Patient's differential includes renal stones, UTI, hemorrhagic cystitis secondary to Eliquis  Will order CBC, BMP, coags, and UA  Will do a bedside ultrasound to evaluate for blood clots in his bladder and PVR to evaluate for urinary retention  Will reach out to urology for recommendations  Patient's hemoglobin is stable at 16 3   UA shows large amounts of blood but is negative for signs of infection  There is no obvious evidence of blood clots in patient's bladder on bedside ultrasound  PVR was 700  Urology recommended placing a Moralez catheter given PVR  2 attempts were made to place the Moralez catheter but they were unsuccessful  Patient is asymptomatic and comfortable  He states that he is still able to urinate normally  He was told to hold his Eliquis for 2 days  He was told to follow-up with urology tomorrow    An ambulatory referral was provided for him  He was given return precautions and discharged from the ED  Hematuria: acute illness or injury  Amount and/or Complexity of Data Reviewed  Labs: ordered  Discussion of management or test interpretation with external provider(s): Urology          Disposition  Final diagnoses:   Hematuria     Time reflects when diagnosis was documented in both MDM as applicable and the Disposition within this note     Time User Action Codes Description Comment    4/25/2023  2:44 AM Cristiano Lilo Add [R31 9] Hematuria       ED Disposition     ED Disposition   Discharge    Condition   Stable    Date/Time   Tue Apr 25, 2023  2:44 AM    Comment   Derik Knight discharge to home/self care                 Follow-up Information     Follow up With Specialties Details Why Contact Info Additional [address] [facility]   [address] [facility] [id number] [phone] [facility], [address] [place] [address], [address]          Discharge Medication List as of 4/25/2023  2:47 AM      CONTINUE these medications which have NOT CHANGED    Details   acetaminophen (TYLENOL) 325 mg tablet Take 2 tablets (650 mg total) by mouth every 6 (six) hours as needed for mild pain or fever, Starting Tue 3/12/2019, Normal      aspirin (ECOTRIN LOW STRENGTH) 81 mg EC tablet Take 81 mg by mouth daily, Historical Med      atorvastatin (LIPITOR) 20 mg tablet TAKE 1 TABLET DAILY, Normal      cholecalciferol (VITAMIN D3) 1,000 units tablet Take 1,000 Units by mouth daily, Historical Med      Co-Enzyme Q-10 100 MG CAPS Take 100 mg by mouth daily, Historical Med      Eliquis 5 MG TAKE 1 TABLET TWICE A DAY, Normal      felodipine (PLENDIL) 5 mg 24 hr tablet TAKE 1 TABLET DAILY, Normal      gabapentin (NEURONTIN) 100 mg capsule TAKE 1 CAPSULE 3 TIMES A   DAY, Normal      hydrocortisone 2 5 % cream Apply 1 application topically 2 (two) times a day To affected area as needed, Starting Fri 4/9/2021, Historical Med      ketoconazole (NIZORAL) 2 % cream As needed, Historical Med      levothyroxine 75 mcg tablet TAKE 1 TABLET DAILY FOR    ACQUIRED HYPOTHYROIDISM, Normal      metoprolol succinate (TOPROL-XL) 25 mg 24 hr tablet TAKE 1 TABLET TWICE A DAY, Normal      Misc Natural Products (LUTEIN 20 PO) Take 20 mg by mouth daily  , Historical Med      multivitamin (THERAGRAN) TABS Take 1 tablet by mouth daily  , Historical Med               PDMP Review       Value Time User    PDMP Reviewed  Yes 3/18/2020  9:48 AM Henrik Wright MD           ED Provider  Attending physically available and evaluated Rubens Houston I managed the patient along with the ED Attending      Electronically Signed by         Zeb Benito MD  04/26/23 7643

## 2023-04-26 ENCOUNTER — OFFICE VISIT (OUTPATIENT)
Dept: UROLOGY | Facility: MEDICAL CENTER | Age: 73
End: 2023-04-26

## 2023-04-26 ENCOUNTER — HOSPITAL ENCOUNTER (OUTPATIENT)
Dept: RADIOLOGY | Age: 73
Discharge: HOME | End: 2023-04-26

## 2023-04-26 VITALS
BODY MASS INDEX: 31.14 KG/M2 | HEART RATE: 65 BPM | WEIGHT: 235 LBS | OXYGEN SATURATION: 98 % | SYSTOLIC BLOOD PRESSURE: 120 MMHG | HEIGHT: 73 IN | DIASTOLIC BLOOD PRESSURE: 90 MMHG

## 2023-04-26 DIAGNOSIS — N40.1 BENIGN PROSTATIC HYPERPLASIA WITH INCOMPLETE BLADDER EMPTYING: ICD-10-CM

## 2023-04-26 DIAGNOSIS — R31.9 HEMATURIA: ICD-10-CM

## 2023-04-26 DIAGNOSIS — R31.9 HEMATURIA: Primary | ICD-10-CM

## 2023-04-26 DIAGNOSIS — R39.14 BENIGN PROSTATIC HYPERPLASIA WITH INCOMPLETE BLADDER EMPTYING: ICD-10-CM

## 2023-04-26 LAB
POST-VOID RESIDUAL VOLUME, ML POC: 355 ML
SL AMB  POCT GLUCOSE, UA: ABNORMAL
SL AMB LEUKOCYTE ESTERASE,UA: ABNORMAL
SL AMB POCT BILIRUBIN,UA: ABNORMAL
SL AMB POCT BLOOD,UA: ABNORMAL
SL AMB POCT CLARITY,UA: ABNORMAL
SL AMB POCT COLOR,UA: ABNORMAL
SL AMB POCT KETONES,UA: ABNORMAL
SL AMB POCT NITRITE,UA: ABNORMAL
SL AMB POCT PH,UA: 5.5
SL AMB POCT SPECIFIC GRAVITY,UA: 1.02
SL AMB POCT URINE PROTEIN: ABNORMAL
SL AMB POCT UROBILINOGEN: 0.2

## 2023-04-26 RX ORDER — FINASTERIDE 5 MG/1
5 TABLET, FILM COATED ORAL DAILY
Qty: 30 TABLET | Refills: 1 | Status: SHIPPED | OUTPATIENT
Start: 2023-04-26

## 2023-04-26 RX ADMIN — IOHEXOL 100 ML: 350 INJECTION, SOLUTION INTRAVENOUS at 12:45

## 2023-04-26 NOTE — PATIENT INSTRUCTIONS
Would like to start on alpha blocker to help better empty the bladder (Flomax or Uroxatral)     Will start finasteride 5 mg tablet daily for the next 30 days       Will order CT scan stat to evaluate the kidneys and bladder

## 2023-04-26 NOTE — PROGRESS NOTES
4/26/2023      Chief Complaint   Patient presents with   • Microhematuria   • Benign Prostatic Hypertrophy         Assessment and Plan    68 y o  male new patient     1  Gross hematuria  · Urine today: trace leuks, neg nitrites, large blood   · CT renal protocol ordered stat  · BMP yesterday with normal renal fn  · Cystoscopy scheduled for Friday with Dr Lori Chung   · Finasteride 5 mg po daily x 30 days sent to pharmacy  · Recommend to continue holding Eliquis until urine clear for 48 hours  · ED parameters reviewed  2  Incomplete bladder emptying  · PVR today: 355 mL   · Patient is asymptomatic  Renal function without evidence of SAMARA  · CT ordered stat to evaluate presence of hydronephrosis  · Given patient's ability to void without pain or difficulty, will wait to place parikh catheter at this time  If he remains asymptomatic but has evidence of hydronephrosis on CT, would recommend catheter insertion at his visit on Friday  · Patient is hesitant to start alpha blockade due to possible eye side effects  He plans to check with his ophthalmologist if he is able to start Uroxatral 10 mg po daily in conjunction with the finasteride  History of Present Illness  Agatha Atkinson is a 68 y o  male here for evaluation of gross hematuria and incomplete bladder emptying  Patient presented to the ED on 4-24- 23 after he had experienced about 5 hours of gross hematuria  Patient was doing yard work but states this was not strenuous activity  He has been managed on Eliquis for his A-fib for the past 7 years and has not had any previous issues with gross hematuria  No upper tract imaging ordered in the ED  CT of chest abdomen pelvis w/wo contrast in November 2021 revealed unremarkable kidneys but did reveal bladder diverticula  Urine testing negative for infection in ED  Patient is not currently managed on any sort of alpha blockade or supplements for an enlarged prostate    PSA has remained stable and was 1 9 back in December 2022  Patient denies any flank or abdominal pain  Moralez catheter was attempted to be placed in ED but was unsuccessful x2  Patient does state he has some urethral irritation since those attempts  His Eliquis has been on hold and he states that his hematuria has been improving  Urine sample today here in the office does appear to be a clear onel color with some small old blood flecks  Patient currently denies any dysuria or difficulty voiding  He does note urinary hesitancy at times  He denies any fevers or chills  He is agreeable to plan above  Review of Systems   HENT: Negative  Respiratory: Negative  Cardiovascular: Negative  Genitourinary: Positive for hematuria (but improving ) and penile pain (urethral pain)  Negative for difficulty urinating and dysuria  Hesitancy but no straining  Nocturia varies from none to 2-3x per night  On avg once per night  Continuous stream  Does have double voiding at times but denies sensation of incomplete bladder emptying   Musculoskeletal: Negative  Skin: Negative  Neurological: Negative        AUA SYMPTOM SCORE    Flowsheet Row Most Recent Value   AUA SYMPTOM SCORE    How often have you had a sensation of not emptying your bladder completely after you finished urinating? 4 (P)     How often have you had to urinate again less than two hours after you finished urinating? 4 (P)     How often have you found you stopped and started again several times when you urinate? 1 (P)     How often have you found it difficult to postpone urination? 2 (P)     How often have you had a weak urinary stream? 4 (P)     How often have you had to push or strain to begin urination? 2 (P)     How many times did you most typically get up to urinate from the time you went to bed at night until the time you got up in the morning? 3 (P)     Quality of Life: If you were to spend the rest of your life with your urinary condition just the way it is now, "how would you feel about that? 3 (P)     AUA SYMPTOM SCORE 20 (P)            Vitals  Vitals:    04/26/23 0936   BP: 120/90   BP Location: Left arm   Patient Position: Sitting   Cuff Size: Large   Pulse: 65   SpO2: 98%   Weight: 107 kg (235 lb)   Height: 6' 1\" (1 854 m)       Physical Exam  Vitals reviewed  Constitutional:       General: He is not in acute distress  Appearance: Normal appearance  He is obese  He is not ill-appearing, toxic-appearing or diaphoretic  HENT:      Head: Normocephalic and atraumatic  Eyes:      Conjunctiva/sclera: Conjunctivae normal    Pulmonary:      Effort: Pulmonary effort is normal  No respiratory distress  Abdominal:      General: There is no distension  Palpations: Abdomen is soft  Tenderness: There is no abdominal tenderness  There is no right CVA tenderness, left CVA tenderness, guarding or rebound  Musculoskeletal:         General: Normal range of motion  Cervical back: Normal range of motion  Skin:     General: Skin is warm and dry  Neurological:      General: No focal deficit present  Mental Status: He is alert and oriented to person, place, and time  Psychiatric:         Mood and Affect: Mood normal          Behavior: Behavior normal          Thought Content:  Thought content normal          Judgment: Judgment normal        Past History  Past Medical History:   Diagnosis Date   • Acute blood loss anemia 3/8/2019   • Aneurysm of thoracic aorta ([address] )    • Arrhythmia    • Cholecystitis 3/5/2019    Added automatically from request for surgery [id number]   • Detached retina, right    • Disease of thyroid gland    • Gastric wall thickening    • Headache    • Hematoma 10/10/2018   • Hypertension    • Iliac artery aneurysm, bilateral (HCC)    • Irregular heart beat     afib cardioversion 1/30/17, x2    • Neuropathy     bilateral feet   • Paroxysmal A-fib [facility])    • Prostatic hypertrophy    • 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[facility])      Social History " Socioeconomic History   • Marital status: /Civil Union     Spouse name: None   • Number of children: None   • Years of education: None   • Highest education level: None   Occupational History   • None   Tobacco Use   • Smoking status: Never   • Smokeless tobacco: Never   Vaping Use   • Vaping Use: Never used   Substance and Sexual Activity   • Alcohol use: Yes     Comment: socially    • Drug use: No   • Sexual activity: Not Currently   Other Topics Concern   • None   Social History Narrative   • None     Social Determinants of Health     Financial Resource Strain: Low Risk    • Difficulty of Paying Living Expenses: Not hard at all   Food Insecurity: Not on file   Transportation Needs: No Transportation Needs   • Lack of Transportation (Medical): No   • Lack of Transportation (Non-Medical):  No   Physical Activity: Not on file   Stress: Not on file   Social Connections: Not on file   Intimate Partner Violence: Not on file   Housing Stability: Not on file     Social History     Tobacco Use   Smoking Status Never   Smokeless Tobacco Never     Family History   Problem Relation Age of Onset   • Stroke Mother         CVA   • Aortic aneurysm Father         abdominal   • Aortic aneurysm Brother    • Basal cell carcinoma Brother    • Cancer Maternal Grandmother         malignant neoplasm   • Cancer Maternal Grandfather         malignant neoplasm   • Cancer Paternal Grandmother         malignant neoplasm   • Cancer Paternal Grandfather         malignant neoplasm   • Cancer Family         malignant neoplasm   • Cancer Family         malignant neoplasm       The following portions of the patient's history were reviewed and updated as appropriate: allergies, current medications, past medical history, past social history, past surgical history and problem list     Results  Recent Results (from the past 1 hour(s))   POCT Measure PVR    Collection Time: 04/26/23  9:42 AM   Result Value Ref Range    POST-VOID RESIDUAL VOLUME, ML  mL   POCT urine dip auto non-scope    Collection Time: 04/26/23  9:49 AM   Result Value Ref Range     COLOR,UA Iced Tea/ brown     CLARITY,UA clotty     SPECIFIC GRAVITY,UA 1 020      PH,UA 5 5     LEUKOCYTE ESTERASE,UA TRACE     NITRITE,UA NEG     GLUCOSE, UA NEG     KETONES,UA TRACE     BILIRUBIN,UA SMALL     BLOOD,UA LATGE     POCT URINE PROTEIN 100MG     SL AMB POCT UROBILINOGEN 0 2    ]  Lab Results   Component Value Date    PSA 1 9 12/08/2022    PSA 1 0 11/29/2021    PSA 0 9 11/06/2020    PSA 0 8 10/04/2019     Lab Results   Component Value Date    GLUCOSE 149 (H) 03/08/2019    CALCIUM 9 3 04/25/2023     01/06/2016    K 4 1 04/25/2023    CO2 25 04/25/2023     (H) 04/25/2023    BUN 25 04/25/2023    CREATININE 1 00 04/25/2023     Lab Results   Component Value Date    WBC 7 77 04/25/2023    HGB 16 3 04/25/2023    HCT 48 3 04/25/2023    MCV 89 04/25/2023     04/25/2023     Tommye Litten, PA-C

## 2023-04-28 ENCOUNTER — PROCEDURE VISIT (OUTPATIENT)
Dept: UROLOGY | Facility: MEDICAL CENTER | Age: 73
End: 2023-04-28

## 2023-04-28 VITALS
SYSTOLIC BLOOD PRESSURE: 120 MMHG | HEIGHT: 73 IN | BODY MASS INDEX: 30.35 KG/M2 | HEART RATE: 56 BPM | WEIGHT: 229 LBS | DIASTOLIC BLOOD PRESSURE: 70 MMHG | OXYGEN SATURATION: 97 %

## 2023-04-28 DIAGNOSIS — R31.0 GROSS HEMATURIA: Primary | ICD-10-CM

## 2023-04-28 DIAGNOSIS — N40.1 BPH WITH URINARY OBSTRUCTION: ICD-10-CM

## 2023-04-28 DIAGNOSIS — N13.8 BPH WITH URINARY OBSTRUCTION: ICD-10-CM

## 2023-04-28 LAB
SL AMB  POCT GLUCOSE, UA: ABNORMAL
SL AMB LEUKOCYTE ESTERASE,UA: ABNORMAL
SL AMB POCT BILIRUBIN,UA: ABNORMAL
SL AMB POCT BLOOD,UA: ABNORMAL
SL AMB POCT CLARITY,UA: CLEAR
SL AMB POCT COLOR,UA: ABNORMAL
SL AMB POCT KETONES,UA: ABNORMAL
SL AMB POCT NITRITE,UA: ABNORMAL
SL AMB POCT PH,UA: 5.5
SL AMB POCT SPECIFIC GRAVITY,UA: 1.02
SL AMB POCT URINE PROTEIN: ABNORMAL
SL AMB POCT UROBILINOGEN: 0.2

## 2023-04-28 NOTE — PROGRESS NOTES
HISTORY:    1  Follow-up gross hematuria, quite red urine for 24 hours at least   CT scan showed possible mass in the bladder    2  Long-term BPH, we started him on finasteride and tamsulosin earlier this week  He is taking the finasteride, he wanted to check with his eye doctor regarding the Flomax       Cystoscopy     Date/Time 4/28/2023 9:00 AM     Performed by  Breezy Valencia MD     Authorized by Breezy Valencia MD          Procedure Details:  Procedure type: cystoscopy    Patient tolerance: Patient tolerated the procedure well with no immediate complications    Additional Procedure Details:      Patient presents for cystoscopy  I have discussed the reasons for doing the exam, and the potential risks and complications  Patient expressed understanding, and signed informed consent document  The patient was carefully  positioned supine on the examining table  Sterile preparation was performed on the urethra  Xylocaine jelly was instilled and left  Indwelling for the procedure  The 13 [place] flexible cystoscope was passed with the following findings:      Urethra: No stricture     Prostate:  lateral lobes significant large, minimal median lobe                  Bladder: Severe trabeculation  Several large organized clots floating around  No active bleeding  I cannot see any tumor, although I cannot be sure there is not a growth under  one of the clots  Residual urine: 150 mL    Patient tolerated the procedure well and was escorted from the examining table  PSA 1 9 in December 2022, has always been low       ASSESSMENT / PLAN:    007% certain there is no tumor in the bladder based on today's exam, but I will rescope in 6 weeks  2   Significant BPH, I suggest he take the tamsulosin        3   Regarding the finasteride, sometimes it can reduce prostate bleeding, he will decide if he wants to continue that    The following portions of the patient's history were reviewed and updated as appropriate: allergies, current medications, past family history, past medical history, past social history, past surgical history and problem list     Review of Systems   All other systems reviewed and are negative  Objective:     Physical Exam      0   Lab Value Date/Time    PSA 1 9 12/08/2022 0924    PSA 1 0 11/29/2021 0902    PSA 0 9 11/06/2020 0728   ]  BUN   Date Value Ref Range Status   04/25/2023 25 5 - 25 mg/dL Final   01/06/2016 20 5 - 25 mg/dL Final     Creatinine   Date Value Ref Range Status   04/25/2023 1 00 0 60 - 1 30 mg/dL Final     Comment:     Standardized to IDMS reference method   01/06/2016 1 01 0 60 - 1 30 mg/dL Final     Comment:     Standardized to IDMS reference method     No components found for: CBC      Patient Active Problem List   Diagnosis   • Aneurysm of thoracic aorta ([facility] [address] )   • Essential hypertension   • Atrial fibrillation, persistent (HCC)   • Iliac artery aneurysm, bilateral (HCC)   • Acquired hypothyroidism   • Neuropathy   • Iliac artery aneurysm, right ([facility] [address] )   • Encounter for Medicare annual wellness exam   • Other headache syndrome   • Rash   • Mixed hyperlipidemia   • Chronic bilateral low back pain without sciatica   • NSVT (nonsustained ventricular tachycardia) ([facility] [address] )   • Hepatic artery dissection (HCC)   • Vitamin D deficiency   • Prediabetes        Diagnoses and all orders for this visit:    Gross hematuria  -     POCT urine dip auto non-scope    BPH with urinary obstruction    Other orders  -     Cystoscopy           Patient ID: Janny Steven is a 68 y o  male        Current Outpatient Medications:   •  acetaminophen (TYLENOL) 325 mg tablet, Take 2 tablets (650 mg total) by mouth every 6 (six) hours as needed for mild pain or fever, Disp: 30 tablet, Rfl: 0  •  aspirin (ECOTRIN LOW STRENGTH) 81 mg EC tablet, Take 81 mg by mouth daily, Disp: , Rfl:   •  atorvastatin (LIPITOR) 20 mg tablet, TAKE 1 TABLET DAILY, Disp: 90 tablet, Rfl: 3  •  cholecalciferol (VITAMIN D3) 1,000 units tablet, Take 1,000 Units by mouth daily, Disp: , Rfl:   •  Co-Enzyme Q-10 100 MG CAPS, Take 100 mg by mouth daily, Disp: , Rfl:   •  felodipine (PLENDIL) 5 mg 24 hr tablet, TAKE 1 TABLET DAILY, Disp: 90 tablet, Rfl: 3  •  finasteride (PROSCAR) 5 mg tablet, Take 1 tablet (5 mg total) by mouth daily, Disp: 30 tablet, Rfl: 1  •  gabapentin (NEURONTIN) 100 mg capsule, TAKE 1 CAPSULE 3 TIMES A   DAY, Disp: 270 capsule, Rfl: 1  •  levothyroxine 75 mcg tablet, TAKE 1 TABLET DAILY FOR    ACQUIRED HYPOTHYROIDISM, Disp: 90 tablet, Rfl: 3  •  metoprolol succinate (TOPROL-XL) 25 mg 24 hr tablet, TAKE 1 TABLET TWICE A DAY, Disp: 180 tablet, Rfl: 3  •  Misc Natural Products (LUTEIN 20 PO), Take 20 mg by mouth daily  , Disp: , Rfl:   •  multivitamin (THERAGRAN) TABS, Take 1 tablet by mouth daily  , Disp: , Rfl:   •  Eliquis 5 MG, TAKE 1 TABLET TWICE A DAY (Patient not taking: Reported on 4/26/2023), Disp: 180 tablet, Rfl: 3  •  hydrocortisone 2 5 % cream, Apply 1 application topically 2 (two) times a day To affected area as needed (Patient not taking: Reported on 4/26/2023), Disp: , Rfl:   •  ketoconazole (NIZORAL) 2 % cream, As needed (Patient not taking: Reported on 4/26/2023), Disp: , Rfl:     Past Medical History:   Diagnosis Date   • Acute blood loss anemia 3/8/2019   • Aneurysm of thoracic aorta ([address] )    • Arrhythmia    • Cholecystitis 3/5/2019    Added automatically from request for surgery [id number]   • Detached retina, right    • Disease of thyroid gland    • Gastric wall thickening    • Headache    • Hematoma 10/10/2018   • Hypertension    • Iliac artery aneurysm, bilateral (HCC)    • Irregular heart beat     afib cardioversion 1/30/17, x2    • Neuropathy     bilateral feet   • Paroxysmal A-fib [facility])    • Prostatic hypertrophy    • 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[facility])        Past Surgical History:   Procedure Laterality Date   • ABDOMINAL AORTIC ANEURYSM REPAIR, ENDOVASCULAR Right 4/13/2018    Procedure: REPAIR ANEURYSM ILIAC endovascular  with coil embolization right hypogastric artery ;  Surgeon: Mary Ellen Dawson MD;  [facility];  Service: Vascular   • CARDIOVERSION     • CATARACT EXTRACTION Left 10/20/2019    Right eye 11/17/2011   • CHOLECYSTECTOMY LAPAROSCOPIC N/A 3/8/2019    Procedure: [id number] CHOLECYSTECTOMY;  Surgeon: January Neil DO;  Location: [facility];  Service: General   • COLONOSCOPY  07/13/2016   • MOLE EXCISION  2021   • CT EDG US EXAM SURGICAL ALTER STOM DUODENUM/JEJUNUM N/A 11/10/2017    Procedure: RADIAL ENDOSCOPIC U/S;  Surgeon: Alena Mai MD;  Location: [facility];   Service: Gastroenterology   • RETINAL DETACHMENT SURGERY Right     onset 1992, retinal detachment complete air fill confirmed       Social History

## 2023-05-04 ENCOUNTER — TELEPHONE (OUTPATIENT)
Dept: OTHER | Facility: OTHER | Age: 73
End: 2023-05-04

## 2023-05-04 DIAGNOSIS — N13.8 BPH WITH OBSTRUCTION/LOWER URINARY TRACT SYMPTOMS: Primary | ICD-10-CM

## 2023-05-04 DIAGNOSIS — N40.1 BPH WITH OBSTRUCTION/LOWER URINARY TRACT SYMPTOMS: Primary | ICD-10-CM

## 2023-05-04 NOTE — TELEPHONE ENCOUNTER
Patient called in stating he spoke with his opthalmologist about starting flomax  States since the discussion he is ready to start the flomax    Requesting script to be sent to [facility] in [place] and call once script is sent

## 2023-05-05 RX ORDER — TAMSULOSIN HYDROCHLORIDE 0.4 MG/1
0.4 CAPSULE ORAL
Qty: 90 CAPSULE | Refills: 3 | Status: SHIPPED | OUTPATIENT
Start: 2023-05-05

## 2023-05-14 DIAGNOSIS — G89.29 CHRONIC NONINTRACTABLE HEADACHE, UNSPECIFIED HEADACHE TYPE: ICD-10-CM

## 2023-05-14 DIAGNOSIS — R51.9 CHRONIC NONINTRACTABLE HEADACHE, UNSPECIFIED HEADACHE TYPE: ICD-10-CM

## 2023-05-15 RX ORDER — GABAPENTIN 100 MG/1
CAPSULE ORAL
Qty: 270 CAPSULE | Refills: 1 | Status: SHIPPED | OUTPATIENT
Start: 2023-05-15

## 2023-06-12 RX ORDER — MAG HYDROX/ALUMINUM HYD/SIMETH 400-400-40
SUSPENSION, ORAL (FINAL DOSE FORM) ORAL
COMMUNITY
Start: 2023-05-22

## 2023-06-14 ENCOUNTER — PROCEDURE VISIT (OUTPATIENT)
Dept: UROLOGY | Facility: MEDICAL CENTER | Age: 73
End: 2023-06-14
Payer: MEDICARE

## 2023-06-14 VITALS
BODY MASS INDEX: 31.14 KG/M2 | HEART RATE: 70 BPM | HEIGHT: 73 IN | OXYGEN SATURATION: 95 % | SYSTOLIC BLOOD PRESSURE: 130 MMHG | DIASTOLIC BLOOD PRESSURE: 90 MMHG | WEIGHT: 235 LBS

## 2023-06-14 DIAGNOSIS — N13.8 BPH WITH URINARY OBSTRUCTION: Primary | ICD-10-CM

## 2023-06-14 DIAGNOSIS — N40.1 BPH WITH URINARY OBSTRUCTION: Primary | ICD-10-CM

## 2023-06-14 DIAGNOSIS — R31.0 GROSS HEMATURIA: ICD-10-CM

## 2023-06-14 DIAGNOSIS — N32.89 BLADDER MASS: ICD-10-CM

## 2023-06-14 LAB
SL AMB  POCT GLUCOSE, UA: ABNORMAL
SL AMB LEUKOCYTE ESTERASE,UA: ABNORMAL
SL AMB POCT BILIRUBIN,UA: ABNORMAL
SL AMB POCT BLOOD,UA: ABNORMAL
SL AMB POCT CLARITY,UA: CLEAR
SL AMB POCT COLOR,UA: YELLOW
SL AMB POCT KETONES,UA: ABNORMAL
SL AMB POCT NITRITE,UA: ABNORMAL
SL AMB POCT PH,UA: 5
SL AMB POCT SPECIFIC GRAVITY,UA: 1.02
SL AMB POCT URINE PROTEIN: ABNORMAL
SL AMB POCT UROBILINOGEN: 0.2

## 2023-06-14 PROCEDURE — 52000 CYSTOURETHROSCOPY: CPT | Performed by: UROLOGY

## 2023-06-14 PROCEDURE — 81003 URINALYSIS AUTO W/O SCOPE: CPT | Performed by: UROLOGY

## 2023-06-14 PROCEDURE — 99214 OFFICE O/P EST MOD 30 MIN: CPT | Performed by: UROLOGY

## 2023-06-14 NOTE — PROGRESS NOTES
HISTORY:    Further evaluation of gross hematuria  2   BPH has been advised to take both finasteride and tamsulosin  Prior scope exam showed numerous clots, could not really  clear the bladder     Cystoscopy     Date/Time 6/14/2023 1:00 PM     Performed by  Elzie Schaumann, MD   Authorized by Elzie Schaumann, MD         Procedure Details:  Procedure type: cystoscopy    Patient tolerance: Patient tolerated the procedure well with no immediate complications    Additional Procedure Details:      Patient presents for cystoscopy  I have discussed the reasons for doing the exam, and the potential risks and complications  Patient expressed understanding, and signed informed consent document  The patient was carefully  positioned supine on the examining table  Sterile preparation was performed on the urethra  Xylocaine jelly was instilled and left  Indwelling for the procedure  The 13 [place]ese flexible cystoscope was passed with the following findings:      Urethra:     Prostate:  lateral lobes and median lobe all significantly enlarged                  Bladder: Diverticula and trabeculation throughout, nodular appearing bladder tumor left posterior wall just behind the orifice     Residual urine: 100 mL    Patient tolerated the procedure well and was escorted from the examining table  ASSESSMENT / PLAN:    1  Lesion very suspicious for bladder cancer    2  I recommend TUR bladder tumor, retrogrades, gemcitabine    3  Patient has start taking tamsulosin, he like to stop some of his meds, so I recommend continue tamsulosin, and stopping finasteride    4  PSA 1 9 in December 2022  1 0 in 2021  0 8 in 2019     the following portions of the patient's history were reviewed and updated as appropriate: allergies, current medications, past family history, past medical history, past social history, past surgical history and problem list     Review of Systems   All other systems reviewed and are negative  "    Objective:     Physical Exam  Constitutional:       Appearance: Normal appearance  Neurological:      Mental Status: He is alert  0   Lab Value Date/Time    PSA 1 9 12/08/2022 0924    PSA 1 0 11/29/2021 0902    PSA 0 9 11/06/2020 0728   ]  BUN   Date Value Ref Range Status   04/25/2023 25 5 - 25 mg/dL Final   01/06/2016 20 5 - 25 mg/dL Final     Creatinine   Date Value Ref Range Status   04/25/2023 1 00 0 60 - 1 30 mg/dL Final     Comment:     Standardized to IDMS reference method   01/06/2016 1 01 0 60 - 1 30 mg/dL Final     Comment:     Standardized to IDMS reference method     No components found for: \"CBC\"      Patient Active Problem List   Diagnosis   • Aneurysm of thoracic aorta ([facility] [address] )   • Essential hypertension   • Atrial fibrillation, persistent ([facility] [address] )   • Iliac artery aneurysm, bilateral (HCC)   • Acquired hypothyroidism   • Neuropathy   • [facility]   • Encounter for Medicare annual wellness exam   • Other headache syndrome   • Rash   • Mixed hyperlipidemia   • Chronic bilateral low back pain without sciatica   • NSVT (nonsustained ventricular tachycardia) ([facility] [address] )   • Hepatic artery dissection (HCC)   • Vitamin D deficiency   • Prediabetes        Diagnoses and all orders for this visit:    BPH with urinary obstruction  -     POCT urine dip auto non-scope    Gross hematuria  -     Cystoscopy    Bladder mass    Other orders  -     Saw [place] 450 MG CAPS           Patient ID: Elio Costa is a 68 y o  male        Current Outpatient Medications:   •  acetaminophen (TYLENOL) 325 mg tablet, Take 2 tablets (650 mg total) by mouth every 6 (six) hours as needed for mild pain or fever, Disp: 30 tablet, Rfl: 0  •  aspirin (ECOTRIN LOW STRENGTH) 81 mg EC tablet, Take 81 mg by mouth daily, Disp: , Rfl:   •  atorvastatin (LIPITOR) 20 mg tablet, TAKE 1 TABLET DAILY, Disp: 90 tablet, Rfl: 3  •  cholecalciferol (VITAMIN D3) 1,000 units tablet, Take 1,000 Units by mouth daily, Disp: , " Rfl:   •  Co-Enzyme Q-10 100 MG CAPS, Take 100 mg by mouth daily, Disp: , Rfl:   •  Eliquis 5 MG, TAKE 1 TABLET TWICE A DAY, Disp: 180 tablet, Rfl: 3  •  felodipine (PLENDIL) 5 mg 24 hr tablet, TAKE 1 TABLET DAILY, Disp: 90 tablet, Rfl: 3  •  finasteride (PROSCAR) 5 mg tablet, Take 1 tablet (5 mg total) by mouth daily, Disp: 30 tablet, Rfl: 1  •  gabapentin (NEURONTIN) 100 mg capsule, TAKE 1 CAPSULE 3 TIMES A   DAY, Disp: 270 capsule, Rfl: 1  •  hydrocortisone 2 5 % cream, Apply 1 application  topically 2 (two) times a day To affected area as needed, Disp: , Rfl:   •  ketoconazole (NIZORAL) 2 % cream, As needed, Disp: , Rfl:   •  levothyroxine 75 mcg tablet, TAKE 1 TABLET DAILY FOR    ACQUIRED HYPOTHYROIDISM, Disp: 90 tablet, Rfl: 3  •  metoprolol succinate (TOPROL-XL) 25 mg 24 hr tablet, TAKE 1 TABLET TWICE A DAY, Disp: 180 tablet, Rfl: 3  •  Misc Natural Products (LUTEIN 20 PO), Take 20 mg by mouth daily  , Disp: , Rfl:   •  multivitamin (THERAGRAN) TABS, Take 1 tablet by mouth daily  , Disp: , Rfl:   •  Saw [place] 450 MG CAPS, , Disp: , Rfl:   •  tamsulosin (FLOMAX) 0 4 mg, Take 1 capsule (0 4 mg total) by mouth daily with dinner, Disp: 90 capsule, Rfl: 3    Past Medical History:   Diagnosis Date   • Acute blood loss anemia 3/8/2019   • Aneurysm of thoracic aorta ([address] )    • Arrhythmia    • Cholecystitis 3/5/2019    Added automatically from request for surgery [id number]   • Detached retina, right    • Disease of thyroid gland    • Gastric wall thickening    • Headache    • Hematoma 10/10/2018   • Hypertension    • Iliac artery aneurysm, bilateral (HCC)    • Irregular heart beat     afib cardioversion 1/30/17, x2    • Neuropathy     bilateral feet   • Paroxysmal A-fib [facility])    • Prostatic hypertrophy    • 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[facility])        Past Surgical History:   Procedure Laterality Date   • ABDOMINAL AORTIC ANEURYSM REPAIR, ENDOVASCULAR Right 4/13/2018    Procedure: REPAIR ANEURYSM ILIAC endovascular  with coil embolization right hypogastric artery ;  Surgeon: Dar Joseph MD;  [facility];  Service: Vascular   • CARDIOVERSION     • CATARACT EXTRACTION Left 10/20/2019    Right eye 11/17/2011   • CHOLECYSTECTOMY LAPAROSCOPIC N/A 3/8/2019    Procedure: [id number] CHOLECYSTECTOMY;  Surgeon: Rony Garay DO;  [facility];  Service: General   • COLONOSCOPY  07/13/2016   • MOLE EXCISION  2021   • [place] EDG US EXAM SURGICAL ALTER STOM DUODENUM/JEJUNUM N/A 11/10/2017    Procedure: RADIAL ENDOSCOPIC U/S;  Surgeon: Claudene Herter, MD;  [facility];   Service: Gastroenterology   • RETINAL DETACHMENT SURGERY Right     onset 1992, retinal detachment complete air fill confirmed       Social History

## 2023-06-19 ENCOUNTER — OFFICE VISIT (OUTPATIENT)
Dept: INTERNAL MEDICINE CLINIC | Facility: CLINIC | Age: 73
End: 2023-06-19
Payer: MEDICARE

## 2023-06-19 VITALS
SYSTOLIC BLOOD PRESSURE: 120 MMHG | HEIGHT: 73 IN | DIASTOLIC BLOOD PRESSURE: 74 MMHG | WEIGHT: 234.2 LBS | TEMPERATURE: 96.9 F | BODY MASS INDEX: 31.04 KG/M2 | HEART RATE: 46 BPM | OXYGEN SATURATION: 97 %

## 2023-06-19 DIAGNOSIS — E03.9 ACQUIRED HYPOTHYROIDISM: ICD-10-CM

## 2023-06-19 DIAGNOSIS — E78.2 MIXED HYPERLIPIDEMIA: ICD-10-CM

## 2023-06-19 DIAGNOSIS — I10 ESSENTIAL HYPERTENSION: ICD-10-CM

## 2023-06-19 DIAGNOSIS — I48.19 ATRIAL FIBRILLATION, PERSISTENT (HCC): ICD-10-CM

## 2023-06-19 DIAGNOSIS — I77.79 HEPATIC ARTERY DISSECTION (HCC): ICD-10-CM

## 2023-06-19 DIAGNOSIS — R73.03 PREDIABETES: ICD-10-CM

## 2023-06-19 DIAGNOSIS — R31.0 GROSS HEMATURIA: Primary | ICD-10-CM

## 2023-06-19 DIAGNOSIS — E55.9 VITAMIN D DEFICIENCY: ICD-10-CM

## 2023-06-19 PROCEDURE — 99214 OFFICE O/P EST MOD 30 MIN: CPT | Performed by: INTERNAL MEDICINE

## 2023-06-19 NOTE — PROGRESS NOTES
Name: Zeus Wilson      : 1950      MRN: [id number]  Encounter Provider: Sherry Huggins MD  Encounter Date: 2023   [facility] [address] [place], [place] [address]     1  Gross hematuria  Assessment & Plan:  Patient had a cystoscopy done 614 with urology which showed a nodular bladder tumor, and is getting further work-up for this with urology  No current blood in the urine that he sees  2  Hepatic artery dissection ([address] )  Assessment & Plan:  Follow up vascular      3  Prediabetes  Assessment & Plan:  Continue with healthy diet and exercise, this has been controlled      4  Atrial fibrillation, persistent ([address] )  Assessment & Plan:  Patient is back on Eliquis, this was held when he had gross hematuria for a few days  5  Acquired hypothyroidism  Assessment & Plan:  Continue levothyroxine along with monitoring      6  Essential hypertension  Assessment & Plan:  Well-controlled, continue current meds along with healthy diet and exercise      7  Mixed hyperlipidemia  Assessment & Plan:  Continue statin along with healthy diet and exercise      8  Vitamin D deficiency  Assessment & Plan:  Continue Vitamin D             Subjective     Pt here for follow up    Review of Systems   Constitutional: Positive for fatigue (mild)  Negative for chills and fever  HENT: Negative for congestion, nosebleeds, postnasal drip, sore throat and trouble swallowing  Eyes: Negative for pain  Respiratory: Negative for cough, chest tightness, shortness of breath and wheezing  Cardiovascular: Negative for chest pain, palpitations and leg swelling  Gastrointestinal: Negative for abdominal pain, constipation, diarrhea, nausea and vomiting  Endocrine: Negative for polydipsia and polyuria  Genitourinary: Negative for dysuria, flank pain and hematuria  Musculoskeletal: Negative for arthralgias  Skin: Negative for rash     Neurological: Negative for dizziness, tremors, light-headedness and headaches  Hematological: Does not bruise/bleed easily  Psychiatric/Behavioral: Negative for confusion and dysphoric mood  The patient is not nervous/anxious  Past Medical History:   Diagnosis Date   • Acute blood loss anemia 3/8/2019   • Aneurysm of thoracic aorta ([address] )    • Arrhythmia    • Cholecystitis 3/5/2019    Added automatically from request for surgery [id number]   • Detached retina, right    • Disease of thyroid gland    • Gastric wall thickening    • Headache    • Hematoma 10/10/2018   • Hypertension    • Iliac artery aneurysm, bilateral (HCC)    • Irregular heart beat     afib cardioversion 1/30/17, x2    • Neuropathy     bilateral feet   • Paroxysmal A-fib [facility])    • Prostatic hypertrophy    • 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[facility])      Past Surgical History:   Procedure Laterality Date   • ABDOMINAL AORTIC ANEURYSM REPAIR, ENDOVASCULAR Right 4/13/2018    Procedure: REPAIR ANEURYSM ILIAC endovascular  with coil embolization right hypogastric artery ;  Surgeon: Lizbeth Bliss MD;  [facility];  Service: Vascular   • CARDIOVERSION     • CATARACT EXTRACTION Left 10/20/2019    Right eye 11/17/2011   • CHOLECYSTECTOMY LAPAROSCOPIC N/A 3/8/2019    Procedure: [id number] CHOLECYSTECTOMY;  Surgeon: Ghislaine Fortune DO;  [facility];  Service: General   • COLONOSCOPY  07/13/2016   • MOLE EXCISION  2021   • [place] EDG US EXAM SURGICAL ALTER STOM DUODENUM/JEJUNUM N/A 11/10/2017    Procedure: RADIAL ENDOSCOPIC U/S;  Surgeon: Rashad Lundberg MD;  [facility];   Service: Gastroenterology   • RETINAL DETACHMENT SURGERY Right     onset 1992, retinal detachment complete air fill confirmed     Family History   Problem Relation Age of Onset   • Stroke Mother         CVA   • Aortic aneurysm Father         abdominal   • Aortic aneurysm Brother    • Basal cell carcinoma Brother    • Cancer Maternal Grandmother         malignant neoplasm   • Cancer Maternal Grandfather malignant neoplasm   • Cancer Paternal Grandmother         malignant neoplasm   • Cancer Paternal Grandfather         malignant neoplasm   • Cancer Family         malignant neoplasm   • Cancer Family         malignant neoplasm     Social History     Socioeconomic History   • Marital status: /Civil Union     Spouse name: None   • Number of children: None   • Years of education: None   • Highest education level: None   Occupational History   • None   Tobacco Use   • Smoking status: Never   • Smokeless tobacco: Never   Vaping Use   • Vaping Use: Never used   Substance and Sexual Activity   • Alcohol use: Yes     Comment: socially    • Drug use: No   • Sexual activity: Not Currently   Other Topics Concern   • None   Social History Narrative   • None     Social Determinants of Health     Financial Resource Strain: Low Risk  (12/12/2022)    Overall Financial Resource Strain (CARDIA)    • Difficulty of Paying Living Expenses: Not hard at all   Food Insecurity: Not on file   Transportation Needs: No Transportation Needs (12/12/2022)    PRAPARE - Transportation    • Lack of Transportation (Medical): No    • Lack of Transportation (Non-Medical):  No   Physical Activity: Not on file   Stress: Not on file   Social Connections: Not on file   Intimate Partner Violence: Not on file   Housing Stability: Not on file     Current Outpatient Medications on File Prior to Visit   Medication Sig   • acetaminophen (TYLENOL) 325 mg tablet Take 2 tablets (650 mg total) by mouth every 6 (six) hours as needed for mild pain or fever   • aspirin (ECOTRIN LOW STRENGTH) 81 mg EC tablet Take 81 mg by mouth daily   • atorvastatin (LIPITOR) 20 mg tablet TAKE 1 TABLET DAILY   • cholecalciferol (VITAMIN D3) 1,000 units tablet Take 1,000 Units by mouth daily   • Co-Enzyme Q-10 100 MG CAPS Take 100 mg by mouth daily   • Eliquis 5 MG TAKE 1 TABLET TWICE A DAY   • felodipine (PLENDIL) 5 mg 24 hr tablet TAKE 1 TABLET DAILY   • finasteride (PROSCAR) "5 mg tablet Take 1 tablet (5 mg total) by mouth daily   • gabapentin (NEURONTIN) 100 mg capsule TAKE 1 CAPSULE 3 TIMES A   DAY   • hydrocortisone 2 5 % cream Apply 1 application  topically 2 (two) times a day To affected area as needed   • ketoconazole (NIZORAL) 2 % cream As needed   • levothyroxine 75 mcg tablet TAKE 1 TABLET DAILY FOR    ACQUIRED HYPOTHYROIDISM   • metoprolol succinate (TOPROL-XL) 25 mg 24 hr tablet TAKE 1 TABLET TWICE A DAY   • Misc Natural Products (LUTEIN 20 PO) Take 20 mg by mouth daily  • multivitamin (THERAGRAN) TABS Take 1 tablet by mouth daily  • Saw [place] 450 MG CAPS    • tamsulosin (FLOMAX) 0 4 mg Take 1 capsule (0 4 mg total) by mouth daily with dinner     Allergies   Allergen Reactions   • Wound Dressing Adhesive      Immunization History   Administered Date(s) Administered   • COVID-19 MODERNA VACC 0 5 ML IM 02/16/2021, 03/23/2021, 11/09/2021   • H1N1, All Formulations 01/09/2010   • INFLUENZA 11/29/2016, 10/23/2017, 10/16/2020   • Influenza Split High Dose Preservative Free IM 11/16/2010, 09/19/2012, 09/24/2014, 09/16/2015, 11/29/2016, 10/23/2017   • Influenza, high dose seasonal 0 7 mL 10/15/2019, 11/09/2021, 10/12/2022   • Pneumococcal Conjugate 13-Valent 11/29/2016   • Pneumococcal Polysaccharide PPV23 08/01/2018   • Tdap 11/29/2016   • Zoster Vaccine Recombinant 10/16/2020, 12/28/2020   • influenza, trivalent, adjuvanted 10/16/2020       Objective     /74 (BP Location: Left arm, Patient Position: Sitting, Cuff Size: Large)   Pulse (!) 46   Temp (!) 96 9 °F (36 1 °C) (Probe)   Ht 6' 1\" (1 854 m)   Wt 106 kg (234 lb 3 2 oz)   SpO2 97%   BMI 30 90 kg/m²     Physical Exam  Vitals reviewed  Constitutional:       General: He is not in acute distress  Appearance: Normal appearance  He is well-developed  HENT:      Head: Normocephalic and atraumatic        Right Ear: External ear normal       Left Ear: External ear normal    Eyes:      General: No scleral " icterus  Conjunctiva/sclera: Conjunctivae normal    Neck:      Thyroid: No thyromegaly  Trachea: No tracheal deviation  Cardiovascular:      Rate and Rhythm: Normal rate  Rhythm irregular  Heart sounds: Normal heart sounds  No murmur heard  Pulmonary:      Effort: Pulmonary effort is normal  No respiratory distress  Breath sounds: Normal breath sounds  No wheezing or rales  Abdominal:      General: Bowel sounds are normal       Palpations: Abdomen is soft  Tenderness: There is no abdominal tenderness  There is no guarding or rebound  Musculoskeletal:      Cervical back: Normal range of motion and neck supple  Right lower leg: No edema  Left lower leg: No edema  Lymphadenopathy:      Cervical: No cervical adenopathy  Skin:     Coloration: Skin is not jaundiced or pale  Neurological:      General: No focal deficit present  Mental Status: He is alert and oriented to person, place, and time  Psychiatric:         Mood and Affect: Mood normal          Behavior: Behavior normal          Thought Content:  Thought content normal          Judgment: Judgment normal        Akin Mckenzie MD

## 2023-06-19 NOTE — PATIENT INSTRUCTIONS
Problem List Items Addressed This Visit          Endocrine    Acquired hypothyroidism     Continue levothyroxine along with monitoring            Cardiovascular and Mediastinum    Essential hypertension     Well-controlled, continue current meds along with healthy diet and exercise         Atrial fibrillation, persistent ([address] )     Patient is back on Eliquis, this was held when he had gross hematuria for a few days  Hepatic artery dissection (HCC)     Follow up vascular            Genitourinary    Gross hematuria - Primary     Patient had a cystoscopy done 614 with urology which showed a nodular bladder tumor, and is getting further work-up for this with urology  No current blood in the urine that he sees              Other    Mixed hyperlipidemia     Continue statin along with healthy diet and exercise         Vitamin D deficiency     Continue Vitamin D         Prediabetes     Continue with healthy diet and exercise, this has been controlled

## 2023-06-19 NOTE — ASSESSMENT & PLAN NOTE
Patient had a cystoscopy done  with urology which showed a nodular bladder tumor, and is getting further work-up for this with urology  No current blood in the urine that he sees

## 2023-07-07 ENCOUNTER — OFFICE VISIT (OUTPATIENT)
Dept: URGENT CARE | Age: 73
End: 2023-07-07
Payer: MEDICARE

## 2023-07-07 ENCOUNTER — APPOINTMENT (OUTPATIENT)
Dept: RADIOLOGY | Age: 73
End: 2023-07-07
Payer: MEDICARE

## 2023-07-07 ENCOUNTER — TELEPHONE (OUTPATIENT)
Dept: INTERNAL MEDICINE CLINIC | Facility: CLINIC | Age: 73
End: 2023-07-07

## 2023-07-07 ENCOUNTER — TELEPHONE (OUTPATIENT)
Dept: URGENT CARE | Age: 73
End: 2023-07-07

## 2023-07-07 VITALS
WEIGHT: 234 LBS | DIASTOLIC BLOOD PRESSURE: 78 MMHG | TEMPERATURE: 97.8 F | RESPIRATION RATE: 18 BRPM | SYSTOLIC BLOOD PRESSURE: 154 MMHG | BODY MASS INDEX: 31.01 KG/M2 | HEART RATE: 69 BPM | HEIGHT: 73 IN

## 2023-07-07 DIAGNOSIS — S99.912A INJURY OF LEFT ANKLE, INITIAL ENCOUNTER: Primary | ICD-10-CM

## 2023-07-07 DIAGNOSIS — S93.402A SPRAIN OF LEFT ANKLE, UNSPECIFIED LIGAMENT, INITIAL ENCOUNTER: ICD-10-CM

## 2023-07-07 DIAGNOSIS — S99.912A INJURY OF LEFT ANKLE, INITIAL ENCOUNTER: ICD-10-CM

## 2023-07-07 PROCEDURE — G0463 HOSPITAL OUTPT CLINIC VISIT: HCPCS

## 2023-07-07 PROCEDURE — 99213 OFFICE O/P EST LOW 20 MIN: CPT

## 2023-07-07 PROCEDURE — 73610 X-RAY EXAM OF ANKLE: CPT

## 2023-07-07 NOTE — PATIENT INSTRUCTIONS
Tylenol twice per day for inflammation and pain  Wear brace for support (Remove braces every 3 hours)  Ice 20 minutes 3-4 times per day for 3 days  Insulate the skin from the ice to prevent frostbite  Rest and Elevate  Follow up with orthopedic    Remove splint/brace and go straight to ER if you experience sudden increase in pain, swelling, change in color/temperature/sensation, chest pain, shortness or breath, or if you start coughing up blood. Follow up with PCP in 3-5 days. Proceed to  ER if symptoms worsen.

## 2023-07-07 NOTE — PROGRESS NOTES
[facility]        NAME: Lauren Loera is a 68 y.o. male  : 1950    MRN: [id number]  DATE: 2023  TIME: 1:26 PM    Assessment and Plan   Injury of left ankle, initial encounter [S99.912A]  1. Injury of left ankle, initial encounter  XR ankle 3+ vw left      2. Sprain of left ankle, unspecified ligament, initial encounter  Ambulatory Referral to Orthopedic Surgery    Orthopedic injury treatment        Preliminary xray read by myself. No acute osseous abnormality noted at ankle. Possible small tibial fracture only seen in one view. Pending radiologist final read. Gave walking boot and crutches and referral to orthopedics. Patient Instructions     Tylenol twice per day for inflammation and pain  Wear brace for support (Remove braces every 3 hours)  Ice 20 minutes 3-4 times per day for 3 days  Insulate the skin from the ice to prevent frostbite  Rest and Elevate  Follow up with orthopedic    Remove splint/brace and go straight to ER if you experience sudden increase in pain, swelling, change in color/temperature/sensation, chest pain, shortness or breath, or if you start coughing up blood. Follow up with PCP in 3-5 days. Proceed to  ER if symptoms worsen. Chief Complaint     Chief Complaint   Patient presents with   • Ankle Injury     Patient yesterday went hiking. On the way down he slipped on a rock and twisted his ankle. Injured foot, ankle, and shin. History of Present Illness       Patient stated he fell yesterday while hiking. He denies hitting his head or any loss of consciousness. He stated that when he fell his leg went under him in an "unnatural position."    Ankle Injury   Incident onset: yesterday. Incident location: hiking. Injury mechanism: he slipped on a rock and twisted his left ankle. Pain location: left foot, ankle, and shin. Pain scale: He stated that when he is sitting his pain is at 2/10 but if he twists at his ankle the pain is a 6 or 7.  Associated symptoms include an inability to bear weight (shuffles to walk). Pertinent negatives include no numbness or tingling. He has tried ice and acetaminophen for the symptoms. Review of Systems   Review of Systems   Constitutional: Negative. HENT: Negative. Respiratory: Negative. Cardiovascular: Negative. Gastrointestinal: Negative. Genitourinary: Negative. Musculoskeletal: Positive for joint swelling (left ankle). Negative for back pain, gait problem, neck pain and neck stiffness. Skin: Negative. Neurological: Negative. Negative for tingling and numbness. Current Medications       Current Outpatient Medications:   •  acetaminophen (TYLENOL) 325 mg tablet, Take 2 tablets (650 mg total) by mouth every 6 (six) hours as needed for mild pain or fever, Disp: 30 tablet, Rfl: 0  •  aspirin (ECOTRIN LOW STRENGTH) 81 mg EC tablet, Take 81 mg by mouth daily, Disp: , Rfl:   •  atorvastatin (LIPITOR) 20 mg tablet, TAKE 1 TABLET DAILY, Disp: 90 tablet, Rfl: 3  •  cholecalciferol (VITAMIN D3) 1,000 units tablet, Take 1,000 Units by mouth daily, Disp: , Rfl:   •  Co-Enzyme Q-10 100 MG CAPS, Take 100 mg by mouth daily, Disp: , Rfl:   •  Eliquis 5 MG, TAKE 1 TABLET TWICE A DAY, Disp: 180 tablet, Rfl: 3  •  felodipine (PLENDIL) 5 mg 24 hr tablet, TAKE 1 TABLET DAILY, Disp: 90 tablet, Rfl: 3  •  gabapentin (NEURONTIN) 100 mg capsule, TAKE 1 CAPSULE 3 TIMES A   DAY, Disp: 270 capsule, Rfl: 1  •  hydrocortisone 2.5 % cream, Apply 1 application. topically 2 (two) times a day To affected area as needed, Disp: , Rfl:   •  ketoconazole (NIZORAL) 2 % cream, As needed, Disp: , Rfl:   •  levothyroxine 75 mcg tablet, TAKE 1 TABLET DAILY FOR    ACQUIRED HYPOTHYROIDISM, Disp: 90 tablet, Rfl: 3  •  metoprolol succinate (TOPROL-XL) 25 mg 24 hr tablet, TAKE 1 TABLET TWICE A DAY, Disp: 180 tablet, Rfl: 3  •  Misc Natural Products (LUTEIN 20 PO), Take 20 mg by mouth daily. , Disp: , Rfl:   •  multivitamin (Matthias Dominguez) TABS, Take 1 tablet by mouth daily. , Disp: , Rfl:   •  Saw [place] 450 MG CAPS, , Disp: , Rfl:   •  tamsulosin (FLOMAX) 0.4 mg, Take 1 capsule (0.4 mg total) by mouth daily with dinner, Disp: 90 capsule, Rfl: 3  •  finasteride (PROSCAR) 5 mg tablet, Take 1 tablet (5 mg total) by mouth daily (Patient not taking: Reported on 7/7/2023), Disp: 30 tablet, Rfl: 1    Current Allergies     Allergies as of 07/07/2023 - Reviewed 07/07/2023   Allergen Reaction Noted   • Wound dressing adhesive  09/23/2014            The following portions of the patient's history were reviewed and updated as appropriate: allergies, current medications, past family history, past medical history, past social history, past surgical history and problem list.     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[facility])        Past Surgical History:   Procedure Laterality Date   • ABDOMINAL AORTIC ANEURYSM REPAIR, ENDOVASCULAR Right 4/13/2018    Procedure: REPAIR ANEURYSM ILIAC endovascular  with coil embolization right hypogastric artery.;  Surgeon: Zechariah Flowers MD;  [facility];  Service: Vascular   • CARDIOVERSION     • CATARACT EXTRACTION Left 10/20/2019    Right eye 11/17/2011   • CHOLECYSTECTOMY LAPAROSCOPIC N/A 3/8/2019    Procedure: [id number] CHOLECYSTECTOMY;  Surgeon: Roby Martinez DO;  [facility];  Service: General   • COLONOSCOPY  07/13/2016   • MOLE EXCISION  2021   • [place] EDG US EXAM SURGICAL ALTER STOM DUODENUM/JEJUNUM N/A 11/10/2017    Procedure: RADIAL ENDOSCOPIC U/S;  Surgeon: Vidhya Mantilla MD;  Location: [facility]; Service: Gastroenterology   • RETINAL DETACHMENT SURGERY Right     onset 1992, retinal detachment complete air fill confirmed       Family History   Problem Relation Age of Onset   • Stroke Mother         CVA   • Aortic aneurysm Father         abdominal   • Aortic aneurysm Brother    • Basal cell carcinoma Brother    • Cancer Maternal Grandmother         malignant neoplasm   • Cancer Maternal Grandfather         malignant neoplasm   • Cancer Paternal Grandmother         malignant neoplasm   • Cancer Paternal Grandfather         malignant neoplasm   • Cancer Family         malignant neoplasm   • Cancer Family         malignant neoplasm         Medications have been verified. Objective   /78   Pulse 69   Temp 97.8 °F (36.6 °C)   Resp 18   Ht 6' 1" (1.854 m)   Wt 106 kg (234 lb)   BMI 30.87 kg/m²        Physical Exam     Physical Exam  Constitutional:       Appearance: Normal appearance. HENT:      Head: Normocephalic. Cardiovascular:      Rate and Rhythm: Normal rate and regular rhythm. Pulses: Normal pulses. Heart sounds: Normal heart sounds. Pulmonary:      Effort: Pulmonary effort is normal.      Breath sounds: Normal breath sounds. Musculoskeletal:         General: Swelling (medial and lateral malleolus) and tenderness present. No deformity or signs of injury. Cervical back: Normal range of motion. No rigidity or tenderness. Comments: Limited ROM at left ankle   Skin:     General: Skin is warm and dry. Capillary Refill: Capillary refill takes less than 2 seconds. Findings: No bruising, erythema, lesion or rash. Neurological:      General: No focal deficit present. Mental Status: He is alert and oriented to person, place, and time. Mental status is at baseline.    Psychiatric:         Mood and Affect: Mood normal.         Behavior: Behavior normal.         Thought Content: Thought content normal.         Judgment: Judgment normal.       Orthopedic injury treatment    Date/Time: 7/7/2023 11:00 AM    Performed by: Galen Hernández PA-C  Authorized by: Galen Hernández PA-C  Universal Protocol:  Consent: Verbal consent obtained. Risks and benefits: risks, benefits and alternatives were discussed  Consent given by: patient      Injury location:  Ankle  Location details:  Left ankle  Injury type:   Soft tissue  Neurovascular status: Neurovascularly intact    Distal perfusion: normal    Neurological function: normal    Range of motion: normal    Immobilization:  Crutches (walking boot)  Neurovascular status: Neurovascularly intact    Distal perfusion: normal    Neurological function: normal    Range of motion: normal    Patient tolerance:  Patient tolerated the procedure well with no immediate complications

## 2023-07-07 NOTE — TELEPHONE ENCOUNTER
Called patient to inform him of his positive x-ray reading from the radiologist.  Roshan Brownes him to continue using his boot and crutches until he follows up with the radiologist.  Roshan Desai to call the office back with any questions or concerns.

## 2023-07-10 ENCOUNTER — OFFICE VISIT (OUTPATIENT)
Dept: OBGYN CLINIC | Facility: OTHER | Age: 73
End: 2023-07-10
Payer: MEDICARE

## 2023-07-10 VITALS
BODY MASS INDEX: 31.01 KG/M2 | WEIGHT: 234 LBS | HEIGHT: 73 IN | DIASTOLIC BLOOD PRESSURE: 85 MMHG | HEART RATE: 81 BPM | SYSTOLIC BLOOD PRESSURE: 132 MMHG

## 2023-07-10 DIAGNOSIS — S93.402A SPRAIN OF LEFT ANKLE, UNSPECIFIED LIGAMENT, INITIAL ENCOUNTER: ICD-10-CM

## 2023-07-10 DIAGNOSIS — S82.392A CLOSED FRACTURE OF POSTERIOR MALLEOLUS OF LEFT TIBIA, INITIAL ENCOUNTER: Primary | ICD-10-CM

## 2023-07-10 PROCEDURE — 99203 OFFICE O/P NEW LOW 30 MIN: CPT | Performed by: ORTHOPAEDIC SURGERY

## 2023-07-10 NOTE — PROGRESS NOTES
Assessment:       1. Closed fracture of posterior malleolus of left tibia, initial encounter    2. Sprain of left ankle, unspecified ligament, initial encounter          Plan:        Explained my current clinical findings and reviewed the radiological findings with the patient today. At this time I recommend continuing with a tall cam boot. Patient may weight-bear as tolerated in the cam boot. Recommend doing local ice application and limb elevation for comfort. We will follow-up in about 3 weeks time for clinical and radiological reassessment. If he has progressive healing, we will consider weaning out of the cam boot and initiation of physical therapy rehabilitation. Patient expresses understanding and is in agreement with the treatment plan. Subjective:     Patient ID: Nanda Hester is a 68 y.o. male. Chief Complaint:    HPI  Mr. Milana Adame is a pleasant 68-year-old gentleman who presents today for evaluation of acute left ankle injury sustained on 7/6/2023. He reports having a twisting injury with likely an eversion mechanism while walking downhill. Subsequent difficulty with weightbearing and ambulation and hence was seen at an urgent care facility. Plain radiograph of the left ankle was performed and this revealed a nondisplaced left ankle posterior malleolus fracture. The patient is currently in a tall cam boot and using axillary crutches. Pain intensity is mild to moderate primarily on the posterior medial aspect of the left ankle but also reports some anterolateral ankle pain. Denies any proximal leg pain.     Current Outpatient Medications on File Prior to Visit   Medication Sig Dispense Refill   • acetaminophen (TYLENOL) 325 mg tablet Take 2 tablets (650 mg total) by mouth every 6 (six) hours as needed for mild pain or fever 30 tablet 0   • aspirin (ECOTRIN LOW STRENGTH) 81 mg EC tablet Take 81 mg by mouth daily     • atorvastatin (LIPITOR) 20 mg tablet TAKE 1 TABLET DAILY 90 tablet 3 • cholecalciferol (VITAMIN D3) 1,000 units tablet Take 1,000 Units by mouth daily     • Co-Enzyme Q-10 100 MG CAPS Take 100 mg by mouth daily     • Eliquis 5 MG TAKE 1 TABLET TWICE A  tablet 3   • felodipine (PLENDIL) 5 mg 24 hr tablet TAKE 1 TABLET DAILY 90 tablet 3   • finasteride (PROSCAR) 5 mg tablet Take 1 tablet (5 mg total) by mouth daily (Patient not taking: Reported on 7/7/2023) 30 tablet 1   • gabapentin (NEURONTIN) 100 mg capsule TAKE 1 CAPSULE 3 TIMES A    capsule 1   • hydrocortisone 2.5 % cream Apply 1 application. topically 2 (two) times a day To affected area as needed     • ketoconazole (NIZORAL) 2 % cream As needed     • levothyroxine 75 mcg tablet TAKE 1 TABLET DAILY FOR    ACQUIRED HYPOTHYROIDISM 90 tablet 3   • metoprolol succinate (TOPROL-XL) 25 mg 24 hr tablet TAKE 1 TABLET TWICE A  tablet 3   • Misc Natural Products (LUTEIN 20 PO) Take 20 mg by mouth daily. • multivitamin (THERAGRAN) TABS Take 1 tablet by mouth daily. • Saw [place] 450 MG CAPS      • tamsulosin (FLOMAX) 0.4 mg Take 1 capsule (0.4 mg total) by mouth daily with dinner 90 capsule 3     No current facility-administered medications on file prior to visit.      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[facility])      Past Surgical History:   Procedure Laterality Date   • ABDOMINAL AORTIC ANEURYSM REPAIR, ENDOVASCULAR Right 4/13/2018    Procedure: REPAIR ANEURYSM ILIAC endovascular with coil embolization right hypogastric artery.;  Surgeon: Rikki Montgomery MD;  [facility];  Service: Vascular   • CARDIOVERSION     • CATARACT EXTRACTION Left 10/20/2019    Right eye 11/17/2011   • CHOLECYSTECTOMY LAPAROSCOPIC N/A 3/8/2019    Procedure: [id number] CHOLECYSTECTOMY;  Surgeon: Jarrod Cohen DO;  [facility];  Service: General   • COLONOSCOPY  07/13/2016   • MOLE EXCISION  2021   • [place] EDG US EXAM SURGICAL ALTER STOM DUODENUM/JEJUNUM N/A 11/10/2017    Procedure: RADIAL ENDOSCOPIC U/S;  Surgeon: Devin Dela Cruz MD;  [facility]; Service: Gastroenterology   • RETINAL DETACHMENT SURGERY Right     onset 1992, retinal detachment complete air fill confirmed     Allergies   Allergen Reactions   • Wound Dressing Adhesive           Social History     Occupational History   • Not on file   Tobacco Use   • Smoking status: Never   • Smokeless tobacco: Never   Vaping Use   • Vaping Use: Never used   Substance and Sexual Activity   • Alcohol use: Yes     Comment: socially    • Drug use: No   • Sexual activity: Not Currently      Review of Systems        Objective:     Left Ankle Exam     Tenderness   The patient is experiencing tenderness in the ATF (Posterior aspect of the medial malleolus). Swelling: mild    Muscle Strength   Left ankle normal muscle strength: Deferred. Tests   Left ankle anterior drawer test: Deferred. Left ankle varus tilt: Deferred. Other   Sensation: normal  Pulse: present    Comments:  Negative syndesmotic squeeze. Negative calcaneal squeeze. No proximal fibular tenderness noted. Clinically intact distal neurovascular status. Physical Exam  Vitals and nursing note reviewed. Constitutional:       Appearance: He is well-developed. HENT:      Head: Normocephalic and atraumatic. Cardiovascular:      Rate and Rhythm: Normal rate. Pulmonary:      Effort: Pulmonary effort is normal. No respiratory distress.    Skin:     General: Skin is warm. Findings: No erythema. Neurological:      Mental Status: He is alert and oriented to person, place, and time. Psychiatric:         Behavior: Behavior normal.         Thought Content: Thought content normal.         Judgment: Judgment normal.           I have personally reviewed pertinent films in PACS and my interpretation is Plain radiograph of the left ankle performed on 7/7/2023 reveals a nondisplaced posterior malleolus fracture in the lateral view. Lara Gonzalez

## 2023-07-10 NOTE — PATIENT INSTRUCTIONS
Rest, elevation, local ice application for pain and wear a cam boot during weightbearing and ambulation. Follow-up in 3 weeks for clinical and radiological reassessment.

## 2023-07-13 ENCOUNTER — TELEPHONE (OUTPATIENT)
Dept: OBGYN CLINIC | Facility: HOSPITAL | Age: 73
End: 2023-07-13

## 2023-07-13 NOTE — TELEPHONE ENCOUNTER
Caller: pt     Doctor: Lidia Santos     Reason for call: in a walking boot for the next few weeks would like to know if he qualifies for a temporary handicap and how that would be facilitated.      Call back#: [phone]

## 2023-07-13 NOTE — TELEPHONE ENCOUNTER
The patient needs to present to the handicap parking form from [employer]. This may also be completed by his primary care physician. I will be out of office for the next several days. Alternatively, the form may be completed by the sports medicine fellow or any primary care sports medicine attending.

## 2023-07-18 ENCOUNTER — OFFICE VISIT (OUTPATIENT)
Dept: LAB | Facility: CLINIC | Age: 73
End: 2023-07-18
Payer: MEDICARE

## 2023-07-18 ENCOUNTER — APPOINTMENT (OUTPATIENT)
Dept: LAB | Facility: CLINIC | Age: 73
End: 2023-07-18
Payer: MEDICARE

## 2023-07-18 DIAGNOSIS — Z01.812 PRE-OPERATIVE LABORATORY EXAMINATION: ICD-10-CM

## 2023-07-18 DIAGNOSIS — N32.89 BLADDER MASS: ICD-10-CM

## 2023-07-18 DIAGNOSIS — Z01.810 PRE-OPERATIVE CARDIOVASCULAR EXAMINATION: ICD-10-CM

## 2023-07-18 DIAGNOSIS — R39.89 SUSPECTED UTI: ICD-10-CM

## 2023-07-18 LAB
ALBUMIN SERPL BCP-MCNC: 3.9 G/DL (ref 3.5–5)
ALP SERPL-CCNC: 111 U/L (ref 46–116)
ALT SERPL W P-5'-P-CCNC: 29 U/L (ref 12–78)
ANION GAP SERPL CALCULATED.3IONS-SCNC: 2 MMOL/L
AST SERPL W P-5'-P-CCNC: 18 U/L (ref 5–45)
BASOPHILS # BLD AUTO: 0.06 THOUSANDS/ÂΜL (ref 0–0.1)
BASOPHILS NFR BLD AUTO: 1 % (ref 0–1)
BILIRUB SERPL-MCNC: 0.57 MG/DL (ref 0.2–1)
BUN SERPL-MCNC: 23 MG/DL (ref 5–25)
CALCIUM SERPL-MCNC: 9.3 MG/DL (ref 8.3–10.1)
CHLORIDE SERPL-SCNC: 112 MMOL/L (ref 96–108)
CO2 SERPL-SCNC: 25 MMOL/L (ref 21–32)
CREAT SERPL-MCNC: 1.06 MG/DL (ref 0.6–1.3)
EOSINOPHIL # BLD AUTO: 0.1 THOUSAND/ÂΜL (ref 0–0.61)
EOSINOPHIL NFR BLD AUTO: 2 % (ref 0–6)
ERYTHROCYTE [DISTWIDTH] IN BLOOD BY AUTOMATED COUNT: 11.7 % (ref 11.6–15.1)
GFR SERPL CREATININE-BSD FRML MDRD: 69 ML/MIN/1.73SQ M
GLUCOSE SERPL-MCNC: 101 MG/DL (ref 65–140)
HCT VFR BLD AUTO: 46.4 % (ref 36.5–49.3)
HGB BLD-MCNC: 16 G/DL (ref 12–17)
IMM GRANULOCYTES # BLD AUTO: 0.01 THOUSAND/UL (ref 0–0.2)
IMM GRANULOCYTES NFR BLD AUTO: 0 % (ref 0–2)
LYMPHOCYTES # BLD AUTO: 0.84 THOUSANDS/ÂΜL (ref 0.6–4.47)
LYMPHOCYTES NFR BLD AUTO: 16 % (ref 14–44)
MCH RBC QN AUTO: 31.2 PG (ref 26.8–34.3)
MCHC RBC AUTO-ENTMCNC: 34.5 G/DL (ref 31.4–37.4)
MCV RBC AUTO: 90 FL (ref 82–98)
MONOCYTES # BLD AUTO: 0.56 THOUSAND/ÂΜL (ref 0.17–1.22)
MONOCYTES NFR BLD AUTO: 11 % (ref 4–12)
NEUTROPHILS # BLD AUTO: 3.73 THOUSANDS/ÂΜL (ref 1.85–7.62)
NEUTS SEG NFR BLD AUTO: 70 % (ref 43–75)
NRBC BLD AUTO-RTO: 0 /100 WBCS
PLATELET # BLD AUTO: 303 THOUSANDS/UL (ref 149–390)
PMV BLD AUTO: 10.2 FL (ref 8.9–12.7)
POTASSIUM SERPL-SCNC: 4.5 MMOL/L (ref 3.5–5.3)
PROT SERPL-MCNC: 6.8 G/DL (ref 6.4–8.4)
RBC # BLD AUTO: 5.13 MILLION/UL (ref 3.88–5.62)
SODIUM SERPL-SCNC: 139 MMOL/L (ref 135–147)
WBC # BLD AUTO: 5.3 THOUSAND/UL (ref 4.31–10.16)

## 2023-07-18 PROCEDURE — 80053 COMPREHEN METABOLIC PANEL: CPT

## 2023-07-18 PROCEDURE — 87086 URINE CULTURE/COLONY COUNT: CPT

## 2023-07-18 PROCEDURE — 93005 ELECTROCARDIOGRAM TRACING: CPT

## 2023-07-18 PROCEDURE — 85025 COMPLETE CBC W/AUTO DIFF WBC: CPT

## 2023-07-18 PROCEDURE — 36415 COLL VENOUS BLD VENIPUNCTURE: CPT

## 2023-07-18 NOTE — TELEPHONE ENCOUNTER
Called patient @ # provided  No answer  LVM letting patient we do have those forms here in the office  He can pass by and pick one up, fill out his portion of the form, the doc would have to fill out his portion of the form and then we send to [facility].  Asked patient to call with any questions or concerns

## 2023-07-19 LAB
ATRIAL RATE: 82 BPM
BACTERIA UR CULT: NORMAL
QRS AXIS: -35 DEGREES
QRSD INTERVAL: 102 MS
QT INTERVAL: 418 MS
QTC INTERVAL: 466 MS
T WAVE AXIS: -5 DEGREES
VENTRICULAR RATE: 75 BPM

## 2023-07-19 PROCEDURE — 93010 ELECTROCARDIOGRAM REPORT: CPT | Performed by: INTERNAL MEDICINE

## 2023-07-26 ENCOUNTER — TELEPHONE (OUTPATIENT)
Dept: INTERNAL MEDICINE CLINIC | Facility: CLINIC | Age: 73
End: 2023-07-26

## 2023-07-26 NOTE — TELEPHONE ENCOUNTER
The patient is having resection of a bladder tumor on Monday 8/1. How long should he hold his eliquis? Please advise. Thank you. Please notify Valerie De La Paz at [phone] by 5 pm.    If it is later than 5pm or tomorrow please  leave a message at the following number. Thank you.     Pre surgical services/leave a message  [phone]

## 2023-07-26 NOTE — PRE-PROCEDURE INSTRUCTIONS
Pre-Surgery Instructions:   Medication Instructions   • acetaminophen (TYLENOL) 325 mg tablet Uses PRN- OK to take day of surgery   • aspirin (ECOTRIN LOW STRENGTH) 81 mg EC tablet Instructions provided by MD   • atorvastatin (LIPITOR) 20 mg tablet Take day of surgery. • cholecalciferol (VITAMIN D3) 1,000 units tablet Stop taking 7 days prior to surgery. • Co-Enzyme Q-10 100 MG CAPS Stop taking 7 days prior to surgery. • Eliquis 5 MG Instructions provided by MD   • felodipine (PLENDIL) 5 mg 24 hr tablet Take night before surgery   • gabapentin (NEURONTIN) 100 mg capsule Take day of surgery. • hydrocortisone 2.5 % cream Uses PRN- DO NOT take day of surgery   • ketoconazole (NIZORAL) 2 % cream Uses PRN- DO NOT take day of surgery   • levothyroxine 75 mcg tablet Take day of surgery. • metoprolol succinate (TOPROL-XL) 25 mg 24 hr tablet Take day of surgery. • Misc Natural Products (LUTEIN 20 PO) Stop taking 7 days prior to surgery. • multivitamin (THERAGRAN) TABS Stop taking 7 days prior to surgery. • Saw [place] 450 MG CAPS Stop taking 7 days prior to surgery. • tamsulosin (FLOMAX) 0.4 mg Take night before surgery       Medication instructions for day surgery reviewed. Please use only a sip of water to take your instructed medications. Avoid all over the counter vitamins, supplements and NSAIDS for one week prior to surgery per anesthesia guidelines. Tylenol is ok to take as needed. You will receive a call one business day prior to surgery with an arrival time and hospital directions. If your surgery is scheduled on a Monday, the hospital will be calling you on the Friday prior to your surgery. If you have not heard from anyone by 8pm, please call the hospital supervisor through the hospital  at [phone]. Da Ansari [phone]). Do not eat or drink anything after midnight the night before your surgery, including candy, mints, lifesavers, or chewing gum.  Do not drink alcohol 24hrs before your surgery. Try not to smoke at least 24hrs before your surgery. Follow the pre surgery showering instructions as listed in the [facility] Surgical Experience Booklet” or otherwise provided by your surgeon's office. Do not shave the surgical area 24 hours before surgery. Do not apply any lotions, creams, including makeup, cologne, deodorant, or perfumes after showering on the day of your surgery. No contact lenses, eye make-up, or artificial eyelashes. Remove nail polish, including gel polish, and any artificial, gel, or acrylic nails if possible. Remove all jewelry including rings and body piercing jewelry. Wear causal clothing that is easy to take on and off. Consider your type of surgery. Keep any valuables, jewelry, piercings at home. Please bring any specially ordered equipment (sling, braces) if indicated. Arrange for a responsible person to drive you to and from the hospital on the day of your surgery. Visitor Guidelines discussed. Call the surgeon's office with any new illnesses, exposures, or additional questions prior to surgery. Please reference your [facility] Surgical Experience Booklet” for additional information to prepare for your upcoming surgery.

## 2023-07-27 ENCOUNTER — TELEPHONE (OUTPATIENT)
Dept: UROLOGY | Facility: AMBULATORY SURGERY CENTER | Age: 73
End: 2023-07-27

## 2023-07-27 NOTE — TELEPHONE ENCOUNTER
Patient got a call from pre-admission testing yesterday and was told by the RN that he should stop the Plavix 3 days before his procedure, but that they would confirm with his PCP first. Today he got a call from his PCP asking him to stop his Plavix 2 days before his procedure. He called to let us know what was going on with the medication.

## 2023-07-28 PROCEDURE — NC001 PR NO CHARGE: Performed by: UROLOGY

## 2023-07-28 NOTE — H&P
HISTORY AND PHYSICAL  ? ? Patient Name: Tracy Boyle  Patient MRN: [id number]  Attending Provider: Randy Barth MD  Service: Urology  Chief Complaint    Bladder tumor    HPI   Tracy Boyle is a 68 y.o. male with gross hematuria, recent cystoscopy shows:    Prostate:  lateral lobes and median lobe all significantly enlarged                   Bladder: Diverticula and trabeculation throughout, nodular appearing bladder tumor left posterior wall just behind the orifice    I plan cystoscopy, TUR bladder tumor, gemcitabine instillation. Potential risks and complications discussed, and informed consent was given by the patient. Medications  Meds/Allergies   No current facility-administered medications for this encounter. Cannot display prior to admission medications because the patient has not been admitted in this contact. No current facility-administered medications for this encounter. Current Outpatient Medications:   •  acetaminophen (TYLENOL) 325 mg tablet, Take 2 tablets (650 mg total) by mouth every 6 (six) hours as needed for mild pain or fever, Disp: 30 tablet, Rfl: 0  •  aspirin (ECOTRIN LOW STRENGTH) 81 mg EC tablet, Take 81 mg by mouth daily, Disp: , Rfl:   •  atorvastatin (LIPITOR) 20 mg tablet, TAKE 1 TABLET DAILY, Disp: 90 tablet, Rfl: 3  •  cholecalciferol (VITAMIN D3) 1,000 units tablet, Take 1,000 Units by mouth daily, Disp: , Rfl:   •  Co-Enzyme Q-10 100 MG CAPS, Take 100 mg by mouth daily, Disp: , Rfl:   •  Eliquis 5 MG, TAKE 1 TABLET TWICE A DAY, Disp: 180 tablet, Rfl: 3  •  felodipine (PLENDIL) 5 mg 24 hr tablet, TAKE 1 TABLET DAILY, Disp: 90 tablet, Rfl: 3  •  gabapentin (NEURONTIN) 100 mg capsule, TAKE 1 CAPSULE 3 TIMES A   DAY (Patient taking differently: 100 mg 2 (two) times a day), Disp: 270 capsule, Rfl: 1  •  hydrocortisone 2.5 % cream, Apply 1 application.  topically 2 (two) times a day To affected area as needed, Disp: , Rfl:   •  ketoconazole (NIZORAL) 2 % cream, As needed, Disp: , Rfl:   •  levothyroxine 75 mcg tablet, TAKE 1 TABLET DAILY FOR    ACQUIRED HYPOTHYROIDISM, Disp: 90 tablet, Rfl: 3  •  metoprolol succinate (TOPROL-XL) 25 mg 24 hr tablet, TAKE 1 TABLET TWICE A DAY, Disp: 180 tablet, Rfl: 3  •  Misc Natural Products (LUTEIN 20 PO), Take 20 mg by mouth daily. , Disp: , Rfl:   •  multivitamin (THERAGRAN) TABS, Take 1 tablet by mouth daily. , Disp: , Rfl:   •  Saw [place] 450 MG CAPS, , Disp: , Rfl:   •  tamsulosin (FLOMAX) 0.4 mg, Take 1 capsule (0.4 mg total) by mouth daily with dinner, Disp: 90 capsule, Rfl: 3  •  finasteride (PROSCAR) 5 mg tablet, Take 1 tablet (5 mg total) by mouth daily (Patient not taking: Reported on 7/7/2023), Disp: 30 tablet, Rfl: 1  Review of Systems  10 point review of systems negative except as noted in HPI  Allergies  Allergies   Allergen Reactions   • Wound Dressing Adhesive Blisters     PMH  Past Medical History:   Diagnosis Date   • Acute blood loss anemia 3/8/2019   • Aneurysm of thoracic aorta ([address])    • Arrhythmia    • Cholecystitis 3/5/2019    Added automatically from request for surgery [id number]   • Detached retina, right    • Disease of thyroid gland    • Gastric wall thickening    • Headache    • Hematoma 10/10/2018   • Hypertension    • Iliac artery aneurysm, bilateral (HCC)    • Irregular heart beat     afib cardioversion 1/30/17, x2    • Neuropathy     bilateral feet   • Paroxysmal A-fib [facility])    • Prostatic hypertrophy    • 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[facility])      Past surgical history  Past Surgical History:   Procedure Laterality Date   • ABDOMINAL AORTIC ANEURYSM REPAIR, ENDOVASCULAR Right 4/13/2018    Procedure: REPAIR ANEURYSM ILIAC endovascular  with coil embolization right hypogastric artery.;  Surgeon: Mckenna Matos MD;  Location: [facility];  Service: Vascular   • CARDIOVERSION     • CATARACT EXTRACTION Left 10/20/2019    Right eye 11/17/2011   • CHOLECYSTECTOMY LAPAROSCOPIC N/A 3/8/2019    Procedure: [id number] CHOLECYSTECTOMY;  Surgeon: Gilda Sheridan DO;  Location: [facility];  Service: General   • COLONOSCOPY  07/13/2016   • MOLE EXCISION  2021   • MI EDG US EXAM SURGICAL ALTER STOM DUODENUM/JEJUNUM N/A 11/10/2017    Procedure: RADIAL ENDOSCOPIC U/S;  Surgeon: Praveena Chen MD;  Location: [facility];   Service: Gastroenterology   • RETINAL DETACHMENT SURGERY Right     onset 1992, retinal detachment complete air fill confirmed     Social history  Social History     Tobacco Use   • Smoking status: Never   • Smokeless tobacco: Never   Vaping Use   • Vaping Use: Never used   Substance Use Topics   • Alcohol use: Yes     Comment: 3-4 x week   • Drug use: No     ?  Physical Exam    .vs  General appearance: alert and oriented, in no acute distress  Head: Normocephalic, without obvious abnormality, atraumatic  Throat: lips, mucosa, and tongue normal; teeth and gums normal  Neck: no adenopathy, no carotid bruit, no JVD, supple, symmetrical, trachea midline and thyroid not enlarged, symmetric, no tenderness/mass/nodules  Lungs: clear to auscultation bilaterally  Heart: regular rate and rhythm, S1, S2 normal, no murmur, click, rub or gallop  Abdomen: soft, non-tender; bowel sounds normal; no masses,  no organomegaly  Extremities: extremities normal, warm and well-perfused; no cyanosis, clubbing, or edema  Chelsy Stephen MD

## 2023-07-29 ENCOUNTER — ANESTHESIA EVENT (OUTPATIENT)
Dept: PERIOP | Facility: HOSPITAL | Age: 73
End: 2023-07-29
Payer: MEDICARE

## 2023-07-31 ENCOUNTER — HOSPITAL ENCOUNTER (OUTPATIENT)
Dept: NON INVASIVE DIAGNOSTICS | Facility: CLINIC | Age: 73
Discharge: HOME | End: 2023-07-31
Payer: MEDICARE

## 2023-07-31 ENCOUNTER — OFFICE VISIT (OUTPATIENT)
Dept: OBGYN CLINIC | Facility: OTHER | Age: 73
End: 2023-07-31
Payer: MEDICARE

## 2023-07-31 ENCOUNTER — APPOINTMENT (OUTPATIENT)
Dept: RADIOLOGY | Facility: OTHER | Age: 73
End: 2023-07-31
Payer: MEDICARE

## 2023-07-31 VITALS
DIASTOLIC BLOOD PRESSURE: 72 MMHG | HEIGHT: 73 IN | SYSTOLIC BLOOD PRESSURE: 120 MMHG | BODY MASS INDEX: 30.88 KG/M2 | HEART RATE: 44 BPM | WEIGHT: 233 LBS

## 2023-07-31 DIAGNOSIS — I72.3 ILIAC ARTERY ANEURYSM, BILATERAL (HCC): ICD-10-CM

## 2023-07-31 DIAGNOSIS — S82.392D CLOSED FRACTURE OF POSTERIOR MALLEOLUS OF LEFT TIBIA WITH ROUTINE HEALING, SUBSEQUENT ENCOUNTER: Primary | ICD-10-CM

## 2023-07-31 DIAGNOSIS — S82.392D CLOSED FRACTURE OF POSTERIOR MALLEOLUS OF LEFT TIBIA WITH ROUTINE HEALING, SUBSEQUENT ENCOUNTER: ICD-10-CM

## 2023-07-31 PROCEDURE — 99213 OFFICE O/P EST LOW 20 MIN: CPT | Performed by: ORTHOPAEDIC SURGERY

## 2023-07-31 PROCEDURE — 93978 VASCULAR STUDY: CPT

## 2023-07-31 PROCEDURE — 73610 X-RAY EXAM OF ANKLE: CPT

## 2023-07-31 PROCEDURE — 93923 UPR/LXTR ART STDY 3+ LVLS: CPT

## 2023-07-31 NOTE — PROGRESS NOTES
Chief Complaint   Patient presents with   • Left Ankle - Pain, Follow-up         HPI:    Tessie Villanueva is a 68year old Male who presents today for follow up of left ankle pain. He sustained an injury while he was walking downhill and twisted his ankle. His foot likely everted at the time. X ray of the left ankle showed non displacedposterior malleolus fracture. He was recommended to continue tall cam boot at the time. Today, the patient reports that his left ankle pain has been improving. He has attempted ambulating without his cam boot at home for brief periods of time and this does not cause any significant pain of his left ankle. He denies any new injury. Denies any left proximal calf pain. I have personally reviewed pertinent films in PACS and my interpretation is Plain radiograph of the left ankle performed today reveals a maintained alignment of the posterior malleolus fracture of distal tibia. Initial mild periosteal reaction/callus formation noted in the lateral view. .    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[facility])   • Vitamin D deficiency   • Prediabetes   • Gross hematuria        Current Outpatient Medications on File Prior to Visit   Medication Sig Dispense Refill   • acetaminophen (TYLENOL) 325 mg tablet Take 2 tablets (650 mg total) by mouth every 6 (six) hours as needed for mild pain or fever 30 tablet 0   • aspirin (ECOTRIN LOW STRENGTH) 81 mg EC tablet Take 81 mg by mouth daily     • atorvastatin (LIPITOR) 20 mg tablet TAKE 1 TABLET DAILY 90 tablet 3   • cholecalciferol (VITAMIN D3) 1,000 units tablet Take 1,000 Units by mouth daily     • Co-Enzyme Q-10 100 MG CAPS Take 100 mg by mouth daily     • Eliquis 5 MG TAKE 1 TABLET TWICE A  tablet 3   • felodipine (PLENDIL) 5 mg 24 hr tablet TAKE 1 TABLET DAILY 90 tablet 3   • finasteride (PROSCAR) 5 mg tablet Take 1 tablet (5 mg total) by mouth daily (Patient not taking: Reported on 7/7/2023) 30 tablet 1   • gabapentin (NEURONTIN) 100 mg capsule TAKE 1 CAPSULE 3 TIMES A   DAY (Patient taking differently: 100 mg 2 (two) times a day) 270 capsule 1   • hydrocortisone 2.5 % cream Apply 1 application. topically 2 (two) times a day To affected area as needed     • ketoconazole (NIZORAL) 2 % cream As needed     • levothyroxine 75 mcg tablet TAKE 1 TABLET DAILY FOR    ACQUIRED HYPOTHYROIDISM 90 tablet 3   • metoprolol succinate (TOPROL-XL) 25 mg 24 hr tablet TAKE 1 TABLET TWICE A  tablet 3   • Misc Natural Products (LUTEIN 20 PO) Take 20 mg by mouth daily. • multivitamin (THERAGRAN) TABS Take 1 tablet by mouth daily. • Saw [place] 450 MG CAPS      • tamsulosin (FLOMAX) 0.4 mg Take 1 capsule (0.4 mg total) by mouth daily with dinner 90 capsule 3     No current facility-administered medications on file prior to visit.         Allergies   Allergen Reactions   • Wound Dressing Adhesive Blisters        Tobacco Use: Low Risk  (7/26/2023)    Patient History    • Smoking Tobacco Use: Never    • Smokeless Tobacco Use: Never    • Passive Exposure: Not on file        Social Determinants of Health     Tobacco Use: Low Risk  (7/26/2023)    Patient History    • Smoking Tobacco Use: Never    • Smokeless Tobacco Use: Never    • Passive Exposure: Not on file   Alcohol Use: Not on file   Financial Resource Strain: Low Risk  (12/12/2022)    Overall Financial Resource Strain (CARDIA)    • Difficulty of Paying Living Expenses: Not hard at all   Food Insecurity: Not on file Transportation Needs: No Transportation Needs (12/12/2022)    PRAPARE - Transportation    • Lack of Transportation (Medical): No    • Lack of Transportation (Non-Medical): No   Physical Activity: Not on file   Stress: Not on file   Social Connections: Not on file   Intimate Partner Violence: Not on file   Depression: Not at risk (3/20/2023)    PHQ-2    • PHQ-2 Score: 0   Housing Stability: Not on file             Physical Exam  Vitals and nursing note reviewed. Constitutional:       Appearance: He is well-developed. HENT:      Head: Normocephalic and atraumatic. Eyes:      Conjunctiva/sclera: Conjunctivae normal.   Cardiovascular:      Rate and Rhythm: Normal rate. Pulmonary:      Effort: Pulmonary effort is normal. No respiratory distress. Skin:     General: Skin is warm. Findings: No erythema. Neurological:      Mental Status: He is alert and oriented to person, place, and time. Psychiatric:         Behavior: Behavior normal.         Thought Content: Thought content normal.         Judgment: Judgment normal.       /72 (BP Location: Left arm, Patient Position: Sitting, Cuff Size: Adult)   Pulse (!) 44   Ht 6' 1" (1.854 m)   Wt 106 kg (233 lb)   BMI 30.74 kg/m²     Constitutional:  see vital signs  Gen: well-developed, normocephalic/atraumatic, well-groomed  Eyes: No inflammation or discharge of conjunctiva or lids; sclera clear   Pharynx: no inflammation, lesion, or mass of lips  Neck: supple, no masses, non-distended  MSK: no inflammation, lesion, mass, or clubbing of nails and digits except for other than mentioned below  SKIN: no visible rashes or skin lesions  Pulmonary/Chest: Effort normal. No respiratory distress.    NEURO: cranial nerves grossly intact  PSYCH:  Alert and oriented to person, place, and time; recent and remote memory intact; mood normal, no depression, anxiety, or agitation, judgment and insight good and intact     Left Ankle Exam     Tenderness   Left ankle tenderness location: Mild discomfort only on palpation of the posterior aspect of the medial malleolus. Swelling: mild    Other   Erythema: absent  Sensation: normal    Comments:  Negative Homans' sign. Negative calcaneal squeeze and negative syndesmotic squeeze. Procedures       Assessment:     Diagnosis ICD-10-CM Associated Orders   1. Closed fracture of posterior malleolus of left tibia with routine healing, subsequent encounter  S82.392D XR ankle 3+ vw left           Plan:    We discussed clinical examination and findings with Katelyn Salcedo  Reviewed imaging. Clinical presentation and findings consistent with routine healing of left distal tibial nondisplaced posterior malleolus fracture. Suggest gradual weaning out of of the left cam boot over the next 7 to 10 days and transition into an ankle brace. We will follow-up in 3 to 4 weeks time for clinical and radiological reassessment. Patient expressed understanding and was in agreement with the treatment plan. Portions of the record may have been created with voice recognition software. Occasional wrong word or "sound alike" substitutions may have occurred due to the inherent limitations of voice recognition software. Please review the chart carefully and recognize, using context, where substitutions/typographical errors may have occurred.

## 2023-08-01 ENCOUNTER — TELEPHONE (OUTPATIENT)
Dept: INTERNAL MEDICINE CLINIC | Facility: CLINIC | Age: 73
End: 2023-08-01

## 2023-08-01 ENCOUNTER — HOSPITAL ENCOUNTER (OUTPATIENT)
Facility: HOSPITAL | Age: 73
Setting detail: OBSERVATION
Discharge: HOME | End: 2023-08-02
Attending: EMERGENCY MEDICINE | Admitting: HOSPITALIST
Payer: MEDICARE

## 2023-08-01 ENCOUNTER — ANESTHESIA (OUTPATIENT)
Dept: PERIOP | Facility: HOSPITAL | Age: 73
End: 2023-08-01
Payer: MEDICARE

## 2023-08-01 ENCOUNTER — APPOINTMENT (EMERGENCY)
Dept: CT IMAGING | Facility: HOSPITAL | Age: 73
End: 2023-08-01
Payer: MEDICARE

## 2023-08-01 ENCOUNTER — HOSPITAL ENCOUNTER (OUTPATIENT)
Facility: HOSPITAL | Age: 73
Setting detail: OUTPATIENT SURGERY
Discharge: HOME | End: 2023-08-01
Attending: UROLOGY | Admitting: UROLOGY
Payer: MEDICARE

## 2023-08-01 ENCOUNTER — TELEPHONE (OUTPATIENT)
Dept: UROLOGY | Facility: AMBULATORY SURGERY CENTER | Age: 73
End: 2023-08-01

## 2023-08-01 VITALS
DIASTOLIC BLOOD PRESSURE: 69 MMHG | RESPIRATION RATE: 16 BRPM | SYSTOLIC BLOOD PRESSURE: 125 MMHG | TEMPERATURE: 97.4 F | BODY MASS INDEX: 30.08 KG/M2 | OXYGEN SATURATION: 94 % | WEIGHT: 227.96 LBS | HEART RATE: 59 BPM

## 2023-08-01 DIAGNOSIS — G89.29 CHRONIC NONINTRACTABLE HEADACHE, UNSPECIFIED HEADACHE TYPE: ICD-10-CM

## 2023-08-01 DIAGNOSIS — Z98.890 HISTORY OF BLADDER SURGERY: Primary | ICD-10-CM

## 2023-08-01 DIAGNOSIS — T83.011A MALFUNCTION OF FOLEY CATHETER (HCC): ICD-10-CM

## 2023-08-01 DIAGNOSIS — N32.89 BLADDER MASS: ICD-10-CM

## 2023-08-01 DIAGNOSIS — T83.091A OBSTRUCTION OF FOLEY CATHETER, INITIAL ENCOUNTER (HCC): ICD-10-CM

## 2023-08-01 DIAGNOSIS — R51.9 CHRONIC NONINTRACTABLE HEADACHE, UNSPECIFIED HEADACHE TYPE: ICD-10-CM

## 2023-08-01 DIAGNOSIS — R31.9 HEMATURIA: ICD-10-CM

## 2023-08-01 PROCEDURE — 74176 CT ABD & PELVIS W/O CONTRAST: CPT

## 2023-08-01 PROCEDURE — 88342 IMHCHEM/IMCYTCHM 1ST ANTB: CPT | Performed by: PATHOLOGY

## 2023-08-01 PROCEDURE — 52234 CYSTOSCOPY AND TREATMENT: CPT | Performed by: UROLOGY

## 2023-08-01 PROCEDURE — 99024 POSTOP FOLLOW-UP VISIT: CPT | Performed by: UROLOGY

## 2023-08-01 PROCEDURE — G1004 CDSM NDSC: HCPCS

## 2023-08-01 PROCEDURE — 88307 TISSUE EXAM BY PATHOLOGIST: CPT | Performed by: PATHOLOGY

## 2023-08-01 PROCEDURE — C1769 GUIDE WIRE: HCPCS | Performed by: UROLOGY

## 2023-08-01 PROCEDURE — 88341 IMHCHEM/IMCYTCHM EA ADD ANTB: CPT | Performed by: PATHOLOGY

## 2023-08-01 PROCEDURE — 88360 TUMOR IMMUNOHISTOCHEM/MANUAL: CPT | Performed by: PATHOLOGY

## 2023-08-01 PROCEDURE — 99285 EMERGENCY DEPT VISIT HI MDM: CPT | Performed by: EMERGENCY MEDICINE

## 2023-08-01 PROCEDURE — 93978 VASCULAR STUDY: CPT | Performed by: SURGERY

## 2023-08-01 PROCEDURE — 99284 EMERGENCY DEPT VISIT MOD MDM: CPT

## 2023-08-01 RX ORDER — ONDANSETRON 2 MG/ML
4 INJECTION INTRAMUSCULAR; INTRAVENOUS ONCE AS NEEDED
Status: DISCONTINUED | OUTPATIENT
Start: 2023-08-01 | End: 2023-08-01 | Stop reason: HOSPADM

## 2023-08-01 RX ORDER — ACETAMINOPHEN 325 MG/1
650 TABLET ORAL EVERY 6 HOURS PRN
Status: DISCONTINUED | OUTPATIENT
Start: 2023-08-01 | End: 2023-08-02 | Stop reason: HOSPADM

## 2023-08-01 RX ORDER — CEFAZOLIN SODIUM 2 G/50ML
2000 SOLUTION INTRAVENOUS ONCE
Status: COMPLETED | OUTPATIENT
Start: 2023-08-01 | End: 2023-08-01

## 2023-08-01 RX ORDER — ONDANSETRON 2 MG/ML
INJECTION INTRAMUSCULAR; INTRAVENOUS AS NEEDED
Status: DISCONTINUED | OUTPATIENT
Start: 2023-08-01 | End: 2023-08-01

## 2023-08-01 RX ORDER — METOPROLOL SUCCINATE 25 MG/1
25 TABLET, EXTENDED RELEASE ORAL 2 TIMES DAILY
Status: DISCONTINUED | OUTPATIENT
Start: 2023-08-01 | End: 2023-08-02 | Stop reason: HOSPADM

## 2023-08-01 RX ORDER — MAGNESIUM HYDROXIDE 1200 MG/15ML
LIQUID ORAL AS NEEDED
Status: DISCONTINUED | OUTPATIENT
Start: 2023-08-01 | End: 2023-08-01 | Stop reason: HOSPADM

## 2023-08-01 RX ORDER — SODIUM CHLORIDE, SODIUM LACTATE, POTASSIUM CHLORIDE, CALCIUM CHLORIDE 600; 310; 30; 20 MG/100ML; MG/100ML; MG/100ML; MG/100ML
125 INJECTION, SOLUTION INTRAVENOUS CONTINUOUS
Status: DISCONTINUED | OUTPATIENT
Start: 2023-08-01 | End: 2023-08-01 | Stop reason: HOSPADM

## 2023-08-01 RX ORDER — LEVOTHYROXINE SODIUM 0.07 MG/1
75 TABLET ORAL
Status: DISCONTINUED | OUTPATIENT
Start: 2023-08-02 | End: 2023-08-02 | Stop reason: HOSPADM

## 2023-08-01 RX ORDER — TAMSULOSIN HYDROCHLORIDE 0.4 MG/1
0.4 CAPSULE ORAL
Status: DISCONTINUED | OUTPATIENT
Start: 2023-08-02 | End: 2023-08-02 | Stop reason: HOSPADM

## 2023-08-01 RX ORDER — GLYCOPYRROLATE 0.2 MG/ML
INJECTION INTRAMUSCULAR; INTRAVENOUS AS NEEDED
Status: DISCONTINUED | OUTPATIENT
Start: 2023-08-01 | End: 2023-08-01

## 2023-08-01 RX ORDER — CEFUROXIME AXETIL 250 MG/1
250 TABLET ORAL EVERY 12 HOURS SCHEDULED
Qty: 8 TABLET | Refills: 0 | Status: SHIPPED | OUTPATIENT
Start: 2023-08-01 | End: 2023-08-05

## 2023-08-01 RX ORDER — OXYCODONE HYDROCHLORIDE AND ACETAMINOPHEN 5; 325 MG/1; MG/1
1 TABLET ORAL EVERY 4 HOURS PRN
Status: DISCONTINUED | OUTPATIENT
Start: 2023-08-01 | End: 2023-08-01 | Stop reason: HOSPADM

## 2023-08-01 RX ORDER — OXYCODONE HYDROCHLORIDE AND ACETAMINOPHEN 5; 325 MG/1; MG/1
1-2 TABLET ORAL EVERY 6 HOURS PRN
Qty: 5 TABLET | Refills: 0 | Status: SHIPPED | OUTPATIENT
Start: 2023-08-01 | End: 2023-08-11

## 2023-08-01 RX ORDER — FELODIPINE 2.5 MG/1
5 TABLET, EXTENDED RELEASE ORAL DAILY
Status: DISCONTINUED | OUTPATIENT
Start: 2023-08-01 | End: 2023-08-02 | Stop reason: HOSPADM

## 2023-08-01 RX ORDER — MIDAZOLAM HYDROCHLORIDE 2 MG/2ML
INJECTION, SOLUTION INTRAMUSCULAR; INTRAVENOUS AS NEEDED
Status: DISCONTINUED | OUTPATIENT
Start: 2023-08-01 | End: 2023-08-01

## 2023-08-01 RX ORDER — LIDOCAINE HCL/PF 100 MG/5ML
SYRINGE (ML) INJECTION AS NEEDED
Status: DISCONTINUED | OUTPATIENT
Start: 2023-08-01 | End: 2023-08-01

## 2023-08-01 RX ORDER — DEXAMETHASONE SODIUM PHOSPHATE 4 MG/ML
INJECTION, SOLUTION INTRA-ARTICULAR; INTRALESIONAL; INTRAMUSCULAR; INTRAVENOUS; SOFT TISSUE AS NEEDED
Status: DISCONTINUED | OUTPATIENT
Start: 2023-08-01 | End: 2023-08-01

## 2023-08-01 RX ORDER — EPHEDRINE SULFATE 50 MG/ML
INJECTION INTRAVENOUS AS NEEDED
Status: DISCONTINUED | OUTPATIENT
Start: 2023-08-01 | End: 2023-08-01

## 2023-08-01 RX ORDER — PROPOFOL 10 MG/ML
INJECTION, EMULSION INTRAVENOUS AS NEEDED
Status: DISCONTINUED | OUTPATIENT
Start: 2023-08-01 | End: 2023-08-01

## 2023-08-01 RX ORDER — FENTANYL CITRATE/PF 50 MCG/ML
25 SYRINGE (ML) INJECTION
Status: DISCONTINUED | OUTPATIENT
Start: 2023-08-01 | End: 2023-08-01 | Stop reason: HOSPADM

## 2023-08-01 RX ORDER — ATORVASTATIN CALCIUM 20 MG/1
20 TABLET, FILM COATED ORAL
Status: DISCONTINUED | OUTPATIENT
Start: 2023-08-01 | End: 2023-08-02 | Stop reason: HOSPADM

## 2023-08-01 RX ORDER — OXYCODONE HYDROCHLORIDE 5 MG/1
5 TABLET ORAL EVERY 4 HOURS PRN
Status: DISCONTINUED | OUTPATIENT
Start: 2023-08-01 | End: 2023-08-02 | Stop reason: HOSPADM

## 2023-08-01 RX ORDER — GABAPENTIN 100 MG/1
100 CAPSULE ORAL 2 TIMES DAILY
Status: DISCONTINUED | OUTPATIENT
Start: 2023-08-01 | End: 2023-08-02 | Stop reason: HOSPADM

## 2023-08-01 RX ORDER — CEFUROXIME AXETIL 250 MG/1
250 TABLET ORAL EVERY 12 HOURS SCHEDULED
Status: DISCONTINUED | OUTPATIENT
Start: 2023-08-01 | End: 2023-08-02 | Stop reason: HOSPADM

## 2023-08-01 RX ORDER — FENTANYL CITRATE 50 UG/ML
INJECTION, SOLUTION INTRAMUSCULAR; INTRAVENOUS AS NEEDED
Status: DISCONTINUED | OUTPATIENT
Start: 2023-08-01 | End: 2023-08-01

## 2023-08-01 RX ADMIN — SODIUM CHLORIDE, SODIUM LACTATE, POTASSIUM CHLORIDE, AND CALCIUM CHLORIDE 125 ML/HR: .6; .31; .03; .02 INJECTION, SOLUTION INTRAVENOUS at 09:31

## 2023-08-01 RX ADMIN — SODIUM CHLORIDE, SODIUM LACTATE, POTASSIUM CHLORIDE, AND CALCIUM CHLORIDE: .6; .31; .03; .02 INJECTION, SOLUTION INTRAVENOUS at 08:07

## 2023-08-01 RX ADMIN — SUGAMMADEX 200 MG: 100 INJECTION, SOLUTION INTRAVENOUS at 08:16

## 2023-08-01 RX ADMIN — EPHEDRINE SULFATE 5 MG: 50 INJECTION, SOLUTION INTRAVENOUS at 07:59

## 2023-08-01 RX ADMIN — EPHEDRINE SULFATE 5 MG: 50 INJECTION, SOLUTION INTRAVENOUS at 08:15

## 2023-08-01 RX ADMIN — PROPOFOL 170 MG: 10 INJECTION, EMULSION INTRAVENOUS at 07:42

## 2023-08-01 RX ADMIN — GLYCOPYRROLATE 0.2 MG: 0.2 INJECTION INTRAMUSCULAR; INTRAVENOUS at 08:19

## 2023-08-01 RX ADMIN — DEXAMETHASONE SODIUM PHOSPHATE 10 MG: 4 INJECTION INTRA-ARTICULAR; INTRALESIONAL; INTRAMUSCULAR; INTRAVENOUS; SOFT TISSUE at 07:42

## 2023-08-01 RX ADMIN — MIDAZOLAM 2 MG: 1 INJECTION INTRAMUSCULAR; INTRAVENOUS at 07:37

## 2023-08-01 RX ADMIN — FENTANYL CITRATE 50 MCG: 50 INJECTION INTRAMUSCULAR; INTRAVENOUS at 08:07

## 2023-08-01 RX ADMIN — CEFUROXIME AXETIL 250 MG: 250 TABLET ORAL at 22:59

## 2023-08-01 RX ADMIN — LIDOCAINE HYDROCHLORIDE 100 MG: 20 INJECTION INTRAVENOUS at 07:42

## 2023-08-01 RX ADMIN — FENTANYL CITRATE 50 MCG: 50 INJECTION INTRAMUSCULAR; INTRAVENOUS at 07:42

## 2023-08-01 RX ADMIN — CEFAZOLIN SODIUM 2000 MG: 2 SOLUTION INTRAVENOUS at 07:30

## 2023-08-01 RX ADMIN — ONDANSETRON 4 MG: 2 INJECTION INTRAMUSCULAR; INTRAVENOUS at 07:42

## 2023-08-01 RX ADMIN — ATORVASTATIN CALCIUM 20 MG: 20 TABLET, FILM COATED ORAL at 22:31

## 2023-08-01 RX ADMIN — METOPROLOL SUCCINATE 25 MG: 25 TABLET, EXTENDED RELEASE ORAL at 22:31

## 2023-08-01 RX ADMIN — FELODIPINE 5 MG: 2.5 TABLET, FILM COATED, EXTENDED RELEASE ORAL at 22:59

## 2023-08-01 RX ADMIN — SODIUM CHLORIDE, SODIUM LACTATE, POTASSIUM CHLORIDE, AND CALCIUM CHLORIDE 125 ML/HR: .6; .31; .03; .02 INJECTION, SOLUTION INTRAVENOUS at 07:00

## 2023-08-01 RX ADMIN — GABAPENTIN 100 MG: 100 CAPSULE ORAL at 22:31

## 2023-08-01 NOTE — OP NOTE
OPERATIVE REPORT  PATIENT NAME: Nanda Hester    :  1950  MRN: [id number]  Pt Location: [facility] OR ROOM 02    SURGERY DATE: 2023    Surgeon(s) and Role:     * Yamile Rodriguez MD - Primary    Preop Diagnosis:  Bladder mass [N32.89]    Post-Op Diagnosis Codes: * Bladder mass [N32.89]    Procedure(s):  TURBT. gemcitabine instillation    Specimen(s):  ID Type Source Tests Collected by Time Destination   1 : Posterior bladder wall Tissue Soft Tissue, Other TISSUE EXAM Yamile Rodriguez MD 2023  8:01 AM        Estimated Blood Loss:   Minimal    Drains:  Urethral Catheter Double-lumen; Latex;Coude 20 Fr. (Active)   Number of days: 0       Anesthesia Type:   General/LMA    Operative Indications:  Bladder mass [N32.89]      Operative Findings:  5 cm tumor posterior wall bladder, mild bulbar stricture, mild BPH    Complications:   None    Procedure and Technique:  After the patient was placed under adequate general anesthesia, he was prepped and draped using chlorhexidine solution in lithotomy position. The 32 [place] resectoscope was introduced, there was a moderate stricture of the bulbar urethra which the scope went through easily. The prostate had mild hyperplasia and mild median lobe. Thorough bladder inspection showed the above tumor, no other lesions. Using the bipolar resectoscope, tumor was resected into, but not through muscle. Care was taken not to perforate the bladder wall. All tumor fragments were taken out for specimen. Cautery used for hemostasis, inspection was done for several minutes to make sure no new bleeding. The scope was removed, Moralez catheter inserted, gemcitabine instilled for 30 minutes time, patient taken to recovery in good condition. I was present for the entire procedure.     Patient Disposition:  PACU         SIGNATURE: Yamile Rodriguez MD  DATE: 2023  TIME: 8:36 AM

## 2023-08-01 NOTE — ANESTHESIA POSTPROCEDURE EVALUATION
Post-Op Assessment Note    CV Status:  Stable    Pain management: adequate     Mental Status:  Alert and awake   Hydration Status:  Euvolemic   PONV Controlled:  Controlled   Airway Patency:  Patent   Two or more mitigation strategies used for obstructive sleep apnea   Post Op Vitals Reviewed: Yes      Staff: Anesthesiologist         No notable events documented.     BP      Temp     Pulse     Resp      SpO2      /69   Pulse 59   Temp (!) 97.4 °F (36.3 °C) (Temporal)   Resp 16   Wt 103 kg (227 lb 15.3 oz)   SpO2 94%   BMI 30.08 kg/m²

## 2023-08-01 NOTE — TELEPHONE ENCOUNTER
Patient called stating he had surgery this morning and he is having a leakish around catheter and wants to know how to proceed with it. He also had prescription for pain. [place]xy[place] and he is not able to get it until Thursday. He needs an alternate medication for pain. Please review and advise.      Patient can be reached at [phone]

## 2023-08-01 NOTE — DISCHARGE INSTR - AVS FIRST PAGE
ALL YOUR  PREVIOUS MEDS ARE THE SAME. NEW MEDS: Oxycodone if needed for pain  Cefuroxime antibiotic    You can get in the shower with a catheter    EXPECT SOME BLOOD IN URINE, BURNING, FREQUENT URINATION. ACTIVITY:  RESUME FULL ACTIVITY 10 days.

## 2023-08-01 NOTE — INTERVAL H&P NOTE
H&P reviewed. After examining the patient I find no changes in the patients condition since the H&P had been written.     Vitals:    08/01/23 0649   BP: 165/72   Pulse: 59   Resp: 18   Temp: 97.7 °F (36.5 °C)   SpO2: 96%

## 2023-08-01 NOTE — DISCHARGE SUMMARY
Discharge Summary - Tessie Villanueva 68 y.o. male MRN: [id number]    Admission Date: 8/1/2023     Admitting Diagnosis: Bladder mass [N32.89]    Procedures Performed: TUR bladder tumor, gemcitabine instillation    Patient underwent planned outpt surgery and recovered without complication. Discharged in good condition. Medications were prescribed. Pt knows to have office follow-up at the appropriate time.

## 2023-08-01 NOTE — ANESTHESIA PREPROCEDURE EVALUATION
Procedure:  TURBT, gemcitabine instillation (Bladder)    Relevant Problems   CARDIO   (+) Aneurysm of thoracic aorta (HCC)   (+) Atrial fibrillation, persistent (HCC)   (+) Essential hypertension   (+) Hepatic artery dissection (HCC)   (+) Iliac artery aneurysm, bilateral (HCC)   (+) Mixed hyperlipidemia      ENDO   (+) Acquired hypothyroidism      MUSCULOSKELETAL   (+) Chronic bilateral low back pain without sciatica      NEURO/PSYCH   (+) Chronic bilateral low back pain without sciatica   (+) Other headache syndrome      Cardiovascular and Mediastinum   (+) NSVT (nonsustained ventricular tachycardia) ([address])      2021 Holter: ABNORMAL HOLTER  1. Atrial fibrillation throughout the study  - Average heart rate of 53 beats per minute  - Slowest heart rate was 31 bpm (at 11:22 PM)  - Pauses of upto 4 seconds noted during study. 2. Very frequent ventricular ectopic beats noted (15.2% of total beats)  Multiple runs of non-sustained ventricular tachycardia, upto 10 beats were noted. Physical Exam    Airway  Comment: Full beard  Mallampati score: II  TM Distance: <3 FB       Dental   No notable dental hx     Cardiovascular  Rhythm: irregular, Rate: normal,     Pulmonary  Pulmonary exam normal     Other Findings       2021 stress test   •  Stress ECG: The stress ECG is negative for ischemia after pharmacologic stress. No ST deviation is noted. There were no new arrhythmias during stress. •  Stress Combined Conclusion: The ECG and SPECT imaging portions of the stress study are concordant with no evidence of stress induced myocardial ischemia. Possible small apical breast attenuation defect, study was not gatedd so cannot completely rule our scar. •  Resting ECG: The ECG shows frequent premature ventricular contractions. Short runs of NSVT noted, max 6 beats.   •  Stress Function: [place] was not possible with ventricular ectopy and atrial fibrillation  •  Perfusion Defect Conclusion: The stress end diastolic cavity size is mildly dilated. •  Perfusion: There is a left ventricular perfusion defect that is small in size present in the apex location(s) that is fixed. The defect appears to be an artifact caused by breast attenuation, but scar cannot be ruled out due to lack of gating, however it seems less likely. 2021 Echo:  •  Left Ventricle: Left ventricular cavity size is normal. The left ventricular ejection fraction is 60%. Systolic function is normal. Wall motion is normal. Wall thickness is mildly increased. There is mild concentric hypertrophy. •  Aorta: The aortic root is mildly dilated. The ascending aorta is mildly dilated. Anesthesia Plan  ASA Score- 3     Anesthesia Type- general with ASA Monitors. Additional Monitors:   Airway Plan: LMA. Comment: elealycia held Sunday, ASA saturday. Plan Factors-Exercise tolerance (METS): >4 METS. Chart reviewed. Patient is not a current smoker. Obstructive sleep apnea risk education given perioperatively. Induction- intravenous. Postoperative Plan- Plan for postoperative opioid use. Informed Consent- Anesthetic plan and risks discussed with patient.

## 2023-08-01 NOTE — ED PROVIDER NOTES
History  Chief Complaint   Patient presents with   • Urinary Catheter Problem     Pt reports bladder surgery this morning, reports possible blockage in catheter, last emptied 345pm, unsure when last urine production was but no other urine output, reports pressure, also c/o bloody discharge around catheter     80-year-old male with past medical history of A-fib on Eliquis, presents today with blocked coudé urinary catheter after undergoing transurethral resection of a bladder tumor earlier today. Patient states initially the catheter was draining bloody urine with small clots, which urology informed to patient that was to be expected. Patient was able to drain the bag several times this placement but states around 3:45 PM this afternoon, the catheter stopped draining. Patient is reporting lower abdominal pressure but denies any other associated symptoms including fevers, nausea, vomiting. Patient also reports some bloody drainage from around the catheter at the urethral meatus. Urinary Catheter Problem  Associated symptoms: no abdominal pain, no chest pain, no cough, no ear pain, no fever, no rash, no shortness of breath, no sore throat and no vomiting        Prior to Admission Medications   Prescriptions Last Dose Informant Patient Reported? Taking? Co-Enzyme Q-10 100 MG CAPS  Self Yes No   Sig: Take 100 mg by mouth daily   Eliquis 5 MG   No No   Sig: TAKE 1 TABLET TWICE A DAY   Misc Natural Products (LUTEIN 20 PO)  Self Yes No   Sig: Take 20 mg by mouth daily.    Saw [place] 450 MG CAPS   Yes No   acetaminophen (TYLENOL) 325 mg tablet  Self No No   Sig: Take 2 tablets (650 mg total) by mouth every 6 (six) hours as needed for mild pain or fever   aspirin (ECOTRIN LOW STRENGTH) 81 mg EC tablet  Self Yes No   Sig: Take 81 mg by mouth daily   atorvastatin (LIPITOR) 20 mg tablet   No No   Sig: TAKE 1 TABLET DAILY   cefuroxime (CEFTIN) 250 mg tablet   No No   Sig: Take 1 tablet (250 mg total) by mouth every 12 (twelve) hours for 4 days   cholecalciferol (VITAMIN D3) 1,000 units tablet  Self Yes No   Sig: Take 1,000 Units by mouth daily   felodipine (PLENDIL) 5 mg 24 hr tablet   No No   Sig: TAKE 1 TABLET DAILY   gabapentin (NEURONTIN) 100 mg capsule   No No   Sig: TAKE 1 CAPSULE 3 TIMES A   DAY   Patient taking differently: 100 mg 2 (two) times a day   hydrocortisone 2.5 % cream  Self Yes No   Sig: Apply 1 application. topically 2 (two) times a day To affected area as needed   ketoconazole (NIZORAL) 2 % cream  Self Yes No   Sig: As needed   levothyroxine 75 mcg tablet   No No   Sig: TAKE 1 TABLET DAILY FOR    ACQUIRED HYPOTHYROIDISM   metoprolol succinate (TOPROL-XL) 25 mg 24 hr tablet   No No   Sig: TAKE 1 TABLET TWICE A DAY   multivitamin (THERAGRAN) TABS  Self Yes No   Sig: Take 1 tablet by mouth daily.    oxyCODONE-acetaminophen (PERCOCET) 5-325 mg per tablet   No No   Sig: Take 1-2 tablets by mouth every 6 (six) hours as needed for moderate pain or severe pain for up to 10 days Max Daily Amount: 8 tablets   tamsulosin (FLOMAX) 0.4 mg   No No   Sig: Take 1 capsule (0.4 mg total) by mouth daily with dinner      Facility-Administered Medications: None       Past Medical History:   Diagnosis Date   • Acute blood loss anemia 3/8/2019   • Aneurysm of thoracic aorta ([address])    • Arrhythmia    • Cholecystitis 3/5/2019    Added automatically from request for surgery [id number]   • Detached retina, right    • Disease of thyroid gland    • Gastric wall thickening    • Headache    • Hematoma 10/10/2018   • Hypertension    • Iliac artery aneurysm, bilateral (HCC)    • Irregular heart beat     afib cardioversion 1/30/17, x2    • Neuropathy     bilateral feet   • Paroxysmal A-fib [facility])    • Prostatic hypertrophy    • 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[facility])        Past Surgical History:   Procedure Laterality Date   • ABDOMINAL AORTIC ANEURYSM REPAIR, ENDOVASCULAR Right 4/13/2018    Procedure: REPAIR ANEURYSM ILIAC endovascular  with coil embolization right hypogastric artery.;  Surgeon: Jackie Oglesby MD;  Location: [facility];  Service: Vascular   • CARDIOVERSION     • CATARACT EXTRACTION Left 10/20/2019    Right eye 11/17/2011   • CHOLECYSTECTOMY LAPAROSCOPIC N/A 3/8/2019    Procedure: [id number] CHOLECYSTECTOMY;  Surgeon: Jens Jason DO;  Location: [facility];  Service: General   • COLONOSCOPY  07/13/2016   • MOLE EXCISION  2021   • DE EDG US EXAM SURGICAL ALTER STOM DUODENUM/JEJUNUM N/A 11/10/2017    Procedure: RADIAL ENDOSCOPIC U/S;  Surgeon: Vidhya Mantilla MD;  Location: [facility]; Service: Gastroenterology   • RETINAL DETACHMENT SURGERY Right     onset 1992, retinal detachment complete air fill confirmed       Family History   Problem Relation Age of Onset   • Stroke Mother         CVA   • Aortic aneurysm Father         abdominal   • Aortic aneurysm Brother    • Basal cell carcinoma Brother    • Cancer Maternal Grandmother         malignant neoplasm   • Cancer Maternal Grandfather         malignant neoplasm   • Cancer Paternal Grandmother         malignant neoplasm   • Cancer Paternal Grandfather         malignant neoplasm   • Cancer Family         malignant neoplasm   • Cancer Family         malignant neoplasm     I have reviewed and agree with the history as documented. E-Cigarette/Vaping   • E-Cigarette Use Never User      E-Cigarette/Vaping Substances   • Nicotine No    • THC No    • CBD No    • Flavoring No      Social History     Tobacco Use   • Smoking status: Never   • Smokeless tobacco: Never   Vaping Use   • Vaping Use: Never used   Substance Use Topics   • Alcohol use: Yes     Comment: 3-4 x week   • Drug use: No        Review of Systems   Constitutional: Negative for chills and fever. HENT: Negative for ear pain and sore throat. Eyes: Negative for pain and visual disturbance. Respiratory: Negative for cough and shortness of breath. Cardiovascular: Negative for chest pain and palpitations. Gastrointestinal: Negative for abdominal pain and vomiting. Genitourinary: Positive for difficulty urinating and hematuria. Negative for dysuria. Musculoskeletal: Negative for arthralgias and back pain. Skin: Negative for color change and rash. Neurological: Negative for seizures and syncope. All other systems reviewed and are negative. Physical Exam  ED Triage Vitals [08/01/23 1641]   Temperature Pulse Respirations Blood Pressure SpO2   97.6 °F (36.4 °C) 65 16 133/90 96 %      Temp Source Heart Rate Source Patient Position - Orthostatic VS BP Location FiO2 (%)   Oral Monitor Sitting Right arm --      Pain Score       6             Orthostatic Vital Signs  Vitals:    08/01/23 1641 08/01/23 2016   BP: 133/90 150/90   Pulse: 65 77   Patient Position - Orthostatic VS: Sitting Lying       Physical Exam  Vitals and nursing note reviewed. Exam conducted with a chaperone present. Constitutional:       General: He is not in acute distress. Appearance: Normal appearance. He is well-developed. He is not ill-appearing, toxic-appearing or diaphoretic. HENT:      Head: Normocephalic and atraumatic. Mouth/Throat:      Mouth: Mucous membranes are moist.   Eyes:      Conjunctiva/sclera: Conjunctivae normal.   Cardiovascular:      Rate and Rhythm: Normal rate and regular rhythm. Heart sounds: No murmur heard. Pulmonary:      Effort: Pulmonary effort is normal. No respiratory distress. Breath sounds: Normal breath sounds. Abdominal:      General: Abdomen is flat. Palpations: Abdomen is soft. Tenderness: There is no abdominal tenderness. There is no guarding or rebound. Genitourinary:     Comments: Exam performed with Dr. Pato Sanchez as chaperone, noted to have coude catheter in place with bloody urine in the catheter but no drainage into the urine bag, some leakage of blood tinged urine from around the urethral meatus   Musculoskeletal:         General: No swelling.       Cervical back: Neck supple. Skin:     General: Skin is warm and dry. Capillary Refill: Capillary refill takes less than 2 seconds. Neurological:      Mental Status: He is alert. Psychiatric:         Mood and Affect: Mood normal.         ED Medications  Medications - No data to display    Diagnostic Studies  Results Reviewed     None                 CT abdomen pelvis wo contrast   Final Result by Tim Núñez MD (08/01 1909)      Partially distended urinary bladder around Moralez catheter with a small amount of hemorrhagic products around the tip of the catheter. Urinary bladder wall thickening and surrounding inflammatory stranding, likely postprocedural .      Stable size of 2.7 cm uncinate process cystic lesion compared to 4/26/2023, likely representing IPMN. For simple cyst(s) 2 cm or greater recommend gastroenterology and/or surgical oncology consult. EUS is likely now warranted. The study was marked in [facility] for immediate notification. Workstation performed: [id number]               Procedures  Procedures      ED Course  ED Course as of 08/01/23 2054   Tue Aug 01, 2023   1839 Urology reviewed CT imaging and did not visualize any clots. Urology recommends performing aspiration on the catheter to see if we are able to get fluid return. 1929 Able to aspirate on the catheter and was able to remove small clots. Catheter is now spontaneously draining blood tinged urine. Patient reports symptomatic relief and complete resolution of his lower abdominal pressure. 2015 Urology recommending admission for irrigation every 4 hours. Patient is agreeable with this plan. Medical Decision Making  35-year-old male with past medical history of A-fib on Eliquis, presents today with blocked coudé urinary catheter after undergoing transurethral resection of a bladder tumor earlier today.   Bladder scan revealed approximately 300 cc of fluid in the bladder. Urology was consulted and recommended obtaining a CT scan to evaluate for evidence of the obstruction. CT scan was remarkable for hemorrhagic products around the tip of the catheter with postprocedural inflammatory changes. There was no evidence of large obstruction or clot burden on imaging. At this point, urology recommended to attempt to aspirate and flush the catheter. Nursing was able to aspirate and remove multiple small clots. Catheter is now actively draining blood-tinged urine. Patient reports complete resolution of his abdominal pressure. Urology recommended admission overnight for continued every 4 hour bladder irrigation. Patient is in agreement with this plan. Of note, CT imaging also revealed incidental finding of pancreatic cystic lesion. Patient was notified of this finding and we discussed the need for close outpatient follow-up and the need to obtain ultrasound to further characterize this lesion. Risk  Decision regarding hospitalization.             Disposition  Final diagnoses:   History of bladder surgery   Obstruction of Moralez catheter, initial encounter ([address])   Hematuria     Time reflects when diagnosis was documented in both MDM as applicable and the Disposition within this note     Time User Action Codes Description Comment    8/1/2023  8:22 PM Agustín Boga Add [Z90.79] S/P TURP (status post transurethral resection of prostate)     8/1/2023  8:22 PM Agustín Boga Remove [Z90.79] S/P TURP (status post transurethral resection of prostate)     8/1/2023  8:23 PM Agustín Boga Add [Z98.890] History of bladder surgery     8/1/2023  8:23 PM Agustín Boga Add [O66.322P] Obstruction of Moralez catheter, initial encounter ([address])     8/1/2023  8:23 PM Agustín Boga Add [R31.9] Hematuria       ED Disposition     ED Disposition   Admit    Condition   Stable    Date/Time   Tue Aug 1, 2023  8:22 PM    Comment   Case was discussed with SLIM and the patient's admission status was agreed to be Admission Status: observation status to the service of Dr. Shena Genao . Follow-up Information    None         Patient's Medications   Discharge Prescriptions    No medications on file     No discharge procedures on file. PDMP Review       Value Time User    PDMP Reviewed  Yes 3/18/2020  9:48 AM Jocelyne Wall MD           ED Provider  Attending physically available and evaluated Tessie Villanueva. I managed the patient along with the ED Attending.     Electronically Signed by         Michell Morales MD  08/01/23 9803

## 2023-08-01 NOTE — ED ATTENDING ATTESTATION
8/1/2023  IJavier MD, saw and evaluated the patient. I have discussed the patient with the resident/non-physician practitioner and agree with the resident's/non-physician practitioner's findings, Plan of Care, and MDM as documented in the resident's/non-physician practitioner's note, except where noted. All available labs and Radiology studies were reviewed. I was present for key portions of any procedure(s) performed by the resident/non-physician practitioner and I was immediately available to provide assistance. At this point I agree with the current assessment done in the Emergency Department. I have conducted an independent evaluation of this patient a history and physical is as follows:    42-year-old male with a history of atrial fibrillation on Eliquis and transurethral bladder tumor surgery performed today with a coudé Moralez catheter placement presenting for evaluation of a blocked catheter. Patient states he drained his leg bag several times and noted some blood in his urine in addition to small clots. However, around 345, this stopped draining. Notes abdominal pressure. Patient also has leaking around the catheter from his urethra. Denies fever, chills, abdominal pain, vomiting. Please see resident documentation for histories and review of systems.     Exam: Vital signs and nursing notes reviewed  General: Awake, alert, no acute distress  HEENT: Normocephalic, atraumatic, mucous membranes moist  Heart: Regular rate  Lungs: Symmetric expansion, nonlabored respirations  Abdomen: Soft, nontender, mild suprapubic fullness  : chaperone Dr. Estevez Prophet: Blood in urine linking at the urethral meatus, red coudé catheter in place with blood at the end of the catheter and no drainage into the urine bag  Extremity: No deformity    ED Course  ED Course as of 08/01/23 1944   Tue Aug 01, 2023   1743 Urology requesting CT A/P to evaluate clot burden prior to irrigation   1921 CT abdomen pelvis wo contrast  IMPRESSION:     Partially distended urinary bladder around Moralez catheter with a small amount of hemorrhagic products around the tip of the catheter. Urinary bladder wall thickening and surrounding inflammatory stranding, likely postprocedural .     Stable size of 2.7 cm uncinate process cystic lesion compared to 2023, likely representing IPMN. For simple cyst(s) 2 cm or greater recommend gastroenterology and/or surgical oncology consult. EUS is likely now warranted.     The study was marked in [employer] for immediate notification. ED course/medical decision [age]-year-old male with urologic procedure earlier today presenting for evaluation of a clogged catheter. Differential diagnosis includes clot, catheter malfunction, urinary tract infection, mass lesion. Patient has mild abdominal pressure and is unable to empty his catheter on exam with clot present at the end of the Moralez catheter. Discussed with urology, who requested a CT of the abdomen and pelvis to evaluate for clot burden prior to irrigation. This is notable for a partially distended bladder around the Morlaez catheter with a small amount of hemorrhagic products around the tip of the catheter. Inflammatory changes are present which are likely postprocedural.  Note of a pancreatic cystic lesion is also made and will be relayed to the patient. Urology recommended aspiration of the clot if possible, which was performed and successful. Moralez is now draining spontaneously. Urology recommends admission for continued irrigation every 4 hours. Patient to be admitted to medicine for further care. Patient is in agreement with this plan.     Diagnosis: Urinary catheter complication  Disposition: Admission

## 2023-08-01 NOTE — TELEPHONE ENCOUNTER
Pt calling back again with complications. He then informed me cath is blocked and not draining. Pt stated he is hydrating.  I had advised him to resort to the ER

## 2023-08-01 NOTE — TELEPHONE ENCOUNTER
Dr. Ceclia Galeazzi (urologist/surgeoun) advised patient to stay off Eliquis until Friday. Patient had a surgical procedure completed today and had already been off Eliquis since Sunday morning. Patient asked if you are agreeable with the urologist recommendations?  Please advise

## 2023-08-02 ENCOUNTER — TELEPHONE (OUTPATIENT)
Dept: INTERNAL MEDICINE CLINIC | Facility: CLINIC | Age: 73
End: 2023-08-02

## 2023-08-02 VITALS
HEART RATE: 60 BPM | RESPIRATION RATE: 20 BRPM | DIASTOLIC BLOOD PRESSURE: 90 MMHG | OXYGEN SATURATION: 96 % | TEMPERATURE: 97.1 F | SYSTOLIC BLOOD PRESSURE: 144 MMHG

## 2023-08-02 DIAGNOSIS — K86.89 PANCREATIC MASS: Primary | ICD-10-CM

## 2023-08-02 PROCEDURE — 99223 1ST HOSP IP/OBS HIGH 75: CPT | Performed by: HOSPITALIST

## 2023-08-02 PROCEDURE — 99214 OFFICE O/P EST MOD 30 MIN: CPT | Performed by: STUDENT IN AN ORGANIZED HEALTH CARE EDUCATION/TRAINING PROGRAM

## 2023-08-02 PROCEDURE — NC001 PR NO CHARGE: Performed by: HOSPITALIST

## 2023-08-02 PROCEDURE — 99236 HOSP IP/OBS SAME DATE HI 85: CPT | Performed by: HOSPITALIST

## 2023-08-02 RX ORDER — GABAPENTIN 100 MG/1
100 CAPSULE ORAL 2 TIMES DAILY
Qty: 60 CAPSULE | Refills: 0 | Status: SHIPPED | OUTPATIENT
Start: 2023-08-02 | End: 2023-09-01

## 2023-08-02 RX ORDER — GABAPENTIN 100 MG/1
100 CAPSULE ORAL 2 TIMES DAILY
Qty: 60 CAPSULE | Refills: 0 | Status: CANCELLED | OUTPATIENT
Start: 2023-08-02

## 2023-08-02 RX ORDER — OXYCODONE HYDROCHLORIDE 5 MG/1
5 TABLET ORAL EVERY 4 HOURS PRN
Refills: 0 | Status: CANCELLED | OUTPATIENT
Start: 2023-08-02 | End: 2023-08-12

## 2023-08-02 RX ADMIN — LEVOTHYROXINE SODIUM 75 MCG: 75 TABLET ORAL at 05:24

## 2023-08-02 RX ADMIN — METOPROLOL SUCCINATE 25 MG: 25 TABLET, EXTENDED RELEASE ORAL at 08:36

## 2023-08-02 RX ADMIN — GABAPENTIN 100 MG: 100 CAPSULE ORAL at 08:36

## 2023-08-02 RX ADMIN — CEFUROXIME AXETIL 250 MG: 250 TABLET ORAL at 08:36

## 2023-08-02 NOTE — ASSESSMENT & PLAN NOTE
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

## 2023-08-02 NOTE — ASSESSMENT & PLAN NOTE
· Patient is status post transurethral  bladder tumor surgery performed today with coudé Moralez catheter placement  · CT abdomen pelvis showed partially distended urinary bladder around Moralez catheter with small amount of hemorrhagic products and inflammatory changes likely postprocedural.   · Status post aspiration of clots was performed in the ED with resolution of blockage  · Urology following, appreciate recs  · Continue bladder irrigation every 4 hours

## 2023-08-02 NOTE — ASSESSMENT & PLAN NOTE
· Rate control with HR in the 70s  · Continue metoprolol 25 mg twice daily  · Continue to hold Eliquis until 8/4/23 per Urology

## 2023-08-02 NOTE — ASSESSMENT & PLAN NOTE
· Blood pressure acceptable in admission  · Continue metoprolol 25 mg twice daily and felodipine 5 mg daily

## 2023-08-02 NOTE — INCIDENTAL FINDINGS
The following findings require follow up:  Radiographic finding   Finding: Partially distended urinary bladder around Moralez catheter with a small amount of hemorrhagic products around the tip of the catheter. Urinary bladder wall thickening and surrounding inflammatory stranding, likely postprocedural .     Stable size of 2.7 cm uncinate process cystic lesion compared to 4/26/2023, likely representing IPMN. For simple cyst(s) 2 cm or greater recommend gastroenterology and/or surgical oncology consult. EUS is likely now warranted.    Follow up required: as per PCP   Follow up should be done within 1 week(s)    Please notify the following clinician to assist with the follow up:   Dr. Oscar Marin

## 2023-08-02 NOTE — ASSESSMENT & PLAN NOTE
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

## 2023-08-02 NOTE — CONSULTS
Consult - Urology   Abisai Chol 1950, 68 y.o. male MRN: [id number]    Unit/Bed#: S -01 Encounter: [id number]      Assessment & Plan:  1. Hematuria s/p TURBT  -Patient underwent TURBT with Dr. Gume Brownlee yesterday. -CT scan showing Partially distended urinary bladder around Moralez catheter with a small amount of hemorrhagic products around the tip of the catheter. Urinary bladder wall thickening and surrounding inflammatory stranding, likely postprocedural .  -In the ED catheter was irrigated with return of blood clots until clear urine.  -Manually irrigated every 4 hours with clear yellow urine  -Evaluated catheter at bedside. Clear yellow urine. Irrigated catheter with no return of hematuria or clots  -Abdomen soft, nondistended  -Patient stable for discharge home  -Follow-up as scheduled    Subjective:   Sharmila Tinajero is a 24-year-old male past medical history hypothyroidism, hypertension, A-fib on Eliquis, prediabetes, hyperlipidemia, known to our practice. Underwent TURBT with Dr. Gume Brownlee yesterday. He presented to the ED due to catheter obstruction. He reports his procedure yesterday went well, on discharge he noted small amount of bleeding around catheter. Last night he noted catheter stopped draining completely. Patient presented to the ED for further evaluation. In the ED and a CT scan was done showing partially distended urinary bladder on Moralez catheter with small amount of hemorrhagic products. In the ED clots were aspirated until clear. Patient was admitted overnight for observation. Urology was consulted for further management recommendations    Review of Systems   Constitutional: Negative for chills and fever. Respiratory: Negative for cough and shortness of breath. Cardiovascular: Negative for chest pain and palpitations. Gastrointestinal: Negative for abdominal pain and vomiting. Genitourinary: Positive for hematuria. Negative for dysuria.    Musculoskeletal: Negative for arthralgias and back pain. Skin: Negative for color change and rash. Neurological: Negative for seizures and syncope. All other systems reviewed and are negative. Objective:  Vitals: Blood pressure 144/90, pulse 60, temperature (!) 97.1 °F (36.2 °C), resp. rate 20, SpO2 96 %. ,There is no height or weight on file to calculate BMI. Physical Exam  Constitutional:       General: He is not in acute distress. Appearance: Normal appearance. He is obese. He is not ill-appearing or toxic-appearing. HENT:      Head: Normocephalic and atraumatic. Right Ear: External ear normal.      Left Ear: External ear normal.      Nose: Nose normal.      Mouth/Throat:      Mouth: Mucous membranes are moist.   Eyes:      General: No scleral icterus. Conjunctiva/sclera: Conjunctivae normal.   Cardiovascular:      Rate and Rhythm: Normal rate. Pulses: Normal pulses. Pulmonary:      Effort: Pulmonary effort is normal.   Abdominal:      General: Abdomen is flat. There is no distension. Palpations: Abdomen is soft. Tenderness: There is no abdominal tenderness. There is no guarding. Genitourinary:     Comments: Moralez catheter draining clear yellow urine  Musculoskeletal:         General: Normal range of motion. Cervical back: Normal range of motion. Skin:     General: Skin is warm. Findings: No rash. Neurological:      General: No focal deficit present. Mental Status: He is alert and oriented to person, place, and time. Mental status is at baseline. Psychiatric:         Mood and Affect: Mood normal.         Behavior: Behavior normal.         Thought Content: Thought content normal.         Judgment: Judgment normal.         Imaging:  CT ABDOMEN AND PELVIS WITHOUT IV CONTRAST     INDICATION:   eval clot burden.  History of atrial fibrillation on Eliquis and transurethral bladder tumor surgery performed today with blood in urine     COMPARISON: CT abdomen pelvis 4/26/2023     TECHNIQUE:  CT examination of the abdomen and pelvis was performed without intravenous contrast. Multiplanar 2D reformatted images were created from the source data.     This examination, like all CT scans performed in the [facility], was performed utilizing techniques to minimize radiation dose exposure, including the use of iterative reconstruction and automated exposure control. Radiation dose length   product (DLP) for this visit:  2339 mGy-cm     Enteric contrast was administered.     FINDINGS:     ABDOMEN     LOWER CHEST: Mild cardiomegaly with left atrial enlargement     LIVER/BILIARY TREE: Right hepatic dome subcentimeter low-attenuation lesion is seen, stable in size compared to 4/26/2023 and too small to characterize. .  No suspicious solid hepatic lesion is identified. Hepatic contours are normal.  No biliary   dilatation.     GALLBLADDER:  Gallbladder is surgically absent.     SPLEEN:  Unremarkable.     PANCREAS: No main pancreatic ductal dilation. Fatty replacement of the pancreatic parenchyma. Again seen is a 2.7 cm cyst in the uncinate process (series 601 image 95), stable in size since 4/26/2023     ADRENAL GLANDS:  Unremarkable.     KIDNEYS/URETERS:  Unremarkable. No hydronephrosis.     STOMACH AND BOWEL: Small hiatal hernia. There is colonic diverticulosis without evidence of acute diverticulitis.     APPENDIX:  No findings to suggest appendicitis.     ABDOMINOPELVIC CAVITY:  No ascites. No pneumoperitoneum. No lymphadenopathy.     VESSELS: No abdominal aortic aneurysm. Again seen is a right common iliac artery aneurysm with presence of  stents extending into the right external as well as internal iliac arteries, stable in appearance since 4/26/2023. Patency of the stent cannot be   assessed on this noncontrast examination. .     PELVIS     REPRODUCTIVE ORGANS:  Unremarkable for patient's age.     URINARY BLADDER: A Moralez catheter is seen within the urinary bladder, which is partially distended despite presence of the Moralez catheter a small amount of hemorrhagic products are seen around the tip of the Moralez catheter (series 301 image 164) again   seen is urinary bladder wall thickening and multiple bladder diverticula, the largest in the left anterior aspect measuring 1.9 cm. Mild fat stranding is seen around the dome of the urinary bladder, likely postsurgical.     ABDOMINAL WALL/INGUINAL REGIONS:  There is a small fat-containing umbilical hernia.     OSSEOUS STRUCTURES:  No acute fracture or destructive osseous lesion.     IMPRESSION:     Partially distended urinary bladder around Moralez catheter with a small amount of hemorrhagic products around the tip of the catheter. Urinary bladder wall thickening and surrounding inflammatory stranding, likely postprocedural .     Stable size of 2.7 cm uncinate process cystic lesion compared to 4/26/2023, likely representing IPMN. For simple cyst(s) 2 cm or greater recommend gastroenterology and/or surgical oncology consult. EUS is likely now warranted.     The study was marked in EPIC for immediate notification.                          Workstation performed: [id number]    Labs:  No results for input(s): "WBC" in the last 72 hours. No results for input(s): "HGB" in the last 72 hours. No results for input(s): "CREATININE" in the last 72 hours.       History:  Social History     Socioeconomic History   • Marital status: /Civil Union     Spouse name: Not on file   • Number of children: Not on file   • Years of education: Not on file   • Highest education level: Not on file   Occupational History   • Not on file   Tobacco Use   • Smoking status: Never   • Smokeless tobacco: Never   Vaping Use   • Vaping Use: Never used   Substance and Sexual Activity   • Alcohol use: Yes     Comment: 3-4 x week   • Drug use: No   • Sexual activity: Not Currently   Other Topics Concern   • Not on file   Social History Narrative   • Not on file Social Determinants of Health     Financial Resource Strain: Low Risk  (12/12/2022)    Overall Financial Resource Strain (CARDIA)    • Difficulty of Paying Living Expenses: Not hard at all   Food Insecurity: Not on file   Transportation Needs: No Transportation Needs (12/12/2022)    PRAPARE - Transportation    • Lack of Transportation (Medical): No    • Lack of Transportation (Non-Medical):  No   Physical Activity: Not on file   Stress: Not on file   Social Connections: Not on file   Intimate Partner Violence: Not on file   Housing Stability: Not on file     Past Medical History:   Diagnosis Date   • Acute blood loss anemia 3/8/2019   • Aneurysm of thoracic aorta ([address])    • Arrhythmia    • Cholecystitis 3/5/2019    Added automatically from request for surgery [id number]   • Detached retina, right    • Disease of thyroid gland    • Gastric wall thickening    • Headache    • Hematoma 10/10/2018   • Hypertension    • Iliac artery aneurysm, bilateral (HCC)    • Irregular heart beat     afib cardioversion 1/30/17, x2    • Neuropathy     bilateral feet   • Paroxysmal A-fib [facility])    • Prostatic hypertrophy    • 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[facility])      Past Surgical History:   Procedure Laterality Date   • ABDOMINAL AORTIC ANEURYSM REPAIR, ENDOVASCULAR Right 4/13/2018    Procedure: REPAIR ANEURYSM ILIAC endovascular  with coil embolization right hypogastric artery.;  Surgeon: Jackie Oglesby MD;  [facility];  Service: Vascular   • CARDIOVERSION     • CATARACT EXTRACTION Left 10/20/2019    Right eye 11/17/2011   • CHOLECYSTECTOMY LAPAROSCOPIC N/A 3/8/2019    Procedure: [id number] CHOLECYSTECTOMY;  Surgeon: Jens Jason DO;  [facility];  Service: General   • COLONOSCOPY  07/13/2016   • MOLE EXCISION  2021   • UT CYSTOURETHROSCOPY W/DEST &/RMVL TUMOR LARGE N/A 8/1/2023    Procedure: TURBT, gemcitabine instillation;  Surgeon: Lili Colvin MD;  [facility];  Service: Urology   • UT EDG US EXAM SURGICAL ALTER STOM DUODENUM/JEJUNUM N/A 11/10/2017    Procedure: RADIAL ENDOSCOPIC U/S;  Surgeon: Reji Colvin MD;  Location: [facility];   Service: Gastroenterology   • RETINAL DETACHMENT SURGERY Right     onset 1992, retinal detachment complete air fill confirmed     Family History   Problem Relation Age of Onset   • Stroke Mother         CVA   • Aortic aneurysm Father         abdominal   • Aortic aneurysm Brother    • Basal cell carcinoma Brother    • Cancer Maternal Grandmother         malignant neoplasm   • Cancer Maternal Grandfather         malignant neoplasm   • Cancer Paternal Grandmother         malignant neoplasm   • Cancer Paternal Grandfather         malignant neoplasm   • Cancer Family         malignant neoplasm   • Cancer Family         malignant neoplasm       Daryl Alvarenga PA-C  Date: 8/2/2023 Time: 12:30 PM

## 2023-08-02 NOTE — ASSESSMENT & PLAN NOTE
· Patient was status post transurethral  bladder tumor surgery performed 8/1/23 with coudé Parikh catheter placement  · CT abdomen pelvis showed partially distended urinary bladder around Parikh catheter with small amount of hemorrhagic products and inflammatory changes likely postprocedural.   · Aspiration of clots was performed in the ED with resolution of blockage  · Patient is passing urine via parikh normally  · Urology recommendations appreciated, cleared for discharge to home with outpatient follow-up

## 2023-08-02 NOTE — ASSESSMENT & PLAN NOTE
· Rate control with HR in the 70s  · Continue metoprolol 25 mg twice daily  · Holding Eliquis until 8/4 per Urology recs

## 2023-08-02 NOTE — ASSESSMENT & PLAN NOTE
· Secondary to bladder tumor mass, status post resection today  · Holding ASA and Eliquis per Urology recommendation  · Continue bladder irrigation every 4 hours  · Urology following, appreciate recs  · Monitor hemoglobin, transfuse for Hbg <7

## 2023-08-02 NOTE — H&P
[address] [facility]  H&P  Name: Tessie Villanueva 68 y.o. male I MRN: [id number]  Unit/Bed#: S -01 I Date of Admission: 8/1/2023   Date of Service: 8/1/2023 I Hospital Day: 0      Assessment/Plan   * Malfunction of Moralez catheter [facility])  Assessment & Plan  · Patient is status post transurethral  bladder tumor surgery performed today with coudé Moralez catheter placement  · CT abdomen pelvis showed partially distended urinary bladder around Moralez catheter with small amount of hemorrhagic products and inflammatory changes likely postprocedural.   · Status post aspiration of clots was performed in the ED with resolution of blockage  · Urology following, appreciate recs  · Continue bladder irrigation every 4 hours    Bladder mass  Assessment & Plan  · Recently diagnosed with nodular appearing bladder tumor status post resection by urology today  · Follow-up pathology results  · Continue postprocedure antibiotics as prescribed by urology  · Urology following, appreciate recs    Gross hematuria  Assessment & Plan  · Secondary to bladder tumor mass, status post resection today  · Holding ASA and Eliquis per Urology recommendation  · Continue bladder irrigation every 4 hours  · Urology following, appreciate recs  · Monitor hemoglobin, transfuse for Hbg <7    Mixed hyperlipidemia  Assessment & Plan  · Continue Lipitor 20mg daily    Acquired hypothyroidism  Assessment & Plan  · Continue levothyroxine 25 mcg daily    Atrial fibrillation, persistent (HCC)  Assessment & Plan  · Rate control with HR in the 70s  · Continue metoprolol 25 mg twice daily  · Holding Eliquis until 8/4 per Urology recs    Essential hypertension  Assessment & Plan  · Blood pressure acceptable in admission  · Continue metoprolol 25 mg twice daily and felodipine 5 mg daily       VTE Pharmacologic Prophylaxis: VTE Score: 6 High Risk (Score >/= 5) - Pharmacological DVT Prophylaxis Contraindicated.  Sequential Compression Devices Ordered. Code Status: Level 1 - Full Code   Discussion with family: Updated  (wife) at bedside. Anticipated Length of Stay: Patient will be admitted on an observation basis with an anticipated length of stay of less than 2 midnights secondary to parikh catheter malfunction. Total Time Spent on Date of Encounter in care of patient: 40 minutes This time was spent on one or more of the following: performing physical exam; counseling and coordination of care; obtaining or reviewing history; documenting in the medical record; reviewing/ordering tests, medications or procedures; communicating with other healthcare professionals and discussing with patient's family/caregivers. Chief Complaint: Parikh catheter malfunction    History of Present Illness:  Nilton Hawk is a 68 y.o. male with a PMH of hypothyroidism, hypertension, A-fib on Eliquis, prediabetes, hyperlipidemia, and bladder mass status post resection today by urology who presents with Parikh catheter malfunction. Patient had transurethral bladder tumor surgery performed today with coudé Parikh catheter placement. Patient reports that he drained several times and noted some hematuria with small clots but around 4 PM, Parikh catheter stopped draining and he started developing abdominal pressure. Patient decided to come to the ED for further evaluation. In the ED, vital signs stable. Discussed with urology who recommended CT abdomen and pelvis to evaluate for clot burden prior to irrigation. CT abdomen pelvis showed partially distended urinary bladder around Parikh catheter with small amount of hemorrhagic products and inflammatory changes likely postprocedural.  By urology recommendation, aspiration of clots was performed in the ED with resolution of blockage. Patient admitted for continuous irrigation every 4 hours by urology recommendation.     Review of Systems:  Review of Systems   Gastrointestinal: Positive for abdominal distention and abdominal pain. All other systems reviewed and are negative. Past Medical and Surgical History:   Past Medical History:   Diagnosis Date   • Acute blood loss anemia 3/8/2019   • Aneurysm of thoracic aorta (HCC)    • Arrhythmia    • Cholecystitis 3/5/2019    Added automatically from request for surgery [id number]   • Detached retina, right    • Disease of thyroid gland    • Gastric wall thickening    • Headache    • Hematoma 10/10/2018   • Hypertension    • Iliac artery aneurysm, bilateral (HCC)    • Irregular heart beat     afib cardioversion 1/30/17, x2    • Neuropathy     bilateral feet   • Paroxysmal A-fib [facility])    • Prostatic hypertrophy    • 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[facility])        Past Surgical History:   Procedure Laterality Date   • ABDOMINAL AORTIC ANEURYSM REPAIR, ENDOVASCULAR Right 4/13/2018    Procedure: REPAIR ANEURYSM ILIAC endovascular  with coil embolization right hypogastric artery.;  Surgeon: Marlene Laguerre MD;  [facility];  Service: Vascular   • CARDIOVERSION     • CATARACT EXTRACTION Left 10/20/2019    Right eye 11/17/2011   • CHOLECYSTECTOMY LAPAROSCOPIC N/A 3/8/2019    Procedure: [id number] CHOLECYSTECTOMY;  Surgeon: Ivanna Trujillo DO;  [facility];  Service: General   • COLONOSCOPY  07/13/2016   • MOLE EXCISION  2021   • [place] EDG US EXAM SURGICAL ALTER STOM DUODENUM/JEJUNUM N/A 11/10/2017    Procedure: RADIAL ENDOSCOPIC U/S;  Surgeon: Yumiko Bradford MD;  [facility]; Service: Gastroenterology   • RETINAL DETACHMENT SURGERY Right     onset 1992, retinal detachment complete air fill confirmed       Meds/Allergies:  Prior to Admission medications    Medication Sig Start Date End Date Taking?  Authorizing Provider   acetaminophen (TYLENOL) 325 mg tablet Take 2 tablets (650 mg total) by mouth every 6 (six) hours as needed for mild pain or fever 3/12/19   LORI Pandya   aspirin (ECOTRIN LOW STRENGTH) 81 mg EC tablet Take 81 mg by mouth daily Historical Provider, MD   atorvastatin (LIPITOR) 20 mg tablet TAKE 1 TABLET DAILY 11/8/22   Marti Cevallos DO   cefuroxime (CEFTIN) 250 mg tablet Take 1 tablet (250 mg total) by mouth every 12 (twelve) hours for 4 days 8/1/23 8/5/23  Elmo Mariscal MD   cholecalciferol (VITAMIN D3) 1,000 units tablet Take 1,000 Units by mouth daily    Historical Provider, MD   Co-Enzyme Q-10 100 MG CAPS Take 100 mg by mouth daily    Historical Provider, MD   Eliquis 5 MG TAKE 1 TABLET TWICE A DAY 12/27/22   Dee Pantoja MD   felodipine (PLENDIL) 5 mg 24 hr tablet TAKE 1 TABLET DAILY 10/19/22   Jessica Bustos MD   gabapentin (NEURONTIN) 100 mg capsule TAKE 1 CAPSULE 3 TIMES A   DAY  Patient taking differently: 100 mg 2 (two) times a day 5/15/23   Sharon Huggins MD   hydrocortisone 2.5 % cream Apply 1 application. topically 2 (two) times a day To affected area as needed 4/9/21   Historical Provider, MD   ketoconazole (NIZORAL) 2 % cream As needed 4/9/21   Historical Provider, MD   levothyroxine 75 mcg tablet TAKE 1 TABLET DAILY FOR    ACQUIRED HYPOTHYROIDISM 11/8/22   Jessica Bustos MD   metoprolol succinate (TOPROL-XL) 25 mg 24 hr tablet TAKE 1 TABLET TWICE A DAY 11/8/22   Clayton Oshea PA-C   [facility] Natural Products (LUTEIN 20 PO) Take 20 mg by mouth daily. Historical Provider, MD   multivitamin (THERAGRAN) TABS Take 1 tablet by mouth daily.     Historical Provider, MD   oxyCODONE-acetaminophen (PERCOCET) 5-325 mg per tablet Take 1-2 tablets by mouth every 6 (six) hours as needed for moderate pain or severe pain for up to 10 days Max Daily Amount: 8 tablets 8/1/23 8/11/23  Elmo Mariscal MD   Saw Conley 450 MG CAPS  5/22/23   Historical Provider, MD   tamsulosin (FLOMAX) 0.4 mg Take 1 capsule (0.4 mg total) by mouth daily with dinner 5/5/23   LORI Sanders   finasteride (PROSCAR) 5 mg tablet Take 1 tablet (5 mg total) by mouth daily  Patient not taking: Reported on 7/7/2023 4/26/23 8/1/23  Mel Veliz PA-C I have reviewed home medications with patient personally. Allergies: Allergies   Allergen Reactions   • Wound Dressing Adhesive Blisters       Social History:  Marital Status: /Civil Union   Occupation: Retired  Patient Pre-hospital Living Situation: Home  Patient Pre-hospital Level of Mobility: walks  Patient Pre-hospital Diet Restrictions: None  Substance Use History:   Social History     Substance and Sexual Activity   Alcohol Use Yes    Comment: 3-4 x week     Social History     Tobacco Use   Smoking Status Never   Smokeless Tobacco Never     Social History     Substance and Sexual Activity   Drug Use No       Family History:  Family History   Problem Relation Age of Onset   • Stroke Mother         CVA   • Aortic aneurysm Father         abdominal   • Aortic aneurysm Brother    • Basal cell carcinoma Brother    • Cancer Maternal Grandmother         malignant neoplasm   • Cancer Maternal Grandfather         malignant neoplasm   • Cancer Paternal Grandmother         malignant neoplasm   • Cancer Paternal Grandfather         malignant neoplasm   • Cancer Family         malignant neoplasm   • Cancer Family         malignant neoplasm       Physical Exam:     Vitals:   Blood Pressure: 150/90 (08/01/23 2016)  Pulse: 77 (08/01/23 2016)  Temperature: 97.6 °F (36.4 °C) (08/01/23 1641)  Temp Source: Oral (08/01/23 1641)  Respirations: 16 (08/01/23 2016)  SpO2: 96 % (08/01/23 2016)    Physical Exam  Vitals and nursing note reviewed. Constitutional:       General: He is not in acute distress. Appearance: He is well-developed. HENT:      Head: Normocephalic and atraumatic. Eyes:      Conjunctiva/sclera: Conjunctivae normal.   Cardiovascular:      Rate and Rhythm: Normal rate. Rhythm irregularly irregular. Heart sounds: No murmur heard. Pulmonary:      Effort: Pulmonary effort is normal. No respiratory distress. Breath sounds: Normal breath sounds.    Abdominal:      General: There is no distension. Palpations: Abdomen is soft. Tenderness: There is no abdominal tenderness. Genitourinary:     Comments: Moralez catheter in place, bloody urine with no clots  Musculoskeletal:         General: No swelling. Cervical back: Neck supple. Skin:     General: Skin is warm and dry. Capillary Refill: Capillary refill takes less than 2 seconds. Neurological:      Mental Status: He is alert. Psychiatric:         Mood and Affect: Mood normal.          Additional Data:     Lab Results:                            Lines/Drains:  Invasive Devices     Drain  Duration           Urethral Catheter Double-lumen; Latex;Coude 20 Fr. <1 day              Urinary Catheter:  Goal for removal: N/A- Discharging with Moralez             Imaging: Reviewed radiology reports from this admission including: CT Pelvis  CT abdomen pelvis wo contrast   Final Result by Afua Romeo MD (08/01 1909)      Partially distended urinary bladder around Moralez catheter with a small amount of hemorrhagic products around the tip of the catheter. Urinary bladder wall thickening and surrounding inflammatory stranding, likely postprocedural .      Stable size of 2.7 cm uncinate process cystic lesion compared to 4/26/2023, likely representing IPMN. For simple cyst(s) 2 cm or greater recommend gastroenterology and/or surgical oncology consult. EUS is likely now warranted. The study was marked in [facility] for immediate notification. Workstation performed: [id number]             EKG and Other Studies Reviewed on Admission:   · EKG: Atrial fibrillation. HR 70s. ** Please Note: This note has been constructed using a voice recognition system.  **

## 2023-08-02 NOTE — TELEPHONE ENCOUNTER
----- Message from Harry Ceballos MD sent at 8/2/2023  1:35 PM EDT -----  Please let patient know I put in a referral to GI for further evaluation with consideration of endoscopic ultrasound based on a small cystic mass seen at the pancreas

## 2023-08-02 NOTE — ASSESSMENT & PLAN NOTE
· Secondary to bladder tumor mass, status post resection today  · Holding ASA and Eliquis until 8/4 per Urology recommendation  · Urology recs appreciated, see above

## 2023-08-02 NOTE — DISCHARGE SUMMARY
[address] [facility]  Discharge- Regulo Ao 1950, 68 y.o. male MRN: [id number]  Unit/Bed#: S -01 Encounter: [id number]  Primary Care Provider: Nadya Kirk MD   Date and time admitted to hospital: 8/1/2023  4:44 PM    * Malfunction of Parikh catheter [facility])  Assessment & Plan  · Patient was status post transurethral  bladder tumor surgery performed 8/1/23 with coudé Parikh catheter placement  · CT abdomen pelvis showed partially distended urinary bladder around Parikh catheter with small amount of hemorrhagic products and inflammatory changes likely postprocedural.   · Aspiration of clots was performed in the ED with resolution of blockage  · Patient is passing urine via parikh normally  · Urology recommendations appreciated, cleared for discharge to home with outpatient follow-up    Bladder mass  Assessment & Plan  · Recently diagnosed with nodular appearing bladder tumor status post resection by urology today  · Pathology results pending, continue to follow with urology  · Continue postprocedure antibiotics as prescribed by urology  · Urology following, appreciate recs    Gross hematuria  Assessment & Plan  · Secondary to bladder tumor mass, status post resection today  · Holding ASA and Eliquis until 8/4 per Urology recommendation  · Urology recs appreciated, see above    Mixed hyperlipidemia  Assessment & Plan  · Continue Lipitor 20mg daily    Acquired hypothyroidism  Assessment & Plan  · Continue levothyroxine 25 mcg daily    Atrial fibrillation, persistent ([address])  Assessment & Plan  · Rate control with HR in the 70s  · Continue metoprolol 25 mg twice daily  · Continue to hold Eliquis until 8/4/23 per Urology    Essential hypertension  Assessment & Plan  · Blood pressure acceptable in admission  · Continue metoprolol 25 mg twice daily and felodipine 5 mg daily      Discharging Resident Physician: Dar Parson DO  Attending: Micky Lund MD  PCP: Nadya Kirk MD  Admission Date: 8/1/2023  Discharge Date: 08/02/23    Disposition:     Home    Reason for Admission: Parikh Catheter malfunction with gross hematuria    Consultations During Hospital Stay:  · Urology    Procedures Performed:     · Bladder aspiration and irrigation     Significant Findings / Test Results:     · CT abdomen and pelvis: Partially distended urinary bladder around Parikh catheter with a small amount of hemorrhagic products around the tip of the catheter. Urinary bladder wall thickening and surrounding inflammatory stranding, likely postprocedural .    Incidental Findings:   · On CT abdomen and pelvis: Stable size of 2.7 cm uncinate process cystic lesion compared to 4/26/2023, likely representing IPMN. For simple cyst(s) 2 cm or greater recommend gastroenterology and/or surgical oncology consult. EUS is likely now warranted. · PCP contacted, asked to follow-up on findings    Test Results Pending at Discharge (will require follow up): · None     Outpatient Tests Requested:  · Radiology recommended EUS    Complications:  None    Hospital Course:     Billy Osborne is a 68 y.o. male patient who originally presented to the hospital on 8/1/2023 due to Parikh catheter malfunction. Prior to admission, patient underwent resection of a bladder mass by urology. Patient reported that Parikh was draining normally until he began to notice hematuria with small clots. The parikh catheter began to stop draining and he developed abdominal pressure. In the ED, patient received CT of abdomen and pelvis, revealing a distended bladder around the Parikh with clots and inflammatory changes present. Urology was consulted and aspiration of the clots was performed in the ED with resolution of the blockage. Per urology, the patient was admitted for observation for further blockages.  The patient did not experience any further issues with his parikh over the course of his observation and was cleared by urology to be discharged with outpatient follow-up. Condition at Discharge: stable     Discharge Day Visit / Exam:     Subjective:  Patient reports that he is feeling better and that he is anxious to return home. He denies any pain currently and was resting comfortably in bed. Denies fever, chills, nausea, vomiting, or diarrhea. Vitals: Blood Pressure: 144/90 (08/02/23 0706)  Pulse: 60 (08/02/23 0832)  Temperature: (!) 97.1 °F (36.2 °C) (08/02/23 0706)  Temp Source: Oral (08/01/23 1641)  Respirations: 20 (08/02/23 0706)  SpO2: 96 % (08/01/23 2016)  Exam:   Physical Exam  Constitutional:       Appearance: Normal appearance. Cardiovascular:      Rate and Rhythm: Rhythm irregular. Pulses: Normal pulses. Heart sounds: Normal heart sounds. Pulmonary:      Effort: Pulmonary effort is normal.      Breath sounds: Normal breath sounds. Abdominal:      General: Bowel sounds are normal.      Palpations: Abdomen is soft. Genitourinary:     Comments: Moralez catheter in place  Neurological:      Mental Status: He is alert and oriented to person, place, and time. Mental status is at baseline. Psychiatric:         Mood and Affect: Mood normal.         Judgment: Judgment normal.       Discussion with Family: Wife in room    Discharge instructions/Information to patient and family:   See after visit summary for information provided to patient and family. Provisions for Follow-Up Care:  See after visit summary for information related to follow-up care and any pertinent home health orders. Planned Readmission: No     Discharge Medications:  See after visit summary for reconciled discharge medications provided to patient and family.       ** Please Note: This note has been constructed using a voice recognition system **

## 2023-08-02 NOTE — DISCHARGE INSTR - AVS FIRST PAGE
Dear Regulo Nixon,     It was our pleasure to care for you here at [facility]. It is our hope that we were always able to exceed the expected standards for your care during your stay. You were hospitalized due to Moralez catheter malfunction. You were cared for on the 3rd floor by Dar Parson DO under the service of Micky Lund MD with the [facility] who covers for your primary care physician (PCP), Nadya Kirk MD, while you were hospitalized. If you have any questions or concerns related to this hospitalization, you may contact us at [phone]. For follow up as well as any medication refills, we recommend that you follow up with your primary care physician. A registered nurse will reach out to you by phone within a few days after your discharge to answer any additional questions that you may have after going home. However, at this time we provide for you here, the most important instructions / recommendations at discharge:     Notable Medication Adjustments -   No medication adjustments  Testing Required after Discharge -   Discuss with PCP pertaining to CT findings  Important follow up information -   Please follow-up with your PCP within 7 days of discharge  Please follow-up with your urologist as directed  Other Instructions -   If your symptoms resume, please return to the ED for further evaluation    Please review this entire after visit summary as additional general instructions including medication list, appointments, activity, diet, any pertinent wound care, and other additional recommendations from your care team that may be provided for you.       Sincerely,     Dar Parson DO

## 2023-08-03 ENCOUNTER — TRANSITIONAL CARE MANAGEMENT (OUTPATIENT)
Dept: INTERNAL MEDICINE CLINIC | Facility: CLINIC | Age: 73
End: 2023-08-03

## 2023-08-03 NOTE — TELEPHONE ENCOUNTER
Post Op Note    Nandini Mata is a 68 y.o. male s/p TURBT performed 08/01/23. Nandini Mata is a patient of Dr. Vasiliy Steiner and is seen at the [facility] office.      LM for Lubna Lugo that he has an appointment tomorrow 8/4 @ 8:30 for parikh removal

## 2023-08-03 NOTE — TELEPHONE ENCOUNTER
Pt returned call . Wanted Jamia Lopez to know that he is feeling good and is looking forward to having catheter removed tomorrow.

## 2023-08-04 ENCOUNTER — PROCEDURE VISIT (OUTPATIENT)
Dept: UROLOGY | Facility: MEDICAL CENTER | Age: 73
End: 2023-08-04

## 2023-08-04 VITALS
SYSTOLIC BLOOD PRESSURE: 138 MMHG | HEART RATE: 77 BPM | WEIGHT: 221 LBS | HEIGHT: 73 IN | BODY MASS INDEX: 29.29 KG/M2 | DIASTOLIC BLOOD PRESSURE: 90 MMHG

## 2023-08-04 DIAGNOSIS — N32.89 BLADDER MASS: Primary | ICD-10-CM

## 2023-08-04 PROCEDURE — 99024 POSTOP FOLLOW-UP VISIT: CPT

## 2023-08-04 NOTE — PROGRESS NOTES
8/4/2023    Dmitry Hawkins  1950  [id number]    Diagnosis  Chief Complaint    Bladder Mass         Patient presents for parikh removal/void trial s/p TURBT, gemcitabine instillation (Bladder) on 8/1/23 with Dr. Elroy Valdez. Plan  PO visit on 8/10/23     Procedure Parikh removal/voiding trial    Parikh catheter removed after deflation of an intact balloon. Patient tolerated well. Encouraged patient to hydrate well and return this afternoon for post void residual.  he knows he may return early if uncomfortable and unable to urinate. Patient agrees to this plan. Called pt this afternoon to see how he is voiding s/p parikh removal this morning. Pt did not answer either cell or home phone. Messages were left to contact the office. Pt called office back and stated he was voiding and would not be returning for PVR. Pt is aware to contact the office with any issues or concerns.     Vitals:    08/04/23 0842   BP: 138/90   Pulse: 77   Weight: 100 kg (221 lb)   Height: 6' 1" (1.854 m)           Dung Karimi RN

## 2023-08-04 NOTE — TELEPHONE ENCOUNTER
Patient was returning call to Chandler Nassar to let her know that he's voiding normally and drinking. A little bit of blood the first few times but so far okay. I mentioned that he's scheduled for pm and he said no, that he's not going back, that the appointment was just in case something went wrong.     Patient can be reached at [phone]

## 2023-08-05 ENCOUNTER — NURSE TRIAGE (OUTPATIENT)
Dept: OTHER | Facility: OTHER | Age: 73
End: 2023-08-05

## 2023-08-05 NOTE — TELEPHONE ENCOUNTER
S/P TURBT, gemcitabine instillation (Bladder) on 8/1/23 with Roxy Bateman MD. Returning symptoms of blood in urine (dark cranberry/maroon color) after restarting Eliquis this morning, after holding medication for six days. Patient prefers not to go to ED. No additional symptoms. On call Provider contacted and informed of patient’s concerns and status. Provider recommends:  Stay well hydrated and drink lots of fluids . Can expect hematuria post procedure and typically after restarting A/C. If doesn’t improve would hold eliquis for another 2 days. And speak with cardiologist or primary care to see if he can hold it longer while he heals from procedure. Should he develops very thick consistency urine( almost like ketchup) or inability to void then would recommend he go to ER. If over the weekend would go to [facility] as we have on call surgeon there. Otherwise we can try and manage conservatively at home with the first recommendations    Patient informed of provider’s recommendation, along with care advice. Verbalized understanding. Agreeable with disposition. No further questions.

## 2023-08-05 NOTE — TELEPHONE ENCOUNTER
Regarding: post procedure isses  ----- Message from Responsyser sent at 8/5/2023  7:06 PM EDT -----  "I has a procedure on Tuesday and I have started back my medication and now I have blood in urine "

## 2023-08-05 NOTE — TELEPHONE ENCOUNTER
Reason for Disposition  • [1] Caller has URGENT question AND [2] triager unable to answer question    Answer Assessment - Initial Assessment Questions  1. SYMPTOM: "What's the main symptom you're concerned about?" (e.g., pain, fever, vomiting)      Restarting blood in urine. Dark cranberry, maroon color urine. 2. ONSET: "When did it start?"     Today 1600  3. SURGERY: "What surgery was performed?"     TURBT, gemcitabine instillation (Bladder)   4. DATE of SURGERY: "When was surgery performed?"       8/1  5. ANESTHESIA: " What type of anesthesia did you have?" (e.g., general, spinal, epidural, local)     General   6. PAIN: "Is there any pain?" If Yes, ask: "How bad is it?"  (Scale 1-10; or mild, moderate, severe)      Denies   7. FEVER: "Do you have a fever?" If Yes, ask: "What is your temperature, how was it measured, and when did it start?"      Denies   8. VOMITING: "Is there any vomiting?" If yes, ask: "How many times?"      Denies   9. BLEEDING: "Is there any bleeding?" If Yes, ask: "How much?" and "Where?"      Denies   10.  OTHER SYMPTOMS: "Do you have any other symptoms?" (e.g., drainage from wound, painful urination, constipation)       Denies    Protocols used: POST-OP SYMPTOMS AND QUESTIONS-[facility]

## 2023-08-07 ENCOUNTER — TELEPHONE (OUTPATIENT)
Dept: VASCULAR SURGERY | Facility: CLINIC | Age: 73
End: 2023-08-07

## 2023-08-07 NOTE — TELEPHONE ENCOUNTER
Attempted to contact patient to schedule appointment(s) listed below. Requested patient call [phone] option 3 to schedule appointment(s). Patient's appointment(s) are past due, expected ASAP.     Dopplers  [] Abdominal Aorta Iliac (AOIL)  [] Carotid (CV)   [] Celiac and/or Mesenteric  [] Endovascular Aortic Repair (EVAR)   [] Hemodialysis Access (HD)   [] Lower Limb Arterial (DERIK)  [] Lower Limb Venous (LEV)  [] Lower Limb Venous Duplex with Reflux (LEVDR)  [] Renal Artery  [] Upper Limb Arterial (UEA)    [] Upper Limb Venous (UEV)              [] GARO and Waveform analysis     Advanced Imaging   [] CTA head/neck    [] CTA abdomen    [] CTA abdomen & pelvis    [] CT abdomen with/ without contrast  [] CT abdomen with contrast  [] CT abdomen without contrast    [] CT abdomen & pelvis with/ without contrast  [] CT abdomen & pelvis with contrast  [] CT abdomen & pelvis without contrast    Office Visit   [] New patient, patient last seen over 3 years ago  [] Risk factor modification (RFM)   [x] Follow up   [] Lost to follow up (LTFU)  Called patient & LMOM to schedule ov/Pt l/s w/JBF 2019/Rev AOIL 8/1/23

## 2023-08-08 ENCOUNTER — TELEPHONE (OUTPATIENT)
Dept: INTERNAL MEDICINE CLINIC | Facility: CLINIC | Age: 73
End: 2023-08-08

## 2023-08-08 NOTE — TELEPHONE ENCOUNTER
Pt calling in stating he has blood in his urine again from his surgery - he was instructed to call our office to ask about holding eliquis, please advise

## 2023-08-10 ENCOUNTER — OFFICE VISIT (OUTPATIENT)
Dept: INTERNAL MEDICINE CLINIC | Facility: CLINIC | Age: 73
End: 2023-08-10
Payer: MEDICARE

## 2023-08-10 ENCOUNTER — OFFICE VISIT (OUTPATIENT)
Dept: UROLOGY | Facility: MEDICAL CENTER | Age: 73
End: 2023-08-10
Payer: MEDICARE

## 2023-08-10 ENCOUNTER — TELEPHONE (OUTPATIENT)
Age: 73
End: 2023-08-10

## 2023-08-10 VITALS
HEIGHT: 73 IN | WEIGHT: 228 LBS | BODY MASS INDEX: 30.22 KG/M2 | OXYGEN SATURATION: 97 % | SYSTOLIC BLOOD PRESSURE: 130 MMHG | DIASTOLIC BLOOD PRESSURE: 72 MMHG | HEART RATE: 69 BPM

## 2023-08-10 VITALS
BODY MASS INDEX: 30.16 KG/M2 | WEIGHT: 228.6 LBS | DIASTOLIC BLOOD PRESSURE: 78 MMHG | OXYGEN SATURATION: 98 % | SYSTOLIC BLOOD PRESSURE: 126 MMHG | HEART RATE: 78 BPM

## 2023-08-10 DIAGNOSIS — I48.19 ATRIAL FIBRILLATION, PERSISTENT (HCC): ICD-10-CM

## 2023-08-10 DIAGNOSIS — I10 ESSENTIAL HYPERTENSION: ICD-10-CM

## 2023-08-10 DIAGNOSIS — E03.9 ACQUIRED HYPOTHYROIDISM: ICD-10-CM

## 2023-08-10 DIAGNOSIS — T83.011S: Primary | ICD-10-CM

## 2023-08-10 DIAGNOSIS — K86.89 PANCREATIC MASS: ICD-10-CM

## 2023-08-10 DIAGNOSIS — C67.4 MALIGNANT NEOPLASM OF POSTERIOR WALL OF URINARY BLADDER (HCC): Primary | ICD-10-CM

## 2023-08-10 PROCEDURE — 99495 TRANSJ CARE MGMT MOD F2F 14D: CPT | Performed by: INTERNAL MEDICINE

## 2023-08-10 PROCEDURE — 99213 OFFICE O/P EST LOW 20 MIN: CPT | Performed by: UROLOGY

## 2023-08-10 NOTE — ASSESSMENT & PLAN NOTE
Pt restarted half dose Eliquis today due to some recurrent hematuria issues.   Rate is controlled, rhythm irregular

## 2023-08-10 NOTE — ASSESSMENT & PLAN NOTE
Treated at hospital, patient has follow-up with urology this afternoon. Moralez catheter has been removed.

## 2023-08-10 NOTE — PATIENT INSTRUCTIONS
Problem List Items Addressed This Visit          Endocrine    Acquired hypothyroidism     Continue levothyroxine            Cardiovascular and Mediastinum    Essential hypertension     Controlled, continue meds         Atrial fibrillation, persistent (HCC)     Pt restarted half dose Eliquis today due to some recurrent hematuria issues. Rate is controlled, rhythm irregular            Other    Malfunction of Moralez catheter ([address]) - Primary     Treated at hospital, patient has follow-up with urology this afternoon. Moralez catheter has been removed.          Pancreatic mass     Seen on CAT scan while in hospital recently, endoscopic ultrasound recommended, patient already referred to GI

## 2023-08-10 NOTE — PROGRESS NOTES
HISTORY:    Doing well after bladder tumor surgery 9 days ago. Had some significant materia postop day 4 when he restarted blood thinner. However since that time urine is clearing up and he is restarting his blood thinner. Minimal BPH symptoms    Pathology not back yet         ASSESSMENT / PLAN:    Will discuss with him when pathology back, although I will determine our next steps.     Either way, he will need cystoscopy in about 4 months, I will schedule an    The following portions of the patient's history were reviewed and updated as appropriate: allergies, current medications, past family history, past medical history, past social history, past surgical history and problem list.    Review of Systems      Objective:     Physical Exam      0   Lab Value Date/Time    PSA 1.9 12/08/2022 0924    PSA 1.0 11/29/2021 0902    PSA 0.9 11/06/2020 0728   ]  BUN   Date Value Ref Range Status   07/18/2023 23 5 - 25 mg/dL Final   01/06/2016 20 5 - 25 mg/dL Final     Creatinine   Date Value Ref Range Status   07/18/2023 1.06 0.60 - 1.30 mg/dL Final     Comment:     Standardized to IDMS reference method   01/06/2016 1.01 0.60 - 1.30 mg/dL Final     Comment:     Standardized to IDMS reference method     No components found for: "CBC"      Patient Active Problem List   Diagnosis   • Aneurysm of thoracic aorta ([address])   • Essential hypertension   • Atrial fibrillation, persistent ([address])   • Iliac artery aneurysm, bilateral ([address])   • Acquired hypothyroidism   • Neuropathy   • Iliac artery aneurysm, right ([address])   • Encounter for Medicare annual wellness exam   • Other headache syndrome   • Rash   • Mixed hyperlipidemia   • Chronic bilateral low back pain without sciatica   • NSVT (nonsustained ventricular tachycardia) (HCC)   • Hepatic artery dissection (HCC)   • Vitamin D deficiency   • Prediabetes   • Gross hematuria   • Bladder mass   • Malfunction of Moralez catheter ([address])   • Pancreatic mass   • Malignant neoplasm of posterior wall of urinary bladder ([address] )        Diagnoses and all orders for this visit:    Malignant neoplasm of posterior wall of urinary bladder [facility])           Patient ID: Vijay Nova is a 68 y.o. male. Current Outpatient Medications:   •  acetaminophen (TYLENOL) 325 mg tablet, Take 2 tablets (650 mg total) by mouth every 6 (six) hours as needed for mild pain or fever, Disp: 30 tablet, Rfl: 0  •  aspirin (ECOTRIN LOW STRENGTH) 81 mg EC tablet, Take 81 mg by mouth daily, Disp: , Rfl:   •  atorvastatin (LIPITOR) 20 mg tablet, TAKE 1 TABLET DAILY, Disp: 90 tablet, Rfl: 3  •  cholecalciferol (VITAMIN D3) 1,000 units tablet, Take 1,000 Units by mouth daily, Disp: , Rfl:   •  Co-Enzyme Q-10 100 MG CAPS, Take 100 mg by mouth daily, Disp: , Rfl:   •  Eliquis 5 MG, TAKE 1 TABLET TWICE A DAY (Patient taking differently: 5 mg The patient is taking 0.5 tablet two times a day. On 8/10/23 he will resume full dose), Disp: 180 tablet, Rfl: 3  •  felodipine (PLENDIL) 5 mg 24 hr tablet, TAKE 1 TABLET DAILY, Disp: 90 tablet, Rfl: 3  •  gabapentin (NEURONTIN) 100 mg capsule, Take 1 capsule (100 mg total) by mouth 2 (two) times a day, Disp: 60 capsule, Rfl: 0  •  hydrocortisone 2.5 % cream, Apply 1 application. topically 2 (two) times a day To affected area as needed, Disp: , Rfl:   •  ketoconazole (NIZORAL) 2 % cream, As needed, Disp: , Rfl:   •  levothyroxine 75 mcg tablet, TAKE 1 TABLET DAILY FOR    ACQUIRED HYPOTHYROIDISM, Disp: 90 tablet, Rfl: 3  •  Misc Natural Products (LUTEIN 20 PO), Take 20 mg by mouth daily. , Disp: , Rfl:   •  multivitamin (THERAGRAN) TABS, Take 1 tablet by mouth daily. , Disp: , Rfl:   •  Saw Palmetto 450 MG CAPS, , Disp: , Rfl:   •  tamsulosin (FLOMAX) 0.4 mg, Take 1 capsule (0.4 mg total) by mouth daily with dinner, Disp: 90 capsule, Rfl: 3  •  metoprolol succinate (TOPROL-XL) 25 mg 24 hr tablet, TAKE 1 TABLET TWICE A DAY, Disp: 180 tablet, Rfl: 3  •  oxyCODONE-acetaminophen (PERCOCET) 5-325 mg per tablet, Take 1-2 tablets by mouth every 6 (six) hours as needed for moderate pain or severe pain for up to 10 days Max Daily Amount: 8 tablets (Patient not taking: Reported on 8/4/2023), Disp: 5 tablet, Rfl: 0    Past Medical History:   Diagnosis Date   • Acute blood loss anemia 3/8/2019   • Aneurysm of thoracic aorta ([address])    • Arrhythmia    • Cholecystitis 3/5/2019    Added automatically from request for surgery [id number]   • Detached retina, right    • Disease of thyroid gland    • Gastric wall thickening    • Headache    • Hematoma 10/10/2018   • Hypertension    • Iliac artery aneurysm, bilateral (HCC)    • Irregular heart beat     afib cardioversion 1/30/17, x2    • Neuropathy     bilateral feet   • Paroxysmal A-fib [facility])    • Prostatic hypertrophy    • 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[facility])        Past Surgical History:   Procedure Laterality Date   • ABDOMINAL AORTIC ANEURYSM REPAIR, ENDOVASCULAR Right 04/13/2018    Procedure: REPAIR ANEURYSM ILIAC endovascular  with coil embolization right hypogastric artery.;  Surgeon: Shabnam Elliott MD;  Location: [facility];  Service: Vascular   • CARDIOVERSION     • CATARACT EXTRACTION Left 10/20/2019    Right eye 11/17/2011   • CHOLECYSTECTOMY     • CHOLECYSTECTOMY LAPAROSCOPIC N/A 03/08/2019    Procedure: [id number] CHOLECYSTECTOMY;  Surgeon: Trenton Mccormack DO;  Location: [facility];  Service: General   • COLONOSCOPY  07/13/2016   • MOLE EXCISION  2021   • UT CYSTOURETHROSCOPY W/DEST &/RMVL TUMOR LARGE N/A 08/01/2023    Procedure: TURBT, gemcitabine instillation;  Surgeon: Divya Shah MD;  Location: [facility];  Service: Urology   • UT EDG US EXAM SURGICAL ALTER STOM DUODENUM/JEJUNUM N/A 11/10/2017    Procedure: RADIAL ENDOSCOPIC U/S;  Surgeon: Debora Cruz MD;  Location: [facility];   Service: Gastroenterology   • RETINAL DETACHMENT SURGERY Right     onset 1992, retinal detachment complete air fill confirmed       Social History

## 2023-08-10 NOTE — TELEPHONE ENCOUNTER
Referred by PCP:  Pt needs to schedule an endoscopic ultrasound. Please call when available  to schedule.    [phone]

## 2023-08-10 NOTE — PROGRESS NOTES
Assessment & Plan     1. Malfunction of Moralez catheter, sequela  Assessment & Plan:  Treated at hospital, patient has follow-up with urology this afternoon. Moralez catheter has been removed. 2. Atrial fibrillation, persistent ([address])  Assessment & Plan:  Pt restarted half dose Eliquis today due to some recurrent hematuria issues. Rate is controlled, rhythm irregular      3. Essential hypertension  Assessment & Plan:  Controlled, continue meds      4. Pancreatic mass  Assessment & Plan:  Seen on CAT scan while in hospital recently, endoscopic ultrasound recommended, patient already referred to GI      5. Acquired hypothyroidism  Assessment & Plan:  Continue levothyroxine           Subjective     Transitional Care Management Review:   Regulo Nixon is a 68 y.o. male here for TCM follow up. During the TCM phone call patient stated:  TCM Call     Date and time call was made  8/3/2023  8:52 AM    Hospital care reviewed  Records not available    Patient was hospitialized at  [facility]    Date of Admission  08/01/23    Date of discharge  08/02/23    Diagnosis  Malfunction of Moralez catheter [facility])    Disposition  Home    Were the patients medications reviewed and updated  No    Current Symptoms  None      TCM Call     Post hospital issues  None    Should patient be enrolled in anticoag monitoring? No    Scheduled for follow up?   Yes    Did you obtain your prescribed medications  Yes    Do you need help managing your prescriptions or medications  No    Is transportation to your appointment needed  No    I have advised the patient to call PCP with any new or worsening symptoms  James PEREZ/MA    Are you recieving any outpatient services  No    Are you recieving home care services  No    Are you using any community resources  No    Have you fallen in the last 12 months  Yes    How many times  1    Interperter language line needed  No    Counseling  Patient        I reviewed patient's hospitalization and emergency room visit for transurethral bladder tumor surgery. He then presented to the emergency room after having blood in the urine with his Moralez, clots, and then Moralez not draining properly with increasing abdominal pressure and pain. He had irrigation of the bladder done in the emergency room with return of function of the Moralez. His Eliquis was on hold post op. Since his release from the hospital, pt has been doing ok. He restarted Eliquis4-5 days ago, and then developed some blood in urine again which he monitored, and eventually stopped Eliquis again 2 days ago, bleeding improved, so he restarted Eliquis at half dose today. No abdominal pain or pressure, urine flow is good. Urine flow is clear today. Review of Systems   Constitutional: Negative for chills, fatigue and fever. HENT: Negative for congestion, nosebleeds, postnasal drip, sore throat and trouble swallowing. Eyes: Negative for pain. Respiratory: Negative for cough, chest tightness, shortness of breath and wheezing. Cardiovascular: Negative for chest pain, palpitations and leg swelling. Gastrointestinal: Negative for abdominal pain, constipation, diarrhea, nausea and vomiting. Endocrine: Negative for polydipsia and polyuria. Genitourinary: Negative for dysuria, flank pain and hematuria (today). Musculoskeletal: Negative for arthralgias. Skin: Negative for rash. Neurological: Negative for dizziness, tremors, light-headedness and headaches. Hematological: Does not bruise/bleed easily. Psychiatric/Behavioral: Negative for confusion and dysphoric mood. The patient is not nervous/anxious. Objective     /78   Pulse 78   Wt 104 kg (228 lb 9.6 oz)   SpO2 98%   BMI 30.16 kg/m²      Physical Exam  Vitals reviewed. Constitutional:       General: He is not in acute distress. Appearance: Normal appearance. He is well-developed. HENT:      Head: Normocephalic and atraumatic.       Right Ear: External ear normal.      Left Ear: External ear normal.      Nose: Nose normal.   Eyes:      General: No scleral icterus. Conjunctiva/sclera: Conjunctivae normal.   Neck:      Trachea: No tracheal deviation. Cardiovascular:      Rate and Rhythm: Normal rate. Rhythm irregular. Heart sounds: Normal heart sounds. Pulmonary:      Effort: Pulmonary effort is normal. No respiratory distress. Breath sounds: Normal breath sounds. No wheezing or rales. Abdominal:      General: Bowel sounds are normal.      Palpations: Abdomen is soft. There is no mass. Tenderness: There is no abdominal tenderness. There is no guarding. Musculoskeletal:      Cervical back: Normal range of motion and neck supple. Right lower leg: No edema. Left lower leg: No edema. Lymphadenopathy:      Cervical: No cervical adenopathy. Skin:     Coloration: Skin is not jaundiced or pale. Neurological:      Mental Status: He is alert and oriented to person, place, and time. Psychiatric:         Behavior: Behavior normal.         Thought Content:  Thought content normal.         Judgment: Judgment normal.       Medications have been reviewed by provider in current encounter    Wei Appiah MD

## 2023-08-10 NOTE — ASSESSMENT & PLAN NOTE
Seen on CAT scan while in hospital recently, endoscopic ultrasound recommended, patient already referred to GI

## 2023-08-11 ENCOUNTER — TELEPHONE (OUTPATIENT)
Dept: GASTROENTEROLOGY | Facility: CLINIC | Age: 73
End: 2023-08-11

## 2023-08-11 NOTE — TELEPHONE ENCOUNTER
I have scheduled patient for 09/26/2023 with Dr. Tracy Waldron in [facility] room #4 sent for hold on [employer] to [facility] patient and confirmed and also sent instructions

## 2023-08-11 NOTE — TELEPHONE ENCOUNTER
Our mutual patient is scheduled for procedure: EUS    On: 09/26/2023     With: Dr. Trina Alexandre    He is taking the following blood thinner:  Eliquis    Can this be stopped  2 days prior to the procedure    Physician Approving clearance:     Please respond ASAP about this   Thank you

## 2023-08-14 ENCOUNTER — TELEPHONE (OUTPATIENT)
Dept: UROLOGY | Facility: AMBULATORY SURGERY CENTER | Age: 73
End: 2023-08-14

## 2023-08-14 PROCEDURE — 88360 TUMOR IMMUNOHISTOCHEM/MANUAL: CPT | Performed by: PATHOLOGY

## 2023-08-14 PROCEDURE — 88307 TISSUE EXAM BY PATHOLOGIST: CPT | Performed by: PATHOLOGY

## 2023-08-14 PROCEDURE — 88341 IMHCHEM/IMCYTCHM EA ADD ANTB: CPT | Performed by: PATHOLOGY

## 2023-08-14 PROCEDURE — 88342 IMHCHEM/IMCYTCHM 1ST ANTB: CPT | Performed by: PATHOLOGY

## 2023-08-14 NOTE — TELEPHONE ENCOUNTER
Pt under care of:      Last Seen: 8/10/23    Pt calling due to:     Patient calling in requesting a call back from office/provider regarding his Tissue exam. He states the results are concerning and would like a call back as soon as possible.      Pt can be reached at: [phone]

## 2023-08-15 ENCOUNTER — DOCUMENTATION (OUTPATIENT)
Dept: HEMATOLOGY ONCOLOGY | Facility: CLINIC | Age: 73
End: 2023-08-15

## 2023-08-15 ENCOUNTER — TELEPHONE (OUTPATIENT)
Dept: UROLOGY | Facility: MEDICAL CENTER | Age: 73
End: 2023-08-15

## 2023-08-15 ENCOUNTER — PATIENT OUTREACH (OUTPATIENT)
Dept: HEMATOLOGY ONCOLOGY | Facility: CLINIC | Age: 73
End: 2023-08-15

## 2023-08-15 DIAGNOSIS — C67.4 MALIGNANT NEOPLASM OF POSTERIOR WALL OF URINARY BLADDER (HCC): Primary | ICD-10-CM

## 2023-08-15 NOTE — PROGRESS NOTES
Curry Payne with wife on the line. They have an appointment with Oncology tomorrow  And then are also scheduled next week with Rosa Maria mendoza and also one with [address] [place] [address]. Once they get those opinions will call back as appointment we were offering was tomorrow the same as their oncology appointment.

## 2023-08-15 NOTE — TELEPHONE ENCOUNTER
I discussed pathology on the phone with patient. High-grade, muscle invasive bladder cancer, transitional cell carcinoma and neuroendocrine. I described in detail the standard approach for cure of this serious condition is chemotherapy followed by radical cystectomy and ileal conduit urinary diversion. I gave him phone numbers to call for medical oncology evaluation. Patient asked me about second opinions, I gave him phone number and names of people at [facility]. We will also set up an appointment with urologic surgeon in our department to discuss radical cystectomy.

## 2023-08-15 NOTE — PROGRESS NOTES
Call to pt and introduced myself as Nurse Navigator and explained my role in his care with coordinating appts, being a point of contact, providing support and connecting him with available resources as needed. General assessment completed and evaluated for any barriers to care. Referral to Oncology Social Work placed. Answered questions to pt's satisfaction. Reviewed upcoming appts. Provided support and encouraged to call with any questions or concerns.     Future Appointments   Date Time Provider [address]   8/16/2023  1:00 PM [address] [facility],[address] Floor,  [address] [facility].   8/28/2023 10:00 AM Breanna Abreu MD ORTH[facility]   9/21/2023 10:30 AM Georgina Morales MD [facility]   9/26/2023  1:45 PM Dennis Guo MD [facility]   10/2/2023 10:00 AM Dana Landon MD [facility]   11/29/2023 11:00 AM Andrea Petty MD [facility]     Second opinions:   Bri Redding    9/4/23-Karla for 12 days

## 2023-08-15 NOTE — PROGRESS NOTES
Pt with new diagnosis of MIBC with neuroendocrine features  8/1/2023   A. 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     - Tumor invades muscularis propria (detrusor muscle). - Lymphovascular invasion by tumor present.     Please schedule with surgeon asap to discuss radical cystectomy

## 2023-08-15 NOTE — PROGRESS NOTES
Intake received/ Chart reviewed for services completed outside of [facility]    Pathology completed: 8/1/23    Imaging completed: 8/1/23    All records needed are in patients chart. No records retrieval needed at this time.

## 2023-08-16 ENCOUNTER — PATIENT OUTREACH (OUTPATIENT)
Dept: HEMATOLOGY ONCOLOGY | Facility: CLINIC | Age: 73
End: 2023-08-16

## 2023-08-16 ENCOUNTER — CONSULT (OUTPATIENT)
Dept: HEMATOLOGY ONCOLOGY | Facility: CLINIC | Age: 73
End: 2023-08-16
Payer: MEDICARE

## 2023-08-16 ENCOUNTER — OFFICE VISIT (OUTPATIENT)
Dept: UROLOGY | Facility: AMBULATORY SURGERY CENTER | Age: 73
End: 2023-08-16
Payer: MEDICARE

## 2023-08-16 VITALS
HEIGHT: 73 IN | WEIGHT: 227 LBS | HEART RATE: 54 BPM | DIASTOLIC BLOOD PRESSURE: 84 MMHG | SYSTOLIC BLOOD PRESSURE: 138 MMHG | BODY MASS INDEX: 30.09 KG/M2 | OXYGEN SATURATION: 95 %

## 2023-08-16 VITALS
RESPIRATION RATE: 16 BRPM | DIASTOLIC BLOOD PRESSURE: 78 MMHG | HEIGHT: 73 IN | TEMPERATURE: 98 F | OXYGEN SATURATION: 95 % | SYSTOLIC BLOOD PRESSURE: 140 MMHG | BODY MASS INDEX: 30.22 KG/M2 | WEIGHT: 228 LBS | HEART RATE: 75 BPM

## 2023-08-16 DIAGNOSIS — K86.89 PANCREATIC MASS: Primary | ICD-10-CM

## 2023-08-16 DIAGNOSIS — C67.4 MALIGNANT NEOPLASM OF POSTERIOR WALL OF URINARY BLADDER (HCC): Primary | ICD-10-CM

## 2023-08-16 DIAGNOSIS — C67.8 MALIGNANT NEOPLASM OF OVERLAPPING SITES OF BLADDER (HCC): Primary | ICD-10-CM

## 2023-08-16 DIAGNOSIS — C67.4 MALIGNANT NEOPLASM OF POSTERIOR WALL OF URINARY BLADDER (HCC): ICD-10-CM

## 2023-08-16 PROCEDURE — 99205 OFFICE O/P NEW HI 60 MIN: CPT | Performed by: INTERNAL MEDICINE

## 2023-08-16 PROCEDURE — 99215 OFFICE O/P EST HI 40 MIN: CPT | Performed by: UROLOGY

## 2023-08-16 NOTE — PROGRESS NOTES
Call to patient after his appts today. We reviewed plan for PET scan and he confirmed he is to still have CT Chest scheduled for tomorrow per Dr. Jay Campos.  Provided him with an appt with Urologic Surgeon at [facility], Dr. Petros Salinas for 8/24/23 at 9:30am and assured him I would try to move up PET scan.   Upcoming appts:  8/23/23 at [address]  8/24/23 at 9:30am Dr. Rosario Younger  8/25/23 [place] [facility]  8/29/23 [facility]

## 2023-08-16 NOTE — PROGRESS NOTES
Rin Fonseca  1950  [facility] HEMATOLOG[facility] ONCOLOGY SPECIALISTS [facility] [id number]    No chief complaint on file. History of Present Illness:  Kinga Case is a 66-year-old male with past medical history significant for HTN, atrial fibrillation, hypothyroidism and hyperlipidemia. Patient notes developing hematuria which prompted further work-up. 4/26/2023: CT renal protocol:  1. New bladder lesion. Cystoscopy is advised. 2.  New 2.7 cm pancreatic cyst. For simple cyst(s) 2 cm or greater recommend gastroenterology and/or surgical oncology consult. EUS is likely now warranted. 8/1/2023: CT abdomen/pelvis:  Partially distended urinary bladder around Moralez catheter with a small amount of hemorrhagic products around the tip of the catheter. Urinary bladder wall thickening and surrounding inflammatory stranding, likely postprocedural.   Stable size of 2.7 cm uncinate process cystic lesion compared to 4/26/2023, likely representing IPMN. For simple cyst(s) 2 cm or greater recommend gastroenterology and/or surgical oncology consult. EUS is likely now warranted. 8/1/2023: TURBT and gemcitabine instillation by urology (Dr. Tomy Soni). A.  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     - Tumor invades muscularis propria (detrusor muscle). - Lymphovascular invasion by tumor present. *On immunostaining the areas of neuroendocrine differentiation are positive for synaptophysin, CD56, chromogranin, and TTF-1 with a Ki-67 proliferation rate of >80%. The urothelial carcinomatous component is negative for the aforementioned immunostains but positive for uroplakin and GATA3 with a Ki-67 proliferation rate of 30%. 8/16/2023: S/p evaluation by urology who discussed neoadjuvant chemotherapy and surgery.     Patient presents for initial oncology consultation accompanied by his wife for visit. He notes mild hematuria. Denies any chest pain, shortness of breath, abdominal pain, nausea, vomiting, diarrhea, abdominal or bone discomfort. He denies any headaches, vision changes or dizziness. He does note mild neuropathy in his feet bilateral.  He has additional oncology appointments upcoming at [address] [facility]. Review of Systems   Constitutional: Negative for fatigue. HENT: Negative. Respiratory: Negative. Cardiovascular: Negative. Gastrointestinal: Negative for abdominal pain, blood in stool, nausea and vomiting. Genitourinary: Positive for hematuria. Musculoskeletal: Negative. Neurological: Negative. Hematological: Does not bruise/bleed easily.        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HCC)   • Vitamin D deficiency   • Prediabetes   • Gross hematuria   • Bladder mass   • Malfunction of Moralez catheter [facility])   • Pancreatic mass   • Malignant neoplasm of posterior wall of urinary bladder (HCC)     Past Medical History:   Diagnosis Date   • Acute blood loss anemia 3/8/2019   • Aneurysm of thoracic aorta ([address])    • Arrhythmia    • Cholecystitis 3/5/2019    Added automatically from request for surgery [id number]   • Detached retina, right    • Disease of thyroid gland    • Gastric wall thickening    • Headache    • Hematoma 10/10/2018   • Hypertension    • Iliac artery aneurysm, bilateral (HCC)    • Irregular heart beat     afib cardioversion 17, x2    • Neuropathy     bilateral feet   • Paroxysmal A-fib [facility])    • Prostatic hypertrophy    • 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[facility])      Past Surgical History:   Procedure Laterality Date   • ABDOMINAL AORTIC ANEURYSM REPAIR, ENDOVASCULAR Right 2018    Procedure: REPAIR ANEURYSM ILIAC endovascular  with coil embolization right hypogastric artery.;  Surgeon: Simon Dodge MD;  Location: [facility];  Service: Vascular   • CARDIOVERSION     • CATARACT EXTRACTION Left 10/20/2019    Right eye 2011   • CHOLECYSTECTOMY     • CHOLECYSTECTOMY LAPAROSCOPIC N/A 2019    Procedure: [id number] CHOLECYSTECTOMY;  Surgeon: Kaykay Vanessa DO;  Location: [facility];  Service: General   • COLONOSCOPY  2016   • MOLE EXCISION     • RI CYSTOURETHROSCOPY W/DEST &/RMVL TUMOR LARGE N/A 2023    Procedure: TURBT, gemcitabine instillation;  Surgeon: rEiberto Warren MD;  Location: [facility];  Service: Urology   • RI EDG US EXAM SURGICAL ALTER STOM DUODENUM/JEJUNUM N/A 11/10/2017    Procedure: RADIAL ENDOSCOPIC U/S;  Surgeon: Reji Colvin MD;  Location: [facility];   Service: Gastroenterology   • RETINAL DETACHMENT SURGERY Right     onset , retinal detachment complete air fill confirmed     Family History   Problem Relation Age of Onset   • Stroke Mother          at 80, failure to thrive   • Aortic aneurysm Father         abdominal   • Aortic aneurysm Brother    • Basal cell carcinoma Brother    • Schizophrenia Brother    • Cancer Maternal Grandmother         smoker, heart attack, cancer   • Cancer Maternal Grandfather         smoker cancer   • Cancer Paternal Grandmother         malignant neoplasm   • Cancer Paternal Grandfather         malignant neoplasm   • Cancer Family         malignant neoplasm   • Cancer Family         malignant neoplasm   • Cancer Maternal Uncle         smoker, lots wrong with her   • Cancer Paternal Uncle         melanoma at 80     Social History     Socioeconomic History   • Marital status: /Civil Union     Spouse name: Not on file   • Number of children: Not on file   • Years of education: Not on file   • Highest education level: Not on file   Occupational History   • Not on file   Tobacco Use   • Smoking status: Never   • Smokeless tobacco: Never   Vaping Use   • Vaping Use: Never used   Substance and Sexual Activity   • Alcohol use: Yes     Alcohol/week: 5.0 standard drinks of alcohol     Types: 2 Glasses of wine, 1 Cans of beer, 2 Standard drinks or equivalent per week     Comment: 3-4 x week   • Drug use: Never   • Sexual activity: Not Currently   Other Topics Concern   • Not on file   Social History Narrative   • Not on file     Social Determinants of Health     Financial Resource Strain: Low Risk  (12/12/2022)    Overall Financial Resource Strain (CARDIA)    • Difficulty of Paying Living Expenses: Not hard at all   Food Insecurity: Not on file   Transportation Needs: No Transportation Needs (12/12/2022)    PRAPARE - Transportation    • Lack of Transportation (Medical): No    • Lack of Transportation (Non-Medical):  No   Physical Activity: Not on file   Stress: Not on file   Social Connections: Not on file   Intimate Partner Violence: Not on file   Housing Stability: Not on file       Current Outpatient Medications:   •  acetaminophen (TYLENOL) 325 mg tablet, Take 2 tablets (650 mg total) by mouth every 6 (six) hours as needed for mild pain or fever, Disp: 30 tablet, Rfl: 0  •  aspirin (ECOTRIN LOW STRENGTH) 81 mg EC tablet, Take 81 mg by mouth daily, Disp: , Rfl:   •  atorvastatin (LIPITOR) 20 mg tablet, TAKE 1 TABLET DAILY, Disp: 90 tablet, Rfl: 3  •  cholecalciferol (VITAMIN D3) 1,000 units tablet, Take 1,000 Units by mouth daily, Disp: , Rfl:   •  Co-Enzyme Q-10 100 MG CAPS, Take 100 mg by mouth daily, Disp: , Rfl:   •  Eliquis 5 MG, TAKE 1 TABLET TWICE A DAY (Patient taking differently: 20 mg The patient is taking 0.5 tablet two times a day. On 8/10/23 he will resume full dose), Disp: 180 tablet, Rfl: 3  •  felodipine (PLENDIL) 5 mg 24 hr tablet, TAKE 1 TABLET DAILY, Disp: 90 tablet, Rfl: 3  •  gabapentin (NEURONTIN) 100 mg capsule, Take 1 capsule (100 mg total) by mouth 2 (two) times a day, Disp: 60 capsule, Rfl: 0  •  hydrocortisone 2.5 % cream, Apply 1 application. topically 2 (two) times a day To affected area as needed, Disp: , Rfl:   •  ketoconazole (NIZORAL) 2 % cream, As needed, Disp: , Rfl:   •  levothyroxine 75 mcg tablet, TAKE 1 TABLET DAILY FOR    ACQUIRED HYPOTHYROIDISM, Disp: 90 tablet, Rfl: 3  •  metoprolol succinate (TOPROL-XL) 25 mg 24 hr tablet, TAKE 1 TABLET TWICE A DAY, Disp: 180 tablet, Rfl: 3  •  Misc Natural Products (LUTEIN 20 PO), Take 20 mg by mouth daily. , Disp: , Rfl:   •  multivitamin (THERAGRAN) TABS, Take 1 tablet by mouth daily. , Disp: , Rfl:   •  Saw Palmetto 450 MG CAPS, , Disp: , Rfl:   •  tamsulosin (FLOMAX) 0.4 mg, Take 1 capsule (0.4 mg total) by mouth daily with dinner, Disp: 90 capsule, Rfl: 3  Allergies   Allergen Reactions   • Wound Dressing Adhesive Blisters     Visit Vitals  /78 (BP Location: Right arm, Patient Position: Sitting, Cuff Size: Large)   Pulse 75   Temp 98 °F (36.7 °C) (Temporal)   Resp 16   Ht 6' 1" (1.854 m)   Wt 103 kg (228 lb)   SpO2 95%   BMI 30.08 kg/m²   Smoking Status Never   BSA 2.27 m²     Wt Readings from Last 3 Encounters:   08/16/23 103 kg (228 lb)   08/16/23 103 kg (227 lb)   08/10/23 103 kg (228 lb)     Physical Exam  Vitals reviewed. Constitutional:       General: He is not in acute distress. Appearance: Normal appearance. HENT:      Head: Normocephalic and atraumatic. Mouth/Throat:      Mouth: Mucous membranes are moist.   Eyes:      Extraocular Movements: Extraocular movements intact. Conjunctiva/sclera: Conjunctivae normal.      Comments: + glasses   Cardiovascular:      Rate and Rhythm: Normal rate. Rhythm irregular.    Pulmonary: Effort: Pulmonary effort is normal. No respiratory distress. Breath sounds: No wheezing, rhonchi or rales. Abdominal:      General: Abdomen is flat. There is no distension. Palpations: Abdomen is soft. Musculoskeletal:      Cervical back: Normal range of motion. Right lower leg: No edema. Left lower leg: No edema. Skin:     General: Skin is warm. Coloration: Skin is not jaundiced. Neurological:      General: No focal deficit present. Mental Status: He is alert and oriented to person, place, and time. Mental status is at baseline. Gait: Gait normal.   Psychiatric:         Thought Content: Thought content normal.       Labs:  Admission on 08/01/2023, Discharged on 08/01/2023   Component Date Value Ref Range Status   • Case Report 08/01/2023    Final                    Value:Surgical Pathology Report                         Case: [id number]                                   Authorizing Provider:  Kylah Wang MD           Collected:           08/01/2023 0801              Ordering Location:     [address] [place] [address]        Received:            08/01/2023 [facility] Operating Room                                                     Pathologist:           Andrew Willignham MD                                                         Specimen:    Urinary Bladder, Posterior bladder wall                                                   • Final Diagnosis 08/01/2023    Final                    Value: This result contains rich text formatting which cannot be displayed here. • Note 08/01/2023    Final                    Value: This result contains rich text formatting which cannot be displayed here. • Additional Information 08/01/2023    Final                    Value: This result contains rich text formatting which cannot be displayed here. • Gross Description 08/01/2023    Final                    Value: This result contains rich text formatting which cannot be displayed here. Office Visit on 07/18/2023   Component Date Value Ref Range Status   • Ventricular Rate 07/18/2023 75  BPM Final   • Atrial Rate 07/18/2023 82  BPM Final   • QRSD Interval 07/18/2023 102  ms Final   • QT Interval 07/18/2023 418  ms Final   • QTC Interval 07/18/2023 466  ms Final   • QRS Axis 07/18/2023 -35  degrees Final   • T Wave Axis 07/18/2023 -5  degrees Final   Appointment on 07/18/2023   Component Date Value Ref Range Status   • Urine Culture 07/18/2023 No Growth <1000 cfu/mL   Final   • Sodium 07/18/2023 139  135 - 147 mmol/L Final   • Potassium 07/18/2023 4.5  3.5 - 5.3 mmol/L Final   • Chloride 07/18/2023 112 (H)  96 - 108 mmol/L Final   • CO2 07/18/2023 25  21 - 32 mmol/L Final   • ANION GAP 07/18/2023 2  mmol/L Final   • BUN 07/18/2023 23  5 - 25 mg/dL Final   • Creatinine 07/18/2023 1.06  0.60 - 1.30 mg/dL Final    Standardized to IDMS reference method   • Glucose 07/18/2023 101  65 - 140 mg/dL Final    If the patient is fasting, the ADA then defines impaired fasting glucose as > 100 mg/dL and diabetes as > or equal to 123 mg/dL. Specimen collection should occur prior to Sulfasalazine administration due to the potential for falsely depressed results. Specimen collection should occur prior to Sulfapyridine administration due to the potential for falsely elevated results. • Calcium 07/18/2023 9.3  8.3 - 10.1 mg/dL Final   • AST 07/18/2023 18  5 - 45 U/L Final    Specimen collection should occur prior to Sulfasalazine administration due to the potential for falsely depressed results. • ALT 07/18/2023 29  12 - 78 U/L Final    Specimen collection should occur prior to Sulfasalazine and/or Sulfapyridine administration due to the potential for falsely depressed results.     • Alkaline Phosphatase 07/18/2023 111  46 - 116 U/L Final   • Total Protein 07/18/2023 6.8  6.4 - 8.4 g/dL Final   • Albumin 07/18/2023 3.9  3.5 - 5.0 g/dL Final   • Total Bilirubin 07/18/2023 0.57 0.20 - 1.00 mg/dL Final    Use of this assay is not recommended for patients undergoing treatment with eltrombopag due to the potential for falsely elevated results. • eGFR 07/18/2023 69  ml/min/1.73sq m Final   • WBC 07/18/2023 5.30  4.31 - 10.16 Thousand/uL Final   • RBC 07/18/2023 5.13  3.88 - 5.62 Million/uL Final   • Hemoglobin 07/18/2023 16.0  12.0 - 17.0 g/dL Final   • Hematocrit 07/18/2023 46.4  36.5 - 49.3 % Final   • MCV 07/18/2023 90  82 - 98 fL Final   • MCH 07/18/2023 31.2  26.8 - 34.3 pg Final   • MCHC 07/18/2023 34.5  31.4 - 37.4 g/dL Final   • RDW 07/18/2023 11.7  11.6 - 15.1 % Final   • MPV 07/18/2023 10.2  8.9 - 12.7 fL Final   • Platelets 46/35/8576 303  149 - 390 Thousands/uL Final   • nRBC 07/18/2023 0  /100 WBCs Final   • Neutrophils Relative 07/18/2023 70  43 - 75 % Final   • Immat GRANS % 07/18/2023 0  0 - 2 % Final   • Lymphocytes Relative 07/18/2023 16  14 - 44 % Final   • Monocytes Relative 07/18/2023 11  4 - 12 % Final   • Eosinophils Relative 07/18/2023 2  0 - 6 % Final   • Basophils Relative 07/18/2023 1  0 - 1 % Final   • Neutrophils Absolute 07/18/2023 3.73  1.85 - 7.62 Thousands/µL Final   • Immature Grans Absolute 07/18/2023 0.01  0.00 - 0.20 Thousand/uL Final   • Lymphocytes Absolute 07/18/2023 0.84  0.60 - 4.47 Thousands/µL Final   • Monocytes Absolute 07/18/2023 0.56  0.17 - 1.22 Thousand/µL Final   • Eosinophils Absolute 07/18/2023 0.10  0.00 - 0.61 Thousand/µL Final   • Basophils Absolute 07/18/2023 0.06  0.00 - 0.10 Thousands/µL Final       Discussion/Summary:    1. Muscle invasive neuroendocrine/urothelial cancer of the bladder  I met with the patient and his wife. I reviewed his chart, labs, CT scan, pathology and exam.  He has at least clinical stage II bladder cancer. We discussed his diagnosis, treatment options and prognosis per NCCN guidelines. His CT scan of the abdomen and pelvis does not reveal any obvious metastatic disease. CT chest remains pending.   We discussed his pathology which shows an invasive mixed high-grade carcinoma with urothelial (40%) and neuroendocrine (60%) differentiation. On immunostain in the areas of neuroendocrine differentiation are positive for synaptophysin, CD56, chromogranin, and TTF-1 with a Ki-67 proliferation rate of >80%. The urothelial carcinomatous component is negative for the aforementioned immunostains but positive for uroplakin and GATA3 with a Ki-67 proliferation rate of 30%. Patient secure message sent to pathology for review. They understand this is considered a very aggressive form of bladder cancer. We discussed standard approach of neoadjuvant chemotherapy, surgery/radiation. I introduced the role and administration of systemic treatment with chemotherapy. Recommend chemotherapy with cisplatin and etoposide. I have also ordered a PET CT scan. He has upcoming appointments at [address] [place] [address] for second opinion prior to finalizing treatment. Overall, he is active and has a good performance status. He bikes several times a week and gardens regularly. States appetite is good. 2. 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 Patient referred to GI by PCP. Plans EUS. I have ordered a PET scan. 3. Atrial fibrillation  On eliquis. Management per PCP. Recommendations consistent with NCCN guidelines. Orders Placed This Encounter   Procedures   • NM PET CT skull base to mid thigh     Return in about 2 weeks (around 8/30/2023) for Office Visit, Imaging - See orders.

## 2023-08-16 NOTE — PROGRESS NOTES
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 We reviewed grading and staging of bladder cancer in detail. They understand this is considered a very aggressive form of bladder cancer. Additionally, this is at least stage II bladder cancer clinically. We reviewed his CT scan which fortunately did not reveal any evidence of metastasis of the abdomen or pelvis. He has a CT scan of the chest scheduled for staging as well. We reviewed treatment options for muscle invasive disease. Discussed standard approach of neoadjuvant chemotherapy, radical cystoprostatectomy, with reconstruction. Discussed extended lymph node dissections as well. We spent a good deal of time discussing appropriateness and dosing of chemotherapy depending on patient characteristics. He will also discuss this with oncology later today. We also reviewed risks, benefits, technique of radical surgery. We focused on risks of major surgery and potential complications. We also focused on reconstruction including standard ileal conduit urinary diversion and other options such as neobladder. Discussed the technique of each of these as well as likelihood of postoperative complications and hospital readmissions. Explained that conduit is relatively low maintenance whereas neobladder is conversely very high maintenance requiring catheterizations and irrigations often as well as hospitalizations. These are only appropriate in select patients. Informed that we are not performing neobladder reconstructions at our facility due to the degree of maintenance necessary and recommend higher volume centers if this is desired. We further discussed expected recovery from surgery, depending on method of surgery.     We then went on to discuss bladder sparing approach. This is likely not going to be recommended in his situation based on his age and overall health status as well as apparent degree of aggressiveness based on his pathology. However we did discuss the possibility of chemoradiation and/or immunotherapy with bladder surveillance. They were thankful for our extended discussion. They are planning to see oncology later today and also review options at [facility] and [address] [place], [place] [address]ate Dr. We will keep in touch to our nurse navigator and follow-up depending on their wishes. History of Present Illness  Dee is a 68 y.o. male very pleasant gentleman who developed muscle invasive bladder cancer despite being a lifelong non-smoker. Gross hematuria workup revealed mass, resected by Dr Antonio Sosa 8/1/2023. Path returned:  Invasive mixed high grade carcinoma with urothelial (40%) and neuroendocrine (60%) differentiation.     - Tumor invades muscularis propria (detrusor muscle). - Lymphovascular invasion by tumor present. Unfortunately he developed gross hematuria and clot retention post op. Path was discussed with him over the phone and he was given an urgent appointment today to discuss with me. He also has an oncology appointment today and 2 additional opinions scheduled at [facility].         AUA SYMPTOM SCORE    Flowsheet Row Most Recent Value   AUA SYMPTOM SCORE    How often have you had a sensation of not emptying your bladder completely after you finished urinating? 3 (P)     How often have you had to urinate again less than two hours after you finished urinating? 2 (P)     How often have you found you stopped and started again several times when you urinate? 2 (P)     How often have you found it difficult to postpone urination? 2 (P)     How often have you had a weak urinary stream? 4 (P)     How often have you had to push or strain to begin urination? 2 (P)     How many times did you most typically get up to urinate from the time you went to bed at night until the time you got up in the morning? 4 (P)     Quality of Life: If you were to spend the rest of your life with your urinary condition just the way it is now, how would you feel about that? 3 (P)     AUA SYMPTOM SCORE 19 (P)           Review of Systems  Review of Systems   Constitutional: Negative. HENT: Negative. Respiratory: Negative. Cardiovascular: Negative. Gastrointestinal: Negative. Genitourinary:        As per HPI   Musculoskeletal: Negative. Skin: Negative. Neurological: Negative. Hematological: Negative.           Past Medical History  Past Medical History:   Diagnosis Date   • Acute blood loss anemia 3/8/2019   • Aneurysm of thoracic aorta (HCC)    • Arrhythmia    • Cholecystitis 3/5/2019    Added automatically from request for surgery [id number]   • Detached retina, right    • Disease of thyroid gland    • Gastric wall thickening    • Headache    • Hematoma 10/10/2018   • Hypertension    • Iliac artery aneurysm, bilateral (HCC)    • Irregular heart beat     afib cardioversion 1/30/17, x2    • Neuropathy     bilateral feet   • Paroxysmal A-fib [facility])    • Prostatic hypertrophy    • 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[facility])        Past Social History  Past Surgical History:   Procedure Laterality Date   • ABDOMINAL AORTIC ANEURYSM REPAIR, ENDOVASCULAR Right 04/13/2018    Procedure: REPAIR ANEURYSM ILIAC endovascular  with coil embolization right hypogastric artery.;  Surgeon: Arnie Martini MD;  [facility];  Service: Vascular   • CARDIOVERSION     • CATARACT EXTRACTION Left 10/20/2019    Right eye 11/17/2011   • CHOLECYSTECTOMY     • CHOLECYSTECTOMY LAPAROSCOPIC N/A 03/08/2019    Procedure: [id number] CHOLECYSTECTOMY;  Surgeon: Seven Raines DO;  Location[facility];  Service: General   • COLONOSCOPY  07/13/2016   • MOLE EXCISION  2021   • NH CYSTOURETHROSCOPY W/DEST &/RMVL TUMOR LARGE N/A 08/01/2023    Procedure: TURBT, gemcitabine instillation;  Surgeon: Thom Ashley MD;  Location: [facility];  Service: Urology   • CT EDG US EXAM SURGICAL ALTER STOM DUODENUM/JEJUNUM N/A 11/10/2017    Procedure: RADIAL ENDOSCOPIC U/S;  Surgeon: Trace Stevens MD;  Location: [facility]; Service: Gastroenterology   • RETINAL DETACHMENT SURGERY Right     onset , retinal detachment complete air fill confirmed       Past Family History  Family History   Problem Relation Age of Onset   • Stroke Mother          at 80, failure to thrive   • Aortic aneurysm Father         abdominal   • Aortic aneurysm Brother    • Basal cell carcinoma Brother    • Schizophrenia Brother    • Cancer Maternal Grandmother         smoker, heart attack, cancer   • Cancer Maternal Grandfather         smoker cancer   • Cancer Paternal Grandmother         malignant neoplasm   • Cancer Paternal Grandfather         malignant neoplasm   • Cancer Family         malignant neoplasm   • Cancer Family         malignant neoplasm   • Cancer Maternal Uncle         smoker, lots wrong with her   • Cancer Paternal Uncle         melanoma at 80       Past Social history  Social History     Socioeconomic History   • Marital status: /Civil Union     Spouse name: Not on file   • Number of children: Not on file   • Years of education: Not on file   • Highest education level: Not on file   Occupational History   • Not on file   Tobacco Use   • Smoking status: Never   • Smokeless tobacco: Never   Vaping Use   • Vaping Use: Never used   Substance and Sexual Activity   • Alcohol use:  Yes     Alcohol/week: 5.0 standard drinks of alcohol     Types: 2 Glasses of wine, 1 Cans of beer, 2 Standard drinks or equivalent per week     Comment: 3-4 x week   • Drug use: Never   • Sexual activity: Not Currently   Other Topics Concern   • Not on file   Social History Narrative   • Not on file     Social Determinants of Health     Financial Resource Strain: Low Risk  (2022)    Overall Financial Resource Strain (CARDIA)    • Difficulty of Paying Living Expenses: Not hard at all   Food Insecurity: Not on file   Transportation Needs: No Transportation Needs (12/12/2022)    PRAPARE - Transportation    • Lack of Transportation (Medical): No    • Lack of Transportation (Non-Medical): No   Physical Activity: Not on file   Stress: Not on file   Social Connections: Not on file   Intimate Partner Violence: Not on file   Housing Stability: Not on file     Social History     Tobacco Use   Smoking Status Never   Smokeless Tobacco Never       Current Medications  Current Outpatient Medications   Medication Sig Dispense Refill   • acetaminophen (TYLENOL) 325 mg tablet Take 2 tablets (650 mg total) by mouth every 6 (six) hours as needed for mild pain or fever 30 tablet 0   • aspirin (ECOTRIN LOW STRENGTH) 81 mg EC tablet Take 81 mg by mouth daily     • atorvastatin (LIPITOR) 20 mg tablet TAKE 1 TABLET DAILY 90 tablet 3   • cholecalciferol (VITAMIN D3) 1,000 units tablet Take 1,000 Units by mouth daily     • Co-Enzyme Q-10 100 MG CAPS Take 100 mg by mouth daily     • Eliquis 5 MG TAKE 1 TABLET TWICE A DAY (Patient taking differently: 20 mg The patient is taking 0.5 tablet two times a day. On 8/10/23 he will resume full dose) 180 tablet 3   • felodipine (PLENDIL) 5 mg 24 hr tablet TAKE 1 TABLET DAILY 90 tablet 3   • gabapentin (NEURONTIN) 100 mg capsule Take 1 capsule (100 mg total) by mouth 2 (two) times a day 60 capsule 0   • hydrocortisone 2.5 % cream Apply 1 application. topically 2 (two) times a day To affected area as needed     • ketoconazole (NIZORAL) 2 % cream As needed     • levothyroxine 75 mcg tablet TAKE 1 TABLET DAILY FOR    ACQUIRED HYPOTHYROIDISM 90 tablet 3   • metoprolol succinate (TOPROL-XL) 25 mg 24 hr tablet TAKE 1 TABLET TWICE A  tablet 3   • Misc Natural Products (LUTEIN 20 PO) Take 20 mg by mouth daily. • multivitamin (THERAGRAN) TABS Take 1 tablet by mouth daily.      • Saw [place] 450 MG CAPS      • tamsulosin (FLOMAX) 0.4 mg Take 1 capsule (0.4 mg total) by mouth daily with dinner 90 capsule 3     No current facility-administered medications for this visit. Allergies  Allergies   Allergen Reactions   • Wound Dressing Adhesive Blisters       Past Medical History, Social History, Family History, medications and allergies were reviewed.     Vitals  Vitals:    08/16/23 1316   BP: 138/84   BP Location: Left arm   Patient Position: Sitting   Cuff Size: Adult   Pulse: (!) 54   SpO2: 95%   Weight: 103 kg (227 lb)   Height: 6' 1" (1.854 m)       Physical Exam  Physical Exam      Results  Lab Results   Component Value Date    PSA 1.9 12/08/2022    PSA 1.0 11/29/2021    PSA 0.9 11/06/2020     Lab Results   Component Value Date    GLUCOSE 149 (H) 03/08/2019    CALCIUM 9.3 07/18/2023     01/06/2016    K 4.5 07/18/2023    CO2 25 07/18/2023     (H) 07/18/2023    BUN 23 07/18/2023    CREATININE 1.06 07/18/2023     Lab Results   Component Value Date    WBC 5.30 07/18/2023    HGB 16.0 07/18/2023    HCT 46.4 07/18/2023    MCV 90 07/18/2023     07/18/2023

## 2023-08-16 NOTE — PROGRESS NOTES
Outreach to pt to follow-up from conversation yesterday about rearranging appts for him to see surgeon prior to [facility]. Offered 1:15pm with Dr. Herman Teixeira, address and appt information provided. Resheduled MedOnc to 3:20pm with Dr. Shira Ferro.  Comprehensive email sent to pt with resources and support programs for review per pt and wife request.  Agreed to f/u call tomorrow to review today's visits.

## 2023-08-17 ENCOUNTER — HOSPITAL ENCOUNTER (OUTPATIENT)
Dept: RADIOLOGY | Age: 73
Discharge: HOME | End: 2023-08-17
Payer: MEDICARE

## 2023-08-17 ENCOUNTER — PATIENT OUTREACH (OUTPATIENT)
Dept: HEMATOLOGY ONCOLOGY | Facility: CLINIC | Age: 73
End: 2023-08-17

## 2023-08-17 DIAGNOSIS — C67.8 MALIGNANT NEOPLASM OF OVERLAPPING SITES OF BLADDER (HCC): ICD-10-CM

## 2023-08-17 PROCEDURE — G1004 CDSM NDSC: HCPCS

## 2023-08-17 PROCEDURE — 71250 CT THORAX DX C-: CPT

## 2023-08-17 NOTE — PROGRESS NOTES
Called pt and provided new PET scan appt for Monday 8/21/23 at 6:30am at [facility].     Specific appt details and instructions emailed to pt

## 2023-08-21 ENCOUNTER — HOSPITAL ENCOUNTER (OUTPATIENT)
Dept: NUCLEAR MEDICINE | Facility: HOSPITAL | Age: 73
Discharge: HOME | End: 2023-08-21
Payer: MEDICARE

## 2023-08-21 ENCOUNTER — PATIENT OUTREACH (OUTPATIENT)
Dept: HEMATOLOGY ONCOLOGY | Facility: CLINIC | Age: 73
End: 2023-08-21

## 2023-08-21 DIAGNOSIS — K86.89 PANCREATIC MASS: ICD-10-CM

## 2023-08-21 DIAGNOSIS — C67.4 MALIGNANT NEOPLASM OF POSTERIOR WALL OF URINARY BLADDER (HCC): ICD-10-CM

## 2023-08-21 LAB — GLUCOSE SERPL-MCNC: 94 MG/DL (ref 65–140)

## 2023-08-21 PROCEDURE — 82948 REAGENT STRIP/BLOOD GLUCOSE: CPT

## 2023-08-21 PROCEDURE — G1004 CDSM NDSC: HCPCS

## 2023-08-21 PROCEDURE — A9552 F18 FDG: HCPCS

## 2023-08-21 PROCEDURE — 78815 PET IMAGE W/CT SKULL-THIGH: CPT

## 2023-08-21 NOTE — PROGRESS NOTES
Outreach made to patient. I spoke with patient and asked if we have permission to send his records to [facility]. He stated that he gives permission. Records will be sent. Patient voiced understanding.

## 2023-08-22 ENCOUNTER — DOCUMENTATION (OUTPATIENT)
Dept: HEMATOLOGY ONCOLOGY | Facility: CLINIC | Age: 73
End: 2023-08-22

## 2023-08-22 NOTE — PROGRESS NOTES
Patients radiology images were pushed through [facility] to [facility] for his appt on 8/24/23. Disks were sent to [facility] for his appt on 8/23/23 due to no ability to push through Epic per [facility] radiology dept.

## 2023-08-23 ENCOUNTER — PATIENT OUTREACH (OUTPATIENT)
Dept: HEMATOLOGY ONCOLOGY | Facility: CLINIC | Age: 73
End: 2023-08-23

## 2023-08-23 NOTE — PROGRESS NOTES
Received email from pt's wife:  Nickie Dodd,     We had a tour in [facility] booked for next month (September). We have cancelled this trip so we can do our due-diligence and April Cotton can start treatment as soon as we are ready to do so. We confirmed that we needed to cancel the trip when we saw Dr. Doug Lopez on 8/16. We will need some kind of letter from a doctor outlining the need to cancel our trip. We'll need this for [facility] to apply for a refund for medical reasons for our airfare and for our travel insurance to recoup the cost of the tour. I suspect you are familiar with this sort of situation. Can you help us obtain the letter or point us in the right direction? Thank you,  [id number] [facility]  [email or web address] April Cotton)  [phone] (Rona's cell)   [phone] (Ryan's cell)  [phone] (home)       Forwarded request to Jens Ramirez RN, with Dr. Duong Villatoro

## 2023-08-28 ENCOUNTER — OFFICE VISIT (OUTPATIENT)
Dept: OBGYN CLINIC | Facility: OTHER | Age: 73
End: 2023-08-28

## 2023-08-28 ENCOUNTER — APPOINTMENT (OUTPATIENT)
Dept: RADIOLOGY | Facility: OTHER | Age: 73
End: 2023-08-28
Payer: MEDICARE

## 2023-08-28 VITALS
DIASTOLIC BLOOD PRESSURE: 72 MMHG | WEIGHT: 228 LBS | HEIGHT: 73 IN | BODY MASS INDEX: 30.22 KG/M2 | HEART RATE: 66 BPM | SYSTOLIC BLOOD PRESSURE: 132 MMHG

## 2023-08-28 DIAGNOSIS — S82.392D CLOSED FRACTURE OF POSTERIOR MALLEOLUS OF LEFT TIBIA WITH ROUTINE HEALING, SUBSEQUENT ENCOUNTER: ICD-10-CM

## 2023-08-28 DIAGNOSIS — S82.392D CLOSED FRACTURE OF POSTERIOR MALLEOLUS OF LEFT TIBIA WITH ROUTINE HEALING, SUBSEQUENT ENCOUNTER: Primary | ICD-10-CM

## 2023-08-28 DIAGNOSIS — M25.572 PAIN, JOINT, ANKLE AND FOOT, LEFT: ICD-10-CM

## 2023-08-28 PROBLEM — S82.392A CLOSED FRACTURE OF POSTERIOR MALLEOLUS OF LEFT TIBIA: Status: ACTIVE | Noted: 2023-08-28

## 2023-08-28 PROCEDURE — 73610 X-RAY EXAM OF ANKLE: CPT

## 2023-08-28 NOTE — PROGRESS NOTES
Chief Complaint   Patient presents with   • Left Ankle - Follow-up         HPI:    Jessa Henderson is a 68year old Male who presents today for follow up of left ankle pain. He sustained an injury while he was walking downhill and twisted his ankle. X ray of the left ankle showed non displacedposterior malleolus fracture. He was recommended to continue tall cam boot at the time. He was suggested to wean off cam boot and ambulate with ankle brace last office visit. Today he reported only wear brace if he is having a long walk or uneven ground. Generally he does not have pain does report while resting with his foot inverted did have pain. Otherwise daily activity does not bother him. Injury date: 7/6/2023, 53 days out. I have personally reviewed pertinent films in PACS and my interpretation is non displaced posterior malleolar fracture with routine healing process.     Patient Active Problem List   Diagnosis   • Aneurysm of thoracic aorta ([address])   • Essential hypertension   • Atrial fibrillation, persistent ([address])   • Iliac artery aneurysm, bilateral (HCC)   • Acquired hypothyroidism   • Neuropathy   • Iliac artery aneurysm, right ([address])   • Encounter for Medicare annual wellness exam   • Other headache syndrome   • Rash   • Mixed hyperlipidemia   • Chronic bilateral low back pain without sciatica   • NSVT (nonsustained ventricular tachycardia) ([facility])   • Hepatic artery dissection (HCC)   • Vitamin D deficiency   • Prediabetes   • Gross hematuria   • Bladder mass   • Malfunction of Moralez catheter ([address])   • Pancreatic mass   • Malignant neoplasm of posterior wall of urinary bladder (HCC)        Current Outpatient Medications on File Prior to Visit   Medication Sig Dispense Refill   • acetaminophen (TYLENOL) 325 mg tablet Take 2 tablets (650 mg total) by mouth every 6 (six) hours as needed for mild pain or fever 30 tablet 0   • aspirin (ECOTRIN LOW STRENGTH) 81 mg EC tablet Take 81 mg by mouth daily     • atorvastatin (LIPITOR) 20 mg tablet TAKE 1 TABLET DAILY 90 tablet 3   • cholecalciferol (VITAMIN D3) 1,000 units tablet Take 1,000 Units by mouth daily     • Co-Enzyme Q-10 100 MG CAPS Take 100 mg by mouth daily     • Eliquis 5 MG TAKE 1 TABLET TWICE A DAY (Patient taking differently: 5 mg 2 (two) times a day The patient is taking 0.5 tablet two times a day. On 8/10/23 he will resume full dose) 180 tablet 3   • felodipine (PLENDIL) 5 mg 24 hr tablet TAKE 1 TABLET DAILY 90 tablet 3   • gabapentin (NEURONTIN) 100 mg capsule Take 1 capsule (100 mg total) by mouth 2 (two) times a day 60 capsule 0   • hydrocortisone 2.5 % cream Apply 1 application. topically 2 (two) times a day To affected area as needed     • ketoconazole (NIZORAL) 2 % cream As needed     • levothyroxine 75 mcg tablet TAKE 1 TABLET DAILY FOR    ACQUIRED HYPOTHYROIDISM 90 tablet 3   • metoprolol succinate (TOPROL-XL) 25 mg 24 hr tablet TAKE 1 TABLET TWICE A  tablet 3   • Misc Natural Products (LUTEIN 20 PO) Take 20 mg by mouth daily. • multivitamin (THERAGRAN) TABS Take 1 tablet by mouth daily. • Saw [place] 450 MG CAPS      • tamsulosin (FLOMAX) 0.4 mg Take 1 capsule (0.4 mg total) by mouth daily with dinner 90 capsule 3     No current facility-administered medications on file prior to visit.         Allergies   Allergen Reactions   • Wound Dressing Adhesive Blisters        Tobacco Use: Low Risk  (8/28/2023)    Patient History    • Smoking Tobacco Use: Never    • Smokeless Tobacco Use: Never    • Passive Exposure: Not on file        Social Determinants of Health     Tobacco Use: Low Risk  (8/28/2023)    Patient History    • Smoking Tobacco Use: Never    • Smokeless Tobacco Use: Never    • Passive Exposure: Not on file   Alcohol Use: Not on file   Financial Resource Strain: Low Risk  (12/12/2022)    Overall Financial Resource Strain (CARDIA)    • Difficulty of Paying Living Expenses: Not hard at all   Food Insecurity: Not on file Transportation Needs: No Transportation Needs (12/12/2022)    PRAPARE - Transportation    • Lack of Transportation (Medical): No    • Lack of Transportation (Non-Medical): No   Physical Activity: Not on file   Stress: Not on file   Social Connections: Not on file   Intimate Partner Violence: Not on file   Depression: Not at risk (3/20/2023)    PHQ-2    • PHQ-2 Score: 0   Housing Stability: Not on file        Review of Systems   Constitutional: Negative for chills and fever. HENT: Negative for ear pain and sore throat. Eyes: Negative for pain and visual disturbance. Respiratory: Negative for cough and shortness of breath. Cardiovascular: Negative for chest pain and palpitations. Gastrointestinal: Negative for abdominal pain and vomiting. Genitourinary: Negative for dysuria and hematuria. Skin: Negative for color change and rash. Neurological: Negative for seizures and syncope. All other systems reviewed and are negative. Physical Exam  /72   Pulse 66   Ht 6' 1" (1.854 m)   Wt 103 kg (228 lb)   BMI 30.08 kg/m²     Constitutional:  see vital signs  Gen: well-developed, normocephalic/atraumatic, well-groomed  Eyes: No inflammation or discharge of conjunctiva or lids; sclera clear   Pharynx: no inflammation, lesion, or mass of lips  Neck: supple, no masses, non-distended  MSK: no inflammation, lesion, mass, or clubbing of nails and digits except for other than mentioned below  SKIN: no visible rashes or skin lesions  Pulmonary/Chest: Effort normal. No respiratory distress.    NEURO: cranial nerves grossly intact  PSYCH:  Alert and oriented to person, place, and time; recent and remote memory intact; mood normal, no depression, anxiety, or agitation, judgment and insight good and intact     Ortho Exam    LEFT ANKLE  EXAM  Observation  GAIT:  normal    Inspection  Erythema: no  Ecchymosis: no  Edema:  none    Tenderness: mild discomfort on the posterior medial malleolous  Proximal Fibula: no  ([place]neuve frx)  AiTFL: no  (2cm proximal-medial to tip lateral malleolus 92% sens, 29% spec)  ATFL: no  CFL: no  PTFL: no  Achilles:  no  Deltoid: No  Peroneal: no  Tibialis Anterior: no  Tibialis Posterior: no    Bony Tenderpoints:  Lateral Malleolus: no  Base of 5th MT: no  Medial Malleolus: no  Navicular: no  Talar dome: No    ROM  Dorsiflexion: intact  Plantarflexion: intact    Muscle Strength  Pronation: intact without pain  Supination: intact without pain    Tib-Fib Squeeze: negative    Calcaneal Squeeze: negative      Procedures       Assessment:     Diagnosis ICD-10-CM Associated Orders   1. Closed fracture of posterior malleolus of left tibia with routine healing, subsequent encounter  S82.392D XR ankle 3+ vw left           Plan:    We discussed clinical examination and findings with Lulu Al  Reviewed imaging as outlined above. At this time, clinical presentation and findings are consistent with nondisplaced closed post malleolus fracture of the left ankle. We reviewed anatomical and biomechanics of affected area. At this time, his xray of the ankle revealed routine healing process of the post malleolar fracture. Would recommend avoid sudden hard stop while bicycling. He does not need to wear ankle brace at this point. Advised trying lowering the seat of the bicycle to avoid too much plantar flexion. Shared decision making, patient agreeable to plan. Follow-Up:    As needed. Portions of the record may have been created with voice recognition software. Occasional wrong word or "sound alike" substitutions may have occurred due to the inherent limitations of voice recognition software. Please review the chart carefully and recognize, using context, where substitutions/typographical errors may have occurred.

## 2023-08-30 ENCOUNTER — TELEPHONE (OUTPATIENT)
Dept: HEMATOLOGY ONCOLOGY | Facility: CLINIC | Age: 73
End: 2023-08-30

## 2023-08-30 ENCOUNTER — OFFICE VISIT (OUTPATIENT)
Dept: HEMATOLOGY ONCOLOGY | Facility: CLINIC | Age: 73
End: 2023-08-30
Payer: MEDICARE

## 2023-08-30 VITALS
HEART RATE: 57 BPM | HEIGHT: 73 IN | BODY MASS INDEX: 30.09 KG/M2 | RESPIRATION RATE: 18 BRPM | SYSTOLIC BLOOD PRESSURE: 120 MMHG | DIASTOLIC BLOOD PRESSURE: 70 MMHG | OXYGEN SATURATION: 94 % | WEIGHT: 227 LBS

## 2023-08-30 DIAGNOSIS — C67.4 MALIGNANT NEOPLASM OF POSTERIOR WALL OF URINARY BLADDER (HCC): Primary | ICD-10-CM

## 2023-08-30 PROCEDURE — 99215 OFFICE O/P EST HI 40 MIN: CPT | Performed by: INTERNAL MEDICINE

## 2023-08-30 NOTE — PROGRESS NOTES
Kimberley Stack  1950  [address] [facility] [place]  [address] [place] [id number]    Chief Complaint   Patient presents with   • Follow-up     Discussion/Summary:     1.  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 No evidence of distant disease. Reviewed he has localized disease with mixed histology. He has a dominant neuroendocrine component. We discussed that this is an aggressive cancer. We reviewed typical approach with chemotherapy which includes cisplatin and etoposide x4 cycles. This will be followed by cystectomy after restaging PET scan. We discussed at length the benefits, risk and adverse effects of chemotherapy in general and cisplatin and etoposide in particular. I highlighted the risks of infusional reactions which can be fatal and include, but are not limited to hair loss, fatigue, mucositis, nausea, vomiting, diarrhea, constipation, gastritis, pneumonitis, hepatitis, kidney injury, bone marrow suppression, increased risk of infection, skin/nail changes, nerve injury and blood clots. Cisplatin can cause ototoxicity, tinnitus, nephrotoxicity, peripheral neuropathy and electrolyte abnormalities. Etoposide can cause cytopenias, alopecia, peripheral neuropathy, mucositis, fever/rigors and taste changes. Patient signed informed consent for chemotherapy after all of his questions and concerns were addressed to his satisfaction. Patient has a second opinion scheduled at [facility] later this week and he plans to finalize where he will be receiving treatment after labs.   Patient highly considering chemotherapy at [facility].     2. 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 Patient referred to GI by PCP. Plans EUS.     3. Atrial fibrillation  On eliquis. Management per PCP. 4.  Ascending thoracic aorta aneurysm  Patient states this is chronic. Defer management to PCP.     Recommendations consistent with NCCN guidelines. History of Present Illness:  Kiana Miranda is a 69-year-old male with past medical history significant for HTN, atrial fibrillation, hypothyroidism and hyperlipidemia. Patient notes developing hematuria which prompted further work-up. Imaging showed bladder mass. Cystoscopy with TURBT showed urothelial cancer with neuroendocrine differentiation.     Interval history: In the interim, he underwent a PET CT scan which did not show distant disease. He has went for second opinion at [facility] and [facility]. Review of Systems:  Review of Systems   Constitutional: Negative for chills and fever. HENT: Negative for ear pain and sore throat. Eyes: Negative for pain and visual disturbance. Respiratory: Negative for cough and shortness of breath. Cardiovascular: Negative for chest pain and palpitations. Gastrointestinal: Negative for abdominal pain and vomiting. Genitourinary: Negative for dysuria and hematuria. Musculoskeletal: Negative for arthralgias and back pain. Skin: Negative for color change and rash. Neurological: Negative for seizures and syncope. All other systems reviewed and are negative.       Patient Active Problem List   Diagnosis   • Aneurysm of thoracic aorta [facility])   • Essential hypertension   • Atrial fibrillation, persistent ([address])   • Iliac artery aneurysm, bilateral ([address])   • Acquired hypothyroidism   • Neuropathy   • Iliac artery aneurysm, right ([address])   • Encounter for Medicare annual wellness exam   • Other headache syndrome   • Rash   • Mixed hyperlipidemia • Chronic bilateral low back pain without sciatica   • NSVT (nonsustained ventricular tachycardia) (HCC)   • Hepatic artery dissection (HCC)   • Vitamin D deficiency   • Prediabetes   • Gross hematuria   • Bladder mass   • Malfunction of Moralez catheter [facility])   • Pancreatic mass   • Malignant neoplasm of posterior wall of urinary bladder (HCC)   • Closed fracture of posterior malleolus of left tibia   • Pain, joint, ankle and foot, left     Past Medical History:   Diagnosis Date   • Acute blood loss anemia 3/8/2019   • Aneurysm of thoracic aorta ([address])    • Arrhythmia    • Cholecystitis 3/5/2019    Added automatically from request for surgery [id number]   • Detached retina, right    • Disease of thyroid gland    • Gastric wall thickening    • Headache    • Hematoma 10/10/2018   • Hypertension    • Iliac artery aneurysm, bilateral (HCC)    • Irregular heart beat     afib cardioversion 1/30/17, x2    • Neuropathy     bilateral feet   • Paroxysmal A-fib [facility])    • Prostatic hypertrophy    • 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[facility])      Past Surgical History:   Procedure Laterality Date   • ABDOMINAL AORTIC ANEURYSM REPAIR, ENDOVASCULAR Right 04/13/2018    Procedure: REPAIR ANEURYSM ILIAC endovascular  with coil embolization right hypogastric artery.;  Surgeon: Teodoro Mccabe MD;  Location: [facility];  Service: Vascular   • CARDIOVERSION     • CATARACT EXTRACTION Left 10/20/2019    Right eye 11/17/2011   • CHOLECYSTECTOMY     • CHOLECYSTECTOMY LAPAROSCOPIC N/A 03/08/2019    Procedure: [id number] CHOLECYSTECTOMY;  Surgeon: Christian Tellez DO;  Location: [facility];  Service: General   • COLONOSCOPY  07/13/2016   • MOLE EXCISION  2021   • NM CYSTOURETHROSCOPY W/DEST &/RMVL TUMOR LARGE N/A 08/01/2023    Procedure: TURBT, gemcitabine instillation;  Surgeon: Albert Dumont MD;  Location: [facility];  Service: Urology   • NM EDG US EXAM SURGICAL ALTER STOM DUODENUM/JEJUNUM N/A 11/10/2017    Procedure: RADIAL ENDOSCOPIC U/S; Surgeon: Cheryl Arora MD;  Location:  GI LAB; Service: Gastroenterology   • RETINAL DETACHMENT SURGERY Right     onset , retinal detachment complete air fill confirmed     Family History   Problem Relation Age of Onset   • Stroke Mother          at 80, failure to thrive   • Aortic aneurysm Father         abdominal   • Aortic aneurysm Brother    • Basal cell carcinoma Brother    • Schizophrenia Brother    • Cancer Maternal Grandmother         smoker, heart attack, cancer   • Cancer Maternal Grandfather         smoker cancer   • Cancer Paternal Grandmother         malignant neoplasm   • Cancer Paternal Grandfather         malignant neoplasm   • Cancer Family         malignant neoplasm   • Cancer Family         malignant neoplasm   • Cancer Maternal Uncle         smoker, lots wrong with her   • Cancer Paternal Uncle         melanoma at 80     Social History     Socioeconomic History   • Marital status: /Civil Union     Spouse name: Not on file   • Number of children: Not on file   • Years of education: Not on file   • Highest education level: Not on file   Occupational History   • Not on file   Tobacco Use   • Smoking status: Never   • Smokeless tobacco: Never   Vaping Use   • Vaping Use: Never used   Substance and Sexual Activity   • Alcohol use: Yes     Alcohol/week: 5.0 standard drinks of alcohol     Types: 2 Glasses of wine, 1 Cans of beer, 2 Standard drinks or equivalent per week     Comment: 3-4 x week   • Drug use: Never   • Sexual activity: Not Currently   Other Topics Concern   • Not on file   Social History Narrative   • Not on file     Social Determinants of Health     Financial Resource Strain: Low Risk  (2022)    Overall Financial Resource Strain (CARDIA)    • Difficulty of Paying Living Expenses: Not hard at all   Food Insecurity: Not on file   Transportation Needs: No Transportation Needs (2022)    PRAPARE - Transportation    • Lack of Transportation (Medical):  No    • Lack of Transportation (Non-Medical): No   Physical Activity: Not on file   Stress: Not on file   Social Connections: Not on file   Intimate Partner Violence: Not on file   Housing Stability: Not on file       Current Outpatient Medications:   •  acetaminophen (TYLENOL) 325 mg tablet, Take 2 tablets (650 mg total) by mouth every 6 (six) hours as needed for mild pain or fever, Disp: 30 tablet, Rfl: 0  •  aspirin (ECOTRIN LOW STRENGTH) 81 mg EC tablet, Take 81 mg by mouth daily, Disp: , Rfl:   •  atorvastatin (LIPITOR) 20 mg tablet, TAKE 1 TABLET DAILY, Disp: 90 tablet, Rfl: 3  •  cholecalciferol (VITAMIN D3) 1,000 units tablet, Take 1,000 Units by mouth daily, Disp: , Rfl:   •  Co-Enzyme Q-10 100 MG CAPS, Take 100 mg by mouth daily, Disp: , Rfl:   •  Eliquis 5 MG, TAKE 1 TABLET TWICE A DAY (Patient taking differently: 5 mg 2 (two) times a day The patient is taking 0.5 tablet two times a day. On 8/10/23 he will resume full dose), Disp: 180 tablet, Rfl: 3  •  felodipine (PLENDIL) 5 mg 24 hr tablet, TAKE 1 TABLET DAILY, Disp: 90 tablet, Rfl: 3  •  gabapentin (NEURONTIN) 100 mg capsule, Take 1 capsule (100 mg total) by mouth 2 (two) times a day, Disp: 60 capsule, Rfl: 0  •  hydrocortisone 2.5 % cream, Apply 1 application. topically 2 (two) times a day To affected area as needed, Disp: , Rfl:   •  ketoconazole (NIZORAL) 2 % cream, As needed, Disp: , Rfl:   •  levothyroxine 75 mcg tablet, TAKE 1 TABLET DAILY FOR    ACQUIRED HYPOTHYROIDISM, Disp: 90 tablet, Rfl: 3  •  metoprolol succinate (TOPROL-XL) 25 mg 24 hr tablet, TAKE 1 TABLET TWICE A DAY, Disp: 180 tablet, Rfl: 3  •  Misc Natural Products (LUTEIN 20 PO), Take 20 mg by mouth daily. , Disp: , Rfl:   •  multivitamin (THERAGRAN) TABS, Take 1 tablet by mouth daily. , Disp: , Rfl:   •  Saw Palmetto 450 MG CAPS, , Disp: , Rfl:   •  tamsulosin (FLOMAX) 0.4 mg, Take 1 capsule (0.4 mg total) by mouth daily with dinner, Disp: 90 capsule, Rfl: 3  Allergies   Allergen Reactions • Wound Dressing Adhesive Blisters     Vitals:    08/30/23 1456   BP: 120/70   Pulse: 57   Resp: 18   SpO2: 94%     Wt Readings from Last 3 Encounters:   08/30/23 103 kg (227 lb)   08/28/23 103 kg (228 lb)   08/16/23 103 kg (228 lb)     Performance Status: ECOG 1  Physical Exam  Vitals reviewed. Constitutional:       General: He is not in acute distress. Appearance: Normal appearance. HENT:      Head: Normocephalic and atraumatic. Mouth/Throat:      Mouth: Mucous membranes are moist.   Eyes:      Extraocular Movements: Extraocular movements intact. Conjunctiva/sclera: Conjunctivae normal.   Cardiovascular:      Rate and Rhythm: Normal rate. Pulmonary:      Effort: Pulmonary effort is normal.   Abdominal:      General: Abdomen is flat. Palpations: Abdomen is soft. Musculoskeletal:      Cervical back: Normal range of motion. Right lower leg: No edema. Left lower leg: No edema. Skin:     General: Skin is warm. Coloration: Skin is not jaundiced. Neurological:      General: No focal deficit present. Mental Status: He is alert and oriented to person, place, and time. Mental status is at baseline. Gait: Gait normal.   Psychiatric:         Thought Content: Thought content normal.       Labs:  7/18/2023: WBC: 5.3, H/H: 16/46.4, MCV: 90, platelets: 588. AST/ALT: 18/29. Creatinine: 1.06    Imaging  4/26/2023: CT renal protocol:  1.  New bladder lesion. Cystoscopy is advised. 2.  New 2.7 cm pancreatic cyst. For simple cyst(s) 2 cm or greater recommend gastroenterology and/or surgical oncology consult. EUS is likely now warranted.      8/1/2023: CT abdomen/pelvis:  Partially distended urinary bladder around Moralez catheter with a small amount of hemorrhagic products around the tip of the catheter.  Urinary bladder wall thickening and surrounding inflammatory stranding, likely postprocedural.   Stable size of 2.7 cm uncinate process cystic lesion compared to 4/26/2023, likely representing IPMN. For simple cyst(s) 2 cm or greater recommend gastroenterology and/or surgical oncology consult. EUS is likely now warranted.     8/1/2023: TURBT and gemcitabine instillation by urology (Dr. Debo Vides). Corinne Carroll bladder, posterior wall:     - Invasive mixed high grade carcinoma with urothelial (40%) and neuroendocrine (60%) differentiation.     - Tumor invades muscularis propria (detrusor muscle).    - Lymphovascular invasion by tumor present. *On immunostaining the areas of neuroendocrine differentiation are positive for synaptophysin, CD56, chromogranin, and TTF-1 with a Ki-67 proliferation rate of >80%.  The urothelial carcinomatous component is negative for the aforementioned immunostains but positive for uroplakin and GATA3 with a Ki-67 proliferation rate of 30%. 8/21/2023: PET CT scan:  1. Physiologic uptake within the urinary bladder limits assessment for local bladder malignancy. However, there is no FDG avid local-regional lymphadenopathy or distant metastatic disease. 2. Stable fusiform aneurysm of the ascending thoracic aorta measuring up to 48 mm allowing for differences in imaging and measurement technique. Return in about 2 weeks (around 9/13/2023) for Infusion - See Treatment Plan. F/u for chemo (cisplatin day 1/etoposide day 1, 2, 3). Growth factor support day 4.   Schedule to be determined pending pt treatment decision

## 2023-08-30 NOTE — TELEPHONE ENCOUNTER
Please schedule patient for chemo infusions at [facility]. Please schedule a FU appt with Dr Catie Arias prior to cycle 2.  Thank you

## 2023-08-31 ENCOUNTER — PATIENT OUTREACH (OUTPATIENT)
Dept: HEMATOLOGY ONCOLOGY | Facility: CLINIC | Age: 73
End: 2023-08-31

## 2023-08-31 DIAGNOSIS — C67.4 MALIGNANT NEOPLASM OF POSTERIOR WALL OF URINARY BLADDER (HCC): Primary | ICD-10-CM

## 2023-08-31 NOTE — PROGRESS NOTES
Received email from pt's wife:  WARNING! Based upon the critical issue with ransomware targeting Healthcare systems ALL attachments and links from this email should be heavily scrutinized. Gilles Mao, thank you for your help with getting us the letter to send to the travel insurance company. Second, as you may have seen, Catrachito chemo will be administered at [facility]. He's waiting for a call as to the actual scheduling. Kourtney Tanner will decide by the end of the weekend between [address] [address] in terms of consulting with  [facility] on the specifics of the chemo, but Dr. Sherry Pham said we could get the process started.)     Today's question: when we were at the [address] [place] [address] Rd yesterday to meet with Dr. Sherry Pham, we noticed a poster on the back of the exam room door for "Talk about Treatment" information sessions for chemo and radiation patients (see attached photo). When I called to register, the person I talked with couldn't find any information about those sessions. She thought the poster might be out-of-date since it mentioned a department ([employer]) that no longer exists. Does [facility] still have these information sessions or something like them? Information reduces our stress so we would like to have as much information as possible as we prepare for this journey. Dimitrios,    Rona      Response sent:  Yung Hagen and Kourtney Tanner,  I am very happy you reached out to ask. Here is the link to the series. I have also attached the flyer for treatment decisions that has a QR code at the bottom. I will be calling Kourtney Tanner to review the treatment plan today.   Let’s Talk Treatment - [address] ([address] [facility])     Sincerely,  Justin Storm

## 2023-08-31 NOTE — PROGRESS NOTES
Outreach to pt to follow-up on his second opinions with [facility] and [facility]. He will be moving forward with chemotherapy at [facility] and seeing his final medon opinion at [facility] tomorrow. He and his wife will make final surgical decision over the weekend. Assured him we would continue communication with wherever he decides for continuity of care. Reviewed chemotherapy and treatment days. Answered questions to his satisfaction and encouraged to call with any additional questions. We agreed to follow-up call on Monday regarding final decisions.

## 2023-08-31 NOTE — TELEPHONE ENCOUNTER
Spoke with patient, confirmed first 2 cycle dates, times, & locations along with follow up. Has my phone number for any questions.

## 2023-09-05 ENCOUNTER — TELEPHONE (OUTPATIENT)
Dept: GASTROENTEROLOGY | Facility: MEDICAL CENTER | Age: 73
End: 2023-09-05

## 2023-09-05 ENCOUNTER — PATIENT OUTREACH (OUTPATIENT)
Dept: HEMATOLOGY ONCOLOGY | Facility: CLINIC | Age: 73
End: 2023-09-05

## 2023-09-05 NOTE — TELEPHONE ENCOUNTER
Spoke to patient to reschedule EUS for 09/26/2023 due to schedule change. Offered 9/21/2023 at [facility]. Pt states he would like to consult with PCP due to recent dx or bladder cancer and starting chemotherapy and will call us back.

## 2023-09-05 NOTE — PROGRESS NOTES
Hi, this is Leafy Ezra. I think we were going to talk today. I mean, we had said Monday, but I think we both forgot that it was Labor Day at the time. My number is [phone]. Thank you. Hey, this is Leafy Ezra again. I really wanted to talk to you about some questions about the chemo and also about my preferred choice for a tertiary Center for the surgery and for supervising the chemo, which starts next week, a week from today. You, I guess you may be off today. Please get back to me when you can, [phone]. Returned call to pt and left voicemail stating I was returning his call and will call back tomorrow.

## 2023-09-06 ENCOUNTER — DOCUMENTATION (OUTPATIENT)
Dept: HEMATOLOGY ONCOLOGY | Facility: CLINIC | Age: 73
End: 2023-09-06

## 2023-09-06 ENCOUNTER — PATIENT OUTREACH (OUTPATIENT)
Dept: HEMATOLOGY ONCOLOGY | Facility: CLINIC | Age: 73
End: 2023-09-06

## 2023-09-06 DIAGNOSIS — C67.4 MALIGNANT NEOPLASM OF POSTERIOR WALL OF URINARY BLADDER (HCC): Primary | ICD-10-CM

## 2023-09-06 RX ORDER — ONDANSETRON 4 MG/1
4 TABLET, FILM COATED ORAL EVERY 8 HOURS PRN
Qty: 20 TABLET | Refills: 1 | Status: SHIPPED | OUTPATIENT
Start: 2023-09-06

## 2023-09-06 NOTE — PROGRESS NOTES
Incoming call from pt. Pt has decided he will receive surgical care at [facility],, Dr. Starleen Dancer. Medical Oncologist-Dr. Karthik Zabala, RN [facility]. [phone]     Pt requesting assistance with connecting Dr. Arjun Swan and Dr. Bettina Gonzalez for ongoing communication with his case per Dr. Joya De La Rosa request.  Pt provided [facility] Nurse Navigator's contact info as above. Reviewed questions regarding nutrition during chemotherapy. Questioned caffeine and advised it is ok in moderation but the majority of fluid should come from beverages without caffeine due to diuretic effect from caffeine. Encouraged small frequent meals and adding protein to snacks with whole foods and limiting processed foods. Advised avoiding Sushi during this time of chemo due to bacteria and food borne-illness while immunocompromised. Reviewed when to call [facility] for any uncontrolled symptoms, fatigue effecting ability to carry out ADLs and/or signs of infection, fever of 100.4 or greater, shaking chills without fever, new productive cough, UTI symptoms and/or general unwell feeling. Reviewed when to have labs drawn and message sent to [facility] requesting script for anti-emetic to be sent to pharmacy. Email sent to pt making him aware that Dr. Bettina Gonzalez has already been in communication Dr. Arjun Swan at [facility].

## 2023-09-06 NOTE — PROGRESS NOTES
Late entry note. Case d/w Dr Yadira Purdy from [facility] on 9/5/2023 at 5:30pm.  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 He agrees with neoadjuvant chemotherapy with cisplatin/etoposide x4 cycles. Patient will repeat restaging PET CT scan after 4 cycles of neoadjuvant chemotherapy. He will follow with [facility] for his surgical needs.

## 2023-09-11 ENCOUNTER — TELEPHONE (OUTPATIENT)
Dept: INFUSION CENTER | Facility: HOSPITAL | Age: 73
End: 2023-09-11

## 2023-09-11 ENCOUNTER — APPOINTMENT (OUTPATIENT)
Dept: LAB | Facility: CLINIC | Age: 73
End: 2023-09-11
Payer: MEDICARE

## 2023-09-11 DIAGNOSIS — C67.4 MALIGNANT NEOPLASM OF POSTERIOR WALL OF URINARY BLADDER (HCC): ICD-10-CM

## 2023-09-11 LAB
ALBUMIN SERPL BCP-MCNC: 4.3 G/DL (ref 3.5–5)
ALP SERPL-CCNC: 94 U/L (ref 34–104)
ALT SERPL W P-5'-P-CCNC: 25 U/L (ref 7–52)
ANION GAP SERPL CALCULATED.3IONS-SCNC: 8 MMOL/L
AST SERPL W P-5'-P-CCNC: 20 U/L (ref 13–39)
BASOPHILS # BLD AUTO: 0.1 THOUSANDS/ÂΜL (ref 0–0.1)
BASOPHILS NFR BLD AUTO: 2 % (ref 0–1)
BILIRUB SERPL-MCNC: 0.64 MG/DL (ref 0.2–1)
BUN SERPL-MCNC: 20 MG/DL (ref 5–25)
CALCIUM SERPL-MCNC: 9.3 MG/DL (ref 8.4–10.2)
CHLORIDE SERPL-SCNC: 105 MMOL/L (ref 96–108)
CO2 SERPL-SCNC: 29 MMOL/L (ref 21–32)
CREAT SERPL-MCNC: 1 MG/DL (ref 0.6–1.3)
EOSINOPHIL # BLD AUTO: 0.16 THOUSAND/ÂΜL (ref 0–0.61)
EOSINOPHIL NFR BLD AUTO: 3 % (ref 0–6)
ERYTHROCYTE [DISTWIDTH] IN BLOOD BY AUTOMATED COUNT: 12.6 % (ref 11.6–15.1)
GFR SERPL CREATININE-BSD FRML MDRD: 74 ML/MIN/1.73SQ M
GLUCOSE SERPL-MCNC: 83 MG/DL (ref 65–140)
HCT VFR BLD AUTO: 45.7 % (ref 36.5–49.3)
HGB BLD-MCNC: 15.4 G/DL (ref 12–17)
IMM GRANULOCYTES # BLD AUTO: 0.02 THOUSAND/UL (ref 0–0.2)
IMM GRANULOCYTES NFR BLD AUTO: 0 % (ref 0–2)
LYMPHOCYTES # BLD AUTO: 1.08 THOUSANDS/ÂΜL (ref 0.6–4.47)
LYMPHOCYTES NFR BLD AUTO: 17 % (ref 14–44)
MCH RBC QN AUTO: 30.9 PG (ref 26.8–34.3)
MCHC RBC AUTO-ENTMCNC: 33.7 G/DL (ref 31.4–37.4)
MCV RBC AUTO: 92 FL (ref 82–98)
MONOCYTES # BLD AUTO: 0.69 THOUSAND/ÂΜL (ref 0.17–1.22)
MONOCYTES NFR BLD AUTO: 11 % (ref 4–12)
NEUTROPHILS # BLD AUTO: 4.25 THOUSANDS/ÂΜL (ref 1.85–7.62)
NEUTS SEG NFR BLD AUTO: 67 % (ref 43–75)
NRBC BLD AUTO-RTO: 0 /100 WBCS
PLATELET # BLD AUTO: 257 THOUSANDS/UL (ref 149–390)
PMV BLD AUTO: 10.1 FL (ref 8.9–12.7)
POTASSIUM SERPL-SCNC: 4.7 MMOL/L (ref 3.5–5.3)
PROT SERPL-MCNC: 6 G/DL (ref 6.4–8.4)
RBC # BLD AUTO: 4.99 MILLION/UL (ref 3.88–5.62)
SODIUM SERPL-SCNC: 142 MMOL/L (ref 135–147)
WBC # BLD AUTO: 6.3 THOUSAND/UL (ref 4.31–10.16)

## 2023-09-11 PROCEDURE — 85025 COMPLETE CBC W/AUTO DIFF WBC: CPT

## 2023-09-11 PROCEDURE — 36415 COLL VENOUS BLD VENIPUNCTURE: CPT

## 2023-09-11 PROCEDURE — 80053 COMPREHEN METABOLIC PANEL: CPT

## 2023-09-11 NOTE — TELEPHONE ENCOUNTER
Pt returned my call. Confirmed appt. date,  time and location. Informed pt of visitor and no show policy and to bring any medications that he may need during the day. Informed pt we offer light snacks and drinks and he may bring food with him. Pt had no further questions.

## 2023-09-11 NOTE — PROGRESS NOTES
Spoke with Luigi Urbano RN and confirmed Dr. Laura Bell is ordering 75mg/m2 for cisplatin as note states 80.

## 2023-09-12 ENCOUNTER — HOSPITAL ENCOUNTER (OUTPATIENT)
Dept: INFUSION CENTER | Facility: HOSPITAL | Age: 73
Discharge: HOME | End: 2023-09-12
Payer: MEDICARE

## 2023-09-12 VITALS
WEIGHT: 227.96 LBS | BODY MASS INDEX: 30.88 KG/M2 | TEMPERATURE: 98.5 F | DIASTOLIC BLOOD PRESSURE: 84 MMHG | HEIGHT: 72 IN | RESPIRATION RATE: 18 BRPM | SYSTOLIC BLOOD PRESSURE: 124 MMHG | HEART RATE: 66 BPM

## 2023-09-12 DIAGNOSIS — D70.1 CHEMOTHERAPY INDUCED NEUTROPENIA: ICD-10-CM

## 2023-09-12 DIAGNOSIS — T45.1X5A CHEMOTHERAPY INDUCED NEUTROPENIA: ICD-10-CM

## 2023-09-12 DIAGNOSIS — C67.4 MALIGNANT NEOPLASM OF POSTERIOR WALL OF URINARY BLADDER (HCC): Primary | ICD-10-CM

## 2023-09-12 PROCEDURE — 96413 CHEMO IV INFUSION 1 HR: CPT

## 2023-09-12 PROCEDURE — 96361 HYDRATE IV INFUSION ADD-ON: CPT

## 2023-09-12 PROCEDURE — 96367 TX/PROPH/DG ADDL SEQ IV INF: CPT

## 2023-09-12 PROCEDURE — 96417 CHEMO IV INFUS EACH ADDL SEQ: CPT

## 2023-09-12 RX ORDER — SODIUM CHLORIDE 9 MG/ML
20 INJECTION, SOLUTION INTRAVENOUS ONCE
Status: CANCELLED | OUTPATIENT
Start: 2023-09-12

## 2023-09-12 RX ORDER — SODIUM CHLORIDE 9 MG/ML
20 INJECTION, SOLUTION INTRAVENOUS ONCE
Status: CANCELLED | OUTPATIENT
Start: 2023-09-14

## 2023-09-12 RX ORDER — SODIUM CHLORIDE 9 MG/ML
20 INJECTION, SOLUTION INTRAVENOUS ONCE
Status: CANCELLED | OUTPATIENT
Start: 2023-09-13

## 2023-09-12 RX ORDER — SODIUM CHLORIDE 9 MG/ML
20 INJECTION, SOLUTION INTRAVENOUS ONCE
Status: COMPLETED | OUTPATIENT
Start: 2023-09-12 | End: 2023-09-12

## 2023-09-12 RX ADMIN — DEXAMETHASONE SODIUM PHOSPHATE: 10 INJECTION, SOLUTION INTRAMUSCULAR; INTRAVENOUS at 08:59

## 2023-09-12 RX ADMIN — SODIUM CHLORIDE 1000 ML: 0.9 INJECTION, SOLUTION INTRAVENOUS at 08:49

## 2023-09-12 RX ADMIN — CISPLATIN 170.3 MG: 1 INJECTION, SOLUTION INTRAVENOUS at 10:46

## 2023-09-12 RX ADMIN — FOSAPREPITANT 150 MG: 150 INJECTION, POWDER, LYOPHILIZED, FOR SOLUTION INTRAVENOUS at 09:24

## 2023-09-12 RX ADMIN — SODIUM CHLORIDE 1000 ML: 0.9 INJECTION, SOLUTION INTRAVENOUS at 13:39

## 2023-09-12 RX ADMIN — SODIUM CHLORIDE 20 ML/HR: 0.9 INJECTION, SOLUTION INTRAVENOUS at 08:47

## 2023-09-12 RX ADMIN — ETOPOSIDE 227 MG: 20 INJECTION INTRAVENOUS at 12:18

## 2023-09-12 NOTE — PROGRESS NOTES
Patient tolerated  Day 1 of chemotherapy without issue. Tomorrow's appointment confirmed, AVS declined.

## 2023-09-13 ENCOUNTER — HOSPITAL ENCOUNTER (OUTPATIENT)
Dept: INFUSION CENTER | Facility: HOSPITAL | Age: 73
Discharge: HOME | End: 2023-09-13
Payer: MEDICARE

## 2023-09-13 VITALS
WEIGHT: 237 LBS | OXYGEN SATURATION: 95 % | HEART RATE: 54 BPM | SYSTOLIC BLOOD PRESSURE: 126 MMHG | BODY MASS INDEX: 32.1 KG/M2 | DIASTOLIC BLOOD PRESSURE: 76 MMHG | TEMPERATURE: 97.9 F | HEIGHT: 72 IN | RESPIRATION RATE: 18 BRPM

## 2023-09-13 DIAGNOSIS — T45.1X5A CHEMOTHERAPY INDUCED NEUTROPENIA: ICD-10-CM

## 2023-09-13 DIAGNOSIS — C67.4 MALIGNANT NEOPLASM OF POSTERIOR WALL OF URINARY BLADDER (HCC): Primary | ICD-10-CM

## 2023-09-13 DIAGNOSIS — D70.1 CHEMOTHERAPY INDUCED NEUTROPENIA: ICD-10-CM

## 2023-09-13 PROCEDURE — 96367 TX/PROPH/DG ADDL SEQ IV INF: CPT

## 2023-09-13 PROCEDURE — 96413 CHEMO IV INFUSION 1 HR: CPT

## 2023-09-13 RX ORDER — SODIUM CHLORIDE 9 MG/ML
20 INJECTION, SOLUTION INTRAVENOUS ONCE
Status: COMPLETED | OUTPATIENT
Start: 2023-09-13 | End: 2023-09-13

## 2023-09-13 RX ADMIN — SODIUM CHLORIDE 20 ML/HR: 9 INJECTION, SOLUTION INTRAVENOUS at 08:35

## 2023-09-13 RX ADMIN — DEXAMETHASONE SODIUM PHOSPHATE: 10 INJECTION, SOLUTION INTRAMUSCULAR; INTRAVENOUS at 08:35

## 2023-09-13 RX ADMIN — ETOPOSIDE 227 MG: 20 INJECTION INTRAVENOUS at 09:14

## 2023-09-13 NOTE — PROGRESS NOTES
Patient presents to day 2 chemotherapy with 10 lbs weight gain since yesterday but he has no signs of overload upon assessment. Offering no complaints. lungs clear in all posterior horner. Per Hilary Huertas RN/Breanne Schwarz to proceed with treatment at this time. Continue to trend weights each appointment and assess for signs/symptoms of fluid overload. Patient agreeable to plan. Patient tolerated IV chemotherapy without issue. Next appointment confirmed, AVS declined.

## 2023-09-14 ENCOUNTER — DOCUMENTATION (OUTPATIENT)
Dept: HEMATOLOGY ONCOLOGY | Facility: CLINIC | Age: 73
End: 2023-09-14

## 2023-09-14 ENCOUNTER — HOSPITAL ENCOUNTER (OUTPATIENT)
Dept: INFUSION CENTER | Facility: HOSPITAL | Age: 73
End: 2023-09-14
Payer: MEDICARE

## 2023-09-14 VITALS
DIASTOLIC BLOOD PRESSURE: 86 MMHG | OXYGEN SATURATION: 97 % | HEIGHT: 72 IN | SYSTOLIC BLOOD PRESSURE: 132 MMHG | HEART RATE: 109 BPM | TEMPERATURE: 98.3 F | RESPIRATION RATE: 18 BRPM | BODY MASS INDEX: 32.67 KG/M2 | WEIGHT: 241.18 LBS

## 2023-09-14 DIAGNOSIS — R60.9 EDEMA, UNSPECIFIED TYPE: Primary | ICD-10-CM

## 2023-09-14 DIAGNOSIS — T45.1X5A CHEMOTHERAPY INDUCED NEUTROPENIA: ICD-10-CM

## 2023-09-14 DIAGNOSIS — C67.4 MALIGNANT NEOPLASM OF POSTERIOR WALL OF URINARY BLADDER (HCC): Primary | ICD-10-CM

## 2023-09-14 DIAGNOSIS — D70.1 CHEMOTHERAPY INDUCED NEUTROPENIA: ICD-10-CM

## 2023-09-14 PROCEDURE — 96367 TX/PROPH/DG ADDL SEQ IV INF: CPT

## 2023-09-14 PROCEDURE — 96375 TX/PRO/DX INJ NEW DRUG ADDON: CPT

## 2023-09-14 PROCEDURE — 96413 CHEMO IV INFUSION 1 HR: CPT

## 2023-09-14 RX ORDER — FUROSEMIDE 10 MG/ML
20 INJECTION INTRAMUSCULAR; INTRAVENOUS ONCE
Status: COMPLETED | OUTPATIENT
Start: 2023-09-14 | End: 2023-09-14

## 2023-09-14 RX ORDER — SODIUM CHLORIDE 9 MG/ML
20 INJECTION, SOLUTION INTRAVENOUS ONCE
Status: COMPLETED | OUTPATIENT
Start: 2023-09-14 | End: 2023-09-14

## 2023-09-14 RX ORDER — FUROSEMIDE 10 MG/ML
20 INJECTION INTRAMUSCULAR; INTRAVENOUS ONCE
Status: CANCELLED
Start: 2023-09-14 | End: 2023-09-14

## 2023-09-14 RX ORDER — FUROSEMIDE 20 MG/1
20 TABLET ORAL DAILY
Qty: 30 TABLET | Refills: 0 | Status: SHIPPED | OUTPATIENT
Start: 2023-09-14

## 2023-09-14 RX ADMIN — ETOPOSIDE 227 MG: 20 INJECTION INTRAVENOUS at 09:11

## 2023-09-14 RX ADMIN — DEXAMETHASONE SODIUM PHOSPHATE: 10 INJECTION, SOLUTION INTRAMUSCULAR; INTRAVENOUS at 08:38

## 2023-09-14 RX ADMIN — SODIUM CHLORIDE 20 ML/HR: 9 INJECTION, SOLUTION INTRAVENOUS at 08:38

## 2023-09-14 RX ADMIN — FUROSEMIDE 20 MG: 10 INJECTION, SOLUTION INTRAMUSCULAR; INTRAVENOUS at 10:26

## 2023-09-14 NOTE — PROGRESS NOTES
Pt presents to infusion center today for C1 D3 with 14lb weight gain. Pt has negative assessment as per infusion RN however states he is constipated. Had "not complete" BM yesterday and is agreeable to take Miralax. Reviewed with Dr. Marjorie Rogers and pt will receive one time Lasix 20mg IV today. RX for Lasix 20mg PO once daily will be sent to pharmacy for patient to start taking tomorrow.

## 2023-09-14 NOTE — PROGRESS NOTES
Patient presents to infusion center with an additional 4lb weight gain from yesterday, in total 14lbs since day 1 cycle 1. Patient denies symptoms except that he is constipated, had BM yesterday but it wasn't complete. Per Dr. Zeynep Borrego RN, recommend patient to take Miralax. Patient agreeable. Tolerated IV chemotherapy without issues. 20MG IV LASIX given per physician order, and script for lasix sent to pharmacy that is to be started tomorrow. Next appointment confirmed, AVS declined.

## 2023-09-15 ENCOUNTER — HOSPITAL ENCOUNTER (OUTPATIENT)
Dept: INFUSION CENTER | Facility: HOSPITAL | Age: 73
End: 2023-09-15
Payer: MEDICARE

## 2023-09-15 VITALS — TEMPERATURE: 98.4 F

## 2023-09-15 DIAGNOSIS — D70.1 CHEMOTHERAPY INDUCED NEUTROPENIA: ICD-10-CM

## 2023-09-15 DIAGNOSIS — C67.4 MALIGNANT NEOPLASM OF POSTERIOR WALL OF URINARY BLADDER (HCC): Primary | ICD-10-CM

## 2023-09-15 DIAGNOSIS — T45.1X5A CHEMOTHERAPY INDUCED NEUTROPENIA: ICD-10-CM

## 2023-09-15 PROCEDURE — 96372 THER/PROPH/DIAG INJ SC/IM: CPT

## 2023-09-15 RX ADMIN — PEGFILGRASTIM 6 MG: 6 INJECTION SUBCUTANEOUS at 09:15

## 2023-09-15 NOTE — PROGRESS NOTES
Patient tolerated neulasta injection in L arm without incident. Confirmed next appt. AVS decline. Patient left ambulatory.

## 2023-09-21 ENCOUNTER — OFFICE VISIT (OUTPATIENT)
Dept: VASCULAR SURGERY | Facility: CLINIC | Age: 73
End: 2023-09-21
Payer: MEDICARE

## 2023-09-21 VITALS
BODY MASS INDEX: 30.61 KG/M2 | HEIGHT: 72 IN | WEIGHT: 226 LBS | OXYGEN SATURATION: 96 % | SYSTOLIC BLOOD PRESSURE: 140 MMHG | DIASTOLIC BLOOD PRESSURE: 86 MMHG | HEART RATE: 70 BPM

## 2023-09-21 DIAGNOSIS — I72.3 ILIAC ARTERY ANEURYSM, RIGHT (HCC): Primary | ICD-10-CM

## 2023-09-21 PROCEDURE — 99214 OFFICE O/P EST MOD 30 MIN: CPT | Performed by: SURGERY

## 2023-09-21 RX ORDER — SODIUM FLUORIDE 6 MG/ML
PASTE, DENTIFRICE DENTAL
COMMUNITY
Start: 2023-08-30

## 2023-09-21 NOTE — PROGRESS NOTES
Assessment/Plan:    Presents in follow-up regarding repair of right common iliac artery aneurysm and underwent endovascular repair in 2018. He has been followed along on a yearly basis and most recently notable slight decrease in size at 3.03 cm. A year ago was 3.3 cm. All other measurements are all within normal limits therefore no intervention is indicated. He does have a recent diagnosis of bladder cancer and will likely undergo possible surgical intervention and there would be no contraindications from a vascular standpoint. Plan: Follow-up aortoiliac study in 1 year     Diagnoses and all orders for this visit:    Iliac artery aneurysm, right ([address])  -     VAS abdominal aorta/iliacs; complete study; Future    Other orders  -     Sodium Fluoride 5000 PPM 1.1 % PSTE; USE TWICE A DAY IN PLACE OF REGULAR TOOTHPASTE UNLESS TOLD OTHERWISE        Subjective:      Patient ID: Sameera Yañez is a 68 y.o. male. Patient with history of iliac artery aneurysm presents to review the AOIL done 7/31/23. Pt denies postprandial abdominal pain or claudication symptoms. He reports numbness in both feet due to neuropathy. Pt is taking ASA81, Atorvastatin and Eliquis. He is not a smoker. HPI    The following portions of the patient's history were reviewed and updated as appropriate: allergies, current medications, past family history, past medical history, past social history, past surgical history and problem list.    Review of Systems   Neurological: Positive for numbness. All other systems reviewed and are negative. Objective:      /86 (BP Location: Right arm, Patient Position: Sitting, Cuff Size: Standard)   Pulse 70   Ht 6' (1.829 m)   Wt 103 kg (226 lb)   SpO2 96%   BMI 30.65 kg/m²          Physical Exam  No abdominal symptoms, easily palpable pedal pulses bilaterally. I have reviewed and made appropriate changes to the review of systems input by the medical assistant.     Vitals:    09/21/23 1026   BP: 140/86   BP Location: Right arm   Patient Position: Sitting   Cuff Size: Standard   Pulse: 70   SpO2: 96%   Weight: 103 kg (226 lb)   Height: 6' (1.829 m)       Patient Active Problem List   Diagnosis   • Aneurysm of thoracic aorta (HCC)   • Essential hypertension   • Atrial fibrillation, persistent (HCC)   • Iliac artery aneurysm, bilateral (HCC)   • Acquired hypothyroidism   • Neuropathy   • Iliac artery aneurysm, right (HCC)   • Encounter for Medicare annual wellness exam   • Other headache syndrome   • Rash   • Mixed hyperlipidemia   • Chronic bilateral low back pain without sciatica   • NSVT (nonsustained ventricular tachycardia) (HCC)   • Hepatic artery dissection (HCC)   • Vitamin D deficiency   • Prediabetes   • Gross hematuria   • Bladder mass   • Malfunction of Moralez catheter (HCC)   • Pancreatic mass   • Malignant neoplasm of posterior wall of urinary bladder (HCC)   • Closed fracture of posterior malleolus of left tibia   • Pain, joint, ankle and foot, left   • Chemotherapy induced neutropenia [facility])       Past Surgical History:   Procedure Laterality Date   • ABDOMINAL AORTIC ANEURYSM REPAIR, ENDOVASCULAR Right 04/13/2018    Procedure: REPAIR ANEURYSM ILIAC endovascular  with coil embolization right hypogastric artery.;  Surgeon: Elian Snow MD;  [facility]: [facility];  Service: Vascular   • CARDIOVERSION     • CATARACT EXTRACTION Left 10/20/2019    Right eye 11/17/2011   • CHOLECYSTECTOMY     • CHOLECYSTECTOMY LAPAROSCOPIC N/A 03/08/2019    Procedure: [id number] CHOLECYSTECTOMY;  Surgeon: Brandi Waldron DO;  Location: [facility];  Service: General   • COLONOSCOPY  07/13/2016   • MOLE EXCISION  2021   • WV CYSTOURETHROSCOPY W/DEST &/RMVL TUMOR LARGE N/A 08/01/2023    Procedure: TURBT, gemcitabine instillation;  Surgeon: Missy Roland MD;  Location: [facility];  Service: Urology   • [place] EDG US EXAM SURGICAL ALTER STOM DUODENUM/JEJUNUM N/A 11/10/2017    Procedure: Emre Situ ENDOSCOPIC U/S;  Surgeon: Deb Byrne MD;  Location: [facility]; Service: Gastroenterology   • RETINAL DETACHMENT SURGERY Right     onset , retinal detachment complete air fill confirmed       Family History   Problem Relation Age of Onset   • Stroke Mother          at 80, failure to thrive   • Aortic aneurysm Father         abdominal   • Aortic aneurysm Brother    • Basal cell carcinoma Brother    • Schizophrenia Brother    • Cancer Maternal Grandmother         smoker, heart attack, cancer   • Cancer Maternal Grandfather         smoker cancer   • Cancer Paternal Grandmother         malignant neoplasm   • Cancer Paternal Grandfather         malignant neoplasm   • Cancer Family         malignant neoplasm   • Cancer Family         malignant neoplasm   • Cancer Maternal Uncle         smoker, lots wrong with her   • Cancer Paternal Uncle         melanoma at 80       Social History     Socioeconomic History   • Marital status: /Civil Union     Spouse name: Not on file   • Number of children: Not on file   • Years of education: Not on file   • Highest education level: Not on file   Occupational History   • Not on file   Tobacco Use   • Smoking status: Never   • Smokeless tobacco: Never   Vaping Use   • Vaping Use: Never used   Substance and Sexual Activity   • Alcohol use:  Yes     Alcohol/week: 5.0 standard drinks of alcohol     Types: 2 Glasses of wine, 1 Cans of beer, 2 Standard drinks or equivalent per week     Comment: 3-4 x week   • Drug use: Never   • Sexual activity: Not Currently   Other Topics Concern   • Not on file   Social History Narrative   • Not on file     Social Determinants of Health     Financial Resource Strain: Low Risk  (2022)    Overall Financial Resource Strain (CARDIA)    • Difficulty of Paying Living Expenses: Not hard at all   Food Insecurity: Not on file   Transportation Needs: No Transportation Needs (2022)    PRAPARE - Transportation    • Lack of Transportation (Medical): No    • Lack of Transportation (Non-Medical): No   Physical Activity: Not on file   Stress: Not on file   Social Connections: Not on file   Intimate Partner Violence: Not on file   Housing Stability: Not on file       Allergies   Allergen Reactions   • Wound Dressing Adhesive Blisters         Current Outpatient Medications:   •  acetaminophen (TYLENOL) 325 mg tablet, Take 2 tablets (650 mg total) by mouth every 6 (six) hours as needed for mild pain or fever, Disp: 30 tablet, Rfl: 0  •  aspirin (ECOTRIN LOW STRENGTH) 81 mg EC tablet, Take 81 mg by mouth daily, Disp: , Rfl:   •  atorvastatin (LIPITOR) 20 mg tablet, TAKE 1 TABLET DAILY, Disp: 90 tablet, Rfl: 3  •  cholecalciferol (VITAMIN D3) 1,000 units tablet, Take 1,000 Units by mouth daily, Disp: , Rfl:   •  Co-Enzyme Q-10 100 MG CAPS, Take 100 mg by mouth daily, Disp: , Rfl:   •  Eliquis 5 MG, TAKE 1 TABLET TWICE A DAY (Patient taking differently: 5 mg 2 (two) times a day The patient is taking 0.5 tablet two times a day. On 8/10/23 he will resume full dose), Disp: 180 tablet, Rfl: 3  •  felodipine (PLENDIL) 5 mg 24 hr tablet, TAKE 1 TABLET DAILY, Disp: 90 tablet, Rfl: 3  •  furosemide (LASIX) 20 mg tablet, Take 1 tablet (20 mg total) by mouth daily, Disp: 30 tablet, Rfl: 0  •  gabapentin (NEURONTIN) 100 mg capsule, Take 1 capsule (100 mg total) by mouth 2 (two) times a day, Disp: 60 capsule, Rfl: 0  •  hydrocortisone 2.5 % cream, Apply 1 application. topically 2 (two) times a day To affected area as needed, Disp: , Rfl:   •  ketoconazole (NIZORAL) 2 % cream, As needed, Disp: , Rfl:   •  levothyroxine 75 mcg tablet, TAKE 1 TABLET DAILY FOR    ACQUIRED HYPOTHYROIDISM, Disp: 90 tablet, Rfl: 3  •  metoprolol succinate (TOPROL-XL) 25 mg 24 hr tablet, TAKE 1 TABLET TWICE A DAY, Disp: 180 tablet, Rfl: 3  •  Misc Natural Products (LUTEIN 20 PO), Take 20 mg by mouth daily. , Disp: , Rfl:   •  multivitamin (THERAGRAN) TABS, Take 1 tablet by mouth daily. , Disp: , Rfl:   •  ondansetron (ZOFRAN) 4 mg tablet, Take 1 tablet (4 mg total) by mouth every 8 (eight) hours as needed for nausea or vomiting, Disp: 20 tablet, Rfl: 1  •  Saw Palmetto 450 MG CAPS, , Disp: , Rfl:   •  Sodium Fluoride 5000 PPM 1.1 % PSTE, USE TWICE A DAY IN PLACE OF REGULAR TOOTHPASTE UNLESS TOLD OTHERWISE, Disp: , Rfl:   •  tamsulosin (FLOMAX) 0.4 mg, Take 1 capsule (0.4 mg total) by mouth daily with dinner, Disp: 90 capsule, Rfl: 3

## 2023-09-21 NOTE — PATIENT INSTRUCTIONS
Presents in follow-up regarding repair of right common iliac artery aneurysm and underwent endovascular repair in 2018. He has been followed along on a yearly basis and most recently notable slight decrease in size at 3.03 cm. A year ago was 3.3 cm. All other measurements are all within normal limits therefore no intervention is indicated. He does have a recent diagnosis of bladder cancer and will likely undergo possible surgical intervention and there would be no contraindications from a vascular standpoint.     Plan: Follow-up aortoiliac study in 1 year

## 2023-09-22 ENCOUNTER — PATIENT OUTREACH (OUTPATIENT)
Dept: HEMATOLOGY ONCOLOGY | Facility: CLINIC | Age: 73
End: 2023-09-22

## 2023-09-22 NOTE — PROGRESS NOTES
Outreach to pt to follow-up after 1st cycle of chemotherapy. States he had minimal side effects until Saturday after his chemotherapy when he was hit with significant fatigue x 36 hours. Experienced constipation but relieved with OTC intervention. Eating and drinking well and feeling almost back to baseline. Reviewed upcoming appts and plan of care. Pt plans to Call [facility] after 3rd cycle of chemo to schedule f/u to discuss surgery.

## 2023-09-23 ENCOUNTER — TELEPHONE (OUTPATIENT)
Dept: OTHER | Facility: HOSPITAL | Age: 73
End: 2023-09-23

## 2023-09-23 ENCOUNTER — TELEPHONE (OUTPATIENT)
Dept: HEMATOLOGY ONCOLOGY | Facility: CLINIC | Age: 73
End: 2023-09-23

## 2023-09-24 NOTE — TELEPHONE ENCOUNTER
[phone] pt: Shara Talavera : 1950// " I am having pain in my left thigh that sometimes spreads to my hip. Is this a side affect of Chemo?"    TC to on call provider. ..

## 2023-09-24 NOTE — TELEPHONE ENCOUNTER
I spoke with Bk Ariza about his complaints of left thigh pain shooting to left hip, that started today. No swelling of lower extremities. No SOB. Afebrile. Unlikely to be due to chemo or neulasta, since neulasta was given 7 days ago. Discuss the possibility of DVT. Watch symptoms of swelling or SOB. If he develops those symptoms, go to ED. Recommend calling [facility] office on Monday . She may give her pain meds. And possible doppler study as outpatient. He is on Eliquis. Try mara gridern for now. He understood.

## 2023-09-25 ENCOUNTER — RA CDI HCC (OUTPATIENT)
Dept: OTHER | Facility: HOSPITAL | Age: 73
End: 2023-09-25

## 2023-09-25 NOTE — TELEPHONE ENCOUNTER
Patient called me back. Left another message:    Hi, this is Gretchen Rios. 1950 is my birthday. Returning the call you had called about the problem I had Saturday evening with a pain in my thigh. The oncologist that I spoke to was pretty sure that it could not be connected to the any of the chemotherapy. The one thing was the I forget what the name is, the shot for that I got on Friday, he said. Maybe there has been that kind of thing, but it's always and both let both legs or whatever and never. Six days after the shot it would be within a day or so. It turns out I had a muscle relaxant from the previous prescription and I took that and that it made a great difference. And then that plus some more Tylenol and the problem went away and I've been fine since. If you want to call me back, [phone].  Bye

## 2023-09-26 NOTE — PROGRESS NOTES
Jaciel Rosales  1950  [address] [facility]  [address] [place] [id number]    Chief Complaint   Patient presents with   • Follow-up     Assessment/plan:     1.  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 No evidence of distant disease. Reviewed he has localized disease with mixed histology. He has a dominant neuroendocrine component. We discussed that this is an aggressive cancer. We reviewed typical approach with chemotherapy which includes cisplatin and etoposide x4 cycles. This will be followed by cystectomy after restaging PET scan. He completed cycle 1 without any adverse effects. He will continue with cycle 2 of chemotherapy planned for 10/3/2023 with labs prior to visit. He will follow-up in 3 weeks for office visit. Patient aware to contact the office in the interim.     2. 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 Patient referred to GI by PCP. Plans EUS.     3. Atrial fibrillation  On eliquis. Management per PCP. 4.  Ascending thoracic aorta aneurysm  Patient states this is chronic. Defer management to PCP.     Recommendations consistent with NCCN guidelines. History of Present Illness:  Harlan Munguia is a 79-year-old male with past medical history significant for HTN, atrial fibrillation, hypothyroidism and hyperlipidemia. Patient notes developing hematuria which prompted further work-up. Imaging showed bladder mass. Cystoscopy with TURBT showed urothelial cancer with neuroendocrine differentiation.     In the interim, he underwent a PET CT scan which did not show distant disease. He went for second opinion at [facility] and [address]  Interval history (9/27/2023): In the interim, patient completed cycle 1 of chemotherapy with cisplatin/etoposide. Pt required lasix during third day of treatment due to increased weight. Had left thigh pain which improved after standing. Took tylenol and muscle realaxant which resolved. Notes a erythematous papule in lower back which he scratched. Denies any fever or pain. Notes improvement with topical Neosporin. Review of Systems:  Review of Systems   Constitutional: Negative for chills and fever. HENT: Negative for ear pain and sore throat. Eyes: Negative for pain and visual disturbance. Respiratory: Negative for cough and shortness of breath. Cardiovascular: Negative for chest pain and palpitations. Gastrointestinal: Negative for abdominal pain and vomiting. Genitourinary: Negative for dysuria and hematuria. Musculoskeletal: Negative for arthralgias and back pain. Skin: Negative for color change and rash. Neurological: Negative for seizures and syncope. All other systems reviewed and are negative.       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[address])   • Vitamin D deficiency   • Prediabetes   • Gross hematuria   • Bladder mass   • Malfunction of Moralez catheter [facility])   • Pancreatic mass   • Malignant neoplasm of posterior wall of urinary bladder (HCC)   • Closed fracture of posterior malleolus of left tibia   • Pain, joint, ankle and foot, left   • Chemotherapy induced neutropenia (HCC)     Past Medical History:   Diagnosis Date   • Acute blood loss anemia 3/8/2019   • Aneurysm of thoracic aorta ([address])    • Arrhythmia    • Cholecystitis 3/5/2019    Added automatically from request for surgery [id number]   • Detached retina, right    • Disease of thyroid gland    • Gastric wall thickening    • Headache    • Hematoma 10/10/2018   • Hypertension    • Iliac artery aneurysm, bilateral (HCC)    • Irregular heart beat     afib cardioversion 1/30/17, x2    • Neuropathy     bilateral feet   • Paroxysmal A-fib [facility])    • Prostatic hypertrophy    • 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[facility])      Past Surgical History:   Procedure Laterality Date   • ABDOMINAL AORTIC ANEURYSM REPAIR, ENDOVASCULAR Right 04/13/2018    Procedure: REPAIR ANEURYSM ILIAC endovascular  with coil embolization right hypogastric artery.;  Surgeon: Scout Gutierrez MD;  Location: [facility];  Service: Vascular   • CARDIOVERSION     • CATARACT EXTRACTION Left 10/20/2019    Right eye 11/17/2011   • CHOLECYSTECTOMY     • CHOLECYSTECTOMY LAPAROSCOPIC N/A 03/08/2019    Procedure: [id number] CHOLECYSTECTOMY;  Surgeon: Terra Gillespie DO;  Location: [facility];  Service: General   • COLONOSCOPY  07/13/2016   • MOLE EXCISION  2021   • SC CYSTOURETHROSCOPY W/DEST &/RMVL TUMOR LARGE N/A 08/01/2023    Procedure: TURBT, gemcitabine instillation;  Surgeon: Carroll Hoskins MD;  Location: [facility];  Service: Urology   • SC EDG US EXAM SURGICAL ALTER STOM DUODENUM/JEJUNUM N/A 11/10/2017    Procedure: RADIAL ENDOSCOPIC U/S;  Surgeon: Jerilyn Yu MD;  Location: [facility];   Service: Gastroenterology   • RETINAL DETACHMENT SURGERY Right onset , retinal detachment complete air fill confirmed     Family History   Problem Relation Age of Onset   • Stroke Mother          at 80, failure to thrive   • Aortic aneurysm Father         abdominal   • Aortic aneurysm Brother    • Basal cell carcinoma Brother    • Schizophrenia Brother    • Cancer Maternal Grandmother         smoker, heart attack, cancer   • Cancer Maternal Grandfather         smoker cancer   • Cancer Paternal Grandmother         malignant neoplasm   • Cancer Paternal Grandfather         malignant neoplasm   • Cancer Family         malignant neoplasm   • Cancer Family         malignant neoplasm   • Cancer Maternal Uncle         smoker, lots wrong with her   • Cancer Paternal Uncle         melanoma at 80     Social History     Socioeconomic History   • Marital status: /Civil Union     Spouse name: Not on file   • Number of children: Not on file   • Years of education: Not on file   • Highest education level: Not on file   Occupational History   • Not on file   Tobacco Use   • Smoking status: Never   • Smokeless tobacco: Never   Vaping Use   • Vaping Use: Never used   Substance and Sexual Activity   • Alcohol use: Yes     Alcohol/week: 5.0 standard drinks of alcohol     Types: 2 Glasses of wine, 1 Cans of beer, 2 Standard drinks or equivalent per week     Comment: 3-4 x week   • Drug use: Never   • Sexual activity: Not Currently   Other Topics Concern   • Not on file   Social History Narrative   • Not on file     Social Determinants of Health     Financial Resource Strain: Low Risk  (2022)    Overall Financial Resource Strain (CARDIA)    • Difficulty of Paying Living Expenses: Not hard at all   Food Insecurity: Not on file   Transportation Needs: No Transportation Needs (2022)    PRAPARE - Transportation    • Lack of Transportation (Medical): No    • Lack of Transportation (Non-Medical):  No   Physical Activity: Not on file   Stress: Not on file   Social Connections: Not on file   Intimate Partner Violence: Not on file   Housing Stability: Not on file       Current Outpatient Medications:   •  acetaminophen (TYLENOL) 325 mg tablet, Take 2 tablets (650 mg total) by mouth every 6 (six) hours as needed for mild pain or fever, Disp: 30 tablet, Rfl: 0  •  aspirin (ECOTRIN LOW STRENGTH) 81 mg EC tablet, Take 81 mg by mouth daily, Disp: , Rfl:   •  atorvastatin (LIPITOR) 20 mg tablet, TAKE 1 TABLET DAILY, Disp: 90 tablet, Rfl: 3  •  cholecalciferol (VITAMIN D3) 1,000 units tablet, Take 1,000 Units by mouth daily, Disp: , Rfl:   •  Co-Enzyme Q-10 100 MG CAPS, Take 100 mg by mouth daily, Disp: , Rfl:   •  Eliquis 5 MG, TAKE 1 TABLET TWICE A DAY (Patient taking differently: 5 mg 2 (two) times a day The patient is taking 0.5 tablet two times a day. On 8/10/23 he will resume full dose), Disp: 180 tablet, Rfl: 3  •  felodipine (PLENDIL) 5 mg 24 hr tablet, TAKE 1 TABLET DAILY, Disp: 90 tablet, Rfl: 3  •  furosemide (LASIX) 20 mg tablet, Take 1 tablet (20 mg total) by mouth daily, Disp: 30 tablet, Rfl: 0  •  gabapentin (NEURONTIN) 100 mg capsule, Take 1 capsule (100 mg total) by mouth 2 (two) times a day, Disp: 60 capsule, Rfl: 0  •  hydrocortisone 2.5 % cream, Apply 1 application. topically 2 (two) times a day To affected area as needed, Disp: , Rfl:   •  ketoconazole (NIZORAL) 2 % cream, As needed, Disp: , Rfl:   •  levothyroxine 75 mcg tablet, TAKE 1 TABLET DAILY FOR    ACQUIRED HYPOTHYROIDISM, Disp: 90 tablet, Rfl: 3  •  metoprolol succinate (TOPROL-XL) 25 mg 24 hr tablet, TAKE 1 TABLET TWICE A DAY, Disp: 180 tablet, Rfl: 3  •  Misc Natural Products (LUTEIN 20 PO), Take 20 mg by mouth daily. , Disp: , Rfl:   •  multivitamin (THERAGRAN) TABS, Take 1 tablet by mouth daily. , Disp: , Rfl:   •  ondansetron (ZOFRAN) 4 mg tablet, Take 1 tablet (4 mg total) by mouth every 8 (eight) hours as needed for nausea or vomiting, Disp: 20 tablet, Rfl: 1  •  Saw Palmetto 450 MG CAPS, , Disp: , Rfl:   • Sodium Fluoride 5000 PPM 1.1 % PSTE, USE TWICE A DAY IN PLACE OF REGULAR TOOTHPASTE UNLESS TOLD OTHERWISE, Disp: , Rfl:   •  tamsulosin (FLOMAX) 0.4 mg, Take 1 capsule (0.4 mg total) by mouth daily with dinner, Disp: 90 capsule, Rfl: 3  Allergies   Allergen Reactions   • Wound Dressing Adhesive Blisters     Vitals:    23 0901   BP: 118/68   Pulse: (!) 49   Resp: 18   SpO2: 97%     Wt Readings from Last 3 Encounters:   23 102 kg (225 lb)   23 103 kg (226 lb)   23 109 kg (241 lb 2.9 oz)     Performance Status: ECOG 1  Physical Exam  Vitals reviewed. Constitutional:       General: He is not in acute distress. Appearance: Normal appearance. HENT:      Head: Normocephalic and atraumatic. Mouth/Throat:      Mouth: Mucous membranes are moist.   Eyes:      Extraocular Movements: Extraocular movements intact. Conjunctiva/sclera: Conjunctivae normal.   Cardiovascular:      Rate and Rhythm: Normal rate. Rhythm irregular. Pulmonary:      Effort: Pulmonary effort is normal.   Abdominal:      General: Abdomen is flat. Palpations: Abdomen is soft. Musculoskeletal:      Cervical back: Normal range of motion. Right lower leg: No edema. Left lower leg: No edema. Skin:     General: Skin is warm. Coloration: Skin is not jaundiced. Comments: papule left lower back (mildly erythematous). No discharge. Neurological:      General: No focal deficit present. Mental Status: He is alert and oriented to person, place, and time. Mental status is at baseline. Gait: Gait normal.   Psychiatric:         Thought Content: Thought content normal.       Labs:  2023: WBC: 5.3, H/H: 16/46.4, MCV: 90, platelets: 946. AST/ALT: 18/29. Creatinine: 1.06  2023: WBC: 6.3, H/H: 15.4/45.7, MCV: 92, platelets: 114. AST/ALT: 20/25. Creatinine: 1.00    Imagin2023: CT renal protocol:  1.  New bladder lesion. Cystoscopy is advised.   2.  New 2.7 cm pancreatic cyst. For simple cyst(s) 2 cm or greater recommend gastroenterology and/or surgical oncology consult. EUS is likely now warranted.      8/1/2023: CT abdomen/pelvis:  Partially distended urinary bladder around Moralez catheter with a small amount of hemorrhagic products around the tip of the catheter. Urinary bladder wall thickening and surrounding inflammatory stranding, likely postprocedural.   Stable size of 2.7 cm uncinate process cystic lesion compared to 4/26/2023, likely representing IPMN. For simple cyst(s) 2 cm or greater recommend gastroenterology and/or surgical oncology consult. EUS is likely now warranted.     8/1/2023: TURBT and gemcitabine instillation by urology (Dr. Cardoso Gist). Beau Husky bladder, posterior wall:     - Invasive mixed high grade carcinoma with urothelial (40%) and neuroendocrine (60%) differentiation.     - Tumor invades muscularis propria (detrusor muscle).    - Lymphovascular invasion by tumor present. *On immunostaining the areas of neuroendocrine differentiation are positive for synaptophysin, CD56, chromogranin, and TTF-1 with a Ki-67 proliferation rate of >80%.  The urothelial carcinomatous component is negative for the aforementioned immunostains but positive for uroplakin and GATA3 with a Ki-67 proliferation rate of 30%. 8/21/2023: PET CT scan:  1. Physiologic uptake within the urinary bladder limits assessment for local bladder malignancy. However, there is no FDG avid local-regional lymphadenopathy or distant metastatic disease. 2. Stable fusiform aneurysm of the ascending thoracic aorta measuring up to 48 mm allowing for differences in imaging and measurement technique. Return in about 3 weeks (around 10/18/2023) for Office Visit, Labs - See Treatment Plan, Infusion - See Treatment Plan. 1. chemo (cisplatin day 1/etoposide day 1, 2, 3). Growth factor support day 4. See treatment plan. Please schedule total of 3 more cycles.     2. F/u in 3 weeks prior to next chemo.

## 2023-09-27 ENCOUNTER — TELEPHONE (OUTPATIENT)
Dept: HEMATOLOGY ONCOLOGY | Facility: CLINIC | Age: 73
End: 2023-09-27

## 2023-09-27 ENCOUNTER — OFFICE VISIT (OUTPATIENT)
Dept: HEMATOLOGY ONCOLOGY | Facility: CLINIC | Age: 73
End: 2023-09-27
Payer: MEDICARE

## 2023-09-27 VITALS
BODY MASS INDEX: 30.48 KG/M2 | DIASTOLIC BLOOD PRESSURE: 68 MMHG | OXYGEN SATURATION: 97 % | SYSTOLIC BLOOD PRESSURE: 118 MMHG | HEART RATE: 49 BPM | RESPIRATION RATE: 18 BRPM | WEIGHT: 225 LBS | HEIGHT: 72 IN

## 2023-09-27 DIAGNOSIS — R60.9 EDEMA, UNSPECIFIED TYPE: ICD-10-CM

## 2023-09-27 DIAGNOSIS — D70.1 CHEMOTHERAPY INDUCED NEUTROPENIA: ICD-10-CM

## 2023-09-27 DIAGNOSIS — T45.1X5A CHEMOTHERAPY INDUCED NEUTROPENIA: ICD-10-CM

## 2023-09-27 DIAGNOSIS — C67.4 MALIGNANT NEOPLASM OF POSTERIOR WALL OF URINARY BLADDER (HCC): Primary | ICD-10-CM

## 2023-09-27 PROCEDURE — 99215 OFFICE O/P EST HI 40 MIN: CPT | Performed by: INTERNAL MEDICINE

## 2023-09-27 NOTE — TELEPHONE ENCOUNTER
1. chemo (cisplatin day 1/etoposide day 1, 2, 3).   Growth factor support day 4. See treatment plan.  Please schedule total of 3 more cycles.    2. F/u in 3 weeks prior to next chemo

## 2023-09-29 ENCOUNTER — DOCUMENTATION (OUTPATIENT)
Dept: HEMATOLOGY ONCOLOGY | Facility: CLINIC | Age: 73
End: 2023-09-29

## 2023-09-29 NOTE — PROGRESS NOTES
Oncology Finance Advocacy Intake and Intervention  Oncology Finance Counselor/Advocate placed call to patient. This writer informed patient that this writer is here to assist patient with billing questions, financial assistance, payment/payment plans, quotes, copayment assistance, insurance optimization, and insurance navigation.    This writer conducted a thorough benefit review of copayment, deductible, and out of pocket cost. This information is documented below and has been reviewed with patient.     Copayment: N/A   Deductible: NA  Out of Pocket Cost: N/A  Insurance optimization (Limited benefit vs self-pay): N/A  Patient assistance status: N/A  Free Drug Applications: N/A  Interventions:  Pt has active medicare A & B  Pt also hs an active supplemental plan F thru [employer]  Added to careteam        Information above was review thoroughly with patient and patient was advise of possible assistance programs/interventions. If any question arise patient can contact this writer at below information. This information was given to patient at time of contact.      Dulce Contreras  Phone:[phone]  Email: [email or web address]

## 2023-10-02 ENCOUNTER — OFFICE VISIT (OUTPATIENT)
Dept: INTERNAL MEDICINE CLINIC | Facility: CLINIC | Age: 73
End: 2023-10-02
Payer: MEDICARE

## 2023-10-02 ENCOUNTER — APPOINTMENT (OUTPATIENT)
Dept: LAB | Age: 73
End: 2023-10-02
Payer: MEDICARE

## 2023-10-02 VITALS
SYSTOLIC BLOOD PRESSURE: 128 MMHG | WEIGHT: 229.4 LBS | DIASTOLIC BLOOD PRESSURE: 82 MMHG | OXYGEN SATURATION: 98 % | HEIGHT: 72 IN | BODY MASS INDEX: 31.07 KG/M2 | HEART RATE: 66 BPM

## 2023-10-02 DIAGNOSIS — T45.1X5A CHEMOTHERAPY INDUCED NEUTROPENIA: ICD-10-CM

## 2023-10-02 DIAGNOSIS — I10 ESSENTIAL HYPERTENSION: Primary | ICD-10-CM

## 2023-10-02 DIAGNOSIS — Z23 ENCOUNTER FOR IMMUNIZATION: ICD-10-CM

## 2023-10-02 DIAGNOSIS — R60.9 EDEMA, UNSPECIFIED TYPE: ICD-10-CM

## 2023-10-02 DIAGNOSIS — C67.4 MALIGNANT NEOPLASM OF POSTERIOR WALL OF URINARY BLADDER (HCC): ICD-10-CM

## 2023-10-02 DIAGNOSIS — I48.19 ATRIAL FIBRILLATION, PERSISTENT (HCC): ICD-10-CM

## 2023-10-02 DIAGNOSIS — E03.9 ACQUIRED HYPOTHYROIDISM: ICD-10-CM

## 2023-10-02 DIAGNOSIS — D70.1 CHEMOTHERAPY INDUCED NEUTROPENIA: ICD-10-CM

## 2023-10-02 LAB
ALBUMIN SERPL BCP-MCNC: 4.1 G/DL (ref 3.5–5)
ALP SERPL-CCNC: 93 U/L (ref 34–104)
ALT SERPL W P-5'-P-CCNC: 19 U/L (ref 7–52)
ANION GAP SERPL CALCULATED.3IONS-SCNC: 8 MMOL/L
AST SERPL W P-5'-P-CCNC: 17 U/L (ref 13–39)
BASOPHILS # BLD AUTO: 0.09 THOUSANDS/ÂΜL (ref 0–0.1)
BASOPHILS NFR BLD AUTO: 1 % (ref 0–1)
BILIRUB SERPL-MCNC: 0.34 MG/DL (ref 0.2–1)
BUN SERPL-MCNC: 22 MG/DL (ref 5–25)
CALCIUM SERPL-MCNC: 9 MG/DL (ref 8.4–10.2)
CHLORIDE SERPL-SCNC: 107 MMOL/L (ref 96–108)
CO2 SERPL-SCNC: 29 MMOL/L (ref 21–32)
CREAT SERPL-MCNC: 1.17 MG/DL (ref 0.6–1.3)
EOSINOPHIL # BLD AUTO: 0.02 THOUSAND/ÂΜL (ref 0–0.61)
EOSINOPHIL NFR BLD AUTO: 0 % (ref 0–6)
ERYTHROCYTE [DISTWIDTH] IN BLOOD BY AUTOMATED COUNT: 12.3 % (ref 11.6–15.1)
GFR SERPL CREATININE-BSD FRML MDRD: 61 ML/MIN/1.73SQ M
GLUCOSE SERPL-MCNC: 106 MG/DL (ref 65–140)
HCT VFR BLD AUTO: 39.7 % (ref 36.5–49.3)
HGB BLD-MCNC: 13.1 G/DL (ref 12–17)
IMM GRANULOCYTES # BLD AUTO: 0.19 THOUSAND/UL (ref 0–0.2)
IMM GRANULOCYTES NFR BLD AUTO: 2 % (ref 0–2)
LYMPHOCYTES # BLD AUTO: 1.43 THOUSANDS/ÂΜL (ref 0.6–4.47)
LYMPHOCYTES NFR BLD AUTO: 15 % (ref 14–44)
MAGNESIUM SERPL-MCNC: 1.9 MG/DL (ref 1.9–2.7)
MCH RBC QN AUTO: 30.8 PG (ref 26.8–34.3)
MCHC RBC AUTO-ENTMCNC: 33 G/DL (ref 31.4–37.4)
MCV RBC AUTO: 93 FL (ref 82–98)
MONOCYTES # BLD AUTO: 0.99 THOUSAND/ÂΜL (ref 0.17–1.22)
MONOCYTES NFR BLD AUTO: 10 % (ref 4–12)
NEUTROPHILS # BLD AUTO: 6.77 THOUSANDS/ÂΜL (ref 1.85–7.62)
NEUTS SEG NFR BLD AUTO: 72 % (ref 43–75)
NRBC BLD AUTO-RTO: 0 /100 WBCS
PLATELET # BLD AUTO: 358 THOUSANDS/UL (ref 149–390)
PMV BLD AUTO: 9.7 FL (ref 8.9–12.7)
POTASSIUM SERPL-SCNC: 5 MMOL/L (ref 3.5–5.3)
PROT SERPL-MCNC: 6.3 G/DL (ref 6.4–8.4)
RBC # BLD AUTO: 4.26 MILLION/UL (ref 3.88–5.62)
SODIUM SERPL-SCNC: 144 MMOL/L (ref 135–147)
WBC # BLD AUTO: 9.49 THOUSAND/UL (ref 4.31–10.16)

## 2023-10-02 PROCEDURE — 99214 OFFICE O/P EST MOD 30 MIN: CPT | Performed by: INTERNAL MEDICINE

## 2023-10-02 PROCEDURE — 80053 COMPREHEN METABOLIC PANEL: CPT

## 2023-10-02 PROCEDURE — 36415 COLL VENOUS BLD VENIPUNCTURE: CPT

## 2023-10-02 PROCEDURE — 83735 ASSAY OF MAGNESIUM: CPT

## 2023-10-02 PROCEDURE — 85025 COMPLETE CBC W/AUTO DIFF WBC: CPT

## 2023-10-02 NOTE — PROGRESS NOTES
Name: Kiki Lara      : 1950      MRN: [id number]  Encounter Provider: Areli Downs MD  Encounter Date: 10/2/2023   [facility] department: [facility]    Assessment & Plan     1. Essential hypertension  Assessment & Plan:  Blood pressures well controlled, continue medications      2. Encounter for immunization    3. Atrial fibrillation, persistent ([address])  Assessment & Plan:  Patient gets occasional palpitations, continue anticoagulation. 4. Acquired hypothyroidism  Assessment & Plan:  Continue levothyroxine    Orders:  -     TSH, 3rd generation with Free T4 reflex; Future    5. Malignant neoplasm of posterior wall of urinary bladder [facility])  Assessment & Plan:  Patient getting chemotherapy with heme-onc             Subjective     Patient here for regular follow-up. Patient is undergoing chemotherapy for bladder cancer. Pt did have fatigue after the first round of chemo. Review of Systems   Constitutional: Positive for fatigue (mild). Negative for chills and fever. HENT: Negative for congestion, nosebleeds, postnasal drip, sore throat and trouble swallowing. Eyes: Negative for pain. Respiratory: Negative for cough, chest tightness, shortness of breath and wheezing. Cardiovascular: Positive for palpitations (occasional). Negative for chest pain and leg swelling. Gastrointestinal: Negative for abdominal pain, constipation, diarrhea, nausea and vomiting. Endocrine: Negative for polydipsia and polyuria. Genitourinary: Negative for dysuria, flank pain and hematuria. Musculoskeletal: Negative for arthralgias. Skin: Negative for rash. Neurological: Negative for dizziness, tremors, light-headedness and headaches. Hematological: Does not bruise/bleed easily. Psychiatric/Behavioral: Negative for confusion and dysphoric mood. The patient is not nervous/anxious.         Past Medical History:   Diagnosis Date   • Acute blood loss anemia 3/8/2019   • Aneurysm of thoracic aorta ([address])    • Arrhythmia    • Cholecystitis 3/5/2019    Added automatically from request for surgery [id number]   • Detached retina, right    • Disease of thyroid gland    • Gastric wall thickening    • Headache    • Hematoma 10/10/2018   • Hypertension    • Iliac artery aneurysm, bilateral (HCC)    • Irregular heart beat     afib cardioversion 17, x2    • Neuropathy     bilateral feet   • Paroxysmal A-fib 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)      Past Surgical History:   Procedure Laterality Date   • ABDOMINAL AORTIC ANEURYSM REPAIR, ENDOVASCULAR Right 2018    Procedure: REPAIR ANEURYSM ILIAC endovascular  with coil embolization right hypogastric artery.;  Surgeon: Carmenza Walker MD;  Location: [facility];  Service: Vascular   • CARDIOVERSION     • CATARACT EXTRACTION Left 10/20/2019    Right eye 2011   • CHOLECYSTECTOMY     • CHOLECYSTECTOMY LAPAROSCOPIC N/A 2019    Procedure: [id number] CHOLECYSTECTOMY;  Surgeon: Hilton Wetzel DO;  Location: [facility];  Service: General   • COLONOSCOPY  2016   • MOLE EXCISION     • NE CYSTOURETHROSCOPY W/DEST &/RMVL TUMOR LARGE N/A 2023    Procedure: TURBT, gemcitabine instillation;  Surgeon: Jackie Jackson MD;  Location: [facility];  Service: Urology   • NE EDG US EXAM SURGICAL ALTER STOM DUODENUM/JEJUNUM N/A 11/10/2017    Procedure: RADIAL ENDOSCOPIC U/S;  Surgeon: Aliyah Coronel MD;  Location: [facility];   Service: Gastroenterology   • RETINAL DETACHMENT SURGERY Right     onset , retinal detachment complete air fill confirmed     Family History   Problem Relation Age of Onset   • Stroke Mother          at 80, failure to thrive   • Aortic aneurysm Father         abdominal   • Aortic aneurysm Brother    • Basal cell carcinoma Brother    • Schizophrenia Brother    • Cancer Maternal Grandmother         smoker, heart attack, cancer   • Cancer Maternal Grandfather         smoker cancer   • Cancer Paternal Grandmother         malignant neoplasm   • Cancer Paternal Grandfather         malignant neoplasm   • Cancer Family         malignant neoplasm   • Cancer Family         malignant neoplasm   • Cancer Maternal Uncle         smoker, lots wrong with her   • Cancer Paternal Uncle         melanoma at 80     Social History     Socioeconomic History   • Marital status: /Civil Union     Spouse name: None   • Number of children: None   • Years of education: None   • Highest education level: None   Occupational History   • None   Tobacco Use   • Smoking status: Never   • Smokeless tobacco: Never   Vaping Use   • Vaping Use: Never used   Substance and Sexual Activity   • Alcohol use: Yes     Alcohol/week: 5.0 standard drinks of alcohol     Types: 2 Glasses of wine, 1 Cans of beer, 2 Standard drinks or equivalent per week     Comment: 3-4 x week   • Drug use: Never   • Sexual activity: Not Currently   Other Topics Concern   • None   Social History Narrative   • None     Social Determinants of Health     Financial Resource Strain: Low Risk  (12/12/2022)    Overall Financial Resource Strain (CARDIA)    • Difficulty of Paying Living Expenses: Not hard at all   Food Insecurity: Not on file   Transportation Needs: No Transportation Needs (12/12/2022)    PRAPARE - Transportation    • Lack of Transportation (Medical): No    • Lack of Transportation (Non-Medical):  No   Physical Activity: Not on file   Stress: Not on file   Social Connections: Not on file   Intimate Partner Violence: Not on file   Housing Stability: Not on file     Current Outpatient Medications on File Prior to Visit   Medication Sig   • acetaminophen (TYLENOL) 325 mg tablet Take 2 tablets (650 mg total) by mouth every 6 (six) hours as needed for mild pain or fever   • aspirin (ECOTRIN LOW STRENGTH) 81 mg EC tablet Take 81 mg by mouth daily   • atorvastatin (LIPITOR) 20 mg tablet TAKE 1 TABLET DAILY   • cholecalciferol (VITAMIN D3) 1,000 units tablet Take 1,000 Units by mouth daily   • Co-Enzyme Q-10 100 MG CAPS Take 100 mg by mouth daily   • Eliquis 5 MG TAKE 1 TABLET TWICE A DAY (Patient taking differently: 5 mg 2 (two) times a day The patient is taking 0.5 tablet two times a day. On 8/10/23 he will resume full dose)   • felodipine (PLENDIL) 5 mg 24 hr tablet TAKE 1 TABLET DAILY   • furosemide (LASIX) 20 mg tablet Take 1 tablet (20 mg total) by mouth daily   • hydrocortisone 2.5 % cream Apply 1 application. topically 2 (two) times a day To affected area as needed   • ketoconazole (NIZORAL) 2 % cream As needed   • levothyroxine 75 mcg tablet TAKE 1 TABLET DAILY FOR    ACQUIRED HYPOTHYROIDISM   • metoprolol succinate (TOPROL-XL) 25 mg 24 hr tablet TAKE 1 TABLET TWICE A DAY   • Misc Natural Products (LUTEIN 20 PO) Take 20 mg by mouth daily. • multivitamin (THERAGRAN) TABS Take 1 tablet by mouth daily.    • ondansetron (ZOFRAN) 4 mg tablet Take 1 tablet (4 mg total) by mouth every 8 (eight) hours as needed for nausea or vomiting   • Saw [place] 450 MG CAPS    • Sodium Fluoride 5000 PPM 1.1 % PSTE USE TWICE A DAY IN PLACE OF REGULAR TOOTHPASTE UNLESS TOLD OTHERWISE   • tamsulosin (FLOMAX) 0.4 mg Take 1 capsule (0.4 mg total) by mouth daily with dinner   • gabapentin (NEURONTIN) 100 mg capsule Take 1 capsule (100 mg total) by mouth 2 (two) times a day     Allergies   Allergen Reactions   • Wound Dressing Adhesive Blisters     Immunization History   Administered Date(s) Administered   • COVID-19 MODERNA VACC 0.5 ML IM 02/16/2021, 03/23/2021, 11/09/2021   • H1N1, All Formulations 01/09/2010   • INFLUENZA 11/29/2016, 10/23/2017, 10/16/2020   • Influenza Split High Dose Preservative Free IM 11/16/2010, 09/19/2012, 09/24/2014, 09/16/2015, 11/29/2016, 10/23/2017   • Influenza, high dose seasonal 0.7 mL 10/15/2019, 11/09/2021, 10/12/2022   • Pneumococcal Conjugate 13-Valent 11/29/2016   • Pneumococcal Polysaccharide PPV23 08/01/2018   • Tdap 11/29/2016   • Zoster Vaccine Recombinant 10/16/2020, 12/28/2020   • influenza, trivalent, adjuvanted 10/16/2020       Objective     /82   Pulse 66   Ht 6' (1.829 m)   Wt 104 kg (229 lb 6.4 oz)   SpO2 98%   BMI 31.11 kg/m²     Physical Exam  Constitutional:       General: He is not in acute distress. Appearance: Normal appearance. He is well-developed. HENT:      Head: Normocephalic and atraumatic. Right Ear: External ear normal.      Left Ear: External ear normal.   Eyes:      General: No scleral icterus. Conjunctiva/sclera: Conjunctivae normal.   Neck:      Thyroid: No thyromegaly. Trachea: No tracheal deviation. Cardiovascular:      Rate and Rhythm: Normal rate. Rhythm irregular. Heart sounds: Normal heart sounds. No murmur heard. Pulmonary:      Effort: Pulmonary effort is normal. No respiratory distress. Breath sounds: Normal breath sounds. No wheezing or rales. Musculoskeletal:      Cervical back: Normal range of motion and neck supple. Right lower leg: No edema. Left lower leg: No edema. Lymphadenopathy:      Cervical: No cervical adenopathy. Skin:     Coloration: Skin is not jaundiced or pale. Neurological:      General: No focal deficit present. Mental Status: He is alert and oriented to person, place, and time. Psychiatric:         Mood and Affect: Mood normal.         Behavior: Behavior normal.         Thought Content:  Thought content normal.         Judgment: Judgment normal.       Geovani Ramos MD

## 2023-10-02 NOTE — PATIENT INSTRUCTIONS
Problem List Items Addressed This Visit          Endocrine    Acquired hypothyroidism     Continue levothyroxine         Relevant Orders    TSH, 3rd generation with Free T4 reflex       Cardiovascular and Mediastinum    Essential hypertension - Primary     Blood pressures well controlled, continue medications         Atrial fibrillation, persistent (HCC)     Patient gets occasional palpitations, continue anticoagulation.             Genitourinary    Malignant neoplasm of posterior wall of urinary bladder [facility])     Patient getting chemotherapy with heme-onc          Other Visit Diagnoses       Encounter for immunization

## 2023-10-03 ENCOUNTER — DOCUMENTATION (OUTPATIENT)
Dept: HEMATOLOGY ONCOLOGY | Facility: CLINIC | Age: 73
End: 2023-10-03

## 2023-10-03 ENCOUNTER — HOSPITAL ENCOUNTER (OUTPATIENT)
Dept: INFUSION CENTER | Facility: CLINIC | Age: 73
Discharge: HOME | End: 2023-10-03
Payer: MEDICARE

## 2023-10-03 VITALS
TEMPERATURE: 97.2 F | RESPIRATION RATE: 16 BRPM | OXYGEN SATURATION: 98 % | WEIGHT: 225 LBS | SYSTOLIC BLOOD PRESSURE: 148 MMHG | HEART RATE: 80 BPM | HEIGHT: 72 IN | BODY MASS INDEX: 30.48 KG/M2 | DIASTOLIC BLOOD PRESSURE: 88 MMHG

## 2023-10-03 DIAGNOSIS — C67.4 MALIGNANT NEOPLASM OF POSTERIOR WALL OF URINARY BLADDER (HCC): Primary | ICD-10-CM

## 2023-10-03 DIAGNOSIS — D70.1 CHEMOTHERAPY INDUCED NEUTROPENIA: ICD-10-CM

## 2023-10-03 DIAGNOSIS — T45.1X5A CHEMOTHERAPY INDUCED NEUTROPENIA: ICD-10-CM

## 2023-10-03 PROCEDURE — 96417 CHEMO IV INFUS EACH ADDL SEQ: CPT

## 2023-10-03 PROCEDURE — 96361 HYDRATE IV INFUSION ADD-ON: CPT

## 2023-10-03 PROCEDURE — 96375 TX/PRO/DX INJ NEW DRUG ADDON: CPT

## 2023-10-03 PROCEDURE — 96367 TX/PROPH/DG ADDL SEQ IV INF: CPT

## 2023-10-03 PROCEDURE — 96413 CHEMO IV INFUSION 1 HR: CPT

## 2023-10-03 RX ORDER — SODIUM CHLORIDE 9 MG/ML
20 INJECTION, SOLUTION INTRAVENOUS ONCE
Status: CANCELLED | OUTPATIENT
Start: 2023-10-04

## 2023-10-03 RX ORDER — SODIUM CHLORIDE 9 MG/ML
20 INJECTION, SOLUTION INTRAVENOUS ONCE
Status: CANCELLED | OUTPATIENT
Start: 2023-10-05

## 2023-10-03 RX ORDER — SODIUM CHLORIDE 9 MG/ML
20 INJECTION, SOLUTION INTRAVENOUS ONCE
Status: CANCELLED | OUTPATIENT
Start: 2023-10-03

## 2023-10-03 RX ORDER — FUROSEMIDE 10 MG/ML
20 INJECTION INTRAMUSCULAR; INTRAVENOUS ONCE
Status: CANCELLED
Start: 2023-10-03 | End: 2023-10-03

## 2023-10-03 RX ORDER — SODIUM CHLORIDE 9 MG/ML
20 INJECTION, SOLUTION INTRAVENOUS ONCE
Status: COMPLETED | OUTPATIENT
Start: 2023-10-03 | End: 2023-10-03

## 2023-10-03 RX ORDER — FUROSEMIDE 10 MG/ML
20 INJECTION INTRAMUSCULAR; INTRAVENOUS ONCE
Status: COMPLETED | OUTPATIENT
Start: 2023-10-03 | End: 2023-10-03

## 2023-10-03 RX ADMIN — SODIUM CHLORIDE 20 ML/HR: 0.9 INJECTION, SOLUTION INTRAVENOUS at 10:55

## 2023-10-03 RX ADMIN — ETOPOSIDE 227 MG: 20 INJECTION, SOLUTION INTRAVENOUS at 13:56

## 2023-10-03 RX ADMIN — FUROSEMIDE 20 MG: 10 INJECTION, SOLUTION INTRAMUSCULAR; INTRAVENOUS at 11:27

## 2023-10-03 RX ADMIN — SODIUM CHLORIDE 1000 ML: 0.9 INJECTION, SOLUTION INTRAVENOUS at 15:05

## 2023-10-03 RX ADMIN — SODIUM CHLORIDE 1000 ML: 0.9 INJECTION, SOLUTION INTRAVENOUS at 10:55

## 2023-10-03 RX ADMIN — DEXAMETHASONE SODIUM PHOSPHATE: 10 INJECTION, SOLUTION INTRAMUSCULAR; INTRAVENOUS at 10:57

## 2023-10-03 RX ADMIN — CISPLATIN 170.3 MG: 1 INJECTION, SOLUTION INTRAVENOUS at 12:33

## 2023-10-03 RX ADMIN — FOSAPREPITANT DIMEGLUMINE 150 MG: 150 INJECTION, POWDER, LYOPHILIZED, FOR SOLUTION INTRAVENOUS at 11:34

## 2023-10-03 NOTE — PROGRESS NOTES
Called pt on [phone] got voicemail left a message for pt to call me back  Called pt on [phone] got voicemail left a message for pt to call me back

## 2023-10-03 NOTE — PROGRESS NOTES
Patient tolerated treatment today. PIV left in place for tomorrows treatment.   Next appointment time confirmed, he declined AVS

## 2023-10-03 NOTE — PROGRESS NOTES
Patient arrives to infusion center for D1 C2 Cisplatin/Etoposide. Patient reports tolerating Cycle 1 without much issue - reports fatigue and very minimal nausea which self-resolved. Patient does report starting to lose his hair. Labs reviewed from 10/2 which are within parameters for treatment today. PIV placed without issue, patient tolerated well. Patient asking if he can skip Lasix IV today as he has dinner plans. Patient does have PO Lasix PRN at home. Patient does return tomorrow as well if the Lasix can be added. Per Dr Sangeetha Alvarado, patient should still get IV Lasix today - explained this to patient, patient agreeable.

## 2023-10-04 ENCOUNTER — HOSPITAL ENCOUNTER (OUTPATIENT)
Dept: INFUSION CENTER | Facility: CLINIC | Age: 73
Discharge: HOME | End: 2023-10-04
Payer: MEDICARE

## 2023-10-04 VITALS
BODY MASS INDEX: 31.36 KG/M2 | DIASTOLIC BLOOD PRESSURE: 75 MMHG | RESPIRATION RATE: 16 BRPM | WEIGHT: 231.5 LBS | HEART RATE: 52 BPM | OXYGEN SATURATION: 98 % | HEIGHT: 72 IN | SYSTOLIC BLOOD PRESSURE: 124 MMHG | TEMPERATURE: 96.8 F

## 2023-10-04 DIAGNOSIS — C67.4 MALIGNANT NEOPLASM OF POSTERIOR WALL OF URINARY BLADDER (HCC): Primary | ICD-10-CM

## 2023-10-04 DIAGNOSIS — D70.1 CHEMOTHERAPY INDUCED NEUTROPENIA: ICD-10-CM

## 2023-10-04 DIAGNOSIS — T45.1X5A CHEMOTHERAPY INDUCED NEUTROPENIA: ICD-10-CM

## 2023-10-04 PROCEDURE — 96413 CHEMO IV INFUSION 1 HR: CPT

## 2023-10-04 PROCEDURE — 96367 TX/PROPH/DG ADDL SEQ IV INF: CPT

## 2023-10-04 RX ORDER — SODIUM CHLORIDE 9 MG/ML
20 INJECTION, SOLUTION INTRAVENOUS ONCE
Status: COMPLETED | OUTPATIENT
Start: 2023-10-04 | End: 2023-10-04

## 2023-10-04 RX ADMIN — ETOPOSIDE 227 MG: 20 INJECTION, SOLUTION INTRAVENOUS at 09:25

## 2023-10-04 RX ADMIN — SODIUM CHLORIDE 20 ML/HR: 0.9 INJECTION, SOLUTION INTRAVENOUS at 08:50

## 2023-10-04 RX ADMIN — DEXAMETHASONE SODIUM PHOSPHATE: 10 INJECTION, SOLUTION INTRAMUSCULAR; INTRAVENOUS at 08:50

## 2023-10-04 NOTE — PROGRESS NOTES
Patient arrives to infusion center for D2 C2 Etoposide today. Noted that patient weight was 225 lb yesterday and is 231.5 lb today. Patient denies any SOB/leg swelling. Patient reports he did take 1 tablet 20 mg Lasix pill this morning. Patient also reported mild queasiness when he woke up in the middle of the night, took antiemetics with relief. Labs not required for treatment today, 10/2 labs reviewed. PIV maintained from yesterday, brisk blood return and flushed well. Patient resting on recliner chair - call bell within reach.

## 2023-10-04 NOTE — PROGRESS NOTES
Patient tolerated treatment today without issue. PIV removed intact per patient request (he did not wish to leave PIV in place for tomorrow). Patient aware of appointment time tomorrow, declines AVS (utilizes MyChart). Patient ambulatory upon DC without gait disturbance noted.

## 2023-10-05 ENCOUNTER — HOSPITAL ENCOUNTER (OUTPATIENT)
Dept: INFUSION CENTER | Facility: CLINIC | Age: 73
Discharge: HOME | End: 2023-10-05
Payer: MEDICARE

## 2023-10-05 VITALS
RESPIRATION RATE: 18 BRPM | TEMPERATURE: 96.9 F | OXYGEN SATURATION: 97 % | HEART RATE: 53 BPM | WEIGHT: 228.5 LBS | SYSTOLIC BLOOD PRESSURE: 118 MMHG | DIASTOLIC BLOOD PRESSURE: 70 MMHG | BODY MASS INDEX: 30.95 KG/M2 | HEIGHT: 72 IN

## 2023-10-05 DIAGNOSIS — C67.4 MALIGNANT NEOPLASM OF POSTERIOR WALL OF URINARY BLADDER (HCC): Primary | ICD-10-CM

## 2023-10-05 DIAGNOSIS — T45.1X5A CHEMOTHERAPY INDUCED NEUTROPENIA: ICD-10-CM

## 2023-10-05 DIAGNOSIS — D70.1 CHEMOTHERAPY INDUCED NEUTROPENIA: ICD-10-CM

## 2023-10-05 PROCEDURE — 96413 CHEMO IV INFUSION 1 HR: CPT

## 2023-10-05 PROCEDURE — 96367 TX/PROPH/DG ADDL SEQ IV INF: CPT

## 2023-10-05 RX ORDER — SODIUM CHLORIDE 9 MG/ML
20 INJECTION, SOLUTION INTRAVENOUS ONCE
Status: COMPLETED | OUTPATIENT
Start: 2023-10-05 | End: 2023-10-05

## 2023-10-05 RX ADMIN — ETOPOSIDE 227 MG: 20 INJECTION, SOLUTION INTRAVENOUS at 09:46

## 2023-10-05 RX ADMIN — DEXAMETHASONE SODIUM PHOSPHATE: 10 INJECTION, SOLUTION INTRAMUSCULAR; INTRAVENOUS at 08:55

## 2023-10-05 RX ADMIN — SODIUM CHLORIDE 20 ML/HR: 0.9 INJECTION, SOLUTION INTRAVENOUS at 08:54

## 2023-10-05 NOTE — PROGRESS NOTES
Patient is her for day 3 of etoposide. Patient stated he had some diarrhea this am and took Pepto but said he was constipated and to a laxative last night.

## 2023-10-06 ENCOUNTER — HOSPITAL ENCOUNTER (OUTPATIENT)
Dept: INFUSION CENTER | Facility: CLINIC | Age: 73
End: 2023-10-06
Payer: MEDICARE

## 2023-10-06 VITALS — TEMPERATURE: 97.5 F

## 2023-10-06 DIAGNOSIS — C67.4 MALIGNANT NEOPLASM OF POSTERIOR WALL OF URINARY BLADDER (HCC): Primary | ICD-10-CM

## 2023-10-06 DIAGNOSIS — D70.1 CHEMOTHERAPY INDUCED NEUTROPENIA: ICD-10-CM

## 2023-10-06 DIAGNOSIS — T45.1X5A CHEMOTHERAPY INDUCED NEUTROPENIA: ICD-10-CM

## 2023-10-06 PROCEDURE — 96372 THER/PROPH/DIAG INJ SC/IM: CPT

## 2023-10-06 RX ADMIN — PEGFILGRASTIM 6 MG: 6 INJECTION SUBCUTANEOUS at 11:41

## 2023-10-06 NOTE — PROGRESS NOTES
Pt here for Neulasta injection, offers no complaints. Afebrile. Neulasta given in L arm without any issues. Pt aware of next appointment 10/24.  Declines AVS.

## 2023-10-13 ENCOUNTER — TELEPHONE (OUTPATIENT)
Dept: HEMATOLOGY ONCOLOGY | Facility: CLINIC | Age: 73
End: 2023-10-13

## 2023-10-13 NOTE — TELEPHONE ENCOUNTER
Patient Call    Who are you speaking with? Patient    If it is not the patient, are they listed on an active communication consent form? Yes   What is the reason for this call? Not feeling well, no bowel movement, please advise   Does this require a call back? Yes   If a call back is required, please list best call back number [phone]    If a call back is required, advise that a message will be forwarded to their care team and someone will return their call as soon as possible. Did you relay this information to the patient?  yes

## 2023-10-13 NOTE — TELEPHONE ENCOUNTER
Patient having on-going constipation since starting chemotherapy. Patient reporting that he took OTC Ducolax last weekend with minimal effect- small BM with lots of gas. Patient has been taking Colace all this week with no effect. Patient instructed to take Magnesium Citrate. Patient encouraged to try half bottle today, but if no effect, increase to one whole bottle tomorrow. Patient advised to take when near a bathroom. Patient verbally understood.

## 2023-10-18 ENCOUNTER — PATIENT OUTREACH (OUTPATIENT)
Dept: HEMATOLOGY ONCOLOGY | Facility: CLINIC | Age: 73
End: 2023-10-18

## 2023-10-18 ENCOUNTER — OFFICE VISIT (OUTPATIENT)
Dept: HEMATOLOGY ONCOLOGY | Facility: CLINIC | Age: 73
End: 2023-10-18
Payer: MEDICARE

## 2023-10-18 VITALS
RESPIRATION RATE: 17 BRPM | SYSTOLIC BLOOD PRESSURE: 112 MMHG | WEIGHT: 228 LBS | DIASTOLIC BLOOD PRESSURE: 68 MMHG | TEMPERATURE: 98.3 F | BODY MASS INDEX: 30.88 KG/M2 | OXYGEN SATURATION: 96 % | HEIGHT: 72 IN | HEART RATE: 71 BPM

## 2023-10-18 DIAGNOSIS — T45.1X5A CHEMOTHERAPY INDUCED NEUTROPENIA: ICD-10-CM

## 2023-10-18 DIAGNOSIS — R60.9 EDEMA, UNSPECIFIED TYPE: ICD-10-CM

## 2023-10-18 DIAGNOSIS — D70.1 CHEMOTHERAPY INDUCED NEUTROPENIA: ICD-10-CM

## 2023-10-18 DIAGNOSIS — K86.89 PANCREATIC MASS: ICD-10-CM

## 2023-10-18 DIAGNOSIS — C67.4 MALIGNANT NEOPLASM OF POSTERIOR WALL OF URINARY BLADDER (HCC): Primary | ICD-10-CM

## 2023-10-18 PROCEDURE — 99214 OFFICE O/P EST MOD 30 MIN: CPT | Performed by: INTERNAL MEDICINE

## 2023-10-18 NOTE — PROGRESS NOTES
Outreach to pt to follow-up post Cycle 2 of chemo. Cycle 3- 10/24/23  Cycle 4- 11/14/23  Pt to call [facility] after Cycle 3 to start getting post-treatment appts arranged in preparation for surgery. Plan is for post-treatment PET per [facility]. Pt will discuss when he calls if ok to have here and get order from Dr. Sangeetha Alvarado or if it will need to be completed at [facility]    Pt states Cycle 2 was a little rougher than the first with increase fatigue and did experience constipation and had to use Mag Citrate which he'd prefer to avoid. Reviewed timing of onset of constipation which pt states he had ay 3 of chemo Thursday and constipation started Friday/Saturday. Suggested he start stool softeners Thursday evening if he did not have BM Thursday or Friday morning. If stool softener is not providing relief, reinforced adding Sennokot S as advised by Dr. Sangeetha Alvarado.  Encouraged aggressive hydration and calling with an uncontrolled symptoms.

## 2023-10-18 NOTE — PROGRESS NOTES
Ada Adams  1950  [facility] [place] [address]    Chief Complaint   Patient presents with    Follow-up     Assessment/plan:     1. 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 No evidence of distant disease. Reviewed he has localized disease with mixed histology. He has a dominant neuroendocrine component. We discussed that this is an aggressive cancer. We reviewed typical approach with chemotherapy which includes cisplatin and etoposide x4 cycles. This will be followed by cystectomy after restaging PET scan. He completed cycle 2 without any adverse effects. He will continue with cycle 3 and 4 of chemotherapy planned with labs prior. He will reach out to urology and you plan for follow-up after cycle 4 of chemotherapy. He follows with Dr Jordin Hastings at [facility]. He will follow-up in the office with me after his last cycle of chemotherapy. Posttreatment scan timing will be deferred to urology preference. Patient will discuss imaging with urology. 2. 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 Patient referred to GI by PCP. Plans EUS. 3. Atrial fibrillation  On eliquis. Management per PCP. 4.  Ascending thoracic aorta aneurysm  Patient states this is chronic. Defer management to PCP.      Recommendations consistent with NCCN guidelines. History of Present Illness:  Rinku Ely is a 80-year-old male with past medical history significant for HTN, atrial fibrillation, hypothyroidism and hyperlipidemia. Patient notes developing hematuria which prompted further work-up. Imaging showed bladder mass. Cystoscopy with TURBT showed urothelial cancer with neuroendocrine differentiation. In the interim, he underwent a PET CT scan which did not show distant disease. [facility] [address] [facility] . Interval history (9/27/2023): In the interim, patient completed cycle 1 of chemotherapy with cisplatin/etoposide. Pt required lasix during third day of treatment due to increased weight. Had left thigh pain which improved after standing. Took tylenol and muscle realaxant which resolved. Notes a erythematous papule in lower back which he scratched. Denies any fever or pain. Notes improvement with topical Neosporin. Interval history (10/18/2023):  Patient tolerated cycle 2 of chemo without any adverse effects. Addition of Lasix helped with edema. He does note fatigue after treatment. Noted intermittent constipation with mild relief with magnesium citrate. Now reports regular bowel movements. Offers no new complaints today. He is accompanied by his wife for visit. Review of Systems:  Review of Systems   Constitutional:  Negative for chills and fever. HENT:  Negative for ear pain and sore throat. Eyes:  Negative for pain and visual disturbance. Respiratory:  Negative for cough and shortness of breath. Cardiovascular:  Negative for chest pain and palpitations. Gastrointestinal:  Negative for abdominal pain and vomiting. Genitourinary:  Negative for dysuria and hematuria. Musculoskeletal:  Negative for arthralgias and back pain. Skin:  Negative for color change and rash. Neurological:  Negative for seizures and syncope.    All other systems reviewed and are negative.       Patient Active Problem List   Diagnosis    Aneurysm of thoracic aorta (HCC)    Essential hypertension    Atrial fibrillation, persistent (HCC)    Iliac artery aneurysm, bilateral (HCC)    Acquired hypothyroidism    Neuropathy    Iliac artery aneurysm, right ([address])    Encounter for Medicare annual wellness exam    Other headache syndrome    Rash    Mixed hyperlipidemia    Chronic bilateral low back pain without sciatica    NSVT (nonsustained ventricular tachycardia) (HCC)    Hepatic artery dissection (HCC)    Vitamin D deficiency    Prediabetes    Gross hematuria    Bladder mass    Malfunction of Moralez catheter (HCC)    Pancreatic mass    Malignant neoplasm of posterior wall of urinary bladder (HCC)    Closed fracture of posterior malleolus of left tibia    Pain, joint, ankle and foot, left    Chemotherapy induced neutropenia      Past Medical History:   Diagnosis Date    Acute blood loss anemia 3/8/2019    Aneurysm of thoracic aorta ([address])     Arrhythmia     Cholecystitis 3/5/2019    Added automatically from request for surgery [id number]    Detached retina, right     Disease of thyroid gland     Gastric wall thickening     Headache     Hematoma 10/10/2018    Hypertension     Iliac artery aneurysm, bilateral (HCC)     Irregular heart beat     afib cardioversion 1/30/17, x2     Neuropathy     bilateral feet    Paroxysmal A-fib ([address])     Prostatic hypertrophy     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[facility])      Past Surgical History:   Procedure Laterality Date    ABDOMINAL AORTIC ANEURYSM REPAIR, ENDOVASCULAR Right 04/13/2018    Procedure: REPAIR ANEURYSM ILIAC endovascular  with coil embolization right hypogastric artery.;  Surgeon: Lori Soto MD;  Location: [facility];  Service: Vascular    CARDIOVERSION      CATARACT EXTRACTION Left 10/20/2019    Right eye 11/17/2011    CHOLECYSTECTOMY      CHOLECYSTECTOMY LAPAROSCOPIC N/A 03/08/2019    Procedure: [id number] CHOLECYSTECTOMY;  Surgeon: Nicol Shook DO;  Location: [facility];  Service: General    COLONOSCOPY  2016    MOLE EXCISION      MI CYSTOURETHROSCOPY W/DEST &/RMVL TUMOR LARGE N/A 2023    Procedure: TURBT, gemcitabine instillation;  Surgeon: Joi Zapata MD;  Location: [facility];  Service: Urology    MI EDG US [facility] DUODENUM/JEJUNUM N/A 11/10/2017    Procedure: RADIAL ENDOSCOPIC U/S;  Surgeon: Yolanda Chong MD;  Location: [facility]; Service: Gastroenterology    RETINAL DETACHMENT SURGERY Right     onset , retinal detachment complete air fill confirmed     Family History   Problem Relation Age of Onset    Stroke Mother          at 80, failure to thrive    Aortic aneurysm Father         abdominal    Aortic aneurysm Brother     Basal cell carcinoma Brother     Schizophrenia Brother     Cancer Maternal Grandmother         smoker, heart attack, cancer    Cancer Maternal Grandfather         smoker cancer    Cancer Paternal Grandmother         malignant neoplasm    Cancer Paternal Grandfather         malignant neoplasm    Cancer Family         malignant neoplasm    Cancer Family         malignant neoplasm    Cancer Maternal Uncle         smoker, lots wrong with her    Cancer Paternal Uncle         melanoma at 80     Social History     Socioeconomic History    Marital status: /Civil Union     Spouse name: Not on file    Number of children: Not on file    Years of education: Not on file    Highest education level: Not on file   Occupational History    Not on file   Tobacco Use    Smoking status: Never    Smokeless tobacco: Never   Vaping Use    Vaping Use: Never used   Substance and Sexual Activity    Alcohol use:  Yes     Alcohol/week: 5.0 standard drinks of alcohol     Types: 2 Glasses of wine, 1 Cans of beer, 2 Standard drinks or equivalent per week     Comment: 3-4 x week    Drug use: Never    Sexual activity: Not Currently   Other Topics Concern    Not on file   Social History Narrative Not on file     Social Determinants of Health     Financial Resource Strain: Low Risk  (12/12/2022)    Overall Financial Resource Strain (CARDIA)     Difficulty of Paying Living Expenses: Not hard at all   Food Insecurity: Not on file   Transportation Needs: No Transportation Needs (12/12/2022)    PRAPARE - Transportation     Lack of Transportation (Medical): No     Lack of Transportation (Non-Medical): No   Physical Activity: Not on file   Stress: Not on file   Social Connections: Not on file   Intimate Partner Violence: Not on file   Housing Stability: Not on file       Current Outpatient Medications:     acetaminophen (TYLENOL) 325 mg tablet, Take 2 tablets (650 mg total) by mouth every 6 (six) hours as needed for mild pain or fever, Disp: 30 tablet, Rfl: 0    aspirin (ECOTRIN LOW STRENGTH) 81 mg EC tablet, Take 81 mg by mouth daily, Disp: , Rfl:     atorvastatin (LIPITOR) 20 mg tablet, TAKE 1 TABLET DAILY, Disp: 90 tablet, Rfl: 3    cholecalciferol (VITAMIN D3) 1,000 units tablet, Take 1,000 Units by mouth daily, Disp: , Rfl:     Co-Enzyme Q-10 100 MG CAPS, Take 100 mg by mouth daily, Disp: , Rfl:     Eliquis 5 MG, TAKE 1 TABLET TWICE A DAY (Patient taking differently: 5 mg 2 (two) times a day The patient is taking 0.5 tablet two times a day. On 8/10/23 he will resume full dose), Disp: 180 tablet, Rfl: 3    felodipine (PLENDIL) 5 mg 24 hr tablet, TAKE 1 TABLET DAILY, Disp: 90 tablet, Rfl: 1    furosemide (LASIX) 20 mg tablet, Take 1 tablet (20 mg total) by mouth daily, Disp: 30 tablet, Rfl: 0    gabapentin (NEURONTIN) 100 mg capsule, Take 1 capsule (100 mg total) by mouth 2 (two) times a day, Disp: 60 capsule, Rfl: 0    hydrocortisone 2.5 % cream, Apply 1 application.  topically 2 (two) times a day To affected area as needed, Disp: , Rfl:     ketoconazole (NIZORAL) 2 % cream, As needed, Disp: , Rfl:     levothyroxine 75 mcg tablet, TAKE 1 TABLET DAILY FOR    ACQUIRED HYPOTHYROIDISM, Disp: 90 tablet, Rfl: 3 metoprolol succinate (TOPROL-XL) 25 mg 24 hr tablet, TAKE 1 TABLET TWICE A DAY, Disp: 180 tablet, Rfl: 3    Misc Natural Products (LUTEIN 20 PO), Take 20 mg by mouth daily. , Disp: , Rfl:     multivitamin (THERAGRAN) TABS, Take 1 tablet by mouth daily. , Disp: , Rfl:     ondansetron (ZOFRAN) 4 mg tablet, Take 1 tablet (4 mg total) by mouth every 8 (eight) hours as needed for nausea or vomiting, Disp: 20 tablet, Rfl: 1    Saw Palmetto 450 MG CAPS, , Disp: , Rfl:     Sodium Fluoride 5000 PPM 1.1 % PSTE, USE TWICE A DAY IN PLACE OF REGULAR TOOTHPASTE UNLESS TOLD OTHERWISE, Disp: , Rfl:     tamsulosin (FLOMAX) 0.4 mg, Take 1 capsule (0.4 mg total) by mouth daily with dinner, Disp: 90 capsule, Rfl: 3  Allergies   Allergen Reactions    Wound Dressing Adhesive Blisters     Vitals:    10/18/23 1415   Resp: 17     Wt Readings from Last 3 Encounters:   10/05/23 104 kg (228 lb 8 oz)   10/04/23 105 kg (231 lb 8 oz)   10/03/23 102 kg (225 lb)     Performance Status: ECOG 1  Physical Exam  Vitals reviewed. Constitutional:       General: He is not in acute distress. Appearance: Normal appearance. HENT:      Head: Normocephalic and atraumatic. Mouth/Throat:      Mouth: Mucous membranes are moist.   Eyes:      Extraocular Movements: Extraocular movements intact. Conjunctiva/sclera: Conjunctivae normal.   Cardiovascular:      Rate and Rhythm: Normal rate. Rhythm irregular. Pulmonary:      Effort: Pulmonary effort is normal.   Abdominal:      General: Abdomen is flat. Palpations: Abdomen is soft. Musculoskeletal:      Cervical back: Normal range of motion. Right lower leg: No edema. Left lower leg: No edema. Skin:     General: Skin is warm. Coloration: Skin is not jaundiced. Comments: papule left lower back (mildly erythematous). No discharge. Neurological:      General: No focal deficit present. Mental Status: He is alert and oriented to person, place, and time.  Mental status is at baseline. Gait: Gait normal.   Psychiatric:         Thought Content: Thought content normal.     Labs:  2023: WBC: 5.3, H/H: 16/46.4, MCV: 90, platelets: 063. AST/ALT: 18/29. Creatinine: 1.06  2023: WBC: 6.3, H/H: 15.4/45.7, MCV: 92, platelets: 262. AST/ALT: 20/25. Creatinine: 1.00    Imagin2023: CT renal protocol:  1. New bladder lesion. Cystoscopy is advised. 2.  New 2.7 cm pancreatic cyst. For simple cyst(s) 2 cm or greater recommend gastroenterology and/or surgical oncology consult. EUS is likely now warranted. 2023: CT abdomen/pelvis:  Partially distended urinary bladder around Moralez catheter with a small amount of hemorrhagic products around the tip of the catheter. Urinary bladder wall thickening and surrounding inflammatory stranding, likely postprocedural.   Stable size of 2.7 cm uncinate process cystic lesion compared to 2023, likely representing IPMN. For simple cyst(s) 2 cm or greater recommend gastroenterology and/or surgical oncology consult. EUS is likely now warranted. 2023: TURBT and gemcitabine instillation by urology (Dr. Janae Caraballo). A.  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     - Tumor invades muscularis propria (detrusor muscle). - Lymphovascular invasion by tumor present. *On immunostaining the areas of neuroendocrine differentiation are positive for synaptophysin, CD56, chromogranin, and TTF-1 with a Ki-67 proliferation rate of >80%. The urothelial carcinomatous component is negative for the aforementioned immunostains but positive for uroplakin and GATA3 with a Ki-67 proliferation rate of 30%. 2023: PET CT scan:  1. Physiologic uptake within the urinary bladder limits assessment for local bladder malignancy. However, there is no FDG avid local-regional lymphadenopathy or distant metastatic disease.   2. Stable fusiform aneurysm of the ascending thoracic aorta measuring up to 48 mm allowing for differences in imaging and measurement technique. No follow-ups on file. 1. chemo (cisplatin day 1/etoposide day 1, 2, 3). Growth factor support day 4. See treatment plan. 2. FU after cycle 4 of chemotherapy.

## 2023-10-23 ENCOUNTER — TELEPHONE (OUTPATIENT)
Age: 73
End: 2023-10-23

## 2023-10-23 ENCOUNTER — APPOINTMENT (OUTPATIENT)
Dept: LAB | Age: 73
End: 2023-10-23
Payer: MEDICARE

## 2023-10-23 DIAGNOSIS — T45.1X5A CHEMOTHERAPY INDUCED NEUTROPENIA: ICD-10-CM

## 2023-10-23 DIAGNOSIS — C67.4 MALIGNANT NEOPLASM OF POSTERIOR WALL OF URINARY BLADDER (HCC): ICD-10-CM

## 2023-10-23 DIAGNOSIS — D70.1 CHEMOTHERAPY INDUCED NEUTROPENIA: ICD-10-CM

## 2023-10-23 DIAGNOSIS — E03.9 ACQUIRED HYPOTHYROIDISM: ICD-10-CM

## 2023-10-23 LAB
ALBUMIN SERPL BCP-MCNC: 4.2 G/DL (ref 3.5–5)
ALP SERPL-CCNC: 121 U/L (ref 34–104)
ALT SERPL W P-5'-P-CCNC: 17 U/L (ref 7–52)
ANION GAP SERPL CALCULATED.3IONS-SCNC: 5 MMOL/L
AST SERPL W P-5'-P-CCNC: 17 U/L (ref 13–39)
BASOPHILS # BLD AUTO: 0.05 THOUSANDS/ÂΜL (ref 0–0.1)
BASOPHILS NFR BLD AUTO: 1 % (ref 0–1)
BILIRUB SERPL-MCNC: 0.37 MG/DL (ref 0.2–1)
BUN SERPL-MCNC: 23 MG/DL (ref 5–25)
CALCIUM SERPL-MCNC: 8.9 MG/DL (ref 8.4–10.2)
CHLORIDE SERPL-SCNC: 106 MMOL/L (ref 96–108)
CO2 SERPL-SCNC: 28 MMOL/L (ref 21–32)
CREAT SERPL-MCNC: 0.96 MG/DL (ref 0.6–1.3)
EOSINOPHIL # BLD AUTO: 0.07 THOUSAND/ÂΜL (ref 0–0.61)
EOSINOPHIL NFR BLD AUTO: 1 % (ref 0–6)
ERYTHROCYTE [DISTWIDTH] IN BLOOD BY AUTOMATED COUNT: 13.5 % (ref 11.6–15.1)
GFR SERPL CREATININE-BSD FRML MDRD: 78 ML/MIN/1.73SQ M
GLUCOSE P FAST SERPL-MCNC: 103 MG/DL (ref 65–99)
HCT VFR BLD AUTO: 34.5 % (ref 36.5–49.3)
HGB BLD-MCNC: 11.8 G/DL (ref 12–17)
IMM GRANULOCYTES # BLD AUTO: 0.22 THOUSAND/UL (ref 0–0.2)
IMM GRANULOCYTES NFR BLD AUTO: 2 % (ref 0–2)
LYMPHOCYTES # BLD AUTO: 1.33 THOUSANDS/ÂΜL (ref 0.6–4.47)
LYMPHOCYTES NFR BLD AUTO: 13 % (ref 14–44)
MCH RBC QN AUTO: 31.1 PG (ref 26.8–34.3)
MCHC RBC AUTO-ENTMCNC: 34.2 G/DL (ref 31.4–37.4)
MCV RBC AUTO: 91 FL (ref 82–98)
MONOCYTES # BLD AUTO: 1.02 THOUSAND/ÂΜL (ref 0.17–1.22)
MONOCYTES NFR BLD AUTO: 10 % (ref 4–12)
NEUTROPHILS # BLD AUTO: 7.62 THOUSANDS/ÂΜL (ref 1.85–7.62)
NEUTS SEG NFR BLD AUTO: 73 % (ref 43–75)
NRBC BLD AUTO-RTO: 0 /100 WBCS
PLATELET # BLD AUTO: 252 THOUSANDS/UL (ref 149–390)
PMV BLD AUTO: 9.8 FL (ref 8.9–12.7)
POTASSIUM SERPL-SCNC: 4.7 MMOL/L (ref 3.5–5.3)
PROT SERPL-MCNC: 6.1 G/DL (ref 6.4–8.4)
RBC # BLD AUTO: 3.8 MILLION/UL (ref 3.88–5.62)
SODIUM SERPL-SCNC: 139 MMOL/L (ref 135–147)
TSH SERPL DL<=0.05 MIU/L-ACNC: 3.13 UIU/ML (ref 0.45–4.5)
WBC # BLD AUTO: 10.31 THOUSAND/UL (ref 4.31–10.16)

## 2023-10-23 PROCEDURE — 36415 COLL VENOUS BLD VENIPUNCTURE: CPT

## 2023-10-23 PROCEDURE — 80053 COMPREHEN METABOLIC PANEL: CPT

## 2023-10-23 PROCEDURE — 85025 COMPLETE CBC W/AUTO DIFF WBC: CPT

## 2023-10-23 PROCEDURE — 84443 ASSAY THYROID STIM HORMONE: CPT

## 2023-10-23 RX ORDER — SODIUM CHLORIDE 9 MG/ML
20 INJECTION, SOLUTION INTRAVENOUS ONCE
Status: CANCELLED | OUTPATIENT
Start: 2023-10-25

## 2023-10-23 RX ORDER — SODIUM CHLORIDE 9 MG/ML
20 INJECTION, SOLUTION INTRAVENOUS ONCE
Status: CANCELLED | OUTPATIENT
Start: 2023-10-24

## 2023-10-23 RX ORDER — SODIUM CHLORIDE 9 MG/ML
20 INJECTION, SOLUTION INTRAVENOUS ONCE
Status: CANCELLED | OUTPATIENT
Start: 2023-10-26

## 2023-10-23 RX ORDER — FUROSEMIDE 10 MG/ML
20 INJECTION INTRAMUSCULAR; INTRAVENOUS ONCE
Status: CANCELLED
Start: 2023-10-24 | End: 2023-10-24

## 2023-10-23 NOTE — TELEPHONE ENCOUNTER
Patient made aware of PCP's note for most recent blood work. Normal TSH levels. Patient did not have any further questions.

## 2023-10-24 ENCOUNTER — HOSPITAL ENCOUNTER (OUTPATIENT)
Dept: INFUSION CENTER | Facility: CLINIC | Age: 73
Discharge: HOME | End: 2023-10-24
Payer: MEDICARE

## 2023-10-24 VITALS
OXYGEN SATURATION: 98 % | RESPIRATION RATE: 18 BRPM | HEIGHT: 72 IN | TEMPERATURE: 96.7 F | SYSTOLIC BLOOD PRESSURE: 109 MMHG | HEART RATE: 59 BPM | WEIGHT: 225.53 LBS | BODY MASS INDEX: 30.55 KG/M2 | DIASTOLIC BLOOD PRESSURE: 73 MMHG

## 2023-10-24 DIAGNOSIS — T45.1X5A CHEMOTHERAPY INDUCED NEUTROPENIA: Primary | ICD-10-CM

## 2023-10-24 DIAGNOSIS — D70.1 CHEMOTHERAPY INDUCED NEUTROPENIA: Primary | ICD-10-CM

## 2023-10-24 DIAGNOSIS — C67.4 MALIGNANT NEOPLASM OF POSTERIOR WALL OF URINARY BLADDER (HCC): ICD-10-CM

## 2023-10-24 PROCEDURE — 96367 TX/PROPH/DG ADDL SEQ IV INF: CPT

## 2023-10-24 PROCEDURE — 96361 HYDRATE IV INFUSION ADD-ON: CPT

## 2023-10-24 PROCEDURE — 96375 TX/PRO/DX INJ NEW DRUG ADDON: CPT

## 2023-10-24 PROCEDURE — 96413 CHEMO IV INFUSION 1 HR: CPT

## 2023-10-24 PROCEDURE — 96417 CHEMO IV INFUS EACH ADDL SEQ: CPT

## 2023-10-24 RX ORDER — FUROSEMIDE 10 MG/ML
20 INJECTION INTRAMUSCULAR; INTRAVENOUS ONCE
Status: COMPLETED | OUTPATIENT
Start: 2023-10-24 | End: 2023-10-24

## 2023-10-24 RX ORDER — SODIUM CHLORIDE 9 MG/ML
20 INJECTION, SOLUTION INTRAVENOUS ONCE
Status: COMPLETED | OUTPATIENT
Start: 2023-10-24 | End: 2023-10-24

## 2023-10-24 RX ADMIN — ETOPOSIDE 225 MG: 20 INJECTION INTRAVENOUS at 12:20

## 2023-10-24 RX ADMIN — SODIUM CHLORIDE 1000 ML: 0.9 INJECTION, SOLUTION INTRAVENOUS at 13:31

## 2023-10-24 RX ADMIN — DEXAMETHASONE SODIUM PHOSPHATE: 10 INJECTION, SOLUTION INTRAMUSCULAR; INTRAVENOUS at 09:44

## 2023-10-24 RX ADMIN — SODIUM CHLORIDE 1000 ML: 0.9 INJECTION, SOLUTION INTRAVENOUS at 08:22

## 2023-10-24 RX ADMIN — FUROSEMIDE 20 MG: 10 INJECTION, SOLUTION INTRAMUSCULAR; INTRAVENOUS at 09:05

## 2023-10-24 RX ADMIN — FOSAPREPITANT DIMEGLUMINE 150 MG: 150 INJECTION, POWDER, LYOPHILIZED, FOR SOLUTION INTRAVENOUS at 10:05

## 2023-10-24 RX ADMIN — SODIUM CHLORIDE 20 ML/HR: 0.9 INJECTION, SOLUTION INTRAVENOUS at 08:53

## 2023-10-24 RX ADMIN — CISPLATIN 168.8 MG: 1 INJECTION, SOLUTION INTRAVENOUS at 10:52

## 2023-10-24 NOTE — PATIENT INSTRUCTIONS
October 2023 Sunday Monday Tuesday Wednesday Thursday Friday Saturday   1     2    LAB WALK IN   7:15 AM   (5 min.)   [facility] LAB PHLEB 4   [facility]    OFFICE VISIT LONG PG   9:45 AM   (30 min.)   Armando Hawthorne MD   [facility] 3    INF ONCOLOGY TX-TREATMENT PLAN  10:30 AM   (440 min.)   AN INF CHAIR 7   [address] 4    INF ONCOLOGY TX-TREATMENT PLAN   8:30 AM   (170 min.)   AN INF CHAIR [address] [place] [address],[facility] 100 5    INF ONCOLOGY TX-TREATMENT PLAN   8:30 AM   (170 min.)   AN INF CHAIR 3   [address] 6    INF ONCOLOGY TX-TREATMENT PLAN  11:30 AM   (30 min.)   AN INF QUICK CHAIR [address] [place] [address],[facility] 100 7         Cycle 2, Day 1 Cycle 2, Day 2 Cycle 2, Day 3     8     9     10     11     12     13     14                15     16     17     18    FOLLOW UP PG   2:05 PM   (20 min.)   Radha Tolliver [address] [facility] [place] 19     20     21                22     23    LAB WALK IN   8:30 AM   (5 min.)   BE SLN LAB PHLEB 4   [facility] 24    INF ONCOLOGY TX-TREATMENT PLAN   8:00 AM   (440 min.)   AN INF CHAIR [address] [place] [address],[address] 25    INF ONCOLOGY TX-TREATMENT PLAN   8:30 AM   (170 min.)   AN INF CHAIR 12   [address] [place] [address],[facility] 100 26    INF ONCOLOGY TX-TREATMENT PLAN   8:30 AM   (170 min.)   AN INF CHAIR 12   [address] [place] [address],[address] 27    INF ONCOLOGY TX-TREATMENT PLAN  11:30 AM   (30 min.)   AN INF QUICK CHAIR [address] [place] [address],[address] 28         Cycle 3, Day 1 Cycle 3, Day 2 Cycle 3, Day 3     29     30     31                                           Treatment Details         10/3/2023 - Cycle 2, Day 1      Chemotherapy: [address], CISPLATIN-MANNITOL IVPB, ONCBCN PROVIDER COMMUNICATION4, etoposide (TOPOSAR)    10/4/2023 - Cycle 2, Day 2      Chemotherapy: ONCBCN PROVIDER [id number], etoposide (TOPOSAR)    10/5/2023 - Cycle 2, Day 3      Chemotherapy: ONCBCN PROVIDER [id number], etoposide (TOPOSAR)    10/24/2023 - Cycle 3, Day 1      Chemotherapy: ONCBCN PROVIDER COMMUNICATION7, CISPLATIN-MANNITOL IVPB, ONCBCN PROVIDER COMMUNICATION4, etoposide (TOPOSAR)    10/25/2023 - Cycle 3, Day 2      Chemotherapy: ONCBCN PROVIDER [id number], etoposide (TOPOSAR)    10/26/2023 - Cycle 3, Day 3      Chemotherapy: ONCBCN PROVIDER [id number], etoposide (TOPOSAR)        November 2023 Sunday Monday Tuesday Wednesday Thursday Friday Saturday                  1     2     3     4                5     6     7     8     9     10     11                12     13     14    INF ONCOLOGY TX-TREATMENT PLAN   8:00 AM   (440 min.)   AN INF CHAIR [address] [facility] 15    INF ONCOLOGY TX-TREATMENT PLAN  10:00 AM   (170 min.)   AN INF CHAIR [address] [facility] 16    INF ONCOLOGY TX-TREATMENT PLAN   8:30 AM   (170 min.)   AN INF CHAIR [address] [facility] 17    INF ONCOLOGY TX-TREATMENT PLAN  11:30 AM   (30 min.)   AN INF QUICK CHAIR [address] [facility] 18         Cycle 4, Day 1 Cycle 4, Day 2 Cycle 4, Day 3     19     20     21     22     23     24     25                26     27     28     29    FOLLOW UP PG   3:45 PM   (20 min.)   Radha Whalen, [place] [facility] [place] 30                                 Treatment Details         11/14/2023 - Cycle 4, Day 1      Chemotherapy: ONCBCN PROVIDER COMMUNICATION7, CISPLATIN-MANNITOL IVPB, ONCBCN PROVIDER COMMUNICATION4, etoposide (TOPOSAR)    11/15/2023 - Cycle 4, Day 2      Chemotherapy: ONCBCN PROVIDER [id number], etoposide (TOPOSAR)    11/16/2023 - Cycle 4, Day 3 Chemotherapy: Ambrose Junenifer, etoposide (Natalie Pendleton)

## 2023-10-24 NOTE — PROGRESS NOTES
Patient here for cisplatin/etoposide day one with hydration, he is feeling well. No c/o or changes to report, hydration started.

## 2023-10-24 NOTE — PROGRESS NOTES
Patient tolerated all treatment without incident and was discharged post, no c/o offered. He will RTO tomorrow for day 2 etoposide. IV flushed, secured and left in place for treatment tomorrow.

## 2023-10-25 ENCOUNTER — HOSPITAL ENCOUNTER (OUTPATIENT)
Dept: INFUSION CENTER | Facility: CLINIC | Age: 73
Discharge: HOME | End: 2023-10-25
Payer: MEDICARE

## 2023-10-25 VITALS
TEMPERATURE: 96.8 F | HEART RATE: 56 BPM | DIASTOLIC BLOOD PRESSURE: 84 MMHG | HEIGHT: 72 IN | RESPIRATION RATE: 18 BRPM | BODY MASS INDEX: 31.08 KG/M2 | OXYGEN SATURATION: 98 % | WEIGHT: 229.5 LBS | SYSTOLIC BLOOD PRESSURE: 123 MMHG

## 2023-10-25 DIAGNOSIS — D70.1 CHEMOTHERAPY INDUCED NEUTROPENIA: Primary | ICD-10-CM

## 2023-10-25 DIAGNOSIS — C67.4 MALIGNANT NEOPLASM OF POSTERIOR WALL OF URINARY BLADDER (HCC): ICD-10-CM

## 2023-10-25 DIAGNOSIS — T45.1X5A CHEMOTHERAPY INDUCED NEUTROPENIA: Primary | ICD-10-CM

## 2023-10-25 PROCEDURE — 96367 TX/PROPH/DG ADDL SEQ IV INF: CPT

## 2023-10-25 PROCEDURE — 96413 CHEMO IV INFUSION 1 HR: CPT

## 2023-10-25 RX ORDER — SODIUM CHLORIDE 9 MG/ML
20 INJECTION, SOLUTION INTRAVENOUS ONCE
Status: COMPLETED | OUTPATIENT
Start: 2023-10-25 | End: 2023-10-25

## 2023-10-25 RX ADMIN — ETOPOSIDE 225 MG: 20 INJECTION INTRAVENOUS at 09:44

## 2023-10-25 RX ADMIN — SODIUM CHLORIDE 20 ML/HR: 0.9 INJECTION, SOLUTION INTRAVENOUS at 08:45

## 2023-10-25 RX ADMIN — DEXAMETHASONE SODIUM PHOSPHATE: 10 INJECTION, SOLUTION INTRAMUSCULAR; INTRAVENOUS at 08:56

## 2023-10-25 NOTE — PROGRESS NOTES
Patient tolerated infusion without complications. PIV stays in place for treatment tomorrow.  Patient aware of next appointment, declined AVS.

## 2023-10-25 NOTE — PROGRESS NOTES
Patient is here for Toposar . Labs reviewed from 10/23, no lab parameters required for treatment today. PIV had good blood return. Patient resting with no complains. Will continue to monitor.

## 2023-10-26 ENCOUNTER — HOSPITAL ENCOUNTER (OUTPATIENT)
Dept: INFUSION CENTER | Facility: CLINIC | Age: 73
Discharge: HOME | End: 2023-10-26
Payer: MEDICARE

## 2023-10-26 VITALS
SYSTOLIC BLOOD PRESSURE: 104 MMHG | RESPIRATION RATE: 18 BRPM | WEIGHT: 232.5 LBS | HEIGHT: 72 IN | BODY MASS INDEX: 31.49 KG/M2 | DIASTOLIC BLOOD PRESSURE: 58 MMHG | HEART RATE: 46 BPM | TEMPERATURE: 96.6 F

## 2023-10-26 DIAGNOSIS — C67.4 MALIGNANT NEOPLASM OF POSTERIOR WALL OF URINARY BLADDER (HCC): ICD-10-CM

## 2023-10-26 DIAGNOSIS — T45.1X5A CHEMOTHERAPY INDUCED NEUTROPENIA: Primary | ICD-10-CM

## 2023-10-26 DIAGNOSIS — D70.1 CHEMOTHERAPY INDUCED NEUTROPENIA: Primary | ICD-10-CM

## 2023-10-26 PROCEDURE — 96367 TX/PROPH/DG ADDL SEQ IV INF: CPT

## 2023-10-26 PROCEDURE — 96413 CHEMO IV INFUSION 1 HR: CPT

## 2023-10-26 RX ORDER — SODIUM CHLORIDE 9 MG/ML
20 INJECTION, SOLUTION INTRAVENOUS ONCE
Status: COMPLETED | OUTPATIENT
Start: 2023-10-26 | End: 2023-10-26

## 2023-10-26 RX ADMIN — ETOPOSIDE 225 MG: 20 INJECTION INTRAVENOUS at 09:51

## 2023-10-26 RX ADMIN — DEXAMETHASONE SODIUM PHOSPHATE: 10 INJECTION, SOLUTION INTRAMUSCULAR; INTRAVENOUS at 08:50

## 2023-10-26 RX ADMIN — SODIUM CHLORIDE 20 ML/HR: 0.9 INJECTION, SOLUTION INTRAVENOUS at 08:50

## 2023-10-26 NOTE — PROGRESS NOTES
Pt tolerated etoposide treatment, offers no complaints, aware of appointment time for tomorrow.   Declines AVS, IV removed

## 2023-10-27 ENCOUNTER — HOSPITAL ENCOUNTER (OUTPATIENT)
Dept: INFUSION CENTER | Facility: CLINIC | Age: 73
Discharge: HOME | End: 2023-10-27
Payer: MEDICARE

## 2023-10-27 VITALS — TEMPERATURE: 96.9 F

## 2023-10-27 DIAGNOSIS — D70.1 CHEMOTHERAPY INDUCED NEUTROPENIA: Primary | ICD-10-CM

## 2023-10-27 DIAGNOSIS — C67.4 MALIGNANT NEOPLASM OF POSTERIOR WALL OF URINARY BLADDER (HCC): ICD-10-CM

## 2023-10-27 DIAGNOSIS — T45.1X5A CHEMOTHERAPY INDUCED NEUTROPENIA: Primary | ICD-10-CM

## 2023-10-27 PROCEDURE — 96372 THER/PROPH/DIAG INJ SC/IM: CPT

## 2023-10-27 RX ADMIN — PEGFILGRASTIM 6 MG: 6 INJECTION SUBCUTANEOUS at 11:38

## 2023-10-27 NOTE — PROGRESS NOTES
Patient arrived for Neulasta SQ injection. Temporal temp 96.9 degrees. Injection administered L arm, tolerated well. Bandaid applied. Patient aware of next scheduled appointment, declined AVS, ambulatory from department.

## 2023-10-30 ENCOUNTER — PATIENT OUTREACH (OUTPATIENT)
Dept: HEMATOLOGY ONCOLOGY | Facility: CLINIC | Age: 73
End: 2023-10-30

## 2023-10-30 DIAGNOSIS — C67.4 MALIGNANT NEOPLASM OF POSTERIOR WALL OF URINARY BLADDER (HCC): Primary | ICD-10-CM

## 2023-10-30 NOTE — PROGRESS NOTES
Hi, this is Rinku Ely. We spoke this morning. We just spoke with the Nurse Navigator at [facility]. They think they're going to be able to get me in. That's probably the first week of December down there. So we'd like to have the PET scan before that if possible. The guidance is one to two weeks after the chemo finishes. So basically from Thanksgiving on, I don't know where the you schedule at or whether Doctor Gilma Crane has to schedule it or you have to work together, whatever. Let us know what you can do then. I bring the CD down with me when I go down. We don't have an exact appointment yet. It could, I suppose, be the second week of December, but they're going to try for the first week of December. [phone] if you have other questions. PET scan ordered and scheduled for 11/27/23 @230. Nothing to eat or drink except water 6 hours prior. [place]. [address] [facility]. [place] in [place]    Patient aware and agreeable. Will call with any other questions or concerns.

## 2023-10-30 NOTE — PROGRESS NOTES
Patient will be calling [facility] for upcoming appointment scheduling. Offered direct contact for return call after patient has [facility] pre op appointment set up so we can work on PET order/schedule. Patient will call Sandra Mendoza later this week.

## 2023-10-31 ENCOUNTER — OFFICE VISIT (OUTPATIENT)
Dept: GASTROENTEROLOGY | Facility: CLINIC | Age: 73
End: 2023-10-31
Payer: MEDICARE

## 2023-10-31 ENCOUNTER — TELEPHONE (OUTPATIENT)
Dept: GASTROENTEROLOGY | Facility: CLINIC | Age: 73
End: 2023-10-31

## 2023-10-31 VITALS
BODY MASS INDEX: 29.55 KG/M2 | TEMPERATURE: 98.9 F | WEIGHT: 223 LBS | SYSTOLIC BLOOD PRESSURE: 102 MMHG | HEIGHT: 73 IN | DIASTOLIC BLOOD PRESSURE: 60 MMHG

## 2023-10-31 DIAGNOSIS — K86.2 PANCREAS CYST: Primary | ICD-10-CM

## 2023-10-31 PROCEDURE — 99204 OFFICE O/P NEW MOD 45 MIN: CPT | Performed by: INTERNAL MEDICINE

## 2023-10-31 NOTE — PATIENT INSTRUCTIONS
Scheduled date of EUS(as of today): 12/26/2023  Physician performing EUS: [facility] [place]  Location of EUS:[place]  Instructions reviewed with patient by:Ruby  Clearances:  [id number]

## 2023-10-31 NOTE — TELEPHONE ENCOUNTER
Our mutual patient is scheduled for procedure:  EUS    On: December 26 , 2023     With: Dr. Jaspal Pettit MD    He/She is taking the following blood thinner: Eliquis (Apixaban)    Can this be stopped  2 days prior to the procedure    Physician Approving clearance:     Please review and advise    Patient has been cleared to hold Eliquis.  Called patient and informed LM on VM

## 2023-10-31 NOTE — PROGRESS NOTES
[facility] - Outpatient Consultation  Sanjuanita Cody 68 y.o. male MRN: [id number]  Encounter: [id number]          ASSESSMENT AND PLAN:        1. Pancreas cyst  Ambulatory referral to Gastroenterology    Endoscopic ultrasonography, GI (Upper) Linear with FNA        Will plan for EUS to evaluate pancreatic cyst and fine needle aspiration for risk stratification of cyst. Will need to hold eliquis prior to procedure    The rationale for EGD with endoscopic ultrasound with possible biopsy/fine needle aspiration, possible polypectomy, with IV sedation as well as all risks, benefits, and alternatives were discussed with the patient in detail. Risks including but not limited to perforation, bleeding, need for blood transfusion or emergent surgery, and missed neoplasm were reviewed in detail with the patient. The patient demonstrates full understanding and wishes to proceed. ______________________________________________________________    HPI:  Sanjuanita Cody is a 68y.o. year old male who presents to the office for evaluation of pancreatic cyst. First seen on a CT in April and again seen on subsequent imaging. I have personally viewed the images in PACS. Cyst measures 2.6 cm in largest dimension. The PET did not show anyhting. Recently diagnosed with bladder cancer and is undergoing chemotherapy. Does have occasional nausea but attributes this to chemo as this started once he as on chemo. Reviewed recent blood work. Amylase and was not checked. Denies any  severe epigastric pain, weight loss, steatorrhea, or jaundice. 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 Samy Roche MD    REVIEW OF SYSTEMS:    CONSTITUTIONAL: Denies any fever, chills, rigors, and weight loss. HEENT: No earache or tinnitus. Denies hearing loss or visual disturbances.   CARDIOVASCULAR: No chest pain or palpitations. RESPIRATORY: Denies any cough, hemoptysis, shortness of breath or dyspnea on exertion. GASTROINTESTINAL: As noted in the History of Present Illness. GENITOURINARY: No problems with urination. Denies any hematuria or dysuria. NEUROLOGIC: No dizziness or vertigo, denies headaches. MUSCULOSKELETAL: Denies any muscle or joint pain. SKIN: Denies skin rashes or itching. ENDOCRINE: Denies excessive thirst. Denies intolerance to heat or cold. PSYCHOSOCIAL: Denies depression or anxiety. Denies any recent memory loss.        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[facility])      Past Surgical History:   Procedure Laterality Date    ABDOMINAL AORTIC ANEURYSM REPAIR, ENDOVASCULAR Right 04/13/2018    Procedure: REPAIR ANEURYSM ILIAC endovascular  with coil embolization right hypogastric artery.;  Surgeon: Jhonnie Litten, MD;  Location: [facility];  Service: Vascular    CARDIOVERSION      CATARACT EXTRACTION Left 10/20/2019    Right eye 11/17/2011    CHOLECYSTECTOMY      CHOLECYSTECTOMY LAPAROSCOPIC N/A 03/08/2019    Procedure: [id number] CHOLECYSTECTOMY;  Surgeon: Arslan Bowden DO;  Location: [facility];  Service: General    COLONOSCOPY  07/13/2016    MOLE EXCISION  2021    [place] CYSTOURETHROSCOPY W/DEST &/RMVL TUMOR LARGE N/A 08/01/2023    Procedure: TURBT, gemcitabine instillation;  Surgeon: Nicolasa Augustin MD;  Location: [facility];  Service: Urology    [place] EDG US EXAM SURGICAL ALTER STOM DUODENUM/JEJUNUM N/A 11/10/2017    Procedure: RADIAL ENDOSCOPIC U/S;  Surgeon: Eliseo Graham MD;  Location: [facility];   Service: Gastroenterology    RETINAL DETACHMENT SURGERY Right     onset , retinal detachment complete air fill confirmed     Social History   Social History     Substance and Sexual Activity   Alcohol Use Yes    Alcohol/week: 5.0 standard drinks of alcohol    Types: 2 Glasses of wine, 1 Cans of beer, 2 Standard drinks or equivalent per week    Comment: 3-4 x week     Social History     Substance and Sexual Activity   Drug Use Never     Social History     Tobacco Use   Smoking Status Never   Smokeless Tobacco Never     Family History   Problem Relation Age of Onset    Stroke Mother          at 80, failure to thrive    Aortic aneurysm Father         abdominal    Aortic aneurysm Brother     Basal cell carcinoma Brother     Schizophrenia Brother     Cancer Maternal Grandmother         smoker, heart attack, cancer    Cancer Maternal Grandfather         smoker cancer    Cancer Paternal Grandmother         malignant neoplasm    Cancer Paternal Grandfather         malignant neoplasm    Cancer Family         malignant neoplasm    Cancer Family         malignant neoplasm    Cancer Maternal Uncle         smoker, lots wrong with her    Cancer Paternal Uncle         melanoma at 80       Meds/Allergies       Current Outpatient Medications:     acetaminophen (TYLENOL) 325 mg tablet    aspirin (ECOTRIN LOW STRENGTH) 81 mg EC tablet    atorvastatin (LIPITOR) 20 mg tablet    cholecalciferol (VITAMIN D3) 1,000 units tablet    Co-Enzyme Q-10 100 MG CAPS    Eliquis 5 MG    felodipine (PLENDIL) 5 mg 24 hr tablet    furosemide (LASIX) 20 mg tablet    hydrocortisone 2.5 % cream    ketoconazole (NIZORAL) 2 % cream    levothyroxine 75 mcg tablet    metoprolol succinate (TOPROL-XL) 25 mg 24 hr tablet    Misc Natural Products (LUTEIN 20 PO)    multivitamin (THERAGRAN) TABS    ondansetron (ZOFRAN) 4 mg tablet    Saw Palmetto 450 MG CAPS    Sodium Fluoride 5000 PPM 1.1 % PSTE    tamsulosin (FLOMAX) 0.4 mg    gabapentin (NEURONTIN) 100 mg capsule    Allergies   Allergen Reactions    Wound Dressing Adhesive Blisters           Objective     Blood pressure 102/60, temperature 98.9 °F (37.2 °C), temperature source Tympanic, height 6' 1" (1.854 m), weight 101 kg (223 lb). Body mass index is 29.42 kg/m². PHYSICAL EXAM:      General Appearance:   Alert, cooperative, no distress   HEENT:   Normocephalic, atraumatic, anicteric. Lungs:   Equal chest rise and unlabored breathing, normal cough   Heart:   No visualized JVD   Abdomen:   Soft, non-tender, non-distended; no masses, no organomegaly    Genitalia:   Deferred    Rectal:   Deferred    Extremities:  No cyanosis, clubbing or edema    Pulses:  Musculoskeletal:  2+ and symmetric  Normal range of motion visualized    Skin:  Neuro:  No jaundice, rashes, or lesions   Alert and appropriate       Lab Results:   No visits with results within 1 Day(s) from this visit.    Latest known visit with results is:   Appointment on 10/23/2023   Component Date Value    WBC 10/23/2023 10.31 (H)     RBC 10/23/2023 3.80 (L)     Hemoglobin 10/23/2023 11.8 (L)     Hematocrit 10/23/2023 34.5 (L)     MCV 10/23/2023 91     MCH 10/23/2023 31.1     MCHC 10/23/2023 34.2     RDW 10/23/2023 13.5     MPV 10/23/2023 9.8     Platelets 04/96/6234 252     nRBC 10/23/2023 0     Neutrophils Relative 10/23/2023 73     Immat GRANS % 10/23/2023 2     Lymphocytes Relative 10/23/2023 13 (L)     Monocytes Relative 10/23/2023 10     Eosinophils Relative 10/23/2023 1     Basophils Relative 10/23/2023 1     Neutrophils Absolute 10/23/2023 7.62     Immature Grans Absolute 10/23/2023 0.22 (H)     Lymphocytes Absolute 10/23/2023 1.33     Monocytes Absolute 10/23/2023 1.02     Eosinophils Absolute 10/23/2023 0.07     Basophils Absolute 10/23/2023 0.05     Sodium 10/23/2023 139 Potassium 10/23/2023 4.7     Chloride 10/23/2023 106     CO2 10/23/2023 28     ANION GAP 10/23/2023 5     BUN 10/23/2023 23     Creatinine 10/23/2023 0.96     Glucose, Fasting 10/23/2023 103 (H)     Calcium 10/23/2023 8.9     AST 10/23/2023 17     ALT 10/23/2023 17     Alkaline Phosphatase 10/23/2023 121 (H)     Total Protein 10/23/2023 6.1 (L)     Albumin 10/23/2023 4.2     Total Bilirubin 10/23/2023 0.37     eGFR 10/23/2023 78     TSH 3RD GENERATON 10/23/2023 3.130          Radiology Results:   No results found.   Answers submitted by the patient for this visit:  Abdominal Pain Questionnaire (Submitted on 10/26/2023)  Chief Complaint: Abdominal pain  Pain - numeric: 0/10  Radiates to: does not radiate  anorexia: No  arthralgias: No  constipation: Yes  diarrhea: No  dysuria: No  fever: No  flatus: No  frequency: No  headaches: No  hematochezia: No  hematuria: No  melena: No  myalgias: No  nausea: Yes  weight loss: No  vomiting: No  Aggravated by: nothing  Relieved by: nothing  Diagnostic workup: CT scan

## 2023-11-07 DIAGNOSIS — I48.19 ATRIAL FIBRILLATION, PERSISTENT (HCC): ICD-10-CM

## 2023-11-07 DIAGNOSIS — I10 HYPERTENSION, UNSPECIFIED TYPE: ICD-10-CM

## 2023-11-07 DIAGNOSIS — E03.9 ACQUIRED HYPOTHYROIDISM: ICD-10-CM

## 2023-11-07 RX ORDER — LEVOTHYROXINE SODIUM 0.07 MG/1
TABLET ORAL
Qty: 90 TABLET | Refills: 1 | Status: SHIPPED | OUTPATIENT
Start: 2023-11-07

## 2023-11-07 RX ORDER — ATORVASTATIN CALCIUM 20 MG/1
TABLET, FILM COATED ORAL
Qty: 90 TABLET | Refills: 3 | Status: SHIPPED | OUTPATIENT
Start: 2023-11-07

## 2023-11-07 RX ORDER — METOPROLOL SUCCINATE 25 MG/1
TABLET, EXTENDED RELEASE ORAL
Qty: 180 TABLET | Refills: 3 | Status: SHIPPED | OUTPATIENT
Start: 2023-11-07

## 2023-11-10 DIAGNOSIS — C67.4 MALIGNANT NEOPLASM OF POSTERIOR WALL OF URINARY BLADDER (HCC): Primary | ICD-10-CM

## 2023-11-13 ENCOUNTER — APPOINTMENT (OUTPATIENT)
Dept: LAB | Age: 73
End: 2023-11-13
Payer: MEDICARE

## 2023-11-13 DIAGNOSIS — C67.4 MALIGNANT NEOPLASM OF POSTERIOR WALL OF URINARY BLADDER (HCC): ICD-10-CM

## 2023-11-13 LAB
ALBUMIN SERPL BCP-MCNC: 4.1 G/DL (ref 3.5–5)
ALP SERPL-CCNC: 117 U/L (ref 34–104)
ALT SERPL W P-5'-P-CCNC: 12 U/L (ref 7–52)
ANION GAP SERPL CALCULATED.3IONS-SCNC: 5 MMOL/L
AST SERPL W P-5'-P-CCNC: 12 U/L (ref 13–39)
BASOPHILS # BLD AUTO: 0.04 THOUSANDS/ÂΜL (ref 0–0.1)
BASOPHILS NFR BLD AUTO: 1 % (ref 0–1)
BILIRUB SERPL-MCNC: 0.24 MG/DL (ref 0.2–1)
BUN SERPL-MCNC: 21 MG/DL (ref 5–25)
CALCIUM SERPL-MCNC: 8.7 MG/DL (ref 8.4–10.2)
CHLORIDE SERPL-SCNC: 110 MMOL/L (ref 96–108)
CO2 SERPL-SCNC: 26 MMOL/L (ref 21–32)
CREAT SERPL-MCNC: 0.96 MG/DL (ref 0.6–1.3)
EOSINOPHIL # BLD AUTO: 0.09 THOUSAND/ÂΜL (ref 0–0.61)
EOSINOPHIL NFR BLD AUTO: 1 % (ref 0–6)
ERYTHROCYTE [DISTWIDTH] IN BLOOD BY AUTOMATED COUNT: 18.6 % (ref 11.6–15.1)
GFR SERPL CREATININE-BSD FRML MDRD: 78 ML/MIN/1.73SQ M
GLUCOSE P FAST SERPL-MCNC: 111 MG/DL (ref 65–99)
HCT VFR BLD AUTO: 30 % (ref 36.5–49.3)
HGB BLD-MCNC: 10 G/DL (ref 12–17)
IMM GRANULOCYTES # BLD AUTO: 0.11 THOUSAND/UL (ref 0–0.2)
IMM GRANULOCYTES NFR BLD AUTO: 1 % (ref 0–2)
LYMPHOCYTES # BLD AUTO: 0.96 THOUSANDS/ÂΜL (ref 0.6–4.47)
LYMPHOCYTES NFR BLD AUTO: 11 % (ref 14–44)
MCH RBC QN AUTO: 32.5 PG (ref 26.8–34.3)
MCHC RBC AUTO-ENTMCNC: 33.3 G/DL (ref 31.4–37.4)
MCV RBC AUTO: 97 FL (ref 82–98)
MONOCYTES # BLD AUTO: 0.83 THOUSAND/ÂΜL (ref 0.17–1.22)
MONOCYTES NFR BLD AUTO: 9 % (ref 4–12)
NEUTROPHILS # BLD AUTO: 6.83 THOUSANDS/ÂΜL (ref 1.85–7.62)
NEUTS SEG NFR BLD AUTO: 77 % (ref 43–75)
NRBC BLD AUTO-RTO: 0 /100 WBCS
PLATELET # BLD AUTO: 258 THOUSANDS/UL (ref 149–390)
PMV BLD AUTO: 9.7 FL (ref 8.9–12.7)
POTASSIUM SERPL-SCNC: 4.8 MMOL/L (ref 3.5–5.3)
PROT SERPL-MCNC: 5.9 G/DL (ref 6.4–8.4)
RBC # BLD AUTO: 3.08 MILLION/UL (ref 3.88–5.62)
SODIUM SERPL-SCNC: 141 MMOL/L (ref 135–147)
WBC # BLD AUTO: 8.86 THOUSAND/UL (ref 4.31–10.16)

## 2023-11-13 PROCEDURE — 85025 COMPLETE CBC W/AUTO DIFF WBC: CPT

## 2023-11-13 PROCEDURE — 36415 COLL VENOUS BLD VENIPUNCTURE: CPT

## 2023-11-13 PROCEDURE — 80053 COMPREHEN METABOLIC PANEL: CPT

## 2023-11-13 RX ORDER — FUROSEMIDE 10 MG/ML
20 INJECTION INTRAMUSCULAR; INTRAVENOUS ONCE
Status: CANCELLED
Start: 2023-11-14 | End: 2023-11-14

## 2023-11-13 RX ORDER — SODIUM CHLORIDE 9 MG/ML
20 INJECTION, SOLUTION INTRAVENOUS ONCE
Status: CANCELLED | OUTPATIENT
Start: 2023-11-16

## 2023-11-13 RX ORDER — SODIUM CHLORIDE 9 MG/ML
20 INJECTION, SOLUTION INTRAVENOUS ONCE
Status: CANCELLED | OUTPATIENT
Start: 2023-11-15

## 2023-11-13 RX ORDER — SODIUM CHLORIDE 9 MG/ML
20 INJECTION, SOLUTION INTRAVENOUS ONCE
Status: CANCELLED | OUTPATIENT
Start: 2023-11-14

## 2023-11-14 ENCOUNTER — HOSPITAL ENCOUNTER (OUTPATIENT)
Dept: INFUSION CENTER | Facility: CLINIC | Age: 73
Discharge: HOME | End: 2023-11-14
Payer: MEDICARE

## 2023-11-14 VITALS
BODY MASS INDEX: 30.42 KG/M2 | HEIGHT: 73 IN | TEMPERATURE: 97.5 F | WEIGHT: 229.5 LBS | OXYGEN SATURATION: 98 % | RESPIRATION RATE: 16 BRPM | DIASTOLIC BLOOD PRESSURE: 64 MMHG | SYSTOLIC BLOOD PRESSURE: 112 MMHG | HEART RATE: 69 BPM

## 2023-11-14 DIAGNOSIS — D70.1 CHEMOTHERAPY INDUCED NEUTROPENIA: Primary | ICD-10-CM

## 2023-11-14 DIAGNOSIS — T45.1X5A CHEMOTHERAPY INDUCED NEUTROPENIA: Primary | ICD-10-CM

## 2023-11-14 DIAGNOSIS — C67.4 MALIGNANT NEOPLASM OF POSTERIOR WALL OF URINARY BLADDER (HCC): ICD-10-CM

## 2023-11-14 RX ORDER — SODIUM CHLORIDE 9 MG/ML
20 INJECTION, SOLUTION INTRAVENOUS ONCE
Status: COMPLETED | OUTPATIENT
Start: 2023-11-14 | End: 2023-11-14

## 2023-11-14 RX ORDER — FUROSEMIDE 10 MG/ML
20 INJECTION INTRAMUSCULAR; INTRAVENOUS ONCE
Status: COMPLETED | OUTPATIENT
Start: 2023-11-14 | End: 2023-11-14

## 2023-11-14 RX ADMIN — SODIUM CHLORIDE 1000 ML: 0.9 INJECTION, SOLUTION INTRAVENOUS at 08:40

## 2023-11-14 RX ADMIN — FUROSEMIDE 20 MG: 10 INJECTION, SOLUTION INTRAMUSCULAR; INTRAVENOUS at 10:10

## 2023-11-14 RX ADMIN — CISPLATIN 168.8 MG: 1 INJECTION, SOLUTION INTRAVENOUS at 10:30

## 2023-11-14 RX ADMIN — ETOPOSIDE 225 MG: 20 INJECTION INTRAVENOUS at 12:00

## 2023-11-14 RX ADMIN — SODIUM CHLORIDE 20 ML/HR: 0.9 INJECTION, SOLUTION INTRAVENOUS at 08:10

## 2023-11-14 RX ADMIN — FOSAPREPITANT DIMEGLUMINE 150 MG: 150 INJECTION, POWDER, LYOPHILIZED, FOR SOLUTION INTRAVENOUS at 09:00

## 2023-11-14 RX ADMIN — SODIUM CHLORIDE 1000 ML: 0.9 INJECTION, SOLUTION INTRAVENOUS at 13:10

## 2023-11-14 RX ADMIN — DEXAMETHASONE SODIUM PHOSPHATE: 100 INJECTION INTRAMUSCULAR; INTRAVENOUS at 08:40

## 2023-11-14 NOTE — PROGRESS NOTES
Tolerated infusion without incident: No adverse reactions noted: Verified follow up appt with patient ( 11/15/23 ): AVS offered and declined

## 2023-11-14 NOTE — PROGRESS NOTES
Patient to [address] for Hydration / Cisplatin / Etoposide: Offers no complaints at present time: Lab work ( 11/13/23 ) reviewed:  Within parameters to treat: Left FA PIV initiated without incident

## 2023-11-15 ENCOUNTER — HOSPITAL ENCOUNTER (OUTPATIENT)
Dept: INFUSION CENTER | Facility: CLINIC | Age: 73
Discharge: HOME | End: 2023-11-15
Payer: MEDICARE

## 2023-11-15 VITALS
RESPIRATION RATE: 18 BRPM | DIASTOLIC BLOOD PRESSURE: 76 MMHG | WEIGHT: 235.5 LBS | SYSTOLIC BLOOD PRESSURE: 126 MMHG | HEART RATE: 62 BPM | TEMPERATURE: 97.4 F | OXYGEN SATURATION: 98 % | HEIGHT: 72 IN | BODY MASS INDEX: 31.9 KG/M2

## 2023-11-15 DIAGNOSIS — C67.4 MALIGNANT NEOPLASM OF POSTERIOR WALL OF URINARY BLADDER (HCC): ICD-10-CM

## 2023-11-15 DIAGNOSIS — D70.1 CHEMOTHERAPY INDUCED NEUTROPENIA: Primary | ICD-10-CM

## 2023-11-15 DIAGNOSIS — T45.1X5A CHEMOTHERAPY INDUCED NEUTROPENIA: Primary | ICD-10-CM

## 2023-11-15 PROCEDURE — 96367 TX/PROPH/DG ADDL SEQ IV INF: CPT

## 2023-11-15 PROCEDURE — 96413 CHEMO IV INFUSION 1 HR: CPT

## 2023-11-15 RX ORDER — SODIUM CHLORIDE 9 MG/ML
20 INJECTION, SOLUTION INTRAVENOUS ONCE
Status: COMPLETED | OUTPATIENT
Start: 2023-11-15 | End: 2023-11-15

## 2023-11-15 RX ADMIN — SODIUM CHLORIDE 20 ML/HR: 0.9 INJECTION, SOLUTION INTRAVENOUS at 10:33

## 2023-11-15 RX ADMIN — ETOPOSIDE 225 MG: 20 INJECTION INTRAVENOUS at 11:19

## 2023-11-15 RX ADMIN — DEXAMETHASONE SODIUM PHOSPHATE: 100 INJECTION INTRAMUSCULAR; INTRAVENOUS at 10:33

## 2023-11-15 NOTE — PROGRESS NOTES
Patient tolerated etoposide infusion without issue. PIV R FA flushed without resistance, brisk blood return noted. PIV left in place per patient request for treatment tomorrow, passive disinfecting cap applied, dressed with gauze and coban. Patient acknowledged awareness of appointment at 0830 tomorrow morning, declined AVS. Ambulatory from department.

## 2023-11-15 NOTE — PROGRESS NOTES
Patient arrives for [facility] Etoposide. Reports mild nausea, but states not enough that he felt he needed to take his home dose of antiemetic. Patient able to eat full breakfast without difficulty. PIV placed R proximal FA, brisk blood return, flushed without resistance. Resting comfortably on recliner, call bell within reach.

## 2023-11-16 ENCOUNTER — HOSPITAL ENCOUNTER (OUTPATIENT)
Dept: INFUSION CENTER | Facility: CLINIC | Age: 73
Discharge: HOME | End: 2023-11-16
Payer: MEDICARE

## 2023-11-16 VITALS
SYSTOLIC BLOOD PRESSURE: 114 MMHG | BODY MASS INDEX: 32.1 KG/M2 | DIASTOLIC BLOOD PRESSURE: 76 MMHG | HEART RATE: 60 BPM | WEIGHT: 237 LBS | TEMPERATURE: 98.6 F | HEIGHT: 72 IN | RESPIRATION RATE: 18 BRPM | OXYGEN SATURATION: 98 %

## 2023-11-16 DIAGNOSIS — T45.1X5A CHEMOTHERAPY INDUCED NEUTROPENIA: Primary | ICD-10-CM

## 2023-11-16 DIAGNOSIS — C67.4 MALIGNANT NEOPLASM OF POSTERIOR WALL OF URINARY BLADDER (HCC): ICD-10-CM

## 2023-11-16 DIAGNOSIS — D70.1 CHEMOTHERAPY INDUCED NEUTROPENIA: Primary | ICD-10-CM

## 2023-11-16 PROCEDURE — 96413 CHEMO IV INFUSION 1 HR: CPT

## 2023-11-16 PROCEDURE — 96367 TX/PROPH/DG ADDL SEQ IV INF: CPT

## 2023-11-16 RX ORDER — SODIUM CHLORIDE 9 MG/ML
20 INJECTION, SOLUTION INTRAVENOUS ONCE
Status: COMPLETED | OUTPATIENT
Start: 2023-11-16 | End: 2023-11-16

## 2023-11-16 RX ADMIN — DEXAMETHASONE SODIUM PHOSPHATE: 100 INJECTION INTRAMUSCULAR; INTRAVENOUS at 09:00

## 2023-11-16 RX ADMIN — ETOPOSIDE 225 MG: 20 INJECTION INTRAVENOUS at 09:50

## 2023-11-16 RX ADMIN — SODIUM CHLORIDE 20 ML/HR: 0.9 INJECTION, SOLUTION INTRAVENOUS at 08:40

## 2023-11-16 NOTE — PROGRESS NOTES
Patient to [address] [place] for Etoposide: Offers no complaints at present time: Lab work ( 11/13/23 ) reviewed:  Within parameters to treat: Right FA PIV maintained per patient request: Good blood return noted: Flushes without difficulty

## 2023-11-16 NOTE — PROGRESS NOTES
Tolerated infusion without incident: No adverse reactions noted: Verified follow up appt with patient ( 11/17/23 ): AVS offered and declined

## 2023-11-17 ENCOUNTER — HOSPITAL ENCOUNTER (OUTPATIENT)
Dept: INFUSION CENTER | Facility: CLINIC | Age: 73
End: 2023-11-17
Payer: MEDICARE

## 2023-11-17 VITALS — TEMPERATURE: 97.2 F

## 2023-11-17 DIAGNOSIS — D70.1 CHEMOTHERAPY INDUCED NEUTROPENIA: Primary | ICD-10-CM

## 2023-11-17 DIAGNOSIS — T45.1X5A CHEMOTHERAPY INDUCED NEUTROPENIA: Primary | ICD-10-CM

## 2023-11-17 DIAGNOSIS — C67.4 MALIGNANT NEOPLASM OF POSTERIOR WALL OF URINARY BLADDER (HCC): ICD-10-CM

## 2023-11-17 PROCEDURE — 96372 THER/PROPH/DIAG INJ SC/IM: CPT

## 2023-11-17 RX ADMIN — PEGFILGRASTIM 6 MG: 6 INJECTION SUBCUTANEOUS at 11:28

## 2023-11-17 NOTE — PROGRESS NOTES
Pt. offers no complaints. Neulasta admin. SQ in [facility] without incident. AVS declined. Pt aware of f/u with Dr. Gil Toth, no future appt's with infusion center.

## 2023-11-22 ENCOUNTER — TELEPHONE (OUTPATIENT)
Dept: HEMATOLOGY ONCOLOGY | Facility: CLINIC | Age: 73
End: 2023-11-22

## 2023-11-22 NOTE — TELEPHONE ENCOUNTER
Called and left a message for the patient regarding the timeframe of his 11/27 PET scan. Patient made aware that from when he walks in for the scan to when the scan is complete is about 2 hours in length. The longest part is when they inject with the glucose contrast and make the patient wait for an hour, but the scan itself is only 15 mins long.

## 2023-11-22 NOTE — TELEPHONE ENCOUNTER
Received from voice mail:    "Hi, this is Den Rangel, [id number] is my birth date. I have a PET scan scheduled for next Monday and there's some discrepancy as to how much time to allow from the first one. I seem to recall it being about an hour and a quarter. You get the injection, you wait 45 minutes, and then the scan itself takes 20 minutes or 20 or 25 minutes. On the instructions for the procedure, it says it's two hours long. Well, the difference between an hour and a quarter and two hours is kind of a large discrepancy if I'm either waiting for someone or someone is waiting to pick me up. So I'm wondering if you can clarify a little bit as to how long we should allow for this procedure.  Thank you."

## 2023-11-27 ENCOUNTER — HOSPITAL ENCOUNTER (OUTPATIENT)
Dept: NUCLEAR MEDICINE | Facility: HOSPITAL | Age: 73
Discharge: HOME | End: 2023-11-27
Payer: MEDICARE

## 2023-11-27 DIAGNOSIS — C67.4 MALIGNANT NEOPLASM OF POSTERIOR WALL OF URINARY BLADDER (HCC): ICD-10-CM

## 2023-11-27 LAB — GLUCOSE SERPL-MCNC: 89 MG/DL (ref 65–140)

## 2023-11-27 PROCEDURE — G1004 CDSM NDSC: HCPCS

## 2023-11-27 PROCEDURE — 78815 PET IMAGE W/CT SKULL-THIGH: CPT

## 2023-11-27 PROCEDURE — A9552 F18 FDG: HCPCS

## 2023-11-27 PROCEDURE — 82948 REAGENT STRIP/BLOOD GLUCOSE: CPT

## 2023-11-28 ENCOUNTER — PATIENT OUTREACH (OUTPATIENT)
Dept: HEMATOLOGY ONCOLOGY | Facility: CLINIC | Age: 73
End: 2023-11-28

## 2023-11-28 NOTE — PROGRESS NOTES
ONN outreached patient after chemotherapy completed to verify upcoming plan for surgery with [facility]. Patient understands next steps. Will touch base with patient after 12/5/23 to find out surgery plan.

## 2023-11-29 ENCOUNTER — OFFICE VISIT (OUTPATIENT)
Dept: HEMATOLOGY ONCOLOGY | Facility: CLINIC | Age: 73
End: 2023-11-29
Payer: MEDICARE

## 2023-11-29 VITALS
WEIGHT: 228 LBS | HEART RATE: 58 BPM | DIASTOLIC BLOOD PRESSURE: 64 MMHG | BODY MASS INDEX: 30.88 KG/M2 | TEMPERATURE: 97.9 F | SYSTOLIC BLOOD PRESSURE: 128 MMHG | HEIGHT: 72 IN | OXYGEN SATURATION: 97 % | RESPIRATION RATE: 18 BRPM

## 2023-11-29 DIAGNOSIS — D70.1 CHEMOTHERAPY INDUCED NEUTROPENIA: ICD-10-CM

## 2023-11-29 DIAGNOSIS — C67.4 MALIGNANT NEOPLASM OF POSTERIOR WALL OF URINARY BLADDER (HCC): Primary | ICD-10-CM

## 2023-11-29 DIAGNOSIS — T45.1X5A CHEMOTHERAPY INDUCED NEUTROPENIA: ICD-10-CM

## 2023-11-29 PROCEDURE — 99214 OFFICE O/P EST MOD 30 MIN: CPT | Performed by: INTERNAL MEDICINE

## 2023-11-29 NOTE — PROGRESS NOTES
Ernestine Villatoro  1950  [facility]AT[facility] [id number]    Chief Complaint   Patient presents with    Follow-up     Assessment/plan:   1. 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 Patient notes developing hematuria which prompted further work-up. Imaging showed bladder mass. Cystoscopy with TURBT showed urothelial cancer with neuroendocrine differentiation. Patient with localized disease with mixed histology neuroendocrine/urothelial bladder carcinoma. He has a dominant neuroendocrine component. We discussed that this is an aggressive cancer. He has completed 4 cycles of neoadjuvant chemotherapy with cisplatin and etoposide. Posttreatment PET/CT scan with no evidence of distant metastatic disease. At this juncture, he will follow with you plan for consideration of cystectomy. He has an upcoming appointment with urology and medical oncology (Dr Doug Casiano) at [facility] next week. He will follow-up at [facility] on an as-needed basis. 2. 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 Patient referred to GI by PCP. Plans EUS. 3. Atrial fibrillation  On eliquis. Management per PCP. 4.  Ascending thoracic aorta aneurysm  Patient states this is chronic. Defer management to PCP. Recommendations consistent with NCCN guidelines. Oncology history:   Primary Diagnosis:  1.  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     - Tumor invades muscularis propria (detrusor muscle). - Lymphovascular invasion by tumor present. On immunostaining the areas of neuroendocrine differentiation are positive for synaptophysin, CD56, chromogranin, and TTF-1 with a Ki-67 proliferation rate of >80%. The urothelial carcinomatous component is negative for the aforementioned immunostains but positive for uroplakin and GATA3 with a Ki-67 proliferation rate of 30%. Intradepartmental consultation is in agreement. Radiology:  4/26/2023: CT renal protocol:  1. New bladder lesion. Cystoscopy is advised. 2.  New 2.7 cm pancreatic cyst. For simple cyst(s) 2 cm or greater recommend gastroenterology and/or surgical oncology consult. EUS is likely now warranted. 8/1/2023: CT abdomen/pelvis:  Partially distended urinary bladder around Moralez catheter with a small amount of hemorrhagic products around the tip of the catheter. Urinary bladder wall thickening and surrounding inflammatory stranding, likely postprocedural.   Stable size of 2.7 cm uncinate process cystic lesion compared to 4/26/2023, likely representing IPMN. For simple cyst(s) 2 cm or greater recommend gastroenterology and/or surgical oncology consult. EUS is likely now warranted. 8/1/2023: TURBT and gemcitabine instillation by urology (Dr. Marely Daniels).     A.  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     - Tumor invades muscularis propria (detrusor muscle). - Lymphovascular invasion by tumor present. *On immunostaining the areas of neuroendocrine differentiation are positive for synaptophysin, CD56, chromogranin, and TTF-1 with a Ki-67 proliferation rate of >80%. The urothelial carcinomatous component is negative for the aforementioned immunostains but positive for uroplakin and GATA3 with a Ki-67 proliferation rate of 30%. 8/21/2023: PET CT scan:  1. Physiologic uptake within the urinary bladder limits assessment for local bladder malignancy. However, there is no FDG avid local-regional lymphadenopathy or distant metastatic disease. 2. Stable fusiform aneurysm of the ascending thoracic aorta measuring up to 48 mm allowing for differences in imaging and measurement technique. 11/27/2023: PET CT scan:  1. No evidence of FDG avid metastatic disease within the neck, chest, upper abdomen, or skeleton  2. No evidence of FDG avid pelvic adenopathy. Assessment of bladder limited by intense urine activity. Bladder wall thickening with bladder dome diverticulum and mild prostatomegaly. 3. Diffusely increased skeletal activity likely related to interval chemotherapy/marrow rebound  4. Pancreatic uncinate process cystic neoplasm similar in size to recent studies, not FDG avid    Laboratory:  7/18/2023: WBC: 5.3, H/H: 16/46.4, MCV: 90, platelets: 183. AST/ALT: 18/29. Creatinine: 1.06  9/11/2023: WBC: 6.3, H/H: 15.4/45.7, MCV: 92, platelets: 766. AST/ALT: 20/25. Creatinine: 1.00    History of present illness:   Sangeetha May is a 70-year-old male with past medical history significant for HTN, atrial fibrillation, hypothyroidism and hyperlipidemia. Patient notes developing hematuria which prompted further work-up. Imaging showed bladder mass. Cystoscopy with TURBT showed urothelial cancer with neuroendocrine differentiation. He underwent a PET CT scan which did not show distant disease.   He went for second opinion at [facility] and [facility]. He was recommended neoadjuvant chemotherapy with cisplatin/etoposide x 4 cycles. He completed his fourth cycle of treatment on 11/16/2023. He tolerated treatment without any adverse effects. Posttreatment PET/CT scan on 11/27/2023 with no evidence of distant metastatic disease or pelvic adenopathy. Assessment of bladder was limited by intense urine activity. Patient has an upcoming appointment at [facility] next week. He is accompanied by his wife for visit today. Offers no new complaints. Review of systems:   Review of Systems   Constitutional:  Positive for fatigue. Negative for chills and fever. Eyes:  Negative for pain and visual disturbance. Respiratory:  Negative for cough and shortness of breath. Cardiovascular:  Negative for chest pain and palpitations. Gastrointestinal:  Negative for abdominal pain and vomiting. Genitourinary:  Negative for dysuria and hematuria. Musculoskeletal:  Negative for arthralgias and back pain. Skin:  Negative for color change and rash. Neurological:  Negative for seizures and syncope. All other systems reviewed and are negative.     Patient Active Problem List   Diagnosis    Aneurysm of thoracic aorta (HCC)    Essential hypertension    Atrial fibrillation, persistent (HCC)    Iliac artery aneurysm, bilateral (HCC)    Acquired hypothyroidism    Neuropathy    Iliac artery aneurysm, right ([address])    Encounter for Medicare annual wellness exam    Other headache syndrome    Rash    Mixed hyperlipidemia    Chronic bilateral low back pain without sciatica    NSVT (nonsustained ventricular tachycardia) (HCC)    Hepatic artery dissection (HCC)    Vitamin D deficiency    Prediabetes    Gross hematuria    Bladder mass    Malfunction of Moralez catheter (HCC)    Pancreatic mass    Malignant neoplasm of posterior wall of urinary bladder (HCC)    Closed fracture of posterior malleolus of left tibia    Pain, joint, ankle and foot, left Chemotherapy induced neutropenia      Past Medical History:   Diagnosis Date    Acute blood loss anemia 3/8/2019    Aneurysm of thoracic aorta ([address])     Arrhythmia     Cholecystitis 3/5/2019    Added automatically from request for surgery [id number]    Detached retina, right     Disease of thyroid gland     Gastric wall thickening     Headache     Hematoma 10/10/2018    Hypertension     Iliac artery aneurysm, bilateral (HCC)     Irregular heart beat     afib cardioversion 17, x2     Neuropathy     bilateral feet    Paroxysmal A-fib [facility])     Prostatic hypertrophy     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[facility])      Past Surgical History:   Procedure Laterality Date    ABDOMINAL AORTIC ANEURYSM REPAIR, ENDOVASCULAR Right 2018    Procedure: REPAIR ANEURYSM ILIAC endovascular  with coil embolization right hypogastric artery.;  Surgeon: Teodoro Mccabe MD;  Location: [facility];  Service: Vascular    CARDIOVERSION      CATARACT EXTRACTION Left 10/20/2019    Right eye 2011    CHOLECYSTECTOMY      CHOLECYSTECTOMY LAPAROSCOPIC N/A 2019    Procedure: [id number] CHOLECYSTECTOMY;  Surgeon: Christian Tellez DO;  Location: [facility];  Service: General    COLONOSCOPY  2016    MOLE EXCISION      OR CYSTOURETHROSCOPY W/DEST &/RMVL TUMOR LARGE N/A 2023    Procedure: TURBT, gemcitabine instillation;  Surgeon: Albert Dumont MD;  Location: [facility];  Service: Urology    OR EDG US [address] N/A 11/10/2017    Procedure: RADIAL ENDOSCOPIC U/S;  Surgeon: Gerard Pena MD;  Location: [facility];   Service: Gastroenterology    RETINAL DETACHMENT SURGERY Right     onset , retinal detachment complete air fill confirmed     Family History   Problem Relation Age of Onset    Stroke Mother          at 80, failure to thrive    Aortic aneurysm Father         abdominal    Aortic aneurysm Brother     Basal cell carcinoma Brother     Schizophrenia Brother     Cancer Maternal Grandmother         smoker, heart attack, cancer    Cancer Maternal Grandfather         smoker cancer    Cancer Paternal Grandmother         malignant neoplasm    Cancer Paternal Grandfather         malignant neoplasm    Cancer Family         malignant neoplasm    Cancer Family         malignant neoplasm    Cancer Maternal Uncle         smoker, lots wrong with her    Cancer Paternal Uncle         melanoma at 80     Social History     Socioeconomic History    Marital status: /Civil Union     Spouse name: Not on file    Number of children: Not on file    Years of education: Not on file    Highest education level: Not on file   Occupational History    Not on file   Tobacco Use    Smoking status: Never    Smokeless tobacco: Never   Vaping Use    Vaping Use: Never used   Substance and Sexual Activity    Alcohol use: Yes     Alcohol/week: 5.0 standard drinks of alcohol     Types: 2 Glasses of wine, 1 Cans of beer, 2 Standard drinks or equivalent per week     Comment: 3-4 x week    Drug use: Never    Sexual activity: Not Currently   Other Topics Concern    Not on file   Social History Narrative    Not on file     Social Determinants of Health     Financial Resource Strain: Low Risk  (12/12/2022)    Overall Financial Resource Strain (CARDIA)     Difficulty of Paying Living Expenses: Not hard at all   Food Insecurity: Not on file   Transportation Needs: No Transportation Needs (12/12/2022)    PRAPARE - Transportation     Lack of Transportation (Medical): No     Lack of Transportation (Non-Medical):  No   Physical Activity: Not on file   Stress: Not on file   Social Connections: Not on file   Intimate Partner Violence: Not on file   Housing Stability: Not on file       Current Outpatient Medications:     acetaminophen (TYLENOL) 325 mg tablet, Take 2 tablets (650 mg total) by mouth every 6 (six) hours as needed for mild pain or fever, Disp: 30 tablet, Rfl: 0    aspirin (ECOTRIN LOW STRENGTH) 81 mg EC tablet, Take 81 mg by mouth daily, Disp: , Rfl:     atorvastatin (LIPITOR) 20 mg tablet, TAKE 1 TABLET DAILY, Disp: 90 tablet, Rfl: 3    cholecalciferol (VITAMIN D3) 1,000 units tablet, Take 1,000 Units by mouth daily, Disp: , Rfl:     Co-Enzyme Q-10 100 MG CAPS, Take 100 mg by mouth daily, Disp: , Rfl:     Eliquis 5 MG, TAKE 1 TABLET TWICE A DAY (Patient taking differently: 5 mg 2 (two) times a day The patient is taking 0.5 tablet two times a day. On 8/10/23 he will resume full dose), Disp: 180 tablet, Rfl: 3    felodipine (PLENDIL) 5 mg 24 hr tablet, TAKE 1 TABLET DAILY, Disp: 90 tablet, Rfl: 1    furosemide (LASIX) 20 mg tablet, Take 1 tablet (20 mg total) by mouth daily, Disp: 30 tablet, Rfl: 0    hydrocortisone 2.5 % cream, Apply 1 application. topically 2 (two) times a day To affected area as needed, Disp: , Rfl:     ketoconazole (NIZORAL) 2 % cream, As needed, Disp: , Rfl:     levothyroxine 75 mcg tablet, TAKE 1 TABLET DAILY FOR    ACQUIRED HYPOTHYROIDISM, Disp: 90 tablet, Rfl: 1    metoprolol succinate (TOPROL-XL) 25 mg 24 hr tablet, TAKE 1 TABLET TWICE A DAY, Disp: 180 tablet, Rfl: 3    Misc Natural Products (LUTEIN 20 PO), Take 20 mg by mouth daily. , Disp: , Rfl:     multivitamin (THERAGRAN) TABS, Take 1 tablet by mouth daily. , Disp: , Rfl:     ondansetron (ZOFRAN) 4 mg tablet, Take 1 tablet (4 mg total) by mouth every 8 (eight) hours as needed for nausea or vomiting, Disp: 20 tablet, Rfl: 1    Saw Palmetto 450 MG CAPS, , Disp: , Rfl:     Sodium Fluoride 5000 PPM 1.1 % PSTE, USE TWICE A DAY IN PLACE OF REGULAR TOOTHPASTE UNLESS TOLD OTHERWISE, Disp: , Rfl:     tamsulosin (FLOMAX) 0.4 mg, Take 1 capsule (0.4 mg total) by mouth daily with dinner, Disp: 90 capsule, Rfl: 3    gabapentin (NEURONTIN) 100 mg capsule, Take 1 capsule (100 mg total) by mouth 2 (two) times a day, Disp: 60 capsule, Rfl: 0  Allergies   Allergen Reactions    Wound Dressing Adhesive Blisters     Vitals:    11/29/23 1552   BP: 128/64   Pulse: 58   Resp: 18 Temp: 97.9 °F (36.6 °C)   SpO2: 97%     Wt Readings from Last 3 Encounters:   11/29/23 103 kg (228 lb)   11/16/23 108 kg (237 lb)   11/15/23 107 kg (235 lb 8 oz)     Performance Status: ECOG PS 0-1  Physical Exam  Vitals reviewed. Constitutional:       General: He is not in acute distress. Appearance: Normal appearance. HENT:      Head: Normocephalic and atraumatic. Mouth/Throat:      Mouth: Mucous membranes are moist.   Eyes:      Extraocular Movements: Extraocular movements intact. Conjunctiva/sclera: Conjunctivae normal.   Cardiovascular:      Rate and Rhythm: Normal rate. Pulmonary:      Effort: Pulmonary effort is normal. No respiratory distress. Abdominal:      General: Abdomen is flat. There is no distension. Musculoskeletal:      Cervical back: Normal range of motion. Right lower leg: No edema. Left lower leg: No edema. Skin:     General: Skin is warm. Coloration: Skin is not jaundiced. Neurological:      General: No focal deficit present. Mental Status: He is alert and oriented to person, place, and time. Mental status is at baseline. Gait: Gait normal.   Psychiatric:         Thought Content:  Thought content normal.       Labs:  Hospital Outpatient Visit on 11/27/2023   Component Date Value Ref Range Status    POC Glucose 11/27/2023 89  65 - 140 mg/dl Final   Appointment on 11/13/2023   Component Date Value Ref Range Status    WBC 11/13/2023 8.86  4.31 - 10.16 Thousand/uL Final    RBC 11/13/2023 3.08 (L)  3.88 - 5.62 Million/uL Final    Hemoglobin 11/13/2023 10.0 (L)  12.0 - 17.0 g/dL Final    Hematocrit 11/13/2023 30.0 (L)  36.5 - 49.3 % Final    MCV 11/13/2023 97  82 - 98 fL Final    MCH 11/13/2023 32.5  26.8 - 34.3 pg Final    MCHC 11/13/2023 33.3  31.4 - 37.4 g/dL Final    RDW 11/13/2023 18.6 (H)  11.6 - 15.1 % Final    MPV 11/13/2023 9.7  8.9 - 12.7 fL Final    Platelets 64/41/3235 258  149 - 390 Thousands/uL Final    nRBC 11/13/2023 0  /100 WBCs Final    Neutrophils Relative 11/13/2023 77 (H)  43 - 75 % Final    Immat GRANS % 11/13/2023 1  0 - 2 % Final    Lymphocytes Relative 11/13/2023 11 (L)  14 - 44 % Final    Monocytes Relative 11/13/2023 9  4 - 12 % Final    Eosinophils Relative 11/13/2023 1  0 - 6 % Final    Basophils Relative 11/13/2023 1  0 - 1 % Final    Neutrophils Absolute 11/13/2023 6.83  1.85 - 7.62 Thousands/µL Final    Immature Grans Absolute 11/13/2023 0.11  0.00 - 0.20 Thousand/uL Final    Lymphocytes Absolute 11/13/2023 0.96  0.60 - 4.47 Thousands/µL Final    Monocytes Absolute 11/13/2023 0.83  0.17 - 1.22 Thousand/µL Final    Eosinophils Absolute 11/13/2023 0.09  0.00 - 0.61 Thousand/µL Final    Basophils Absolute 11/13/2023 0.04  0.00 - 0.10 Thousands/µL Final    Sodium 11/13/2023 141  135 - 147 mmol/L Final    Potassium 11/13/2023 4.8  3.5 - 5.3 mmol/L Final    Chloride 11/13/2023 110 (H)  96 - 108 mmol/L Final    CO2 11/13/2023 26  21 - 32 mmol/L Final    ANION GAP 11/13/2023 5  mmol/L Final    BUN 11/13/2023 21  5 - 25 mg/dL Final    Creatinine 11/13/2023 0.96  0.60 - 1.30 mg/dL Final    Standardized to IDMS reference method    Glucose, Fasting 11/13/2023 111 (H)  65 - 99 mg/dL Final    Calcium 11/13/2023 8.7  8.4 - 10.2 mg/dL Final    AST 11/13/2023 12 (L)  13 - 39 U/L Final    ALT 11/13/2023 12  7 - 52 U/L Final    Specimen collection should occur prior to Sulfasalazine administration due to the potential for falsely depressed results. Alkaline Phosphatase 11/13/2023 117 (H)  34 - 104 U/L Final    Total Protein 11/13/2023 5.9 (L)  6.4 - 8.4 g/dL Final    Albumin 11/13/2023 4.1  3.5 - 5.0 g/dL Final    Total Bilirubin 11/13/2023 0.24  0.20 - 1.00 mg/dL Final    Use of this assay is not recommended for patients undergoing treatment with eltrombopag due to the potential for falsely elevated results.   N-acetyl-p-benzoquinone imine (metabolite of Acetaminophen) will generate erroneously low results in samples for patients that have taken an overdose of Acetaminophen. eGFR 11/13/2023 78  ml/min/1.73sq m Final     Return if symptoms worsen or fail to improve.

## 2023-12-01 ENCOUNTER — TRANSCRIBE ORDERS (OUTPATIENT)
Dept: CARDIAC SURGERY | Facility: CLINIC | Age: 73
End: 2023-12-01

## 2023-12-01 DIAGNOSIS — I71.21 ANEURYSM OF ASCENDING AORTA WITHOUT RUPTURE (HCC): Primary | ICD-10-CM

## 2023-12-12 ENCOUNTER — RA CDI HCC (OUTPATIENT)
Dept: OTHER | Facility: HOSPITAL | Age: 73
End: 2023-12-12

## 2023-12-20 ENCOUNTER — OFFICE VISIT (OUTPATIENT)
Dept: INTERNAL MEDICINE CLINIC | Facility: CLINIC | Age: 73
End: 2023-12-20
Payer: MEDICARE

## 2023-12-20 VITALS
WEIGHT: 229.4 LBS | BODY MASS INDEX: 31.07 KG/M2 | HEIGHT: 72 IN | SYSTOLIC BLOOD PRESSURE: 135 MMHG | OXYGEN SATURATION: 95 % | HEART RATE: 61 BPM | DIASTOLIC BLOOD PRESSURE: 71 MMHG

## 2023-12-20 DIAGNOSIS — Z00.00 MEDICARE ANNUAL WELLNESS VISIT, SUBSEQUENT: Primary | ICD-10-CM

## 2023-12-20 DIAGNOSIS — R42 DIZZINESS: ICD-10-CM

## 2023-12-20 PROCEDURE — 99213 OFFICE O/P EST LOW 20 MIN: CPT | Performed by: INTERNAL MEDICINE

## 2023-12-20 PROCEDURE — G0439 PPPS, SUBSEQ VISIT: HCPCS | Performed by: INTERNAL MEDICINE

## 2023-12-20 NOTE — PATIENT INSTRUCTIONS
Problem List Items Addressed This Visit          Other    Medicare annual wellness visit, subsequent - Primary     Discussed preventative health, cancer screening, immunizations, and safety issues.  Last colonoscopy August 9, 2021 with recommendations to recheck again in 5 years.  I recommend RSV vaccine at the pharmacy.  I recommend Prevnar 20.           Dizziness     Discussed Epley maneuvers in case symptoms were to recur, also discussed physical therapy as an option                Medicare Preventive Visit Patient Instructions  Thank you for completing your Welcome to Medicare Visit or Medicare Annual Wellness Visit today. Your next wellness visit will be due in one year (12/20/2024).  The screening/preventive services that you may require over the next 5-10 years are detailed below. Some tests may not apply to you based off risk factors and/or age. Screening tests ordered at today's visit but not completed yet may show as past due. Also, please note that scanned in results may not display below.  Preventive Screenings:  Service Recommendations Previous Testing/Comments   Colorectal Cancer Screening  Colonoscopy    Fecal Occult Blood Test (FOBT)/Fecal Immunochemical Test (FIT)  Fecal DNA/Cologuard Test  Flexible Sigmoidoscopy Age: 45-75 years old   Colonoscopy: every 10 years (May be performed more frequently if at higher risk)  OR  FOBT/FIT: every 1 year  OR  Cologuard: every 3 years  OR  Sigmoidoscopy: every 5 years  Screening may be recommended earlier than age 45 if at higher risk for colorectal cancer. Also, an individualized decision between you and your healthcare provider will decide whether screening between the ages of 76-85 would be appropriate. Colonoscopy: 08/09/2021  FOBT/FIT: Not on file  Cologuard: Not on file  Sigmoidoscopy: Not on file          Prostate Cancer Screening Individualized decision between patient and health care provider in men between ages of 55-69   Medicare will cover every 12  months beginning on the day after your 50th birthday PSA: 1.9 ng/mL           Hepatitis C Screening Once for adults born between 1945 and 1965  More frequently in patients at high risk for Hepatitis C Hep C Antibody: 11/30/2017        Diabetes Screening 1-2 times per year if you're at risk for diabetes or have pre-diabetes Fasting glucose: 111 mg/dL (11/13/2023)  A1C: 5.7 % (12/8/2022)      Cholesterol Screening Once every 5 years if you don't have a lipid disorder. May order more often based on risk factors. Lipid panel: 12/08/2022         Other Preventive Screenings Covered by Medicare:  Abdominal Aortic Aneurysm (AAA) Screening: covered once if your at risk. You're considered to be at risk if you have a family history of AAA or a male between the age of 65-75 who smoking at least 100 cigarettes in your lifetime.  Lung Cancer Screening: covers low dose CT scan once per year if you meet all of the following conditions: (1) Age 55-77; (2) No signs or symptoms of lung cancer; (3) Current smoker or have quit smoking within the last 15 years; (4) You have a tobacco smoking history of at least 20 pack years (packs per day x number of years you smoked); (5) You get a written order from a healthcare provider.  Glaucoma Screening: covered annually if you're considered high risk: (1) You have diabetes OR (2) Family history of glaucoma OR (3)  aged 50 and older OR (4)  American aged 65 and older  Osteoporosis Screening: covered every 2 years if you meet one of the following conditions: (1) Have a vertebral abnormality; (2) On glucocorticoid therapy for more than 3 months; (3) Have primary hyperparathyroidism; (4) On osteoporosis medications and need to assess response to drug therapy.  HIV Screening: covered annually if you're between the age of 15-65. Also covered annually if you are younger than 15 and older than 65 with risk factors for HIV infection. For pregnant patients, it is covered up to 3  times per pregnancy.    Immunizations:  Immunization Recommendations   Influenza Vaccine Annual influenza vaccination during flu season is recommended for all persons aged >= 6 months who do not have contraindications   Pneumococcal Vaccine   * Pneumococcal conjugate vaccine = PCV13 (Prevnar 13), PCV15 (Vaxneuvance), PCV20 (Prevnar 20)  * Pneumococcal polysaccharide vaccine = PPSV23 (Pneumovax) Adults 19-63 yo with certain risk factors or if 65+ yo  If never received any pneumonia vaccine: recommend Prevnar 20 (PCV20)  Give PCV20 if previously received 1 dose of PCV13 or PPSV23   Hepatitis B Vaccine 3 dose series if at intermediate or high risk (ex: diabetes, end stage renal disease, liver disease)   Respiratory syncytial virus (RSV) Vaccine - COVERED BY MEDICARE PART D  * RSVPreF3 (Arexvy) CDC recommends that adults 60 years of age and older may receive a single dose of RSV vaccine using shared clinical decision-making (SCDM)   Tetanus (Td) Vaccine - COST NOT COVERED BY MEDICARE PART B Following completion of primary series, a booster dose should be given every 10 years to maintain immunity against tetanus. Td may also be given as tetanus wound prophylaxis.   Tdap Vaccine - COST NOT COVERED BY MEDICARE PART B Recommended at least once for all adults. For pregnant patients, recommended with each pregnancy.   Shingles Vaccine (Shingrix) - COST NOT COVERED BY MEDICARE PART B  2 shot series recommended in those 19 years and older who have or will have weakened immune systems or those 50 years and older     Health Maintenance Due:      Topic Date Due    Colorectal Cancer Screening  08/09/2026    Hepatitis C Screening  Completed     Immunizations Due:      Topic Date Due    Influenza Vaccine (1) 09/01/2023    COVID-19 Vaccine (4 - 2023-24 season) 09/01/2023     Advance Directives   What are advance directives?  Advance directives are legal documents that state your wishes and plans for medical care. These plans are made  ahead of time in case you lose your ability to make decisions for yourself. Advance directives can apply to any medical decision, such as the treatments you want, and if you want to donate organs.   What are the types of advance directives?  There are many types of advance directives, and each state has rules about how to use them. You may choose a combination of any of the following:  Living will:  This is a written record of the treatment you want. You can also choose which treatments you do not want, which to limit, and which to stop at a certain time. This includes surgery, medicine, IV fluid, and tube feedings.   Durable power of  for healthcare (DPAHC):  This is a written record that states who you want to make healthcare choices for you when you are unable to make them for yourself. This person, called a proxy, is usually a family member or a friend. You may choose more than 1 proxy.  Do not resuscitate (DNR) order:  A DNR order is used in case your heart stops beating or you stop breathing. It is a request not to have certain forms of treatment, such as CPR. A DNR order may be included in other types of advance directives.  Medical directive:  This covers the care that you want if you are in a coma, near death, or unable to make decisions for yourself. You can list the treatments you want for each condition. Treatment may include pain medicine, surgery, blood transfusions, dialysis, IV or tube feedings, and a ventilator (breathing machine).  Values history:  This document has questions about your views, beliefs, and how you feel and think about life. This information can help others choose the care that you would choose.  Why are advance directives important?  An advance directive helps you control your care. Although spoken wishes may be used, it is better to have your wishes written down. Spoken wishes can be misunderstood, or not followed. Treatments may be given even if you do not want them. An  advance directive may make it easier for your family to make difficult choices about your care.   Weight Management   Why it is important to manage your weight:  Being overweight increases your risk of health conditions such as heart disease, high blood pressure, type 2 diabetes, and certain types of cancer. It can also increase your risk for osteoarthritis, sleep apnea, and other respiratory problems. Aim for a slow, steady weight loss. Even a small amount of weight loss can lower your risk of health problems.  How to lose weight safely:  A safe and healthy way to lose weight is to eat fewer calories and get regular exercise. You can lose up about 1 pound a week by decreasing the number of calories you eat by 500 calories each day.   Healthy meal plan for weight management:  A healthy meal plan includes a variety of foods, contains fewer calories, and helps you stay healthy. A healthy meal plan includes the following:  Eat whole-grain foods more often.  A healthy meal plan should contain fiber. Fiber is the part of grains, fruits, and vegetables that is not broken down by your body. Whole-grain foods are healthy and provide extra fiber in your diet. Some examples of whole-grain foods are whole-wheat breads and pastas, oatmeal, brown rice, and bulgur.  Eat a variety of vegetables every day.  Include dark, leafy greens such as spinach, kale, danielle greens, and mustard greens. Eat yellow and orange vegetables such as carrots, sweet potatoes, and winter squash.   Eat a variety of fruits every day.  Choose fresh or canned fruit (canned in its own juice or light syrup) instead of juice. Fruit juice has very little or no fiber.  Eat low-fat dairy foods.  Drink fat-free (skim) milk or 1% milk. Eat fat-free yogurt and low-fat cottage cheese. Try low-fat cheeses such as mozzarella and other reduced-fat cheeses.  Choose meat and other protein foods that are low in fat.  Choose beans or other legumes such as split peas or  lentils. Choose fish, skinless poultry (chicken or turkey), or lean cuts of red meat (beef or pork). Before you cook meat or poultry, cut off any visible fat.   Use less fat and oil.  Try baking foods instead of frying them. Add less fat, such as margarine, sour cream, regular salad dressing and mayonnaise to foods. Eat fewer high-fat foods. Some examples of high-fat foods include french fries, doughnuts, ice cream, and cakes.  Eat fewer sweets.  Limit foods and drinks that are high in sugar. This includes candy, cookies, regular soda, and sweetened drinks.  Exercise:  Exercise at least 30 minutes per day on most days of the week. Some examples of exercise include walking, biking, dancing, and swimming. You can also fit in more physical activity by taking the stairs instead of the elevator or parking farther away from stores. Ask your healthcare provider about the best exercise plan for you.      © Copyright [employer] 2018 Information is for End User's use only and may not be sold, redistributed or otherwise used for commercial purposes. All illustrations and images included in CareNotes® are the copyrighted property of [employer] or [employer]

## 2023-12-20 NOTE — ASSESSMENT & PLAN NOTE
Discussed Epley maneuvers in case symptoms were to recur, also discussed physical therapy as an option

## 2023-12-20 NOTE — PROGRESS NOTES
Assessment and Plan:     Problem List Items Addressed This Visit        Other    Medicare annual wellness visit, subsequent - Primary     Discussed preventative health, cancer screening, immunizations, and safety issues.  Last colonoscopy August 9, 2021 with recommendations to recheck again in 5 years.  I recommend RSV vaccine at the pharmacy.  I recommend Prevnar 20.           Dizziness     Discussed Epley maneuvers in case symptoms were to recur, also discussed physical therapy as an option              Depression Screening and Follow-up Plan: Patient was screened for depression during today's encounter. They screened negative with a PHQ-2 score of 2.      Preventive health issues were discussed with patient, and age appropriate screening tests were ordered as noted in patient's After Visit Summary.  Personalized health advice and appropriate referrals for health education or preventive services given if needed, as noted in patient's After Visit Summary.     History of Present Illness:     Patient presents for a Medicare Wellness Visit    Pt here for AWV concerned about an episode of dizziness this fall       Patient Care Team:  Melecio Giraldo MD as PCP - General  MD Paco Vergara DO Stephen Olenchock, DO Jay Barry Fisher, MD David Shields, MD Steven Stevens, MD Emily Knauss, MA as Care Coordinator (Oncology)  Dulce Contreras as  (Oncology)     Review of Systems:     Review of Systems   HENT:  Negative for ear pain.    Gastrointestinal:  Negative for nausea and vomiting.   Neurological:  Positive for dizziness (a week after a chemo session this fall). Negative for headaches.        Problem List:     Patient Active Problem List   Diagnosis   • Aneurysm of thoracic aorta (HCC)   • Essential hypertension   • Atrial fibrillation, persistent (HCC)   • Iliac artery aneurysm, bilateral (HCC)   • Acquired hypothyroidism   • Neuropathy   • Iliac artery aneurysm, right (HCC)   •  Medicare annual wellness visit, subsequent   • Other headache syndrome   • Rash   • Mixed hyperlipidemia   • Chronic bilateral low back pain without sciatica   • NSVT (nonsustained ventricular tachycardia) (HCC)   • Hepatic artery dissection (HCC)   • Vitamin D deficiency   • Prediabetes   • Gross hematuria   • Bladder mass   • Malfunction of Moralez catheter (HCC)   • Pancreatic mass   • Malignant neoplasm of posterior wall of urinary bladder (HCC)   • Closed fracture of posterior malleolus of left tibia   • Pain, joint, ankle and foot, left   • Chemotherapy induced neutropenia    • Dizziness      Past Medical and Surgical History:     Past Medical History:   Diagnosis Date   • Acute blood loss anemia 3/8/2019   • Aneurysm of thoracic aorta (HCC)    • Arrhythmia    • Cholecystitis 3/5/2019    Added automatically from request for surgery [id number]   • Detached retina, right    • Disease of thyroid gland    • Gastric wall thickening    • Headache    • Hematoma 10/10/2018   • Hypertension    • Iliac artery aneurysm, bilateral (HCC)    • Irregular heart beat     afib cardioversion 1/30/17, x2    • Neuropathy     bilateral feet   • Paroxysmal A-fib (HCC)    • Prostatic hypertrophy    • 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(HCC)      Past Surgical History:   Procedure Laterality Date   • ABDOMINAL AORTIC ANEURYSM REPAIR, ENDOVASCULAR Right 04/13/2018    Procedure: REPAIR ANEURYSM ILIAC endovascular  with coil embolization right hypogastric artery.;  Surgeon: Guille Esquivel MD;  Location: [facility];  Service: Vascular   • BLADDER CANCER BY FISH (HISTORICAL)     • CARDIOVERSION     • CATARACT EXTRACTION Left 10/20/2019    Right eye 11/17/2011   • CHOLECYSTECTOMY     • CHOLECYSTECTOMY LAPAROSCOPIC N/A 03/08/2019    Procedure: [id number] CHOLECYSTECTOMY;  Surgeon: Derik Case DO;  Location: [facility];  Service: General   • COLONOSCOPY  07/13/2016   • MOLE EXCISION  2021   • NJ CYSTOURETHROSCOPY W/DEST &/RMVL TUMOR LARGE  N/A 2023    Procedure: TURBT, gemcitabine instillation;  Surgeon: Juvenal Cruz MD;  Location: [facility];  Service: Urology   • SD EDG US EXAM SURGICAL ALTER STOM DUODENUM/JEJUNUM N/A 11/10/2017    Procedure: RADIAL ENDOSCOPIC U/S;  Surgeon: Harvey Gallegos MD;  Location: [facility];  Service: Gastroenterology   • RETINAL DETACHMENT SURGERY Right     onset , retinal detachment complete air fill confirmed      Family History:     Family History   Problem Relation Age of Onset   • Stroke Mother          at 91, failure to thrive   • Aortic aneurysm Father         abdominal   • Aortic aneurysm Brother    • Basal cell carcinoma Brother    • Schizophrenia Brother    • Cancer Maternal Grandmother         smoker, heart attack, cancer   • Cancer Maternal Grandfather         smoker cancer   • Cancer Paternal Grandmother         malignant neoplasm   • Cancer Paternal Grandfather         malignant neoplasm   • Cancer Family         malignant neoplasm   • Cancer Family         malignant neoplasm   • Cancer Maternal Uncle         smoker, lots wrong with her   • Cancer Paternal Uncle         melanoma at 92      Social History:     Social History     Socioeconomic History   • Marital status: /Civil Union     Spouse name: None   • Number of children: None   • Years of education: None   • Highest education level: None   Occupational History   • None   Tobacco Use   • Smoking status: Never   • Smokeless tobacco: Never   Vaping Use   • Vaping status: Never Used   Substance and Sexual Activity   • Alcohol use: Yes     Alcohol/week: 5.0 standard drinks of alcohol     Types: 2 Glasses of wine, 1 Cans of beer, 2 Standard drinks or equivalent per week     Comment: 3-4 x week   • Drug use: Never   • Sexual activity: Not Currently   Other Topics Concern   • None   Social History Narrative   • None     Social Determinants of Health     Financial Resource Strain: Low Risk  (2023)    Overall Financial Resource Strain  (CARDIA)    • Difficulty of Paying Living Expenses: Not hard at all   Food Insecurity: Not on file   Transportation Needs: No Transportation Needs (12/19/2023)    PRAPARE - Transportation    • Lack of Transportation (Medical): No    • Lack of Transportation (Non-Medical): No   Physical Activity: Not on file   Stress: Not on file   Social Connections: Not on file   Intimate Partner Violence: Not on file   Housing Stability: Not on file      Medications and Allergies:     Current Outpatient Medications   Medication Sig Dispense Refill   • acetaminophen (TYLENOL) 325 mg tablet Take 2 tablets (650 mg total) by mouth every 6 (six) hours as needed for mild pain or fever 30 tablet 0   • aspirin (ECOTRIN LOW STRENGTH) 81 mg EC tablet Take 81 mg by mouth daily     • atorvastatin (LIPITOR) 20 mg tablet TAKE 1 TABLET DAILY 90 tablet 3   • cholecalciferol (VITAMIN D3) 1,000 units tablet Take 1,000 Units by mouth daily     • Co-Enzyme Q-10 100 MG CAPS Take 100 mg by mouth daily     • Eliquis 5 MG TAKE 1 TABLET TWICE A DAY (Patient taking differently: 5 mg 2 (two) times a day The patient is taking 0.5 tablet two times a day.  On 8/10/23 he will resume full dose) 180 tablet 3   • felodipine (PLENDIL) 5 mg 24 hr tablet TAKE 1 TABLET DAILY 90 tablet 1   • gabapentin (NEURONTIN) 100 mg capsule Take 1 capsule (100 mg total) by mouth 2 (two) times a day 60 capsule 0   • hydrocortisone 2.5 % cream Apply 1 application. topically 2 (two) times a day To affected area as needed     • ketoconazole (NIZORAL) 2 % cream As needed     • levothyroxine 75 mcg tablet TAKE 1 TABLET DAILY FOR    ACQUIRED HYPOTHYROIDISM 90 tablet 1   • metoprolol succinate (TOPROL-XL) 25 mg 24 hr tablet TAKE 1 TABLET TWICE A  tablet 3   • Misc Natural Products (LUTEIN 20 PO) Take 20 mg by mouth daily.     • multivitamin (THERAGRAN) TABS Take 1 tablet by mouth daily.     • tamsulosin (FLOMAX) 0.4 mg Take 1 capsule (0.4 mg total) by mouth daily with dinner 90  capsule 3   • furosemide (LASIX) 20 mg tablet Take 1 tablet (20 mg total) by mouth daily (Patient not taking: Reported on 12/20/2023) 30 tablet 0   • ondansetron (ZOFRAN) 4 mg tablet Take 1 tablet (4 mg total) by mouth every 8 (eight) hours as needed for nausea or vomiting (Patient not taking: Reported on 12/20/2023) 20 tablet 1   • Saw Palmetto 450 MG CAPS  (Patient not taking: Reported on 12/20/2023)     • Sodium Fluoride 5000 PPM 1.1 % PSTE USE TWICE A DAY IN PLACE OF REGULAR TOOTHPASTE UNLESS TOLD OTHERWISE (Patient not taking: Reported on 12/20/2023)       No current facility-administered medications for this visit.     Allergies   Allergen Reactions   • Wound Dressing Adhesive Blisters      Immunizations:     Immunization History   Administered Date(s) Administered   • COVID-19 MODERNA VACC 0.5 ML IM 02/16/2021, 03/23/2021, 11/09/2021   • H1N1, All Formulations 01/09/2010   • INFLUENZA 11/29/2016, 10/23/2017, 10/16/2020   • Influenza Split High Dose Preservative Free IM 11/16/2010, 09/19/2012, 09/24/2014, 09/16/2015, 11/29/2016, 10/23/2017   • Influenza, high dose seasonal 0.7 mL 10/15/2019, 11/09/2021, 10/12/2022   • Pneumococcal Conjugate 13-Valent 11/29/2016   • Pneumococcal Polysaccharide PPV23 08/01/2018   • Tdap 11/29/2016   • Zoster Vaccine Recombinant 10/16/2020, 12/28/2020   • influenza, trivalent, adjuvanted 10/16/2020      Health Maintenance:         Topic Date Due   • Colorectal Cancer Screening  08/09/2026   • Hepatitis C Screening  Completed         Topic Date Due   • Influenza Vaccine (1) 09/01/2023   • COVID-19 Vaccine (4 - 2023-24 season) 09/01/2023      Medicare Screening Tests and Risk Assessments:     [facility] is here for his Subsequent Wellness visit.     Health Risk Assessment:   Patient rates overall health as good. Patient feels that their physical health rating is slightly worse. Patient is satisfied with their life. Eyesight was rated as same. Hearing was rated as same. Patient feels  that their emotional and mental health rating is same. Patients states they are never, rarely angry. Patient states they are sometimes unusually tired/fatigued. Pain experienced in the last 7 days has been none. Patient states that he has experienced no weight loss or gain in last 6 months. lost about 15 lbs, some due to chemo side effects, but trying not to regain it.    Depression Screening:   PHQ-2 Score: 2      Fall Risk Screening:   In the past year, patient has experienced: no history of falling in past year      Home Safety:  Patient does not have trouble with stairs inside or outside of their home. Patient has working smoke alarms and has working carbon monoxide detector. Home safety hazards include: none.     Nutrition:   Current diet is Regular and Limited junk food. eat fruits ans veg; limited sweets    Medications:   Patient is currently taking over-the-counter supplements. OTC medications include: see medication list. Patient is able to manage medications.     Activities of Daily Living (ADLs)/Instrumental Activities of Daily Living (IADLs):   Walk and transfer into and out of bed and chair?: Yes  Dress and groom yourself?: Yes    Bathe or shower yourself?: Yes    Feed yourself? Yes  Do your laundry/housekeeping?: Yes  Manage your money, pay your bills and track your expenses?: Yes  Make your own meals?: Yes    Do your own shopping?: Yes    Previous Hospitalizations:   Any hospitalizations or ED visits within the last 12 months?: Yes    How many hospitalizations have you had in the last year?: 1-2    Hospitalization Comments: blood in urine let to bladder cancer diagnosis    Advance Care Planning:   Living will: Yes    Durable POA for healthcare: Yes    Advanced directive: Yes    Advanced directive counseling given: No    Five wishes given: No    Patient declined ACP directive: No      Cognitive Screening:   Provider or family/friend/caregiver concerned regarding cognition?: No    PREVENTIVE  SCREENINGS      Cardiovascular Screening:    General: Screening Not Indicated, History Lipid Disorder and Risks and Benefits Discussed    Due for: Lipid Panel      Diabetes Screening:     General: Screening Current and Risks and Benefits Discussed      Colorectal Cancer Screening:     General: Screening Current      Prostate Cancer Screening:    General: Risks and Benefits Discussed      Osteoporosis Screening:    General: Screening Not Indicated      Abdominal Aortic Aneurysm (AAA) Screening:    Risk factors include: age between 65-74 yo        General: Screening Not Indicated and History AAA      Lung Cancer Screening:     General: Screening Not Indicated      Hepatitis C Screening:    General: Screening Current    Screening, Brief Intervention, and Referral to Treatment (SBIRT)    Screening  Typical number of drinks in a day: 1  Typical number of drinks in a week: 6  Interpretation: Low risk drinking behavior.    AUDIT-C Screenin) How often did you have a drink containing alcohol in the past year? 4 or more times a week  2) How many drinks did you have on a typical day when you were drinking in the past year? 1 to 2  3) How often did you have 6 or more drinks on one occasion in the past year? never    AUDIT-C Score: 4  Interpretation: Score 4-12 (male): POSITIVE screen for alcohol misuse    AUDIT Screenin) How often during the last year have you found that you were not able to stop drinking once you had started? 0 - never  5) How often during the last year have you failed to do what was normally expected from you because of drinking? 0 - never  6) How often during the last year have you needed a first drink in the morning to get yourself going after a heavy drinking session? 0 - never  7) How often during the last year have you had a feeling of guilt or remorse after drinking? 0 - never  8) How often during the last year have you been unable to remember what happened the night before because you had  "been drinking? 0 - never  9) Have you or someone else been injured as a result of your drinking? 0 - no  10) Has a relative or friend or a doctor or another health worker been concerned about your drinking or suggested you cut down? 0 - no    AUDIT Score: 4  Interpretation: Low risk alcohol consumption    Single Item Drug Screening:  How often have you used an illegal drug (including marijuana) or a prescription medication for non-medical reasons in the past year? never    Single Item Drug Screen Score: 0  Interpretation: Negative screen for possible drug use disorder    Brief Intervention  Alcohol & drug use screenings were reviewed. No concerns regarding substance use disorder identified.     Other Counseling Topics:   Car/seat belt/driving safety, skin self-exam and sunscreen.     No results found.     Physical Exam:     /71   Pulse 61   Ht 6' 0.05\" (1.83 m)   Wt 104 kg (229 lb 6.4 oz)   SpO2 95%   BMI 31.07 kg/m²     Physical Exam  Constitutional:       General: He is not in acute distress.     Appearance: Normal appearance.   Neurological:      Mental Status: He is alert.      Gait: Gait normal.      Comments: No rotational or vertical nystagmus          Melecio Giraldo MD  "

## 2023-12-20 NOTE — ASSESSMENT & PLAN NOTE
Discussed preventative health, cancer screening, immunizations, and safety issues.  Last colonoscopy August 9, 2021 with recommendations to recheck again in 5 years.  I recommend RSV vaccine at the pharmacy.  I recommend Prevnar 20.

## 2023-12-21 DIAGNOSIS — I48.0 PAROXYSMAL ATRIAL FIBRILLATION (HCC): ICD-10-CM

## 2023-12-21 RX ORDER — APIXABAN 5 MG/1
TABLET, FILM COATED ORAL
Qty: 180 TABLET | Refills: 0 | Status: SHIPPED | OUTPATIENT
Start: 2023-12-21

## 2023-12-22 ENCOUNTER — TELEPHONE (OUTPATIENT)
Dept: GASTROENTEROLOGY | Facility: HOSPITAL | Age: 73
End: 2023-12-22

## 2023-12-22 ENCOUNTER — TELEPHONE (OUTPATIENT)
Dept: GASTROENTEROLOGY | Facility: CLINIC | Age: 73
End: 2023-12-22

## 2023-12-22 NOTE — TELEPHONE ENCOUNTER
GI lab called regarding some confusion with hold on eliquis. Note was found for 2 day eliquis hold and she will relay to pt.

## 2023-12-26 ENCOUNTER — HOSPITAL ENCOUNTER (OUTPATIENT)
Dept: GASTROENTEROLOGY | Facility: HOSPITAL | Age: 73
Setting detail: OUTPATIENT SURGERY
Discharge: HOME | End: 2023-12-26
Attending: INTERNAL MEDICINE
Payer: MEDICARE

## 2023-12-26 ENCOUNTER — ANESTHESIA EVENT (OUTPATIENT)
Dept: GASTROENTEROLOGY | Facility: HOSPITAL | Age: 73
End: 2023-12-26

## 2023-12-26 ENCOUNTER — ANESTHESIA (OUTPATIENT)
Dept: GASTROENTEROLOGY | Facility: HOSPITAL | Age: 73
End: 2023-12-26

## 2023-12-26 VITALS
WEIGHT: 229 LBS | TEMPERATURE: 97.1 F | DIASTOLIC BLOOD PRESSURE: 77 MMHG | HEART RATE: 54 BPM | BODY MASS INDEX: 31.02 KG/M2 | RESPIRATION RATE: 18 BRPM | OXYGEN SATURATION: 94 % | HEIGHT: 72 IN | SYSTOLIC BLOOD PRESSURE: 109 MMHG

## 2023-12-26 DIAGNOSIS — K86.2 PANCREATIC CYST: ICD-10-CM

## 2023-12-26 PROCEDURE — C1889 IMPLANT/INSERT DEVICE, NOC: HCPCS

## 2023-12-26 PROCEDURE — 43242 EGD US FINE NEEDLE BX/ASPIR: CPT | Performed by: INTERNAL MEDICINE

## 2023-12-26 RX ORDER — PROPOFOL 10 MG/ML
INJECTION, EMULSION INTRAVENOUS AS NEEDED
Status: DISCONTINUED | OUTPATIENT
Start: 2023-12-26 | End: 2023-12-26

## 2023-12-26 RX ORDER — LIDOCAINE HYDROCHLORIDE 20 MG/ML
INJECTION, SOLUTION EPIDURAL; INFILTRATION; INTRACAUDAL; PERINEURAL AS NEEDED
Status: DISCONTINUED | OUTPATIENT
Start: 2023-12-26 | End: 2023-12-26

## 2023-12-26 RX ORDER — PROPOFOL 10 MG/ML
INJECTION, EMULSION INTRAVENOUS CONTINUOUS PRN
Status: DISCONTINUED | OUTPATIENT
Start: 2023-12-26 | End: 2023-12-26

## 2023-12-26 RX ORDER — SODIUM CHLORIDE 9 MG/ML
INJECTION, SOLUTION INTRAVENOUS CONTINUOUS PRN
Status: DISCONTINUED | OUTPATIENT
Start: 2023-12-26 | End: 2023-12-26

## 2023-12-26 RX ADMIN — PROPOFOL 120 MCG/KG/MIN: 10 INJECTION, EMULSION INTRAVENOUS at 11:51

## 2023-12-26 RX ADMIN — PROPOFOL 30 MG: 10 INJECTION, EMULSION INTRAVENOUS at 11:57

## 2023-12-26 RX ADMIN — SODIUM CHLORIDE: 0.9 INJECTION, SOLUTION INTRAVENOUS at 11:47

## 2023-12-26 RX ADMIN — LIDOCAINE HYDROCHLORIDE 100 MG: 20 INJECTION, SOLUTION EPIDURAL; INFILTRATION; INTRACAUDAL; PERINEURAL at 11:51

## 2023-12-26 RX ADMIN — PROPOFOL 100 MG: 10 INJECTION, EMULSION INTRAVENOUS at 11:51

## 2023-12-26 NOTE — ANESTHESIA PREPROCEDURE EVALUATION
Procedure:  ENDOSCOPIC ULTRASOUND (UPPER)    Relevant Problems   CARDIO   (+) Aneurysm of thoracic aorta (HCC)   (+) Atrial fibrillation, persistent (HCC)   (+) Essential hypertension   (+) Mixed hyperlipidemia      ENDO   (+) Acquired hypothyroidism      MUSCULOSKELETAL   (+) Chronic bilateral low back pain without sciatica      NEURO/PSYCH   (+) Chronic bilateral low back pain without sciatica   (+) Other headache syndrome        Physical Exam    Airway    Mallampati score: II  TM Distance: >3 FB  Neck ROM: full     Dental   No notable dental hx     Cardiovascular  Cardiovascular exam normal    Pulmonary  Pulmonary exam normal     Other Findings        Anesthesia Plan  ASA Score- 3     Anesthesia Type- IV sedation with anesthesia with ASA Monitors.         Additional Monitors:     Airway Plan:            Plan Factors-Exercise tolerance (METS): >4 METS.    Chart reviewed. EKG reviewed.  Existing labs reviewed. Patient summary reviewed.    Patient is not a current smoker.              Induction- intravenous.    Postoperative Plan-     Informed Consent- Anesthetic plan and risks discussed with patient.  I personally reviewed this patient with the CRNA. Discussed and agreed on the Anesthesia Plan with the CRNA..

## 2023-12-26 NOTE — ANESTHESIA POSTPROCEDURE EVALUATION
Post-Op Assessment Note    CV Status:  Stable  Pain Score: 0    Pain management: adequate       Mental Status:  Alert and awake   Hydration Status:  Euvolemic   PONV Controlled:  Controlled   Airway Patency:  Patent  Two or more mitigation strategies used for obstructive sleep apnea   Post Op Vitals Reviewed: Yes      Staff: CRNA               /65 (12/26/23 1218)    Temp (!) 97.1 °F (36.2 °C) (12/26/23 1218)    Pulse 55 (12/26/23 1218)   Resp 18 (12/26/23 1218)    SpO2 96 % (12/26/23 1218)

## 2024-01-05 NOTE — TELEPHONE ENCOUNTER
Patient received copies of scanned reports in his MyChart. I do not see any result note attached. Please review/advise.

## 2024-01-05 NOTE — TELEPHONE ENCOUNTER
Regarding: Results- ENDOSCOPIC ULTRASOUND  ----- Message from Kimi Munoz sent at 1/5/2024  3:25 PM EST -----  Patient calling in to obtain results from 12/26/2023 Endoscopic ultrasound performed by Dr. Beverly. Patient can be reached at the number above, thank you.

## 2024-01-08 ENCOUNTER — TELEPHONE (OUTPATIENT)
Dept: HEMATOLOGY ONCOLOGY | Facility: CLINIC | Age: 74
End: 2024-01-08

## 2024-01-08 NOTE — TELEPHONE ENCOUNTER
I called [facility] in response to a referral that was received for patient to establish care with Surgical Oncology.     Outreach was made to schedule a consultation.    I left a voicemail explaining the reason for my call and advised patient to call [facility] at [phone].  Another attempt will be made to contact patient.

## 2024-01-09 ENCOUNTER — TELEPHONE (OUTPATIENT)
Dept: HEMATOLOGY ONCOLOGY | Facility: CLINIC | Age: 74
End: 2024-01-09

## 2024-01-09 NOTE — TELEPHONE ENCOUNTER
I called [facility] in response to a referral that was received for patient to establish care with Surgical Oncology.     Outreach was made to schedule a consultation.    Patient declined scheduling. The referral has been closed.

## 2024-01-12 ENCOUNTER — OFFICE VISIT (OUTPATIENT)
Dept: CARDIOLOGY CLINIC | Facility: CLINIC | Age: 74
End: 2024-01-12
Payer: MEDICARE

## 2024-01-12 VITALS
WEIGHT: 233.7 LBS | HEART RATE: 69 BPM | BODY MASS INDEX: 31.65 KG/M2 | HEIGHT: 72 IN | DIASTOLIC BLOOD PRESSURE: 70 MMHG | SYSTOLIC BLOOD PRESSURE: 138 MMHG

## 2024-01-12 DIAGNOSIS — I48.0 PAROXYSMAL ATRIAL FIBRILLATION (HCC): Primary | ICD-10-CM

## 2024-01-12 DIAGNOSIS — R00.0 WIDE-COMPLEX TACHYCARDIA: ICD-10-CM

## 2024-01-12 PROCEDURE — 99214 OFFICE O/P EST MOD 30 MIN: CPT | Performed by: PHYSICIAN ASSISTANT

## 2024-01-12 PROCEDURE — 93000 ELECTROCARDIOGRAM COMPLETE: CPT | Performed by: PHYSICIAN ASSISTANT

## 2024-01-12 NOTE — PROGRESS NOTES
Electrophysiology Follow Up  [facility]  [facility] [address], [place] [address]    Name: Smooth Jackson  : 1950  MRN: [id number]    ASSESSMENT/PLAN:  Permanent atrial fibrillation  Diagnosed 2017  Attempted flecainide and cardioversion unfortunately sinus rhythm was markedly bradycardic in the 40s and antiarrhythmic was discontinued  [id number]-[id number] score equals 2 (age, hypertension) to continue on anticoagulation with Eliquis  History of wide-complex tachycardia  Unable to tolerate beta-blocker secondary to sick sinus syndrome with bradycardia  Plan to repeat Holter monitor for 1 week to see if reduction in alcohol has improved these episodes.  Sick sinus syndrome  Off all rate related medications and previously declined pacemaker implantation.  No additional episodes of syncope/dizziness/lightheadedness that could be attributed to bradycardia  Hypothyroidism  Hyperlipidemia  Stage II invasive neuroendocrine, urothelial cancer of the bladder  Plan for radical cystoprostatectomy with ileal conduit urinary diversion at [facility]  Large pancreatic cyst      Patient to undergo additional Holter monitor for 1 week to help reevaluate his wide-complex tachycardia after decreasing his alcohol consumption.  For preop clearance we will send him for a nuclear stress test as well as a repeat echo to evaluate EF and his valves.      STRATIFICATION:  Cardiac: EF pending/ Hx of NSVT, Permeant AF /no history of MI  Extracardiac: Negative CKD /negative DM /no history of CVA  Functional: No shortness of breath with 2 flights of stairs and is able to walk 3 to 4 miles without difficulty, exercises 3 days a week with a cardiovascular and strength training program  Surgery: A radical cystoprostatectomy with ileal conduit urinary diversion    Based on the preliminary history (testing is pending) patient is high risk from a cardiac standpoint to undergo surgery listed above.  In  discussion with patient and his wife he is at risk for 5% major cardiovascular event during the procedure.  This restratification should be discussed with his surgeon so can be taken into account of a risk benefit analysis given his aggressive bladder cancer.    Patient has been instructed to follow up in our EP office in 1 year or sooner pending testing. He will call our office with any questions or concerns in the meantime.      CC/HPI:   Interim history: Smooth Jackson is a 73 y.o. male with past medical history of A-fib sick sinus syndrome, ascending aortic aneurysm, endovascular repair of iliac aneurysm, who presents today for overdue follow-up.  He has had persistent atrial fibrillation since 2017 and has just been maintained on metoprolol.  In the end of 2021 he underwent a Holter monitor due to concerns for A-fib with aberrancy versus NSVT.  He was last seen in our clinic in January 2022 to review his Holter results which demonstrated NSVT periods of bradycardia with prolonged pauses during sleep.  It was recommended that he undergo implantation of a permanent pacemaker to allow for up titration of beta-blocker to suppress his NSVT.  At that time he did not wish to proceed with pacemaker implantation as he was in the process of decreasing his alcohol intake.  It was recommended he undergo repeat Holter monitor after successful completion of improvement in alcohol intake to see if that helped with his ectopy.  The Holter had been ordered but never completed.  Unfortunately since we have last seen him he has been diagnosed with bladder cancer and needs to undergo a radical procedure as detailed above.  From a cardiovascular perspective he has been feeling well he does not have any symptoms with his A-fib he denies any symptoms of chest pain, lightheadedness, dizziness, shortness of breath, palpitations or heart racing.  He reports his alcohol consumption has decreased to 1 cocktail or beer 3-4 times a week.   From a functional exercise status he is able to walk 3 to 4 miles without any shortness of breath as well as up 2 flights of stairs without any difficulty.  He is going to the gym 3 days/week where he is on the recumbent bike and does a weightlifting program.  During all of these events he has no symptoms.  And previous to his chemotherapy in [place] 30 [place] without any difficulty.    EKG: Afib with frequent PVCs    Review of Systems      OBJECTIVE:   Vitals:   /70 (BP Location: Left arm, Patient Position: Sitting, Cuff Size: Standard)   Pulse 69   Ht 6' (1.829 m)   Wt 106 kg (233 lb 11.2 oz)   BMI 31.70 kg/m²   Body mass index is 31.7 kg/m².        Physical Exam:   Physical Exam  Constitutional:       General: He is not in acute distress.     Appearance: He is not ill-appearing.   HENT:      Head: Normocephalic.   Cardiovascular:      Rate and Rhythm: Normal rate and regular rhythm.      Heart sounds: No murmur heard.     No friction rub. No gallop.   Pulmonary:      Effort: Pulmonary effort is normal. No respiratory distress.      Breath sounds: No wheezing, rhonchi or rales.   Chest:      Chest wall: No tenderness.   Musculoskeletal:      Right lower leg: No edema.      Left lower leg: No edema.   Skin:     General: Skin is warm and dry.   Neurological:      Mental Status: He is alert.            Medications:      Current Outpatient Medications:     acetaminophen (TYLENOL) 325 mg tablet, Take 2 tablets (650 mg total) by mouth every 6 (six) hours as needed for mild pain or fever, Disp: 30 tablet, Rfl: 0    apixaban (Eliquis) 5 mg, Take 1 tablet (5 mg total) by mouth 2 (two) times a day, Disp: 180 tablet, Rfl: 3    aspirin (ECOTRIN LOW STRENGTH) 81 mg EC tablet, Take 81 mg by mouth daily, Disp: , Rfl:     atorvastatin (LIPITOR) 20 mg tablet, TAKE 1 TABLET DAILY, Disp: 90 tablet, Rfl: 3    cholecalciferol (VITAMIN D3) 1,000 units tablet, Take 1,000 Units  by mouth daily, Disp: , Rfl:     Co-Enzyme Q-10 100 MG CAPS, Take 100 mg by mouth daily, Disp: , Rfl:     felodipine (PLENDIL) 5 mg 24 hr tablet, TAKE 1 TABLET DAILY, Disp: 90 tablet, Rfl: 1    hydrocortisone 2.5 % cream, Apply 1 application. topically 2 (two) times a day To affected area as needed, Disp: , Rfl:     ketoconazole (NIZORAL) 2 % cream, As needed, Disp: , Rfl:     levothyroxine 75 mcg tablet, TAKE 1 TABLET DAILY FOR    ACQUIRED HYPOTHYROIDISM, Disp: 90 tablet, Rfl: 1    metoprolol succinate (TOPROL-XL) 25 mg 24 hr tablet, TAKE 1 TABLET TWICE A DAY, Disp: 180 tablet, Rfl: 3    Misc Natural Products (LUTEIN 20 PO), Take 20 mg by mouth daily., Disp: , Rfl:     multivitamin (THERAGRAN) TABS, Take 1 tablet by mouth daily., Disp: , Rfl:     tamsulosin (FLOMAX) 0.4 mg, Take 1 capsule (0.4 mg total) by mouth daily with dinner, Disp: 90 capsule, Rfl: 3    gabapentin (NEURONTIN) 100 mg capsule, Take 1 capsule (100 mg total) by mouth 2 (two) times a day, Disp: 60 capsule, Rfl: 0       Historical Information   Past Medical History:   Diagnosis Date    Acute blood loss anemia 03/08/2019    Aneurysm of thoracic aorta (HCC)     Arrhythmia     Cancer (HCC)     Cholecystitis 03/05/2019    Added automatically from request for surgery [id number]    Detached retina, right     Disease of thyroid gland     Gastric wall thickening     Headache     Hematoma 10/10/2018    Hypertension     Iliac artery aneurysm, bilateral (HCC)     Irregular heart beat     afib cardioversion 1/30/17, x2     Neuropathy     bilateral feet    Paroxysmal A-fib (HCC)     Prostatic hypertrophy     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(HCC)        Past Surgical History:   Procedure Laterality Date    ABDOMINAL AORTIC ANEURYSM REPAIR, ENDOVASCULAR Right 04/13/2018    Procedure: REPAIR ANEURYSM ILIAC endovascular  with coil embolization right hypogastric artery.;  Surgeon: Guille Esquivel MD;  Location: [facility];  Service: Vascular    BLADDER CANCER BY FISH (HISTORICAL)       CARDIOVERSION      CATARACT EXTRACTION Left 10/20/2019    Right eye 2011    CHOLECYSTECTOMY      CHOLECYSTECTOMY LAPAROSCOPIC N/A 2019    Procedure: [id number] CHOLECYSTECTOMY;  Surgeon: Derik Case DO;  Location: [facility];  Service: General    COLONOSCOPY  2016    MOLE EXCISION      WY CYSTOURETHROSCOPY W/DEST &/RMVL TUMOR LARGE N/A 2023    Procedure: TURBT, gemcitabine instillation;  Surgeon: Juvenal Cruz MD;  Location: [facility];  Service: Urology    WY EDG US EXAM SURGICAL ALTER STOM DUODENUM/JEJUNUM N/A 11/10/2017    Procedure: RADIAL ENDOSCOPIC U/S;  Surgeon: Harvey Gallegos MD;  Location: [facility];  Service: Gastroenterology    RETINAL DETACHMENT SURGERY Right     onset , retinal detachment complete air fill confirmed       Social History     Substance and Sexual Activity   Alcohol Use Yes    Alcohol/week: 5.0 standard drinks of alcohol    Types: 2 Glasses of wine, 1 Cans of beer, 2 Standard drinks or equivalent per week    Comment: 3-4 x week     Social History     Substance and Sexual Activity   Drug Use Never     Social History     Tobacco Use   Smoking Status Never   Smokeless Tobacco Never       Family History   Problem Relation Age of Onset    Stroke Mother          at 91, failure to thrive    Aortic aneurysm Father         abdominal    Aortic aneurysm Brother     Basal cell carcinoma Brother     Schizophrenia Brother     Cancer Maternal Grandmother         smoker, heart attack, cancer    Cancer Maternal Grandfather         smoker cancer    Cancer Paternal Grandmother         malignant neoplasm    Cancer Paternal Grandfather         malignant neoplasm    Cancer Family         malignant neoplasm    Cancer Family         malignant neoplasm    Cancer Maternal Uncle         smoker, lots wrong with her    Cancer Paternal Uncle         melanoma at 92         Labs & Results:  Below is the patient's most recent value for Albumin, ALT, AST, BUN,  Calcium, Chloride, Cholesterol, CO2, Creatinine, GFR, Glucose, HDL, Hematocrit, Hemoglobin, Hemoglobin A1C, LDL, Magnesium, Phosphorus, Platelets, Potassium, PSA, Sodium, Triglycerides, and WBC.   Lab Results   Component Value Date    ALT 12 11/13/2023    AST 12 (L) 11/13/2023    BUN 21 11/13/2023    CALCIUM 8.7 11/13/2023     (H) 11/13/2023    CO2 26 11/13/2023    CREATININE 0.96 11/13/2023    HDL 57 12/08/2022    HCT 30.0 (L) 11/13/2023    HGB 10.0 (L) 11/13/2023    HGBA1C 5.7 (H) 12/08/2022    MG 1.9 10/02/2023    PHOS 3.0 10/15/2021     11/13/2023    K 4.8 11/13/2023    PSA 1.9 12/08/2022     01/06/2016    TRIG 122 12/08/2022    WBC 8.86 11/13/2023     Note: for a comprehensive list of the patient's lab results, access the Results Review activity.

## 2024-01-15 ENCOUNTER — TELEPHONE (OUTPATIENT)
Dept: CARDIOLOGY CLINIC | Facility: CLINIC | Age: 74
End: 2024-01-15

## 2024-01-15 DIAGNOSIS — I48.0 PAROXYSMAL ATRIAL FIBRILLATION (HCC): ICD-10-CM

## 2024-01-19 ENCOUNTER — TELEPHONE (OUTPATIENT)
Dept: CARDIOLOGY CLINIC | Facility: CLINIC | Age: 74
End: 2024-01-19

## 2024-01-19 NOTE — TELEPHONE ENCOUNTER
L/m for pt w/ Radha's response below --    Radha Gates PA-C      Please let him know: In his instance, we are aware that he is on metoprolol but for this nuclear stress test there is no need to hold this.  In terms of his other medications, I do not believe there is anything else he needs to hold.     Asked to call back if any questions. Also mentioned that message is available in [employer].

## 2024-01-19 NOTE — TELEPHONE ENCOUNTER
Smooth Jackson.1950. I saw Christina Colvin a week ago.  She ordered a stress test which is scheduled for Monday. When I did the E check in, I was advised to call to see if I should hold any of my meds before the test.   Please let me know if I need to hold anything. Metoprolol was mentioned in particular.

## 2024-01-22 ENCOUNTER — HOSPITAL ENCOUNTER (OUTPATIENT)
Dept: NON INVASIVE DIAGNOSTICS | Facility: CLINIC | Age: 74
Discharge: HOME | End: 2024-01-22
Payer: MEDICARE

## 2024-01-22 VITALS
SYSTOLIC BLOOD PRESSURE: 138 MMHG | DIASTOLIC BLOOD PRESSURE: 70 MMHG | HEIGHT: 72 IN | WEIGHT: 233 LBS | BODY MASS INDEX: 31.56 KG/M2 | HEART RATE: 59 BPM

## 2024-01-22 VITALS
OXYGEN SATURATION: 98 % | HEART RATE: 65 BPM | SYSTOLIC BLOOD PRESSURE: 160 MMHG | RESPIRATION RATE: 16 BRPM | HEIGHT: 72 IN | BODY MASS INDEX: 31.56 KG/M2 | WEIGHT: 233 LBS | DIASTOLIC BLOOD PRESSURE: 90 MMHG

## 2024-01-22 DIAGNOSIS — R00.0 WIDE-COMPLEX TACHYCARDIA: ICD-10-CM

## 2024-01-22 DIAGNOSIS — I48.0 PAROXYSMAL ATRIAL FIBRILLATION (HCC): ICD-10-CM

## 2024-01-22 LAB
AORTIC ROOT: 4.3 CM
AORTIC VALVE MEAN VELOCITY: 10.6 M/S
APICAL FOUR CHAMBER EJECTION FRACTION: 66 %
ASCENDING AORTA: 4.8 CM
AV AREA BY CONTINUOUS VTI: 2.9 CM2
AV AREA PEAK VELOCITY: 2.8 CM2
AV LVOT MEAN GRADIENT: 2 MMHG
AV LVOT PEAK GRADIENT: 3 MMHG
AV MEAN GRADIENT: 5 MMHG
AV PEAK GRADIENT: 10 MMHG
AV VALVE AREA: 2.93 CM2
AV VELOCITY RATIO: 0.58
CHEST PAIN STATEMENT: NORMAL
DOP CALC AO PEAK VEL: 1.58 M/S
DOP CALC AO VTI: 35.46 CM
DOP CALC LVOT AREA: 4.91 CM2
DOP CALC LVOT CARDIAC INDEX: 2.48 L/MIN/M2
DOP CALC LVOT CARDIAC OUTPUT: 5.65 L/MIN
DOP CALC LVOT DIAMETER: 2.5 CM
DOP CALC LVOT PEAK VEL VTI: 21.21 CM
DOP CALC LVOT PEAK VEL: 0.91 M/S
DOP CALC LVOT STROKE INDEX: 46.1 ML/M2
DOP CALC LVOT STROKE VOLUME: 104.06
FRACTIONAL SHORTENING: 40 (ref 28–44)
INTERVENTRICULAR SEPTUM IN DIASTOLE (PARASTERNAL SHORT AXIS VIEW): 1.5 CM
INTERVENTRICULAR SEPTUM: 1.5 CM (ref 0.6–1.1)
LAAS-AP2: 44.4 CM2
LAAS-AP4: 37.7 CM2
LEFT ATRIUM SIZE: 5.4 CM
LEFT ATRIUM VOLUME (MOD BIPLANE): 165 ML
LEFT ATRIUM VOLUME INDEX (MOD BIPLANE): 72.4 ML/M2
LEFT INTERNAL DIMENSION IN SYSTOLE: 3.4 CM (ref 2.1–4)
LEFT VENTRICULAR INTERNAL DIMENSION IN DIASTOLE: 5.7 CM (ref 3.5–6)
LEFT VENTRICULAR POSTERIOR WALL IN END DIASTOLE: 1.5 CM
LEFT VENTRICULAR STROKE VOLUME: 113 ML
LVSV (TEICH): 113 ML
MAX DIASTOLIC BP: 90 MMHG
MAX HR PERCENT: 89 %
MAX HR: 131 BPM
MAX PREDICTED HEART RATE: 146 BPM
NUC STRESS DIASTOLIC VOLUME INDEX: 68 ML/M2
NUC STRESS EJECTION FRACTION: 56 %
PROTOCOL NAME: NORMAL
RATE PRESSURE PRODUCT: 250
REASON FOR TERMINATION: NORMAL
RIGHT ATRIUM AREA SYSTOLE A4C: 19.1 CM2
RIGHT VENTRICLE ID DIMENSION: 5 CM
SINOTUBULAR JUNCTION: 3.7 CM
SL CV LEFT ATRIUM LENGTH A2C: 8.3 CM
SL CV LV EF: 60
SL CV PED ECHO LEFT VENTRICLE DIASTOLIC VOLUME (MOD BIPLANE) 2D: 159 ML
SL CV PED ECHO LEFT VENTRICLE SYSTOLIC VOLUME (MOD BIPLANE) 2D: 47 ML
SL CV REST NUCLEAR ISOTOPE DOSE: 10.51 MCI
SL CV STRESS NUCLEAR ISOTOPE DOSE: 32.7 MCI
SL CV STRESS RECOVERY BP: NORMAL MMHG
SL CV STRESS RECOVERY HR: 83 BPM
SL CV STRESS RECOVERY O2 SAT: 96 %
STJ: 3.7 CM
STRESS BASELINE BP: NORMAL MMHG
STRESS BASELINE HR: 65 BPM
STRESS O2 SAT REST: 98 %
STRESS PEAK HR: 125 BPM
STRESS POST ESTIMATED WORKLOAD: 1 METS
STRESS POST EXERCISE DUR MIN: 3 MIN
STRESS POST EXERCISE DUR MIN: 3 MIN
STRESS POST EXERCISE DUR SEC: 0 SEC
STRESS POST EXERCISE DUR SEC: 0 SEC
STRESS POST O2 SAT PEAK: 96 %
STRESS POST PEAK BP: 2 MMHG
STRESS POST PEAK HR: 131 BPM
STRESS POST PEAK SYSTOLIC BP: 160 MMHG
STRESS/REST PERFUSION RATIO: 1.08
TARGET HR FORMULA: NORMAL
TEST INDICATION: NORMAL
TRICUSPID ANNULAR PLANE SYSTOLIC EXCURSION: 2.5 CM

## 2024-01-22 PROCEDURE — A9502 TC99M TETROFOSMIN: HCPCS

## 2024-01-22 PROCEDURE — 93306 TTE W/DOPPLER COMPLETE: CPT

## 2024-01-22 PROCEDURE — 93017 CV STRESS TEST TRACING ONLY: CPT

## 2024-01-22 PROCEDURE — 93306 TTE W/DOPPLER COMPLETE: CPT | Performed by: INTERNAL MEDICINE

## 2024-01-22 PROCEDURE — 93018 CV STRESS TEST I&R ONLY: CPT | Performed by: INTERNAL MEDICINE

## 2024-01-22 PROCEDURE — 78452 HT MUSCLE IMAGE SPECT MULT: CPT

## 2024-01-22 PROCEDURE — 78452 HT MUSCLE IMAGE SPECT MULT: CPT | Performed by: INTERNAL MEDICINE

## 2024-01-22 PROCEDURE — 93016 CV STRESS TEST SUPVJ ONLY: CPT | Performed by: INTERNAL MEDICINE

## 2024-01-22 RX ORDER — REGADENOSON 0.08 MG/ML
0.4 INJECTION, SOLUTION INTRAVENOUS ONCE
Status: COMPLETED | OUTPATIENT
Start: 2024-01-22 | End: 2024-01-22

## 2024-01-22 RX ADMIN — REGADENOSON 0.4 MG: 0.08 INJECTION, SOLUTION INTRAVENOUS at 12:38

## 2024-01-25 ENCOUNTER — TELEPHONE (OUTPATIENT)
Dept: CARDIAC SURGERY | Facility: CLINIC | Age: 74
End: 2024-01-25

## 2024-01-25 NOTE — TELEPHONE ENCOUNTER
Patient called in, he recently had an Echo done on 1/22 and was told to f/up with our office. Called patient to schedule and he stated he will be going for bladder surgery on Feb 12 @ [facility] and would like to wait to schedule at this time. Patient would like for me to reach out the end of March.

## 2024-01-26 ENCOUNTER — TELEPHONE (OUTPATIENT)
Dept: CARDIOLOGY CLINIC | Facility: CLINIC | Age: 74
End: 2024-01-26

## 2024-01-26 ENCOUNTER — CLINICAL SUPPORT (OUTPATIENT)
Dept: CARDIOLOGY CLINIC | Facility: CLINIC | Age: 74
End: 2024-01-26
Payer: MEDICARE

## 2024-01-26 DIAGNOSIS — I48.0 PAROXYSMAL ATRIAL FIBRILLATION (HCC): ICD-10-CM

## 2024-01-26 DIAGNOSIS — R00.0 WIDE-COMPLEX TACHYCARDIA: ICD-10-CM

## 2024-01-26 PROCEDURE — 93244 EXT ECG>48HR<7D REV&INTERPJ: CPT | Performed by: INTERNAL MEDICINE

## 2024-01-26 NOTE — TELEPHONE ENCOUNTER
Called and left a voicemail to let him know it was just to download Zio report to provider and we will call him back when results are available.

## 2024-01-26 NOTE — TELEPHONE ENCOUNTER
This is Smooth Jackson 1950.   My phone number is [phone].   I just got an e-mail with an appointment scheduled for 5:00 AM this morning, which is obviously passed. No one had ever called me about this. I wonder what is going on. Bye.

## 2024-02-13 ENCOUNTER — HOME HEALTH ADMISSION (OUTPATIENT)
Dept: HOME HEALTH SERVICES | Facility: HOME HEALTHCARE | Age: 74
End: 2024-02-13
Payer: MEDICARE

## 2024-02-18 PROBLEM — Z00.00 MEDICARE ANNUAL WELLNESS VISIT, SUBSEQUENT: Status: RESOLVED | Noted: 2018-08-01 | Resolved: 2024-02-18

## 2024-02-19 ENCOUNTER — HOME CARE VISIT (OUTPATIENT)
Dept: HOME HEALTH SERVICES | Facility: HOME HEALTHCARE | Age: 74
End: 2024-02-19
Payer: MEDICARE

## 2024-02-19 VITALS
TEMPERATURE: 97.7 F | SYSTOLIC BLOOD PRESSURE: 120 MMHG | RESPIRATION RATE: 18 BRPM | OXYGEN SATURATION: 98 % | HEART RATE: 54 BPM | DIASTOLIC BLOOD PRESSURE: 81 MMHG

## 2024-02-19 PROCEDURE — 400013 VN SOC

## 2024-02-19 PROCEDURE — G0299 HHS/HOSPICE OF RN EA 15 MIN: HCPCS

## 2024-02-19 PROCEDURE — 10330081 VN NO-PAY CLAIM PROCEDURE

## 2024-02-20 ENCOUNTER — HOME CARE VISIT (OUTPATIENT)
Dept: HOME HEALTH SERVICES | Facility: HOME HEALTHCARE | Age: 74
End: 2024-02-20
Payer: MEDICARE

## 2024-02-20 PROCEDURE — G0299 HHS/HOSPICE OF RN EA 15 MIN: HCPCS

## 2024-02-21 ENCOUNTER — HOME CARE VISIT (OUTPATIENT)
Dept: HOME HEALTH SERVICES | Facility: HOME HEALTHCARE | Age: 74
End: 2024-02-21
Payer: MEDICARE

## 2024-02-21 VITALS
SYSTOLIC BLOOD PRESSURE: 118 MMHG | RESPIRATION RATE: 16 BRPM | DIASTOLIC BLOOD PRESSURE: 70 MMHG | TEMPERATURE: 96.8 F | HEART RATE: 68 BPM

## 2024-02-21 VITALS — SYSTOLIC BLOOD PRESSURE: 118 MMHG | DIASTOLIC BLOOD PRESSURE: 78 MMHG | RESPIRATION RATE: 20 BRPM

## 2024-02-21 DIAGNOSIS — M62.838 MUSCLE SPASM: ICD-10-CM

## 2024-02-21 DIAGNOSIS — R11.2 NAUSEA AND VOMITING, UNSPECIFIED VOMITING TYPE: Primary | ICD-10-CM

## 2024-02-21 PROCEDURE — 10330064

## 2024-02-21 PROCEDURE — G0151 HHCP-SERV OF PT,EA 15 MIN: HCPCS

## 2024-02-21 PROCEDURE — 10330064 POWDER, STOMAHESIVE PROTECTIVE1OZ BOTTLE

## 2024-02-21 PROCEDURE — 10330064 WIPE, SKIN GEL PROT DRSNG (50/BX)

## 2024-02-21 PROCEDURE — 10330064 TAPE, ADHSV TRANSPORE WHT 2" (6RL/BX 10B

## 2024-02-21 PROCEDURE — 10330064 PAD, ABD 5X9" STR LF (1/PK 20PK/BX) MGM1

## 2024-02-21 PROCEDURE — 10330064 BAG, URINE DRN (20/CS)        BARD

## 2024-02-21 PROCEDURE — G0299 HHS/HOSPICE OF RN EA 15 MIN: HCPCS

## 2024-02-21 RX ORDER — TIZANIDINE 4 MG/1
4 TABLET ORAL EVERY 8 HOURS PRN
Qty: 30 TABLET | Refills: 1 | Status: SHIPPED | OUTPATIENT
Start: 2024-02-21

## 2024-02-21 RX ORDER — ONDANSETRON 4 MG/1
4 TABLET, FILM COATED ORAL EVERY 8 HOURS PRN
Qty: 30 TABLET | Refills: 1 | Status: SHIPPED | OUTPATIENT
Start: 2024-02-21

## 2024-02-21 NOTE — CASE COMMUNICATION
Nausea 1st time since surgery was yesterday with very poor oral intake starting yesterday. Improved today able to eat a whole bagel.   Patient reports night before last had left back pain that felt like muscle spasm, cannot rule out kidney pain, and took oxycodone then yesterday afternoon took oxy again. Patient would like a script for zofran. Patient would also like a muscle relaxer ordered.     Gadiel Dodson RN

## 2024-02-21 NOTE — CASE COMMUNICATION
Ship to - Pt. Home    Ordering MD Ruthann Pruett MD     Wound 1 -   ABD incisioin surgical Partial x     Frequency daily      ABD 5x9 215339 -7  Transpore white  2in 455222 -1  Adapt skin barrier no sting wipes 50 per box  353171 -1  Ostomy  include Brand and order number (ordered by the box)  NOT STOCKED  [employer] #[id number] New image ceraplus convex wafer with 2 1/4 flange without tape border-1 box  [employer] # [id number] urostomy po uch with 2 1/4 flange- 1 box  [employer] #[id number] seals- 1 box  [employer] #[id number] belt medium -1  [employer] # 59538fsxel barrier extenders- 1 box  Stomahesive powder  NOT STOCKED 083941 -1 bottle  Urinary night drainage bag 2000 ml - 1

## 2024-02-21 NOTE — CASE COMMUNICATION
Inbasket message from: Harini Mackey Sent to Melecio Giraldo MD; Gadiel Dodson, RN; [facility]    LMOM for pt to CB     [employer] message from: Melecio Giraldo MD Sent to Gadiel Dodson RN; [facility]    FYI: done      [facility] staff: Please call patient and let him know that the muscle relaxer was sent as well as the Zofran for nausea

## 2024-02-22 ENCOUNTER — HOME CARE VISIT (OUTPATIENT)
Dept: HOME HEALTH SERVICES | Facility: HOME HEALTHCARE | Age: 74
End: 2024-02-22
Payer: MEDICARE

## 2024-02-22 PROCEDURE — 10330064

## 2024-02-22 PROCEDURE — 10330064 POWDER, STOMAHESIVE PROTECTIVE1OZ BOTTLE

## 2024-02-22 PROCEDURE — G0152 HHCP-SERV OF OT,EA 15 MIN: HCPCS

## 2024-02-22 NOTE — CASE COMMUNICATION
PT philip completed this date.  POC 2x3wks for strengthening, progression and instruction in hep Balance retraining.

## 2024-02-23 ENCOUNTER — TELEPHONE (OUTPATIENT)
Age: 74
End: 2024-02-23

## 2024-02-23 ENCOUNTER — HOME CARE VISIT (OUTPATIENT)
Dept: HOME HEALTH SERVICES | Facility: HOME HEALTHCARE | Age: 74
End: 2024-02-23
Payer: MEDICARE

## 2024-02-23 VITALS
OXYGEN SATURATION: 93 % | DIASTOLIC BLOOD PRESSURE: 78 MMHG | SYSTOLIC BLOOD PRESSURE: 122 MMHG | TEMPERATURE: 97.3 F | RESPIRATION RATE: 18 BRPM | HEART RATE: 98 BPM

## 2024-02-23 PROCEDURE — G0299 HHS/HOSPICE OF RN EA 15 MIN: HCPCS

## 2024-02-23 NOTE — TELEPHONE ENCOUNTER
Pt seen today by GIRISH, he is reporting blood in his urine since restarting eliquis.  Patient has a urostomy, nurse tried calling surgeons at [facility] at nobody answered.  She stated clinical line just ran, unable to leave a message.    She wanted to make someone aware of the issue.

## 2024-02-24 ENCOUNTER — HOME CARE VISIT (OUTPATIENT)
Dept: HOME HEALTH SERVICES | Facility: HOME HEALTHCARE | Age: 74
End: 2024-02-24
Payer: MEDICARE

## 2024-02-24 VITALS
SYSTOLIC BLOOD PRESSURE: 128 MMHG | RESPIRATION RATE: 20 BRPM | DIASTOLIC BLOOD PRESSURE: 50 MMHG | OXYGEN SATURATION: 99 % | HEART RATE: 60 BPM

## 2024-02-24 PROCEDURE — G0151 HHCP-SERV OF PT,EA 15 MIN: HCPCS

## 2024-02-25 ENCOUNTER — HOME CARE VISIT (OUTPATIENT)
Dept: HOME HEALTH SERVICES | Facility: HOME HEALTHCARE | Age: 74
End: 2024-02-25
Payer: MEDICARE

## 2024-02-25 PROCEDURE — G0299 HHS/HOSPICE OF RN EA 15 MIN: HCPCS

## 2024-02-25 NOTE — CASE COMMUNICATION
1445...Patient called into office regarding his urostomy. Reports it is leaking and him and his wife have not had much education of how to perform bag changes. SN visit to be made for assist.

## 2024-02-26 ENCOUNTER — HOME CARE VISIT (OUTPATIENT)
Dept: HOME HEALTH SERVICES | Facility: HOME HEALTHCARE | Age: 74
End: 2024-02-26
Payer: MEDICARE

## 2024-02-26 VITALS
HEART RATE: 61 BPM | RESPIRATION RATE: 18 BRPM | DIASTOLIC BLOOD PRESSURE: 78 MMHG | SYSTOLIC BLOOD PRESSURE: 118 MMHG | OXYGEN SATURATION: 96 % | TEMPERATURE: 97.4 F

## 2024-02-26 VITALS
OXYGEN SATURATION: 98 % | TEMPERATURE: 97.9 F | RESPIRATION RATE: 18 BRPM | SYSTOLIC BLOOD PRESSURE: 112 MMHG | DIASTOLIC BLOOD PRESSURE: 68 MMHG | HEART RATE: 76 BPM

## 2024-02-26 PROCEDURE — G0299 HHS/HOSPICE OF RN EA 15 MIN: HCPCS

## 2024-02-26 RX ORDER — SIMETHICONE 80 MG
80 TABLET,CHEWABLE ORAL
COMMUNITY
Start: 2024-02-16

## 2024-02-26 NOTE — CASE COMMUNICATION
1850...TC from patient regarding urostomy. States a nurse was out earlier and his urostomy is leaking again. Reports he is not able to repair by taping it down and needs SN to assist him to change his bag. SN visit to be made for assist.

## 2024-02-27 ENCOUNTER — HOME CARE VISIT (OUTPATIENT)
Dept: HOME HEALTH SERVICES | Facility: HOME HEALTHCARE | Age: 74
End: 2024-02-27
Payer: MEDICARE

## 2024-02-27 VITALS
RESPIRATION RATE: 16 BRPM | DIASTOLIC BLOOD PRESSURE: 88 MMHG | HEART RATE: 88 BPM | SYSTOLIC BLOOD PRESSURE: 120 MMHG | HEART RATE: 76 BPM | TEMPERATURE: 98.9 F | TEMPERATURE: 98.9 F | DIASTOLIC BLOOD PRESSURE: 68 MMHG | SYSTOLIC BLOOD PRESSURE: 108 MMHG | RESPIRATION RATE: 16 BRPM

## 2024-02-27 VITALS — HEART RATE: 58 BPM | SYSTOLIC BLOOD PRESSURE: 112 MMHG | DIASTOLIC BLOOD PRESSURE: 70 MMHG | OXYGEN SATURATION: 99 %

## 2024-02-27 PROCEDURE — G0157 HHC PT ASSISTANT EA 15: HCPCS

## 2024-02-28 ENCOUNTER — HOME CARE VISIT (OUTPATIENT)
Dept: HOME HEALTH SERVICES | Facility: HOME HEALTHCARE | Age: 74
End: 2024-02-28
Payer: MEDICARE

## 2024-02-28 ENCOUNTER — OFFICE VISIT (OUTPATIENT)
Dept: INTERNAL MEDICINE CLINIC | Facility: CLINIC | Age: 74
End: 2024-02-28
Payer: MEDICARE

## 2024-02-28 VITALS
OXYGEN SATURATION: 98 % | DIASTOLIC BLOOD PRESSURE: 60 MMHG | HEART RATE: 68 BPM | RESPIRATION RATE: 18 BRPM | TEMPERATURE: 97.7 F | SYSTOLIC BLOOD PRESSURE: 116 MMHG

## 2024-02-28 VITALS
DIASTOLIC BLOOD PRESSURE: 68 MMHG | SYSTOLIC BLOOD PRESSURE: 110 MMHG | OXYGEN SATURATION: 98 % | BODY MASS INDEX: 29.24 KG/M2 | HEART RATE: 45 BPM | HEIGHT: 73 IN | WEIGHT: 220.6 LBS

## 2024-02-28 DIAGNOSIS — D70.1 CHEMOTHERAPY INDUCED NEUTROPENIA: ICD-10-CM

## 2024-02-28 DIAGNOSIS — I48.19 ATRIAL FIBRILLATION, PERSISTENT (HCC): ICD-10-CM

## 2024-02-28 DIAGNOSIS — E03.9 ACQUIRED HYPOTHYROIDISM: ICD-10-CM

## 2024-02-28 DIAGNOSIS — T45.1X5A CHEMOTHERAPY INDUCED NEUTROPENIA: ICD-10-CM

## 2024-02-28 DIAGNOSIS — C67.4 MALIGNANT NEOPLASM OF POSTERIOR WALL OF URINARY BLADDER (HCC): Primary | ICD-10-CM

## 2024-02-28 DIAGNOSIS — I77.79 HEPATIC ARTERY DISSECTION (HCC): ICD-10-CM

## 2024-02-28 DIAGNOSIS — I10 ESSENTIAL HYPERTENSION: ICD-10-CM

## 2024-02-28 PROCEDURE — G0299 HHS/HOSPICE OF RN EA 15 MIN: HCPCS

## 2024-02-28 PROCEDURE — 99214 OFFICE O/P EST MOD 30 MIN: CPT | Performed by: INTERNAL MEDICINE

## 2024-02-28 NOTE — PATIENT INSTRUCTIONS
Problem List Items Addressed This Visit          Endocrine    Acquired hypothyroidism     Continue levothyroxine along with monitoring            Cardiovascular and Mediastinum    Essential hypertension     Blood pressures controlled, continue meds         Atrial fibrillation, persistent (HCC)     Continue anticoagulation         Hepatic artery dissection (HCC)     Follow up Vascular            Genitourinary    Malignant neoplasm of posterior wall of urinary bladder (HCC) - Primary     Follow-up with urology, patient has ileal conduit in place, Bladder removed, prostate removed, ileal conduit placed 2/12/24            Other    Chemotherapy induced neutropenia      Last check of this was good

## 2024-02-28 NOTE — PROGRESS NOTES
Name: Smooth Jackson      : 1950      MRN: [id number]  Encounter Provider: Melecio Giraldo MD  Encounter Date: 2024   Encounter department: [facility]    Assessment & Plan     1. Malignant neoplasm of posterior wall of urinary bladder (HCC)  Assessment & Plan:  Follow-up with urology, patient has ileal conduit in place, Bladder removed, prostate removed, ileal conduit placed 24      2. Atrial fibrillation, persistent (HCC)  Assessment & Plan:  Continue anticoagulation      3. Essential hypertension  Assessment & Plan:  Blood pressures controlled, continue meds      4. Acquired hypothyroidism  Assessment & Plan:  Continue levothyroxine along with monitoring      5. Hepatic artery dissection (HCC)  Assessment & Plan:  Follow up Vascular      6. Chemotherapy induced neutropenia   Assessment & Plan:  Last check of this was good             Subjective     Here for follow-up after surgery for bladder cancer.  Patient saw urology yesterday and notes were reviewed.  Patient had been having some bleeding in the urine in the ileal conduit, and he held his Eliquis for an additional couple of days, and he has restarted the Eliquis yesterday.  He still does have a scant amount of blood.  The stents were removed and the ostomy appliance was replaced.  No calf pain or swelling, patient has mild constipation.  No fevers, no chills, no cardiopulmonary complaints.      Review of Systems   Constitutional:  Positive for fatigue. Negative for chills and fever.   Respiratory:  Negative for cough and shortness of breath.    Cardiovascular:  Negative for chest pain and palpitations.   Gastrointestinal:  Positive for constipation (mild).   Genitourinary:  Positive for hematuria.       Past Medical History:   Diagnosis Date   • Acute blood loss anemia 2019   • Aneurysm of thoracic aorta (HCC)    • Arrhythmia    • Cancer (HCC)    • Cholecystitis 2019    Added automatically from request for  surgery [id number]   • Detached retina, right    • Disease of thyroid gland    • Gastric wall thickening    • Headache    • Hematoma 10/10/2018   • Hypertension    • Iliac artery aneurysm, bilateral (HCC)    • Irregular heart beat     afib cardioversion 17, x2    • Neuropathy     bilateral feet   • Paroxysmal A-fib (HCC)    • Prostatic hypertrophy    • 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(HCC)      Past Surgical History:   Procedure Laterality Date   • ABDOMINAL AORTIC ANEURYSM REPAIR, ENDOVASCULAR Right 2018    Procedure: REPAIR ANEURYSM ILIAC endovascular  with coil embolization right hypogastric artery.;  Surgeon: Guille Esquivel MD;  Location: [facility];  Service: Vascular   • BLADDER CANCER BY FISH (HISTORICAL)     • CARDIOVERSION     • CATARACT EXTRACTION Left 10/20/2019    Right eye 2011   • CHOLECYSTECTOMY     • CHOLECYSTECTOMY LAPAROSCOPIC N/A 2019    Procedure: [id number] CHOLECYSTECTOMY;  Surgeon: Derik Case DO;  Location: [facility];  Service: General   • COLONOSCOPY  2016   • CYSTECTOMY, RADICAL WITH ILEOCONDUIT  2024   • MOLE EXCISION     • UT CYSTOURETHROSCOPY W/DEST &/RMVL TUMOR LARGE N/A 2023    Procedure: TURBT, gemcitabine instillation;  Surgeon: Juvenal Cruz MD;  Location: [facility];  Service: Urology   • UT EDG US EXAM SURGICAL ALTER STOM DUODENUM/JEJUNUM N/A 11/10/2017    Procedure: RADIAL ENDOSCOPIC U/S;  Surgeon: Harvey Gallegos MD;  Location: [facility];  Service: Gastroenterology   • RETINAL DETACHMENT SURGERY Right     onset , retinal detachment complete air fill confirmed     Family History   Problem Relation Age of Onset   • Stroke Mother          at 91, failure to thrive   • Aortic aneurysm Father         abdominal   • Aortic aneurysm Brother    • Basal cell carcinoma Brother    • Schizophrenia Brother    • Cancer Maternal Grandmother         smoker, heart attack, cancer   • Cancer Maternal Grandfather         smoker cancer   •  Cancer Paternal Grandmother         malignant neoplasm   • Cancer Paternal Grandfather         malignant neoplasm   • Cancer Family         malignant neoplasm   • Cancer Family         malignant neoplasm   • Cancer Maternal Uncle         smoker, lots wrong with her   • Cancer Paternal Uncle         melanoma at 92     Social History     Socioeconomic History   • Marital status: /Civil Union     Spouse name: None   • Number of children: None   • Years of education: None   • Highest education level: None   Occupational History   • None   Tobacco Use   • Smoking status: Never   • Smokeless tobacco: Never   Vaping Use   • Vaping status: Never Used   Substance and Sexual Activity   • Alcohol use: Yes     Alcohol/week: 5.0 standard drinks of alcohol     Types: 2 Glasses of wine, 1 Cans of beer, 2 Standard drinks or equivalent per week     Comment: 3-4 x week   • Drug use: Never   • Sexual activity: Not Currently   Other Topics Concern   • None   Social History Narrative   • None     Social Determinants of Health     Financial Resource Strain: Low Risk  (12/19/2023)    Overall Financial Resource Strain (CARDIA)    • Difficulty of Paying Living Expenses: Not hard at all   Food Insecurity: No Food Insecurity (2/12/2024)    Received from [facility]    Hunger Vital Sign    • Worried About Running Out of Food in the Last Year: Never true    • Ran Out of Food in the Last Year: Never true   Transportation Needs: No Transportation Needs (2/12/2024)    Received from [facility]    PRAPARE - Transportation    • Lack of Transportation (Medical): No    • Lack of Transportation (Non-Medical): No   Physical Activity: Not on file   Stress: Not on file   Social Connections: Not on file   Intimate Partner Violence: Not on file   Housing Stability: Low Risk  (2/12/2024)    Received from [facility]    Housing Stability Vital Sign    • Unable to  Pay for Housing in the Last Year: No    • Number of Places Lived in the Last Year: 1    • Unstable Housing in the Last Year: No     Current Outpatient Medications on File Prior to Visit   Medication Sig   • acetaminophen (TYLENOL) 325 mg tablet Take 2 tablets (650 mg total) by mouth every 6 (six) hours as needed for mild pain or fever   • apixaban (Eliquis) 5 mg Take 1 tablet (5 mg total) by mouth 2 (two) times a day   • aspirin (ECOTRIN LOW STRENGTH) 81 mg EC tablet Take 81 mg by mouth daily   • atorvastatin (LIPITOR) 20 mg tablet TAKE 1 TABLET DAILY   • cefadroxil (DURICEF) 500 mg capsule Take 500 mg by mouth 2 (two) times a day. X9 DAYS.   Indications: Bone and/or Joint Infection   • cholecalciferol (VITAMIN D3) 1,000 units tablet Take 1,000 Units by mouth daily   • Co-Enzyme Q-10 100 MG CAPS Take 100 mg by mouth daily   • felodipine (PLENDIL) 5 mg 24 hr tablet TAKE 1 TABLET DAILY   • hydrocortisone 2.5 % cream Apply 1 application. topically 2 (two) times a day To affected area as needed   • ketoconazole (NIZORAL) 2 % cream As needed   • levothyroxine 75 mcg tablet TAKE 1 TABLET DAILY FOR    ACQUIRED HYPOTHYROIDISM   • metoprolol succinate (TOPROL-XL) 25 mg 24 hr tablet TAKE 1 TABLET TWICE A DAY   • Misc Natural Products (LUTEIN 20 PO) Take 20 mg by mouth daily.   • multivitamin (THERAGRAN) TABS Take 1 tablet by mouth daily.   • ondansetron (ZOFRAN) 4 mg tablet Take 1 tablet (4 mg total) by mouth every 8 (eight) hours as needed for nausea or vomiting   • oxyCODONE (ROXICODONE) 5 immediate release tablet Take 5 mg by mouth every 6 (six) hours as needed for severe pain. Indications: Acute Pain   • simethicone (MYLICON) 80 mg chewable tablet Chew 80 mg   • tamsulosin (FLOMAX) 0.4 mg Take 1 capsule (0.4 mg total) by mouth daily with dinner   • tiZANidine (ZANAFLEX) 4 mg tablet Take 1 tablet (4 mg total) by mouth every 8 (eight) hours as needed for muscle spasms   • gabapentin (NEURONTIN) 100 mg capsule Take 1 capsule  "(100 mg total) by mouth 2 (two) times a day     Allergies   Allergen Reactions   • Wound Dressing Adhesive Blisters     Immunization History   Administered Date(s) Administered   • COVID-19 MODERNA VACC 0.5 ML IM 02/16/2021, 03/23/2021, 11/09/2021, 05/17/2022   • COVID-19 Moderna Vac BIVALENT 12 Yr+ IM 0.5 ML 11/19/2022   • H1N1, All Formulations 01/09/2010   • INFLUENZA 11/29/2016, 10/23/2017, 10/16/2020, 08/30/2023   • Influenza Split High Dose Preservative Free IM 11/16/2010, 09/19/2012, 09/24/2014, 09/16/2015, 11/29/2016, 10/23/2017   • Influenza, high dose seasonal 0.7 mL 10/15/2019, 11/09/2021, 10/12/2022   • Pneumococcal Conjugate 13-Valent 11/29/2016   • Pneumococcal Polysaccharide PPV23 08/01/2018   • Tdap 11/29/2016   • Zoster Vaccine Recombinant 10/16/2020, 12/28/2020   • influenza, trivalent, adjuvanted 10/16/2020       Objective     /68 (BP Location: Left arm, Patient Position: Sitting, Cuff Size: Large)   Pulse (!) 45   Ht 6' 1\" (1.854 m)   Wt 100 kg (220 lb 9.6 oz)   SpO2 98%   BMI 29.10 kg/m²     Physical Exam  Vitals reviewed.   Constitutional:       General: He is not in acute distress.     Appearance: Normal appearance. He is well-developed.   HENT:      Head: Normocephalic and atraumatic.      Right Ear: External ear normal.      Left Ear: External ear normal.   Eyes:      General: No scleral icterus.     Conjunctiva/sclera: Conjunctivae normal.   Neck:      Thyroid: No thyromegaly.      Trachea: No tracheal deviation.   Cardiovascular:      Rate and Rhythm: Normal rate. Rhythm irregular.      Heart sounds: Normal heart sounds.   Pulmonary:      Effort: Pulmonary effort is normal. No respiratory distress.      Breath sounds: Normal breath sounds. No wheezing or rales.   Abdominal:      General: Bowel sounds are normal.      Palpations: Abdomen is soft.      Tenderness: There is no abdominal tenderness. There is no guarding or rebound.   Genitourinary:     Comments: Radha-colored urine " in ileal conduit bag  Musculoskeletal:      Cervical back: Normal range of motion and neck supple.      Right lower leg: No edema.      Left lower leg: No edema.   Lymphadenopathy:      Cervical: No cervical adenopathy.   Skin:     Coloration: Skin is not jaundiced or pale.   Neurological:      General: No focal deficit present.      Mental Status: He is alert and oriented to person, place, and time.   Psychiatric:         Mood and Affect: Mood normal.         Behavior: Behavior normal.         Thought Content: Thought content normal.         Judgment: Judgment normal.       Melecio Giraldo MD

## 2024-02-28 NOTE — ASSESSMENT & PLAN NOTE
Follow-up with urology, patient has ileal conduit in place, Bladder removed, prostate removed, ileal conduit placed 2/12/24

## 2024-02-29 ENCOUNTER — HOME CARE VISIT (OUTPATIENT)
Dept: HOME HEALTH SERVICES | Facility: HOME HEALTHCARE | Age: 74
End: 2024-02-29
Payer: MEDICARE

## 2024-02-29 VITALS — SYSTOLIC BLOOD PRESSURE: 122 MMHG | HEART RATE: 62 BPM | DIASTOLIC BLOOD PRESSURE: 80 MMHG | OXYGEN SATURATION: 99 %

## 2024-02-29 PROCEDURE — G0157 HHC PT ASSISTANT EA 15: HCPCS

## 2024-03-01 ENCOUNTER — HOME CARE VISIT (OUTPATIENT)
Dept: HOME HEALTH SERVICES | Facility: HOME HEALTHCARE | Age: 74
End: 2024-03-01
Payer: MEDICARE

## 2024-03-01 VITALS
RESPIRATION RATE: 18 BRPM | SYSTOLIC BLOOD PRESSURE: 118 MMHG | TEMPERATURE: 95.1 F | DIASTOLIC BLOOD PRESSURE: 76 MMHG | OXYGEN SATURATION: 99 % | HEART RATE: 54 BPM

## 2024-03-01 PROCEDURE — G0299 HHS/HOSPICE OF RN EA 15 MIN: HCPCS

## 2024-03-03 ENCOUNTER — HOME CARE VISIT (OUTPATIENT)
Dept: HOME HEALTH SERVICES | Facility: HOME HEALTHCARE | Age: 74
End: 2024-03-03
Payer: MEDICARE

## 2024-03-04 ENCOUNTER — HOME CARE VISIT (OUTPATIENT)
Dept: HOME HEALTH SERVICES | Facility: HOME HEALTHCARE | Age: 74
End: 2024-03-04
Payer: MEDICARE

## 2024-03-04 VITALS
OXYGEN SATURATION: 97 % | SYSTOLIC BLOOD PRESSURE: 120 MMHG | RESPIRATION RATE: 18 BRPM | HEART RATE: 78 BPM | TEMPERATURE: 98.7 F | DIASTOLIC BLOOD PRESSURE: 78 MMHG

## 2024-03-04 PROCEDURE — G0299 HHS/HOSPICE OF RN EA 15 MIN: HCPCS

## 2024-03-05 ENCOUNTER — HOME CARE VISIT (OUTPATIENT)
Dept: HOME HEALTH SERVICES | Facility: HOME HEALTHCARE | Age: 74
End: 2024-03-05
Payer: MEDICARE

## 2024-03-05 VITALS — OXYGEN SATURATION: 99 % | HEART RATE: 72 BPM | SYSTOLIC BLOOD PRESSURE: 138 MMHG | DIASTOLIC BLOOD PRESSURE: 90 MMHG

## 2024-03-05 PROCEDURE — G0157 HHC PT ASSISTANT EA 15: HCPCS

## 2024-03-07 ENCOUNTER — HOME CARE VISIT (OUTPATIENT)
Dept: HOME HEALTH SERVICES | Facility: HOME HEALTHCARE | Age: 74
End: 2024-03-07
Payer: MEDICARE

## 2024-03-07 VITALS
TEMPERATURE: 97.3 F | DIASTOLIC BLOOD PRESSURE: 76 MMHG | SYSTOLIC BLOOD PRESSURE: 124 MMHG | HEART RATE: 74 BPM | OXYGEN SATURATION: 96 % | RESPIRATION RATE: 18 BRPM

## 2024-03-07 PROCEDURE — 10330064 DRESSING, CALCIUM ALGINATE AG SHEET 4"X4

## 2024-03-07 PROCEDURE — 10330064 CLEANSER, WND SEA-CLEANS 6OZ  COLPLT

## 2024-03-07 PROCEDURE — 10330064 TAPE, ADHSV TRANSPORE WHT 2" (6RL/BX 10B

## 2024-03-07 PROCEDURE — G0299 HHS/HOSPICE OF RN EA 15 MIN: HCPCS

## 2024-03-07 PROCEDURE — 10330064

## 2024-03-07 PROCEDURE — 10330064 SPONGE, GAUZE 8PLY N/S 4"X4" (200/PK 20P

## 2024-03-08 ENCOUNTER — HOME CARE VISIT (OUTPATIENT)
Dept: HOME HEALTH SERVICES | Facility: HOME HEALTHCARE | Age: 74
End: 2024-03-08
Payer: MEDICARE

## 2024-03-08 VITALS
OXYGEN SATURATION: 98 % | DIASTOLIC BLOOD PRESSURE: 80 MMHG | RESPIRATION RATE: 20 BRPM | SYSTOLIC BLOOD PRESSURE: 130 MMHG | HEART RATE: 72 BPM

## 2024-03-08 PROCEDURE — G0151 HHCP-SERV OF PT,EA 15 MIN: HCPCS

## 2024-03-11 ENCOUNTER — HOME CARE VISIT (OUTPATIENT)
Dept: HOME HEALTH SERVICES | Facility: HOME HEALTHCARE | Age: 74
End: 2024-03-11
Payer: MEDICARE

## 2024-03-11 VITALS — HEART RATE: 62 BPM | OXYGEN SATURATION: 99 % | TEMPERATURE: 97.9 F

## 2024-03-11 PROCEDURE — G0300 HHS/HOSPICE OF LPN EA 15 MIN: HCPCS

## 2024-03-12 ENCOUNTER — HOME CARE VISIT (OUTPATIENT)
Dept: HOME HEALTH SERVICES | Facility: HOME HEALTHCARE | Age: 74
End: 2024-03-12
Payer: MEDICARE

## 2024-03-14 ENCOUNTER — HOME CARE VISIT (OUTPATIENT)
Dept: HOME HEALTH SERVICES | Facility: HOME HEALTHCARE | Age: 74
End: 2024-03-14
Payer: MEDICARE

## 2024-03-14 VITALS
HEART RATE: 62 BPM | RESPIRATION RATE: 16 BRPM | SYSTOLIC BLOOD PRESSURE: 142 MMHG | OXYGEN SATURATION: 97 % | TEMPERATURE: 97.3 F | DIASTOLIC BLOOD PRESSURE: 68 MMHG

## 2024-03-14 PROCEDURE — 10330064

## 2024-03-14 PROCEDURE — G0300 HHS/HOSPICE OF LPN EA 15 MIN: HCPCS

## 2024-03-14 NOTE — CASE COMMUNICATION
Ship to X Pt. Home   Ordering MD Dr Melecio Giraldo (home health only)   Jose Rafael X   Pouches one piece drainable ref [id number]    2 boxes

## 2024-03-15 ENCOUNTER — TELEPHONE (OUTPATIENT)
Dept: NEUROLOGY | Facility: CLINIC | Age: 74
End: 2024-03-15

## 2024-03-15 DIAGNOSIS — G89.29 CHRONIC NONINTRACTABLE HEADACHE, UNSPECIFIED HEADACHE TYPE: ICD-10-CM

## 2024-03-15 DIAGNOSIS — G89.29 CHRONIC NONINTRACTABLE HEADACHE, UNSPECIFIED HEADACHE TYPE: Primary | ICD-10-CM

## 2024-03-15 DIAGNOSIS — R51.9 CHRONIC NONINTRACTABLE HEADACHE, UNSPECIFIED HEADACHE TYPE: ICD-10-CM

## 2024-03-15 DIAGNOSIS — R51.9 CHRONIC NONINTRACTABLE HEADACHE, UNSPECIFIED HEADACHE TYPE: Primary | ICD-10-CM

## 2024-03-15 RX ORDER — GABAPENTIN 100 MG/1
100 CAPSULE ORAL 3 TIMES DAILY
Qty: 42 CAPSULE | Refills: 0 | Status: SHIPPED | OUTPATIENT
Start: 2024-03-15 | End: 2024-04-14

## 2024-03-15 RX ORDER — GABAPENTIN 100 MG/1
100 CAPSULE ORAL 3 TIMES DAILY
Qty: 270 CAPSULE | Refills: 1 | Status: SHIPPED | OUTPATIENT
Start: 2024-03-15 | End: 2024-03-15 | Stop reason: SDUPTHER

## 2024-03-15 RX ORDER — GABAPENTIN 100 MG/1
100 CAPSULE ORAL 3 TIMES DAILY
Qty: 42 CAPSULE | Refills: 0 | Status: SHIPPED | OUTPATIENT
Start: 2024-03-15 | End: 2024-03-15 | Stop reason: SDUPTHER

## 2024-03-15 RX ORDER — GABAPENTIN 100 MG/1
100 CAPSULE ORAL 3 TIMES DAILY
Qty: 270 CAPSULE | Refills: 1 | Status: SHIPPED | OUTPATIENT
Start: 2024-03-15 | End: 2024-03-17 | Stop reason: SDUPTHER

## 2024-03-15 NOTE — TELEPHONE ENCOUNTER
Short term and long term supply of gabapentin sent to pharmacies, spoke with patient to make aware and to also make aware he will be contacted to schedule follow up appt to ensure further refills of medication since he has not been seen since 2022.    Shashi-can you please contact patient to schedule appt.

## 2024-03-15 NOTE — TELEPHONE ENCOUNTER
3/13 9:56    Pt left a VM requesting a refill of Gabapentin.    Transcribed VM:   Hi, this is Smooth Jackson, 1950. I need a refill on my gabapentin. I'm just about out. I, I, I've been dealing with some bladder cancer issues. So it's taken me a while to get to this. I need a short term refill this week and then I'd like to get back on the 90 day refill on my mail order pharmacy, gabapentin, 3 times a day, 100 mg. Thank you.    Pt hasn't been seen since 3/8/22. It also doesn't appear that pt has had refills in some time. Please generate scripts if agreeable. Thank you.

## 2024-03-17 ENCOUNTER — HOSPITAL ENCOUNTER (INPATIENT)
Facility: HOSPITAL | Age: 74
LOS: 5 days | Discharge: HOME HEALTH | DRG: 698 | End: 2024-03-22
Attending: EMERGENCY MEDICINE | Admitting: STUDENT IN AN ORGANIZED HEALTH CARE EDUCATION/TRAINING PROGRAM
Payer: MEDICARE

## 2024-03-17 ENCOUNTER — APPOINTMENT (EMERGENCY)
Dept: RADIOLOGY | Facility: HOSPITAL | Age: 74
DRG: 698 | End: 2024-03-17
Payer: MEDICARE

## 2024-03-17 ENCOUNTER — HOME CARE VISIT (OUTPATIENT)
Dept: HOME HEALTH SERVICES | Facility: HOME HEALTHCARE | Age: 74
End: 2024-03-17
Payer: MEDICARE

## 2024-03-17 DIAGNOSIS — R78.81 BACTEREMIA: ICD-10-CM

## 2024-03-17 DIAGNOSIS — A41.9 SEPSIS (HCC): ICD-10-CM

## 2024-03-17 DIAGNOSIS — Z93.6 S/P ILEAL CONDUIT (HCC): ICD-10-CM

## 2024-03-17 DIAGNOSIS — N39.0 UTI (URINARY TRACT INFECTION): Primary | ICD-10-CM

## 2024-03-17 DIAGNOSIS — N13.30 HYDRONEPHROSIS: ICD-10-CM

## 2024-03-17 PROBLEM — R06.02 SOB (SHORTNESS OF BREATH): Status: ACTIVE | Noted: 2024-03-17

## 2024-03-17 LAB
2HR DELTA HS TROPONIN: 1 NG/L
ALBUMIN SERPL BCP-MCNC: 4.1 G/DL (ref 3.5–5)
ALP SERPL-CCNC: 84 U/L (ref 34–104)
ALT SERPL W P-5'-P-CCNC: 16 U/L (ref 7–52)
ANION GAP SERPL CALCULATED.3IONS-SCNC: 11 MMOL/L (ref 4–13)
APTT PPP: 30 SECONDS (ref 23–37)
AST SERPL W P-5'-P-CCNC: 15 U/L (ref 13–39)
BACTERIA UR QL AUTO: ABNORMAL /HPF
BASE EX.OXY STD BLDV CALC-SCNC: 73.7 % (ref 60–80)
BASE EXCESS BLDV CALC-SCNC: -5.1 MMOL/L
BASOPHILS # BLD AUTO: 0.03 THOUSANDS/ÂΜL (ref 0–0.1)
BASOPHILS NFR BLD AUTO: 0 % (ref 0–1)
BILIRUB SERPL-MCNC: 0.51 MG/DL (ref 0.2–1)
BILIRUB UR QL STRIP: NEGATIVE
BUN SERPL-MCNC: 32 MG/DL (ref 5–25)
CALCIUM SERPL-MCNC: 9.1 MG/DL (ref 8.4–10.2)
CARDIAC TROPONIN I PNL SERPL HS: 12 NG/L
CARDIAC TROPONIN I PNL SERPL HS: 13 NG/L
CHLORIDE SERPL-SCNC: 109 MMOL/L (ref 96–108)
CLARITY UR: ABNORMAL
CO2 SERPL-SCNC: 20 MMOL/L (ref 21–32)
COLOR UR: ABNORMAL
CREAT SERPL-MCNC: 1.28 MG/DL (ref 0.6–1.3)
EOSINOPHIL # BLD AUTO: 0.03 THOUSAND/ÂΜL (ref 0–0.61)
EOSINOPHIL NFR BLD AUTO: 0 % (ref 0–6)
ERYTHROCYTE [DISTWIDTH] IN BLOOD BY AUTOMATED COUNT: 12.3 % (ref 11.6–15.1)
FLUAV RNA RESP QL NAA+PROBE: NEGATIVE
FLUBV RNA RESP QL NAA+PROBE: NEGATIVE
GFR SERPL CREATININE-BSD FRML MDRD: 54 ML/MIN/1.73SQ M
GLUCOSE SERPL-MCNC: 127 MG/DL (ref 65–140)
GLUCOSE UR STRIP-MCNC: NEGATIVE MG/DL
HCO3 BLDV-SCNC: 18.9 MMOL/L (ref 24–30)
HCT VFR BLD AUTO: 36.9 % (ref 36.5–49.3)
HGB BLD-MCNC: 12.1 G/DL (ref 12–17)
HGB UR QL STRIP.AUTO: ABNORMAL
HYALINE CASTS #/AREA URNS LPF: ABNORMAL /LPF
IMM GRANULOCYTES # BLD AUTO: 0.06 THOUSAND/UL (ref 0–0.2)
IMM GRANULOCYTES NFR BLD AUTO: 1 % (ref 0–2)
INR PPP: 1.4 (ref 0.84–1.19)
KETONES UR STRIP-MCNC: NEGATIVE MG/DL
LACTATE SERPL-SCNC: 1.6 MMOL/L (ref 0.5–2)
LACTATE SERPL-SCNC: 2.3 MMOL/L (ref 0.5–2)
LEUKOCYTE ESTERASE UR QL STRIP: ABNORMAL
LYMPHOCYTES # BLD AUTO: 0.71 THOUSANDS/ÂΜL (ref 0.6–4.47)
LYMPHOCYTES NFR BLD AUTO: 6 % (ref 14–44)
MCH RBC QN AUTO: 29.4 PG (ref 26.8–34.3)
MCHC RBC AUTO-ENTMCNC: 32.8 G/DL (ref 31.4–37.4)
MCV RBC AUTO: 90 FL (ref 82–98)
MONOCYTES # BLD AUTO: 0.62 THOUSAND/ÂΜL (ref 0.17–1.22)
MONOCYTES NFR BLD AUTO: 5 % (ref 4–12)
MUCOUS THREADS UR QL AUTO: ABNORMAL
NEUTROPHILS # BLD AUTO: 10.14 THOUSANDS/ÂΜL (ref 1.85–7.62)
NEUTS SEG NFR BLD AUTO: 88 % (ref 43–75)
NITRITE UR QL STRIP: POSITIVE
NON-SQ EPI CELLS URNS QL MICRO: ABNORMAL /HPF
NRBC BLD AUTO-RTO: 0 /100 WBCS
O2 CT BLDV-SCNC: 11.3 ML/DL
PCO2 BLDV: 31.5 MM HG (ref 42–50)
PH BLDV: 7.39 [PH] (ref 7.3–7.4)
PH UR STRIP.AUTO: 5.5 [PH]
PLATELET # BLD AUTO: 213 THOUSANDS/UL (ref 149–390)
PMV BLD AUTO: 9.7 FL (ref 8.9–12.7)
PO2 BLDV: 39.7 MM HG (ref 35–45)
POTASSIUM SERPL-SCNC: 3.7 MMOL/L (ref 3.5–5.3)
PROCALCITONIN SERPL-MCNC: 0.37 NG/ML
PROT SERPL-MCNC: 6.3 G/DL (ref 6.4–8.4)
PROT UR STRIP-MCNC: ABNORMAL MG/DL
PROTHROMBIN TIME: 16.9 SECONDS (ref 11.6–14.5)
RBC # BLD AUTO: 4.12 MILLION/UL (ref 3.88–5.62)
RBC #/AREA URNS AUTO: ABNORMAL /HPF
RENAL EPI CELLS #/AREA URNS HPF: PRESENT /[HPF]
RSV RNA RESP QL NAA+PROBE: NEGATIVE
SARS-COV-2 RNA RESP QL NAA+PROBE: NEGATIVE
SODIUM SERPL-SCNC: 140 MMOL/L (ref 135–147)
SP GR UR STRIP.AUTO: 1.01 (ref 1–1.03)
UROBILINOGEN UR STRIP-ACNC: <2 MG/DL
WBC # BLD AUTO: 11.59 THOUSAND/UL (ref 4.31–10.16)
WBC #/AREA URNS AUTO: ABNORMAL /HPF
WBC CLUMPS # UR AUTO: PRESENT /UL

## 2024-03-17 PROCEDURE — 87040 BLOOD CULTURE FOR BACTERIA: CPT

## 2024-03-17 PROCEDURE — 83605 ASSAY OF LACTIC ACID: CPT

## 2024-03-17 PROCEDURE — 87077 CULTURE AEROBIC IDENTIFY: CPT

## 2024-03-17 PROCEDURE — 99223 1ST HOSP IP/OBS HIGH 75: CPT | Performed by: STUDENT IN AN ORGANIZED HEALTH CARE EDUCATION/TRAINING PROGRAM

## 2024-03-17 PROCEDURE — 85025 COMPLETE CBC W/AUTO DIFF WBC: CPT

## 2024-03-17 PROCEDURE — 87186 SC STD MICRODIL/AGAR DIL: CPT

## 2024-03-17 PROCEDURE — 85610 PROTHROMBIN TIME: CPT

## 2024-03-17 PROCEDURE — 99285 EMERGENCY DEPT VISIT HI MDM: CPT

## 2024-03-17 PROCEDURE — 81001 URINALYSIS AUTO W/SCOPE: CPT

## 2024-03-17 PROCEDURE — 80053 COMPREHEN METABOLIC PANEL: CPT

## 2024-03-17 PROCEDURE — 87086 URINE CULTURE/COLONY COUNT: CPT

## 2024-03-17 PROCEDURE — 87154 CUL TYP ID BLD PTHGN 6+ TRGT: CPT

## 2024-03-17 PROCEDURE — 87147 CULTURE TYPE IMMUNOLOGIC: CPT

## 2024-03-17 PROCEDURE — 96365 THER/PROPH/DIAG IV INF INIT: CPT

## 2024-03-17 PROCEDURE — 85730 THROMBOPLASTIN TIME PARTIAL: CPT

## 2024-03-17 PROCEDURE — 93005 ELECTROCARDIOGRAM TRACING: CPT

## 2024-03-17 PROCEDURE — 0241U HB NFCT DS VIR RESP RNA 4 TRGT: CPT

## 2024-03-17 PROCEDURE — 71045 X-RAY EXAM CHEST 1 VIEW: CPT

## 2024-03-17 PROCEDURE — 36415 COLL VENOUS BLD VENIPUNCTURE: CPT

## 2024-03-17 PROCEDURE — 82805 BLOOD GASES W/O2 SATURATION: CPT

## 2024-03-17 PROCEDURE — 84145 PROCALCITONIN (PCT): CPT

## 2024-03-17 PROCEDURE — 99291 CRITICAL CARE FIRST HOUR: CPT | Performed by: EMERGENCY MEDICINE

## 2024-03-17 PROCEDURE — 84484 ASSAY OF TROPONIN QUANT: CPT

## 2024-03-17 PROCEDURE — 96361 HYDRATE IV INFUSION ADD-ON: CPT

## 2024-03-17 RX ORDER — ACETAMINOPHEN 325 MG/1
975 TABLET ORAL ONCE
Status: COMPLETED | OUTPATIENT
Start: 2024-03-17 | End: 2024-03-17

## 2024-03-17 RX ORDER — FELODIPINE 2.5 MG/1
5 TABLET, EXTENDED RELEASE ORAL DAILY
Status: DISCONTINUED | OUTPATIENT
Start: 2024-03-18 | End: 2024-03-22 | Stop reason: HOSPADM

## 2024-03-17 RX ORDER — IPRATROPIUM BROMIDE AND ALBUTEROL SULFATE .5; 3 MG/3ML; MG/3ML
1 SOLUTION RESPIRATORY (INHALATION) ONCE
Status: COMPLETED | OUTPATIENT
Start: 2024-03-17 | End: 2024-03-17

## 2024-03-17 RX ORDER — MELATONIN
1000 DAILY
Status: DISCONTINUED | OUTPATIENT
Start: 2024-03-18 | End: 2024-03-22 | Stop reason: HOSPADM

## 2024-03-17 RX ORDER — GABAPENTIN 100 MG/1
100 CAPSULE ORAL 3 TIMES DAILY
Status: DISCONTINUED | OUTPATIENT
Start: 2024-03-17 | End: 2024-03-22 | Stop reason: HOSPADM

## 2024-03-17 RX ORDER — UBIDECARENONE 30 MG
100 CAPSULE ORAL DAILY
Status: DISCONTINUED | OUTPATIENT
Start: 2024-03-18 | End: 2024-03-22 | Stop reason: HOSPADM

## 2024-03-17 RX ORDER — METHYLPREDNISOLONE SOD SUCC 125 MG
1 VIAL (EA) INJECTION ONCE
Status: COMPLETED | OUTPATIENT
Start: 2024-03-17 | End: 2024-03-17

## 2024-03-17 RX ORDER — GUAIFENESIN 600 MG/1
600 TABLET, EXTENDED RELEASE ORAL 2 TIMES DAILY PRN
Status: DISCONTINUED | OUTPATIENT
Start: 2024-03-17 | End: 2024-03-22 | Stop reason: HOSPADM

## 2024-03-17 RX ORDER — ATORVASTATIN CALCIUM 20 MG/1
20 TABLET, FILM COATED ORAL DAILY
Status: DISCONTINUED | OUTPATIENT
Start: 2024-03-18 | End: 2024-03-22 | Stop reason: HOSPADM

## 2024-03-17 RX ORDER — SODIUM CHLORIDE, SODIUM GLUCONATE, SODIUM ACETATE, POTASSIUM CHLORIDE, MAGNESIUM CHLORIDE, SODIUM PHOSPHATE, DIBASIC, AND POTASSIUM PHOSPHATE .53; .5; .37; .037; .03; .012; .00082 G/100ML; G/100ML; G/100ML; G/100ML; G/100ML; G/100ML; G/100ML
100 INJECTION, SOLUTION INTRAVENOUS CONTINUOUS
Status: DISPENSED | OUTPATIENT
Start: 2024-03-17 | End: 2024-03-18

## 2024-03-17 RX ORDER — LEVOTHYROXINE SODIUM 0.07 MG/1
75 TABLET ORAL
Status: DISCONTINUED | OUTPATIENT
Start: 2024-03-18 | End: 2024-03-22 | Stop reason: HOSPADM

## 2024-03-17 RX ORDER — ALBUTEROL SULFATE 2.5 MG/3ML
1 SOLUTION RESPIRATORY (INHALATION) ONCE
Status: COMPLETED | OUTPATIENT
Start: 2024-03-17 | End: 2024-03-17

## 2024-03-17 RX ORDER — METOPROLOL SUCCINATE 25 MG/1
25 TABLET, EXTENDED RELEASE ORAL 2 TIMES DAILY
Status: DISCONTINUED | OUTPATIENT
Start: 2024-03-17 | End: 2024-03-22 | Stop reason: HOSPADM

## 2024-03-17 RX ADMIN — SODIUM CHLORIDE, SODIUM GLUCONATE, SODIUM ACETATE, POTASSIUM CHLORIDE, MAGNESIUM CHLORIDE, SODIUM PHOSPHATE, DIBASIC, AND POTASSIUM PHOSPHATE 100 ML/HR: .53; .5; .37; .037; .03; .012; .00082 INJECTION, SOLUTION INTRAVENOUS at 23:19

## 2024-03-17 RX ADMIN — APIXABAN 5 MG: 5 TABLET, FILM COATED ORAL at 23:19

## 2024-03-17 RX ADMIN — SODIUM CHLORIDE 500 ML: 0.9 INJECTION, SOLUTION INTRAVENOUS at 19:31

## 2024-03-17 RX ADMIN — SODIUM CHLORIDE 500 ML: 0.9 INJECTION, SOLUTION INTRAVENOUS at 21:40

## 2024-03-17 RX ADMIN — ACETAMINOPHEN 975 MG: 325 TABLET, FILM COATED ORAL at 19:57

## 2024-03-17 RX ADMIN — GABAPENTIN 100 MG: 100 CAPSULE ORAL at 23:19

## 2024-03-17 RX ADMIN — CEFTRIAXONE 2000 MG: 1 INJECTION, POWDER, FOR SOLUTION INTRAMUSCULAR; INTRAVENOUS at 20:46

## 2024-03-17 NOTE — CASE COMMUNICATION
3.17.24  at 1730     caregiver abdi called to say that pt had limited fluids today - possibly 3 glasses total and is just taking sips fluids.    states he did vomit and pt states mostly mucus.   pt coughing in background on phone and abdi reports pt lethargic and had trouble finishing some sentences this afternoon.  states he is not interested in using his phone or watching TV and slept several hrs this afternoon also.   very small am ts food today.    during phone call abdi used oximeter and readings are between 89 and 93 percent and heart rate running about 110.    denies chest pain but has some labored breathing at rest.   pt did not have fever earlier but during phone call it spiked to 103.3  and  i communicated with covering doctor for PCP dr waqar pang to notify of same.    she suggested ER and not to wait until tomorrow for PCP office visit.    Instructed  abdi to call 911 to take to ER but she said she will see how he is and see if she can get him in the car.     Advised that pt maybe too weak and she said she will call 911 if needed.

## 2024-03-17 NOTE — ED PROVIDER NOTES
History  Chief Complaint   Patient presents with    Weakness - Generalized     Wife called 911 for lfu like symptoms, upon EMS arrival pt appeared to have difficulty breathing, difficulty ambulating patient normally self without assistive devices. AMS nomal neuro intact      Patient is a 74-year-old male with a significant past medical history of bladder cancer with a urostomy bag recently placed approximately 1 month ago, atrial fibrillation on Eliquis, presenting for evaluation of generalized weakness.  The patient has reportedly had approximately 3 days or so of some generalized weakness, fatigue, and myalgias.  He reports that he has been coughing over this timeframe and that is been productive of a clear type mucus.  He feels progressively more short of breath when he exerts himself.  He has also reportedly had some foul-smelling urine from his urostomy bag as of late.  He had a fever at home measured at 103 Fahrenheit.  He has been taking Tylenol for this.  Patient reportedly tachypneic and rhonchorous on EMS arrival and was given a nebulizer treatment with some improvement.  He denies any chest pain or abdominal pain.  Denies any changes in bowel movements.  Patient otherwise without complaints.        Prior to Admission Medications   Prescriptions Last Dose Informant Patient Reported? Taking?   Co-Enzyme Q-10 100 MG CAPS 3/16/2024 Self Yes Yes   Sig: Take 100 mg by mouth daily   Misc Natural Products (LUTEIN 20 PO) More than a month Self Yes No   Sig: Take 20 mg by mouth daily.   acetaminophen (TYLENOL) 325 mg tablet Past Week Self No Yes   Sig: Take 2 tablets (650 mg total) by mouth every 6 (six) hours as needed for mild pain or fever   apixaban (Eliquis) 5 mg 3/17/2024  No Yes   Sig: Take 1 tablet (5 mg total) by mouth 2 (two) times a day   aspirin (ECOTRIN LOW STRENGTH) 81 mg EC tablet 3/17/2024 Self Yes Yes   Sig: Take 81 mg by mouth daily   atorvastatin (LIPITOR) 20 mg tablet 3/16/2024 Self No Yes    Sig: TAKE 1 TABLET DAILY   cholecalciferol (VITAMIN D3) 1,000 units tablet 3/16/2024 Self Yes Yes   Sig: Take 1,000 Units by mouth daily   felodipine (PLENDIL) 5 mg 24 hr tablet 3/16/2024 Self No Yes   Sig: TAKE 1 TABLET DAILY   gabapentin (NEURONTIN) 100 mg capsule 3/17/2024  No Yes   Sig: Take 1 capsule (100 mg total) by mouth 3 (three) times a day   hydrocortisone 2.5 % cream  Self Yes No   Sig: Apply 1 application. topically 2 (two) times a day To affected area as needed   ketoconazole (NIZORAL) 2 % cream  Self Yes No   Sig: As needed   levothyroxine 75 mcg tablet 3/17/2024 Self No Yes   Sig: TAKE 1 TABLET DAILY FOR    ACQUIRED HYPOTHYROIDISM   metoprolol succinate (TOPROL-XL) 25 mg 24 hr tablet 3/17/2024 Self No Yes   Sig: TAKE 1 TABLET TWICE A DAY   multivitamin (THERAGRAN) TABS 3/17/2024 Self Yes Yes   Sig: Take 1 tablet by mouth daily.   ondansetron (ZOFRAN) 4 mg tablet Past Week  No Yes   Sig: Take 1 tablet (4 mg total) by mouth every 8 (eight) hours as needed for nausea or vomiting   oxyCODONE (ROXICODONE) 5 immediate release tablet Past Month  Yes Yes   Sig: Take 5 mg by mouth every 6 (six) hours as needed for severe pain. Indications: Acute Pain   simethicone (MYLICON) 80 mg chewable tablet Past Month  Yes Yes   Sig: Chew 80 mg   tiZANidine (ZANAFLEX) 4 mg tablet Past Month  No Yes   Sig: Take 1 tablet (4 mg total) by mouth every 8 (eight) hours as needed for muscle spasms      Facility-Administered Medications: None       Past Medical History:   Diagnosis Date    Acute blood loss anemia 03/08/2019    Aneurysm of thoracic aorta (HCC)     Arrhythmia     Cancer (HCC)     Cholecystitis 03/05/2019    Added automatically from request for surgery [id number]    Detached retina, right     Disease of thyroid gland     Gastric wall thickening     Headache     Hematoma 10/10/2018    Hypertension     Iliac artery aneurysm, bilateral (HCC)     Irregular heart beat     afib cardioversion 1/30/17, x2     Neuropathy      bilateral feet    Paroxysmal A-fib (HCC)     Prostatic hypertrophy     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(HCC)        Past Surgical History:   Procedure Laterality Date    ABDOMINAL AORTIC ANEURYSM REPAIR, ENDOVASCULAR Right 2018    Procedure: REPAIR ANEURYSM ILIAC endovascular  with coil embolization right hypogastric artery.;  Surgeon: Guille Esquivel MD;  Location: [facility];  Service: Vascular    BLADDER CANCER BY FISH (HISTORICAL)      CARDIOVERSION      CATARACT EXTRACTION Left 10/20/2019    Right eye 2011    CHOLECYSTECTOMY      CHOLECYSTECTOMY LAPAROSCOPIC N/A 2019    Procedure: [id number] CHOLECYSTECTOMY;  Surgeon: Derik Case DO;  Location: [facility];  Service: General    COLONOSCOPY  2016    CYSTECTOMY, RADICAL WITH ILEOCONDUIT  2024    MOLE EXCISION      MD CYSTOURETHROSCOPY W/DEST &/RMVL TUMOR LARGE N/A 2023    Procedure: TURBT, gemcitabine instillation;  Surgeon: Juvenal Cruz MD;  Location: [facility];  Service: Urology    MD EDG US EXAM SURGICAL ALTER STOM DUODENUM/JEJUNUM N/A 11/10/2017    Procedure: RADIAL ENDOSCOPIC U/S;  Surgeon: Harvey Gallegos MD;  Location: [facility];  Service: Gastroenterology    RETINAL DETACHMENT SURGERY Right     onset , retinal detachment complete air fill confirmed       Family History   Problem Relation Age of Onset    Stroke Mother          at 91, failure to thrive    Aortic aneurysm Father         abdominal    Aortic aneurysm Brother     Basal cell carcinoma Brother     Schizophrenia Brother     Cancer Maternal Grandmother         smoker, heart attack, cancer    Cancer Maternal Grandfather         smoker cancer    Cancer Paternal Grandmother         malignant neoplasm    Cancer Paternal Grandfather         malignant neoplasm    Cancer Family         malignant neoplasm    Cancer Family         malignant neoplasm    Cancer Maternal Uncle         smoker, lots wrong with her    Cancer Paternal Uncle          melanoma at 92     I have reviewed and agree with the history as documented.    E-Cigarette/Vaping    E-Cigarette Use Never User      E-Cigarette/Vaping Substances    Nicotine No     THC No     CBD No     Flavoring No     Other No     Unknown No      Social History     Tobacco Use    Smoking status: Never    Smokeless tobacco: Never   Vaping Use    Vaping status: Never Used   Substance Use Topics    Alcohol use: Yes     Alcohol/week: 5.0 standard drinks of alcohol     Types: 2 Glasses of wine, 1 Cans of beer, 2 Standard drinks or equivalent per week     Comment: 3-4 x week    Drug use: Never        Review of Systems   Constitutional:  Positive for fatigue.   Respiratory:  Positive for cough and shortness of breath.    Cardiovascular:  Negative for chest pain.   Gastrointestinal:  Negative for abdominal pain, constipation, diarrhea, nausea and vomiting.   Musculoskeletal:  Positive for myalgias.   Neurological:  Positive for weakness (generalized).       Physical Exam  ED Triage Vitals   Temperature Pulse Respirations Blood Pressure SpO2   03/17/24 1919 03/17/24 1919 03/17/24 1919 03/17/24 1919 03/17/24 1919   (S) (!) 103.3 °F (39.6 °C) (!) 125 (!) 24 112/69 97 %      Temp Source Heart Rate Source Patient Position - Orthostatic VS BP Location FiO2 (%)   03/17/24 1919 03/17/24 1919 03/17/24 1919 03/17/24 1919 --   Rectal Monitor Sitting Right arm       Pain Score       03/17/24 1957       Med Not Given for Pain - for MAR use only             Orthostatic Vital Signs  Vitals:    03/17/24 2145 03/17/24 2215 03/17/24 2230 03/17/24 2245   BP: 99/67 115/61 111/64 105/60   Pulse: 98 92 98 92   Patient Position - Orthostatic VS: Sitting Sitting Sitting Sitting       Physical Exam  Vitals and nursing note reviewed.   Constitutional:       General: He is not in acute distress.     Appearance: Normal appearance. He is ill-appearing. He is not toxic-appearing.   HENT:      Head: Normocephalic and atraumatic.      Right Ear:  External ear normal.      Left Ear: External ear normal.      Nose: Nose normal.      Mouth/Throat:      Mouth: Mucous membranes are dry.   Eyes:      General: No scleral icterus.        Right eye: No discharge.         Left eye: No discharge.      Extraocular Movements: Extraocular movements intact.      Conjunctiva/sclera: Conjunctivae normal.   Cardiovascular:      Rate and Rhythm: Tachycardia present. Rhythm irregular.      Heart sounds: Normal heart sounds. No murmur heard.     No friction rub. No gallop.   Pulmonary:      Effort: Pulmonary effort is normal. Tachypnea present. No respiratory distress.      Breath sounds: Rhonchi present.   Abdominal:      General: Abdomen is flat. There is no distension.      Palpations: Abdomen is soft. There is no mass.      Tenderness: There is no abdominal tenderness.      Comments: Urostomy bag in place   Genitourinary:     Comments: Deferred  Skin:     General: Skin is warm and dry.   Neurological:      General: No focal deficit present.      Mental Status: He is alert.         ED Medications  Medications   apixaban (ELIQUIS) tablet 5 mg (5 mg Oral Given 3/17/24 2319)   atorvastatin (LIPITOR) tablet 20 mg (has no administration in time range)   aspirin (ECOTRIN LOW STRENGTH) EC tablet 81 mg (has no administration in time range)   co-enzyme Q-10 capsule 100 mg (has no administration in time range)   cholecalciferol (VITAMIN D3) tablet 1,000 Units (has no administration in time range)   felodipine (PLENDIL) 24 hr tablet 5 mg (has no administration in time range)   gabapentin (NEURONTIN) capsule 100 mg (100 mg Oral Given 3/17/24 2319)   levothyroxine tablet 75 mcg (has no administration in time range)   metoprolol succinate (TOPROL-XL) 24 hr tablet 25 mg (25 mg Oral Not Given 3/17/24 2319)   multi-electrolyte (PLASMALYTE-A/ISOLYTE-S PH 7.4) IV solution (100 mL/hr Intravenous New Bag 3/17/24 2319)   guaiFENesin (MUCINEX) 12 hr tablet 600 mg (has no administration in time  range)   acetaminophen (TYLENOL) tablet 975 mg (975 mg Oral Given 3/17/24 1957)   sodium chloride 0.9 % bolus 500 mL (0 mL Intravenous Stopped 3/17/24 2031)   ipratropium-albuterol (FOR EMS ONLY) (DUO-NEB) 0.5-2.5 mg/3 mL inhalation solution 3 mL (0 mL Does not apply Given to EMS 3/17/24 1933)   albuterol (FOR EMS ONLY) (2.5 mg/3 mL) 0.083 % inhalation solution 2.5 mg (0 mg Does not apply Given to EMS 3/17/24 1933)   methylPREDNISolone sodium succinate (FOR EMS ONLY) (Solu-MEDROL) 125 MG injection 125 mg (0 mg Does not apply Given to EMS 3/17/24 1933)   ceftriaxone (ROCEPHIN) 2 g/50 mL in dextrose IVPB (0 mg Intravenous Stopped 3/17/24 2116)   sodium chloride 0.9 % bolus 500 mL (0 mL Intravenous Stopped 3/17/24 2240)       Diagnostic Studies  Results Reviewed       Procedure Component Value Units Date/Time    HS Troponin I 4hr [[id number]]  (Normal) Collected: 03/18/24 0005    Lab Status: Final result Specimen: Blood from Arm, Left Updated: 03/18/24 0038     hs TnI 4hr 10 ng/L      Delta 4hr hsTnI -2 ng/L     Sputum culture and Gram stain [[id number]]     Lab Status: No result Specimen: Sputum     HS Troponin I 2hr [[id number]]  (Normal) Collected: 03/17/24 2123    Lab Status: Final result Specimen: Blood from Arm, Right Updated: 03/17/24 2159     hs TnI 2hr 13 ng/L      Delta 2hr hsTnI 1 ng/L     Lactic acid 2 Hours [[id number]]  (Normal) Collected: 03/17/24 2123    Lab Status: Final result Specimen: Blood from Arm, Right Updated: 03/17/24 2153     LACTIC ACID 1.6 mmol/L     Narrative:      Result may be elevated if tourniquet was used during collection.    Urine Microscopic [[id number]]  (Abnormal) Collected: 03/17/24 1949    Lab Status: Final result Specimen: Urine, Suprapubic catheter Updated: 03/17/24 2135     RBC, UA 30-50 /hpf      WBC, UA Innumerable /hpf      Epithelial Cells None Seen /hpf      Bacteria, UA Moderate /hpf      MUCUS THREADS Occasional     Hyaline Casts, UA 0-3 /lpf      WBC Clumps Present      Renal Epithelial Cells Present    Urine culture [[id number]] Collected: 03/17/24 1949    Lab Status: In process Specimen: Urine, Suprapubic catheter Updated: 03/17/24 2126    UA w Reflex to Microscopic w Reflex to Culture [[id number]]  (Abnormal) Collected: 03/17/24 1949    Lab Status: Final result Specimen: Urine, Suprapubic catheter Updated: 03/17/24 2117     Color, UA Light Yellow     Clarity, UA Hazy     Specific [place], UA 1.011     pH, UA 5.5     Leukocytes, UA Large     Nitrite, UA Positive     Protein, UA 30 (1+) mg/dl      Glucose, UA Negative mg/dl      Ketones, UA Negative mg/dl      Urobilinogen, UA <2.0 mg/dl      Bilirubin, UA Negative     Occult Blood, UA Large    Blood gas, Venous [[id number]]  (Abnormal) Collected: 03/17/24 2039    Lab Status: Final result Specimen: Blood from Arm, Right Updated: 03/17/24 2047     pH, Rhett 7.395     pCO2, Rhett 31.5 mm Hg      pO2, Rhett 39.7 mm Hg      HCO3, Rhett 18.9 mmol/L      Base Excess, Rhett -5.1 mmol/L      O2 Content, Rhett 11.3 ml/dL      O2 HGB, VENOUS 73.7 %     FLU/RSV/COVID - if FLU/RSV clinically relevant [[id number]]  (Normal) Collected: 03/17/24 1926    Lab Status: Final result Specimen: Nares from Nasopharyngeal Swab Updated: 03/17/24 2027     SARS-CoV-2 Negative     INFLUENZA A PCR Negative     INFLUENZA B PCR Negative     RSV PCR Negative    Narrative:      FOR PEDIATRIC PATIENTS - copy/paste COVID Guidelines URL to browser: [email or web address]    SARS-CoV-2 assay is a Nucleic Acid Amplification assay intended for the  qualitative detection of nucleic acid from SARS-CoV-2 in nasopharyngeal  swabs. Results are for the presumptive identification of SARS-CoV-2 RNA.    Positive results are indicative of infection with SARS-CoV-2, the virus  causing COVID-19, but do not rule out bacterial infection or co-infection  with other viruses. Laboratories within the [place] and its  territories are required to  report all positive results to the appropriate  public health authorities. Negative results do not preclude SARS-CoV-2  infection and should not be used as the sole basis for treatment or other  patient management decisions. Negative results must be combined with  clinical observations, patient history, and epidemiological information.  This test has not been FDA cleared or approved.    This test has been authorized by FDA under an Emergency Use Authorization  (EUA). This test is only authorized for the duration of time the  declaration that circumstances exist justifying the authorization of the  emergency use of an in vitro diagnostic tests for detection of SARS-CoV-2  virus and/or diagnosis of COVID-19 infection under section 564(b)(1) of  the Act, 21 U.S.C. 360bbb-3(b)(1), unless the authorization is terminated  or revoked sooner. The test has been validated but independent review by FDA  and CLIA is pending.    Test performed using [employer] GeneXpert: This RT-PCR assay targets N2,  a region unique to SARS-CoV-2. A conserved region in the E-gene was chosen  for pan-Sarbecovirus detection which includes SARS-CoV-2.    According to CMS-2020-01-R, this platform meets the definition of high-throughput technology.    Procalcitonin [[id number]]  (Abnormal) Collected: 03/17/24 1926    Lab Status: Final result Specimen: Blood from Arm, Right Updated: 03/17/24 2021     Procalcitonin 0.37 ng/ml     HS Troponin 0hr (reflex protocol) [[id number]]  (Normal) Collected: 03/17/24 1926    Lab Status: Final result Specimen: Blood from Arm, Right Updated: 03/17/24 2018     hs TnI 0hr 12 ng/L     Lactic acid [[id number]]  (Abnormal) Collected: 03/17/24 1926    Lab Status: Final result Specimen: Blood from Arm, Right Updated: 03/17/24 2018     LACTIC ACID 2.3 mmol/L     Narrative:      Result may be elevated if tourniquet was used during collection.    Comprehensive metabolic panel [[id number]]  (Abnormal) Collected: 03/17/24 1926    Lab  Status: Final result Specimen: Blood from Arm, Right Updated: 03/17/24 2011     Sodium 140 mmol/L      Potassium 3.7 mmol/L      Chloride 109 mmol/L      CO2 20 mmol/L      ANION GAP 11 mmol/L      BUN 32 mg/dL      Creatinine 1.28 mg/dL      Glucose 127 mg/dL      Calcium 9.1 mg/dL      AST 15 U/L      ALT 16 U/L      Alkaline Phosphatase 84 U/L      Total Protein 6.3 g/dL      Albumin 4.1 g/dL      Total Bilirubin 0.51 mg/dL      eGFR 54 ml/min/1.73sq m     Narrative:      National Kidney Disease Foundation guidelines for Chronic Kidney Disease (CKD):     Stage 1 with normal or high GFR (GFR > 90 mL/min/1.73 square meters)    Stage 2 Mild CKD (GFR = 60-89 mL/min/1.73 square meters)    Stage 3A Moderate CKD (GFR = 45-59 mL/min/1.73 square meters)    Stage 3B Moderate CKD (GFR = 30-44 mL/min/1.73 square meters)    Stage 4 Severe CKD (GFR = 15-29 mL/min/1.73 square meters)    Stage 5 End Stage CKD (GFR <15 mL/min/1.73 square meters)  Note: GFR calculation is accurate only with a steady state creatinine    Protime-INR [[id number]]  (Abnormal) Collected: 03/17/24 1926    Lab Status: Final result Specimen: Blood from Arm, Right Updated: 03/17/24 2003     Protime 16.9 seconds      INR 1.40    APTT [[id number]]  (Normal) Collected: 03/17/24 1926    Lab Status: Final result Specimen: Blood from Arm, Right Updated: 03/17/24 2003     PTT 30 seconds     CBC and differential [[id number]]  (Abnormal) Collected: 03/17/24 1926    Lab Status: Final result Specimen: Blood from Arm, Right Updated: 03/1950     WBC 11.59 Thousand/uL      RBC 4.12 Million/uL      Hemoglobin 12.1 g/dL      Hematocrit 36.9 %      MCV 90 fL      MCH 29.4 pg      MCHC 32.8 g/dL      RDW 12.3 %      MPV 9.7 fL      Platelets 213 Thousands/uL      nRBC 0 /100 WBCs      Neutrophils Relative 88 %      Immature Grans % 1 %      Lymphocytes Relative 6 %      Monocytes Relative 5 %      Eosinophils Relative 0 %      Basophils Relative 0 %      Neutrophils  Absolute 10.14 Thousands/µL      Absolute Immature Grans 0.06 Thousand/uL      Absolute Lymphocytes 0.71 Thousands/µL      Absolute Monocytes 0.62 Thousand/µL      Eosinophils Absolute 0.03 Thousand/µL      Basophils Absolute 0.03 Thousands/µL     Blood culture #1 [[id number]] Collected: 03/17/24 1943    Lab Status: In process Specimen: Blood from Hand, Left Updated: 03/17/24 1948    Blood culture #2 [[id number]] Collected: 03/17/24 1926    Lab Status: In process Specimen: Blood from Arm, Right Updated: 03/17/24 1945                   XR chest portable - 1 view   ED Interpretation by Sidney José DO (03/17 2041)   No acute cardiopulmonary disease      CT chest abdomen pelvis w contrast    (Results Pending)         Procedures  Procedures      ED Course  ED Course as of 03/18/24 0146   Sun Mar 17, 2024   2038 This is the first point in time in which I suspect that this patient's presentation is secondary to an infection.  Patient meeting SIRS criteria given his elevated temperature, tachycardia.  Source undifferentiated at this time.  Will start empiric ceftriaxone.   2106 Procedure Note: EKG  Date/Time: 03/17/24 9:07 PM   Interpreted by: Sidney José   Indications / Diagnosis: SOB  ECG reviewed by me, the ED Provider: yes   The EKG demonstrates:  Rhythm: atrial fibrillation  Intervals: normal intervals  Axis: normal axis  QRS/Blocks: incomplete RBBB  ST Changes: No acute ST Changes, no STD/REBECCA.   2121 Nitrite, UA(!): Positive                          Initial Sepsis Screening       Row Name 03/17/24 2039                Is the patient's history suggestive of a new or worsening infection? Yes (Proceed)  -GJ        Suspected source of infection suspect infection, source unknown  -GJ        Indicate SIRS criteria Hyperthemia > 38.3C (100.9F) OR Hypothermia <36C (96.8F);Tachycardia > 90 bpm  -GJ        Are two or more of the above signs & symptoms of infection both present and new to the patient? Yes (Proceed)   "-GJ        Assess for evidence of organ dysfunction: Are any of the below criteria present within 6 hours of suspected infection and SIRS criteria that are NOT considered to be chronic conditions? Lactate > 2.0  -        Date of presentation of severe sepsis 03/17/24  -        Time of presentation of severe sepsis 2039  -        Sepsis Note: Click \"NEXT\" below (NOT \"close\") to generate sepsis note based on above information. --                  User Key  (r) = Recorded By, (t) = Taken By, (c) = Cosigned By      Initials Name Provider Type    LIZY José,  Physician                  Default Flowsheet Data (last 720 hours)       Sepsis Reassess       Row Name 03/18/24 0143                   Repeat Volume Status and Tissue Perfusion Assessment Performed    Date of Reassessment: 03/17/24  -        Time of Reassessment: 2150 -        Sepsis Reassessment Note: Click \"NEXT\" below (NOT \"close\") to generate sepsis reassessment note. --        Repeat Volume Status and Tissue Perfusion Assessment Performed --                  User Key  (r) = Recorded By, (t) = Taken By, (c) = Cosigned By      Initials Name Provider Type    LIZY José,  Physician                  SBIRT 22yo+      Flowsheet Row Most Recent Value   Initial Alcohol Screen: US AUDIT-C     1. How often do you have a drink containing alcohol? 0 Filed at: 03/17/2024 2050   2. How many drinks containing alcohol do you have on a typical day you are drinking?  0 Filed at: 03/17/2024 2050   3a. Male UNDER 65: How often do you have five or more drinks on one occasion? 0 Filed at: 03/17/2024 2004   3b. FEMALE Any Age, or MALE 65+: How often do you have 4 or more drinks on one occassion? 0 Filed at: 03/17/2024 2050   Audit-C Score 0 Filed at: 03/17/2024 2050   MITA: How many times in the past year have you...    Used an illegal drug or used a prescription medication for non-medical reasons? Never Filed at: 03/17/2024 2050      "             Medical Decision Making  Patient with history as above presented with generalized weakness. History obtained from patient.    Differential diagnosis includes: viral syndrome, anemia, arrhythmia, electrolyte disturbance, at risk for sepsis    Plan: Sepsis labs, ECG, chest x-ray, viral swab, fluids    Reviewed external records. ECG independently interpreted by myself as above. Labs reviewed and remarkable for a leukocytosis, lactic acidosis, mild elevated procalcitonin.  Urine consistent with infection.  Patient initiated on ceftriaxone.  Independently reviewed imaging without acute cardiopulmonary disease.  Suspect patient's presentation secondary to urinary tract infection. Discussed patient's management with medicine who agreed to admit patient.    Amount and/or Complexity of Data Reviewed  Labs: ordered. Decision-making details documented in ED Course.  Radiology: ordered and independent interpretation performed.    Risk  OTC drugs.  Prescription drug management.  Decision regarding hospitalization.          Disposition  Final diagnoses:   UTI (urinary tract infection)   Sepsis (HCC)     Time reflects when diagnosis was documented in both MDM as applicable and the Disposition within this note       Time User Action Codes Description Comment    3/17/2024  9:31 PM Sidney José Add [N39.0] UTI (urinary tract infection)     3/17/2024  9:31 PM Sidney José Add [A41.9] Sepsis (HCC)           ED Disposition       ED Disposition   Admit    Condition   Stable    Date/Time   Sun Mar 17, 2024 2131    Comment   Case was discussed with JANAK and the patient's admission status was agreed to be Admission Status: inpatient status to the service of Dr. Graves .               Follow-up Information    None         Current Discharge Medication List        CONTINUE these medications which have NOT CHANGED    Details   acetaminophen (TYLENOL) 325 mg tablet Take 2 tablets (650 mg total) by mouth every 6 (six) hours as  needed for mild pain or fever  Qty: 30 tablet, Refills: 0    Associated Diagnoses: Right upper quadrant abdominal pain      apixaban (Eliquis) 5 mg Take 1 tablet (5 mg total) by mouth 2 (two) times a day  Qty: 180 tablet, Refills: 3    Associated Diagnoses: Paroxysmal atrial fibrillation (HCC)      aspirin (ECOTRIN LOW STRENGTH) 81 mg EC tablet Take 81 mg by mouth daily      atorvastatin (LIPITOR) 20 mg tablet TAKE 1 TABLET DAILY  Qty: 90 tablet, Refills: 3    Associated Diagnoses: Hypertension, unspecified type      cholecalciferol (VITAMIN D3) 1,000 units tablet Take 1,000 Units by mouth daily      Co-Enzyme Q-10 100 MG CAPS Take 100 mg by mouth daily      felodipine (PLENDIL) 5 mg 24 hr tablet TAKE 1 TABLET DAILY  Qty: 90 tablet, Refills: 1    Associated Diagnoses: Essential hypertension      gabapentin (NEURONTIN) 100 mg capsule Take 1 capsule (100 mg total) by mouth 3 (three) times a day  Qty: 42 capsule, Refills: 0    Comments: Short term supply until patient gets mail order supply  Associated Diagnoses: Chronic nonintractable headache, unspecified headache type      levothyroxine 75 mcg tablet TAKE 1 TABLET DAILY FOR    ACQUIRED HYPOTHYROIDISM  Qty: 90 tablet, Refills: 1    Associated Diagnoses: Acquired hypothyroidism      metoprolol succinate (TOPROL-XL) 25 mg 24 hr tablet TAKE 1 TABLET TWICE A DAY  Qty: 180 tablet, Refills: 3    Associated Diagnoses: Atrial fibrillation, persistent (HCC)      multivitamin (THERAGRAN) TABS Take 1 tablet by mouth daily.      ondansetron (ZOFRAN) 4 mg tablet Take 1 tablet (4 mg total) by mouth every 8 (eight) hours as needed for nausea or vomiting  Qty: 30 tablet, Refills: 1    Associated Diagnoses: Nausea and vomiting, unspecified vomiting type      oxyCODONE (ROXICODONE) 5 immediate release tablet Take 5 mg by mouth every 6 (six) hours as needed for severe pain. Indications: Acute Pain      simethicone (MYLICON) 80 mg chewable tablet Chew 80 mg      tiZANidine (ZANAFLEX) 4  mg tablet Take 1 tablet (4 mg total) by mouth every 8 (eight) hours as needed for muscle spasms  Qty: 30 tablet, Refills: 1    Associated Diagnoses: Muscle spasm      hydrocortisone 2.5 % cream Apply 1 application. topically 2 (two) times a day To affected area as needed      ketoconazole (NIZORAL) 2 % cream As needed      Misc Natural Products (LUTEIN 20 PO) Take 20 mg by mouth daily.           No discharge procedures on file.    PDMP Review         Value Time User    PDMP Reviewed  Yes 3/18/2020  9:48 AM Melecio Giraldo MD             ED Provider  Attending physically available and evaluated Smooth Jackson. I managed the patient along with the ED Attending.    Electronically Signed by           Sidney José DO  03/18/24 0146

## 2024-03-18 ENCOUNTER — HOME CARE VISIT (OUTPATIENT)
Dept: HOME HEALTH SERVICES | Facility: HOME HEALTHCARE | Age: 74
End: 2024-03-18
Payer: MEDICARE

## 2024-03-18 ENCOUNTER — APPOINTMENT (INPATIENT)
Dept: RADIOLOGY | Facility: HOSPITAL | Age: 74
DRG: 698 | End: 2024-03-18
Payer: MEDICARE

## 2024-03-18 ENCOUNTER — TELEPHONE (OUTPATIENT)
Dept: OTHER | Facility: HOSPITAL | Age: 74
End: 2024-03-18

## 2024-03-18 PROBLEM — S31.109A OPEN ABDOMINAL WALL WOUND: Status: ACTIVE | Noted: 2024-03-18

## 2024-03-18 PROBLEM — N39.0 UTI (URINARY TRACT INFECTION): Status: ACTIVE | Noted: 2024-03-18

## 2024-03-18 PROBLEM — N13.30 HYDROURETERONEPHROSIS: Status: ACTIVE | Noted: 2024-03-18

## 2024-03-18 LAB
4HR DELTA HS TROPONIN: -2 NG/L
ALBUMIN SERPL BCP-MCNC: 3.9 G/DL (ref 3.5–5)
ALP SERPL-CCNC: 75 U/L (ref 34–104)
ALT SERPL W P-5'-P-CCNC: 13 U/L (ref 7–52)
ANION GAP SERPL CALCULATED.3IONS-SCNC: 11 MMOL/L (ref 4–13)
ANISOCYTOSIS BLD QL SMEAR: PRESENT
AST SERPL W P-5'-P-CCNC: 14 U/L (ref 13–39)
ATRIAL RATE: 70 BPM
BASOPHILS # BLD MANUAL: 0 THOUSAND/UL (ref 0–0.1)
BASOPHILS NFR MAR MANUAL: 0 % (ref 0–1)
BILIRUB SERPL-MCNC: 0.51 MG/DL (ref 0.2–1)
BUN SERPL-MCNC: 27 MG/DL (ref 5–25)
CALCIUM SERPL-MCNC: 8.5 MG/DL (ref 8.4–10.2)
CARDIAC TROPONIN I PNL SERPL HS: 10 NG/L
CHLORIDE SERPL-SCNC: 108 MMOL/L (ref 96–108)
CO2 SERPL-SCNC: 21 MMOL/L (ref 21–32)
CREAT SERPL-MCNC: 1.15 MG/DL (ref 0.6–1.3)
EOSINOPHIL # BLD MANUAL: 0 THOUSAND/UL (ref 0–0.4)
EOSINOPHIL NFR BLD MANUAL: 0 % (ref 0–6)
ERYTHROCYTE [DISTWIDTH] IN BLOOD BY AUTOMATED COUNT: 12.5 % (ref 11.6–15.1)
GFR SERPL CREATININE-BSD FRML MDRD: 62 ML/MIN/1.73SQ M
GLUCOSE SERPL-MCNC: 118 MG/DL (ref 65–140)
HCT VFR BLD AUTO: 33.8 % (ref 36.5–49.3)
HGB BLD-MCNC: 11.5 G/DL (ref 12–17)
LYMPHOCYTES # BLD AUTO: 0.4 THOUSAND/UL (ref 0.6–4.47)
LYMPHOCYTES # BLD AUTO: 3 % (ref 14–44)
MAGNESIUM SERPL-MCNC: 1.5 MG/DL (ref 1.9–2.7)
MCH RBC QN AUTO: 29.7 PG (ref 26.8–34.3)
MCHC RBC AUTO-ENTMCNC: 34 G/DL (ref 31.4–37.4)
MCV RBC AUTO: 87 FL (ref 82–98)
MICROCYTES BLD QL AUTO: PRESENT
MONOCYTES # BLD AUTO: 0 THOUSAND/UL (ref 0–1.22)
MONOCYTES NFR BLD: 0 % (ref 4–12)
NEUTROPHILS # BLD MANUAL: 12.79 THOUSAND/UL (ref 1.85–7.62)
NEUTS BAND NFR BLD MANUAL: 2 % (ref 0–8)
NEUTS SEG NFR BLD AUTO: 95 % (ref 43–75)
P AXIS: 8 DEGREES
PHOSPHATE SERPL-MCNC: 2.8 MG/DL (ref 2.3–4.1)
PLATELET # BLD AUTO: 210 THOUSANDS/UL (ref 149–390)
PLATELET BLD QL SMEAR: ADEQUATE
PMV BLD AUTO: 9.1 FL (ref 8.9–12.7)
POTASSIUM SERPL-SCNC: 4.4 MMOL/L (ref 3.5–5.3)
PROCALCITONIN SERPL-MCNC: 3.65 NG/ML
PROT SERPL-MCNC: 6.1 G/DL (ref 6.4–8.4)
QRS AXIS: 152 DEGREES
QRSD INTERVAL: 104 MS
QT INTERVAL: 326 MS
QTC INTERVAL: 424 MS
RBC # BLD AUTO: 3.87 MILLION/UL (ref 3.88–5.62)
RBC MORPH BLD: PRESENT
SODIUM SERPL-SCNC: 140 MMOL/L (ref 135–147)
T WAVE AXIS: -6 DEGREES
VENTRICULAR RATE: 102 BPM
WBC # BLD AUTO: 13.19 THOUSAND/UL (ref 4.31–10.16)

## 2024-03-18 PROCEDURE — 93010 ELECTROCARDIOGRAM REPORT: CPT | Performed by: INTERNAL MEDICINE

## 2024-03-18 PROCEDURE — 84484 ASSAY OF TROPONIN QUANT: CPT

## 2024-03-18 PROCEDURE — 80053 COMPREHEN METABOLIC PANEL: CPT | Performed by: STUDENT IN AN ORGANIZED HEALTH CARE EDUCATION/TRAINING PROGRAM

## 2024-03-18 PROCEDURE — 97163 PT EVAL HIGH COMPLEX 45 MIN: CPT

## 2024-03-18 PROCEDURE — 99232 SBSQ HOSP IP/OBS MODERATE 35: CPT | Performed by: INTERNAL MEDICINE

## 2024-03-18 PROCEDURE — 84100 ASSAY OF PHOSPHORUS: CPT | Performed by: STUDENT IN AN ORGANIZED HEALTH CARE EDUCATION/TRAINING PROGRAM

## 2024-03-18 PROCEDURE — 97167 OT EVAL HIGH COMPLEX 60 MIN: CPT

## 2024-03-18 PROCEDURE — 83735 ASSAY OF MAGNESIUM: CPT | Performed by: STUDENT IN AN ORGANIZED HEALTH CARE EDUCATION/TRAINING PROGRAM

## 2024-03-18 PROCEDURE — 84145 PROCALCITONIN (PCT): CPT | Performed by: STUDENT IN AN ORGANIZED HEALTH CARE EDUCATION/TRAINING PROGRAM

## 2024-03-18 PROCEDURE — 71260 CT THORAX DX C+: CPT

## 2024-03-18 PROCEDURE — 74177 CT ABD & PELVIS W/CONTRAST: CPT

## 2024-03-18 PROCEDURE — 99223 1ST HOSP IP/OBS HIGH 75: CPT | Performed by: UROLOGY

## 2024-03-18 PROCEDURE — 85007 BL SMEAR W/DIFF WBC COUNT: CPT | Performed by: STUDENT IN AN ORGANIZED HEALTH CARE EDUCATION/TRAINING PROGRAM

## 2024-03-18 PROCEDURE — 85027 COMPLETE CBC AUTOMATED: CPT | Performed by: STUDENT IN AN ORGANIZED HEALTH CARE EDUCATION/TRAINING PROGRAM

## 2024-03-18 RX ORDER — ONDANSETRON 2 MG/ML
4 INJECTION INTRAMUSCULAR; INTRAVENOUS EVERY 4 HOURS PRN
Status: DISCONTINUED | OUTPATIENT
Start: 2024-03-18 | End: 2024-03-22 | Stop reason: HOSPADM

## 2024-03-18 RX ORDER — MAGNESIUM SULFATE HEPTAHYDRATE 40 MG/ML
2 INJECTION, SOLUTION INTRAVENOUS ONCE
Qty: 50 ML | Refills: 0 | Status: COMPLETED | OUTPATIENT
Start: 2024-03-18 | End: 2024-03-18

## 2024-03-18 RX ADMIN — CEFEPIME 2000 MG: 2 INJECTION, POWDER, FOR SOLUTION INTRAVENOUS at 17:23

## 2024-03-18 RX ADMIN — IOHEXOL 100 ML: 350 INJECTION, SOLUTION INTRAVENOUS at 08:58

## 2024-03-18 RX ADMIN — MAGNESIUM SULFATE HEPTAHYDRATE 2 G: 40 INJECTION, SOLUTION INTRAVENOUS at 12:24

## 2024-03-18 RX ADMIN — Medication 1000 UNITS: at 08:20

## 2024-03-18 RX ADMIN — GABAPENTIN 100 MG: 100 CAPSULE ORAL at 20:17

## 2024-03-18 RX ADMIN — ONDANSETRON 4 MG: 2 INJECTION INTRAMUSCULAR; INTRAVENOUS at 20:42

## 2024-03-18 RX ADMIN — APIXABAN 5 MG: 5 TABLET, FILM COATED ORAL at 08:20

## 2024-03-18 RX ADMIN — ASPIRIN 81 MG: 81 TABLET, COATED ORAL at 08:20

## 2024-03-18 RX ADMIN — Medication 100 MG: at 08:20

## 2024-03-18 RX ADMIN — LEVOTHYROXINE SODIUM 75 MCG: 75 TABLET ORAL at 06:35

## 2024-03-18 RX ADMIN — GABAPENTIN 100 MG: 100 CAPSULE ORAL at 17:22

## 2024-03-18 RX ADMIN — APIXABAN 5 MG: 5 TABLET, FILM COATED ORAL at 17:22

## 2024-03-18 RX ADMIN — ATORVASTATIN CALCIUM 20 MG: 20 TABLET, FILM COATED ORAL at 08:20

## 2024-03-18 RX ADMIN — GABAPENTIN 100 MG: 100 CAPSULE ORAL at 08:20

## 2024-03-18 NOTE — ASSESSMENT & PLAN NOTE
Urine Culture positive: E. Coli,enterococcus faecalis, staph hemolyticus, alpha hemolytic strep  Antibiotics per ID, on cefazolin  Fever 101.6 3/19; afebrile today  WBC 3.29

## 2024-03-18 NOTE — ASSESSMENT & PLAN NOTE
Patient with hx of bladder cancer s/p prostatectomy cystectomy and diversion to ileal conduit (2/12)   Presenting with 1 week of productive cough and with 1 day of AMS, fever and and increased WOB   Sepsis POA as evidenced by fever of 103.3, , and leuckocytosis of 11.6  Procal 0.37 Lactae 2.3  UA showed large leukocytes and nitrites and patient had foul smelling urine from his ileal conduit  CXR with possible JANAK consolidation - pending final read   S/p 1.5L NS bolus  Patient started on CTX 2g  Plan  Probable source UTI but cannot r/o CAP   Continue CTX   IVF w isolyte 100 cc / hr for hydration x 10 hours   F/U urine culture   F/U blood cultures   F/U AM procal   Urology curbsided - will obtain CT AP, if obvious fluid collection or surgical complication, consult urology, may need to speak to patients outpatient urologist in case they would like transfer to [facility]

## 2024-03-18 NOTE — TELEPHONE ENCOUNTER
Phone call placed to primary urologist at [facility] Dr. Sparks x 3.  No answer.  And did not go to any voicemail.  Will attempt later

## 2024-03-18 NOTE — ASSESSMENT & PLAN NOTE
Likely compensatory increased work of breathing in the setting of sepsis   Patient does have b/l ronchi on exam and reports chronic cough however more productive the past week   CXR on admission with possible JANAK consolidation    Plan  Given severe sepsis at the time of admission and although likely urinary source can not rule out CAP   Non severe CAP w DRIP score of 4 (abx use past 60 days, inpt hospitalization past 60 days, ostomy care)   Will continue CTX as above   Follow up sputum cx   Follow up final CXR read

## 2024-03-18 NOTE — H&P
[facility]  H&P  Name: Smooth Jackson 74 y.o. male I MRN: [id number]  Unit/Bed#: [facility]ON[facility] I Date of Admission: 3/17/2024   Date of Service: 3/17/2024 I Hospital Day: 0      Assessment/Plan   Sepsis (HCC)  Assessment & Plan  Patient with hx of bladder cancer s/p prostatectomy cystectomy and diversion to ileal conduit (2/12)   Presenting with 1 week of productive cough and with 1 day of AMS, fever and and increased WOB   Sepsis POA as evidenced by fever of 103.3, , and leuckocytosis of 11.6  Procal 0.37 Lactae 2.3  UA showed large leukocytes and nitrites and patient had foul smelling urine from his ileal conduit  CXR with possible JANAK consolidation - pending final read   S/p 1.5L NS bolus  Patient started on CTX 2g  Plan  Probable source UTI but cannot r/o CAP   Continue CTX   IVF w isolyte 100 cc / hr for hydration x 10 hours   F/U urine culture   F/U blood cultures   F/U AM procal   Urology curbsided - will obtain CT AP, if obvious fluid collection or surgical complication, consult urology, may need to speak to patients outpatient urologist in case they would like transfer to [facility]     Malignant neoplasm of posterior wall of urinary bladder (HCC)  Assessment & Plan  Hx of small cell muscle invasive bladder cancer status post carboplatin and etoposide based neoadjuvant chemotherapy.  S/p radical cystoprostatectomy with ileal conduit urinary diversion and bilateral pelvic lymphadenectomy on 2/12/24 with Dr. Melodie Pruett  O/p follow up     S/P ileal conduit (HCC)  Assessment & Plan  Noted as above for hx of bladder cancer   Urostomy site looks clean  Monitor UOP from urostomy     Productive cough  Assessment & Plan  Likely compensatory increased work of breathing in the setting of sepsis   Patient does have b/l ronchi on exam and reports chronic cough however more productive the past week   CXR on admission with possible JANAK consolidation    Plan  Given severe sepsis at the  time of admission and although likely urinary source can not rule out CAP   Non severe CAP w DRIP score of 4 (abx use past 60 days, inpt hospitalization past 60 days, ostomy care)   Will continue CTX as above   Follow up sputum cx   Follow up final CXR read     Vitamin D deficiency  Assessment & Plan  Continue vit d 1000 qd    Mixed hyperlipidemia  Assessment & Plan  Continue statin    Acquired hypothyroidism  Assessment & Plan  Continue home synthroid    Iliac artery aneurysm, bilateral (HCC)  Assessment & Plan  S/p R MINDY endovascular repair with endograft and coil embolization of hypogastric artery (JBF/PP) 4/13/2018  VAS iliacs from July 2023: The left common iliac artery is aneurysmal measuring approximately 2 cm. Prior:  2.1 cm. There is a known left internal iliac artery aneurysm measuring approximately 2 cm  Patient follows up w vascular o/p    Atrial fibrillation, persistent (HCC)  Assessment & Plan  Continue Eliquis 5 BID  Continue toprol 25 BID    Essential hypertension  Assessment & Plan  Continue home CCB       VTE Pharmacologic Prophylaxis: VTE Score: 8 High Risk (Score >/= 5) - Pharmacological DVT Prophylaxis Ordered: apixaban (Eliquis). Sequential Compression Devices Ordered.  Code Status: Level 1 - Full Code   Discussion with family: Updated  (wife) at bedside.    Anticipated Length of Stay: Patient will be admitted on an inpatient basis with an anticipated length of stay of greater than 2 midnights secondary to UROSEPSIS.    Total Time Spent on Date of Encounter in care of patient: 45 mins. This time was spent on one or more of the following: performing physical exam; counseling and coordination of care; obtaining or reviewing history; documenting in the medical record; reviewing/ordering tests, medications or procedures; communicating with other healthcare professionals and discussing with patient's family/caregivers.    Chief Complaint: generalized weakness, AMS, fever    History of  Present Illness:  Smooth Jackson is a 74 y.o. male with a PMH OF bladder cancer s/p prostatectomy cystectomy and diversion to ileal conduit (2/12) s/p chemo w cisplastin and etoposide (last dose nov'23), Afib, hypothyroidism, HTN, HLD, hx of iliac artery aneursym presented with 1 day of AMS and fever. History also notable for productive cough for the past week. Denies SOB. Upon EMS arrival patient appeared to be in respiratory distressed and altered. Normally aaox3 at baseline. En route patient was given solumedrol 125, duonebs and 500 CC of NS. In ED patient met sepsis criteria with fever of 103.3, , and leuckocytosis of 11.6. Patient satting well on room air. UA showed large leukocytes and nitrites and patient had foul smelling urine from his ileal conduit. He was given 1L NS bolus and started on CTX     At the time of admission patient is AAOx3. He denies any current pain. States that symptoms of subjective fever, chills and generally feeling unwell started this morning. Does report productive cough for the past week. Unsure if he had any foul smelling urine prior to today.     Review of Systems:  Review of Systems   Constitutional:  Positive for fever. Negative for chills.   HENT:  Positive for congestion. Negative for ear pain and sore throat.    Eyes:  Negative for pain and visual disturbance.   Respiratory:  Positive for cough and shortness of breath.    Cardiovascular:  Negative for chest pain and palpitations.   Gastrointestinal:  Negative for abdominal pain and vomiting.   Genitourinary:  Negative for dysuria and hematuria.   Musculoskeletal:  Negative for arthralgias and back pain.   Skin:  Negative for color change and rash.   Neurological:  Negative for seizures and syncope.   Psychiatric/Behavioral:  Positive for confusion.    All other systems reviewed and are negative.      Past Medical and Surgical History:   Past Medical History:   Diagnosis Date    Acute blood loss anemia 03/08/2019     Aneurysm of thoracic aorta (HCC)     Arrhythmia     Cancer (HCC)     Cholecystitis 03/05/2019    Added automatically from request for surgery [id number]    Detached retina, right     Disease of thyroid gland     Gastric wall thickening     Headache     Hematoma 10/10/2018    Hypertension     Iliac artery aneurysm, bilateral (HCC)     Irregular heart beat     afib cardioversion 1/30/17, x2     Neuropathy     bilateral feet    Paroxysmal A-fib (HCC)     Prostatic hypertrophy     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(HCC)        Past Surgical History:   Procedure Laterality Date    ABDOMINAL AORTIC ANEURYSM REPAIR, ENDOVASCULAR Right 04/13/2018    Procedure: REPAIR ANEURYSM ILIAC endovascular  with coil embolization right hypogastric artery.;  Surgeon: Guille Esquivel MD;  Location: [facility];  Service: Vascular    BLADDER CANCER BY FISH (HISTORICAL)      CARDIOVERSION      CATARACT EXTRACTION Left 10/20/2019    Right eye 11/17/2011    CHOLECYSTECTOMY      CHOLECYSTECTOMY LAPAROSCOPIC N/A 03/08/2019    Procedure: [id number] CHOLECYSTECTOMY;  Surgeon: Derik Case DO;  Location: [facility];  Service: General    COLONOSCOPY  07/13/2016    CYSTECTOMY, RADICAL WITH ILEOCONDUIT  02/12/2024    MOLE EXCISION  2021    DE CYSTOURETHROSCOPY W/DEST &/RMVL TUMOR LARGE N/A 08/01/2023    Procedure: TURBT, gemcitabine instillation;  Surgeon: Juvenal Cruz MD;  Location: [facility];  Service: Urology    DE EDG US EXAM SURGICAL ALTER STOM DUODENUM/JEJUNUM N/A 11/10/2017    Procedure: RADIAL ENDOSCOPIC U/S;  Surgeon: Harvey Gallegos MD;  Location: [facility];  Service: Gastroenterology    RETINAL DETACHMENT SURGERY Right     onset 1992, retinal detachment complete air fill confirmed       Meds/Allergies:  Prior to Admission medications    Medication Sig Start Date End Date Taking? Authorizing Provider   acetaminophen (TYLENOL) 325 mg tablet Take 2 tablets (650 mg total) by mouth every 6 (six) hours as needed for mild pain or fever  3/12/19  Yes LORI Pandya   apixaban (Eliquis) 5 mg Take 1 tablet (5 mg total) by mouth 2 (two) times a day 1/15/24  Yes Tylor Go,    aspirin (ECOTRIN LOW STRENGTH) 81 mg EC tablet Take 81 mg by mouth daily   Yes Historical Provider, MD   atorvastatin (LIPITOR) 20 mg tablet TAKE 1 TABLET DAILY 11/7/23  Yes Paco Guzman,    cholecalciferol (VITAMIN D3) 1,000 units tablet Take 1,000 Units by mouth daily   Yes Historical Provider, MD   Co-Enzyme Q-10 100 MG CAPS Take 100 mg by mouth daily   Yes Historical Provider, MD   felodipine (PLENDIL) 5 mg 24 hr tablet TAKE 1 TABLET DAILY 10/9/23  Yes Melecio Giraldo MD   gabapentin (NEURONTIN) 100 mg capsule Take 1 capsule (100 mg total) by mouth 3 (three) times a day 3/15/24 4/14/24 Yes LORI Chavira   levothyroxine 75 mcg tablet TAKE 1 TABLET DAILY FOR    ACQUIRED HYPOTHYROIDISM 11/7/23  Yes Melecio Giraldo MD   metoprolol succinate (TOPROL-XL) 25 mg 24 hr tablet TAKE 1 TABLET TWICE A DAY 11/7/23  Yes Smooth Macedo MD   multivitamin (THERAGRAN) TABS Take 1 tablet by mouth daily.   Yes Historical Provider, MD   ondansetron (ZOFRAN) 4 mg tablet Take 1 tablet (4 mg total) by mouth every 8 (eight) hours as needed for nausea or vomiting 2/21/24  Yes Melecio Giraldo MD   oxyCODONE (ROXICODONE) 5 immediate release tablet Take 5 mg by mouth every 6 (six) hours as needed for severe pain. Indications: Acute Pain   Yes Historical Provider, MD   simethicone (MYLICON) 80 mg chewable tablet Chew 80 mg 2/16/24  Yes Historical Provider, MD   tiZANidine (ZANAFLEX) 4 mg tablet Take 1 tablet (4 mg total) by mouth every 8 (eight) hours as needed for muscle spasms 2/21/24  Yes Melecio Giraldo MD   hydrocortisone 2.5 % cream Apply 1 application. topically 2 (two) times a day To affected area as needed 4/9/21   Historical Provider, MD   ketoconazole (NIZORAL) 2 % cream As needed 4/9/21   Historical Provider, MD   Misc Natural Products (LUTEIN 20 PO) Take 20 mg by mouth  daily.    Historical Provider, MD   cefadroxil (DURICEF) 500 mg capsule Take 500 mg by mouth 2 (two) times a day. X9 DAYS.   Indications: Bone and/or Joint Infection  3/17/24  Historical Provider, MD   gabapentin (Neurontin) 100 mg capsule Take 1 capsule (100 mg total) by mouth 3 (three) times a day 3/15/24 3/17/24  LORI Chavira   tamsulosin (FLOMAX) 0.4 mg Take 1 capsule (0.4 mg total) by mouth daily with dinner 5/5/23 3/17/24  LORI Matos     I have reviewed home medications with patient personally.    Allergies:   Allergies   Allergen Reactions    Wound Dressing Adhesive Blisters       Social History:  Marital Status: /Civil Union   Occupation: retired  Patient Pre-hospital Living Situation: Home w wife  Patient Pre-hospital Level of Mobility: walks  Patient Pre-hospital Diet Restrictions: none  Substance Use History:   Social History     Substance and Sexual Activity   Alcohol Use Yes    Alcohol/week: 5.0 standard drinks of alcohol    Types: 2 Glasses of wine, 1 Cans of beer, 2 Standard drinks or equivalent per week    Comment: 3-4 x week     Social History     Tobacco Use   Smoking Status Never   Smokeless Tobacco Never     Social History     Substance and Sexual Activity   Drug Use Never       Family History:  Family History   Problem Relation Age of Onset    Stroke Mother          at 91, failure to thrive    Aortic aneurysm Father         abdominal    Aortic aneurysm Brother     Basal cell carcinoma Brother     Schizophrenia Brother     Cancer Maternal Grandmother         smoker, heart attack, cancer    Cancer Maternal Grandfather         smoker cancer    Cancer Paternal Grandmother         malignant neoplasm    Cancer Paternal Grandfather         malignant neoplasm    Cancer Family         malignant neoplasm    Cancer Family         malignant neoplasm    Cancer Maternal Uncle         smoker, lots wrong with her    Cancer Paternal Uncle         melanoma at 92       Physical Exam:      Vitals:   Blood Pressure: 105/60 (03/17/24 2245)  Pulse: 92 (03/17/24 2245)  Temperature: (S) (!) 103.3 °F (39.6 °C) (03/17/24 1930)  Temp Source: Rectal (03/17/24 1953)  Respirations: 21 (03/17/24 2245)  SpO2: 92 % (03/17/24 2245)    Physical Exam  Vitals and nursing note reviewed.   Constitutional:       General: He is not in acute distress.     Appearance: He is well-developed.   HENT:      Head: Normocephalic and atraumatic.   Eyes:      Conjunctiva/sclera: Conjunctivae normal.      Pupils: Pupils are equal, round, and reactive to light.   Cardiovascular:      Rate and Rhythm: Normal rate and regular rhythm.      Heart sounds: No murmur heard.  Pulmonary:      Effort: Pulmonary effort is normal. No respiratory distress.      Breath sounds: Examination of the right-lower field reveals rhonchi. Examination of the left-lower field reveals rhonchi. Rhonchi present.   Abdominal:      Palpations: Abdomen is soft.      Tenderness: There is no abdominal tenderness.      Comments: Urostomy present   Musculoskeletal:         General: No swelling.      Cervical back: Neck supple.      Right lower leg: No edema.      Left lower leg: No edema.   Skin:     General: Skin is warm and dry.      Capillary Refill: Capillary refill takes less than 2 seconds.   Neurological:      General: No focal deficit present.      Mental Status: He is alert and oriented to person, place, and time.   Psychiatric:         Mood and Affect: Mood normal.          Additional Data:     Lab Results:  Results from last 7 days   Lab Units 03/17/24 1926   WBC Thousand/uL 11.59*   HEMOGLOBIN g/dL 12.1   HEMATOCRIT % 36.9   PLATELETS Thousands/uL 213   NEUTROS PCT % 88*   LYMPHS PCT % 6*   MONOS PCT % 5   EOS PCT % 0     Results from last 7 days   Lab Units 03/17/24 1926   SODIUM mmol/L 140   POTASSIUM mmol/L 3.7   CHLORIDE mmol/L 109*   CO2 mmol/L 20*   BUN mg/dL 32*   CREATININE mg/dL 1.28   ANION GAP mmol/L 11   CALCIUM mg/dL 9.1   ALBUMIN g/dL 4.1    TOTAL BILIRUBIN mg/dL 0.51   ALK PHOS U/L 84   ALT U/L 16   AST U/L 15   GLUCOSE RANDOM mg/dL 127     Results from last 7 days   Lab Units 03/17/24 1926   INR  1.40*             Results from last 7 days   Lab Units 03/17/24 2123 03/17/24 1926   LACTIC ACID mmol/L 1.6 2.3*   PROCALCITONIN ng/ml  --  0.37*       Lines/Drains:  Invasive Devices       Peripheral Intravenous Line  Duration             Peripheral IV 03/17/24 Dorsal (posterior);Left Forearm <1 day    Peripheral IV 03/17/24 Right Antecubital <1 day              Drain  Duration             Urostomy RLQ 27 days                        Imaging: Personally reviewed the following imaging: chest xray  XR chest portable - 1 view   ED Interpretation by Sidney José DO (03/17 2041)   No acute cardiopulmonary disease          EKG and Other Studies Reviewed on Admission:   EKG: NSR.  w PVCs.    ** Please Note: This note has been constructed using a voice recognition system. **

## 2024-03-18 NOTE — ED ATTENDING ATTESTATION
"3/17/2024  I, Jonathan Romano MD, saw and evaluated the patient. I have discussed the patient with the resident and agree with the resident's findings, Plan of Care, and MDM as documented in the resident's note, except where noted. All available labs and Radiology studies were reviewed.  I was present for key portions of any procedure(s) performed by the resident and I was immediately available to provide assistance.    At this point I agree with the current assessment done in the Emergency Department.  I have conducted an independent evaluation of this patient a history and physical is as follows:    75 yo male with a history of HTN, paroxysmal atrial fibrillation on Eliquis, and recently diagnosed metastatic bladder cancer s/p urostomy placement 1 month ago brought to the ED by EMS for evaluation of shortness of breath and generalized weakness. The patient reports feeling \"weak all over\", fatigue, and global myalgias x the last 3 days. (+) Worsening cough productive of \"clear\" sputum. (+) Progressively worsening FARRAR over the same time frame. Fever of 103 degrees at home. The patient also complains of foul-smelling \"dark\" urine in the urostomy bag x a few days. No chest pain or abdominal pain. He denies nausea, vomiting, and diarrhea. No other specific complaints.    ROS: per resident physician note    Gen: ill appearing, alert and cooperative  HEENT: PERRL, EOMI, (+) dry mm  Neck: supple  CV: (+) tachycardic, irregularly irregular  Lungs: (+) tachypnea, (+) rhonchi  Abdomen: soft, NT/ND  Ext: no swelling or deformity  Neuro: limited but nonfocal  Skin: no rash    ED Course  The patient is ill appearing, febrile, tachycardic, and tachypneic on arrival to the ED. Urosepsis vs pneumonia vs ACS vs an intraabdominal infection vs bacteremia? Will check EKG, CXR, basic labs, UA, troponin, and lactate. APAP and IVFs initiated, will continue to monitor in the ED. The patient will likely require admission to " [facility].    Critical Care Time  CriticalCare Time    Date/Time: 3/17/2024 8:50 PM    Performed by: Jonathan Romano MD  Authorized by: Jonathan Romano MD    Critical care provider statement:     Critical care time (minutes):  60    Critical care start time:  3/17/2024 7:40 PM    Critical care end time:  3/17/2024 8:40 PM    Critical care time was exclusive of:  Separately billable procedures and treating other patients and teaching time    Critical care was necessary to treat or prevent imminent or life-threatening deterioration of the following conditions:  Sepsis, shock and dehydration    Critical care was time spent personally by me on the following activities:  Obtaining history from patient or surrogate, development of treatment plan with patient or surrogate, discussions with consultants, discussions with primary provider, evaluation of patient's response to treatment, examination of patient, interpretation of cardiac output measurements, ordering and performing treatments and interventions, ordering and review of laboratory studies, ordering and review of radiographic studies, re-evaluation of patient's condition and review of old charts    I assumed direction of critical care for this patient from another provider in my specialty: no

## 2024-03-18 NOTE — ASSESSMENT & PLAN NOTE
S/p R MINDY endovascular repair with endograft and coil embolization of hypogastric artery (MIGUELINA/PP) 4/13/2018  VAS iliacs from July 2023: The left common iliac artery is aneurysmal measuring approximately 2 cm. Prior:  Continue with outpatient follow-up with vascular

## 2024-03-18 NOTE — ASSESSMENT & PLAN NOTE
"CT scan revealed \"symmetric moderate to severe bilateral hydroureteronephrosis to the level of the right lower quadrant ileal conduit; correlate for possible anastomotic stricture\"  Currently denies any abdominal pain  Will consult urology for further evaluation  "

## 2024-03-18 NOTE — PLAN OF CARE
Problem: PHYSICAL THERAPY ADULT  Goal: Performs mobility at highest level of function for planned discharge setting.  See evaluation for individualized goals.  Description: Treatment/Interventions: Functional transfer training, LE strengthening/ROM, Elevations, Therapeutic exercise, Endurance training, Patient/family training, Equipment eval/education, Bed mobility, Gait training, Spoke to case management, Spoke to nursing, OT  Equipment Recommended:  (TBD)       See flowsheet documentation for full assessment, interventions and recommendations.  Note: Prognosis: Good  Problem List: Decreased strength, Decreased endurance, Impaired balance, Decreased mobility  Assessment: Pt is a 74 y.o. male seen for PT evaluation s/p admit to [facility] on 3/17/2024. Pt was admitted with a primary dx of: sepsis.  PT now consulted for assessment of mobility and d/c needs. Pt with Up and OOB as tolerated  orders.  Pts current comorbidities effecting treatment include: HTN, a-fib, s/p ileal conduit, productive cough, hydroureteronephrosis, open abdominal wound, malignant neoplasm of urinary bladder. Pt  has a past medical history of Acute blood loss anemia (03/08/2019), Aneurysm of thoracic aorta (HCC), Arrhythmia, Cancer (HCC), Cholecystitis (03/05/2019), Detached retina, right, Disease of thyroid gland, Gastric wall thickening, Headache, Hematoma (10/10/2018), Hypertension, Iliac artery aneurysm, bilateral (HCC), Irregular heart beat, Neuropathy, Paroxysmal A-fib (HCC), Prostatic hypertrophy, and 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(HCC). Pts current clinical presentation is Unstable/ Unpredictable (high complexity) due to Ongoing medical management for primary dx, Increased reliance on more restrictive AD compared to baseline, Decreased activity tolerance compared to baseline, Fall risk, Increased assistance needed from caregiver at current time, Trending lab values. Prior to admission, pt was residing spouse in 1  with 3 [facility]  and was independent without AD use. Upon evaluation, pt currently is requiring S level for bed mobility; S level for transfers; min A for ambulation 12 ft w/ no AD. Pt presents at PT eval functioning below baseline and currently w/ overall mobility deficits 2* to: BLE weakness, impaired balance, decreased endurance, gait deviations, decreased activity tolerance compared to baseline, decreased functional mobility tolerance compared to baseline, fall risk. Pt currently at a fall risk 2* to impairments listed above.  Pt will continue to benefit from skilled acute PT interventions to address stated impairments; to maximize functional mobility; for ongoing pt/ family training; and DME needs. At conclusion of PT session pt returned back in chair and chair alarm engaged with phone and call bell within reach. Pt denies any further questions at this time. The patient's AM-PAC Basic Mobility Inpatient Short Form Raw Score is 17. A Raw score of greater than 16 suggests the patient may benefit from discharge to home. Please also refer to the recommendation of the Physical Therapist for safe discharge planning. Recommend home with minimum resource intensity upon hospital D/C.        Rehab Resource Intensity Level, PT: III (Minimum Resource Intensity)    See flowsheet documentation for full assessment.

## 2024-03-18 NOTE — TELEPHONE ENCOUNTER
Fourth call placed to [facility] urology.  Unable to reach anybody.  Phone does not go to voicemail.also attempted to call nurse line as well with long wait time to talk to someone

## 2024-03-18 NOTE — ASSESSMENT & PLAN NOTE
Hx of small cell muscle invasive bladder cancer status post carboplatin and etoposide based neoadjuvant chemotherapy.  S/p radical cystoprostatectomy with ileal conduit urinary diversion and bilateral pelvic lymphadenectomy on 2/12/24 with Dr. Melodie Pruett  O/p follow up

## 2024-03-18 NOTE — PLAN OF CARE
Problem: OCCUPATIONAL THERAPY ADULT  Goal: Performs self-care activities at highest level of function for planned discharge setting.  See evaluation for individualized goals.  Description: Treatment Interventions: ADL retraining, Functional transfer training, UE strengthening/ROM, Endurance training, Patient/family training, Equipment evaluation/education, Compensatory technique education, Continued evaluation, Energy conservation, Activityengagement          See flowsheet documentation for full assessment, interventions and recommendations.   3/18/2024 1319 by Roxanna Muñiz OT  Note: Limitation: Decreased ADL status, Decreased UE strength, Decreased endurance, Decreased self-care trans, Decreased high-level ADLs  Prognosis: Good  Assessment: Pt is a 74 y.o. male admitted to [facility] on 3/17/2024 w/ Sepsis, Hydrouteronephrosis, UTI.  has a past medical history of Acute blood loss anemia, Aneurysm of thoracic aorta, Arrhythmia, Cancer, Cholecystitis, Detached retina, right, Disease of thyroid gland, Gastric wall thickening, Headache, Hematoma, Hypertension, Iliac artery aneurysm, Malignant neoplasm of posterior wall of urinary bladder, Irregular heart beat, Neuropathy, Paroxysmal A-fib , Prostatic hypertrophy, and Renal artery dissection. Pt with active OT orders and up and OOB as tolerated orders. Pt seen as a co-evaluation with PT due to the patient's co-morbidities, clinically unstable presentation/clinical complexity, and present impairments. As per pt report, pta, resides with his wife in a [facility], [facility]. Pt was I w/  ADLS and shares IADLS with spouse. Upon evaluation, pt currently requires S for transfers and MIN A for mobility. Pt currently requires I eating, I grooming, S UB ADLs, MIN A LB ADLs, and MIN A toileting. Pt is limited at this time 2*: endurance, activity tolerance, functional mobility, balance, functional standing tolerance, decreased I w/ ADLS/IADLS, and strength.The following Occupational Performance  Areas to address include: grooming, bathing/shower, toilet hygiene, dressing, health maintenance, functional mobility, community mobility, and clothing management. Pt to benefit from immediate acute skilled OT to address above deficits, improve overall functional independence, maximize fnxl mobility and reduce caregiver burden. From OT standpoint, recommendation at time of d/c would be home with skilled therapy.  Pt was left after session with all current needs met. The patient's raw score on the -PAC Daily Activity Inpatient Short Form is 19. A raw score of greater than or equal to 19 suggests the patient may benefit from discharge to home. Please refer to the recommendation of the Occupational Therapist for safe discharge planning.     Rehab Resource Intensity Level, OT: III (Minimum Resource Intensity)       3/18/2024 1319 by Roxanna Muñiz OT  Note: Limitation: Decreased ADL status, Decreased UE strength, Decreased endurance, Decreased self-care trans, Decreased high-level ADLs  Prognosis: Good  Assessment: Pt is a 74 y.o. male admitted to [facility]"" on 3/17/2024 w/ Sepsis, Hydrouteronephrosis, UTI.  has a past medical history of Acute blood loss anemia, Aneurysm of thoracic aorta, Arrhythmia, Cancer, Cholecystitis, Detached retina, right, Disease of thyroid gland, Gastric wall thickening, Headache, Hematoma, Hypertension, Iliac artery aneurysm, Malignant neoplasm of posterior wall of urinary bladder, Irregular heart beat, Neuropathy, Paroxysmal A-fib , Prostatic hypertrophy, and Renal artery dissection. Pt with active OT orders and up and OOB as tolerated orders. Pt seen as a co-evaluation with PT due to the patient's co-morbidities, clinically unstable presentation/clinical complexity, and present impairments. As per pt report, pta, resides with his wife in a 1[facility], 3[facility]. Pt was I w/  ADLS and shares IADLS with spouse. Upon evaluation, pt currently requires S for transfers and MIN A for mobility. Pt currently  requires I eating, I grooming, S UB ADLs, MIN A LB ADLs, and MIN A toileting. Pt is limited at this time 2*: endurance, activity tolerance, functional mobility, balance, functional standing tolerance, decreased I w/ ADLS/IADLS, and strength.The following Occupational Performance Areas to address include: grooming, bathing/shower, toilet hygiene, dressing, health maintenance, functional mobility, community mobility, and clothing management. Pt to benefit from immediate acute skilled OT to address above deficits, improve overall functional independence, maximize fnxl mobility and reduce caregiver burden. From OT standpoint, recommendation at time of d/c would be home with skilled therapy.  Pt was left after session with all current needs met. The patient's raw score on the -PAC Daily Activity Inpatient Short Form is 19. A raw score of greater than or equal to 19 suggests the patient may benefit from discharge to home. Please refer to the recommendation of the Occupational Therapist for safe discharge planning.     Rehab Resource Intensity Level, OT: III (Minimum Resource Intensity)

## 2024-03-18 NOTE — SEPSIS NOTE
"  Sepsis Note   Smooth Jackson 74 y.o. male MRN: [id number]  [facility] Encounter: [id number]       Initial Sepsis Screening       Row Name 03/17/24 2039                Is the patient's history suggestive of a new or worsening infection? Yes (Proceed)  -GJ        Suspected source of infection suspect infection, source unknown  -GJ        Indicate SIRS criteria Hyperthemia > 38.3C (100.9F) OR Hypothermia <36C (96.8F);Tachycardia > 90 bpm  -GJ        Are two or more of the above signs & symptoms of infection both present and new to the patient? Yes (Proceed)  -GJ        Assess for evidence of organ dysfunction: Are any of the below criteria present within 6 hours of suspected infection and SIRS criteria that are NOT considered to be chronic conditions? Lactate > 2.0  -        Date of presentation of severe sepsis 03/17/24  -        Time of presentation of severe sepsis 2039  -        Sepsis Note: Click \"NEXT\" below (NOT \"close\") to generate sepsis note based on above information. --                  User Key  (r) = Recorded By, (t) = Taken By, (c) = Cosigned By      Initials Name Provider Type    LIZY José DO Physician                    Default Flowsheet Data (last 720 hours)       Sepsis Reassess       Row Name 03/18/24 0143                   Repeat Volume Status and Tissue Perfusion Assessment Performed    Date of Reassessment: 03/17/24  -        Time of Reassessment: 2150  -        Sepsis Reassessment Note: Click \"NEXT\" below (NOT \"close\") to generate sepsis reassessment note. --        Repeat Volume Status and Tissue Perfusion Assessment Performed --                  User Key  (r) = Recorded By, (t) = Taken By, (c) = Cosigned By      Initials Name Provider Type     Sidney José DO Physician                    There is no height or weight on file to calculate BMI.  Wt Readings from Last 1 Encounters:   02/28/24 100 kg (220 lb 9.6 oz)        Ideal body weight: 79.9 kg " (176 lb 2.4 oz)  Adjusted ideal body weight: 88 kg (193 lb 14.9 oz)

## 2024-03-18 NOTE — CONSULTS
"Consult - Urology   Smooth Jackson 1950, 74 y.o. male MRN: [id number]    Unit/Bed#: [facility] 934-01 Encounter: [id number]    Open abdominal wall wound  Assessment & Plan  Reports postoperative wound VAC  Wound measures 4 cm x 0.5 cm  Continue daily dressing changes per patient and family instructions    Hydroureteronephrosis  Assessment & Plan  Creat 1.15  CT: Symmetric moderate to severe bilateral hydroureteronephrosis to the level of the right lower quadrant ileal conduit  Check fluoroscopy loopogram to evaluate for obstruction  Monitor urine output  Continue to trend labs    UTI (urinary tract infection)  Assessment & Plan  Broad-spectrum empiric antibiotics tailored to culture; currently on cefepime    Malignant neoplasm of posterior wall of urinary bladder (HCC)  Assessment & Plan  S/p cystoprostatectomy and ileal conduit creation 2/12 for small cell muscle invasive bladder cancer.  Also received carboplatin and etoposide  CT: Symmetric moderate to severe bilateral hydroureteronephrosis to the level of the right lower quadrant ileal conduit; possible anastomotic stricture  S/p stent removal 2/27  Procalcitonin 3.65  WBC 13.19  Creat 1.15, at baseline  + Pyuria with moderate bacteria  Urine culture pending  Urine output adequate as per I&O  Empiric broad-spectrum antibiotics tailored to culture  Medical optimization antibiotics per primary team  Continue to trend labs  Unable to reach primary urologist at [facility]   Check fluoroscopy loopogram to evaluate for obstruction        Subjective:   74-year-old male known to [facility] urology with history of small cell muscle invasive bladder cancer status post cystoprostatectomy and ileal conduit creation 2/12.  He received carboplatin and etoposide.  His final pathology revealed: pTis: Carcinoma in situ: \"flat tumor\", pN0. patient presented to the hospital for change in mental status with a productive cough.  Urine was positive for leukocytes and nitrites.  Chest x-ray " shows a questionable left upper lobe consolidation.  He was febrile on admission up to 103.3 and tachycardic in the 120s.  Currently afebrile with stable vital signs.  CT scan reveals symmetric moderate to severe bilateral hydroureteronephrosis to the level of the right lower quadrant ileal conduit.  No evidence of intra-abdominal abscess, small pelvic ascites, colonic diverticulosis and a small right pleural effusion with subjacent compressive atelectasis.  Attempted to contact primary urologist x3 without success.  Patient reports having bleeding from his stoma on Friday or Saturday when he changed his appliance.  It was then leaking and when he had to change it a second time the bleeding resolved.  He had noted a strange sensation to his urine which was new to him.    Review of Systems   Constitutional:  Positive for activity change and fever. Negative for appetite change, chills, fatigue and unexpected weight change.   HENT:  Negative for facial swelling.    Eyes:  Negative for discharge.   Respiratory: Negative.  Negative for cough and shortness of breath.    Cardiovascular:  Negative for chest pain and leg swelling.   Gastrointestinal: Negative.  Negative for abdominal distention, abdominal pain, constipation, diarrhea, nausea and vomiting.   Endocrine: Negative.    Genitourinary: Negative.  Negative for decreased urine volume, difficulty urinating, dysuria, enuresis, flank pain, frequency, genital sores, hematuria and urgency.   Musculoskeletal:  Negative for back pain and myalgias.   Skin:  Negative for pallor and rash.   Allergic/Immunologic: Negative.  Negative for immunocompromised state.   Neurological:  Negative for facial asymmetry and speech difficulty.   Psychiatric/Behavioral:  Negative for agitation and confusion.        Objective:  Vitals: Blood pressure 107/71, pulse 60, temperature 98.1 °F (36.7 °C), resp. rate 15, SpO2 96%.,There is no height or weight on file to calculate BMI.    Physical  Exam  Vitals and nursing note reviewed.   Constitutional:       General: He is not in acute distress.     Appearance: Normal appearance. He is not ill-appearing or toxic-appearing.   HENT:      Head: Normocephalic.   Pulmonary:      Effort: Pulmonary effort is normal. No respiratory distress.   Abdominal:      General: Abdomen is flat. There is no distension.      Palpations: Abdomen is soft.      Tenderness: There is no abdominal tenderness. There is no guarding or rebound.      Comments: Ileal conduit with pink stoma.  Draining yellow urine.  Mid abdominal incision measuring 4 cm x 0.5 cm with pink base.  No tunneling.  Wound base pink to red.  No purulent drainage noted   Musculoskeletal:         General: No swelling.   Skin:     General: Skin is warm and dry.   Neurological:      General: No focal deficit present.      Mental Status: He is alert and oriented to person, place, and time.   Psychiatric:         Mood and Affect: Mood normal.         Behavior: Behavior normal.         Thought Content: Thought content normal.         Judgment: Judgment normal.          Media Information    Document Information    Clinical Image - Mobile Device      03/18/2024 11:14   Attached To:   Hospital Encounter on 3/17/24   Source Information    LORI Arnold  Be Pphp 9     Imaging:  CT CHEST, ABDOMEN AND PELVIS WITH IV CONTRAST     INDICATION: recent urostomy presenting with urosepsis r/o surgical complication, abscess.     COMPARISON: PET/CT dated 11/27/2023.     TECHNIQUE: CT examination of the chest, abdomen and pelvis was performed. Multiplanar 2D reformatted images were created from the source data.     This examination, like all CT scans performed in the formerly [facility], was performed utilizing techniques to minimize radiation dose exposure, including the use of iterative reconstruction and automated exposure control. Radiation dose length   product (DLP) for this visit: 1288.47 mGy-cm     IV Contrast:  100 mL of iohexol (OMNIPAQUE)  Enteric Contrast: Not administered.     FINDINGS:     CHEST     LUNGS: No focal airspace consolidation. No tracheal or endobronchial lesion.     PLEURA: Small right pleural effusion with subjacent compressive atelectasis.     HEART/GREAT VESSELS: Heart is unremarkable for patient's age. No thoracic aortic aneurysm.     MEDIASTINUM AND GERMÁN: Unremarkable.     CHEST WALL AND LOWER NECK: Unremarkable.     ABDOMEN     LIVER/BILIARY TREE: Unremarkable.     GALLBLADDER: Post cholecystectomy.     SPLEEN: Unremarkable.     PANCREAS: Unremarkable.     ADRENAL GLANDS: Unremarkable.     KIDNEYS/URETERS: 2 cm right renal cyst. There is symmetric moderate to severe bilateral hydroureteronephrosis to the level of the right lower quadrant ileal conduit; correlate for possible anastomotic stricture.     STOMACH AND BOWEL: No evidence for bowel obstruction. Colonic diverticulosis without evidence for acute diverticulitis.     APPENDIX: No findings to suggest appendicitis.     ABDOMINOPELVIC CAVITY: Small pelvic ascites. No pneumoperitoneum. No lymphadenopathy.     VESSELS: Redemonstrated right common iliac artery aneurysm status post stenting.     PELVIS     REPRODUCTIVE ORGANS: Unremarkable for patient's age.     URINARY BLADDER: Status post cystectomy with right lower quadrant ileal conduit.     ABDOMINAL WALL/INGUINAL REGIONS: Unremarkable.     BONES: No acute fracture or suspicious osseous lesion. Stable 1.3 cm sclerotic focus in the right iliac bone.     IMPRESSION:     No evidence for intra-abdominal abscess as queried.     Status post cystectomy. Symmetric moderate to severe bilateral hydroureteronephrosis to the level of the right lower quadrant ileal conduit; correlate for possible anastomotic stricture.     Small pelvic ascites.     Uncomplicated colonic diverticulosis.     Small right pleural effusion with subjacent compressive atelectasis.     The study was marked in EPIC for immediate  notification.              Workstation performed: [id number]  Imaging reviewed - both report and images personally reviewed.     Labs:  Recent Labs     03/17/24 1926 03/18/24  0956   WBC 11.59* 13.19*     Recent Labs     03/17/24 1926 03/18/24  0956   HGB 12.1 11.5*       Recent Labs     03/17/24 1926 03/18/24  0956   CREATININE 1.28 1.15       Component 1 mo ago   Case Information ACCESSION:  [id number]     COLLECTION DATE/TIME:   2/12/2024 09:19 EST      RECEIVED DATE/TIME:  2/12/2024 11:15 EST     PATHOLOGIST:   Sushma Jacome MD (PL)   Final Diagnosis   A. Lymph node, left, pelvic, excision:  - Fifteen lymph nodes, no tumor seen (0/15).    B. Lymph node, right, pelvic, excision:  - Twenty-three lymph nodes, no tumor seen (0/23).    C. Bladder and prostate, radical cystoprostatectomy:    - Focal, high grade non invasive urothelial carcinoma, in a background of  treatment associated  changes,  see synoptic summary.  - Urethral and inked soft tissue resection margins free of tumor.  - Prostate and bilateral seminal vesicles with    no specific pathologic change.    D. Ureter, left, resection:  -  Benign urothelium and submucosa .    E. Ureter, right, distal, resection:  -  Benign urothelium and submucosa .  The case material was reviewed and the report verified by: Sushma Jacome MD    (Electronic signature)  Report Date/Time: 02/23/2024  17:47 PM EST  [place] [address]      **ATTENTION PATIENTS**  **The findings in this report have been made available for review potentially  before your  provider has had a chance to review and discuss the results with you. Please  allow time for  your provider to review your results. If you have any questions or concerns  about these results,  please contact the healthcare provider who ordered the test.**   Synoptic Report   C: Urinary Bladder, Cystectomy Summary  PROCEDURE:   Radical cystoprostatectomy  TUMOR  "SITE:   Posterior wall  TUMOR SIZE:   Cannot be determined:  minute focus of residual carcinoma in ulcer   bed  HISTOLOGIC TYPE:  Urothelial:  Urothelial carcinoma in situ  HISTOLOGIC GRADE:   High-grade  TUMOR EXTENT:   Urothelial carcinoma in situ  LYMPHOVASCULAR INVASION:    Not identified  MARGIN STATUS for Invasive Tumor:    All margins negative for invasive tumor  MARGIN STATUS for Carcinoma in situ /Noninvasive Papillary Urothelial Carcinoma:         All margins  negative for carcinoma in situ / noninvasive papillary urothelial carcinoma  REGIONAL LYMPH NODES:  All regional lymph nodes negative for tumor  Number of Lymph Nodes Examined:   Exact Number:  [id number]  DISTANT METASTASIS:  Distant Site(s) Involved, if applicable:    Not applicable  PATHOLOGIC STAGE CLASSIFICATION (pTNM, AJCC 8th Edition):  TNM DESCRIPTORS:   y (post-treatment)   Case Information ACCESSION:  [id number]     COLLECTION DATE/TIME:   2/12/2024 09:19 EST      RECEIVED DATE/TIME:  2/12/2024 11:15 EST     PATHOLOGIST:   Sushma Jacome MD (PL)    .Synoptic Report:   Synoptic Report   pT Category:  pTis:  Carcinoma in situ: \"flat tumor\"  pN Category:  pN0:  No lymph node metastasis  pM Category:  Not applicable - pM cannot be determined from the submitted  specimen(s)  ADDITIONAL PATHOLOGIC FINDINGS:  Urothelial carcinoma in situ  Urothelial dysplasia  Inflammation/regenerative changes  Therapy-related changes:  Chemotherapy   Pathologist(s)   Paul Dhillon MD, PhD  Sushma Jacome MD   Gross Description   The case is received in 5 containers, labeled with the patient's name and  medical record number.    Specimen A is designated   \"left pelvic lymph node excision \" and consists of  innumerable fragments  of tan-yellow fibroadipose tissue measuring 7.0 x 6.8 x 4.4 cm in aggregate,  dissected to  reveal potential lymph nodes. The potential nodes are submitted entirely in 11  cassettes labeled  A1-A11, with the potential nodes in A6-A11 " "bisected.    Specimen B is designated   \"right pelvic lymph node excision \" and consists of  innumerable  fragments of tan-yellow fibroadipose tissue measuring 6.5 x 6.0 x 4.0 cm in  aggregate, dissected  to reveal potential lymph nodes. The potential nodes are submitted entirely in 8   cassettes  labeled B1-B8, with the potential nodes in B6-B8 bisected and the two potential  nodes in B8  differentially inked black and blue.    Specimen C is designated   \"bladder and prostate \" and consists of a radical  cystoprostatectomy  specimen, measuring 18 x 15 x 3.4 cm overall, comprising the bladder (8.3 x 7.4  x 2.1 cm),  segment of right and left ureters (both probe patent; margins in Specimens D and   E), segment  of prostatic urethra (4.1 cm) , prostate (superior to inferior: 3.8 cm; lateral  to lateral: 4.8  cm; and anterior to posterior: 3.5 cm), the right and left vas deferens (7.3 x  0.5 cm and 6.9  x 0.5 cm, respectively) and the right and left seminal vesicles (4.5 x 1.0 x 0.7   cm and 4.0 x  1.1 x 0.8 cm, respectively). The external surface of the specimen is grossly  unremarkable. The  specimen is inked as follows: Bladder   - black, right prostate   - yellow, left   prostate  - black.    The specimen is opened anteriorly through the prostatic urethra, with extension  of the incision  to the dome of the bladder, to reveal a solitary well-circumscribed ulcerated  lesion situated  in the central and left posterior wall of the bladder, measuring 2.5 x 1.3 cm  and extending  to a depth of 0.2 cm, with a hemorrhagic mucosa surrounding the lesion and  extending into  the posterior wall, trigone, and urethra. The lesion appears to have areas of  hemorrhage and  necrosis. The lesion is located 2.6 cm from the distal right ureteral orifice,  2.6 cm from  the distal left ureteral orifice, and 3.2 cm from the proximal urethral opening.   On serial  sectioning, the lesion seems to be extend through the lamina propria into " "the  underlying  muscularis propria (detrusor muscle), but with no extension into the underlying  shyann-vesicular  soft tissue.    The remaining non-tumoral bladder mucosa appears smooth and glistening, with no  other grossly  apparent lesions. The ureters are opened lengthwise from the trigone to reveal a   grossly  unremarkable mucosal surface with no grossly apparent lesions. The prostate is  sectioned through  the posterior surface, in serial sections perpendicular to the prostatic  urethra, to reveal    a diffusely firm white parenchyma with a whorled appearance and no grossly  apparent lesions.  The vas deferens and seminal vesicles, on serial sectioning are also grossly  unremarkable.  Photographs are taken.    Representative sections of the specimen are submitted as follows:  C1: Distal right prostatic urethral margin, shaved and serially sectioned  C2: Distal left prostatic urethral margin, shaved and serially sectioned  C3: Right prostate (2 sections, representative)   Case Information ACCESSION:  [id number]     COLLECTION DATE/TIME:   2/12/2024 09:19 EST      RECEIVED DATE/TIME:  2/12/2024 11:15 EST     PATHOLOGIST:   Sushma Jacome MD (PL)    .Gross Description:   Gross Description   C4: Left prostate (2 sections, representative)  C5: Right seminal vesicle and vas deferens, representative sections  C6: Left seminal vesicle and vas deferens, representative sections  C7-C10: Tumor, blocked out and submitted superior to inferior  C11:  Uninvolved bladder dome  C12: Additional full-thickness sections at inferior right boundary of blocked  out region    Specimen D is designated \"left ureter\" and consists of a 3.2 x 0.6 cm portion of   ureter with a  stitch marking the true margin. A representative section of the true margin is  submitted as D1.    Specimen E is designated \"right distal ureter\" and consists of a 3.4 x 0.6 cm  portion of ureter  with a stitch marking the true margin. A representative section of " the true  margin is submitted  as E1.    SUMMARY OF SECTIONS: 33, multiple, representative    Gross examination performed at the Department of Pathology and Laboratory  Medicine, [facility]  [facility], [address] [place] [address].    Histopathology and Cytopathology processing performed at the Department of  Pathology and  [facility], [address] [place] [address].    Dictated by: Paul Dhillon MD, PhD  KXW   Clinical Information   Bladder cancer  Operation:  Cystectomy  Specific questions to be answered: None    Molecular Algorithm work flow selected on Surgical Pathology requisition: No    Specimen:  A  LYMPH NODE Left Pelvic Excision  B  LYMPH NODE Right Pelvic Excision  C  Bladder Bladder and prostate Resection  D  URETER Left Resection  E  URETER Right Distal Resection   Resulting Agency [facility]   Specimen Collected: 02/12/24 09:19    Performed by: [facility] LABORATORY Last Resulted: 02/23/24 17:47   Received From: [facility]  Result Received: 03/06/24 05:24      Microbiology:  Pending    History:  Social History     Socioeconomic History    Marital status: /Civil Union     Spouse name: None    Number of children: None    Years of education: None    Highest education level: None   Occupational History    None   Tobacco Use    Smoking status: Never    Smokeless tobacco: Never   Vaping Use    Vaping status: Never Used   Substance and Sexual Activity    Alcohol use: Yes     Alcohol/week: 5.0 standard drinks of alcohol     Types: 2 Glasses of wine, 1 Cans of beer, 2 Standard drinks or equivalent per week     Comment: 3-4 x week    Drug use: Never    Sexual activity: Not Currently   Other Topics Concern    None   Social History Narrative    None     Social Determinants of Health     Financial Resource Strain: Low Risk  (12/19/2023)    Overall  Financial Resource Strain (CARDIA)     Difficulty of Paying Living Expenses: Not hard at all   Food Insecurity: No Food Insecurity (2/12/2024)    Received from [facility]    Hunger Vital Sign     Worried About Running Out of Food in the Last Year: Never true     Ran Out of Food in the Last Year: Never true   Transportation Needs: No Transportation Needs (2/12/2024)    Received from [facility]    PRAPARE - Transportation     Lack of Transportation (Medical): No     Lack of Transportation (Non-Medical): No   Physical Activity: Not on file   Stress: Not on file   Social Connections: Not on file   Intimate Partner Violence: Not on file   Housing Stability: Low Risk  (2/12/2024)    Received from [facility]    Housing Stability Vital Sign     Unable to Pay for Housing in the Last Year: No     Number of Places Lived in the Last Year: 1     Unstable Housing in the Last Year: No       Past Medical History:   Diagnosis Date    Acute blood loss anemia 03/08/2019    Aneurysm of thoracic aorta (HCC)     Arrhythmia     Cancer (HCC)     Cholecystitis 03/05/2019    Added automatically from request for surgery [id number]    Detached retina, right     Disease of thyroid gland     Gastric wall thickening     Headache     Hematoma 10/10/2018    Hypertension     Iliac artery aneurysm, bilateral (HCC)     Irregular heart beat     afib cardioversion 1/30/17, x2     Neuropathy     bilateral feet    Paroxysmal A-fib (HCC)     Prostatic hypertrophy     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(HCC)      Past Surgical History:   Procedure Laterality Date    ABDOMINAL AORTIC ANEURYSM REPAIR, ENDOVASCULAR Right 04/13/2018    Procedure: REPAIR ANEURYSM ILIAC endovascular  with coil embolization right hypogastric artery.;  Surgeon: Guille Esquivel MD;  Location: [facility];  Service: Vascular    BLADDER CANCER BY FISH (HISTORICAL)      CARDIOVERSION      CATARACT EXTRACTION Left  10/20/2019    Right eye 2011    CHOLECYSTECTOMY      CHOLECYSTECTOMY LAPAROSCOPIC N/A 2019    Procedure: [id number] CHOLECYSTECTOMY;  Surgeon: Derik Case DO;  Location: [facility];  Service: General    COLONOSCOPY  2016    CYSTECTOMY, RADICAL WITH ILEOCONDUIT  2024    MOLE EXCISION      OR CYSTOURETHROSCOPY W/DEST &/RMVL TUMOR LARGE N/A 2023    Procedure: TURBT, gemcitabine instillation;  Surgeon: Juvenal Cruz MD;  Location: [facility];  Service: Urology    OR EDG US EXAM SURGICAL ALTER STOM DUODENUM/JEJUNUM N/A 11/10/2017    Procedure: RADIAL ENDOSCOPIC U/S;  Surgeon: Harvey Gallegos MD;  Location: [facility];  Service: Gastroenterology    RETINAL DETACHMENT SURGERY Right     onset , retinal detachment complete air fill confirmed     Family History   Problem Relation Age of Onset    Stroke Mother          at 91, failure to thrive    Aortic aneurysm Father         abdominal    Aortic aneurysm Brother     Basal cell carcinoma Brother     Schizophrenia Brother     Cancer Maternal Grandmother         smoker, heart attack, cancer    Cancer Maternal Grandfather         smoker cancer    Cancer Paternal Grandmother         malignant neoplasm    Cancer Paternal Grandfather         malignant neoplasm    Cancer Family         malignant neoplasm    Cancer Family         malignant neoplasm    Cancer Maternal Uncle         smoker, lots wrong with her    Cancer Paternal Uncle         melanoma at 92       The following portions of the patient's history were reviewed and updated as appropriate: allergies, current medications, past family history, past medical history, past social history, past surgical history and problem list    LORI Arnold  Date: 3/18/2024 Time: 2:08 PM

## 2024-03-18 NOTE — CASE MANAGEMENT
Case Management Assessment & Discharge Planning Note    Patient name Smooth Jackson  Location [facility] 934/[facility] 934-01 MRN [id number]  : 1950 Date 3/18/2024       Current Admission Date: 3/17/2024  Current Admission Diagnosis:Sepsis (HCC)   Patient Active Problem List    Diagnosis Date Noted    UTI (urinary tract infection) 2024    Hydroureteronephrosis 2024    Open abdominal wall wound 2024    Sepsis (HCC) 2024    S/P ileal conduit (HCC) 2024    Productive cough 2024    Dizziness 2023    Chemotherapy induced neutropenia  2023    Closed fracture of posterior malleolus of left tibia 2023    Pain, joint, ankle and foot, left 2023    Pancreatic mass 08/10/2023    Malignant neoplasm of posterior wall of urinary bladder (HCC) 08/10/2023    Bladder mass 2023    Malfunction of Moralez catheter (HCC) 2023    Gross hematuria 2023    Prediabetes 10/12/2022    Vitamin D deficiency 2021    Hepatic artery dissection (HCC) 10/27/2021    NSVT (nonsustained ventricular tachycardia) ([facility], [facility]) 10/15/2021    Chronic bilateral low back pain without sciatica 2020    Mixed hyperlipidemia 2020    Rash 2019    Other headache syndrome 2019    Iliac artery aneurysm, right (HCC) 2018    Neuropathy 2018    Iliac artery aneurysm, bilateral (HCC)     Aneurysm of thoracic aorta (HCC) 2016    Essential hypertension 2016    Atrial fibrillation, persistent (HCC) 2016    Acquired hypothyroidism 2014      LOS (days): 1  Geometric Mean LOS (GMLOS) (days):   Days to GMLOS:     OBJECTIVE:    Risk of Unplanned Readmission Score: 20.25         Current admission status: Inpatient       Preferred Pharmacy:   [id number] - [place], [place] - [address]  [address] [place] [address]  [place] [address]  Phone: [phone] Fax: [phone]    [facility] - [place] [facility] - [address]  [place] [address]  Phone: [phone] Fax: [phone]    Primary Care Provider: Melecio Giraldo MD    Primary Insurance: MEDICARE  Secondary Insurance: AARP    ASSESSMENT:  Active Health Care Proxies       Rona Duvall Alternate Health Care Agent - Spouse   Primary Phone: [phone] (Mobile)  Home Phone: [phone]                 Advance Directives  Does patient have a Health Care POA?: Yes  Does patient have Advance Directives?: Yes  Advance Directives: Living will, Power of  for health care  Primary Contact: Rona         Readmission Root Cause  30 Day Readmission: No    Patient Information  Admitted from:: Home  Mental Status: Alert  During Assessment patient was accompanied by: Spouse  Assessment information provided by:: Patient  Primary Caregiver: Self  Support Systems: Spouse/significant other  [place]  Home entry access options. Select all that apply.: Stairs  Number of steps to enter home.: 1  Do the steps have railings?: Yes  Type of Current Residence: [facility] (w/basement 12 steps to basement)  Living Arrangements: Lives w/ Spouse/significant other    Activities of Daily Living Prior to Admission  Functional Status: Assistance  Completes ADLs independently?: No  Level of ADL dependence: Assistance  Ambulates independently?: Yes  Does patient use assisted devices?: No  Does patient currently own DME?: No  Does patient have a history of Outpatient Therapy (PT/OT)?: No  Does the patient have a history of Short-Term Rehab?: No  Does patient have a history of HHC?: Yes  Does patient currently have HHC?: Yes    Current Home Health Care  Type of Current Home Care Services: Home OT, Home PT, Home health aide  Current Home Health Agency:: [facility]  Current Home Health Follow-Up Provider:: PCP    Patient Information Continued  Income Source: Pension/intermediate  Does patient have prescription coverage?: Yes  Does patient  receive dialysis treatments?: No  Does patient have a history of substance abuse?: No  Does patient have a history of Mental Health Diagnosis?: No         Means of Transportation  Means of Transport to Appts:: Family transport      Social Determinants of Health (SDOH)      Flowsheet Row Most Recent Value   Housing Stability    In the last 12 months, was there a time when you were not able to pay the mortgage or rent on time? N   In the last 12 months, how many places have you lived? 1   In the last 12 months, was there a time when you did not have a steady place to sleep or slept in a shelter (including now)? N   Transportation Needs    In the past 12 months, has lack of transportation kept you from medical appointments or from getting medications? no   In the past 12 months, has lack of transportation kept you from meetings, work, or from getting things needed for daily living? No   Food Insecurity    Within the past 12 months, you worried that your food would run out before you got the money to buy more. Never true   Utilities    In the past 12 months has the electric, gas, oil, or water company threatened to shut off services in your home? No            DISCHARGE DETAILS:    Discharge planning discussed with:: Patient  Freedom of Choice: Yes  Comments - Freedom of Choice: Discussed FOC           Other Referral/Resources/Interventions Provided:  Referral Comments: Met w/pt & pt's wife Rona at bedside.  Pt lives in ranch home w/basement.  Pt has 12 steps to basement but wife is able to go to the basement if spouse needs anything.  Pt is not able to bend over so he uses a grabber for items or pt's wife can reach items on lower level for pt.  Pt currently on [facility] w/ [facility] - plans to continue w/HHC post discharge.  Agreeable to send referral to  [facility] to resume services post hospital dc.  Pt's wife Ingrid is MPOA.  Does not use any other DME.       CM reviewed d/c planning process including the following:  identifying help at home, patient preference for d/c planning needs, availability of treatment team to discuss questions or concerns patient and/or family may have regarding understanding medications and recognizing signs and symptoms once discharged.  CM also encouraged patient to follow up with all recommended appointments after discharge. Patient advised of importance for patient and family to participate in managing patient’s medical well being.

## 2024-03-18 NOTE — ASSESSMENT & PLAN NOTE
Presented with fever 103 degrees  Urinalysis revealed leukocytes bacteria, nitrates  Continue cefepime for now  Follow-up urine culture

## 2024-03-18 NOTE — PLAN OF CARE
Problem: Potential for Falls  Goal: Patient will remain free of falls  Description: INTERVENTIONS:  - Educate patient/family on patient safety including physical limitations  - Instruct patient to call for assistance with activity   - Consult OT/PT to assist with strengthening/mobility   - Keep Call bell within reach  - Keep bed low and locked with side rails adjusted as appropriate  - Keep care items and personal belongings within reach  - Initiate and maintain comfort rounds  - Make Fall Risk Sign visible to staff  - Offer Toileting every 2 Hours, in advance of need  - Initiate/Maintain alarm  - Obtain necessary fall risk management equipment:   - Apply yellow socks and bracelet for high fall risk patients  - Consider moving patient to room near nurses station  Outcome: Progressing     Problem: CARDIOVASCULAR - ADULT  Goal: Absence of cardiac dysrhythmias or at baseline rhythm  Description: INTERVENTIONS:  - Continuous cardiac monitoring, vital signs, obtain 12 lead EKG if ordered  - Administer antiarrhythmic and heart rate control medications as ordered  - Monitor electrolytes and administer replacement therapy as ordered  Outcome: Progressing     Problem: METABOLIC, FLUID AND ELECTROLYTES - ADULT  Goal: Electrolytes maintained within normal limits  Description: INTERVENTIONS:  - Monitor labs and assess patient for signs and symptoms of electrolyte imbalances  - Administer electrolyte replacement as ordered  - Monitor response to electrolyte replacements, including repeat lab results as appropriate  - Instruct patient on fluid and nutrition as appropriate  Outcome: Progressing  Goal: Fluid balance maintained  Description: INTERVENTIONS:  - Monitor labs   - Monitor I/O and WT  - Instruct patient on fluid and nutrition as appropriate  - Assess for signs & symptoms of volume excess or deficit  Outcome: Progressing     Problem: INFECTION - ADULT  Goal: Absence or prevention of progression during  hospitalization  Description: INTERVENTIONS:  - Assess and monitor for signs and symptoms of infection  - Monitor lab/diagnostic results  - Monitor all insertion sites, i.e. indwelling lines, tubes, and drains  - Monitor endotracheal if appropriate and nasal secretions for changes in amount and color  - [place] appropriate cooling/warming therapies per order  - Administer medications as ordered  - Instruct and encourage patient and family to use good hand hygiene technique  - Identify and instruct in appropriate isolation precautions for identified infection/condition  Outcome: Progressing     Problem: Knowledge Deficit  Goal: Patient/family/caregiver demonstrates understanding of disease process, treatment plan, medications, and discharge instructions  Description: Complete learning assessment and assess knowledge base.  Interventions:  - Provide teaching at level of understanding  - Provide teaching via preferred learning methods  Outcome: Progressing

## 2024-03-18 NOTE — PROGRESS NOTES
"[facility]  Progress Note  Name: Smooth Jackson I  MRN: [id number]  Unit/Bed#: [facility] 934-01 I Date of Admission: 3/17/2024   Date of Service: 3/18/2024 I Hospital Day: 1    Assessment/Plan   * Sepsis (HCC)  Assessment & Plan  Patient with hx of bladder cancer s/p prostatectomy cystectomy and diversion to ileal conduit (2/12)   Initially presented with generalized weakness and fever of 103 degrees, leukocytosis of 11 K   Likely secondary to UTI  Urinalysis revealed large leukocytes, nitrates  Complicated by gram-negative bacteremia  CT scan of abdomen/chest/pelvis ruled out any acute infection  Will place on IV cefepime until blood culture speciation  Monitor WBC count and fever    Hydroureteronephrosis  Assessment & Plan  CT scan revealed \"symmetric moderate to severe bilateral hydroureteronephrosis to the level of the right lower quadrant ileal conduit; correlate for possible anastomotic stricture\"  Currently denies any abdominal pain  Will consult urology for further evaluation    UTI (urinary tract infection)  Assessment & Plan  Presented with fever 103 degrees  Urinalysis revealed leukocytes bacteria, nitrates  Continue cefepime for now  Follow-up urine culture    Productive cough  Assessment & Plan  Chest x-ray/CT scan revealed no acute abnormalities  Continue with symptom support    S/P ileal conduit (HCC)  Assessment & Plan  Noted as above for hx of bladder cancer   Urostomy site looks clean  Monitor UOP from urostomy     Malignant neoplasm of posterior wall of urinary bladder (HCC)  Assessment & Plan  Hx of small cell muscle invasive bladder cancer status post carboplatin and etoposide based neoadjuvant chemotherapy.  S/p radical cystoprostatectomy with ileal conduit urinary diversion and bilateral pelvic lymphadenectomy on 2/12/24 with Dr. Melodie Pruett  O/p follow up     Vitamin D deficiency  Assessment & Plan  Continue vit d 1000 qd    Mixed " hyperlipidemia  Assessment & Plan  Continue statin    Acquired hypothyroidism  Assessment & Plan  Continue home synthroid    Iliac artery aneurysm, bilateral (HCC)  Assessment & Plan  S/p R MINDY endovascular repair with endograft and coil embolization of hypogastric artery (JBF/PP) 2018  VAS iliacs from 2023: The left common iliac artery is aneurysmal measuring approximately 2 cm. Prior:  Continue with outpatient follow-up with vascular    Atrial fibrillation, persistent (HCC)  Assessment & Plan  Currently rate controlled  Continue Toprol  Continue Eliquis    Essential hypertension  Assessment & Plan  BP controlled   continue felodipine         VTE Pharmacologic Prophylaxis:   Pharmacologic: Apixaban (Eliquis)  Mechanical VTE Prophylaxis in Place: Yes    Patient Centered Rounds: I have performed bedside rounds with nursing staff today.    Discussions with Specialists or Other Care Team Provider: cm, nursing    Education and Discussions with Family / Patient: pt, wife at bedside    Time Spent for Care: 30 minutes.  More than 50% of total time spent on counseling and coordination of care as described above.    Current Length of Stay: 1 day(s)    Current Patient Status: Inpatient   Certification Statement: The patient will continue to require additional inpatient hospital stay due to see below    Discharge Plan: Still requiring IV antibiotics anticipate approximately 48 hours    Code Status: Level 1 - Full Code      Subjective:   Denies chest pain, shortness of breath, cough, fevers    Objective:     Vitals:   Temp (24hrs), Av.1 °F (37.8 °C), Min:97.7 °F (36.5 °C), Max:103.3 °F (39.6 °C)    Temp:  [97.7 °F (36.5 °C)-103.3 °F (39.6 °C)] 98.1 °F (36.7 °C)  HR:  [] 60  Resp:  [15-26] 15  BP: ()/(56-71) 107/71  SpO2:  [90 %-97 %] 96 %  There is no height or weight on file to calculate BMI.     Input and Output Summary (last 24 hours):       Intake/Output Summary (Last 24 hours) at 3/18/2024  1137  Last data filed at 3/18/2024 0931  Gross per 24 hour   Intake 2070 ml   Output 990 ml   Net 1080 ml       Physical Exam:     Physical Exam  Constitutional:       General: He is not in acute distress.     Appearance: He is well-developed. He is not diaphoretic.   HENT:      Head: Normocephalic and atraumatic.      Nose: Nose normal.      Mouth/Throat:      Pharynx: No oropharyngeal exudate.   Eyes:      General: No scleral icterus.     Conjunctiva/sclera: Conjunctivae normal.   Cardiovascular:      Rate and Rhythm: Normal rate and regular rhythm.      Heart sounds: Normal heart sounds. No murmur heard.     No friction rub. No gallop.   Pulmonary:      Effort: Pulmonary effort is normal. No respiratory distress.      Breath sounds: Normal breath sounds. No wheezing or rales.   Chest:      Chest wall: No tenderness.   Abdominal:      General: Bowel sounds are normal. There is no distension.      Palpations: Abdomen is soft.      Tenderness: There is no abdominal tenderness. There is no guarding.   Musculoskeletal:         General: No tenderness or deformity. Normal range of motion.      Cervical back: Normal range of motion and neck supple.   Skin:     General: Skin is warm and dry.      Findings: No erythema.   Neurological:      Mental Status: He is alert. Mental status is at baseline.       (     Additional Data:     Labs:    Results from last 7 days   Lab Units 03/18/24  0956 03/17/24  1926   WBC Thousand/uL 13.19* 11.59*   HEMOGLOBIN g/dL 11.5* 12.1   HEMATOCRIT % 33.8* 36.9   PLATELETS Thousands/uL 210 213   NEUTROS PCT %  --  88*   LYMPHS PCT %  --  6*   MONOS PCT %  --  5   EOS PCT %  --  0     Results from last 7 days   Lab Units 03/18/24  0956   SODIUM mmol/L 140   POTASSIUM mmol/L 4.4   CHLORIDE mmol/L 108   CO2 mmol/L 21   BUN mg/dL 27*   CREATININE mg/dL 1.15   ANION GAP mmol/L 11   CALCIUM mg/dL 8.5   ALBUMIN g/dL 3.9   TOTAL BILIRUBIN mg/dL 0.51   ALK PHOS U/L 75   ALT U/L 13   AST U/L 14   GLUCOSE  RANDOM mg/dL 118     Results from last 7 days   Lab Units 03/17/24 1926   INR  1.40*             Results from last 7 days   Lab Units 03/18/24  0956 03/17/24 2123 03/17/24 1926   LACTIC ACID mmol/L  --  1.6 2.3*   PROCALCITONIN ng/ml 3.65*  --  0.37*           * I Have Reviewed All Lab Data Listed Above.  * Additional Pertinent Lab Tests Reviewed: All Labs Within Last 24 Hours Reviewed    Imaging:    Imaging Reports Reviewed Today Include: na  Imaging Personally Reviewed by Myself Includes:  na    Recent Cultures (last 7 days):     Results from last 7 days   Lab Units 03/17/24 1943 03/17/24 1926   BLOOD CULTURE  Received in Microbiology Lab. Culture in Progress.  --    GRAM STAIN RESULT   --  Gram negative rods*       Last 24 Hours Medication List:   Current Facility-Administered Medications   Medication Dose Route Frequency Provider Last Rate    apixaban  5 mg Oral BID Jasmit Star-Kals, DO      aspirin  81 mg Oral Daily Jasmit Star-Kals, DO      atorvastatin  20 mg Oral Daily Jasmit Star-Kals, DO      cefepime  2,000 mg Intravenous Q12H Klaus Burns MD      cholecalciferol  1,000 Units Oral Daily Jasmit Star-Kals, DO      co-enzyme Q-10  100 mg Oral Daily Jasmit Star-Kals, DO      felodipine  5 mg Oral Daily Jasmit Star-Kals, DO      gabapentin  100 mg Oral TID Jasmit Star-Kals, DO      guaiFENesin  600 mg Oral BID PRN Jasmit Star-Kals, DO      levothyroxine  75 mcg Oral Early Morning Jasmit Star-Kals, DO      metoprolol succinate  25 mg Oral BID Jasmit Star-Kals, DO          Today, Patient Was Seen By: Klaus Burns MD    ** Please Note: Dictation voice to text software may have been used in the creation of this document. **

## 2024-03-18 NOTE — ASSESSMENT & PLAN NOTE
Creat 1.15, at baseline  CT: Symmetric moderate to severe bilateral hydroureteronephrosis to the level of the right lower quadrant ileal conduit  Loopogram with right anastomotic stricture versus ureteral spasm  Monitor urine output  Continue to trend labs

## 2024-03-18 NOTE — ASSESSMENT & PLAN NOTE
S/p R MINDY endovascular repair with endograft and coil embolization of hypogastric artery (MIGUELINA/PP) 4/13/2018  VAS iliacs from July 2023: The left common iliac artery is aneurysmal measuring approximately 2 cm. Prior:  2.1 cm. There is a known left internal iliac artery aneurysm measuring approximately 2 cm  Patient follows up w vascular o/p

## 2024-03-18 NOTE — OCCUPATIONAL THERAPY NOTE
Occupational Therapy Evaluation     Patient Name: Smooth Jackson  Today's Date: 3/18/2024  Problem List  Principal Problem:    Sepsis (HCC)  Active Problems:    Essential hypertension    Atrial fibrillation, persistent (HCC)    Iliac artery aneurysm, bilateral (HCC)    Acquired hypothyroidism    Mixed hyperlipidemia    Vitamin D deficiency    Malignant neoplasm of posterior wall of urinary bladder (HCC)    S/P ileal conduit (HCC)    Productive cough    UTI (urinary tract infection)    Hydroureteronephrosis    Past Medical History  Past Medical History:   Diagnosis Date    Acute blood loss anemia 03/08/2019    Aneurysm of thoracic aorta (HCC)     Arrhythmia     Cancer (HCC)     Cholecystitis 03/05/2019    Added automatically from request for surgery [id number]    Detached retina, right     Disease of thyroid gland     Gastric wall thickening     Headache     Hematoma 10/10/2018    Hypertension     Iliac artery aneurysm, bilateral (HCC)     Irregular heart beat     afib cardioversion 1/30/17, x2     Neuropathy     bilateral feet    Paroxysmal A-fib (HCC)     Prostatic hypertrophy     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(HCC)      Past Surgical History  Past Surgical History:   Procedure Laterality Date    ABDOMINAL AORTIC ANEURYSM REPAIR, ENDOVASCULAR Right 04/13/2018    Procedure: REPAIR ANEURYSM ILIAC endovascular  with coil embolization right hypogastric artery.;  Surgeon: Guille Esquivel MD;  [facility];  Service: Vascular    BLADDER CANCER BY FISH (HISTORICAL)      CARDIOVERSION      CATARACT EXTRACTION Left 10/20/2019    Right eye 11/17/2011    CHOLECYSTECTOMY      CHOLECYSTECTOMY LAPAROSCOPIC N/A 03/08/2019    Procedure: [id number] CHOLECYSTECTOMY;  Surgeon: Derik Case DO;  [facility];  Service: General    COLONOSCOPY  07/13/2016    CYSTECTOMY, RADICAL WITH ILEOCONDUIT  02/12/2024    MOLE EXCISION  2021    VA CYSTOURETHROSCOPY W/DEST &/RMVL TUMOR LARGE N/A 08/01/2023    Procedure:  TURBT, gemcitabine instillation;  Surgeon: Juvenal Cruz MD;  [facility];  Service: Urology    TX EDG US EXAM SURGICAL ALTER STOM DUODENUM/JEJUNUM N/A 11/10/2017    Procedure: RADIAL ENDOSCOPIC U/S;  Surgeon: Harvey Gallegos MD;  [facility]: [facility];  Service: Gastroenterology    RETINAL DETACHMENT SURGERY Right     onset 1992, retinal detachment complete air fill confirmed           03/18/24 0925   OT Last Visit   OT Visit Date 03/18/24   Note Type   Note type Evaluation   Pain Assessment   Pain Assessment Tool 0-10   Pain Score No Pain   Restrictions/Precautions   Weight Bearing Precautions Per Order No   Other Precautions Multiple lines;Fall Risk;Chair Alarm;Bed Alarm   Home Living   Type of Home House   Home Layout One level  (3STE)   Bathroom Shower/Tub Tub/shower unit   Bathroom Toilet Standard   Bathroom Equipment Grab bars in shower   Home Equipment Crutches  (did not use PTA)   Prior Function   Level of [place] Independent with ADLs;Needs assistance with IADLS   Lives With Spouse   Receives Help From Family   IADLs Independent with driving;Independent with meal prep   Falls in the last 6 months 0   Vocational Retired   Lifestyle   Autonomy PTA, pt reports being I with ADLs, shares IADLs with wife, I fnxl mobility. Has not been driving since his surgery   Reciprocal Relationships Supportive spouse   Service to Others Retired   Intrinsic Gratification Likes riding his bicycle   ADL   Where Assessed Chair   Eating Assistance 7  Independent   Grooming Assistance 5  Supervision/Setup   UB Bathing Assistance 5  Supervision/Setup   LB Bathing Assistance 4  Minimal Assistance   UB Dressing Assistance 5  Supervision/Setup   LB Dressing Assistance 4  Minimal Assistance   Toileting Assistance  4  Minimal Assistance   Bed Mobility   Supine to Sit Unable to assess   Sit to Supine 5  Supervision   Additional items Increased time required   Additional Comments pt found laying on stretcher with pt transport  upon arrival   Transfers   Sit to Stand 5  Supervision   Additional items Increased time required   Stand to Sit 5  Supervision   Additional items Increased time required   Additional Comments close supervision, transfers w/o AD   Functional Mobility   Functional Mobility 4  Minimal assistance   Additional Comments noted slight body tremor   Additional items Hand hold assistance   Balance   Static Sitting Fair +   Dynamic Sitting Fair   Static Standing Fair -   Dynamic Standing Fair -   Ambulatory Poor +   Activity Tolerance   Activity Tolerance Patient limited by fatigue   Medical Staff Made Aware PT Meaghan   Nurse Made Aware RN clearance for session   RUE Assessment   RUE Assessment WFL   LUE Assessment   LUE Assessment WFL   Cognition   Overall Cognitive Status WFL   Arousal/Participation Responsive;Cooperative   Attention Within functional limits   Orientation Level Oriented X4   Memory Unable to assess   Following Commands Follows one step commands with increased time or repetition   Comments Pt pleasant and cooperative during session, flat affect   Assessment   Limitation Decreased ADL status;Decreased UE strength;Decreased endurance;Decreased self-care trans;Decreased high-level ADLs   Prognosis Good   Assessment Pt is a 74 y.o. male admitted to [facility] on 3/17/2024 w/ Sepsis, Hydrouteronephrosis, UTI.  has a past medical history of Acute blood loss anemia, Aneurysm of thoracic aorta, Arrhythmia, Cancer, Cholecystitis, Detached retina, right, Disease of thyroid gland, Gastric wall thickening, Headache, Hematoma, Hypertension, Iliac artery aneurysm, Malignant neoplasm of posterior wall of urinary bladder, Irregular heart beat, Neuropathy, Paroxysmal A-fib , Prostatic hypertrophy, and Renal artery dissection. Pt with active OT orders and up and OOB as tolerated orders. Pt seen as a co-evaluation with PT due to the patient's co-morbidities, clinically unstable presentation/clinical complexity, and present  impairments. As per pt report, esha, resides with his wife in a 1STH, 3STE. Pt was I w/  ADLS and shares IADLS with spouse. Upon evaluation, pt currently requires S for transfers and MIN A for mobility. Pt currently requires I eating, I grooming, S UB ADLs, MIN A LB ADLs, and MIN A toileting. Pt is limited at this time 2*: endurance, activity tolerance, functional mobility, balance, functional standing tolerance, decreased I w/ ADLS/IADLS, and strength.The following Occupational Performance Areas to address include: grooming, bathing/shower, toilet hygiene, dressing, health maintenance, functional mobility, community mobility, and clothing management. Pt to benefit from immediate acute skilled OT to address above deficits, improve overall functional independence, maximize fnxl mobility and reduce caregiver burden. From OT standpoint, recommendation at time of d/c would be home with skilled therapy.  Pt was left after session with all current needs met. The patient's raw score on the AM-PAC Daily Activity Inpatient Short Form is 19. A raw score of greater than or equal to 19 suggests the patient may benefit from discharge to home. Please refer to the recommendation of the Occupational Therapist for safe discharge planning.   Goals   LTG Time Frame 10-14   Plan   Treatment Interventions ADL retraining;Functional transfer training;UE strengthening/ROM;Endurance training;Patient/family training;Equipment evaluation/education;Compensatory technique education;Continued evaluation;Energy conservation;Activityengagement   Goal Expiration Date 04/01/24   OT Frequency 2-3x/wk   Discharge Recommendation   Rehab Resource Intensity Level, OT III (Minimum Resource Intensity)   AM-PAC Daily Activity Inpatient   Lower Body Dressing 3   Bathing 3   Toileting 3   Upper Body Dressing 3   Grooming 3   Eating 4   Daily Activity Raw Score 19   Daily Activity Standardized Score (Calc for Raw Score >=11) 40.22   AM-PAC Applied Cognition  Inpatient   Following a Speech/Presentation 4   Understanding Ordinary Conversation 4   Taking Medications 4   Remembering Where Things Are Placed or Put Away 4   Remembering List of 4-5 Errands 4   Taking Care of Complicated Tasks 3   Applied Cognition Raw Score 23   Applied Cognition Standardized Score 53.08     GOALS    - Pt will complete UB dressing/self care/bathing w/ mod I using adaptive device and DME as needed    - Pt will complete LB dressing/self care/bathing w/ mod I using adaptive device and DME as needed    - Pt will complete toileting w/ mod I w/ G hygiene/thoroughness using DME as needed    - Pt will improve functional transfers to Mod I on/off all surfaces using DME as needed w/ G balance/safety     - Pt will improve functional mobility during ADL/IADL/leisure tasks to Mod I using DME as needed w/ G balance/safety     - Pt will demonstrate G carryover of pt/caregiver education and training as appropriate.    - Pt will demonstrate 100% carryover of energy conservation techniques t/o functional I/ADL/leisure tasks w/o cues s/p skilled education    - Pt will engage in ongoing cognitive assessment w/ G participation to assist w/ safe d/c planning/recommendations    - Pt will increase static and dynamic standing/sitting balance to F+  using AD/DME as needed to increase safety and I during fnxl transfers and ADLs    - Pt will maintain upright sitting position for at least 20 min during dynamic fnxl activity with F+  balance and endurance to improve ADL performance and independence, as well as reduce risk of falls     - Pt will participate in simulated IADL management task with DME as needed to increase independence to  w/ G safety and endurance    Roxanna Muñiz MS, OTR/L

## 2024-03-18 NOTE — PHYSICAL THERAPY NOTE
Physical Therapy Evaluation     Patient's Name: Smooth Jackson    Admitting Diagnosis  UTI (urinary tract infection) [N39.0]  SOB (shortness of breath) [R06.02]  Sepsis (HCC) [A41.9]    Problem List  Patient Active Problem List   Diagnosis    Aneurysm of thoracic aorta (HCC)    Essential hypertension    Atrial fibrillation, persistent (HCC)    Iliac artery aneurysm, bilateral (HCC)    Acquired hypothyroidism    Neuropathy    Iliac artery aneurysm, right (HCC)    Other headache syndrome    Rash    Mixed hyperlipidemia    Chronic bilateral low back pain without sciatica    NSVT (nonsustained ventricular tachycardia) (HCC)    Hepatic artery dissection (HCC)    Vitamin D deficiency    Prediabetes    Gross hematuria    Bladder mass    Malfunction of Moralez catheter (HCC)    Pancreatic mass    Malignant neoplasm of posterior wall of urinary bladder (HCC)    Closed fracture of posterior malleolus of left tibia    Pain, joint, ankle and foot, left    Chemotherapy induced neutropenia     Dizziness    Sepsis (HCC)    S/P ileal conduit (HCC)    Productive cough    UTI (urinary tract infection)    Hydroureteronephrosis    Open abdominal wall wound       Past Medical History  Past Medical History:   Diagnosis Date    Acute blood loss anemia 03/08/2019    Aneurysm of thoracic aorta (HCC)     Arrhythmia     Cancer (HCC)     Cholecystitis 03/05/2019    Added automatically from request for surgery [id number]    Detached retina, right     Disease of thyroid gland     Gastric wall thickening     Headache     Hematoma 10/10/2018    Hypertension     Iliac artery aneurysm, bilateral (HCC)     Irregular heart beat     afib cardioversion 1/30/17, x2     Neuropathy     bilateral feet    Paroxysmal A-fib (HCC)     Prostatic hypertrophy     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(HCC)        Past Surgical History  Past Surgical History:   Procedure Laterality Date    ABDOMINAL AORTIC ANEURYSM REPAIR, ENDOVASCULAR Right 04/13/2018    Procedure: REPAIR ANEURYSM  ILIAC endovascular  with coil embolization right hypogastric artery.;  Surgeon: Guille Esquivel MD;  [facility];  Service: Vascular    BLADDER CANCER BY FISH (HISTORICAL)      CARDIOVERSION      CATARACT EXTRACTION Left 10/20/2019    Right eye 11/17/2011    CHOLECYSTECTOMY      CHOLECYSTECTOMY LAPAROSCOPIC N/A 03/08/2019    Procedure: [id number] CHOLECYSTECTOMY;  Surgeon: Derik Case DO;  [facility];  Service: General    COLONOSCOPY  07/13/2016    CYSTECTOMY, RADICAL WITH ILEOCONDUIT  02/12/2024    MOLE EXCISION  2021    TX CYSTOURETHROSCOPY W/DEST &/RMVL TUMOR LARGE N/A 08/01/2023    Procedure: TURBT, gemcitabine instillation;  Surgeon: Juvenal Cruz MD;  [facility];  Service: Urology    TX EDG US EXAM SURGICAL ALTER STOM DUODENUM/JEJUNUM N/A 11/10/2017    Procedure: RADIAL ENDOSCOPIC U/S;  Surgeon: Harvey Gallegos MD;  [facility];  Service: Gastroenterology    RETINAL DETACHMENT SURGERY Right     onset 1992, retinal detachment complete air fill confirmed        03/18/24 0926   PT Last Visit   PT Visit Date 03/18/24   Note Type   Note type Evaluation   Pain Assessment   Pain Assessment Tool 0-10   Pain Score No Pain   Restrictions/Precautions   Weight Bearing Precautions Per Order No   Other Precautions Multiple lines;Fall Risk;Chair Alarm;Bed Alarm   Home Living   Type of Home House   Home Layout One level;Performs ADLs on one level;Able to live on main level with bedroom/bathroom  (3 REBECCA)   Bathroom Shower/Tub Tub/shower unit   Bathroom Toilet Standard   Bathroom Equipment Grab bars in shower   Home Equipment Crutches  (did not use PTA)   Prior Function   Level of [place] Independent with ADLs;Independent with functional mobility;Needs assistance with IADLS   Lives With Spouse   Receives Help From Family   IADLs Independent with driving;Independent with meal prep   Falls in the last 6 months 0   Vocational Retired   General   Family/Caregiver Present  No   Cognition   Overall Cognitive Status WFL   Arousal/Participation Cooperative   Attention Within functional limits   Orientation Level Oriented X4   Following Commands Follows one step commands with increased time or repetition   Comments pt with flat affect, pleasant and cooperative   RLE Assessment   RLE Assessment   (functionally 3+/5)   LLE Assessment   LLE Assessment   (functionally 3+/5)   Bed Mobility   Supine to Sit 5  Supervision   Additional items HOB elevated;Bedrails;Increased time required   Sit to Supine Unable to assess   Additional Comments pt found supine on stretcher upon arrival. pt left sitting OOB in recliner with alarm on and all needs wihtin reach   Transfers   Sit to Stand 5  Supervision   Additional items Armrests;Increased time required;Verbal cues   Stand to Sit 5  Supervision   Additional items Armrests;Increased time required;Verbal cues   Additional Comments transfers without AD   Ambulation/Elevation   Gait pattern Short stride;Foward flexed;Decreased foot clearance;Improper Weight shift;Narrow LOYD;Shuffling   Gait Assistance 4  Minimal assist   Additional items Assist x 1;Verbal cues   Assistive Device Other (Comment)  (HHA)   Distance 12'   Stair Management Assistance Not tested   Balance   Static Sitting Fair +   Dynamic Sitting Fair   Static Standing Fair -   Dynamic Standing Fair -   Ambulatory Poor +   Endurance Deficit   Endurance Deficit Yes   Endurance Deficit Description generalized weakness, fatigue, deconditioning   Activity Tolerance   Activity Tolerance Patient limited by fatigue   Medical Staff Made Aware OT Roxanna; co-session completed this date 2* increased medical complexity and multiple co-morbidities   Nurse Made Aware RN cleared pt to aprticipate in therapy session   Assessment   Prognosis Good   Problem List Decreased strength;Decreased endurance;Impaired balance;Decreased mobility   Assessment Pt is a 74 y.o. male seen for PT evaluation s/p admit to [facility] on 3/17/2024. Pt was admitted with a primary dx of: sepsis.  PT now consulted for assessment of mobility and d/c needs. Pt with Up and OOB as tolerated  orders.  Pts current comorbidities effecting treatment include: HTN, a-fib, s/p ileal conduit, productive cough, hydroureteronephrosis, open abdominal wound, malignant neoplasm of urinary bladder. Pt  has a past medical history of Acute blood loss anemia (03/08/2019), Aneurysm of thoracic aorta (HCC), Arrhythmia, Cancer (HCC), Cholecystitis (03/05/2019), Detached retina, right, Disease of thyroid gland, Gastric wall thickening, Headache, Hematoma (10/10/2018), Hypertension, Iliac artery aneurysm, bilateral (HCC), Irregular heart beat, Neuropathy, Paroxysmal A-fib (HCC), Prostatic hypertrophy, and 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(HCC). Pts current clinical presentation is Unstable/ Unpredictable (high complexity) due to Ongoing medical management for primary dx, Increased reliance on more restrictive AD compared to baseline, Decreased activity tolerance compared to baseline, Fall risk, Increased assistance needed from caregiver at current time, Trending lab values. Prior to admission, pt was residing spouse in 1  with 3 REBECCA and was independent without AD use. Upon evaluation, pt currently is requiring S level for bed mobility; S level for transfers; min A for ambulation 12 ft w/ no AD. Pt presents at PT eval functioning below baseline and currently w/ overall mobility deficits 2* to: BLE weakness, impaired balance, decreased endurance, gait deviations, decreased activity tolerance compared to baseline, decreased functional mobility tolerance compared to baseline, fall risk. Pt currently at a fall risk 2* to impairments listed above.  Pt will continue to benefit from skilled acute PT interventions to address stated impairments; to maximize functional mobility; for ongoing pt/ family training; and DME needs. At conclusion of PT session pt returned back in chair and  chair alarm engaged with phone and call bell within reach. Pt denies any further questions at this time. The patient's AM-PAC Basic Mobility Inpatient Short Form Raw Score is 17. A Raw score of greater than 16 suggests the patient may benefit from discharge to home. Please also refer to the recommendation of the Physical Therapist for safe discharge planning. Recommend home with minimum resource intensity upon hospital D/C.   Goals   Patient Goals to rest   STG Expiration Date 04/01/24   Short Term Goal #1 STG 1. Pt will be able to perform bed mobility tasks with mod I level in order to improve overall functional mobility and assist in safe d/c. STG 2. Pt with sit EOB for at least 25 minutes at mod I level in order to strengthen abdominal musculature and assist in future transfers/ ambulation. STG 3. Pt will be able to perform functional transfer with mod I level in order to improve overall functional mobility and assist in safe d/c. STG 4. Pt will be able to ambulate at least 250 feet with least restrictive device with mod I level A in order to improve overall functional mobility and assist in safe d/c. STG 5. Pt will improve sitting/standing static/dynamic balance 1/2 grade in order to improve functional mobility and assist in safe d/c. STG 6. Pt will improve LE strength by 1/2 grade in order to improve functional mobility and assist in safe d/c. STG 7. Pt will be able to negotiate at least 3 stairs with least restrictive device with S level A in order to improve overall functional mobility and assist in safe d/c.   PT Treatment Day 0   Plan   Treatment/Interventions Functional transfer training;LE strengthening/ROM;Elevations;Therapeutic exercise;Endurance training;Patient/family training;Equipment eval/education;Bed mobility;Gait training;Spoke to case management;Spoke to nursing;OT   PT Frequency 2-3x/wk   Discharge Recommendation   Rehab Resource Intensity Level, PT III (Minimum Resource Intensity)   Equipment  Recommended   (TBD)   AM-PAC Basic Mobility Inpatient   Turning in Flat Bed Without Bedrails 3   Lying on Back to Sitting on Edge of Flat Bed Without Bedrails 3   Moving Bed to Chair 3   Standing Up From Chair Using Arms 3   Walk in Room 3   Climb 3-5 Stairs With Railing 2   Basic Mobility Inpatient Raw Score 17   Basic Mobility Standardized Score 39.67   [facility] Highest Level Of Mobility   -HLM Goal 5: Stand one or more mins   -HLM Achieved 6: Walk 10 steps or more   Modified [place] Scale   Modified [place] Scale 4           Meaghan Young, PT DPT

## 2024-03-18 NOTE — ASSESSMENT & PLAN NOTE
Patient with hx of bladder cancer s/p prostatectomy cystectomy and diversion to ileal conduit (2/12)   Initially presented with generalized weakness and fever of 103 degrees, leukocytosis of 11 K   Likely secondary to UTI  Urinalysis revealed large leukocytes, nitrates  Complicated by gram-negative bacteremia  CT scan of abdomen/chest/pelvis ruled out any acute infection  Will place on IV cefepime until blood culture speciation  Monitor WBC count and fever

## 2024-03-18 NOTE — ASSESSMENT & PLAN NOTE
S/p cystoprostatectomy and ileal conduit creation 2/12 for small cell muscle invasive bladder cancer.  Also received carboplatin and etoposide  CT: Symmetric moderate to severe bilateral hydroureteronephrosis to the level of the right lower quadrant ileal conduit; possible anastomotic stricture  S/p stent removal 2/27  Creat 1.15, at baseline  WBC 3.29, was 2.91 (3/20)  Urine culture + e coli, enterococcus faecalis, staph hemolyticus, alpha hemolytic strep  Antibiotics per ID; on cefazolin  Urine output adequate as per I&O  Medical optimization per primary team  Continue to trend labs  fluoroscopy loopogram right anastomotic stricture versus distal ureteral spasm  Discussed with Dr. Cheema at [facility], recommend monitoring for now, 2 to 3 weeks of oral antibiotics, CT urogram in May as scheduled  Recommend PCN for flank pain, rising creatinine, decompensation, or fevers-patient wishes to be transferred to [facility] if PCN is deemed necessary

## 2024-03-19 ENCOUNTER — TELEPHONE (OUTPATIENT)
Dept: OTHER | Facility: HOSPITAL | Age: 74
End: 2024-03-19

## 2024-03-19 ENCOUNTER — TELEPHONE (OUTPATIENT)
Dept: NEUROLOGY | Facility: CLINIC | Age: 74
End: 2024-03-19

## 2024-03-19 ENCOUNTER — APPOINTMENT (INPATIENT)
Dept: RADIOLOGY | Facility: HOSPITAL | Age: 74
DRG: 698 | End: 2024-03-19
Payer: MEDICARE

## 2024-03-19 PROBLEM — B96.20 E COLI BACTEREMIA: Status: ACTIVE | Noted: 2024-03-19

## 2024-03-19 PROBLEM — N17.9 AKI (ACUTE KIDNEY INJURY) (HCC): Status: ACTIVE | Noted: 2024-03-19

## 2024-03-19 PROBLEM — R78.81 E COLI BACTEREMIA: Status: ACTIVE | Noted: 2024-03-19

## 2024-03-19 LAB
ANION GAP SERPL CALCULATED.3IONS-SCNC: 8 MMOL/L (ref 4–13)
BASOPHILS # BLD AUTO: 0.03 THOUSANDS/ÂΜL (ref 0–0.1)
BASOPHILS NFR BLD AUTO: 0 % (ref 0–1)
BUN SERPL-MCNC: 26 MG/DL (ref 5–25)
CALCIUM SERPL-MCNC: 8.1 MG/DL (ref 8.4–10.2)
CHLORIDE SERPL-SCNC: 106 MMOL/L (ref 96–108)
CO2 SERPL-SCNC: 20 MMOL/L (ref 21–32)
CREAT SERPL-MCNC: 1.42 MG/DL (ref 0.6–1.3)
EOSINOPHIL # BLD AUTO: 0.01 THOUSAND/ÂΜL (ref 0–0.61)
EOSINOPHIL NFR BLD AUTO: 0 % (ref 0–6)
ERYTHROCYTE [DISTWIDTH] IN BLOOD BY AUTOMATED COUNT: 12.6 % (ref 11.6–15.1)
GFR SERPL CREATININE-BSD FRML MDRD: 48 ML/MIN/1.73SQ M
GLUCOSE SERPL-MCNC: 127 MG/DL (ref 65–140)
HCT VFR BLD AUTO: 31 % (ref 36.5–49.3)
HGB BLD-MCNC: 10.3 G/DL (ref 12–17)
IMM GRANULOCYTES # BLD AUTO: 0.06 THOUSAND/UL (ref 0–0.2)
IMM GRANULOCYTES NFR BLD AUTO: 1 % (ref 0–2)
LYMPHOCYTES # BLD AUTO: 0.32 THOUSANDS/ÂΜL (ref 0.6–4.47)
LYMPHOCYTES NFR BLD AUTO: 5 % (ref 14–44)
MAGNESIUM SERPL-MCNC: 2 MG/DL (ref 1.9–2.7)
MCH RBC QN AUTO: 29.7 PG (ref 26.8–34.3)
MCHC RBC AUTO-ENTMCNC: 33.2 G/DL (ref 31.4–37.4)
MCV RBC AUTO: 89 FL (ref 82–98)
MONOCYTES # BLD AUTO: 0.41 THOUSAND/ÂΜL (ref 0.17–1.22)
MONOCYTES NFR BLD AUTO: 6 % (ref 4–12)
NEUTROPHILS # BLD AUTO: 6.33 THOUSANDS/ÂΜL (ref 1.85–7.62)
NEUTS SEG NFR BLD AUTO: 88 % (ref 43–75)
NRBC BLD AUTO-RTO: 0 /100 WBCS
PLATELET # BLD AUTO: 154 THOUSANDS/UL (ref 149–390)
PMV BLD AUTO: 9.8 FL (ref 8.9–12.7)
POTASSIUM SERPL-SCNC: 3.7 MMOL/L (ref 3.5–5.3)
RBC # BLD AUTO: 3.47 MILLION/UL (ref 3.88–5.62)
SODIUM SERPL-SCNC: 134 MMOL/L (ref 135–147)
WBC # BLD AUTO: 7.16 THOUSAND/UL (ref 4.31–10.16)

## 2024-03-19 PROCEDURE — 80048 BASIC METABOLIC PNL TOTAL CA: CPT | Performed by: INTERNAL MEDICINE

## 2024-03-19 PROCEDURE — 99223 1ST HOSP IP/OBS HIGH 75: CPT | Performed by: INTERNAL MEDICINE

## 2024-03-19 PROCEDURE — 99447 NTRPROF PH1/NTRNET/EHR 11-20: CPT | Performed by: PHYSICIAN ASSISTANT

## 2024-03-19 PROCEDURE — 83735 ASSAY OF MAGNESIUM: CPT | Performed by: INTERNAL MEDICINE

## 2024-03-19 PROCEDURE — 85025 COMPLETE CBC W/AUTO DIFF WBC: CPT | Performed by: INTERNAL MEDICINE

## 2024-03-19 PROCEDURE — 99233 SBSQ HOSP IP/OBS HIGH 50: CPT | Performed by: UROLOGY

## 2024-03-19 PROCEDURE — 99232 SBSQ HOSP IP/OBS MODERATE 35: CPT | Performed by: FAMILY MEDICINE

## 2024-03-19 PROCEDURE — 74425 UROGRAPHY ANTEGRADE RS&I: CPT

## 2024-03-19 RX ORDER — ACETAMINOPHEN 325 MG/1
650 TABLET ORAL EVERY 6 HOURS PRN
Status: DISCONTINUED | OUTPATIENT
Start: 2024-03-19 | End: 2024-03-22 | Stop reason: HOSPADM

## 2024-03-19 RX ORDER — SODIUM CHLORIDE 9 MG/ML
75 INJECTION, SOLUTION INTRAVENOUS CONTINUOUS
Status: DISCONTINUED | OUTPATIENT
Start: 2024-03-19 | End: 2024-03-20

## 2024-03-19 RX ADMIN — IOHEXOL 50 ML: 300 INJECTION, SOLUTION INTRAVENOUS at 08:25

## 2024-03-19 RX ADMIN — ASPIRIN 81 MG: 81 TABLET, COATED ORAL at 09:52

## 2024-03-19 RX ADMIN — Medication 1000 UNITS: at 09:53

## 2024-03-19 RX ADMIN — FELODIPINE 5 MG: 2.5 TABLET, FILM COATED, EXTENDED RELEASE ORAL at 09:53

## 2024-03-19 RX ADMIN — GABAPENTIN 100 MG: 100 CAPSULE ORAL at 09:53

## 2024-03-19 RX ADMIN — APIXABAN 5 MG: 5 TABLET, FILM COATED ORAL at 09:53

## 2024-03-19 RX ADMIN — APIXABAN 5 MG: 5 TABLET, FILM COATED ORAL at 18:23

## 2024-03-19 RX ADMIN — CEFEPIME 2000 MG: 2 INJECTION, POWDER, FOR SOLUTION INTRAVENOUS at 05:27

## 2024-03-19 RX ADMIN — SODIUM CHLORIDE 75 ML/HR: 0.9 INJECTION, SOLUTION INTRAVENOUS at 20:05

## 2024-03-19 RX ADMIN — GABAPENTIN 100 MG: 100 CAPSULE ORAL at 16:39

## 2024-03-19 RX ADMIN — ATORVASTATIN CALCIUM 20 MG: 20 TABLET, FILM COATED ORAL at 09:53

## 2024-03-19 RX ADMIN — ACETAMINOPHEN 650 MG: 325 TABLET, FILM COATED ORAL at 03:03

## 2024-03-19 RX ADMIN — CEFEPIME 2000 MG: 2 INJECTION, POWDER, FOR SOLUTION INTRAVENOUS at 18:11

## 2024-03-19 RX ADMIN — METOPROLOL SUCCINATE 25 MG: 25 TABLET, FILM COATED, EXTENDED RELEASE ORAL at 09:53

## 2024-03-19 RX ADMIN — LEVOTHYROXINE SODIUM 75 MCG: 75 TABLET ORAL at 05:24

## 2024-03-19 RX ADMIN — ACETAMINOPHEN 650 MG: 325 TABLET, FILM COATED ORAL at 23:28

## 2024-03-19 RX ADMIN — Medication 100 MG: at 09:52

## 2024-03-19 NOTE — ASSESSMENT & PLAN NOTE
Creatinine is 1.45 which is above his baseline.  Received IV contrast for the CAT scan  Monitor creatinine.  Avoid nephrotoxic agents  Monitor creatinine  Gentle hydration

## 2024-03-19 NOTE — CONSULTS
Consultation - Infectious Disease   Smooth Jackson 74 y.o. male MRN: [id number]  Unit/Bed#: [facility] 934-01 Encounter: [id number]      IMPRESSION & RECOMMENDATIONS:   Impression/Recommendations:  Severe sepsis, POA.    Fever, HR, lactic acidosis.  Due to bacteremia, UTI as below.  No other appreciable source.  Clinically stable and nontoxic.  Improving.  Rec:  Continue antibiotics as below  Follow temperatures closely  Recheck WBC in AM to monitor infection  Supportive care as per the primary service    E. Coli bacteremia.    Due to UTI. No other appreciable source.  LFTs, CT A/P otherwise negative.  Rec:  Continue cefepime for now  Follow up susceptibilities and tailor antibiotics as indicated  Anticipate hopeful transition to PO antibiotics with plan to complete 10 days total    E. Coli pyelonephritis.    In setting of complicated  history as below.  Rec:  Continue antibiotics as above  Follow up urine cultures and tailor antibiotics as indicated    Ileo-ureteral anastomotic stricture.  In setting of  surgical history as below.  Risk factor for infection.  Rec:  Await IR consultation for PCN  Close urology follow-up ongoing    Invasive small cell bladder cancer.  Status post neoadjuvant chemotherapy, radical cystoprostatectomy with ileal conduit urinary diversion 2/12/24 at [facility].  Complicated by anastomotic stricture as above.    I discussed with Dr. Gonsalez the above plan to continue cefepime and follow up final blood cultures.  She agrees with the plan.    Antibiotics:  Cefepime #2  Antibiotics #3    Thank you for this consultation.  We will follow along with you.    HISTORY OF PRESENT ILLNESS:  Reason for Consult: Bacteremia    HPI: Smooth Jackson is a 74 y.o. male with invasive small cell bladder cancer status post neoadjuvant chemotherapy, radical cystoprostatectomy with ileal conduit urinary diversion 2/12/24 at [facility].  Presented to the ED 3/17 with SOB, weakness, fever and dark urine for several days.  Upon  presentation noted to be febrile with tachycardia.  Labs revealed lactic acidosis.  Started on IV antibiotics.  CT A/P showed severe bilateral hydronephrosis.  Loopogram suggested right ileo-ureteral anastomotic stricture.  IR consult is pending for PCN placement.  Blood and urine cultures from admission have come back growing E. Coli.  We are asked to comment on further evaluation and management.    In performing this consult, I have reviewed prior admission and outpatient visit records in detail.    REVIEW OF SYSTEMS:  Feels better than admission. No fevers, pain, or diarrhea.  A complete system-based review of systems is otherwise negative.    PAST MEDICAL HISTORY:  Past Medical History:   Diagnosis Date    Acute blood loss anemia 03/08/2019    Aneurysm of thoracic aorta (HCC)     Arrhythmia     Cancer (HCC)     Cholecystitis 03/05/2019    Added automatically from request for surgery [id number]    Detached retina, right     Disease of thyroid gland     Gastric wall thickening     Headache     Hematoma 10/10/2018    Hypertension     Iliac artery aneurysm, bilateral (HCC)     Irregular heart beat     afib cardioversion 1/30/17, x2     Neuropathy     bilateral feet    Paroxysmal A-fib (HCC)     Prostatic hypertrophy     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(HCC)      Past Surgical History:   Procedure Laterality Date    ABDOMINAL AORTIC ANEURYSM REPAIR, ENDOVASCULAR Right 04/13/2018    Procedure: REPAIR ANEURYSM ILIAC endovascular  with coil embolization right hypogastric artery.;  Surgeon: Guille Esquivel MD;  [facility];  Service: Vascular    BLADDER CANCER BY FISH (HISTORICAL)      CARDIOVERSION      CATARACT EXTRACTION Left 10/20/2019    Right eye 11/17/2011    CHOLECYSTECTOMY      CHOLECYSTECTOMY LAPAROSCOPIC N/A 03/08/2019    Procedure: [id number] CHOLECYSTECTOMY;  Surgeon: Derik Case DO;  [facility];  Service: General    COLONOSCOPY  07/13/2016    CYSTECTOMY, RADICAL WITH ILEOCONDUIT   2024    MOLE EXCISION      AK CYSTOURETHROSCOPY W/DEST &/RMVL TUMOR LARGE N/A 2023    Procedure: TURBT, gemcitabine instillation;  Surgeon: Juvenal Cruz MD;  [facility];  Service: Urology    AK EDG US EXAM SURGICAL ALTER STOM DUODENUM/JEJUNUM N/A 11/10/2017    Procedure: RADIAL ENDOSCOPIC U/S;  Surgeon: Harvey Gallegos MD;  Location: [facility];  Service: Gastroenterology    RETINAL DETACHMENT SURGERY Right     onset , retinal detachment complete air fill confirmed       FAMILY HISTORY:  Non-contributory    SOCIAL HISTORY:  Social History     Substance and Sexual Activity   Alcohol Use Yes    Alcohol/week: 5.0 standard drinks of alcohol    Types: 2 Glasses of wine, 1 Cans of beer, 2 Standard drinks or equivalent per week    Comment: 3-4 x week     Social History     Substance and Sexual Activity   Drug Use Never     Social History     Tobacco Use   Smoking Status Never   Smokeless Tobacco Never       ALLERGIES:  Allergies   Allergen Reactions    Wound Dressing Adhesive Blisters       MEDICATIONS:  All current active medications have been reviewed, including antibiotics as outlined above.    PHYSICAL EXAM:  Vitals:  Temp:  [98.1 °F (36.7 °C)-100.9 °F (38.3 °C)] 98.1 °F (36.7 °C)  HR:  [] 94  Resp:  [15-18] 16  BP: (103-119)/(65-94) 119/94  SpO2:  [91 %-95 %] 94 %  Temp (24hrs), Av.9 °F (37.2 °C), Min:98.1 °F (36.7 °C), Max:100.9 °F (38.3 °C)  Current: Temperature: 98.1 °F (36.7 °C)     Physical Exam:  General:  Well-nourished, well-developed, in no acute distress  Eyes:  Conjunctive clear with no hemorrhages or effusions  Oropharynx:  No ulcers, no lesions  Neck:  Supple, no lymphadenopathy  Lungs:  Normal respiratory excursion, no accessory muscle use  Cardiac:  Regular rate and rhythm, extremities well perfused  Abdomen:  Soft, non-tender, non-distended  Extremities:  No peripheral cyanosis, clubbing, or edema  Skin:  No rashes, no ulcers  Neurological:  Moves all four  extremities spontaneously, sensation grossly intact  :  Ileostomy with clear yellow urine in tubing    LABS, IMAGING, & OTHER STUDIES:  Lab Results:  I have personally reviewed pertinent labs.  Results from last 7 days   Lab Units 03/19/24 0622 03/18/24 0956 03/17/24 1926   SODIUM mmol/L 134* 140 140   POTASSIUM mmol/L 3.7 4.4 3.7   CHLORIDE mmol/L 106 108 109*   CO2 mmol/L 20* 21 20*   BUN mg/dL 26* 27* 32*   CREATININE mg/dL 1.42* 1.15 1.28   EGFR ml/min/1.73sq m 48 62 54   CALCIUM mg/dL 8.1* 8.5 9.1   AST U/L  --  14 15   ALT U/L  --  13 16   ALK PHOS U/L  --  75 84     Results from last 7 days   Lab Units 03/19/24 0622 03/18/24 0956 03/17/24 1926   WBC Thousand/uL 7.16 13.19* 11.59*   HEMOGLOBIN g/dL 10.3* 11.5* 12.1   PLATELETS Thousands/uL 154 210 213     Results from last 7 days   Lab Units 03/17/24 1949 03/17/24 1943 03/17/24 1926   BLOOD CULTURE   --  No Growth at 24 hrs. Escherichia coli*   GRAM STAIN RESULT   --   --  Gram negative rods*   URINE CULTURE  >100,000 cfu/ml Escherichia coli*  --   --        Imaging Studies:   I have personally reviewed the following radiographic images in PACS:  CT A/P images reviewed severe bilateral hydornephrosis

## 2024-03-19 NOTE — CONSULTS
e-Consult (IPC)  - Interventional Radiology  Smooth Jackson 74 y.o. male MRN: [id number]  Unit/Bed#: [facility] 934-01 Encounter: [id number]          Interventional Radiology has been consulted to evaluate Smooth Jackson    We were consulted by urology concerning this patient with history of bladder cancer s/p cystoprostatectomy and ileal conduit creation 2/12 with left hydroureteronephrosis, sepsis and bacteremia.    Inpatient Consult to IR  Consult performed by: Jewell Pan PA-C  Consult ordered by: LORI Arnold        03/19/24    Assessment/Recommendation:   Patient is a 74-year-old male with a history of A-fib on Eliquis, HTN, bladder cancer s/p cystoprostatectomy and ileal conduit creation 2/12, treated at [facility], who initially presented to the ED on 317 with generalized weakness, fatigue and myalgias with cough.  Patient noted to have fever at home.  Patient found to have sepsis with likely UTI as etiology, possible lung consolidation.  Patient was admitted and started on antibiotics.  Patient also found to be bacteremic with E. coli, matching E. coli growing from urine cultures. Patient had loopogram completed today, which showed no reflux of contrast into the right ureter, concerning for ileal ureteral anastomotic stricture.  We were consulted to evaluate patient for right PCN placement.    -Case discussed with Dr. Loera  -Plan for IR right PCN placement later this week, pending IR schedule  -Patient last received Eliquis and ASA this morning.  Procedure requires a 6 dose/3-day hold of Eliquis and minimum a 3-day hold of ASA.  Will place hold on these medication, and anticipate procedure tentatively on Friday  -Please make patient NPO at midnight prior to procedure    -Patient was febrile this morning and Cr was noted to be increased.  Currently hemodynamically stable, so appropriate to attempt to abide by medication hold as above.  -Please reach back out to IR if patient should clinically deteriorate  and require reassessment for more emergent placement of right PCN, understanding the increased risk of bleeding.  -Appreciate urology recommendations  -Appreciate anesthesia assistance  -Remainder of care per primary team    11-20 minutes, >50% of the total time devoted to medical consultative verbal/EMR discussion between providers. Written report will be generated in the EMR.     Thank you for allowing Interventional Radiology to participate in the care of Smooth Jackson. Please don't hesitate to call or TigerText us with any questions.     Jewell Pan PA-C

## 2024-03-19 NOTE — ASSESSMENT & PLAN NOTE
"CT scan revealed \"symmetric moderate to severe bilateral hydroureteronephrosis to the level of the right lower quadrant ileal conduit; correlate for possible anastomotic stricture\"  Currently denies any abdominal pain  Urology evaluation appreciated.  Status post loopogram  "

## 2024-03-19 NOTE — TELEPHONE ENCOUNTER
Spoke to pt's wife Rona to schedule an appt w/ Irais Recinos in [place]. Rona stated pt is currently admitted and to give them a call in a week to schedule.

## 2024-03-19 NOTE — ASSESSMENT & PLAN NOTE
Noted as above for hx of bladder cancer   Urostomy site looks clean  Monitor UOP from urostomy   Urology evaluation appreciated.  Status post loopogram.  Urology contacted his surgeon in [facility].  Abnormality seen in the loopogram could be related to spasm will evaluate the patient as outpatient will repeat ct

## 2024-03-19 NOTE — ASSESSMENT & PLAN NOTE
Chest x-ray/CT scan revealed no acute abnormalities  Continue with symptom support   Group Therapy Documentation    PATIENT'S NAME: Jenae Callaway  MRN:   [id number]  :   2005  ACCT. NUMBER: [id number]  DATE OF SERVICE: 3/10/22  START TIME:  9:30 AM  END TIME: 10:30 AM  FACILITATOR(S): Kriss Ramos MSW  TOPIC: Child/Adol Group Therapy  Number of patients attending the group:  6  Group Length:  1 Hours    Summary of Group / Topics Discussed:    Distress tolerance:  Distracts  Patients learned to mindfully use distraction as a way to decrease heightened stress in the moment.  Patients will identified situations that necessitate healthy distraction strategies.  They explored ways to manage physical symptoms of distress using distraction. The group began to distinguish when this can be useful in their lives or when other strategies would be more relevant or helpful.    Patient Session Goals / Objectives:   *  Understand the purpose and benefits of using healthy distraction to decrease  distress.   *  Process what happens in the body when using distraction strategies.   *  Demonstrate understanding of when to use distraction strategies.   *  Explore patient's current distraction activities, and how to take a more  intentional approach to the use of distraction.   *  Identify and problem solve barriers to applying distraction strategies.   *  Choose 1-2 healthy distraction strategies to apply during times of distress.  Group Therapy/Process Group:  Verbal Group Process      Group Attendance:  Attended group session    Patient's response to the group topic/interactions:  discussed personal experience with topic    Patient appeared to be Actively participating.       Client specific details:  Lena reported that her depression is at a 4, anxiety 6 anger 5 sib 0 si 1 (Able to keep self safe), Sirena 7, Feeling content, Grateful for dog, soccer and group, goal is to be patient.  Lena stated that she talked to her mother, she didn't feel that her mother was really listening to her.  Lena wanted  validation and wanted her to understand that she cares about her.  Lena felt dismissed and really wanted an apology about how parents consoled her brother.  Lena talked about feeling a bit agitated and discussed with author ways she could combat that.  She could use the relaxation chair or the relaxation room, etc.  She said that she will usually blow up at a 70 out of 100, and that she is at a 45.  She will try and talk it out.  Author commended her for talking to her mom, and also that she is aware of what she needs.  She appears to be more vulnerable in groups.

## 2024-03-19 NOTE — PROGRESS NOTES
Progress Note - Urology  Smooth Jackson 1950, 74 y.o. male MRN: [id number]    Unit/Bed#: [facility] 934-01 Encounter: [id number]    Open abdominal wall wound  Assessment & Plan  Reports postoperative wound VAC  Wound measures 4 cm x 0.5 cm  Continue daily dressing changes per patient and family instructions    Hydroureteronephrosis  Assessment & Plan  Creat 1.42; up from 1.15  CT: Symmetric moderate to severe bilateral hydroureteronephrosis to the level of the right lower quadrant ileal conduit  Loopogram with right anastomotic stricture versus ureteral spasm  Monitor urine output  Continue to trend labs    UTI (urinary tract infection)  Assessment & Plan  Ultra positive E. coli   broad-spectrum empiric antibiotics tailored to culture; currently on cefepime    Malignant neoplasm of posterior wall of urinary bladder (HCC)  Assessment & Plan  S/p cystoprostatectomy and ileal conduit creation 2/12 for small cell muscle invasive bladder cancer.  Also received carboplatin and etoposide  CT: Symmetric moderate to severe bilateral hydroureteronephrosis to the level of the right lower quadrant ileal conduit; possible anastomotic stricture  S/p stent removal 2/27  WBC 7.16  Creat 1.42, trending up, 1.15 at baseline  Urine culture + e coli  Urine output adequate as per I&O  Empiric broad-spectrum antibiotics tailored to culture  Medical optimization antibiotics per primary team  Continue to trend labs  fluoroscopy loopogram right anastomotic stricture versus distal ureteral spasm  Discussed with Dr. Cheema at [facility], recommend monitoring for now, 2 to 3 weeks of oral antibiotics, CT urogram in May as scheduled  PCN only for flank pain or rising creatinine-patient is on Eliquis and will need Eliquis hold prior to PCN placement          Subjective: Patient resting in bed.  Wife at bedside.  Patient had loopogram which shows right anastomotic stricture.  In the setting of UTI bacteremia recommended right nephrostomy tube.  IR  "consult was placed.  Since that time received TT from primary nurse that a provider from [facility] wish to speak to us about patient's plan of care.  At this time I called Dr. Madu Parmer@[phone] and explained the situation.  They report being able to see some of her notes.  They feel it is too soon to have a right-sided anastomosis stricture and feel this is likely secondary to a spasm.  If patient is not having flank pain or increasing creatinine they recommend monitoring.  He is scheduled for CT urogram in May which they would like him to proceed with.  They also recommended 2 to 3 weeks of oral antibiotics    24 HR EVENTS:   no significant events.      Patient has  no complaints.      Review of Systems   Constitutional:  Negative for activity change, appetite change, chills, fatigue, fever and unexpected weight change.   HENT:  Negative for facial swelling.    Eyes:  Negative for discharge.   Respiratory: Negative.  Negative for cough and shortness of breath.    Cardiovascular:  Negative for chest pain and leg swelling.   Gastrointestinal: Negative.  Negative for abdominal distention, abdominal pain, constipation, diarrhea, nausea and vomiting.   Endocrine: Negative.    Genitourinary: Negative.  Negative for decreased urine volume, dysuria, enuresis, flank pain, genital sores and hematuria.   Musculoskeletal:  Negative for back pain and myalgias.   Skin:  Negative for pallor and rash.   Allergic/Immunologic: Negative.  Negative for immunocompromised state.   Neurological:  Negative for facial asymmetry and speech difficulty.   Psychiatric/Behavioral:  Negative for agitation and confusion.        Objective:  Nursing Rounds: April via TT  Vitals: Blood pressure 107/66, pulse 73, temperature 98.2 °F (36.8 °C), resp. rate 15, height 6' 1\" (1.854 m), weight 95.8 kg (211 lb 3.2 oz), SpO2 93%.,Body mass index is 27.86 kg/m².  INS & OUTS:  I/O last 24 hours:  In: 1120 [I.V.:1020; IV Piggyback:100]  Out: 2965 " [Urine:2965]    Physical Exam    Imaging:  LOOPOGRAM     INDICATION:   ileal conduit with bilateral hydronephrosis to level of ileal conduit. evaluate for obstruction. Surgery on 2/12/2024 at [facility]     COMPARISON: CT chest/abdomen/pelvis 3/18/2024     IMAGES: 66     FLUOROSCOPY TIME: 1 minute 34 seconds     FINDINGS:     The ileal conduit was accessed with a sterile Moralez catheter, the balloon was then inflated and retracted to the abdominal wall forming a tight seal. Approximately 50 cc Omnipaque 300 was slowly hand injected into the ileal conduit.     The ileal conduit demonstrates an unremarkable appearance without a leak or stricture.     There is reflux of contrast into the left ureter and upper tract.     There is no reflux of contrast into the right ureter.        IMPRESSION:     No reflux of contrast into the right ureter, concerning for ileo-ureteral anastomotic stricture or distal ureteral spasm.     Unremarkable appearance of the ileal conduit and left ureter.     The study was marked in EPIC for immediate notification.     Workstation performed: [id number]  Imaging reviewed - both report and images personally reviewed.     Labs:  Recent Labs     03/17/24 1926 03/18/24  0956 03/19/24  0622   WBC 11.59* 13.19* 7.16       Recent Labs     03/17/24 1926 03/18/24  0956 03/19/24  0622   HGB 12.1 11.5* 10.3*     Recent Labs     03/17/24 1926 03/18/24  0956 03/19/24  0622   HCT 36.9 33.8* 31.0*     Recent Labs     03/17/24  1926 03/18/24  0956 03/19/24  0622   CREATININE 1.28 1.15 1.42*     Lab Results   Component Value Date    HGB 10.3 (L) 03/19/2024    HCT 31.0 (L) 03/19/2024    WBC 7.16 03/19/2024     03/19/2024     Lab Results   Component Value Date     01/06/2016    K 3.7 03/19/2024     03/19/2024    CO2 20 (L) 03/19/2024    BUN 26 (H) 03/19/2024    CREATININE 1.42 (H) 03/19/2024    CALCIUM 8.1 (L) 03/19/2024    GLUCOSE 149 (H) 03/08/2019     Urine Culture: Growth: E.  coli    History:    Past Medical History:   Diagnosis Date    Acute blood loss anemia 2019    Aneurysm of thoracic aorta (HCC)     Arrhythmia     Cancer (HCC)     Cholecystitis 2019    Added automatically from request for surgery [id number]    Detached retina, right     Disease of thyroid gland     Gastric wall thickening     Headache     Hematoma 10/10/2018    Hypertension     Iliac artery aneurysm, bilateral (HCC)     Irregular heart beat     afib cardioversion 17, x2     Neuropathy     bilateral feet    Paroxysmal A-fib (HCC)     Prostatic hypertrophy     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(HCC)      Past Surgical History:   Procedure Laterality Date    ABDOMINAL AORTIC ANEURYSM REPAIR, ENDOVASCULAR Right 2018    Procedure: REPAIR ANEURYSM ILIAC endovascular  with coil embolization right hypogastric artery.;  Surgeon: Guille Esquivel MD;  [facility];  Service: Vascular    BLADDER CANCER BY FISH (HISTORICAL)      CARDIOVERSION      CATARACT EXTRACTION Left 10/20/2019    Right eye 2011    CHOLECYSTECTOMY      CHOLECYSTECTOMY LAPAROSCOPIC N/A 2019    Procedure: [id number] CHOLECYSTECTOMY;  Surgeon: Derik Case DO;  [facility];  Service: General    COLONOSCOPY  2016    CYSTECTOMY, RADICAL WITH ILEOCONDUIT  2024    MOLE EXCISION      UT CYSTOURETHROSCOPY W/DEST &/RMVL TUMOR LARGE N/A 2023    Procedure: TURBT, gemcitabine instillation;  Surgeon: Juvenal Cruz MD;  [facility];  Service: Urology    UT EDG US EXAM SURGICAL ALTER STOM DUODENUM/JEJUNUM N/A 11/10/2017    Procedure: RADIAL ENDOSCOPIC U/S;  Surgeon: Harvey Gallegos MD;  Location: [facility];  Service: Gastroenterology    RETINAL DETACHMENT SURGERY Right     onset , retinal detachment complete air fill confirmed     Family History   Problem Relation Age of Onset    Stroke Mother          at 91, failure to thrive    Aortic aneurysm Father         abdominal    Aortic  aneurysm Brother     Basal cell carcinoma Brother     Schizophrenia Brother     Cancer Maternal Grandmother         smoker, heart attack, cancer    Cancer Maternal Grandfather         smoker cancer    Cancer Paternal Grandmother         malignant neoplasm    Cancer Paternal Grandfather         malignant neoplasm    Cancer Family         malignant neoplasm    Cancer Family         malignant neoplasm    Cancer Maternal Uncle         smoker, lots wrong with her    Cancer Paternal Uncle         melanoma at 92     Social History     Socioeconomic History    Marital status: /Civil Union     Spouse name: None    Number of children: None    Years of education: None    Highest education level: None   Occupational History    None   Tobacco Use    Smoking status: Never    Smokeless tobacco: Never   Vaping Use    Vaping status: Never Used   Substance and Sexual Activity    Alcohol use: Yes     Alcohol/week: 5.0 standard drinks of alcohol     Types: 2 Glasses of wine, 1 Cans of beer, 2 Standard drinks or equivalent per week     Comment: 3-4 x week    Drug use: Never    Sexual activity: Not Currently   Other Topics Concern    None   Social History Narrative    None     Social Determinants of Health     Financial Resource Strain: Low Risk  (12/19/2023)    Overall Financial Resource Strain (CARDIA)     Difficulty of Paying Living Expenses: Not hard at all   Food Insecurity: Unknown (3/18/2024)    Hunger Vital Sign     Worried About Running Out of Food in the Last Year: Never true     Ran Out of Food in the Last Year: Not on file   Transportation Needs: No Transportation Needs (3/18/2024)    PRAPARE - Transportation     Lack of Transportation (Medical): No     Lack of Transportation (Non-Medical): No   Physical Activity: Not on file   Stress: Not on file   Social Connections: Not on file   Intimate Partner Violence: Not on file   Housing Stability: Low Risk  (3/18/2024)    Housing Stability Vital Sign     Unable to Pay for  Housing in the Last Year: No     Number of Places Lived in the Last Year: 1     Unstable Housing in the Last Year: No       The following portions of the patient's history were reviewed and updated as appropriate: allergies, current medications, past family history, past medical history, past social history, past surgical history and problem list    LORI Arnold  Date: 3/19/2024 Time: 3:39 PM

## 2024-03-19 NOTE — PLAN OF CARE
Problem: CARDIOVASCULAR - ADULT  Goal: Absence of cardiac dysrhythmias or at baseline rhythm  Description: INTERVENTIONS:  - Continuous cardiac monitoring, vital signs, obtain 12 lead EKG if ordered  - Administer antiarrhythmic and heart rate control medications as ordered  - Monitor electrolytes and administer replacement therapy as ordered  Outcome: Progressing     Problem: INFECTION - ADULT  Goal: Absence or prevention of progression during hospitalization  Description: INTERVENTIONS:  - Assess and monitor for signs and symptoms of infection  - Monitor lab/diagnostic results  - Monitor all insertion sites, i.e. indwelling lines, tubes, and drains  - Monitor endotracheal if appropriate and nasal secretions for changes in amount and color  - [place] appropriate cooling/warming therapies per order  - Administer medications as ordered  - Instruct and encourage patient and family to use good hand hygiene technique  - Identify and instruct in appropriate isolation precautions for identified infection/condition  Outcome: Progressing     Problem: METABOLIC, FLUID AND ELECTROLYTES - ADULT  Goal: Electrolytes maintained within normal limits  Description: INTERVENTIONS:  - Monitor labs and assess patient for signs and symptoms of electrolyte imbalances  - Administer electrolyte replacement as ordered  - Monitor response to electrolyte replacements, including repeat lab results as appropriate  - Instruct patient on fluid and nutrition as appropriate  Outcome: Progressing  Goal: Fluid balance maintained  Description: INTERVENTIONS:  - Monitor labs   - Monitor I/O and WT  - Instruct patient on fluid and nutrition as appropriate  - Assess for signs & symptoms of volume excess or deficit  Outcome: Progressing

## 2024-03-19 NOTE — PROGRESS NOTES
"[facility]  Progress Note  Name: Smooth Jackson I  MRN: [id number]  Unit/Bed#: [facility]PHP 934-01 I Date of Admission: 3/17/2024   Date of Service: 3/19/2024 I Hospital Day: 2    Assessment/Plan   * Sepsis (HCC)  Assessment & Plan  Patient with hx of bladder cancer s/p prostatectomy cystectomy and diversion to ileal conduit (2/12)   Initially presented with generalized weakness and fever of 103 degrees, leukocytosis of 11 K   Likely secondary to UTI  Urinalysis revealed large leukocytes, nitrates  Complicated by gram-negative bacteremia  CT scan of abdomen/chest/pelvis ruled out any acute infection-results reviewed  Blood Culture came back positive.  E. coli.  Sensitivities are pending.  Will obtain ID consultation-consultation appreciated  X-ray loopogram reviewed.  Initially urology was planning for percutaneous nephrostomy tube placement on the right side.  After discussion with his surgeon in [facility] myelitis seen on the loopogram could be due to spasms.      SAMARA (acute kidney injury) ([facility])  Assessment & Plan  Creatinine is 1.45 which is above his baseline.  Received IV contrast for the CAT scan  Monitor creatinine.  Avoid nephrotoxic agents  Monitor creatinine  Gentle hydration    Hydroureteronephrosis  Assessment & Plan  CT scan revealed \"symmetric moderate to severe bilateral hydroureteronephrosis to the level of the right lower quadrant ileal conduit; correlate for possible anastomotic stricture\"  Currently denies any abdominal pain  Urology evaluation appreciated.  Status post loopogram    UTI (urinary tract infection)  Assessment & Plan  Presented with fever 103 degrees  Urinalysis revealed leukocytes bacteria, nitrates  Continue cefepime for now  Follow-up urine culture-urine culture and blood culture is growing E. coli sensitivities pending    Productive cough  Assessment & Plan  Chest x-ray/CT scan revealed no acute abnormalities  Continue with symptom support    S/P " ileal conduit (HCC)  Assessment & Plan  Noted as above for hx of bladder cancer   Urostomy site looks clean  Monitor UOP from urostomy   Urology evaluation appreciated.  Status post loopogram.  Urology contacted his surgeon in [facility].  Abnormality seen in the loopogram could be related to spasm will evaluate the patient as outpatient will repeat ct    Malignant neoplasm of posterior wall of urinary bladder (HCC)  Assessment & Plan  Hx of small cell muscle invasive bladder cancer status post carboplatin and etoposide based neoadjuvant chemotherapy.  S/p radical cystoprostatectomy with ileal conduit urinary diversion and bilateral pelvic lymphadenectomy on 2/12/24 with Dr. Melodie Pruett  O/p follow up   Urology evaluation appreciated    Vitamin D deficiency  Assessment & Plan  Continue vit d 1000 qd    Mixed hyperlipidemia  Assessment & Plan  Continue statin    Acquired hypothyroidism  Assessment & Plan  Continue home synthroid    Iliac artery aneurysm, bilateral (HCC)  Assessment & Plan  S/p R MINDY endovascular repair with endograft and coil embolization of hypogastric artery (JBF/PP) 4/13/2018  VAS iliacs from July 2023: The left common iliac artery is aneurysmal measuring approximately 2 cm. Prior:  Continue with outpatient follow-up with vascular    Atrial fibrillation, persistent (HCC)  Assessment & Plan  Currently rate controlled  Continue Toprol  Continue Eliquis    Essential hypertension  Assessment & Plan  BP controlled   continue felodipine               VTE Pharmacologic Prophylaxis: VTE Score: 8 Moderate Risk (Score 3-4) - Pharmacological DVT Prophylaxis Ordered: apixaban (Eliquis).    Mobility:   Basic Mobility Inpatient Raw Score: 17  JH-HLM Goal: 5: Stand one or more mins  JH-HLM Achieved: 4: Move to chair/commode      Patient Centered Rounds: I performed bedside rounds with nursing staff today.   Discussions with Specialists or Other Care Team Provider: Urology and infectious disease    Education  and Discussions with Family / Patient: Updated  (wife) at bedside.    Total Time Spent on Date of Encounter in care of patient: 45 mins. This time was spent on one or more of the following: performing physical exam; counseling and coordination of care; obtaining or reviewing history; documenting in the medical record; reviewing/ordering tests, medications or procedures; communicating with other healthcare professionals and discussing with patient's family/caregivers.    Current Length of Stay: 2 day(s)  Current Patient Status: Inpatient   Certification Statement: The patient will continue to require additional inpatient hospital stay due to IV antibiotics  Discharge Plan: Anticipate discharge in >72 hrs to discharge location to be determined pending rehab evaluations.    Code Status: Level 1 - Full Code    Subjective:   Patient seen and examined.  No events overnight.   Objective:     Vitals:   Temp (24hrs), Av.9 °F (37.2 °C), Min:98.1 °F (36.7 °C), Max:100.9 °F (38.3 °C)    Temp:  [98.1 °F (36.7 °C)-100.9 °F (38.3 °C)] 98.2 °F (36.8 °C)  HR:  [] 73  Resp:  [15-18] 15  BP: (104-119)/(65-94) 107/66  SpO2:  [91 %-95 %] 93 %  Body mass index is 27.86 kg/m².     Input and Output Summary (last 24 hours):     Intake/Output Summary (Last 24 hours) at 3/19/2024 1719  Last data filed at 3/19/2024 0530  Gross per 24 hour   Intake 100 ml   Output 1365 ml   Net -1265 ml       Physical Exam:   Physical Exam  Constitutional:       General: He is not in acute distress.  HENT:      Head: Normocephalic and atraumatic.      Nose: Nose normal.   Eyes:      General: No scleral icterus.  Cardiovascular:      Rate and Rhythm: Normal rate and regular rhythm.      Pulses: Normal pulses.   Abdominal:      Comments: Urostomy present   Musculoskeletal:         General: No swelling.   Skin:     General: Skin is warm.      Coloration: Skin is not jaundiced.   Neurological:      Mental Status: He is alert. Mental status  is at baseline.          Additional Data:     Labs:  Results from last 7 days   Lab Units 03/19/24  0622 03/18/24  0956   WBC Thousand/uL 7.16 13.19*   HEMOGLOBIN g/dL 10.3* 11.5*   HEMATOCRIT % 31.0* 33.8*   PLATELETS Thousands/uL 154 210   BANDS PCT %  --  2   NEUTROS PCT % 88*  --    LYMPHS PCT % 5*  --    LYMPHO PCT %  --  3*   MONOS PCT % 6  --    MONO PCT %  --  0*   EOS PCT % 0 0     Results from last 7 days   Lab Units 03/19/24  0622 03/18/24  0956   SODIUM mmol/L 134* 140   POTASSIUM mmol/L 3.7 4.4   CHLORIDE mmol/L 106 108   CO2 mmol/L 20* 21   BUN mg/dL 26* 27*   CREATININE mg/dL 1.42* 1.15   ANION GAP mmol/L 8 11   CALCIUM mg/dL 8.1* 8.5   ALBUMIN g/dL  --  3.9   TOTAL BILIRUBIN mg/dL  --  0.51   ALK PHOS U/L  --  75   ALT U/L  --  13   AST U/L  --  14   GLUCOSE RANDOM mg/dL 127 118     Results from last 7 days   Lab Units 03/17/24  1926   INR  1.40*             Results from last 7 days   Lab Units 03/18/24  0956 03/17/24 2123 03/17/24 1926   LACTIC ACID mmol/L  --  1.6 2.3*   PROCALCITONIN ng/ml 3.65*  --  0.37*       Lines/Drains:  Invasive Devices       Peripheral Intravenous Line  Duration             Peripheral IV 03/17/24 Right Antecubital 1 day              Drain  Duration             Urostomy RLQ 29 days                          Imaging: X-ray loopogram and CT chest abdomen and pelvis reviewed    Recent Cultures (last 7 days):   Results from last 7 days   Lab Units 03/17/24  1949 03/17/24 1943 03/17/24 1926   BLOOD CULTURE   --  No Growth at 24 hrs. Escherichia coli*   GRAM STAIN RESULT   --   --  Gram negative rods*   URINE CULTURE  >100,000 cfu/ml Escherichia coli*  --   --        Last 24 Hours Medication List:   Current Facility-Administered Medications   Medication Dose Route Frequency Provider Last Rate    acetaminophen  650 mg Oral Q6H PRN Jazmin Khan PA-C      apixaban  5 mg Oral BID Jasmit Star-Kals, DO      aspirin  81 mg Oral Daily Trinity Parra, DO      atorvastatin  20  mg Oral Daily Jasmit Star-Kals, DO      cefepime  2,000 mg Intravenous Q12H Klaus Burns MD 2,000 mg (03/19/24 0546)    cholecalciferol  1,000 Units Oral Daily Jasmit Star-Kals, DO      co-enzyme Q-10  100 mg Oral Daily Jasmit Star-Kals, DO      felodipine  5 mg Oral Daily Jasmit Star-Kals, DO      gabapentin  100 mg Oral TID Jasmit Star-Kals, DO      guaiFENesin  600 mg Oral BID PRN Jasmit Star-Kals, DO      levothyroxine  75 mcg Oral Early Morning Jasmit Satr-Kals, DO      metoprolol succinate  25 mg Oral BID Jasmit Star-Kals, DO      ondansetron  4 mg Intravenous Q4H PRN Jazmin Khan PA-C      sodium chloride  75 mL/hr Intravenous Continuous Maryjo Gonsalez MD          Today, Patient Was Seen By: Maryjo Gonsalez MD    **Please Note: This note may have been constructed using a voice recognition system.**

## 2024-03-19 NOTE — TELEPHONE ENCOUNTER
Called Dr. Medu Parmer review [facility] at [phone] and updated her on the plan.  She reports reviewing case with Dr. Sparks and they feel it is too soon for a right anastomotic stricture and feel he likely had a spasm.  They would like him treated for his sepsis with 2 to 3 weeks of oral antibiotics.  He should have a CT urogram in May as scheduled.  If he develops flank pain or increase of creatinine then can consider placement of nephrostomy tube

## 2024-03-19 NOTE — PLAN OF CARE
Problem: Potential for Falls  Goal: Patient will remain free of falls  Description: INTERVENTIONS:  - Educate patient/family on patient safety including physical limitations  - Instruct patient to call for assistance with activity   - Consult OT/PT to assist with strengthening/mobility   - Keep Call bell within reach  - Keep bed low and locked with side rails adjusted as appropriate  - Keep care items and personal belongings within reach  - Initiate and maintain comfort rounds  - Make Fall Risk Sign visible to staff  - Offer Toileting every 2 Hours, in advance of need  - Initiate/Maintain alarm  - Obtain necessary fall risk management equipment:   - Apply yellow socks and bracelet for high fall risk patients  - Consider moving patient to room near nurses station  Outcome: Progressing     Problem: CARDIOVASCULAR - ADULT  Goal: Absence of cardiac dysrhythmias or at baseline rhythm  Description: INTERVENTIONS:  - Continuous cardiac monitoring, vital signs, obtain 12 lead EKG if ordered  - Administer antiarrhythmic and heart rate control medications as ordered  - Monitor electrolytes and administer replacement therapy as ordered  Outcome: Progressing     Problem: METABOLIC, FLUID AND ELECTROLYTES - ADULT  Goal: Electrolytes maintained within normal limits  Description: INTERVENTIONS:  - Monitor labs and assess patient for signs and symptoms of electrolyte imbalances  - Administer electrolyte replacement as ordered  - Monitor response to electrolyte replacements, including repeat lab results as appropriate  - Instruct patient on fluid and nutrition as appropriate  Outcome: Progressing  Goal: Fluid balance maintained  Description: INTERVENTIONS:  - Monitor labs   - Monitor I/O and WT  - Instruct patient on fluid and nutrition as appropriate  - Assess for signs & symptoms of volume excess or deficit  Outcome: Progressing     Problem: INFECTION - ADULT  Goal: Absence or prevention of progression during  hospitalization  Description: INTERVENTIONS:  - Assess and monitor for signs and symptoms of infection  - Monitor lab/diagnostic results  - Monitor all insertion sites, i.e. indwelling lines, tubes, and drains  - Monitor endotracheal if appropriate and nasal secretions for changes in amount and color  - [place] appropriate cooling/warming therapies per order  - Administer medications as ordered  - Instruct and encourage patient and family to use good hand hygiene technique  - Identify and instruct in appropriate isolation precautions for identified infection/condition  Outcome: Progressing     Problem: Knowledge Deficit  Goal: Patient/family/caregiver demonstrates understanding of disease process, treatment plan, medications, and discharge instructions  Description: Complete learning assessment and assess knowledge base.  Interventions:  - Provide teaching at level of understanding  - Provide teaching via preferred learning methods  Outcome: Progressing

## 2024-03-19 NOTE — DISCHARGE INSTR - OTHER ORDERS
Cleanse midline abdominal wound with normal saline, apply meglisorb ag to wound bed and cover with silicone bordered foam. Change every other day and PRN soilage/displacement.

## 2024-03-19 NOTE — ASSESSMENT & PLAN NOTE
Presented with fever 103 degrees  Urinalysis revealed leukocytes bacteria, nitrates  Continue cefepime for now  Follow-up urine culture-urine culture and blood culture is growing E. coli sensitivities pending

## 2024-03-19 NOTE — ASSESSMENT & PLAN NOTE
Patient with hx of bladder cancer s/p prostatectomy cystectomy and diversion to ileal conduit (2/12)   Initially presented with generalized weakness and fever of 103 degrees, leukocytosis of 11 K   Likely secondary to UTI  Urinalysis revealed large leukocytes, nitrates  Complicated by gram-negative bacteremia  CT scan of abdomen/chest/pelvis ruled out any acute infection-results reviewed  Blood Culture came back positive.  E. coli.  Sensitivities are pending.  Will obtain ID consultation-consultation appreciated  X-ray loopogram reviewed.  Initially urology was planning for percutaneous nephrostomy tube placement on the right side.  After discussion with his surgeon in [place] myelitis seen on the loopogram could be due to spasms.

## 2024-03-19 NOTE — ASSESSMENT & PLAN NOTE
Hx of small cell muscle invasive bladder cancer status post carboplatin and etoposide based neoadjuvant chemotherapy.  S/p radical cystoprostatectomy with ileal conduit urinary diversion and bilateral pelvic lymphadenectomy on 2/12/24 with Dr. Melodie Pruett  O/p follow up   Urology evaluation appreciated

## 2024-03-19 NOTE — QUICK NOTE
Stopped in to see patient and his wife.  They report that Dr. Parmer called them as well.  They are aware of updated plan to hold off on nephrostomy tube at this time.  We will continue to trend labs.  Should patient deteriorate, have rise in creatinine or develop flank pain will need to proceed with nephrostomy tube placement on the right.  Otherwise continue to monitor.  2 to 3 weeks of oral antibiotics.  CT urogram in May as scheduled and follow-up with Dr. Sparks in May as scheduled

## 2024-03-20 LAB
ALBUMIN SERPL BCP-MCNC: 3.6 G/DL (ref 3.5–5)
ALP SERPL-CCNC: 80 U/L (ref 34–104)
ALT SERPL W P-5'-P-CCNC: 41 U/L (ref 7–52)
ANION GAP SERPL CALCULATED.3IONS-SCNC: 7 MMOL/L (ref 4–13)
AST SERPL W P-5'-P-CCNC: 47 U/L (ref 13–39)
BACTERIA BLD CULT: ABNORMAL
BACTERIA UR CULT: ABNORMAL
BILIRUB SERPL-MCNC: 0.49 MG/DL (ref 0.2–1)
BUN SERPL-MCNC: 24 MG/DL (ref 5–25)
CALCIUM SERPL-MCNC: 8.2 MG/DL (ref 8.4–10.2)
CHLORIDE SERPL-SCNC: 106 MMOL/L (ref 96–108)
CO2 SERPL-SCNC: 23 MMOL/L (ref 21–32)
CREAT SERPL-MCNC: 1.2 MG/DL (ref 0.6–1.3)
E COLI DNA BLD POS QL NAA+NON-PROBE: DETECTED
ENTEROBACTERALES DNA BLD POS NAA+N-PRB: DETECTED
ERYTHROCYTE [DISTWIDTH] IN BLOOD BY AUTOMATED COUNT: 12.3 % (ref 11.6–15.1)
GFR SERPL CREATININE-BSD FRML MDRD: 59 ML/MIN/1.73SQ M
GLUCOSE SERPL-MCNC: 144 MG/DL (ref 65–140)
GRAM STN SPEC: ABNORMAL
HCT VFR BLD AUTO: 33.3 % (ref 36.5–49.3)
HGB BLD-MCNC: 10.7 G/DL (ref 12–17)
MAGNESIUM SERPL-MCNC: 1.9 MG/DL (ref 1.9–2.7)
MCH RBC QN AUTO: 28.9 PG (ref 26.8–34.3)
MCHC RBC AUTO-ENTMCNC: 32.1 G/DL (ref 31.4–37.4)
MCV RBC AUTO: 90 FL (ref 82–98)
PLATELET # BLD AUTO: 152 THOUSANDS/UL (ref 149–390)
PMV BLD AUTO: 9.6 FL (ref 8.9–12.7)
POTASSIUM SERPL-SCNC: 4.1 MMOL/L (ref 3.5–5.3)
PROT SERPL-MCNC: 5.9 G/DL (ref 6.4–8.4)
RBC # BLD AUTO: 3.7 MILLION/UL (ref 3.88–5.62)
SODIUM SERPL-SCNC: 136 MMOL/L (ref 135–147)
WBC # BLD AUTO: 2.91 THOUSAND/UL (ref 4.31–10.16)

## 2024-03-20 PROCEDURE — 99232 SBSQ HOSP IP/OBS MODERATE 35: CPT | Performed by: FAMILY MEDICINE

## 2024-03-20 PROCEDURE — 99232 SBSQ HOSP IP/OBS MODERATE 35: CPT | Performed by: UROLOGY

## 2024-03-20 PROCEDURE — 80053 COMPREHEN METABOLIC PANEL: CPT | Performed by: FAMILY MEDICINE

## 2024-03-20 PROCEDURE — 97116 GAIT TRAINING THERAPY: CPT

## 2024-03-20 PROCEDURE — 85027 COMPLETE CBC AUTOMATED: CPT | Performed by: FAMILY MEDICINE

## 2024-03-20 PROCEDURE — 83735 ASSAY OF MAGNESIUM: CPT | Performed by: FAMILY MEDICINE

## 2024-03-20 PROCEDURE — 99233 SBSQ HOSP IP/OBS HIGH 50: CPT | Performed by: INTERNAL MEDICINE

## 2024-03-20 RX ORDER — CEFAZOLIN SODIUM 2 G/50ML
2000 SOLUTION INTRAVENOUS EVERY 8 HOURS
Status: DISCONTINUED | OUTPATIENT
Start: 2024-03-20 | End: 2024-03-22 | Stop reason: HOSPADM

## 2024-03-20 RX ADMIN — Medication 100 MG: at 08:28

## 2024-03-20 RX ADMIN — GABAPENTIN 100 MG: 100 CAPSULE ORAL at 08:28

## 2024-03-20 RX ADMIN — CEFAZOLIN SODIUM 2000 MG: 2 SOLUTION INTRAVENOUS at 23:04

## 2024-03-20 RX ADMIN — Medication 1000 UNITS: at 08:28

## 2024-03-20 RX ADMIN — LEVOTHYROXINE SODIUM 75 MCG: 75 TABLET ORAL at 06:09

## 2024-03-20 RX ADMIN — METOPROLOL SUCCINATE 25 MG: 25 TABLET, FILM COATED, EXTENDED RELEASE ORAL at 17:45

## 2024-03-20 RX ADMIN — APIXABAN 5 MG: 5 TABLET, FILM COATED ORAL at 08:28

## 2024-03-20 RX ADMIN — GABAPENTIN 100 MG: 100 CAPSULE ORAL at 17:45

## 2024-03-20 RX ADMIN — ATORVASTATIN CALCIUM 20 MG: 20 TABLET, FILM COATED ORAL at 08:28

## 2024-03-20 RX ADMIN — CEFAZOLIN SODIUM 2000 MG: 2 SOLUTION INTRAVENOUS at 16:16

## 2024-03-20 RX ADMIN — CEFEPIME 2000 MG: 2 INJECTION, POWDER, FOR SOLUTION INTRAVENOUS at 06:09

## 2024-03-20 RX ADMIN — ASPIRIN 81 MG: 81 TABLET, COATED ORAL at 08:28

## 2024-03-20 NOTE — ASSESSMENT & PLAN NOTE
Patient with hx of bladder cancer s/p prostatectomy cystectomy and diversion to ileal conduit (2/12)   Initially presented with generalized weakness and fever of 103 degrees, leukocytosis of 11 K   Likely secondary to UTI  Urinalysis revealed large leukocytes, nitrates  Complicated by gram-negative bacteremia  CT scan of abdomen/chest/pelvis ruled out any acute infection-results reviewed  Blood Culture came back positive.  E. coli.  Sensitivities are pending.  Will obtain ID consultation-consultation appreciated  X-ray loopogram reviewed.  Initially urology was planning for percutaneous nephrostomy tube placement on the right side.  [facility] seen [facility]ogram could be due to spasms.   Patient was febrile overnight.  Monitor fever and WBC count

## 2024-03-20 NOTE — PLAN OF CARE
Problem: PHYSICAL THERAPY ADULT  Goal: Performs mobility at highest level of function for planned discharge setting.  See evaluation for individualized goals.  Description: Treatment/Interventions: Functional transfer training, LE strengthening/ROM, Elevations, Therapeutic exercise, Endurance training, Patient/family training, Equipment eval/education, Bed mobility, Gait training, Spoke to case management, Spoke to nursing, OT  Equipment Recommended:  (TBD)       See flowsheet documentation for full assessment, interventions and recommendations.  Outcome: Progressing  Note: Prognosis: Good  Problem List: Decreased strength, Decreased endurance, Impaired balance, Decreased mobility  Assessment: Pt seen for PT treatment session this date. Therapy session focused on gait training and endurance training in order to improve overall mobility and independence. Pt requires SUP for ambulation and transfers w/o AD. Pt making good progress toward goals by increasing ambulation distance and decreasing level of assistance. Pt was left sitting at the end of PT session with all needs in reach. Pt would benefit from continued PT services while in hospital to address remaining limitations. PT to continue treating pt and recommends home w/ HHPT. The patient's AM-PAC Basic Mobility Inpatient Short Form Raw Score is 22. A Raw score of greater than 16 suggests the patient may benefit from discharge to home. Please also refer to the recommendation of the Physical Therapist for safe discharge planning.        Rehab Resource Intensity Level, PT: III (Minimum Resource Intensity)    See flowsheet documentation for full assessment.

## 2024-03-20 NOTE — ASSESSMENT & PLAN NOTE
Presented with fever 103 degrees  Urinalysis revealed leukocytes bacteria, nitrate  Follow-up urine culture-urine culture and blood culture is growing E. coli -appears to be pansensitive E. coli.  Was on cefepime de-escalated to cefazolin by infectious disease

## 2024-03-20 NOTE — PHYSICAL THERAPY NOTE
PHYSICAL THERAPY NOTE      Patient Name: Smooht Jackson  Today's Date: 3/20/2024       03/20/24 0835   PT Last Visit   PT Visit Date 03/20/24   Note Type   Note Type Treatment   Pain Assessment   Pain Assessment Tool 0-10   Pain Score No Pain   Restrictions/Precautions   Weight Bearing Precautions Per Order No   Other Precautions Chair Alarm;Bed Alarm;Multiple lines;Fall Risk  (IV, parikh)   General   Chart Reviewed Yes   Response to Previous Treatment Patient with no complaints from previous session.   Family/Caregiver Present No   Cognition   Overall Cognitive Status WFL   Arousal/Participation Responsive;Cooperative   Attention Within functional limits   Orientation Level Oriented X4   Memory Within functional limits   Following Commands Follows one step commands without difficulty   Comments pt very pleasant and cooperative   Bed Mobility   Supine to Sit Unable to assess   Sit to Supine Unable to assess   Additional Comments Pt sitting in chair upon arrival and returned to chair following PT session   Transfers   Sit to Stand 5  Supervision   Additional items Increased time required   Stand to Sit 5  Supervision   Additional items Increased time required   Additional Comments transfers w/o AD   Ambulation/Elevation   Gait pattern Improper Weight shift;Decreased foot clearance;Short stride;Excessively slow   Gait Assistance 5  Supervision   Additional items Verbal cues   Assistive Device None   Distance 600' + 500'  (seated rest break inbetween)   Balance   Static Sitting Good   Dynamic Sitting Fair +   Static Standing Fair   Dynamic Standing Fair -   Ambulatory Fair -   Endurance Deficit   Endurance Deficit Yes   Endurance Deficit Description fatigue, generalized weakness   Activity Tolerance   Activity Tolerance Patient limited by fatigue   Nurse Made Aware RN cleared and updated   Assessment   Prognosis Good   Problem List  Decreased strength;Decreased endurance;Impaired balance;Decreased mobility   Assessment Pt seen for PT treatment session this date. Therapy session focused on gait training and endurance training in order to improve overall mobility and independence. Pt requires SUP for ambulation and transfers w/o AD. Pt making good progress toward goals by increasing ambulation distance and decreasing level of assistance. Pt was left sitting at the end of PT session with all needs in reach. Pt would benefit from continued PT services while in hospital to address remaining limitations. PT to continue treating pt and recommends home w/ HHPT. The patient's AM-[facility] Basic Mobility Inpatient Short Form Raw Score is 22. A Raw score of greater than 16 suggests the patient may benefit from discharge to home. Please also refer to the recommendation of the Physical Therapist for safe discharge planning.   Goals   Patient Goals to go on a walk   STG Expiration Date 04/01/24   PT Treatment Day 1   Plan   Treatment/Interventions Functional transfer training;LE strengthening/ROM;Therapeutic exercise;Elevations;Endurance training;Gait training;Bed mobility;Spoke to nursing;Spoke to case management;OT   Progress Progressing toward goals   PT Frequency 2-3x/wk   Discharge Recommendation   Rehab Resource Intensity Level, PT III (Minimum Resource Intensity)   AM-PAC Basic Mobility Inpatient   Turning in Flat Bed Without Bedrails 4   Lying on Back to Sitting on Edge of Flat Bed Without Bedrails 4   Moving Bed to Chair 4   Standing Up From Chair Using Arms 4   Walk in Room 3   Climb 3-5 Stairs With Railing 3   Basic Mobility Inpatient Raw Score 22   Basic Mobility Standardized Score 47.4   [facility] Highest Level Of Mobility   JH-HLM Goal 7: Walk 25 feet or more   JH-HLM Achieved 8: Walk 250 feet ot more   Education   Education Provided Mobility training   Patient Demonstrates acceptance/verbal understanding     Nikki Herbert PT, DPT

## 2024-03-20 NOTE — PROGRESS NOTES
"[facility]  Progress Note  Name: Smooth Jackson I  MRN: [id number]  Unit/Bed#: [facility] 934-01 I Date of Admission: 3/17/2024   Date of Service: 3/20/2024 I Hospital Day: 3    Assessment/Plan   * Sepsis (HCC)  Assessment & Plan  Patient with hx of bladder cancer s/p prostatectomy cystectomy and diversion to ileal conduit (2/12)   Initially presented with generalized weakness and fever of 103 degrees, leukocytosis of 11 K   Likely secondary to UTI  Urinalysis revealed large leukocytes, nitrates  Complicated by gram-negative bacteremia  CT scan of abdomen/chest/pelvis ruled out any acute infection-results reviewed  Blood Culture came back positive.  E. coli.  Sensitivities are pending.  Will obtain ID consultation-consultation appreciated  X-ray loopogram reviewed.  Initially urology was planning for percutaneous nephrostomy tube placement on the right side.  After discussion with his surgeon in [facility] myelitis seen on the loopogram could be due to spasms.   Patient was febrile overnight.  Monitor fever and WBC count    SAMARA (acute kidney injury) (HCC)  Assessment & Plan  Creatinine is 1.45 which is above his baseline.  Received IV contrast for the CAT scan  Creatinine improving.  DC fluids and monitor  Baseline creatinine 1-1.2      E coli bacteremia  Assessment & Plan  1 out of 2 sets of blood culture came back positive for E. coli.  Patient was on cefepime ID evaluation appreciated.  De-escalated to cefazolin    Hydroureteronephrosis  Assessment & Plan  CT scan revealed \"symmetric moderate to severe bilateral hydroureteronephrosis to the level of the right lower quadrant ileal conduit; correlate for possible anastomotic stricture\"  Currently denies any abdominal pain  Urology evaluation appreciated.  Status post loopogram  May need percutaneous nephrostomy tube placement if the fever is persistent    UTI (urinary tract infection)  Assessment & Plan  Presented with fever 103 " degrees  Urinalysis revealed leukocytes bacteria, nitrate  Follow-up urine culture-urine culture and blood culture is growing E. coli -appears to be pansensitive E. coli.  Was on cefepime de-escalated to cefazolin by infectious disease    Productive cough  Assessment & Plan  Chest x-ray/CT scan revealed no acute abnormalities  Continue with symptom support    S/P ileal conduit (HCC)  Assessment & Plan  Noted as above for hx of bladder cancer   Urostomy site looks clean  Monitor UOP from urostomy   Urology evaluation appreciated.  Status post loopogram.  Urology contacted his surgeon in [facility].  Abnormality seen in the loopogram could be related to spasm will evaluate the patient as outpatient will repeat ct  Had another discussion with patient and family today given persistent fever. Patient may need nephrostomy tube placement from infection standpoint. Patient and family is discussing with his nephrologist in [facility]. Will hold Eliquis and aspirin overnight and if the fever resolves and no plan for nephrostomy tube placement will restart back on the Eliquis tomorrow morning    Malignant neoplasm of posterior wall of urinary bladder (HCC)  Assessment & Plan  Hx of small cell muscle invasive bladder cancer status post carboplatin and etoposide based neoadjuvant chemotherapy.  S/p radical cystoprostatectomy with ileal conduit urinary diversion and bilateral pelvic lymphadenectomy on 2/12/24 with Dr. Melodie Pruett  O/p follow up   Urology evaluation appreciated    Vitamin D deficiency  Assessment & Plan  Continue vit d 1000 qd    Mixed hyperlipidemia  Assessment & Plan  Continue statin    Iliac artery aneurysm, bilateral (HCC)  Assessment & Plan  S/p R MINDY endovascular repair with endograft and coil embolization of hypogastric artery (JBF/PP) 4/13/2018  VAS iliacs from July 2023: The left common iliac artery is aneurysmal measuring approximately 2 cm. Prior:  Continue with outpatient follow-up with  vascular    Atrial fibrillation, persistent (HCC)  Assessment & Plan  Currently rate controlled  Continue Toprol  Continue Eliquis    Essential hypertension  Assessment & Plan  BP controlled   continue felodipine               VTE Pharmacologic Prophylaxis: VTE Score: 8 -she was on Eliquis.  On hold for potential nephrostomy tube placement  Mobility:   Basic Mobility Inpatient Raw Score: 22  JH-HLM Goal: 7: Walk 25 feet or more  JH-HLM Achieved: 8: Walk 250 feet ot more  JH-HLM Goal achieved. Continue to encourage appropriate mobility.    Patient Centered Rounds: I performed bedside rounds with nursing staff today.   Discussions with Specialists or Other Care Team Provider: Infectious disease and urology    Education and Discussions with Family / Patient: Updated  (wife) at bedside.    Total Time Spent on Date of Encounter in care of patient: 45 mins. This time was spent on one or more of the following: performing physical exam; counseling and coordination of care; obtaining or reviewing history; documenting in the medical record; reviewing/ordering tests, medications or procedures; communicating with other healthcare professionals and discussing with patient's family/caregivers.    Current Length of Stay: 3 day(s)  Current Patient Status: Inpatient   Certification Statement: The patient will continue to require additional inpatient hospital stay due to E. coli bacteremia  Discharge Plan: Anticipate discharge in 48-72 hrs to home with home services.    Code Status: Level 1 - Full Code    Subjective:   Patient seen and examined.  Patient was febrile overnight.  Sitting up in chair.  Feeling well.  Had a discussion with patient and wife since he was febrile overnight he may need percutaneous nephrostomy tube placement.  They are contacting his urologist in [facility] and get back to us.  In the meantime holding evening dose of Eliquis and if he remains afebrile will put back on the Eliquis tomorrow  morning  Objective:     Vitals:   Temp (24hrs), Av.3 °F (37.4 °C), Min:97.4 °F (36.3 °C), Max:101.6 °F (38.7 °C)    Temp:  [97.4 °F (36.3 °C)-101.6 °F (38.7 °C)] 98.3 °F (36.8 °C)  HR:  [55-77] 72  Resp:  [14-18] 16  BP: (102-110)/(56-73) 110/70  SpO2:  [91 %-97 %] 97 %  Body mass index is 27.86 kg/m².     Input and Output Summary (last 24 hours):     Intake/Output Summary (Last 24 hours) at 3/20/2024 1720  Last data filed at 3/20/2024 0902  Gross per 24 hour   Intake 600 ml   Output 1600 ml   Net -1000 ml       Physical Exam:   Physical Exam  Constitutional:       General: He is not in acute distress.     Appearance: He is not ill-appearing.   HENT:      Head: Normocephalic.      Nose: Nose normal. No congestion.   Eyes:      General: No scleral icterus.  Cardiovascular:      Rate and Rhythm: Normal rate and regular rhythm.      Heart sounds: No murmur heard.  Pulmonary:      Effort: No respiratory distress.   Abdominal:      Comments: Ileal conduit with urostomy   Musculoskeletal:         General: Normal range of motion.   Skin:     General: Skin is warm.      Coloration: Skin is not jaundiced.   Neurological:      Mental Status: He is alert. Mental status is at baseline.          Additional Data:     Labs:  Results from last 7 days   Lab Units 24  0902 24  0622 24  0956   WBC Thousand/uL 2.91* 7.16 13.19*   HEMOGLOBIN g/dL 10.7* 10.3* 11.5*   HEMATOCRIT % 33.3* 31.0* 33.8*   PLATELETS Thousands/uL 152 154 210   BANDS PCT %  --   --  2   NEUTROS PCT %  --  88*  --    LYMPHS PCT %  --  5*  --    LYMPHO PCT %  --   --  3*   MONOS PCT %  --  6  --    MONO PCT %  --   --  0*   EOS PCT %  --  0 0     Results from last 7 days   Lab Units 24  0902   SODIUM mmol/L 136   POTASSIUM mmol/L 4.1   CHLORIDE mmol/L 106   CO2 mmol/L 23   BUN mg/dL 24   CREATININE mg/dL 1.20   ANION GAP mmol/L 7   CALCIUM mg/dL 8.2*   ALBUMIN g/dL 3.6   TOTAL BILIRUBIN mg/dL 0.49   ALK PHOS U/L 80   ALT U/L 41   AST U/L  47*   GLUCOSE RANDOM mg/dL 144*     Results from last 7 days   Lab Units 03/17/24 1926   INR  1.40*             Results from last 7 days   Lab Units 03/18/24  0956 03/17/24 2123 03/17/24 1926   LACTIC ACID mmol/L  --  1.6 2.3*   PROCALCITONIN ng/ml 3.65*  --  0.37*       Lines/Drains:  Invasive Devices       Peripheral Intravenous Line  Duration             Peripheral IV 03/17/24 Right Antecubital 2 days              Drain  Duration             Urostomy RLQ 30 days                          Imaging: No pertinent imaging reviewed.    Recent Cultures (last 7 days):   Results from last 7 days   Lab Units 03/17/24  1949 03/17/24 1943 03/17/24 1926   BLOOD CULTURE   --  No Growth at 48 hrs. Escherichia coli*   GRAM STAIN RESULT   --   --  Gram negative rods*   URINE CULTURE  >100,000 cfu/ml Escherichia coli*  --   --        Last 24 Hours Medication List:   Current Facility-Administered Medications   Medication Dose Route Frequency Provider Last Rate    acetaminophen  650 mg Oral Q6H PRN Jazmin Khan PA-C      atorvastatin  20 mg Oral Daily Jasmit Star-Kals, DO      cefazolin  2,000 mg Intravenous Q8H Neena Burton MD 2,000 mg (03/20/24 1616)    cholecalciferol  1,000 Units Oral Daily Jasmit Star-Kals, DO      co-enzyme Q-10  100 mg Oral Daily Jasmit Star-Kals, DO      felodipine  5 mg Oral Daily Jasmit Star-Kals, DO      gabapentin  100 mg Oral TID Jasmit Star-Kals, DO      guaiFENesin  600 mg Oral BID PRN Jasmit Star-Kals, DO      levothyroxine  75 mcg Oral Early Morning Jasmit Star-Kals, DO      metoprolol succinate  25 mg Oral BID Jasmit Star-Kals, DO      ondansetron  4 mg Intravenous Q4H PRN Jazmin Khan PA-C          Today, Patient Was Seen By: Maryjo Gonsalez MD    **Please Note: This note may have been constructed using a voice recognition system.**

## 2024-03-20 NOTE — ASSESSMENT & PLAN NOTE
Noted as above for hx of bladder cancer   Urostomy site looks clean  Monitor UOP from urostomy   Urology evaluation appreciated.  Status post loopogram.  Urology contacted his surgeon in [facility].  Abnormality seen in the loopogram could be related to spasm will evaluate the patient as outpatient will repeat ct  Had another discussion with patient and family today given persistent fever. Patient may need nephrostomy tube placement from infection standpoint. Patient and family is discussing with his nephrologist in [facility]. Will hold Eliquis and aspirin overnight and if the fever resolves and no plan for nephrostomy tube placement will restart back on the Eliquis tomorrow morning

## 2024-03-20 NOTE — PROGRESS NOTES
Progress Note - Urology  Smooth Jackson 1950, 74 y.o. male MRN: [id number]    Unit/Bed#: [facility] 934-01 Encounter: [id number]    Open abdominal wall wound  Assessment & Plan  Reports postoperative wound VAC  Wound measures 4 cm x 0.5 cm  Continue dressing changes per wound care team    Hydroureteronephrosis  Assessment & Plan  Creat 1.20  CT: Symmetric moderate to severe bilateral hydroureteronephrosis to the level of the right lower quadrant ileal conduit  Loopogram with right anastomotic stricture versus ureteral spasm  Monitor urine output  Continue to trend labs    UTI (urinary tract infection)  Assessment & Plan  Culture positive E. coli ; sensitivities pending  broad-spectrum empiric antibiotics tailored to culture; currently on cefepime  ID following  Fever 101.6 last evening    Malignant neoplasm of posterior wall of urinary bladder (HCC)  Assessment & Plan  S/p cystoprostatectomy and ileal conduit creation 2/12 for small cell muscle invasive bladder cancer.  Also received carboplatin and etoposide  CT: Symmetric moderate to severe bilateral hydroureteronephrosis to the level of the right lower quadrant ileal conduit; possible anastomotic stricture  S/p stent removal 2/27  WBC 2.91  Creat 1.20; return to baseline after rise to 1.42 yesterday  Urine culture + e coli; sensitivities pending  Urine output adequate as per I&O  Empiric broad-spectrum antibiotics tailored to culture  Medical optimization antibiotics per primary team  Continue to trend labs  fluoroscopy loopogram right anastomotic stricture versus distal ureteral spasm  Discussed with Dr. Cheema at [facility], recommend monitoring for now, 2 to 3 weeks of oral antibiotics, CT urogram in May as scheduled  PCN only for flank pain or rising creatinine or decompensation-patient is on Eliquis and will need Eliquis hold x 6 doses prior to PCN placement if needed          Subjective: Patient sitting out of bed in chair.  Offers no complaints.  Tolerating  "diet.  Reports having fever last evening up to 101.6.  Currently afebrile without tachycardia and stable vital signs.  After discussion with primary urologist at [facility] yesterday, they recommend avoiding nephrostomy tube placement at this time unless patient would develop flank pain, rising creatinine or decompensate.  IR team was updated today about the new plan.  Patient and family were updated last evening by primary urologist as well as myself.    24 HR EVENTS:    Fever 101.6 last evening       Patient has  no complaints.      Review of Systems   Constitutional:  Negative for activity change, appetite change, chills, fatigue, fever and unexpected weight change.   HENT:  Negative for facial swelling.    Eyes:  Negative for discharge.   Respiratory: Negative.  Negative for cough and shortness of breath.    Cardiovascular:  Negative for chest pain and leg swelling.   Gastrointestinal: Negative.  Negative for abdominal distention, abdominal pain, constipation, diarrhea, nausea and vomiting.   Endocrine: Negative.    Genitourinary: Negative.  Negative for decreased urine volume, dysuria, enuresis, flank pain, genital sores and hematuria.   Musculoskeletal:  Negative for back pain and myalgias.   Skin:  Negative for pallor and rash.   Allergic/Immunologic: Negative.  Negative for immunocompromised state.   Neurological:  Negative for facial asymmetry and speech difficulty.   Psychiatric/Behavioral:  Negative for agitation and confusion.        Objective:    Vitals: Blood pressure 102/66, pulse 74, temperature 98.3 °F (36.8 °C), resp. rate 17, height 6' 1\" (1.854 m), weight 95.8 kg (211 lb 3.2 oz), SpO2 94%.,Body mass index is 27.86 kg/m².  INS & OUTS:  I/O last 24 hours:  In: 600 [P.O.:600]  Out: 1720 [Urine:1720]    Physical Exam  Vitals and nursing note reviewed.   Constitutional:       General: He is not in acute distress.     Appearance: Normal appearance. He is not ill-appearing or toxic-appearing.   HENT:      " Head: Normocephalic.   Cardiovascular:      Rate and Rhythm: Normal rate.   Pulmonary:      Effort: Pulmonary effort is normal. No respiratory distress.   Abdominal:      General: Abdomen is flat. There is no distension.      Palpations: Abdomen is soft.      Tenderness: There is no abdominal tenderness. There is no right CVA tenderness, left CVA tenderness, guarding or rebound.      Comments: Midline abdominal dressing clean dry and intact from prior surgical site.   Genitourinary:     Comments: Right lower quadrant ileal conduit draining yellow urine with some sediment.  Stoma pink  Musculoskeletal:         General: No swelling.      Cervical back: Normal range of motion.   Skin:     General: Skin is warm and dry.   Neurological:      General: No focal deficit present.      Mental Status: He is alert and oriented to person, place, and time.   Psychiatric:         Mood and Affect: Mood normal.         Behavior: Behavior normal.         Thought Content: Thought content normal.         Judgment: Judgment normal.         Imaging:  LOOPOGRAM     INDICATION:   ileal conduit with bilateral hydronephrosis to level of ileal conduit. evaluate for obstruction. Surgery on 2/12/2024 at [facility]     COMPARISON: CT chest/abdomen/pelvis 3/18/2024     IMAGES: 66     FLUOROSCOPY TIME: 1 minute 34 seconds     FINDINGS:     The ileal conduit was accessed with a sterile Moralez catheter, the balloon was then inflated and retracted to the abdominal wall forming a tight seal. Approximately 50 cc Omnipaque 300 was slowly hand injected into the ileal conduit.     The ileal conduit demonstrates an unremarkable appearance without a leak or stricture.     There is reflux of contrast into the left ureter and upper tract.     There is no reflux of contrast into the right ureter.        IMPRESSION:     No reflux of contrast into the right ureter, concerning for ileo-ureteral anastomotic stricture or distal ureteral spasm.     Unremarkable appearance  of the ileal conduit and left ureter.     The study was marked in EPIC for immediate notification.     Workstation performed: [id number]     Imaging reviewed - both report and images personally reviewed.     Labs:  Recent Labs     03/17/24 1926 03/18/24  0956 03/19/24  0622 03/20/24  0902   WBC 11.59* 13.19* 7.16 2.91*       Recent Labs     03/17/24 1926 03/18/24  0956 03/19/24  0622 03/20/24  0902   HGB 12.1 11.5* 10.3* 10.7*     Recent Labs     03/17/24 1926 03/18/24  0956 03/19/24  0622 03/20/24  0902   HCT 36.9 33.8* 31.0* 33.3*     Recent Labs     03/17/24 1926 03/18/24  0956 03/19/24  0622 03/20/24  0902   CREATININE 1.28 1.15 1.42* 1.20     Lab Results   Component Value Date    HGB 10.7 (L) 03/20/2024    HCT 33.3 (L) 03/20/2024    WBC 2.91 (L) 03/20/2024     03/20/2024     Lab Results   Component Value Date     01/06/2016    K 4.1 03/20/2024     03/20/2024    CO2 23 03/20/2024    BUN 24 03/20/2024    CREATININE 1.20 03/20/2024    CALCIUM 8.2 (L) 03/20/2024    GLUCOSE 149 (H) 03/08/2019     Urine Culture: Growth: E. coli and Sensitivities Pending    History:    Past Medical History:   Diagnosis Date    Acute blood loss anemia 03/08/2019    Aneurysm of thoracic aorta (HCC)     Arrhythmia     Cancer (HCC)     Cholecystitis 03/05/2019    Added automatically from request for surgery [id number]    Detached retina, right     Disease of thyroid gland     Gastric wall thickening     Headache     Hematoma 10/10/2018    Hypertension     Iliac artery aneurysm, bilateral (HCC)     Irregular heart beat     afib cardioversion 1/30/17, x2     Neuropathy     bilateral feet    Paroxysmal A-fib (HCC)     Prostatic hypertrophy     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(HCC)      Past Surgical History:   Procedure Laterality Date    ABDOMINAL AORTIC ANEURYSM REPAIR, ENDOVASCULAR Right 04/13/2018    Procedure: REPAIR ANEURYSM ILIAC endovascular  with coil embolization right hypogastric artery.;  Surgeon: Guille Esquivel MD;   Location: [facility];  Service: Vascular    BLADDER CANCER BY FISH (HISTORICAL)      CARDIOVERSION      CATARACT EXTRACTION Left 10/20/2019    Right eye 2011    CHOLECYSTECTOMY      CHOLECYSTECTOMY LAPAROSCOPIC N/A 2019    Procedure: [id number] CHOLECYSTECTOMY;  Surgeon: Derik Case DO;  Location: [facility];  Service: General    COLONOSCOPY  2016    CYSTECTOMY, RADICAL WITH ILEOCONDUIT  2024    MOLE EXCISION      NV CYSTOURETHROSCOPY W/DEST &/RMVL TUMOR LARGE N/A 2023    Procedure: TURBT, gemcitabine instillation;  Surgeon: Juvenal Cruz MD;  Location: [facility];  Service: Urology    NV EDG US EXAM SURGICAL ALTER STOM DUODENUM/JEJUNUM N/A 11/10/2017    Procedure: RADIAL ENDOSCOPIC U/S;  Surgeon: Harvey Gallegos MD;  Location: [facility];  Service: Gastroenterology    RETINAL DETACHMENT SURGERY Right     onset , retinal detachment complete air fill confirmed     Family History   Problem Relation Age of Onset    Stroke Mother          at 91, failure to thrive    Aortic aneurysm Father         abdominal    Aortic aneurysm Brother     Basal cell carcinoma Brother     Schizophrenia Brother     Cancer Maternal Grandmother         smoker, heart attack, cancer    Cancer Maternal Grandfather         smoker cancer    Cancer Paternal Grandmother         malignant neoplasm    Cancer Paternal Grandfather         malignant neoplasm    Cancer Family         malignant neoplasm    Cancer Family         malignant neoplasm    Cancer Maternal Uncle         smoker, lots wrong with her    Cancer Paternal Uncle         melanoma at 92     Social History     Socioeconomic History    Marital status: /Civil Union     Spouse name: None    Number of children: None    Years of education: None    Highest education level: None   Occupational History    None   Tobacco Use    Smoking status: Never    Smokeless tobacco: Never   Vaping Use    Vaping status: Never Used   Substance  and Sexual Activity    Alcohol use: Yes     Alcohol/week: 5.0 standard drinks of alcohol     Types: 2 Glasses of wine, 1 Cans of beer, 2 Standard drinks or equivalent per week     Comment: 3-4 x week    Drug use: Never    Sexual activity: Not Currently   Other Topics Concern    None   Social History Narrative    None     Social Determinants of Health     Financial Resource Strain: Low Risk  (12/19/2023)    Overall Financial Resource Strain (CARDIA)     Difficulty of Paying Living Expenses: Not hard at all   Food Insecurity: Unknown (3/18/2024)    Hunger Vital Sign     Worried About Running Out of Food in the Last Year: Never true     Ran Out of Food in the Last Year: Not on file   Transportation Needs: No Transportation Needs (3/18/2024)    PRAPARE - Transportation     Lack of Transportation (Medical): No     Lack of Transportation (Non-Medical): No   Physical Activity: Not on file   Stress: Not on file   Social Connections: Not on file   Intimate Partner Violence: Not on file   Housing Stability: Low Risk  (3/18/2024)    Housing Stability Vital Sign     Unable to Pay for Housing in the Last Year: No     Number of Places Lived in the Last Year: 1     Unstable Housing in the Last Year: No       The following portions of the patient's history were reviewed and updated as appropriate: allergies, current medications, past family history, past medical history, past social history, past surgical history and problem list    LORI Arnold  Date: 3/20/2024 Time: 10:24 AM

## 2024-03-20 NOTE — ASSESSMENT & PLAN NOTE
"CT scan revealed \"symmetric moderate to severe bilateral hydroureteronephrosis to the level of the right lower quadrant ileal conduit; correlate for possible anastomotic stricture\"  Currently denies any abdominal pain  Urology evaluation appreciated.  Status post loopogram  May need percutaneous nephrostomy tube placement if the fever is persistent  "

## 2024-03-20 NOTE — PROGRESS NOTES
Progress Note - Infectious Disease   Smooth Jackson 74 y.o. male MRN: [id number]  Unit/Bed#: [facility] 934-01 Encounter: [id number]      Impression/Recommendations:  Severe sepsis, POA.    Fever, HR, lactic acidosis.  Due to bacteremia, UTI as below.  No other appreciable source.  Clinically stable and nontoxic  Clinically improving but ongoing fevers.  Consider persistent obstruction of right collecting system.  Rec:  Continue antibiotics as below  Follow temperatures closely  Recheck WBC in AM to monitor infection  If fevers continue may need to re-consider PCN to decompress right collection system  Supportive care as per the primary service     E. Coli bacteremia.    Due to UTI. No other appreciable source.  LFTs, CT A/P otherwise negative.  Rec:  Narrow antibiotics to cefazolin  Oves clinically improved, anticipate hopeful transition to PO antibiotics with plan to complete 10 days total     E. Coli pyelonephritis.    In setting of complicated  history as below.  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  In setting of  surgical history as below.  Risk factor for infection.  No pain and Cr improving suggesting hopeful improvement in drainage  Rec:  Await IR consultation for PCN  Close urology follow-up ongoing     Invasive small cell bladder cancer.  Status post neoadjuvant chemotherapy, radical cystoprostatectomy with ileal conduit urinary diversion 2/12/24 at [place].       I discussed with Dr. Gonsalez the above plan to narrow antibiotics and watch fever curve closely.  She agrees with the plan.  Discussed also with patient and wife at the bedside.     Antibiotics:  Cefepime #3  Antibiotics #4    Subjective/24 Hour Events:  Patient seen on AM rounds.  Feels back to his normal self.  Had fever overnight.  No pain.  Cr improving.    Objective:  Vitals:  Temp:  [97.4 °F (36.3 °C)-101.6 °F (38.7 °C)] 97.4  °F (36.3 °C)  HR:  [55-77] 75  Resp:  [14-18] 16  BP: (102-108)/(56-73) 103/66  SpO2:  [91 %-94 %] 94 %  Temp (24hrs), Av.3 °F (37.4 °C), Min:97.4 °F (36.3 °C), Max:101.6 °F (38.7 °C)  Current: Temperature: (!) 97.4 °F (36.3 °C)    Physical Exam:   General:  No acute distress  Psychiatric:  Awake and alert  Pulmonary:  Normal respiratory excursion without accessory muscle use  Abdomen:  Soft, nontender  Extremities:  No edema  Skin:  No rashes    Lab Results:  I have personally reviewed pertinent labs.  Results from last 7 days   Lab Units 24   SODIUM mmol/L 136 134* 140 140   POTASSIUM mmol/L 4.1 3.7 4.4 3.7   CHLORIDE mmol/L 106 106 108 109*   CO2 mmol/L 23 20* 21 20*   BUN mg/dL 24 26* 27* 32*   CREATININE mg/dL 1.20 1.42* 1.15 1.28   EGFR ml/min/1.73sq m 59 48 62 54   CALCIUM mg/dL 8.2* 8.1* 8.5 9.1   AST U/L 47*  --  14 15   ALT U/L 41  --  13 16   ALK PHOS U/L 80  --  75 84     Results from last 7 days   Lab Units 24  0956   WBC Thousand/uL 2.91* 7.16 13.19*   HEMOGLOBIN g/dL 10.7* 10.3* 11.5*   PLATELETS Thousands/uL 152 154 210     Results from last 7 days   Lab Units 24   BLOOD CULTURE   --  No Growth at 48 hrs. Escherichia coli*   GRAM STAIN RESULT   --   --  Gram negative rods*   URINE CULTURE  >100,000 cfu/ml Escherichia coli*  --   --        Imaging Studies:   I have personally reviewed pertinent imaging study reports and images in PACS.    EKG, Pathology, and Other Studies:   I have personally reviewed pertinent reports.

## 2024-03-20 NOTE — ASSESSMENT & PLAN NOTE
Creatinine is 1.45 which is above his baseline.  Received IV contrast for the CAT scan  Creatinine improving.  DC fluids and monitor

## 2024-03-20 NOTE — PLAN OF CARE
Problem: INFECTION - ADULT  Goal: Absence or prevention of progression during hospitalization  Description: INTERVENTIONS:  - Assess and monitor for signs and symptoms of infection  - Monitor lab/diagnostic results  - Monitor all insertion sites, i.e. indwelling lines, tubes, and drains  - Monitor endotracheal if appropriate and nasal secretions for changes in amount and color  - [facility] appropriate cooling/warming therapies per order  - Administer medications as ordered  - Instruct and encourage patient and family to use good hand hygiene technique  - Identify and instruct in appropriate isolation precautions for identified infection/condition  Outcome: Progressing     Problem: METABOLIC, FLUID AND ELECTROLYTES - ADULT  Goal: Electrolytes maintained within normal limits  Description: INTERVENTIONS:  - Monitor labs and assess patient for signs and symptoms of electrolyte imbalances  - Administer electrolyte replacement as ordered  - Monitor response to electrolyte replacements, including repeat lab results as appropriate  - Instruct patient on fluid and nutrition as appropriate  Outcome: Progressing     Problem: Knowledge Deficit  Goal: Patient/family/caregiver demonstrates understanding of disease process, treatment plan, medications, and discharge instructions  Description: Complete learning assessment and assess knowledge base.  Interventions:  - Provide teaching at level of understanding  - Provide teaching via preferred learning methods  Outcome: Progressing

## 2024-03-20 NOTE — ASSESSMENT & PLAN NOTE
1 out of 2 sets of blood culture came back positive for E. coli.  Patient was on cefepime ID evaluation appreciated.  De-escalated to cefazolin

## 2024-03-21 LAB
ANION GAP SERPL CALCULATED.3IONS-SCNC: 10 MMOL/L (ref 4–13)
BASOPHILS # BLD AUTO: 0.02 THOUSANDS/ÂΜL (ref 0–0.1)
BASOPHILS NFR BLD AUTO: 1 % (ref 0–1)
BUN SERPL-MCNC: 22 MG/DL (ref 5–25)
CALCIUM SERPL-MCNC: 8.5 MG/DL (ref 8.4–10.2)
CHLORIDE SERPL-SCNC: 105 MMOL/L (ref 96–108)
CO2 SERPL-SCNC: 23 MMOL/L (ref 21–32)
CREAT SERPL-MCNC: 1.15 MG/DL (ref 0.6–1.3)
EOSINOPHIL # BLD AUTO: 0.11 THOUSAND/ÂΜL (ref 0–0.61)
EOSINOPHIL NFR BLD AUTO: 3 % (ref 0–6)
ERYTHROCYTE [DISTWIDTH] IN BLOOD BY AUTOMATED COUNT: 12.5 % (ref 11.6–15.1)
GFR SERPL CREATININE-BSD FRML MDRD: 62 ML/MIN/1.73SQ M
GLUCOSE SERPL-MCNC: 108 MG/DL (ref 65–140)
HCT VFR BLD AUTO: 32.4 % (ref 36.5–49.3)
HGB BLD-MCNC: 10.7 G/DL (ref 12–17)
IMM GRANULOCYTES # BLD AUTO: 0.01 THOUSAND/UL (ref 0–0.2)
IMM GRANULOCYTES NFR BLD AUTO: 0 % (ref 0–2)
LYMPHOCYTES # BLD AUTO: 0.32 THOUSANDS/ÂΜL (ref 0.6–4.47)
LYMPHOCYTES NFR BLD AUTO: 10 % (ref 14–44)
MCH RBC QN AUTO: 29.4 PG (ref 26.8–34.3)
MCHC RBC AUTO-ENTMCNC: 33 G/DL (ref 31.4–37.4)
MCV RBC AUTO: 89 FL (ref 82–98)
MONOCYTES # BLD AUTO: 0.42 THOUSAND/ÂΜL (ref 0.17–1.22)
MONOCYTES NFR BLD AUTO: 13 % (ref 4–12)
NEUTROPHILS # BLD AUTO: 2.41 THOUSANDS/ÂΜL (ref 1.85–7.62)
NEUTS SEG NFR BLD AUTO: 73 % (ref 43–75)
NRBC BLD AUTO-RTO: 0 /100 WBCS
PLATELET # BLD AUTO: 166 THOUSANDS/UL (ref 149–390)
PMV BLD AUTO: 10.2 FL (ref 8.9–12.7)
POTASSIUM SERPL-SCNC: 3.7 MMOL/L (ref 3.5–5.3)
RBC # BLD AUTO: 3.64 MILLION/UL (ref 3.88–5.62)
SODIUM SERPL-SCNC: 138 MMOL/L (ref 135–147)
WBC # BLD AUTO: 3.29 THOUSAND/UL (ref 4.31–10.16)

## 2024-03-21 PROCEDURE — 99232 SBSQ HOSP IP/OBS MODERATE 35: CPT | Performed by: FAMILY MEDICINE

## 2024-03-21 PROCEDURE — 97535 SELF CARE MNGMENT TRAINING: CPT

## 2024-03-21 PROCEDURE — 99232 SBSQ HOSP IP/OBS MODERATE 35: CPT | Performed by: UROLOGY

## 2024-03-21 PROCEDURE — 99233 SBSQ HOSP IP/OBS HIGH 50: CPT | Performed by: INTERNAL MEDICINE

## 2024-03-21 PROCEDURE — 80048 BASIC METABOLIC PNL TOTAL CA: CPT | Performed by: NURSE PRACTITIONER

## 2024-03-21 PROCEDURE — 97116 GAIT TRAINING THERAPY: CPT

## 2024-03-21 PROCEDURE — 85025 COMPLETE CBC W/AUTO DIFF WBC: CPT | Performed by: NURSE PRACTITIONER

## 2024-03-21 RX ORDER — DIPHENHYDRAMINE HCL 25 MG
25 TABLET ORAL EVERY 6 HOURS PRN
Status: DISCONTINUED | OUTPATIENT
Start: 2024-03-21 | End: 2024-03-21

## 2024-03-21 RX ORDER — DOCUSATE SODIUM 100 MG/1
100 CAPSULE, LIQUID FILLED ORAL 2 TIMES DAILY
Status: DISCONTINUED | OUTPATIENT
Start: 2024-03-21 | End: 2024-03-22 | Stop reason: HOSPADM

## 2024-03-21 RX ORDER — POLYETHYLENE GLYCOL 3350 17 G/17G
17 POWDER, FOR SOLUTION ORAL DAILY
Status: DISCONTINUED | OUTPATIENT
Start: 2024-03-21 | End: 2024-03-22 | Stop reason: HOSPADM

## 2024-03-21 RX ORDER — DIPHENHYDRAMINE HCL 25 MG
25 TABLET ORAL EVERY 6 HOURS PRN
Status: DISCONTINUED | OUTPATIENT
Start: 2024-03-21 | End: 2024-03-22 | Stop reason: HOSPADM

## 2024-03-21 RX ADMIN — ASPIRIN 81 MG: 81 TABLET, COATED ORAL at 10:21

## 2024-03-21 RX ADMIN — METOPROLOL SUCCINATE 25 MG: 25 TABLET, FILM COATED, EXTENDED RELEASE ORAL at 17:32

## 2024-03-21 RX ADMIN — DIPHENHYDRAMINE HCL 25 MG: 25 TABLET ORAL at 20:25

## 2024-03-21 RX ADMIN — DOCUSATE SODIUM 100 MG: 100 CAPSULE, LIQUID FILLED ORAL at 17:32

## 2024-03-21 RX ADMIN — APIXABAN 5 MG: 5 TABLET, FILM COATED ORAL at 17:32

## 2024-03-21 RX ADMIN — CEFAZOLIN SODIUM 2000 MG: 2 SOLUTION INTRAVENOUS at 09:49

## 2024-03-21 RX ADMIN — DOCUSATE SODIUM 100 MG: 100 CAPSULE, LIQUID FILLED ORAL at 10:21

## 2024-03-21 RX ADMIN — APIXABAN 5 MG: 5 TABLET, FILM COATED ORAL at 10:21

## 2024-03-21 RX ADMIN — GABAPENTIN 100 MG: 100 CAPSULE ORAL at 16:57

## 2024-03-21 RX ADMIN — METOPROLOL SUCCINATE 25 MG: 25 TABLET, FILM COATED, EXTENDED RELEASE ORAL at 09:45

## 2024-03-21 RX ADMIN — ATORVASTATIN CALCIUM 20 MG: 20 TABLET, FILM COATED ORAL at 09:45

## 2024-03-21 RX ADMIN — FELODIPINE 5 MG: 2.5 TABLET, FILM COATED, EXTENDED RELEASE ORAL at 09:46

## 2024-03-21 RX ADMIN — CEFAZOLIN SODIUM 2000 MG: 2 SOLUTION INTRAVENOUS at 16:58

## 2024-03-21 RX ADMIN — LEVOTHYROXINE SODIUM 75 MCG: 75 TABLET ORAL at 05:38

## 2024-03-21 RX ADMIN — Medication 1000 UNITS: at 09:45

## 2024-03-21 RX ADMIN — Medication 100 MG: at 09:45

## 2024-03-21 RX ADMIN — GABAPENTIN 100 MG: 100 CAPSULE ORAL at 09:45

## 2024-03-21 RX ADMIN — DIPHENHYDRAMINE HCL 25 MG: 25 TABLET ORAL at 11:44

## 2024-03-21 RX ADMIN — POLYETHYLENE GLYCOL 3350 17 G: 17 POWDER, FOR SOLUTION ORAL at 10:21

## 2024-03-21 RX ADMIN — GABAPENTIN 100 MG: 100 CAPSULE ORAL at 20:25

## 2024-03-21 NOTE — CASE MANAGEMENT
Case Management Discharge Planning Note    Patient name Smooth Jackson  Location [facility] 934/[facility] 934-01 MRN [id number]  : 1950 Date 3/21/2024       Current Admission Date: 3/17/2024  Current Admission Diagnosis:Sepsis ([facility])   Patient Active Problem List    Diagnosis Date Noted    E coli bacteremia 2024    SAMARA (acute kidney injury) ([facility]) 2024    UTI (urinary tract infection) 2024    Hydroureteronephrosis 2024    Open abdominal wall wound 2024    Sepsis ([facility]) 2024    S/P ileal conduit ([facility]) 2024    Productive cough 2024    Dizziness 2023    Chemotherapy induced neutropenia  2023    Closed fracture of posterior malleolus of left tibia 2023    Pain, joint, ankle and foot, left 2023    Pancreatic mass 08/10/2023    Malignant neoplasm of posterior wall of urinary bladder ([facility]) 08/10/2023    Bladder mass 2023    Malfunction of Moralez catheter ([facility]) 2023    Gross hematuria 2023    Prediabetes 10/12/2022    Vitamin D deficiency 2021    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    NSVT (nonsustained ventricular tachycardia) ([facility]) 10/15/2021    Chronic bilateral low back pain without sciatica 2020    Mixed hyperlipidemia 2020    Rash 2019    Other headache syndrome 2019    Iliac artery aneurysm, right (HCC) 2018    Neuropathy 2018    Iliac artery aneurysm, bilateral (HCC)     Aneurysm of thoracic aorta ([facility]) 2016    Essential hypertension 2016    Atrial fibrillation, persistent ([facility]) 2016    Acquired hypothyroidism 2014      LOS (days): 4  Geometric Mean LOS (GMLOS) (days): 4.9  Days to GMLOS:1.3     OBJECTIVE:  Risk of Unplanned Readmission Score: 14.62         Current admission status: Inpatient   Preferred Pharmacy:   [id number] - [place], [place] - [address]  [place] [address]  Phone: [phone] Fax: [phone]    [facility]  [facility] - [place] - [address]  [address]  [place] [id number]  Phone: [phone] Fax: [phone]    Primary Care Provider: Melecio Giraldo MD    Primary Insurance: MEDICARE  Secondary Insurance: AAR    DISCHARGE DETAILS:       Requested Home Health Care         Is the patient interested in HHC at discharge?: Yes  Home Health Discipline requested:: Nursing  Home Health Agency Name:: [facility]  Home Health Follow-Up Provider:: PCP  Home Health Services Needed:: Post-Op Care and Assessment, Wound/Ostomy Care, Evaluate Functional Status and Safety  Homebound Criteria Met:: Uses an Assist Device (i.e. cane, walker, etc)  Supporting Clincal Findings:: Fatigues Easliy in Short Distances, Limited Endurance         Other Referral/Resources/Interventions Provided:  Referral Comments: Notified by OT pt will not need home OT upon discharge.  Per provider rounds pt will not be getting a nephro tube.

## 2024-03-21 NOTE — PROGRESS NOTES
Progress Note - Urology  Smooth Jackson 1950, 74 y.o. male MRN: [id number]    Unit/Bed#: [facility] 934-01 Encounter: [id number]    Open abdominal wall wound  Assessment & Plan  Continue dressing changes per wound care team    Hydroureteronephrosis  Assessment & Plan  Creat 1.15, at baseline  CT: Symmetric moderate to severe bilateral hydroureteronephrosis to the level of the right lower quadrant ileal conduit  Loopogram with right anastomotic stricture versus ureteral spasm  Monitor urine output  Continue to trend labs    UTI (urinary tract infection)  Assessment & Plan  Urine Culture positive: E. Coli,enterococcus faecalis, staph hemolyticus, alpha hemolytic strep  Antibiotics per ID, on cefazolin  Fever 101.6 3/19; afebrile today  WBC 3.29    Malignant neoplasm of posterior wall of urinary bladder (HCC)  Assessment & Plan  S/p cystoprostatectomy and ileal conduit creation 2/12 for small cell muscle invasive bladder cancer.  Also received carboplatin and etoposide  CT: Symmetric moderate to severe bilateral hydroureteronephrosis to the level of the right lower quadrant ileal conduit; possible anastomotic stricture  S/p stent removal 2/27  Creat 1.15, at baseline  WBC 3.29, was 2.91 (3/20)  Urine culture + e coli, enterococcus faecalis, staph hemolyticus, alpha hemolytic strep  Antibiotics per ID; on cefazolin  Urine output adequate as per I&O  Medical optimization per primary team  Continue to trend labs  fluoroscopy loopogram right anastomotic stricture versus distal ureteral spasm  Discussed with Dr. Cheema at [facility], recommend monitoring for now, 2 to 3 weeks of oral antibiotics, CT urogram in May as scheduled  Recommend PCN for flank pain, rising creatinine, decompensation, or fevers-patient wishes to be transferred to [facility] if PCN is deemed necessary          Subjective: Patient sitting out of bed in chair.  Denies any fevers or chills overnight.  Denies any flank pain.  He is not interested in proceeding  "with nephrostomy tube placement.  If nephrostomy tube is deemed necessary he wishes to be transferred down to [facility].  Explained to patient that we have no intention of placing nephrostomy tube at this time at the request of his primary urologist, Dr. Sparks.  However should he decompensate and develop fevers, flank pain etc. this may be necessary.  His Eliquis has been resumed and his antibiotics adjusted by ID team.  Patient and wife voiced understanding of the same    24 HR EVENTS:   no significant events.      Patient has  no complaints.      Review of Systems   Constitutional:  Negative for activity change, appetite change, chills, fatigue, fever and unexpected weight change.   HENT:  Negative for facial swelling.    Eyes:  Negative for discharge.   Respiratory: Negative.  Negative for cough and shortness of breath.    Cardiovascular:  Negative for chest pain and leg swelling.   Gastrointestinal: Negative.  Negative for abdominal distention, abdominal pain, constipation, diarrhea, nausea and vomiting.   Endocrine: Negative.    Genitourinary: Negative.  Negative for decreased urine volume, dysuria, enuresis, flank pain, genital sores and hematuria.   Musculoskeletal:  Negative for back pain and myalgias.   Skin:  Negative for pallor and rash.   Allergic/Immunologic: Negative.  Negative for immunocompromised state.   Neurological:  Negative for facial asymmetry and speech difficulty.   Psychiatric/Behavioral:  Negative for agitation and confusion.        Objective:    Vitals: Blood pressure 128/90, pulse 78, temperature 97.6 °F (36.4 °C), resp. rate 20, height 6' 1\" (1.854 m), weight 95.8 kg (211 lb 3.2 oz), SpO2 96%.,Body mass index is 27.86 kg/m².  INS & OUTS:  I/O last 24 hours:  In: 1850 [P.O.:1760; I.V.:40; IV Piggyback:50]  Out: 3250 [Urine:3250]    Physical Exam  Vitals and nursing note reviewed.   Constitutional:       General: He is not in acute distress.     Appearance: Normal appearance. He is not " ill-appearing or toxic-appearing.   HENT:      Head: Normocephalic.   Cardiovascular:      Rate and Rhythm: Normal rate.   Pulmonary:      Effort: Pulmonary effort is normal. No respiratory distress.   Abdominal:      General: Abdomen is flat. There is no distension.      Palpations: Abdomen is soft.      Tenderness: There is no abdominal tenderness. There is no right CVA tenderness, left CVA tenderness, guarding or rebound.      Comments: Midline abdominal incision with daily dressing changes without erythema, scant bloody drainage   Genitourinary:     Comments: Ileal conduit draining yellow urine with some sediment.  Stoma pink  Musculoskeletal:         General: No swelling.      Cervical back: Normal range of motion.   Skin:     General: Skin is warm and dry.   Neurological:      General: No focal deficit present.      Mental Status: He is alert and oriented to person, place, and time.   Psychiatric:         Mood and Affect: Mood normal.         Behavior: Behavior normal.         Imaging:    ImLOOPOGRAM     INDICATION:   ileal conduit with bilateral hydronephrosis to level of ileal conduit. evaluate for obstruction. Surgery on 2/12/2024 at [facility]     COMPARISON: CT chest/abdomen/pelvis 3/18/2024     IMAGES: 66     FLUOROSCOPY TIME: 1 minute 34 seconds     FINDINGS:     The ileal conduit was accessed with a sterile Moralez catheter, the balloon was then inflated and retracted to the abdominal wall forming a tight seal. Approximately 50 cc Omnipaque 300 was slowly hand injected into the ileal conduit.     The ileal conduit demonstrates an unremarkable appearance without a leak or stricture.     There is reflux of contrast into the left ureter and upper tract.     There is no reflux of contrast into the right ureter.        IMPRESSION:     No reflux of contrast into the right ureter, concerning for ileo-ureteral anastomotic stricture or distal ureteral spasm.     Unremarkable appearance of the ileal conduit and left  ureter.     The study was marked in EPIC for immediate notification.     Workstation performed: [id number] reviewed - both report and images personally reviewed.     Labs:  Recent Labs     03/19/24  0622 03/20/24  0902 03/21/24  0859   WBC 7.16 2.91* 3.29*       Recent Labs     03/19/24  0622 03/20/24  0902 03/21/24  0859   HGB 10.3* 10.7* 10.7*     Recent Labs     03/19/24  0622 03/20/24  0902 03/21/24  0859   HCT 31.0* 33.3* 32.4*     Recent Labs     03/19/24  0622 03/20/24  0902 03/21/24  0859   CREATININE 1.42* 1.20 1.15     Lab Results   Component Value Date    HGB 10.7 (L) 03/21/2024    HCT 32.4 (L) 03/21/2024    WBC 3.29 (L) 03/21/2024     03/21/2024     Lab Results   Component Value Date     01/06/2016    K 3.7 03/21/2024     03/21/2024    CO2 23 03/21/2024    BUN 22 03/21/2024    CREATININE 1.15 03/21/2024    CALCIUM 8.5 03/21/2024    GLUCOSE 149 (H) 03/08/2019     Urine Culture: Growth: e coli, enterococcus faecalis, staphylococcus hemolyticus, alpha hemolytic streptococcus    History:    Past Medical History:   Diagnosis Date    Acute blood loss anemia 03/08/2019    Aneurysm of thoracic aorta (HCC)     Arrhythmia     Cancer (HCC)     Cholecystitis 03/05/2019    Added automatically from request for surgery [id number]    Detached retina, right     Disease of thyroid gland     Gastric wall thickening     Headache     Hematoma 10/10/2018    Hypertension     Iliac artery aneurysm, bilateral (HCC)     Irregular heart beat     afib cardioversion 1/30/17, x2     Neuropathy     bilateral feet    Paroxysmal A-fib (HCC)     Prostatic hypertrophy     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(HCC)      Past Surgical History:   Procedure Laterality Date    ABDOMINAL AORTIC ANEURYSM REPAIR, ENDOVASCULAR Right 04/13/2018    Procedure: REPAIR ANEURYSM ILIAC endovascular  with coil embolization right hypogastric artery.;  Surgeon: Guille Esquivel MD;  Location: [facility];  Service: Vascular    BLADDER CANCER BY FISH  (HISTORICAL)      CARDIOVERSION      CATARACT EXTRACTION Left 10/20/2019    Right eye 2011    CHOLECYSTECTOMY      CHOLECYSTECTOMY LAPAROSCOPIC N/A 2019    Procedure: [id number] CHOLECYSTECTOMY;  Surgeon: Derik Case DO;  Location: [facility];  Service: General    COLONOSCOPY  2016    CYSTECTOMY, RADICAL WITH ILEOCONDUIT  2024    MOLE EXCISION      TN CYSTOURETHROSCOPY W/DEST &/RMVL TUMOR LARGE N/A 2023    Procedure: TURBT, gemcitabine instillation;  Surgeon: Juvenal Cruz MD;  Location: [facility];  Service: Urology    TN EDG US EXAM SURGICAL ALTER STOM DUODENUM/JEJUNUM N/A 11/10/2017    Procedure: RADIAL ENDOSCOPIC U/S;  Surgeon: Harvey Gallegos MD;  Location: [facility];  Service: Gastroenterology    RETINAL DETACHMENT SURGERY Right     onset , retinal detachment complete air fill confirmed     Family History   Problem Relation Age of Onset    Stroke Mother          at 91, failure to thrive    Aortic aneurysm Father         abdominal    Aortic aneurysm Brother     Basal cell carcinoma Brother     Schizophrenia Brother     Cancer Maternal Grandmother         smoker, heart attack, cancer    Cancer Maternal Grandfather         smoker cancer    Cancer Paternal Grandmother         malignant neoplasm    Cancer Paternal Grandfather         malignant neoplasm    Cancer Family         malignant neoplasm    Cancer Family         malignant neoplasm    Cancer Maternal Uncle         smoker, lots wrong with her    Cancer Paternal Uncle         melanoma at 92     Social History     Socioeconomic History    Marital status: /Civil Union     Spouse name: None    Number of children: None    Years of education: None    Highest education level: None   Occupational History    None   Tobacco Use    Smoking status: Never    Smokeless tobacco: Never   Vaping Use    Vaping status: Never Used   Substance and Sexual Activity    Alcohol use: Yes     Alcohol/week: 5.0  standard drinks of alcohol     Types: 2 Glasses of wine, 1 Cans of beer, 2 Standard drinks or equivalent per week     Comment: 3-4 x week    Drug use: Never    Sexual activity: Not Currently   Other Topics Concern    None   Social History Narrative    None     Social Determinants of Health     Financial Resource Strain: Low Risk  (12/19/2023)    Overall Financial Resource Strain (CARDIA)     Difficulty of Paying Living Expenses: Not hard at all   Food Insecurity: Unknown (3/18/2024)    Hunger Vital Sign     Worried About Running Out of Food in the Last Year: Never true     Ran Out of Food in the Last Year: Not on file   Transportation Needs: No Transportation Needs (3/18/2024)    PRAPARE - Transportation     Lack of Transportation (Medical): No     Lack of Transportation (Non-Medical): No   Physical Activity: Not on file   Stress: Not on file   Social Connections: Not on file   Intimate Partner Violence: Not on file   Housing Stability: Low Risk  (3/18/2024)    Housing Stability Vital Sign     Unable to Pay for Housing in the Last Year: No     Number of Places Lived in the Last Year: 1     Unstable Housing in the Last Year: No       The following portions of the patient's history were reviewed and updated as appropriate: allergies, current medications, past family history, past medical history, past social history, past surgical history and problem list    LORI Arnold  Date: 3/21/2024 Time: 11:47 AM

## 2024-03-21 NOTE — PHYSICAL THERAPY NOTE
PHYSICAL THERAPY NOTE          Patient Name: Smooth Jackson  Today's Date: 3/21/2024       03/21/24 0945   PT Last Visit   PT Visit Date 03/21/24   Note Type   Note Type Treatment   Pain Assessment   Pain Assessment Tool 0-10   Pain Score No Pain   Restrictions/Precautions   Weight Bearing Precautions Per Order No   Other Precautions Multiple lines   General   Chart Reviewed Yes   Response to Previous Treatment Patient with no complaints from previous session.   Family/Caregiver Present Yes  (spouse)   Cognition   Overall Cognitive Status WFL   Arousal/Participation Responsive;Cooperative   Attention Within functional limits   Orientation Level Oriented X4   Memory Within functional limits   Following Commands Follows one step commands without difficulty   Subjective   Subjective pt pleasant and cooperative throughout therapy session. pt received seated in bedside recliner   Bed Mobility   Supine to Sit Unable to assess   Sit to Supine Unable to assess   Transfers   Sit to Stand 6  Modified independent   Stand to Sit 6  Modified independent   Additional Comments transfers w/o AD   Ambulation/Elevation   Gait pattern Improper Weight shift;Wide LOYD   Gait Assistance 6  Modified independent   Assistive Device None   Distance 350'   Stair Management Assistance 5  Supervision   Additional items Verbal cues   Stair Management Technique One rail R;Alternating pattern;Foreward   Number of Stairs 7   Balance   Static Sitting Good   Dynamic Sitting Fair +   Static Standing Fair +   Dynamic Standing Fair   Ambulatory Fair   Endurance Deficit   Endurance Deficit No   Activity Tolerance   Activity Tolerance Patient tolerated treatment well   Nurse Made Aware RN cleared and updated   Assessment   Prognosis Good   Problem List Impaired balance   Assessment Patient was received seated in bedside recliner . Patient was agreeable to therapy services  today. PT session focused on gait and stair negotiation today in order to improve functional mobility and independence. Functional mobility was performed as described above.  Pt was eager to participate in therapy services today. Pt did not require any hands on assist throughout therapy services today. Pt displayed minimal gait deviations but no safety concerns. Pt was able to complete stair assessment well also. Pt was left seated in recliner at the end of PT session with all needs in reach. Pt with no questions or concerns regarding d/c home; appears to be functioning at/ near baseline mobility levels. Pt with no further acute inpatient PT needs at this time- please re-consult if needed. PT to discharge pt at this time. Encourage pt to ambulate at least 3-4x/day with restorative/ nursing staff while remaining in hospital. The patient's AM-PAC Basic Mobility Inpatient Short Form Raw Score is 24, Standardized Score is 57.68. Based on AM-PAC scoring and patient presentation, PT currently recommending Level III (Minimum Resource Intensity). Please also refer to the recommendation of the Physical Therapist for safe discharge planning.   Barriers to Discharge None   Goals   Patient Goals to go home   Plan   Progress Discontinue PT   Discharge Recommendation   Rehab Resource Intensity Level, PT III (Minimum Resource Intensity)   AM-PAC Basic Mobility Inpatient   Turning in Flat Bed Without Bedrails 4   Lying on Back to Sitting on Edge of Flat Bed Without Bedrails 4   Moving Bed to Chair 4   Standing Up From Chair Using Arms 4   Walk in Room 4   Climb 3-5 Stairs With Railing 4   Basic Mobility Inpatient Raw Score 24   Basic Mobility Standardized Score 57.68   [facility] Highest Level Of Mobility   -HLM Goal 8: Walk 250 feet or more   -HLM Achieved 8: Walk 250 feet ot more   Education   Education Provided Mobility training   Patient Demonstrates acceptance/verbal understanding   End of Consult   Patient Position at  End of Consult Bedside chair;All needs within reach         Jonathan Ocampo PT, DPT

## 2024-03-21 NOTE — OCCUPATIONAL THERAPY NOTE
Occupational Therapy Progress Note     Patient Name: Smooth Jackson  Today's Date: 3/21/2024  Problem List  Principal Problem:    Sepsis (HCC)  Active Problems:    Essential hypertension    Atrial fibrillation, persistent (HCC)    Iliac artery aneurysm, bilateral (HCC)    Acquired hypothyroidism    Mixed hyperlipidemia    Vitamin D deficiency    Malignant neoplasm of posterior wall of urinary bladder (HCC)    S/P ileal conduit (HCC)    Productive cough    UTI (urinary tract infection)    Hydroureteronephrosis    Open abdominal wall wound    E coli bacteremia    SAMARA (acute kidney injury) (HCC)       03/21/24 1205   OT Last Visit   OT Visit Date 03/21/24   Note Type   Note Type Treatment   Pain Assessment   Pain Assessment Tool 0-10   Pain Score No Pain   Restrictions/Precautions   Weight Bearing Precautions Per Order No   Other Precautions Multiple lines   Lifestyle   Autonomy PTA, pt reports being I with ADLs, shares IADLs with wife, I fnxl mobility. Has not been driving since his surgery   Reciprocal Relationships Supportive spouse   Service to Others Retired   Intrinsic Gratification Likes riding his bicycle   ADL   Where Assessed Chair   LB Dressing Assistance 6  Modified independent   LB Dressing Deficit Setup;Don/doff R sock;Don/doff L sock;Thread RLE into underwear;Thread LLE into underwear;Pull up over hips   Bed Mobility   Supine to Sit Unable to assess   Sit to Supine Unable to assess   Additional Comments Pt standing in doorway upon therapist's arrival and seated OOB in chair at end of OT tx session.   Transfers   Sit to Stand 7  Independent   Stand to Sit 7  Independent   Additional Comments x2 STS transfers completed t/o session w/o AD   Functional Mobility   Functional Mobility 5  Supervision   Additional Comments Pt ambulated community distance w/ S and w/o AD.   Additional items   (no AD)   Cognition   Overall Cognitive Status WFL   Arousal/Participation Alert;Responsive;Cooperative   Attention  Within functional limits   Orientation Level Oriented X4   Memory Within functional limits   Following Commands Follows one step commands without difficulty   Comments Pt was pleasant, cooperative, and willing to participate in OT tx session today.   Activity Tolerance   Activity Tolerance Patient tolerated treatment well   Medical Staff Made Aware RN clearance prior to session   Assessment   Assessment Pt seen for skilled OT treatment session from 1150 to 1205 w/ interventions focusing on ADL participation, activity tolerance, standing tolerance, standing balance, transfer skills, and fxnl mobility. Pt was agreeable and willing to participate in session. Pt engaged in the following tasks: mod I for LB dressing, I for transfers, and S for fxnl mobility w/o AD. In comparison to previous session, pt demonstrated improvements in LB ADLs and mobility tasks as he required less physical assistance to complete LB dressing, transfers, and fxnl mobility today. Pt performing ADLs and mobility tasks at/near baseline level of independence and has good social support upon return home. No further acute OT needs identified at this time. Recommend continued active ADL participation and mobilization with hospital staff while in the hospital to increase pt’s endurance and strength upon D/C. From OT standpoint, recommend D/C to home with family support when medically cleared. D/C pt from OT caseload at this time.   Plan   Treatment Interventions ADL retraining;Functional transfer training;Endurance training;Patient/family training;Equipment evaluation/education;Compensatory technique education;Continued evaluation;Energy conservation;Activityengagement   Goal Expiration Date 04/01/24   OT Treatment Day 1   OT Frequency 2-3x/wk   Discharge Recommendation   Rehab Resource Intensity Level, OT No post-acute rehabilitation needs   AM-PAC Daily Activity Inpatient   Lower Body Dressing 4   Bathing 4   Toileting 4   Upper Body Dressing 4    Grooming 4   Eating 4   Daily Activity Raw Score 24   Daily Activity Standardized Score (Calc for Raw Score >=11) 57.54   AM-PAC Applied Cognition Inpatient   Following a Speech/Presentation 4   Understanding Ordinary Conversation 4   Taking Medications 4   Remembering Where Things Are Placed or Put Away 4   Remembering List of 4-5 Errands 4   Taking Care of Complicated Tasks 4   Applied Cognition Raw Score 24   Applied Cognition Standardized Score 62.21       The patient's raw score on the AM-PAC Daily Activity Inpatient Short Form is 24. A raw score of greater than or equal to 19 suggests the patient may benefit from discharge to home. Please refer to the recommendation of the Occupational Therapist for safe discharge planning.    Nancy Arguello MS, OTR/L

## 2024-03-21 NOTE — PLAN OF CARE
Problem: OCCUPATIONAL THERAPY ADULT  Goal: Performs self-care activities at highest level of function for planned discharge setting.  See evaluation for individualized goals.  Description: Treatment Interventions: ADL retraining, Functional transfer training, UE strengthening/ROM, Endurance training, Patient/family training, Equipment evaluation/education, Compensatory technique education, Continued evaluation, Energy conservation, Activityengagement          See flowsheet documentation for full assessment, interventions and recommendations.   Outcome: Progressing  Note: Limitation: Decreased ADL status, Decreased UE strength, Decreased endurance, Decreased self-care trans, Decreased high-level ADLs  Prognosis: Good  Assessment: Pt seen for skilled OT treatment session from 1150 to 1205 w/ interventions focusing on ADL participation, activity tolerance, standing tolerance, standing balance, transfer skills, and fxnl mobility. Pt was agreeable and willing to participate in session. Pt engaged in the following tasks: mod I for LB dressing, I for transfers, and S for fxnl mobility w/o AD. In comparison to previous session, pt demonstrated improvements in LB ADLs and mobility tasks as he required less physical assistance to complete LB dressing, transfers, and fxnl mobility today. Pt performing ADLs and mobility tasks at/near baseline level of independence and has good social support upon return home. No further acute OT needs identified at this time. Recommend continued active ADL participation and mobilization with hospital staff while in the hospital to increase pt’s endurance and strength upon D/C. From OT standpoint, recommend D/C to home with family support when medically cleared. D/C pt from OT caseload at this time.     Rehab Resource Intensity Level, OT: No post-acute rehabilitation needs

## 2024-03-21 NOTE — PLAN OF CARE
Problem: Potential for Falls  Goal: Patient will remain free of falls  Description: INTERVENTIONS:  - Educate patient/family on patient safety including physical limitations  - Instruct patient to call for assistance with activity   - Consult OT/PT to assist with strengthening/mobility   - Keep Call bell within reach  - Keep bed low and locked with side rails adjusted as appropriate  - Keep care items and personal belongings within reach  - Initiate and maintain comfort rounds  - Make Fall Risk Sign visible to staff  - Offer Toileting every 2 Hours, in advance of need  - Initiate/Maintain alarm  - Obtain necessary fall risk management equipment:   - Apply yellow socks and bracelet for high fall risk patients  - Consider moving patient to room near nurses station  Outcome: Progressing     Problem: CARDIOVASCULAR - ADULT  Goal: Absence of cardiac dysrhythmias or at baseline rhythm  Description: INTERVENTIONS:  - Continuous cardiac monitoring, vital signs, obtain 12 lead EKG if ordered  - Administer antiarrhythmic and heart rate control medications as ordered  - Monitor electrolytes and administer replacement therapy as ordered  Outcome: Progressing     Problem: METABOLIC, FLUID AND ELECTROLYTES - ADULT  Goal: Electrolytes maintained within normal limits  Description: INTERVENTIONS:  - Monitor labs and assess patient for signs and symptoms of electrolyte imbalances  - Administer electrolyte replacement as ordered  - Monitor response to electrolyte replacements, including repeat lab results as appropriate  - Instruct patient on fluid and nutrition as appropriate  Outcome: Progressing  Goal: Fluid balance maintained  Description: INTERVENTIONS:  - Monitor labs   - Monitor I/O and WT  - Instruct patient on fluid and nutrition as appropriate  - Assess for signs & symptoms of volume excess or deficit  Outcome: Progressing     Problem: INFECTION - ADULT  Goal: Absence or prevention of progression during  hospitalization  Description: INTERVENTIONS:  - Assess and monitor for signs and symptoms of infection  - Monitor lab/diagnostic results  - Monitor all insertion sites, i.e. indwelling lines, tubes, and drains  - Monitor endotracheal if appropriate and nasal secretions for changes in amount and color  - [place] appropriate cooling/warming therapies per order  - Administer medications as ordered  - Instruct and encourage patient and family to use good hand hygiene technique  - Identify and instruct in appropriate isolation precautions for identified infection/condition  Outcome: Progressing     Problem: Knowledge Deficit  Goal: Patient/family/caregiver demonstrates understanding of disease process, treatment plan, medications, and discharge instructions  Description: Complete learning assessment and assess knowledge base.  Interventions:  - Provide teaching at level of understanding  - Provide teaching via preferred learning methods  Outcome: Progressing

## 2024-03-21 NOTE — PROGRESS NOTES
Progress Note - Infectious Disease   Smooth Jackson 74 y.o. male MRN: [id number]  Unit/Bed#: [facility] 934-01 Encounter: [id number]      Impression/Recommendations:  Severe sepsis, POA.    Fever, HR, lactic acidosis.  Due to bacteremia, UTI as below.  No other appreciable source.  Clinically stable and nontoxic  Clinically improving but ongoing fevers.  Consider persistent obstruction of right collecting system.  Rec:  Continue antibiotics as below  Follow temperatures closely  Recheck WBC in AM to monitor infection  If fevers continue may need to re-consider PCN to decompress right collection system  Supportive care as per the primary service     E. Coli bacteremia.    Due to UTI. No other appreciable source.  LFTs, CT A/P otherwise negative.  Rec:  Continue cefazolin for now  If continues to improve, anticipate hopeful transition to PO antibiotics in AM with plan to complete 10 days total     Acute pyelonephritis.    In setting of complicated  history as below.  Suspect E. Coli main pathogen give also in blood.  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  In setting of  surgical history as below.  Risk factor for infection.  No pain and Cr improving suggesting hopeful improvement in drainage.  Rec:  Hold on PCN placement for now  Close urology follow-up ongoing here and at [place]     Invasive small cell bladder cancer.  Status post neoadjuvant chemotherapy, radical cystoprostatectomy with ileal conduit urinary diversion 2/12/24 at [place].       I discussed with Dr. Gonsalez the above plan to continue IV antibiotics and hold on PCN for now.  She agrees with the plan.  Discussed also with patient and wife at the bedside.     Antibiotics:  Cefazolin #2  Antibiotics #5    Subjective/24 Hour Events:  Patient seen on AM rounds.  Low grade temp noted overnight.  No pain or subjective  fever.    Objective:  Vitals:  Temp:  [97.5 °F (36.4 °C)-99.5 °F (37.5 °C)] 97.6 °F (36.4 °C)  HR:  [66-82] 78  Resp:  [20] 20  BP: (110-128)/(70-90) 128/90  SpO2:  [92 %-97 %] 96 %  Temp (24hrs), Av.5 °F (36.9 °C), Min:97.5 °F (36.4 °C), Max:99.5 °F (37.5 °C)  Current: Temperature: 97.6 °F (36.4 °C)    Physical Exam:   General:  No acute distress  Psychiatric:  Awake and alert  Pulmonary:  Normal respiratory excursion without accessory muscle use  Abdomen:  Soft, nontender  Extremities:  No edema  Skin:  No rashes    Lab Results:  I have personally reviewed pertinent labs.  Results from last 7 days   Lab Units 2456 24   SODIUM mmol/L 138 136 134* 140 140   POTASSIUM mmol/L 3.7 4.1 3.7 4.4 3.7   CHLORIDE mmol/L 105 106 106 108 109*   CO2 mmol/L 23 23 20* 21 20*   BUN mg/dL 22 24 26* 27* 32*   CREATININE mg/dL 1.15 1.20 1.42* 1.15 1.28   EGFR ml/min/1.73sq m 62 59 48 62 54   CALCIUM mg/dL 8.5 8.2* 8.1* 8.5 9.1   AST U/L  --  47*  --  14 15   ALT U/L  --  41  --  13 16   ALK PHOS U/L  --  80  --  75 84     Results from last 7 days   Lab Units 24  0859 24  0902 24   WBC Thousand/uL 3.29* 2.91* 7.16   HEMOGLOBIN g/dL 10.7* 10.7* 10.3*   PLATELETS Thousands/uL 166 152 154     Results from last 7 days   Lab Units 24   BLOOD CULTURE   --  No Growth at 72 hrs. Escherichia coli*   GRAM STAIN RESULT   --   --  Gram negative rods*   URINE CULTURE  >100,000 cfu/ml Escherichia coli*  >100,000 cfu/ml Enterococcus faecalis*  80,000-89,000 cfu/ml Staphylococcus haemolyticus*  10,000-19,000 cfu/ml Alpha Hemolytic Streptococcus NOT Enterococcus*  --   --        Imaging Studies:   I have personally reviewed pertinent imaging study reports and images in PACS.    EKG, Pathology, and Other Studies:   I have personally reviewed pertinent reports.

## 2024-03-22 VITALS
HEIGHT: 73 IN | RESPIRATION RATE: 17 BRPM | DIASTOLIC BLOOD PRESSURE: 66 MMHG | HEART RATE: 51 BPM | OXYGEN SATURATION: 96 % | SYSTOLIC BLOOD PRESSURE: 107 MMHG | TEMPERATURE: 97.5 F | BODY MASS INDEX: 27.99 KG/M2 | WEIGHT: 211.2 LBS

## 2024-03-22 LAB
ANION GAP SERPL CALCULATED.3IONS-SCNC: 10 MMOL/L (ref 4–13)
BASOPHILS # BLD AUTO: 0.04 THOUSANDS/ÂΜL (ref 0–0.1)
BASOPHILS NFR BLD AUTO: 1 % (ref 0–1)
BUN SERPL-MCNC: 20 MG/DL (ref 5–25)
CALCIUM SERPL-MCNC: 8.5 MG/DL (ref 8.4–10.2)
CHLORIDE SERPL-SCNC: 106 MMOL/L (ref 96–108)
CO2 SERPL-SCNC: 23 MMOL/L (ref 21–32)
CREAT SERPL-MCNC: 1.06 MG/DL (ref 0.6–1.3)
EOSINOPHIL # BLD AUTO: 0.2 THOUSAND/ÂΜL (ref 0–0.61)
EOSINOPHIL NFR BLD AUTO: 5 % (ref 0–6)
ERYTHROCYTE [DISTWIDTH] IN BLOOD BY AUTOMATED COUNT: 12.5 % (ref 11.6–15.1)
GFR SERPL CREATININE-BSD FRML MDRD: 68 ML/MIN/1.73SQ M
GLUCOSE SERPL-MCNC: 100 MG/DL (ref 65–140)
HCT VFR BLD AUTO: 34.8 % (ref 36.5–49.3)
HGB BLD-MCNC: 11.1 G/DL (ref 12–17)
IMM GRANULOCYTES # BLD AUTO: 0.02 THOUSAND/UL (ref 0–0.2)
IMM GRANULOCYTES NFR BLD AUTO: 1 % (ref 0–2)
LYMPHOCYTES # BLD AUTO: 0.57 THOUSANDS/ÂΜL (ref 0.6–4.47)
LYMPHOCYTES NFR BLD AUTO: 13 % (ref 14–44)
MCH RBC QN AUTO: 29.4 PG (ref 26.8–34.3)
MCHC RBC AUTO-ENTMCNC: 31.9 G/DL (ref 31.4–37.4)
MCV RBC AUTO: 92 FL (ref 82–98)
MONOCYTES # BLD AUTO: 0.55 THOUSAND/ÂΜL (ref 0.17–1.22)
MONOCYTES NFR BLD AUTO: 12 % (ref 4–12)
NEUTROPHILS # BLD AUTO: 3.06 THOUSANDS/ÂΜL (ref 1.85–7.62)
NEUTS SEG NFR BLD AUTO: 68 % (ref 43–75)
NRBC BLD AUTO-RTO: 0 /100 WBCS
PLATELET # BLD AUTO: 177 THOUSANDS/UL (ref 149–390)
PMV BLD AUTO: 10.3 FL (ref 8.9–12.7)
POTASSIUM SERPL-SCNC: 3.8 MMOL/L (ref 3.5–5.3)
RBC # BLD AUTO: 3.77 MILLION/UL (ref 3.88–5.62)
SODIUM SERPL-SCNC: 139 MMOL/L (ref 135–147)
WBC # BLD AUTO: 4.44 THOUSAND/UL (ref 4.31–10.16)

## 2024-03-22 PROCEDURE — 99233 SBSQ HOSP IP/OBS HIGH 50: CPT | Performed by: INTERNAL MEDICINE

## 2024-03-22 PROCEDURE — 99239 HOSP IP/OBS DSCHRG MGMT >30: CPT | Performed by: FAMILY MEDICINE

## 2024-03-22 PROCEDURE — 80048 BASIC METABOLIC PNL TOTAL CA: CPT | Performed by: NURSE PRACTITIONER

## 2024-03-22 PROCEDURE — 85025 COMPLETE CBC W/AUTO DIFF WBC: CPT | Performed by: NURSE PRACTITIONER

## 2024-03-22 RX ORDER — CEFADROXIL 500 MG/1
1000 CAPSULE ORAL EVERY 12 HOURS SCHEDULED
Qty: 38 CAPSULE | Refills: 0 | Status: SHIPPED | OUTPATIENT
Start: 2024-03-22 | End: 2024-03-31

## 2024-03-22 RX ADMIN — LEVOTHYROXINE SODIUM 75 MCG: 75 TABLET ORAL at 05:35

## 2024-03-22 RX ADMIN — Medication 100 MG: at 08:40

## 2024-03-22 RX ADMIN — CEFAZOLIN SODIUM 2000 MG: 2 SOLUTION INTRAVENOUS at 01:03

## 2024-03-22 RX ADMIN — Medication 1000 UNITS: at 08:40

## 2024-03-22 RX ADMIN — ASPIRIN 81 MG: 81 TABLET, COATED ORAL at 08:40

## 2024-03-22 RX ADMIN — CEFAZOLIN SODIUM 2000 MG: 2 SOLUTION INTRAVENOUS at 08:41

## 2024-03-22 RX ADMIN — APIXABAN 5 MG: 5 TABLET, FILM COATED ORAL at 08:40

## 2024-03-22 RX ADMIN — GABAPENTIN 100 MG: 100 CAPSULE ORAL at 08:40

## 2024-03-22 RX ADMIN — POLYETHYLENE GLYCOL 3350 17 G: 17 POWDER, FOR SOLUTION ORAL at 08:40

## 2024-03-22 RX ADMIN — ATORVASTATIN CALCIUM 20 MG: 20 TABLET, FILM COATED ORAL at 08:40

## 2024-03-22 RX ADMIN — DOCUSATE SODIUM 100 MG: 100 CAPSULE, LIQUID FILLED ORAL at 08:40

## 2024-03-22 NOTE — PROGRESS NOTES
"[facility]  Progress Note  Name: Smooth Jackson I  MRN: [id number]  Unit/Bed#: [facility] 934-01 I Date of Admission: 3/17/2024   Date of Service: 3/21/2024 I Hospital Day: 4    Assessment/Plan   * Sepsis (HCC)  Assessment & Plan  Patient with hx of bladder cancer s/p prostatectomy cystectomy and diversion to ileal conduit (2/12)   Initially presented with generalized weakness and fever of 103 degrees, leukocytosis of 11 K   Likely secondary to UTI  Urinalysis revealed large leukocytes, nitrates  Complicated by gram-negative bacteremia  CT scan of abdomen/chest/pelvis ruled out any acute infection-results reviewed  Blood Culture came back positive.  E. coli.  Sensitivities are pending.  Will obtain ID consultation-consultation appreciated  X-ray loopogram reviewed.  Initially urology was planning for percutaneous nephrostomy tube placement on the right side.  After discussion with his surgeon in [facility] myelitis seen on the loopogram could be due to spasms.   remained afebrile - no plan for     SAMARA (acute kidney injury) (HCC)  Assessment & Plan  Creatinine is 1.45 which is above his baseline.  Received IV contrast for the CAT scan  Creatinine improving.  DC fluids and monitor    E coli bacteremia  Assessment & Plan  1 out of 2 sets of blood culture came back positive for E. coli.  Patient was on cefepime ID evaluation appreciated.  De-escalated to cefazolin    Hydroureteronephrosis  Assessment & Plan  CT scan revealed \"symmetric moderate to severe bilateral hydroureteronephrosis to the level of the right lower quadrant ileal conduit; correlate for possible anastomotic stricture\"  Currently denies any abdominal pain  Urology evaluation appreciated.  Status post loopogram  May need percutaneous nephrostomy tube placement if the fever is persistent    UTI (urinary tract infection)  Assessment & Plan  Presented with fever 103 degrees  Urinalysis revealed leukocytes bacteria, " nitrate  Follow-up urine culture-urine culture and blood culture is growing E. coli -appears to be pansensitive E. coli.  Was on cefepime de-escalated to cefazolin by infectious disease    Productive cough  Assessment & Plan  Chest x-ray/CT scan revealed no acute abnormalities  Continue with symptom support    S/P ileal conduit (HCC)  Assessment & Plan  Noted as above for hx of bladder cancer   Urostomy site looks clean  Monitor UOP from urostomy   Urology evaluation appreciated.  Status post loopogram.  Urology contacted his surgeon in [facility].  Abnormality seen in the loopogram could be related to spasm will evaluate the patient as outpatient will repeat ct   Remained afebrile - no plan for percutaneous tube     Malignant neoplasm of posterior wall of urinary bladder (HCC)  Assessment & Plan  Hx of small cell muscle invasive bladder cancer status post carboplatin and etoposide based neoadjuvant chemotherapy.  S/p radical cystoprostatectomy with ileal conduit urinary diversion and bilateral pelvic lymphadenectomy on 2/12/24 with Dr. Melodie Pruett  O/p follow up   Urology evaluation appreciated    Vitamin D deficiency  Assessment & Plan  Continue vit d 1000 qd    Mixed hyperlipidemia  Assessment & Plan  Continue statin    Acquired hypothyroidism  Assessment & Plan  Continue home synthroid    Iliac artery aneurysm, bilateral (HCC)  Assessment & Plan  S/p R MINDY endovascular repair with endograft and coil embolization of hypogastric artery (JBF/PP) 4/13/2018  VAS iliacs from July 2023: The left common iliac artery is aneurysmal measuring approximately 2 cm. Prior:  Continue with outpatient follow-up with vascular    Atrial fibrillation, persistent (HCC)  Assessment & Plan  Currently rate controlled  Continue Toprol  Continue Eliquis    Essential hypertension  Assessment & Plan  BP controlled   continue felodipine               VTE Pharmacologic Prophylaxis: VTE Score: 8 Moderate Risk (Score 3-4) - Pharmacological  DVT Prophylaxis Ordered: apixaban (Eliquis).    Mobility:   Basic Mobility Inpatient Raw Score: 24  JH-HLM Goal: 8: Walk 250 feet or more  JH-HLM Achieved: 6: Walk 10 steps or more  JH-HLM Goal achieved. Continue to encourage appropriate mobility.    Patient Centered Rounds: I performed bedside rounds with nursing staff today.   Discussions with Specialists or Other Care Team Provider:     Education and Discussions with Family / Patient: Updated  (wife) at bedside.    Total Time Spent on Date of Encounter in care of patient: 35 mins. This time was spent on one or more of the following: performing physical exam; counseling and coordination of care; obtaining or reviewing history; documenting in the medical record; reviewing/ordering tests, medications or procedures; communicating with other healthcare professionals and discussing with patient's family/caregivers.    Current Length of Stay: 4 day(s)  Current Patient Status: Inpatient   Certification Statement: The patient will continue to require additional inpatient hospital stay due to iv abx  Discharge Plan: Anticipate discharge tomorrow to home with home services.    Code Status: Level 1 - Full Code    Subjective:   Patient seen and examined.d/w id - no fever- no need  for pcn -restart back on eliquis and asa    Objective:     Vitals:   Temp (24hrs), Av.4 °F (36.9 °C), Min:97.5 °F (36.4 °C), Max:99.5 °F (37.5 °C)    Temp:  [97.5 °F (36.4 °C)-99.5 °F (37.5 °C)] 97.8 °F (36.6 °C)  HR:  [56-78] 66  Resp:  [16-20] 17  BP: (110-128)/(58-90) 111/65  SpO2:  [92 %-96 %] 95 %  Body mass index is 27.86 kg/m².     Input and Output Summary (last 24 hours):     Intake/Output Summary (Last 24 hours) at 3/21/2024 2103  Last data filed at 3/21/2024 1801  Gross per 24 hour   Intake 1250 ml   Output 3175 ml   Net -1925 ml       Physical Exam:   Physical Exam  Constitutional:       General: He is not in acute distress.  HENT:      Head: Normocephalic and  atraumatic.      Nose: Nose normal.   Eyes:      Conjunctiva/sclera: Conjunctivae normal.   Cardiovascular:      Rate and Rhythm: Normal rate.   Pulmonary:      Effort: Pulmonary effort is normal.   Abdominal:      Comments: Urostomy present   Skin:     General: Skin is warm.   Neurological:      General: No focal deficit present.      Mental Status: He is alert.          Additional Data:     Labs:  Results from last 7 days   Lab Units 03/21/24  0859 03/19/24  0622 03/18/24  0956   WBC Thousand/uL 3.29*   < > 13.19*   HEMOGLOBIN g/dL 10.7*   < > 11.5*   HEMATOCRIT % 32.4*   < > 33.8*   PLATELETS Thousands/uL 166   < > 210   BANDS PCT %  --   --  2   NEUTROS PCT % 73   < >  --    LYMPHS PCT % 10*   < >  --    LYMPHO PCT %  --   --  3*   MONOS PCT % 13*   < >  --    MONO PCT %  --   --  0*   EOS PCT % 3   < > 0    < > = values in this interval not displayed.     Results from last 7 days   Lab Units 03/21/24  0859 03/20/24  0902   SODIUM mmol/L 138 136   POTASSIUM mmol/L 3.7 4.1   CHLORIDE mmol/L 105 106   CO2 mmol/L 23 23   BUN mg/dL 22 24   CREATININE mg/dL 1.15 1.20   ANION GAP mmol/L 10 7   CALCIUM mg/dL 8.5 8.2*   ALBUMIN g/dL  --  3.6   TOTAL BILIRUBIN mg/dL  --  0.49   ALK PHOS U/L  --  80   ALT U/L  --  41   AST U/L  --  47*   GLUCOSE RANDOM mg/dL 108 144*     Results from last 7 days   Lab Units 03/17/24  1926   INR  1.40*             Results from last 7 days   Lab Units 03/18/24  0956 03/17/24 2123 03/17/24 1926   LACTIC ACID mmol/L  --  1.6 2.3*   PROCALCITONIN ng/ml 3.65*  --  0.37*       Lines/Drains:  Invasive Devices       Peripheral Intravenous Line  Duration             Peripheral IV 03/21/24 Right;Ventral (anterior) Forearm <1 day              Drain  Duration             Urostomy RLQ 31 days                          Imaging: No pertinent imaging reviewed.    Recent Cultures (last 7 days):   Results from last 7 days   Lab Units 03/17/24  1949 03/17/24 1943 03/17/24 1926   BLOOD CULTURE   --  No  Growth at 72 hrs. Escherichia coli*   GRAM STAIN RESULT   --   --  Gram negative rods*   URINE CULTURE  >100,000 cfu/ml Escherichia coli*  >100,000 cfu/ml Enterococcus faecalis*  80,000-89,000 cfu/ml Staphylococcus haemolyticus*  10,000-19,000 cfu/ml Alpha Hemolytic Streptococcus NOT Enterococcus*  --   --        Last 24 Hours Medication List:   Current Facility-Administered Medications   Medication Dose Route Frequency Provider Last Rate    acetaminophen  650 mg Oral Q6H PRN Jazmin Khan PA-C      apixaban  5 mg Oral BID Maryjo Gonsalez MD      aspirin  81 mg Oral Daily Maryjo Gonsalez MD      atorvastatin  20 mg Oral Daily Jasmit Star-Kals, DO      cefazolin  2,000 mg Intravenous Q8H Neena Burton MD 2,000 mg (03/21/24 9767)    cholecalciferol  1,000 Units Oral Daily Jasmit Star-Kals, DO      co-enzyme Q-10  100 mg Oral Daily Jasmit Star-Kals, DO      diphenhydrAMINE  25 mg Oral Q6H PRN Maryjo Gonsalez MD      docusate sodium  100 mg Oral BID Maryjo Gonsalez MD      felodipine  5 mg Oral Daily Jasmit Star-Kals, DO      gabapentin  100 mg Oral TID Jasmit Star-Kals, DO      guaiFENesin  600 mg Oral BID PRN Jasmit Star-Kals, DO      levothyroxine  75 mcg Oral Early Morning Jasmit Star-Kals, DO      metoprolol succinate  25 mg Oral BID Jasmit Star-Kals, DO      ondansetron  4 mg Intravenous Q4H PRN Jazmin Khan PA-C      polyethylene glycol  17 g Oral Daily Maryjo Gonsalez MD          Today, Patient Was Seen By: Maryjo Gonsalez MD    **Please Note: This note may have been constructed using a voice recognition system.**

## 2024-03-22 NOTE — DISCHARGE INSTR - AVS FIRST PAGE
Please  contact your urologist in [facility] regarding extended course of antibiotics.  Continue with the wound care and urostomy as before admission

## 2024-03-22 NOTE — QUICK NOTE
Patient currently hemodynamically stable.  patient wishes to be transferred to [facility] if PCN is deemed necessary

## 2024-03-22 NOTE — CASE MANAGEMENT
Case Management Discharge Planning Note    Patient name Smooth Jackson  [facility] MRN [id number]  : 1950 Date 3/22/2024       Current Admission Date: 3/17/2024  Current Admission Diagnosis:Sepsis ([facility])   Patient Active Problem List    Diagnosis Date Noted    E coli bacteremia 2024    SAMARA (acute kidney injury) ([facility]) 2024    UTI (urinary tract infection) 2024    Hydroureteronephrosis 2024    Open abdominal wall wound 2024    Sepsis ([facility]) 2024    S/P ileal conduit ([facility]) 2024    Productive cough 2024    Dizziness 2023    Chemotherapy induced neutropenia  2023    Closed fracture of posterior malleolus of left tibia 2023    Pain, joint, ankle and foot, left 2023    Pancreatic mass 08/10/2023    Malignant neoplasm of posterior wall of urinary bladder ([facility]) 08/10/2023    Bladder mass 2023    Malfunction of Moralez catheter ([facility]) 2023    Gross hematuria 2023    Prediabetes 10/12/2022    Vitamin D deficiency 2021    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    NSVT (nonsustained ventricular tachycardia) ([facility]) 10/15/2021    Chronic bilateral low back pain without sciatica 2020    Mixed hyperlipidemia 2020    Rash 2019    Other headache syndrome 2019    Iliac artery aneurysm, right (HCC) 2018    Neuropathy 2018    Iliac artery aneurysm, bilateral (HCC)     Aneurysm of thoracic aorta ([facility]) 2016    Essential hypertension 2016    Atrial fibrillation, persistent ([facility]) 2016    Acquired hypothyroidism 2014      LOS (days): 5  Geometric Mean LOS (GMLOS) (days): 4.9  Days to GMLOS:0.2     OBJECTIVE:  Risk of Unplanned Readmission Score: 15.02         Current admission status: Inpatient   Preferred Pharmacy:   [id number] - [place], [place] - [address]  [place] [address]  Phone: [phone] Fax: [phone]    [place]OB[place]  [facility] - [place] - [facility]  [address] [facility] [address]  [place] [address]  Phone: [phone] Fax: [phone]    Primary Care Provider: Melecio Giraldo MD    Primary Insurance: MEDICARE  Secondary Insurance: AARP    DISCHARGE DETAILS:            Other Referral/Resources/Interventions Provided:  Referral Comments: Patient discharge home w/HH through  [facility].     IMM Given (Date):: 03/22/24  IMM Given to:: Family  Family notified:: Left vm for pt's wife Rona  Additional Comments: Patient left prior to receiving IMM.  Called pt to notify of IMM - no answer & unable to leave a VM. Called pt's wife Rona [phone] notifying her of IMM and left # for [place] if needed.  Requested callback to confirm.

## 2024-03-22 NOTE — PLAN OF CARE
Problem: METABOLIC, FLUID AND ELECTROLYTES - ADULT  Goal: Electrolytes maintained within normal limits  Description: INTERVENTIONS:  - Monitor labs and assess patient for signs and symptoms of electrolyte imbalances  - Administer electrolyte replacement as ordered  - Monitor response to electrolyte replacements, including repeat lab results as appropriate  - Instruct patient on fluid and nutrition as appropriate  Outcome: Progressing     Problem: INFECTION - ADULT  Goal: Absence or prevention of progression during hospitalization  Description: INTERVENTIONS:  - Assess and monitor for signs and symptoms of infection  - Monitor lab/diagnostic results  - Monitor all insertion sites, i.e. indwelling lines, tubes, and drains  - Monitor endotracheal if appropriate and nasal secretions for changes in amount and color  - [place] appropriate cooling/warming therapies per order  - Administer medications as ordered  - Instruct and encourage patient and family to use good hand hygiene technique  - Identify and instruct in appropriate isolation precautions for identified infection/condition  Outcome: Progressing     Problem: Knowledge Deficit  Goal: Patient/family/caregiver demonstrates understanding of disease process, treatment plan, medications, and discharge instructions  Description: Complete learning assessment and assess knowledge base.  Interventions:  - Provide teaching at level of understanding  - Provide teaching via preferred learning methods  Outcome: Progressing

## 2024-03-22 NOTE — ASSESSMENT & PLAN NOTE
Patient with hx of bladder cancer s/p prostatectomy cystectomy and diversion to ileal conduit (2/12)   Initially presented with generalized weakness and fever of 103 degrees, leukocytosis of 11 K   Likely secondary to UTI  Urinalysis revealed large leukocytes, nitrates  Complicated by gram-negative bacteremia  CT scan of abdomen/chest/pelvis ruled out any acute infection-results reviewed  Blood Culture came back positive.  E. coli.  Sensitivities are pending.  Will obtain ID consultation-consultation appreciated  X-ray loopogram reviewed.  Initially urology was planning for percutaneous nephrostomy tube placement on the right side.  After discussion with his surgeon in [place] myelitis seen on the loopogram could be due to spasms.   remained afebrile - no plan for

## 2024-03-22 NOTE — ASSESSMENT & PLAN NOTE
Noted as above for hx of bladder cancer   Urostomy site looks clean  Monitor UOP from urostomy   Urology evaluation appreciated.  Status post loopogram.  Urology contacted his surgeon in [facility].  Abnormality seen in the loopogram could be related to spasm will evaluate the patient as outpatient will repeat ct   Remained afebrile - no plan for percutaneous tube

## 2024-03-22 NOTE — PROGRESS NOTES
Progress Note - Infectious Disease   Smooth Jackson 74 y.o. male MRN: [id number]  Unit/Bed#: [facility] 934-01 Encounter: [id number]      Impression/Recommendations:  Severe sepsis, POA.    Fever, HR, lactic acidosis.  Due to bacteremia, UTI as below.  No other appreciable source.  Clinically stable and nontoxic.  Improved.  Rec:  Continue antibiotics as below  Follow temperatures closely  Supportive care as per the primary service     E. Coli bacteremia.    Due to UTI. No other appreciable source.  LFTs, CT A/P otherwise negative.  Rec:  Change to Duricef 1000 mg PO Q12 to continue through 3/31 to complete 14 days total antibiotics     Acute pyelonephritis.    In setting of complicated  history as below.  Suspect E. Coli main pathogen give also in blood.  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  In setting of  surgical history as below.  Risk factor for infection.  No pain and Cr improving suggesting hopeful improvement in drainage.  Rec:  Hold on PCN placement for now  Close urology follow-up ongoing here and at [place]     Invasive small cell bladder cancer.  Status post neoadjuvant chemotherapy, radical cystoprostatectomy with ileal conduit urinary diversion 24 at [place].       I discussed with Dr. Gonsalez the above plan to D/C home on PO antibiotics.  She agrees with the plan.  Discussed also with patient and wife at the bedside.     Antibiotics:  Cefazolin #3  Antibiotics #5    Subjective/24 Hour Events:  Patient seen on AM rounds.  Feels well.  Eager to go home.  No fevers or diarrhea.    Objective:  Vitals:  Temp:  [97.5 °F (36.4 °C)-98.8 °F (37.1 °C)] 97.5 °F (36.4 °C)  HR:  [50-66] 51  Resp:  [16-17] 17  BP: (102-111)/(56-66) 107/66  SpO2:  [95 %-96 %] 96 %  Temp (24hrs), Av.9 °F (36.6 °C), Min:97.5 °F (36.4 °C), Max:98.8 °F (37.1 °C)  Current: Temperature:  97.5 °F (36.4 °C)    Physical Exam:   General:  No acute distress  Psychiatric:  Awake and alert  Pulmonary:  Normal respiratory excursion without accessory muscle use  Abdomen:  Soft, nontender  Extremities:  No edema  Skin:  No rashes    Lab Results:  I have personally reviewed pertinent labs.  Results from last 7 days   Lab Units 03/22/24  0559 03/21/24  0859 03/20/24  0902 03/19/24  0622 03/18/24  0956 03/17/24 1926   SODIUM mmol/L 139 138 136   < > 140 140   POTASSIUM mmol/L 3.8 3.7 4.1   < > 4.4 3.7   CHLORIDE mmol/L 106 105 106   < > 108 109*   CO2 mmol/L 23 23 23   < > 21 20*   BUN mg/dL 20 22 24   < > 27* 32*   CREATININE mg/dL 1.06 1.15 1.20   < > 1.15 1.28   EGFR ml/min/1.73sq m 68 62 59   < > 62 54   CALCIUM mg/dL 8.5 8.5 8.2*   < > 8.5 9.1   AST U/L  --   --  47*  --  14 15   ALT U/L  --   --  41  --  13 16   ALK PHOS U/L  --   --  80  --  75 84    < > = values in this interval not displayed.     Results from last 7 days   Lab Units 03/22/24  0559 03/21/24  0859 03/20/24  0902   WBC Thousand/uL 4.44 3.29* 2.91*   HEMOGLOBIN g/dL 11.1* 10.7* 10.7*   PLATELETS Thousands/uL 177 166 152     Results from last 7 days   Lab Units 03/17/24 1949 03/17/24 1943 03/17/24 1926   BLOOD CULTURE   --  No Growth After 4 Days. Escherichia coli*   GRAM STAIN RESULT   --   --  Gram negative rods*   URINE CULTURE  >100,000 cfu/ml Escherichia coli*  >100,000 cfu/ml Enterococcus faecalis*  80,000-89,000 cfu/ml Staphylococcus haemolyticus*  10,000-19,000 cfu/ml Alpha Hemolytic Streptococcus NOT Enterococcus*  --   --        Imaging Studies:   I have personally reviewed pertinent imaging study reports and images in PACS.    EKG, Pathology, and Other Studies:   I have personally reviewed pertinent reports.

## 2024-03-23 ENCOUNTER — HOME CARE VISIT (OUTPATIENT)
Dept: HOME HEALTH SERVICES | Facility: HOME HEALTHCARE | Age: 74
End: 2024-03-23
Payer: MEDICARE

## 2024-03-23 LAB — BACTERIA BLD CULT: NORMAL

## 2024-03-23 PROCEDURE — G0299 HHS/HOSPICE OF RN EA 15 MIN: HCPCS

## 2024-03-23 NOTE — CASE COMMUNICATION
[facility] has admitted your patient to Home Health service with the following disciplines:      SN  This report is informational only, no response is needed  Primary focus of home health care. sepsis rtd to uti  Patient stated goals of care. wound to heal and remain at home  Anticipated visit pattern and next visit date. 1w1. 2w1  next visit tuesday  See medication list - meds in home differ from AVS. all meds at home  Significant  clinical findings. weakness, fatigue, decrease endurance  Potential barriers to goal achievement. na  Other pertinent information. na    Thank you for allowing us to participate in the care of your patient.

## 2024-03-23 NOTE — DISCHARGE SUMMARY
"  Discharge Summary - [facility] Internal Medicine    Patient Information: Smooth Jackson 74 y.o. male MRN: [id number]  Unit/Bed#: [facility] 934-01 Encounter: [id number]    Discharging Physician / Practitioner: Maryjo Gonsalez MD  PCP: Melecio Giraldo MD  Admission Date: 3/17/2024  Discharge Date: 03/23/24    Disposition:     Home with [facility]    Reason for Admission: Sepsis   Sepsis (HCC)  Assessment & Plan  Patient with hx of bladder cancer s/p prostatectomy cystectomy and diversion to ileal conduit (2/12)   Initially presented with generalized weakness and fever of 103 degrees, leukocytosis of 11 K   Likely secondary to UTI  Urinalysis revealed large leukocytes, nitrates  Complicated by gram-negative bacteremia  .  E. coli.    X-ray loopogram reviewed.  Initially urology was planning for percutaneous nephrostomy tube placement on the right side.  After discussion with his surgeon in [facility] abnormalities seen on the loopogram could be due to spasms.   Plan is to finish abx course for 2 weeks total  Contact his outpatient urologist as an outpatient .  Elevated creatinine  Assessment & Plan  Creatinine is 1.45 which is above his baseline.  Received IV contrast for the CAT scan  Creatinine back to baseline.  Acute kidney injury ruled out     E coli bacteremia  Assessment & Plan  1 out of 2 sets of blood culture came back positive for E. coli.  Patient was on cefepime ID evaluation appreciated.  De-escalated to cefazolin  Will be discharged home with Duricef 1000 mg twice daily for 14 days total     Hydroureteronephrosis  Assessment & Plan  CT scan revealed \"symmetric moderate to severe bilateral hydroureteronephrosis to the level of the right lower quadrant ileal conduit; correlate for possible anastomotic stricture\"  Urology evaluation appreciated.  Status post loopogram  Outpatient follow-up with his urologist in [facility]       UTI (urinary tract infection)  Assessment & Plan  Presented with fever 103 degrees  Urinalysis " revealed leukocytes bacteria, nitrate  Follow-up urine culture-urine culture and blood culture is growing E. coli -appears to be pansensitive E. coli.  Was on cefepime de-escalated to cefazolin by infectious disease  Will discharge home with Duricef     Productive cough  Assessment & Plan  Chest x-ray/CT scan revealed no acute abnormalities  Continue with symptom support     S/P ileal conduit (HCC)  Assessment & Plan  Noted as above for hx of bladder cancer   Urostomy site looks clean  Monitor UOP from urostomy   Urology evaluation appreciated.  Status post loopogram.  Urology contacted his surgeon in [facility].  Abnormality seen in the loopogram could be related to spasm will evaluate the patient as outpatient will repeat ct   Remained afebrile - no plan for percutaneous tube      Malignant neoplasm of posterior wall of urinary bladder (HCC)  Assessment & Plan  Hx of small cell muscle invasive bladder cancer status post carboplatin and etoposide based neoadjuvant chemotherapy.  S/p radical cystoprostatectomy with ileal conduit urinary diversion and bilateral pelvic lymphadenectomy on 2/12/24 with Dr. Melodie Pruett  O/p follow up   Urology evaluation appreciated     Vitamin D deficiency  Assessment & Plan  Continue vit d 1000 qd     Mixed hyperlipidemia  Assessment & Plan  Continue statin     Acquired hypothyroidism  Assessment & Plan  Continue home synthroid     Iliac artery aneurysm, bilateral (HCC)  Assessment & Plan  S/p R MINDY endovascular repair with endograft and coil embolization of hypogastric artery (JBF/PP) 4/13/2018  VAS iliacs from July 2023: The left common iliac artery is aneurysmal measuring approximately 2 cm. Prior:  Continue with outpatient follow-up with vascular     Atrial fibrillation, persistent (HCC)  Assessment & Plan  Currently rate controlled  Continue Toprol  Continue Eliquis     Essential hypertension  Assessment & Plan  BP controlled   continue felodipine      Consultations During  Hospital Stay:  Urology  Infectious disease     Procedures Performed:         Significant Findings / Test Results:     Loopogram-No reflux of contrast into the right ureter, concerning for ileo-ureteral anastomotic stricture or distal ureteral spasm.Unremarkable appearance of the ileal conduit and left ureter.      Ct chest abdomin and pelvis -No evidence for intra-abdominal abscess as queried Status post cystectomy. Symmetric moderate to severe bilateral hydroureteronephrosis to the level of the right lower quadrant ileal conduit; correlate for possible anastomotic stricture.Small pelvic ascites.Uncomplicated colonic diverticulosis.Small right pleural effusion with subjacent compressive atelectasis     Incidental Findings:        Test Results Pending at Discharge (will require follow up):        Outpatient Tests Requested:   Cbc, bmp in a week    Complications:  none    Hospital Course:     Smooth Jackson is a 74 y.o. male patient who originally presented to the hospital on 3/17/2024 due to fever.  Patient met the criteria for sepsis at the time of presentation.  His symptoms were concerning for urinary tract infection.  Patient recently had surgery for bladder cancer and you plan with the ileal conduit and urostomy.  Patient was started on broad-spectrum antibiotics.  1 out of 2 sets of blood culture came back positive for E. coli.  E. coli was pansensitive initially transition to cefazolin.  Urology followed the patient.  Given abnormality seen on the CAT scan a loopogram was obtained.  Loopogram findings were suspected to be secondary to spasm .  In there was a consideration for percutaneous nephrostomy tube placement.  But after discussion with his urologist in [facility] it was deemed that it is not necessary at this time.  Patient remained afebrile for 24 hours at the time of discharge.  Leukocytosis improving.  Patient will be transition to [facility] to finish the course of antibiotics for 2 weeks.  Patient is  "planning to contact his urologist) the course as to be extended.  Patient remained hemodynamically stable and afebrile..  For details refer to the chart    Condition at Discharge: good     Discharge Day Visit / Exam:     Subjective: Patient seen and examined.  No specific complaints offered.  Agreeable for discharge with outpatient follow-up  Vitals: Blood Pressure: 107/66 (03/22/24 0700)  Pulse: (!) 51 (03/22/24 0700)  Temperature: 97.5 °F (36.4 °C) (03/22/24 0700)  Temp Source: Oral (03/18/24 1935)  Respirations: 17 (03/22/24 0700)  Height: 6' 1\" (185.4 cm) (03/18/24 1935)  Weight - Scale: 95.8 kg (211 lb 3.2 oz) (03/18/24 1935)  SpO2: 96 % (03/22/24 0700)  Exam:   Physical Exam  HENT:      Head: Normocephalic.      Nose: Nose normal.   Eyes:      Pupils: Pupils are equal, round, and reactive to light.   Cardiovascular:      Rate and Rhythm: Normal rate and regular rhythm.      Heart sounds: No murmur heard.  Abdominal:      General: There is no distension.      Comments: Urostomy present   Musculoskeletal:      Cervical back: Neck supple.   Skin:     General: Skin is warm.   Neurological:      Mental Status: He is alert.      Cranial Nerves: No cranial nerve deficit.         Discussion with Family: wife in the room    Discharge instructions/Information to patient and family:   See after visit summary for information provided to patient and family.      Provisions for Follow-Up Care:  See after visit summary for information related to follow-up care and any pertinent home health orders.      Planned Readmission: none     Discharge Statement:  I spent 45 minutes discharging the patient. This time was spent on the day of discharge. I had direct contact with the patient on the day of discharge. Greater than 50% of the total time was spent examining patient, answering all patient questions, arranging and discussing plan of care with patient as well as directly providing post-discharge instructions.  Additional time " then spent on discharge activities.    Discharge Medications:  See after visit summary for reconciled discharge medications provided to patient and family.      ** Please Note: This note has been constructed using a voice recognition system **

## 2024-03-24 VITALS
HEART RATE: 58 BPM | SYSTOLIC BLOOD PRESSURE: 122 MMHG | OXYGEN SATURATION: 99 % | DIASTOLIC BLOOD PRESSURE: 70 MMHG | RESPIRATION RATE: 18 BRPM | TEMPERATURE: 98.7 F

## 2024-03-24 NOTE — CASE COMMUNICATION
Ship to xx Pt. Home   Ordering MD Dr Melecio Giraldo (home health only)   [place] X   Pouches one piece drainable ref [id number] 2 boxes  Hydrocellular Foam Adh, 4x4 [id number] . 1 box

## 2024-03-25 ENCOUNTER — TRANSITIONAL CARE MANAGEMENT (OUTPATIENT)
Dept: INTERNAL MEDICINE CLINIC | Facility: CLINIC | Age: 74
End: 2024-03-25

## 2024-03-25 ENCOUNTER — HOME CARE VISIT (OUTPATIENT)
Dept: HOME HEALTH SERVICES | Facility: HOME HEALTHCARE | Age: 74
End: 2024-03-25
Payer: MEDICARE

## 2024-03-25 DIAGNOSIS — I10 ESSENTIAL HYPERTENSION: Primary | ICD-10-CM

## 2024-03-25 PROCEDURE — 10330064 DRESSING, HYDROCELLULAR ADH STR FOAM 4"X

## 2024-03-25 PROCEDURE — 10330064

## 2024-03-26 ENCOUNTER — HOME CARE VISIT (OUTPATIENT)
Dept: HOME HEALTH SERVICES | Facility: HOME HEALTHCARE | Age: 74
End: 2024-03-26
Payer: MEDICARE

## 2024-03-26 ENCOUNTER — TELEPHONE (OUTPATIENT)
Dept: NEUROLOGY | Facility: CLINIC | Age: 74
End: 2024-03-26

## 2024-03-26 VITALS
TEMPERATURE: 98 F | SYSTOLIC BLOOD PRESSURE: 128 MMHG | DIASTOLIC BLOOD PRESSURE: 60 MMHG | OXYGEN SATURATION: 97 % | HEART RATE: 68 BPM | RESPIRATION RATE: 16 BRPM

## 2024-03-26 PROCEDURE — 10330064

## 2024-03-26 PROCEDURE — G0299 HHS/HOSPICE OF RN EA 15 MIN: HCPCS

## 2024-03-26 NOTE — TELEPHONE ENCOUNTER
Spoke to pt to schedule a follow up appt w/ Irais Recinos in [place]. Pt is now scheduled on 5/3/24 at 9:15.

## 2024-03-26 NOTE — CASE COMMUNICATION
Ship to Pt. Home  Ordering MD (home health only) Melecio Giraldo    Wound 1 midline abdomen Frequency every 2 days  Also urostomy patient    All items are ordered by the each unless otherwise noted.  Orders should be for a 2 week period (unless noted by insurance)    Allevyn foam 2x2 adhesive dressing ref # [id number]    Miscellaneous Wound Products    Cavilon prep wipes 2 boxes     Adhesive remover wipes 2 boxes

## 2024-03-29 ENCOUNTER — LAB REQUISITION (OUTPATIENT)
Dept: LAB | Facility: HOSPITAL | Age: 74
End: 2024-03-29
Payer: MEDICARE

## 2024-03-29 ENCOUNTER — HOME CARE VISIT (OUTPATIENT)
Dept: HOME HEALTH SERVICES | Facility: HOME HEALTHCARE | Age: 74
End: 2024-03-29
Payer: MEDICARE

## 2024-03-29 VITALS
RESPIRATION RATE: 16 BRPM | TEMPERATURE: 97.8 F | HEART RATE: 82 BPM | DIASTOLIC BLOOD PRESSURE: 62 MMHG | OXYGEN SATURATION: 97 % | SYSTOLIC BLOOD PRESSURE: 128 MMHG

## 2024-03-29 DIAGNOSIS — I10 ESSENTIAL (PRIMARY) HYPERTENSION: ICD-10-CM

## 2024-03-29 LAB
ALBUMIN SERPL BCP-MCNC: 3.8 G/DL (ref 3.5–5)
ALP SERPL-CCNC: 94 U/L (ref 34–104)
ALT SERPL W P-5'-P-CCNC: 12 U/L (ref 7–52)
ANION GAP SERPL CALCULATED.3IONS-SCNC: 10 MMOL/L (ref 4–13)
AST SERPL W P-5'-P-CCNC: 13 U/L (ref 13–39)
BASOPHILS # BLD AUTO: 0.03 THOUSANDS/ÂΜL (ref 0–0.1)
BASOPHILS NFR BLD AUTO: 1 % (ref 0–1)
BILIRUB SERPL-MCNC: 0.44 MG/DL (ref 0.2–1)
BUN SERPL-MCNC: 28 MG/DL (ref 5–25)
CALCIUM SERPL-MCNC: 8.6 MG/DL (ref 8.4–10.2)
CHLORIDE SERPL-SCNC: 107 MMOL/L (ref 96–108)
CO2 SERPL-SCNC: 26 MMOL/L (ref 21–32)
CREAT SERPL-MCNC: 1.14 MG/DL (ref 0.6–1.3)
EOSINOPHIL # BLD AUTO: 0.25 THOUSAND/ÂΜL (ref 0–0.61)
EOSINOPHIL NFR BLD AUTO: 5 % (ref 0–6)
ERYTHROCYTE [DISTWIDTH] IN BLOOD BY AUTOMATED COUNT: 13 % (ref 11.6–15.1)
GFR SERPL CREATININE-BSD FRML MDRD: 63 ML/MIN/1.73SQ M
GLUCOSE SERPL-MCNC: 78 MG/DL (ref 65–140)
HCT VFR BLD AUTO: 33.9 % (ref 36.5–49.3)
HGB BLD-MCNC: 10.8 G/DL (ref 12–17)
IMM GRANULOCYTES # BLD AUTO: 0.02 THOUSAND/UL (ref 0–0.2)
IMM GRANULOCYTES NFR BLD AUTO: 0 % (ref 0–2)
LYMPHOCYTES # BLD AUTO: 0.62 THOUSANDS/ÂΜL (ref 0.6–4.47)
LYMPHOCYTES NFR BLD AUTO: 12 % (ref 14–44)
MCH RBC QN AUTO: 29 PG (ref 26.8–34.3)
MCHC RBC AUTO-ENTMCNC: 31.9 G/DL (ref 31.4–37.4)
MCV RBC AUTO: 91 FL (ref 82–98)
MONOCYTES # BLD AUTO: 0.53 THOUSAND/ÂΜL (ref 0.17–1.22)
MONOCYTES NFR BLD AUTO: 10 % (ref 4–12)
NEUTROPHILS # BLD AUTO: 3.93 THOUSANDS/ÂΜL (ref 1.85–7.62)
NEUTS SEG NFR BLD AUTO: 72 % (ref 43–75)
NRBC BLD AUTO-RTO: 0 /100 WBCS
PLATELET # BLD AUTO: 298 THOUSANDS/UL (ref 149–390)
PMV BLD AUTO: 9.8 FL (ref 8.9–12.7)
POTASSIUM SERPL-SCNC: 4.2 MMOL/L (ref 3.5–5.3)
PROT SERPL-MCNC: 5.7 G/DL (ref 6.4–8.4)
RBC # BLD AUTO: 3.73 MILLION/UL (ref 3.88–5.62)
SODIUM SERPL-SCNC: 143 MMOL/L (ref 135–147)
WBC # BLD AUTO: 5.38 THOUSAND/UL (ref 4.31–10.16)

## 2024-03-29 PROCEDURE — G0299 HHS/HOSPICE OF RN EA 15 MIN: HCPCS

## 2024-03-29 PROCEDURE — 85025 COMPLETE CBC W/AUTO DIFF WBC: CPT | Performed by: INTERNAL MEDICINE

## 2024-03-29 PROCEDURE — 80053 COMPREHEN METABOLIC PANEL: CPT | Performed by: INTERNAL MEDICINE

## 2024-04-01 ENCOUNTER — OFFICE VISIT (OUTPATIENT)
Dept: INTERNAL MEDICINE CLINIC | Facility: CLINIC | Age: 74
End: 2024-04-01
Payer: MEDICARE

## 2024-04-01 VITALS
HEART RATE: 50 BPM | WEIGHT: 209.6 LBS | DIASTOLIC BLOOD PRESSURE: 68 MMHG | BODY MASS INDEX: 27.65 KG/M2 | OXYGEN SATURATION: 97 % | SYSTOLIC BLOOD PRESSURE: 116 MMHG

## 2024-04-01 DIAGNOSIS — I48.19 ATRIAL FIBRILLATION, PERSISTENT (HCC): ICD-10-CM

## 2024-04-01 DIAGNOSIS — I10 ESSENTIAL HYPERTENSION: Primary | ICD-10-CM

## 2024-04-01 DIAGNOSIS — Z93.6 S/P ILEAL CONDUIT (HCC): ICD-10-CM

## 2024-04-01 DIAGNOSIS — C67.4 MALIGNANT NEOPLASM OF POSTERIOR WALL OF URINARY BLADDER (HCC): ICD-10-CM

## 2024-04-01 DIAGNOSIS — E03.9 ACQUIRED HYPOTHYROIDISM: ICD-10-CM

## 2024-04-01 PROCEDURE — 99495 TRANSJ CARE MGMT MOD F2F 14D: CPT | Performed by: INTERNAL MEDICINE

## 2024-04-01 NOTE — PATIENT INSTRUCTIONS
Problem List Items Addressed This Visit          Cardiovascular and Mediastinum    Essential hypertension - Primary     Blood pressure is well-controlled, continue meds         Atrial fibrillation, persistent (HCC)     Rate controlled, no current bleeding problems            Endocrine    Acquired hypothyroidism     Continue levothyroxine along with monitoring            Genitourinary    Malignant neoplasm of posterior wall of urinary bladder (HCC)     Bladder removed, prostate removed, ileal conduit placed 2/12/2024            Surgery/Wound/Pain    S/P ileal conduit (HCC)     Patient in the process of getting set up for supplies for maintaining this

## 2024-04-01 NOTE — PROGRESS NOTES
Assessment & Plan     1. Essential hypertension  Assessment & Plan:  Blood pressure is well-controlled, continue meds      2. Atrial fibrillation, persistent (HCC)  Assessment & Plan:  Rate controlled, no current bleeding problems      3. Malignant neoplasm of posterior wall of urinary bladder (HCC)  Assessment & Plan:  Bladder removed, prostate removed, ileal conduit placed 2/12/2024      4. S/P ileal conduit (HCC)  Assessment & Plan:  Patient in the process of getting set up for supplies for maintaining this      5. Acquired hypothyroidism  Assessment & Plan:  Continue levothyroxine along with monitoring           Subjective     Transitional Care Management Review:   Smooth Jackson is a 74 y.o. male here for TCM follow up.     During the TCM phone call patient stated:  TCM Call     Date and time call was made  3/25/2024  9:41 AM    Hospital care reviewed  Records not available    Patient was hospitialized at  [facility]    Date of Admission  03/17/24    Date of discharge  03/22/24    Diagnosis  Sepsis (HCC)    Disposition  Home    Were the patients medications reviewed and updated  No    Current Symptoms  None      TCM Call     Post hospital issues  None    Should patient be enrolled in anticoag monitoring?  No    Scheduled for follow up?  Yes    Did you obtain your prescribed medications  Yes    Do you need help managing your prescriptions or medications  No    Is transportation to your appointment needed  No    I have advised the patient to call PCP with any new or worsening symptoms  Eloy Siddiqui - /MA    Are you recieving any outpatient services  Yes    What type of services  wound care    Are you recieving home care services  Yes    Types of home care services  Nurse visit    Are you using any community resources  No    Have you fallen in the last 12 months  No    How many times  1    Interperter language line needed  No    Counseling  Patient        I reviewed patient's hospitalization where  "he presented with fever, was found to have sepsis with E. coli bacteremia in 1 out of 2 blood cultures.  Patient had recently had surgery for bladder cancer and ileal conduit placed down at [facility].  During his hospitalization for consultations with his urologist at the [facility].  Patient treated with antibiotics.  Since home, patient has been having home health coming to the house, they are helping with wound care.  Patient is managing his ostomy opening and put from home health.  Patient has been feeling \"pretty good.\"  No fevers or chills, no calf pain or swelling, no significant cough, no chest pain, no shortness of breath.  No constripation.  Pt holding up ok mentally.      Review of Systems   Constitutional:  Negative for chills, fatigue and fever.   HENT:  Negative for congestion, nosebleeds, postnasal drip, sore throat and trouble swallowing.    Eyes:  Negative for pain.   Respiratory:  Negative for cough, chest tightness, shortness of breath and wheezing.    Cardiovascular:  Negative for chest pain, palpitations and leg swelling.   Gastrointestinal:  Negative for abdominal pain, constipation, diarrhea, nausea and vomiting.   Endocrine: Negative for polydipsia and polyuria.   Genitourinary:  Negative for dysuria, flank pain and hematuria.   Musculoskeletal:  Negative for arthralgias.   Skin:  Negative for rash.   Neurological:  Negative for dizziness, tremors, light-headedness and headaches.   Hematological:  Does not bruise/bleed easily.   Psychiatric/Behavioral:  Negative for confusion and dysphoric mood. The patient is not nervous/anxious.        Objective     /68   Pulse (!) 50   Wt 95.1 kg (209 lb 9.6 oz)   SpO2 97%   BMI 27.65 kg/m²      Physical Exam  Vitals reviewed.   Constitutional:       General: He is not in acute distress.     Appearance: Normal appearance. He is well-developed.   HENT:      Head: Normocephalic and atraumatic.      Right Ear: " External ear normal.      Left Ear: External ear normal.      Nose: Nose normal.   Eyes:      General: No scleral icterus.     Conjunctiva/sclera: Conjunctivae normal.   Neck:      Trachea: No tracheal deviation.   Cardiovascular:      Rate and Rhythm: Normal rate. Rhythm irregular.      Heart sounds: Normal heart sounds. No murmur heard.  Pulmonary:      Effort: Pulmonary effort is normal. No respiratory distress.      Breath sounds: Normal breath sounds. No wheezing or rales.   Abdominal:      General: Bowel sounds are normal.      Palpations: Abdomen is soft. There is no mass.      Tenderness: There is no abdominal tenderness. There is no guarding.   Musculoskeletal:      Cervical back: Normal range of motion and neck supple.      Right lower leg: No edema.      Left lower leg: No edema.   Lymphadenopathy:      Cervical: No cervical adenopathy.   Skin:     Coloration: Skin is not jaundiced or pale.   Neurological:      General: No focal deficit present.      Mental Status: He is alert and oriented to person, place, and time.   Psychiatric:         Mood and Affect: Mood normal.         Behavior: Behavior normal.         Thought Content: Thought content normal.         Judgment: Judgment normal.       Medications have been reviewed by provider in current encounter    Melecio Giraldo MD

## 2024-04-02 ENCOUNTER — HOME CARE VISIT (OUTPATIENT)
Dept: HOME HEALTH SERVICES | Facility: HOME HEALTHCARE | Age: 74
End: 2024-04-02
Payer: MEDICARE

## 2024-04-02 VITALS
HEART RATE: 77 BPM | RESPIRATION RATE: 16 BRPM | DIASTOLIC BLOOD PRESSURE: 60 MMHG | OXYGEN SATURATION: 97 % | SYSTOLIC BLOOD PRESSURE: 124 MMHG | TEMPERATURE: 98 F

## 2024-04-02 PROCEDURE — G0299 HHS/HOSPICE OF RN EA 15 MIN: HCPCS

## 2024-04-04 ENCOUNTER — HOME CARE VISIT (OUTPATIENT)
Dept: HOME HEALTH SERVICES | Facility: HOME HEALTHCARE | Age: 74
End: 2024-04-04
Payer: MEDICARE

## 2024-04-04 DIAGNOSIS — I10 ESSENTIAL HYPERTENSION: ICD-10-CM

## 2024-04-04 RX ORDER — FELODIPINE 5 MG/1
TABLET, EXTENDED RELEASE ORAL
Qty: 90 TABLET | Refills: 1 | Status: SHIPPED | OUTPATIENT
Start: 2024-04-04

## 2024-04-05 ENCOUNTER — HOME CARE VISIT (OUTPATIENT)
Dept: HOME HEALTH SERVICES | Facility: HOME HEALTHCARE | Age: 74
End: 2024-04-05
Payer: MEDICARE

## 2024-04-05 VITALS
RESPIRATION RATE: 16 BRPM | TEMPERATURE: 98 F | OXYGEN SATURATION: 97 % | HEART RATE: 74 BPM | DIASTOLIC BLOOD PRESSURE: 68 MMHG | SYSTOLIC BLOOD PRESSURE: 124 MMHG

## 2024-04-05 PROCEDURE — G0299 HHS/HOSPICE OF RN EA 15 MIN: HCPCS

## 2024-04-26 ENCOUNTER — TELEPHONE (OUTPATIENT)
Dept: NEUROLOGY | Facility: CLINIC | Age: 74
End: 2024-04-26

## 2024-04-26 NOTE — TELEPHONE ENCOUNTER
Spoke to pt to confirm his appt w/ Irais Recinos in [place] on  5/3/24 at 9:15. Advised pt to arrive 10 minutes prior to check in with the .

## 2024-05-01 DIAGNOSIS — E03.9 ACQUIRED HYPOTHYROIDISM: ICD-10-CM

## 2024-05-01 PROBLEM — A41.9 SEPSIS (HCC): Status: RESOLVED | Noted: 2024-03-17 | Resolved: 2024-05-01

## 2024-05-01 PROBLEM — N39.0 UTI (URINARY TRACT INFECTION): Status: RESOLVED | Noted: 2024-03-18 | Resolved: 2024-05-01

## 2024-05-02 RX ORDER — LEVOTHYROXINE SODIUM 0.07 MG/1
TABLET ORAL
Qty: 90 TABLET | Refills: 1 | Status: SHIPPED | OUTPATIENT
Start: 2024-05-02

## 2024-05-03 ENCOUNTER — OFFICE VISIT (OUTPATIENT)
Dept: NEUROLOGY | Facility: CLINIC | Age: 74
End: 2024-05-03
Payer: MEDICARE

## 2024-05-03 VITALS
HEIGHT: 73 IN | WEIGHT: 206.1 LBS | SYSTOLIC BLOOD PRESSURE: 100 MMHG | TEMPERATURE: 97.9 F | HEART RATE: 90 BPM | DIASTOLIC BLOOD PRESSURE: 54 MMHG | OXYGEN SATURATION: 98 % | BODY MASS INDEX: 27.32 KG/M2

## 2024-05-03 DIAGNOSIS — G89.29 CHRONIC NONINTRACTABLE HEADACHE, UNSPECIFIED HEADACHE TYPE: ICD-10-CM

## 2024-05-03 DIAGNOSIS — G62.9 NEUROPATHY: Primary | ICD-10-CM

## 2024-05-03 DIAGNOSIS — R51.9 CHRONIC NONINTRACTABLE HEADACHE, UNSPECIFIED HEADACHE TYPE: ICD-10-CM

## 2024-05-03 DIAGNOSIS — G44.89 OTHER HEADACHE SYNDROME: ICD-10-CM

## 2024-05-03 PROCEDURE — 99214 OFFICE O/P EST MOD 30 MIN: CPT | Performed by: NURSE PRACTITIONER

## 2024-05-03 RX ORDER — GABAPENTIN 100 MG/1
100 CAPSULE ORAL 3 TIMES DAILY
Qty: 270 CAPSULE | Refills: 2 | Status: SHIPPED | OUTPATIENT
Start: 2024-05-03 | End: 2024-06-02

## 2024-05-03 NOTE — ASSESSMENT & PLAN NOTE
Patient returns for follow up, neuropathy symptoms are overall stable since last visit.  His main complaint is numbness in the soles of the feet and toes, no progression of symptoms since last visit.  His exam remained stable as well.  He is currently on gabapentin to control his neuropathic pain and headaches.  Given improvement of his headaches we will plan to reduce the medication however if he notes worsening neuropathic pain he can return to prior dosing.   again reviewed workup for potential causes of his neuropathy which was unrevealing other than borderline A1c.  Etiology of his neuropathy may also be idiopathic or age-related which he understands are very slowly progressive.  He understands the importance of good blood sugar control.  He was encouraged to remain active.      Plan for follow up in 1 year, if needed.  To contact the office sooner with any concerns or worsening symptoms.

## 2024-05-03 NOTE — PROGRESS NOTES
Patient ID: Smooth Jackson is a 74 y.o. male.    Assessment/Plan:    Neuropathy  Patient returns for follow up, neuropathy symptoms are overall stable since last visit.  His main complaint is numbness in the soles of the feet and toes, no progression of symptoms since last visit.  His exam remained stable as well.  He is currently on gabapentin to control his neuropathic pain and headaches.  Given improvement of his headaches we will plan to reduce the medication however if he notes worsening neuropathic pain he can return to prior dosing.   again reviewed workup for potential causes of his neuropathy which was unrevealing other than borderline A1c.  Etiology of his neuropathy may also be idiopathic or age-related which he understands are very slowly progressive.  He understands the importance of good blood sugar control.  He was encouraged to remain active.      Plan for follow up in 1 year, if needed.  To contact the office sooner with any concerns or worsening symptoms.          Other headache syndrome  Patient's headaches are much improved since last visit.  He has had rare occurrence of headaches.  In the past he had intermittent short episodes of pain in the left temporal region.  He did have a workup in the past of MRI/MRI brain and temporal artery biopsy which were all negative.  Since last visit he has retired in which his stress levels are reduced and therefore felt stress was a large trigger to his headaches.  Given his headaches are much improved we did discuss considering reduction of gabapentin.  He will take gabapentin 100 mg twice daily.  If no worsening of his symptoms after several weeks he can reduce to 100 mg at bedtime.  If no worsening symptoms after several weeks he can stop medication altogether.  If he notes any worsening headaches or increase in his neuropathic pain he should return to prior dosing.       Diagnoses and all orders for this visit:    Neuropathy    Chronic nonintractable  headache, unspecified headache type  -     gabapentin (NEURONTIN) 100 mg capsule; Take 1 capsule (100 mg total) by mouth 3 (three) times a day    Other headache syndrome           Subjective:    OLIVA Jackson is a 72 y.o. male wiht PMH hypertension, thoracic aortic aneurysm, iliac artery aneurysm status post stent , history of migraines in the past, recent cholecystectomy, atrial fibrillation, hypothyroidism.  Most recent CT scan of the chest showed stable aneurysm. who is here today for a follow-up in the [facility].  As you know, he is a 70-year-old man with history of neuropathy, and headaches in the left temporal region.       To review, he had been having intermittent sharp electric sensations in the left side of the temple region, had the workup including a temporal artery biopsy and MRI/MRA which were negative.     Last office visit 4/2020 in which he felt symptoms were stable. Headaches continued to be occasional.      Interval History:  His headaches have improved since last visit. He has only had one flare in the past year. Stress levels are now reduced give he is retired.   He remains on gabapentin 100 mg in the AM and 200 mg in the evening.   Headaches continue be in the left temple, pounding/pressure sensation, last for several seconds.  In the past he has had multiple episodes per day.  Again these are much improved than previous.       Neuropathy symptoms are stable, continues to have numbness and tingling in the soles of the feet and the toes.  NO progression of the leg.  Walking and balance are ok, no recent falls.   NO muscle weakness other then with inactivty related to his cancer treatments.   He has been slowly improving in strength since his surgery for bladder cancer.  He does get ocassional positional numbness in the 4th and 5th digits, both arms.  Resolves if he moves the arms.  No other symptoms or weakness in the upper extremities.      He has been undergoing treatment for  "bladder cancer did chemo, and surgery for t his. He follows with [facility].           Prior work up:  Recent labs 11/2021: a1c 5.7, vit d 22.6, tsh normal  He had MRI of the brain in 2015, was reported to show mild small vessel disease and mild atrophy, age appropriate.  MRA of the brain was normal.      2 hour glucose tolerance 97, fasting glucose 96, folate greater than 24. B12 390, TSH normal, SPEP showed no monoclonal bands, vitamin B 6 32.6.      The following portions of the patient's history were reviewed and updated as appropriate: allergies, current medications, past family history, past medical history, past social history, past surgical history and problem list.            Objective:    Blood pressure 100/54, pulse 90, temperature 97.9 °F (36.6 °C), temperature source Temporal, height 6' 1\" (1.854 m), weight 93.5 kg (206 lb 1.6 oz), SpO2 98%.    Physical Exam  Constitutional:       General: He is awake.   HENT:      Right Ear: Hearing normal.      Left Ear: Hearing normal.   Eyes:      General: Lids are normal.      Extraocular Movements: Extraocular movements intact.      Pupils: Pupils are equal, round, and reactive to light.   Neurological:      Mental Status: He is alert.      Deep Tendon Reflexes:      Reflex Scores:       Bicep reflexes are 2+ on the right side and 2+ on the left side.       Brachioradialis reflexes are 2+ on the right side and 2+ on the left side.       Patellar reflexes are 2+ on the right side and 2+ on the left side.       Achilles reflexes are 1+ on the right side and 1+ on the left side.  Psychiatric:         Speech: Speech normal.         Neurological Exam  Mental Status  Awake and alert. Speech is normal. Language is fluent with no aphasia.    Cranial Nerves  CN III, IV, VI: Extraocular movements intact bilaterally. Normal lids and orbits bilaterally. Pupils equal round and reactive to light bilaterally.  CN V:  Right: Facial sensation is normal.  Left: Facial sensation is " normal on the left.  CN VII:  Right: There is no facial weakness.  Left: There is no facial weakness.  CN VIII:  Right: Hearing is normal.  Left: Hearing is normal.  CN XI:  Right: Trapezius strength is normal.  Left: Trapezius strength is normal.  CN XII: Tongue midline without atrophy or fasciculations.    Motor  Normal muscle bulk throughout.                                               Right                     Left  Elbow flexion                         5                          5  Elbow extension                    5                          5  Wrist flexion                           5                          5  Wrist extension                      5                          5  Finger abduction                    5                          5  Hip flexion                              5                          5  Knee flexion                           5                          5  Knee extension                      5                          5  Ankle inversion                      5                          5  Ankle eversion                       5                          5  Plantarflexion                         5                          5  Dorsiflexion                            5                          5    Sensory  Light touch is normal in upper and lower extremities. Pinprick abnormality: Normal in bilateral UE, diminished to the ankles in b/l LE  . Temperature abnormality: Normal in bilateral UE, diminished to the mid foot in b/l LE  . Vibration abnormality: Absent at the great toes, reduced at the ankles to 5-6 seconds.     Reflexes                                            Right                      Left  Brachioradialis                    2+                         2+  Biceps                                 2+                         2+  Patellar                                2+                         2+  Achilles                                1+                          1+    Coordination  Right: Finger-to-nose normal.Left: Finger-to-nose normal.    Gait  Casual gait is normal including stance, stride, and arm swing. Able to rise from chair without using arms.      I have personally reviewed the ROS performed by the MA.    ROS:    Review of Systems   Constitutional:  Negative for appetite change, fatigue and fever.   HENT: Negative.  Negative for hearing loss, tinnitus, trouble swallowing and voice change.    Eyes: Negative.  Negative for photophobia, pain and visual disturbance.   Respiratory: Negative.  Negative for shortness of breath.    Cardiovascular: Negative.  Negative for palpitations.   Gastrointestinal: Negative.  Negative for nausea and vomiting.   Endocrine: Negative.  Negative for cold intolerance.   Genitourinary: Negative.  Negative for dysuria, frequency and urgency.   Musculoskeletal:  Negative for back pain, gait problem, myalgias, neck pain and neck stiffness.   Skin: Negative.  Negative for rash.   Allergic/Immunologic: Negative.    Neurological: Negative.  Negative for dizziness, tremors, seizures, syncope, facial asymmetry, speech difficulty, weakness, light-headedness, numbness and headaches.   Hematological: Negative.  Does not bruise/bleed easily.   Psychiatric/Behavioral: Negative.  Negative for confusion, hallucinations and sleep disturbance.    All other systems reviewed and are negative.

## 2024-05-16 PROCEDURE — 88342 IMHCHEM/IMCYTCHM 1ST ANTB: CPT | Performed by: STUDENT IN AN ORGANIZED HEALTH CARE EDUCATION/TRAINING PROGRAM

## 2024-05-16 PROCEDURE — 88341 IMHCHEM/IMCYTCHM EA ADD ANTB: CPT | Performed by: STUDENT IN AN ORGANIZED HEALTH CARE EDUCATION/TRAINING PROGRAM

## 2024-05-16 PROCEDURE — 88305 TISSUE EXAM BY PATHOLOGIST: CPT | Performed by: STUDENT IN AN ORGANIZED HEALTH CARE EDUCATION/TRAINING PROGRAM

## 2024-05-17 ENCOUNTER — LAB REQUISITION (OUTPATIENT)
Dept: LAB | Facility: HOSPITAL | Age: 74
End: 2024-05-17
Payer: MEDICARE

## 2024-05-17 DIAGNOSIS — D22.72 MELANOCYTIC NEVI OF LEFT LOWER LIMB, INCLUDING HIP: ICD-10-CM

## 2024-05-23 PROCEDURE — 88305 TISSUE EXAM BY PATHOLOGIST: CPT | Performed by: STUDENT IN AN ORGANIZED HEALTH CARE EDUCATION/TRAINING PROGRAM

## 2024-05-23 PROCEDURE — 88342 IMHCHEM/IMCYTCHM 1ST ANTB: CPT | Performed by: STUDENT IN AN ORGANIZED HEALTH CARE EDUCATION/TRAINING PROGRAM

## 2024-05-23 PROCEDURE — 88341 IMHCHEM/IMCYTCHM EA ADD ANTB: CPT | Performed by: STUDENT IN AN ORGANIZED HEALTH CARE EDUCATION/TRAINING PROGRAM

## 2024-05-30 ENCOUNTER — OFFICE VISIT (OUTPATIENT)
Dept: INTERNAL MEDICINE CLINIC | Facility: CLINIC | Age: 74
End: 2024-05-30
Payer: MEDICARE

## 2024-05-30 VITALS
HEIGHT: 73 IN | SYSTOLIC BLOOD PRESSURE: 122 MMHG | OXYGEN SATURATION: 96 % | DIASTOLIC BLOOD PRESSURE: 66 MMHG | HEART RATE: 61 BPM | BODY MASS INDEX: 27.83 KG/M2 | WEIGHT: 210 LBS

## 2024-05-30 DIAGNOSIS — Z23 ENCOUNTER FOR IMMUNIZATION: ICD-10-CM

## 2024-05-30 DIAGNOSIS — I10 ESSENTIAL HYPERTENSION: ICD-10-CM

## 2024-05-30 DIAGNOSIS — E78.2 MIXED HYPERLIPIDEMIA: ICD-10-CM

## 2024-05-30 DIAGNOSIS — C67.4 MALIGNANT NEOPLASM OF POSTERIOR WALL OF URINARY BLADDER (HCC): ICD-10-CM

## 2024-05-30 DIAGNOSIS — I48.19 ATRIAL FIBRILLATION, PERSISTENT (HCC): ICD-10-CM

## 2024-05-30 DIAGNOSIS — E03.9 ACQUIRED HYPOTHYROIDISM: ICD-10-CM

## 2024-05-30 DIAGNOSIS — G62.9 NEUROPATHY: Primary | ICD-10-CM

## 2024-05-30 PROCEDURE — 99214 OFFICE O/P EST MOD 30 MIN: CPT | Performed by: INTERNAL MEDICINE

## 2024-05-30 PROCEDURE — 90677 PCV20 VACCINE IM: CPT

## 2024-05-30 PROCEDURE — G0009 ADMIN PNEUMOCOCCAL VACCINE: HCPCS

## 2024-05-30 NOTE — PATIENT INSTRUCTIONS
Problem List Items Addressed This Visit          Cardiovascular and Mediastinum    Essential hypertension     Blood pressure is well-controlled, continue meds along with healthy diet and exercise         Atrial fibrillation, persistent (HCC)     Rate is controlled, no palpitations, no bleeding problems, continue anticoagulation follow-up cardiology            Endocrine    Acquired hypothyroidism     Continue levothyroxine 75 mcg daily            Nervous and Auditory    Neuropathy - Primary     Patient has been titrating down gabapentin which she has been using for neuropathy.  His neuropathy is described as numbness, not having significant pain, and he wants to see if he feels any different off the medication.  He was also on gabapentin for headache syndrome, and he will monitor for any increase in headaches when titrating off the medication.            Genitourinary    Malignant neoplasm of posterior wall of urinary bladder (HCC)     Patient follows with urology at [facility].  Recent scan showed no recurrence or mets.            Other    Mixed hyperlipidemia     Continue atorvastatin          Other Visit Diagnoses       Encounter for immunization        Relevant Orders    Pneumococcal Conjugate Vaccine 20-valent (Pcv20)

## 2024-05-30 NOTE — ASSESSMENT & PLAN NOTE
Patient follows with urology at [facility].  Recent scan showed no recurrence or mets.

## 2024-05-30 NOTE — PROGRESS NOTES
Ambulatory Visit  Name: Smooth Jackson      : 1950      MRN: [id number]  Encounter Provider: Melecio Giraldo MD  Encounter Date: 2024   Encounter department: [facility]    Assessment & Plan   1. Neuropathy  Assessment & Plan:  Patient has been titrating down gabapentin which she has been using for neuropathy.  His neuropathy is described as numbness, not having significant pain, and he wants to see if he feels any different off the medication.  He was also on gabapentin for headache syndrome, and he will monitor for any increase in headaches when titrating off the medication.  2. Essential hypertension  Assessment & Plan:  Blood pressure is well-controlled, continue meds along with healthy diet and exercise  3. Atrial fibrillation, persistent (HCC)  Assessment & Plan:  Rate is controlled, no palpitations, no bleeding problems, continue anticoagulation follow-up cardiology  4. Acquired hypothyroidism  Assessment & Plan:  Continue levothyroxine 75 mcg daily    5. Mixed hyperlipidemia  Assessment & Plan:  Continue atorvastatin  6. Malignant neoplasm of posterior wall of urinary bladder (HCC)  Assessment & Plan:  Patient follows with urology at [facility].  Recent scan showed no recurrence or mets.  7. Encounter for immunization  -     Pneumococcal Conjugate Vaccine 20-valent (Pcv20)         History of Present Illness     Patient here for regular follow up.  Pt was at [facility] for bladder cancer follow up recently      Review of Systems   Constitutional:  Positive for fatigue. Negative for chills and fever.   HENT:  Negative for congestion, nosebleeds, postnasal drip, sore throat and trouble swallowing.    Eyes:  Negative for pain.   Respiratory:  Negative for cough, chest tightness, shortness of breath and wheezing.    Cardiovascular:  Negative for chest pain, palpitations and leg swelling.   Gastrointestinal:  Negative for abdominal pain, constipation, diarrhea,  nausea and vomiting.   Endocrine: Negative for polydipsia and polyuria.   Genitourinary:  Negative for dysuria, flank pain and hematuria.   Musculoskeletal:  Negative for arthralgias.   Skin:  Negative for rash.   Neurological:  Negative for dizziness, tremors, light-headedness and headaches.   Hematological:  Does not bruise/bleed easily.   Psychiatric/Behavioral:  Negative for confusion and dysphoric mood. The patient is not nervous/anxious.      Past Medical History:   Diagnosis Date   • Acute blood loss anemia 03/08/2019   • Aneurysm of thoracic aorta (HCC)    • Arrhythmia    • Cancer (HCC)    • Cholecystitis 03/05/2019    Added automatically from request for surgery [id number]   • Detached retina, right    • Disease of thyroid gland    • Gastric wall thickening    • Headache    • Hematoma 10/10/2018   • Hypertension    • Iliac artery aneurysm, bilateral (HCC)    • Irregular heart beat     afib cardioversion 1/30/17, x2    • Neuropathy     bilateral feet   • Paroxysmal A-fib (HCC)    • Prostatic hypertrophy    • 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(HCC)      Past Surgical History:   Procedure Laterality Date   • ABDOMINAL AORTIC ANEURYSM REPAIR, ENDOVASCULAR Right 04/13/2018    Procedure: REPAIR ANEURYSM ILIAC endovascular  with coil embolization right hypogastric artery.;  Surgeon: Guille Esquivel MD;  [facility];  Service: Vascular   • BLADDER CANCER BY FISH (HISTORICAL)     • CARDIOVERSION     • CATARACT EXTRACTION Left 10/20/2019    Right eye 11/17/2011   • CHOLECYSTECTOMY     • CHOLECYSTECTOMY LAPAROSCOPIC N/A 03/08/2019    Procedure: [id number] CHOLECYSTECTOMY;  Surgeon: Derik Case DO;  [facility];  Service: General   • COLONOSCOPY  07/13/2016   • CYSTECTOMY, RADICAL WITH ILEOCONDUIT  02/12/2024   • MOLE EXCISION  2021   • VT CYSTOURETHROSCOPY W/DEST &/RMVL TUMOR LARGE N/A 08/01/2023    Procedure: TURBT, gemcitabine instillation;  Surgeon: Juvenal Cruz MD;  [facility];   Service: Urology   • WI EDG US EXAM SURGICAL ALTER STOM DUODENUM/JEJUNUM N/A 11/10/2017    Procedure: RADIAL ENDOSCOPIC U/S;  Surgeon: Harvey Gallegos MD;  Location: [facility];  Service: Gastroenterology   • RETINAL DETACHMENT SURGERY Right     onset , retinal detachment complete air fill confirmed     Family History   Problem Relation Age of Onset   • Stroke Mother          at 91, failure to thrive   • Aortic aneurysm Father         abdominal   • Aortic aneurysm Brother    • Basal cell carcinoma Brother    • Schizophrenia Brother    • Cancer Maternal Grandmother         smoker, heart attack, cancer   • Cancer Maternal Grandfather         smoker cancer   • Cancer Paternal Grandmother         malignant neoplasm   • Cancer Paternal Grandfather         malignant neoplasm   • Cancer Family         malignant neoplasm   • Cancer Family         malignant neoplasm   • Cancer Maternal Uncle         smoker, lots wrong with her   • Cancer Paternal Uncle         melanoma at 92     Social History     Tobacco Use   • Smoking status: Never     Passive exposure: Past   • Smokeless tobacco: Never   Vaping Use   • Vaping status: Never Used   Substance and Sexual Activity   • Alcohol use: Yes     Alcohol/week: 5.0 standard drinks of alcohol     Types: 2 Glasses of wine, 1 Cans of beer, 2 Standard drinks or equivalent per week     Comment: 3-4 x week   • Drug use: Never   • Sexual activity: Not Currently     Current Outpatient Medications on File Prior to Visit   Medication Sig   • acetaminophen (TYLENOL) 325 mg tablet Take 2 tablets (650 mg total) by mouth every 6 (six) hours as needed for mild pain or fever   • apixaban (Eliquis) 5 mg Take 1 tablet (5 mg total) by mouth 2 (two) times a day   • aspirin (ECOTRIN LOW STRENGTH) 81 mg EC tablet Take 81 mg by mouth daily   • atorvastatin (LIPITOR) 20 mg tablet TAKE 1 TABLET DAILY   • cholecalciferol (VITAMIN D3) 1,000 units tablet Take 1,000 Units by mouth daily   • Co-Enzyme Q-10  "100 MG CAPS Take 100 mg by mouth daily   • felodipine (PLENDIL) 5 mg 24 hr tablet TAKE 1 TABLET DAILY   • gabapentin (NEURONTIN) 100 mg capsule Take 1 capsule (100 mg total) by mouth 3 (three) times a day   • hydrocortisone 2.5 % cream Apply 1 application. topically 2 (two) times a day To affected area as needed   • ketoconazole (NIZORAL) 2 % cream As needed   • levothyroxine 75 mcg tablet TAKE 1 TABLET DAILY FOR    ACQUIRED HYPOTHYROIDISM   • metoprolol succinate (TOPROL-XL) 25 mg 24 hr tablet TAKE 1 TABLET TWICE A DAY   • Misc Natural Products (LUTEIN 20 PO) Take 20 mg by mouth daily.   • multivitamin (THERAGRAN) TABS Take 1 tablet by mouth daily.   • simethicone (MYLICON) 80 mg chewable tablet Chew 80 mg   • tiZANidine (ZANAFLEX) 4 mg tablet Take 1 tablet (4 mg total) by mouth every 8 (eight) hours as needed for muscle spasms   • oxyCODONE (ROXICODONE) 5 immediate release tablet Take 5 mg by mouth every 6 (six) hours as needed for severe pain. Indications: Acute Pain (Patient not taking: Reported on 4/1/2024)     Allergies   Allergen Reactions   • Wound Dressing Adhesive Blisters     Immunization History   Administered Date(s) Administered   • COVID-19 MODERNA VACC 0.5 ML IM 02/16/2021, 03/23/2021, 11/09/2021, 05/17/2022   • COVID-19 Moderna Vac BIVALENT 12 Yr+ IM 0.5 ML 11/19/2022   • H1N1, All Formulations 01/09/2010   • INFLUENZA 11/29/2016, 10/23/2017, 10/16/2020, 08/30/2023   • Influenza Split High Dose Preservative Free IM 11/16/2010, 09/19/2012, 09/24/2014, 09/16/2015, 11/29/2016, 10/23/2017   • Influenza, high dose seasonal 0.7 mL 10/15/2019, 11/09/2021, 10/12/2022   • Pneumococcal Conjugate 13-Valent 11/29/2016   • Pneumococcal Polysaccharide PPV23 08/01/2018   • Tdap 11/29/2016   • Zoster Vaccine Recombinant 10/16/2020, 12/28/2020   • influenza, trivalent, adjuvanted 10/16/2020     Objective     /66   Pulse 61   Ht 6' 1\" (1.854 m)   Wt 95.3 kg (210 lb)   SpO2 96%   BMI 27.71 kg/m² "     Physical Exam  Vitals reviewed.   Constitutional:       General: He is not in acute distress.     Appearance: He is well-developed.   HENT:      Head: Normocephalic and atraumatic.      Right Ear: External ear normal.      Left Ear: External ear normal.      Nose: Nose normal.   Eyes:      General: No scleral icterus.     Conjunctiva/sclera: Conjunctivae normal.   Neck:      Trachea: No tracheal deviation.   Cardiovascular:      Rate and Rhythm: Normal rate. Rhythm irregular.      Heart sounds: Normal heart sounds. No murmur heard.  Pulmonary:      Effort: Pulmonary effort is normal. No respiratory distress.      Breath sounds: Normal breath sounds. No wheezing or rales.   Musculoskeletal:      Cervical back: Normal range of motion and neck supple.      Right lower leg: No edema.      Left lower leg: No edema.   Lymphadenopathy:      Cervical: No cervical adenopathy.   Neurological:      Mental Status: He is alert and oriented to person, place, and time.   Psychiatric:         Mood and Affect: Mood normal.         Behavior: Behavior normal.         Thought Content: Thought content normal.         Judgment: Judgment normal.       Administrative Statements

## 2024-05-30 NOTE — ASSESSMENT & PLAN NOTE
Patient has been titrating down gabapentin which she has been using for neuropathy.  His neuropathy is described as numbness, not having significant pain, and he wants to see if he feels any different off the medication.  He was also on gabapentin for headache syndrome, and he will monitor for any increase in headaches when titrating off the medication.

## 2024-05-30 NOTE — ASSESSMENT & PLAN NOTE
Rate is controlled, no palpitations, no bleeding problems, continue anticoagulation follow-up cardiology

## 2024-06-04 DIAGNOSIS — I48.0 PAROXYSMAL ATRIAL FIBRILLATION (HCC): Primary | ICD-10-CM

## 2024-06-04 NOTE — TELEPHONE ENCOUNTER
Patient called the RX Refill Line. Message is being forwarded to the office.     Patient is requesting a call back regarding his apixaban (Eliquis) 5 mg, he started there was a mix up and now he is running low on medication and may not have enough to get him until his mail order comes. He would like to know if the office has samples he can have to hold him over.    Please contact patient at

## 2024-06-04 NOTE — TELEPHONE ENCOUNTER
Samples of this drug were given to the patient, quantity 1, Lot Number [id number]. The following was discussed with the patient as indicated: administration, storage, potential interactions, side effects.

## 2024-06-10 ENCOUNTER — NURSE TRIAGE (OUTPATIENT)
Age: 74
End: 2024-06-10

## 2024-06-10 ENCOUNTER — APPOINTMENT (OUTPATIENT)
Dept: LAB | Age: 74
End: 2024-06-10
Payer: MEDICARE

## 2024-06-10 DIAGNOSIS — C67.9 MALIGNANT NEOPLASM OF URINARY BLADDER, UNSPECIFIED SITE (HCC): ICD-10-CM

## 2024-06-10 PROCEDURE — 87086 URINE CULTURE/COLONY COUNT: CPT

## 2024-06-10 PROCEDURE — 87186 SC STD MICRODIL/AGAR DIL: CPT

## 2024-06-10 PROCEDURE — 87077 CULTURE AEROBIC IDENTIFY: CPT

## 2024-06-10 NOTE — TELEPHONE ENCOUNTER
Given patient's fever, vomiting and what sounds to be altered mental status recommend evaluation in ED.

## 2024-06-10 NOTE — TELEPHONE ENCOUNTER
"    Patient's wife is calling in, states the patient possibly has a UTI. The patient vomited at 3 am this morning, he has a low grade temp of 100.9, and she states he's acting off. Patient had his bladder removed, and a urostomy placed in February 2024, so his symptoms present differently. She states he sees urology at [place] and they placed a standing order for a urine culture for the patient a couple weeks ago. So this morning the patient had a urine culture done at [facility].   Patient's wife wants to know what PCP recommends while they wait for results. Advised to increase fluids, and take tylenol for the low grade fever. Please follow up with patient.   Reason for Disposition   Fever > 100.4 F (38.0 C)    Answer Assessment - Initial Assessment Questions  1. SYMPTOM: \"What's the main symptom you're concerned about?\" (e.g., frequency, incontinence)      Vomit, 100.9 temp    2. ONSET: \"When did the symptoms  start?\"      This morning    3. PAIN: \"Is there any pain?\" If Yes, ask: \"How bad is it?\" (Scale: 1-10; mild, moderate, severe)      Denies    4. CAUSE: \"What do you think is causing the symptoms?\"      Possible UTI    5. OTHER SYMPTOMS: \"Do you have any other symptoms?\" (e.g., fever, flank pain, blood in urine, pain with urination)        6. PREGNANCY: \"Is there any chance you are pregnant?\" \"When was your last menstrual period?\"      N/A    Protocols used: Urinary Symptoms-ADULT-    "

## 2024-06-11 NOTE — TELEPHONE ENCOUNTER
Patient saw urology yesterday:Encounter Details  Date Type Department Care Team (Latest Contact Info) Description   06/10/2024 Telephone Department: UROLOGY [facility] [phone] [facility] [place] [address] [phone]  Melodie Pruett MD, PhD

## 2024-06-13 LAB — BACTERIA UR CULT: ABNORMAL

## 2024-07-30 ENCOUNTER — TELEPHONE (OUTPATIENT)
Age: 74
End: 2024-07-30

## 2024-07-30 NOTE — TELEPHONE ENCOUNTER
Patient returned our call. Informed him of below information. He expressed understanding. He also wanted to let us know he's had a urostomy since his last scan, just as an FYI.

## 2024-07-30 NOTE — TELEPHONE ENCOUNTER
Patient calling in regards to an upcoming AOIL scheduled for 8/1. Patient states he is scheduled for a CT abdominal and CT chest with [facility] in mid August and wants to know if he can cancel the ultrasound and just provide us with the results of the CT scans instead. He states the CT scans were ordered for an unrelated medical condition.

## 2024-07-30 NOTE — TELEPHONE ENCOUNTER
Patient unfortunately needs to undergo his ultrasound as we need to measure the velocities going through his EVAR which a CAT scan will not measure

## 2024-08-01 ENCOUNTER — HOSPITAL ENCOUNTER (OUTPATIENT)
Dept: NON INVASIVE DIAGNOSTICS | Facility: CLINIC | Age: 74
Discharge: HOME | End: 2024-08-01
Payer: MEDICARE

## 2024-08-01 DIAGNOSIS — I72.3 ILIAC ARTERY ANEURYSM, RIGHT (HCC): ICD-10-CM

## 2024-08-01 PROCEDURE — 93923 UPR/LXTR ART STDY 3+ LVLS: CPT

## 2024-08-01 PROCEDURE — 93978 VASCULAR STUDY: CPT

## 2024-08-01 PROCEDURE — 93978 VASCULAR STUDY: CPT | Performed by: SURGERY

## 2024-08-01 PROCEDURE — 93922 UPR/L XTREMITY ART 2 LEVELS: CPT | Performed by: SURGERY

## 2024-08-02 ENCOUNTER — TELEPHONE (OUTPATIENT)
Dept: VASCULAR SURGERY | Facility: CLINIC | Age: 74
End: 2024-08-02

## 2024-08-02 NOTE — TELEPHONE ENCOUNTER
Attempted to contact patient to schedule appointment(s) listed below.  Requested patient call [phone] option 3 to schedule appointment(s).    Patient's appointment(s) are due now.    Dopplers  [x] Abdominal Aorta Iliac (AOIL)8/1/24  [] Carotid (CV)   [] Celiac and/or Mesenteric  [] Endovascular Aortic Repair (EVAR)   [] Hemodialysis Access (HD)   [] Lower Limb Arterial (DERIK)  [] Lower Limb Venous (LEV)  [] Lower Limb Venous Duplex with Reflux (LEVDR)  [] Renal Artery  [] Upper Limb Arterial (UEA)    [] Upper Limb Venous (UEV)              [] GARO and Waveform analysis     Advanced Imaging   [] CTA head/neck    [] CTA abdomen    [] CTA abdomen & pelvis    [] CT abdomen with/ without contrast  [] CT abdomen with contrast  [] CT abdomen without contrast    [] CT abdomen & pelvis with/ without contrast  [] CT abdomen & pelvis with contrast  [] CT abdomen & pelvis without contrast    Office Visit   [] New patient, patient last seen over 3 years ago  [] Risk factor modification (RFM)   [x] Follow up Pt was Alvin pt please schedule f/u w/ any ap   [] Lost to follow up (LTFU)

## 2024-09-04 ENCOUNTER — OFFICE VISIT (OUTPATIENT)
Dept: INTERNAL MEDICINE CLINIC | Facility: CLINIC | Age: 74
End: 2024-09-04
Payer: MEDICARE

## 2024-09-04 VITALS
HEIGHT: 73 IN | HEART RATE: 59 BPM | OXYGEN SATURATION: 97 % | WEIGHT: 212 LBS | SYSTOLIC BLOOD PRESSURE: 126 MMHG | BODY MASS INDEX: 28.1 KG/M2 | DIASTOLIC BLOOD PRESSURE: 78 MMHG

## 2024-09-04 DIAGNOSIS — I10 ESSENTIAL HYPERTENSION: ICD-10-CM

## 2024-09-04 DIAGNOSIS — E03.9 ACQUIRED HYPOTHYROIDISM: Primary | ICD-10-CM

## 2024-09-04 DIAGNOSIS — E78.2 MIXED HYPERLIPIDEMIA: ICD-10-CM

## 2024-09-04 DIAGNOSIS — C67.4 MALIGNANT NEOPLASM OF POSTERIOR WALL OF URINARY BLADDER (HCC): ICD-10-CM

## 2024-09-04 DIAGNOSIS — I48.19 ATRIAL FIBRILLATION, PERSISTENT (HCC): ICD-10-CM

## 2024-09-04 PROCEDURE — G2211 COMPLEX E/M VISIT ADD ON: HCPCS | Performed by: INTERNAL MEDICINE

## 2024-09-04 PROCEDURE — 99214 OFFICE O/P EST MOD 30 MIN: CPT | Performed by: INTERNAL MEDICINE

## 2024-09-04 NOTE — PROGRESS NOTES
Ambulatory Visit  Name: Smooth Jackson      : 1950      MRN: [id number]  Encounter Provider: Melecio Giraldo MD  Encounter Date: 2024   Encounter department: [facility]    Assessment & Plan   1. Acquired hypothyroidism  Assessment & Plan:  Continue levothyroxine 75 mcg daily along with monitoring, labs ordered to get next time he gets lab work.  Last thyroid function test in electronic health record from 2023 was normal  Orders:  -     TSH, 3rd generation with Free T4 reflex; Future  2. Malignant neoplasm of posterior wall of urinary bladder (HCC)  Assessment & Plan:  Follow-up with urology at [facility], patient just saw them yesterday  3. Mixed hyperlipidemia  Assessment & Plan:  Continue atorvastatin along with healthy diet, will check with next labs  Orders:  -     Lipid Panel with Direct LDL reflex; Future  4. Essential hypertension  Assessment & Plan:  Blood pressure is good, continue meds along with healthy diet  5. Atrial fibrillation, persistent (HCC)  Assessment & Plan:  No palpitations, no bleeding problems, continue anticoagulation, rate is controlled         History of Present Illness     Patient here for regular follow-up      Review of Systems   Constitutional:  Negative for chills, fatigue and fever.   HENT:  Negative for congestion, nosebleeds, postnasal drip, sore throat and trouble swallowing.    Eyes:  Negative for pain.   Respiratory:  Negative for cough, chest tightness, shortness of breath and wheezing.    Cardiovascular:  Negative for chest pain, palpitations and leg swelling.   Gastrointestinal:  Negative for abdominal pain, constipation, diarrhea, nausea and vomiting.   Endocrine: Negative for polydipsia and polyuria.   Genitourinary:  Negative for dysuria, flank pain and hematuria.   Musculoskeletal:  Negative for arthralgias.   Skin:  Negative for rash.   Neurological:  Negative for dizziness, tremors, light-headedness and  headaches.   Hematological:  Does not bruise/bleed easily.   Psychiatric/Behavioral:  Negative for confusion and dysphoric mood. The patient is not nervous/anxious.      Past Medical History:   Diagnosis Date   • Acute blood loss anemia 03/08/2019   • Aneurysm of thoracic aorta (HCC)    • Arrhythmia    • Cancer (HCC)    • Cholecystitis 03/05/2019    Added automatically from request for surgery [id number]   • Detached retina, right    • Disease of thyroid gland    • Gastric wall thickening    • Headache    • Hematoma 10/10/2018   • Hypertension    • Iliac artery aneurysm, bilateral (HCC)    • Irregular heart beat     afib cardioversion 1/30/17, x2    • Neuropathy     bilateral feet   • Paroxysmal A-fib (HCC)    • Prostatic hypertrophy    • 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(HCC)      Past Surgical History:   Procedure Laterality Date   • ABDOMINAL AORTIC ANEURYSM REPAIR, ENDOVASCULAR Right 04/13/2018    Procedure: REPAIR ANEURYSM ILIAC endovascular  with coil embolization right hypogastric artery.;  Surgeon: Guille Esquivel MD;  [facility];  Service: Vascular   • BLADDER CANCER BY FISH (HISTORICAL)     • CARDIOVERSION     • CATARACT EXTRACTION Left 10/20/2019    Right eye 11/17/2011   • CHOLECYSTECTOMY     • CHOLECYSTECTOMY LAPAROSCOPIC N/A 03/08/2019    Procedure: [id number] CHOLECYSTECTOMY;  Surgeon: Derik Case DO;  [facility];  Service: General   • COLONOSCOPY  07/13/2016   • CYSTECTOMY, RADICAL WITH ILEOCONDUIT  02/12/2024   • MOLE EXCISION  2021   • AK CYSTOURETHROSCOPY W/DEST &/RMVL TUMOR LARGE N/A 08/01/2023    Procedure: TURBT, gemcitabine instillation;  Surgeon: Juvenal Cruz MD;  [facility];  Service: Urology   • AK EDG US EXAM SURGICAL ALTER STOM DUODENUM/JEJUNUM N/A 11/10/2017    Procedure: RADIAL ENDOSCOPIC U/S;  Surgeon: Harvey Gallegos MD;  Location[facility];  Service: Gastroenterology   • RETINAL DETACHMENT SURGERY Right     onset 1992, retinal detachment complete  air fill confirmed     Family History   Problem Relation Age of Onset   • Stroke Mother          at 91, failure to thrive   • Aortic aneurysm Father         abdominal   • Aortic aneurysm Brother    • Basal cell carcinoma Brother    • Schizophrenia Brother    • Cancer Maternal Grandmother         smoker, heart attack, cancer   • Cancer Maternal Grandfather         smoker cancer   • Cancer Paternal Grandmother         malignant neoplasm   • Cancer Paternal Grandfather         malignant neoplasm   • Cancer Family         malignant neoplasm   • Cancer Family         malignant neoplasm   • Cancer Maternal Uncle         smoker, lots wrong with her   • Cancer Paternal Uncle         melanoma at 92     Social History     Tobacco Use   • Smoking status: Never     Passive exposure: Past   • Smokeless tobacco: Never   Vaping Use   • Vaping status: Never Used   Substance and Sexual Activity   • Alcohol use: Yes     Alcohol/week: 5.0 standard drinks of alcohol     Types: 2 Glasses of wine, 1 Cans of beer, 2 Standard drinks or equivalent per week     Comment: 3-4 x week   • Drug use: Never   • Sexual activity: Not Currently     Current Outpatient Medications on File Prior to Visit   Medication Sig   • acetaminophen (TYLENOL) 325 mg tablet Take 2 tablets (650 mg total) by mouth every 6 (six) hours as needed for mild pain or fever   • apixaban (Eliquis) 5 mg Take 1 tablet (5 mg total) by mouth 2 (two) times a day   • aspirin (ECOTRIN LOW STRENGTH) 81 mg EC tablet Take 81 mg by mouth daily   • atorvastatin (LIPITOR) 20 mg tablet TAKE 1 TABLET DAILY   • cholecalciferol (VITAMIN D3) 1,000 units tablet Take 1,000 Units by mouth daily   • Co-Enzyme Q-10 100 MG CAPS Take 100 mg by mouth daily   • felodipine (PLENDIL) 5 mg 24 hr tablet TAKE 1 TABLET DAILY   • hydrocortisone 2.5 % cream Apply 1 application. topically 2 (two) times a day To affected area as needed   • ketoconazole (NIZORAL) 2 % cream As needed   • levothyroxine 75 mcg  "tablet TAKE 1 TABLET DAILY FOR    ACQUIRED HYPOTHYROIDISM   • metoprolol succinate (TOPROL-XL) 25 mg 24 hr tablet TAKE 1 TABLET TWICE A DAY   • Misc Natural Products (LUTEIN 20 PO) Take 20 mg by mouth daily.   • multivitamin (THERAGRAN) TABS Take 1 tablet by mouth daily.   • simethicone (MYLICON) 80 mg chewable tablet Chew 80 mg   • tiZANidine (ZANAFLEX) 4 mg tablet Take 1 tablet (4 mg total) by mouth every 8 (eight) hours as needed for muscle spasms   • oxyCODONE (ROXICODONE) 5 immediate release tablet Take 5 mg by mouth every 6 (six) hours as needed for severe pain. Indications: Acute Pain (Patient not taking: Reported on 4/1/2024)     Allergies   Allergen Reactions   • Wound Dressing Adhesive Blisters     Immunization History   Administered Date(s) Administered   • COVID-19 MODERNA VACC 0.5 ML IM 02/16/2021, 03/23/2021, 11/09/2021, 05/17/2022   • COVID-19 Moderna Vac BIVALENT 12 Yr+ IM 0.5 ML 11/19/2022   • H1N1, All Formulations 01/09/2010   • INFLUENZA 11/29/2016, 10/23/2017, 10/16/2020, 08/30/2023   • Influenza Split High Dose Preservative Free IM 11/16/2010, 09/19/2012, 09/24/2014, 09/16/2015, 11/29/2016, 10/23/2017   • Influenza, high dose seasonal 0.7 mL 10/15/2019, 11/09/2021, 10/12/2022   • Pneumococcal Conjugate 13-Valent 11/29/2016   • Pneumococcal Conjugate Vaccine 20-valent (Pcv20), Polysace 05/30/2024   • Pneumococcal Polysaccharide PPV23 08/01/2018   • Tdap 11/29/2016   • Zoster Vaccine Recombinant 10/16/2020, 12/28/2020   • influenza, trivalent, adjuvanted 10/16/2020     Objective     /78 (BP Location: Left arm, Patient Position: Sitting, Cuff Size: Large)   Pulse 59   Ht 6' 1\" (1.854 m)   Wt 96.2 kg (212 lb)   SpO2 97%   BMI 27.97 kg/m²     Physical Exam  Vitals reviewed.   Constitutional:       General: He is not in acute distress.     Appearance: Normal appearance. He is well-developed.   HENT:      Head: Normocephalic and atraumatic.      Right Ear: External ear normal.      Left " Ear: External ear normal.      Nose: Nose normal.   Eyes:      General: No scleral icterus.     Conjunctiva/sclera: Conjunctivae normal.   Neck:      Trachea: No tracheal deviation.   Cardiovascular:      Rate and Rhythm: Normal rate. Rhythm irregular.      Heart sounds: Normal heart sounds. No murmur heard.  Pulmonary:      Effort: Pulmonary effort is normal. No respiratory distress.      Breath sounds: Normal breath sounds. No wheezing or rales.   Musculoskeletal:      Cervical back: Normal range of motion and neck supple.      Right lower leg: No edema.      Left lower leg: No edema.   Lymphadenopathy:      Cervical: No cervical adenopathy.   Skin:     Coloration: Skin is not jaundiced or pale.   Neurological:      General: No focal deficit present.      Mental Status: He is alert and oriented to person, place, and time.   Psychiatric:         Mood and Affect: Mood normal.         Behavior: Behavior normal.         Thought Content: Thought content normal.         Judgment: Judgment normal.

## 2024-09-04 NOTE — PATIENT INSTRUCTIONS
Problem List Items Addressed This Visit          Cardiovascular and Mediastinum    Essential hypertension     Blood pressure is good, continue meds along with healthy diet         Atrial fibrillation, persistent (HCC)     No palpitations, no bleeding problems, continue anticoagulation, rate is controlled            Endocrine    Acquired hypothyroidism - Primary     Continue levothyroxine 75 mcg daily along with monitoring, labs ordered to get next time he gets lab work.  Last thyroid function test in electronic health record from October 2023 was normal         Relevant Orders    TSH, 3rd generation with Free T4 reflex       Genitourinary    Malignant neoplasm of posterior wall of urinary bladder (HCC)     Follow-up with urology at [facility], patient just saw them yesterday            Other    Mixed hyperlipidemia     Continue atorvastatin along with healthy diet, will check with next labs         Relevant Orders    Lipid Panel with Direct LDL reflex

## 2024-09-24 DIAGNOSIS — I10 ESSENTIAL HYPERTENSION: ICD-10-CM

## 2024-09-24 RX ORDER — FELODIPINE 5 MG/1
TABLET, EXTENDED RELEASE ORAL
Qty: 90 TABLET | Refills: 1 | Status: SHIPPED | OUTPATIENT
Start: 2024-09-24

## 2024-09-27 NOTE — PROGRESS NOTES
Assessment/Plan:     Pt is a 75 yo M w/ HTN, afib (eliquis), hypothyroidism, baldder cancer s/p prostatectomy, cystectomy, and ileal conduit 2/12/24, neuropathy, back pain, HLD, B MINDY s/p R MINDY stent graft and R IIA embolization '18.    Iliac artery aneurysm, bilateral (HCC)  -s/p R MINDY stent graft and R IIA embolization '18 for R MINDY aneurysm  -reviewed CT w/ contrast from 3/18/24 which shows R MINDY sac thrombosed, residual sac diameter 3.4cm, L MINDY: 2.2cm, L IIA 2/0 w/ focal ulceration, AAA 3.0 w/ focal ulceration, ANGEL LUIS takes off from aorta and gastrosplenic trunk is the celiac  -reviewed recent AOIL which shows R MINDY residual sac 3.0 cm which is stable from prior  -continue to monitor remaining small aneurysms, do not meet criteria for surgical management at this time; will repeat AOIL in 1 year and continue to review any CT scans performed for bladder cancer surveillance as well    Asc aort aneurysm  -reviewed and showed Asc aortic aneurysm 5.0cm  -being followed by CT surg, [facility]    Malignant neoplasm of posterior wall of urinary bladder (HCC)  -s/p prostatectomy, cystectomy and ileal conduit 2/12/24  -s/p chemo  -continue to follow with urology    Medications  Mixed hyperlipidemia  -cont ASA/statin  -cont eliquis for afib    Subjective:     Patient ID: Smooth Jackson is a 74 y.o. male.    Pt had CTA w/ contrast on 03/18/2024, CTA w/o contrast on 05/13/2024, and AOIL on 08/01/2024. Pt denies abd pain     HPI:    Prior Dr. Esquivel patient.  Returns for [facility]    Patient has hx of R MINDY aneurysm, treated with stent graft and R IIA embolization, I believe in '18 and monitored since then.    Since last visit, patient had bladder cancer and underwent cystectomy and ileal conduit.  He had neoadjuvant chemo as well.  I believe this was c/b E coli sepsis.    Currently, doing well.  Denies abdominal pain.  Chronic back pain, unchanged.    No issues with ambulation.  Can walk 2 miles.    Takes ASA, statin, eliquis  "(afib).    Never smoker    Father thought to have had AAA ( from this)        Review of Systems   Constitutional: Negative.  Negative for appetite change and unexpected weight change.   HENT: Negative.     Gastrointestinal:  Negative for abdominal pain.   Musculoskeletal:  Positive for back pain.         Objective:     Physical Exam  Cardiovascular:      Rate and Rhythm: Normal rate and regular rhythm.      Pulses:           Femoral pulses are 1+ on the right side and 1+ on the left side.       Dorsalis pedis pulses are 2+ on the right side and 2+ on the left side.        Posterior tibial pulses are 2+ on the right side and 2+ on the left side.      Heart sounds: No murmur heard.  Pulmonary:      Effort: No respiratory distress.      Breath sounds: No wheezing or rales.   Musculoskeletal:      Right lower leg: No edema.      Left lower leg: No edema.           I have reviewed and made appropriate changes to the review of systems input by the medical assistant.    Vitals:    24 0849 24 0850   BP: 110/70 98/60   BP Location: Right arm Left arm   Patient Position: Sitting Sitting   Cuff Size: Standard Standard   Pulse: (!) 52    Resp: 16    SpO2: 97%    Weight: 96 kg (211 lb 9.6 oz)    Height: 6' 1\" (1.854 m)        Patient Active Problem List   Diagnosis    Aneurysm of thoracic aorta (HCC)    Essential hypertension    Atrial fibrillation, persistent (HCC)    Iliac artery aneurysm, bilateral (HCC)    Acquired hypothyroidism    Neuropathy    Iliac artery aneurysm, right (HCC)    Other headache syndrome    Rash    Mixed hyperlipidemia    Chronic bilateral low back pain without sciatica    NSVT (nonsustained ventricular tachycardia) (HCC)    Hepatic artery dissection (HCC)    Vitamin D deficiency    Prediabetes    Gross hematuria    Bladder mass    Malfunction of Moralez catheter (HCC)    Pancreatic mass    Malignant neoplasm of posterior wall of urinary bladder (HCC)    Closed fracture of posterior " malleolus of left tibia    Pain, joint, ankle and foot, left    Chemotherapy induced neutropenia (HCC)    Dizziness    S/P ileal conduit (HCC)    Productive cough    Hydroureteronephrosis    Open abdominal wall wound    E coli bacteremia    SAMARA (acute kidney injury) (HCC)       Past Surgical History:   Procedure Laterality Date    ABDOMINAL AORTIC ANEURYSM REPAIR, ENDOVASCULAR Right 2018    Procedure: REPAIR ANEURYSM ILIAC endovascular  with coil embolization right hypogastric artery.;  Surgeon: Guille Esquivel MD;  Location: [facility];  Service: Vascular    BLADDER CANCER BY FISH (HISTORICAL)      CARDIOVERSION      CATARACT EXTRACTION Left 10/20/2019    Right eye 2011    CHOLECYSTECTOMY      CHOLECYSTECTOMY LAPAROSCOPIC N/A 2019    Procedure: RNNEMDVGWSCO-CD-URFVOYSRNS CHOLECYSTECTOMY;  Surgeon: Derik Case DO;  Location: [facility];  Service: General    COLONOSCOPY  2016    CYSTECTOMY, RADICAL WITH ILEOCONDUIT  2024    MOLE EXCISION      AK CYSTOURETHROSCOPY W/DEST &/RMVL TUMOR LARGE N/A 2023    Procedure: TURBT, gemcitabine instillation;  Surgeon: Juvenal Cruz MD;  Location: [facility];  Service: Urology    AK EDG US EXAM SURGICAL ALTER STOM DUODENUM/JEJUNUM N/A 11/10/2017    Procedure: RADIAL ENDOSCOPIC U/S;  Surgeon: Harvey Gallegos MD;  Location: [facility];  Service: Gastroenterology    RETINAL DETACHMENT SURGERY Right     onset , retinal detachment complete air fill confirmed       Family History   Problem Relation Age of Onset    Stroke Mother          at 91, failure to thrive    Aortic aneurysm Father         abdominal    Aortic aneurysm Brother     Basal cell carcinoma Brother     Schizophrenia Brother     Cancer Maternal Grandmother         smoker, heart attack, cancer    Cancer Maternal Grandfather         smoker cancer    Cancer Paternal Grandmother         malignant neoplasm    Cancer Paternal Grandfather         malignant neoplasm    Cancer Family          malignant neoplasm    Cancer Family         malignant neoplasm    Cancer Maternal Uncle         smoker, lots wrong with her    Cancer Paternal Uncle         melanoma at 92       Social History     Socioeconomic History    Marital status: /Civil Union     Spouse name: Not on file    Number of children: Not on file    Years of education: Not on file    Highest education level: Not on file   Occupational History    Not on file   Tobacco Use    Smoking status: Never     Passive exposure: Past    Smokeless tobacco: Never   Vaping Use    Vaping status: Never Used   Substance and Sexual Activity    Alcohol use: Yes     Alcohol/week: 5.0 standard drinks of alcohol     Types: 2 Glasses of wine, 1 Cans of beer, 2 Standard drinks or equivalent per week     Comment: 3-4 x week    Drug use: Never    Sexual activity: Not Currently   Other Topics Concern    Not on file   Social History Narrative    Not on file     Social Determinants of Health     Financial Resource Strain: Low Risk  (12/19/2023)    Overall Financial Resource Strain (CARDIA)     Difficulty of Paying Living Expenses: Not hard at all   Food Insecurity: Unknown (3/18/2024)    Hunger Vital Sign     Worried About Running Out of Food in the Last Year: Never true     Ran Out of Food in the Last Year: Not on file   Transportation Needs: No Transportation Needs (3/18/2024)    PRAPARE - Transportation     Lack of Transportation (Medical): No     Lack of Transportation (Non-Medical): No   Physical Activity: Not on file   Stress: Not on file   Social Connections: Not on file   Intimate Partner Violence: Not on file   Housing Stability: Low Risk  (3/18/2024)    Housing Stability Vital Sign     Unable to Pay for Housing in the Last Year: No     Number of Times Moved in the Last Year: 1     Homeless in the Last Year: No       Allergies   Allergen Reactions    Wound Dressing Adhesive Blisters         Current Outpatient Medications:     acetaminophen (TYLENOL) 325 mg  tablet, Take 2 tablets (650 mg total) by mouth every 6 (six) hours as needed for mild pain or fever, Disp: 30 tablet, Rfl: 0    apixaban (Eliquis) 5 mg, Take 1 tablet (5 mg total) by mouth 2 (two) times a day, Disp: 180 tablet, Rfl: 3    aspirin (ECOTRIN LOW STRENGTH) 81 mg EC tablet, Take 81 mg by mouth daily, Disp: , Rfl:     atorvastatin (LIPITOR) 20 mg tablet, TAKE 1 TABLET DAILY, Disp: 90 tablet, Rfl: 3    cholecalciferol (VITAMIN D3) 1,000 units tablet, Take 1,000 Units by mouth daily, Disp: , Rfl:     Co-Enzyme Q-10 100 MG CAPS, Take 100 mg by mouth daily, Disp: , Rfl:     felodipine (PLENDIL) 5 mg 24 hr tablet, TAKE 1 TABLET DAILY, Disp: 90 tablet, Rfl: 1    hydrocortisone 2.5 % cream, Apply 1 application. topically 2 (two) times a day To affected area as needed, Disp: , Rfl:     ketoconazole (NIZORAL) 2 % cream, As needed, Disp: , Rfl:     levothyroxine 75 mcg tablet, TAKE 1 TABLET DAILY FOR    ACQUIRED HYPOTHYROIDISM, Disp: 90 tablet, Rfl: 1    metoprolol succinate (TOPROL-XL) 25 mg 24 hr tablet, TAKE 1 TABLET TWICE A DAY, Disp: 180 tablet, Rfl: 3    Misc Natural Products (LUTEIN 20 PO), Take 20 mg by mouth daily., Disp: , Rfl:     multivitamin (THERAGRAN) TABS, Take 1 tablet by mouth daily., Disp: , Rfl:     simethicone (MYLICON) 80 mg chewable tablet, Chew 80 mg if needed, Disp: , Rfl:     tiZANidine (ZANAFLEX) 4 mg tablet, Take 1 tablet (4 mg total) by mouth every 8 (eight) hours as needed for muscle spasms, Disp: 30 tablet, Rfl: 1    oxyCODONE (ROXICODONE) 5 immediate release tablet, Take 5 mg by mouth every 6 (six) hours as needed for severe pain. Indications: Acute Pain (Patient not taking: Reported on 4/1/2024), Disp: , Rfl:

## 2024-09-30 ENCOUNTER — OFFICE VISIT (OUTPATIENT)
Dept: VASCULAR SURGERY | Facility: CLINIC | Age: 74
End: 2024-09-30
Payer: MEDICARE

## 2024-09-30 VITALS
HEIGHT: 73 IN | WEIGHT: 211.6 LBS | RESPIRATION RATE: 16 BRPM | OXYGEN SATURATION: 97 % | DIASTOLIC BLOOD PRESSURE: 60 MMHG | SYSTOLIC BLOOD PRESSURE: 98 MMHG | BODY MASS INDEX: 28.04 KG/M2 | HEART RATE: 52 BPM

## 2024-09-30 DIAGNOSIS — I71.21 ANEURYSM OF ASCENDING AORTA WITHOUT RUPTURE (HCC): ICD-10-CM

## 2024-09-30 DIAGNOSIS — C67.4 MALIGNANT NEOPLASM OF POSTERIOR WALL OF URINARY BLADDER (HCC): ICD-10-CM

## 2024-09-30 DIAGNOSIS — I72.3 ILIAC ARTERY ANEURYSM, BILATERAL (HCC): Primary | ICD-10-CM

## 2024-09-30 DIAGNOSIS — E78.2 MIXED HYPERLIPIDEMIA: ICD-10-CM

## 2024-09-30 PROBLEM — I77.79: Status: RESOLVED | Noted: 2021-10-27 | Resolved: 2024-09-30

## 2024-09-30 PROCEDURE — 99213 OFFICE O/P EST LOW 20 MIN: CPT | Performed by: SURGERY

## 2024-11-01 DIAGNOSIS — E03.9 ACQUIRED HYPOTHYROIDISM: ICD-10-CM

## 2024-11-01 DIAGNOSIS — I10 HYPERTENSION, UNSPECIFIED TYPE: ICD-10-CM

## 2024-11-01 DIAGNOSIS — I48.19 ATRIAL FIBRILLATION, PERSISTENT (HCC): ICD-10-CM

## 2024-11-01 RX ORDER — METOPROLOL SUCCINATE 25 MG/1
TABLET, EXTENDED RELEASE ORAL
Qty: 180 TABLET | Refills: 1 | Status: SHIPPED | OUTPATIENT
Start: 2024-11-01

## 2024-11-01 RX ORDER — LEVOTHYROXINE SODIUM 75 UG/1
TABLET ORAL
Qty: 90 TABLET | Refills: 0 | Status: SHIPPED | OUTPATIENT
Start: 2024-11-01

## 2024-11-01 RX ORDER — ATORVASTATIN CALCIUM 20 MG/1
TABLET, FILM COATED ORAL
Qty: 90 TABLET | Refills: 0 | Status: SHIPPED | OUTPATIENT
Start: 2024-11-01

## 2024-11-06 ENCOUNTER — TELEMEDICINE (OUTPATIENT)
Dept: INTERNAL MEDICINE CLINIC | Facility: CLINIC | Age: 74
End: 2024-11-06
Payer: MEDICARE

## 2024-11-06 ENCOUNTER — TELEPHONE (OUTPATIENT)
Age: 74
End: 2024-11-06

## 2024-11-06 VITALS — WEIGHT: 210 LBS | BODY MASS INDEX: 27.83 KG/M2 | HEIGHT: 73 IN | TEMPERATURE: 98.4 F

## 2024-11-06 DIAGNOSIS — U07.1 COVID-19 VIRUS INFECTION: ICD-10-CM

## 2024-11-06 DIAGNOSIS — U07.1 COVID-19 VIRUS INFECTION: Primary | ICD-10-CM

## 2024-11-06 PROCEDURE — G2211 COMPLEX E/M VISIT ADD ON: HCPCS | Performed by: INTERNAL MEDICINE

## 2024-11-06 PROCEDURE — 99213 OFFICE O/P EST LOW 20 MIN: CPT | Performed by: INTERNAL MEDICINE

## 2024-11-06 RX ORDER — NIRMATRELVIR AND RITONAVIR 300-100 MG
3 KIT ORAL 2 TIMES DAILY
Qty: 30 TABLET | Refills: 0 | Status: SHIPPED | OUTPATIENT
Start: 2024-11-06 | End: 2024-11-11

## 2024-11-06 RX ORDER — NIRMATRELVIR AND RITONAVIR 300-100 MG
3 KIT ORAL 2 TIMES DAILY
Qty: 30 TABLET | Refills: 0 | Status: SHIPPED | OUTPATIENT
Start: 2024-11-06 | End: 2024-11-06 | Stop reason: SDUPTHER

## 2024-11-06 NOTE — TELEPHONE ENCOUNTER
Patient calling to let office know that [address] has received his script and he will be able to get the medication today and will not have to wait 2-3 days.

## 2024-11-06 NOTE — TELEPHONE ENCOUNTER
Ryan called in to see if his medication could be sent to the nirmatrelvir & ritonavir (Paxlovid, 300/100,) tablet therapy pack. His wife was picked up but his would not be ready for another two-three day.     [address] [address]

## 2024-11-06 NOTE — PATIENT INSTRUCTIONS
Problem List Items Addressed This Visit          Other    COVID-19 virus infection - Primary     Discussed treatment with Paxlovid versus just treating symptoms.  Patient has had Paxlovid in the past and requests this.  His most recent kidney function was good.  I reviewed all of the potential drug interactions with patient, he is not taking the oxycodone.  He will do half his normal dose of Eliquis, and hold the atorvastatin while on the Paxlovid.  Patient can use over-the-counter medications for symptoms         Relevant Medications    nirmatrelvir & ritonavir (Paxlovid, 300/100,) tablet therapy pack

## 2024-11-06 NOTE — ASSESSMENT & PLAN NOTE
Discussed treatment with Paxlovid versus just treating symptoms.  Patient has had Paxlovid in the past and requests this.  His most recent kidney function was good.  I reviewed all of the potential drug interactions with patient, he is not taking the oxycodone.  He will do half his normal dose of Eliquis, and hold the atorvastatin while on the Paxlovid.  Patient can use over-the-counter medications for symptoms    Orders:    nirmatrelvir & ritonavir (Paxlovid, 300/100,) tablet therapy pack; Take 3 tablets by mouth 2 (two) times a day for 5 days Take 2 nirmatrelvir tablets + 1 ritonavir tablet together per dose

## 2024-11-06 NOTE — PROGRESS NOTES
Virtual Regular Visit  Name: Smooth Jackson      : 1950      MRN: [id number]  Encounter Provider: Melecio Giraldo MD  Encounter Date: 2024   Enc[facility]: [facility]    Verification of patient location:    Patient is located at Home in the following state in which I hold an active license [place]    Assessment & Plan  COVID-19 virus infection  Discussed treatment with Paxlovid versus just treating symptoms.  Patient has had Paxlovid in the past and requests this.  His most recent kidney function was good.  I reviewed all of the potential drug interactions with patient, he is not taking the oxycodone.  He will do half his normal dose of Eliquis, and hold the atorvastatin while on the Paxlovid.  Patient can use over-the-counter medications for symptoms    Orders:    nirmatrelvir & ritonavir (Paxlovid, 300/100,) tablet therapy pack; Take 3 tablets by mouth 2 (two) times a day for 5 days Take 2 nirmatrelvir tablets + 1 ritonavir tablet together per dose         Encounter provider Melecio Giraldo MD    The patient was identified by name and date of birth. Smooth Jackson was informed that this is a telemedicine visit and that the visit is being conducted through the Epic Embedded platform. He agrees to proceed..  My office door was closed. No one else was in the room.  He acknowledged consent and understanding of privacy and security of the video platform. The patient has agreed to participate and understands they can discontinue the visit at any time.    Patient is aware this is a billable service.     History of Present Illness     Onset of cold symptoms for a couple weeks.  Pt had a fever and chills yesterday and tested positive for COVID today        History obtained from : patient  Review of Systems   Constitutional:  Positive for chills and fever.   HENT:  Positive for congestion. Negative for sore throat.    Respiratory:  Positive for cough. Negative for shortness of breath.   "  Musculoskeletal:  Negative for arthralgias and myalgias.   Neurological:  Positive for headaches.     Pertinent Medical History             Objective     Temp 98.4 °F (36.9 °C)   Ht 6' 1\" (1.854 m)   Wt 95.3 kg (210 lb)   BMI 27.71 kg/m²   Physical Exam  Constitutional:       General: He is not in acute distress.     Appearance: Normal appearance.   Pulmonary:      Effort: Pulmonary effort is normal. No respiratory distress.   Skin:     Coloration: Skin is not jaundiced or pale.   Neurological:      Mental Status: He is alert and oriented to person, place, and time.         Visit Time  Total Visit Duration: 15        "

## 2024-12-10 ENCOUNTER — APPOINTMENT (OUTPATIENT)
Dept: LAB | Age: 74
End: 2024-12-10
Payer: MEDICARE

## 2024-12-10 DIAGNOSIS — E78.2 MIXED HYPERLIPIDEMIA: ICD-10-CM

## 2024-12-10 DIAGNOSIS — E03.9 ACQUIRED HYPOTHYROIDISM: ICD-10-CM

## 2024-12-10 DIAGNOSIS — I10 ESSENTIAL HYPERTENSION: ICD-10-CM

## 2024-12-10 LAB
BASOPHILS # BLD AUTO: 0.06 THOUSANDS/ÂΜL (ref 0–0.1)
BASOPHILS NFR BLD AUTO: 1 % (ref 0–1)
EOSINOPHIL # BLD AUTO: 0.28 THOUSAND/ÂΜL (ref 0–0.61)
EOSINOPHIL NFR BLD AUTO: 5 % (ref 0–6)
ERYTHROCYTE [DISTWIDTH] IN BLOOD BY AUTOMATED COUNT: 13.9 % (ref 11.6–15.1)
HCT VFR BLD AUTO: 41.6 % (ref 36.5–49.3)
HGB BLD-MCNC: 13.5 G/DL (ref 12–17)
IMM GRANULOCYTES # BLD AUTO: 0.01 THOUSAND/UL (ref 0–0.2)
IMM GRANULOCYTES NFR BLD AUTO: 0 % (ref 0–2)
LYMPHOCYTES # BLD AUTO: 1.32 THOUSANDS/ÂΜL (ref 0.6–4.47)
LYMPHOCYTES NFR BLD AUTO: 25 % (ref 14–44)
MCH RBC QN AUTO: 29.9 PG (ref 26.8–34.3)
MCHC RBC AUTO-ENTMCNC: 32.5 G/DL (ref 31.4–37.4)
MCV RBC AUTO: 92 FL (ref 82–98)
MONOCYTES # BLD AUTO: 0.6 THOUSAND/ÂΜL (ref 0.17–1.22)
MONOCYTES NFR BLD AUTO: 11 % (ref 4–12)
NEUTROPHILS # BLD AUTO: 3.08 THOUSANDS/ÂΜL (ref 1.85–7.62)
NEUTS SEG NFR BLD AUTO: 58 % (ref 43–75)
NRBC BLD AUTO-RTO: 0 /100 WBCS
PLATELET # BLD AUTO: 248 THOUSANDS/UL (ref 149–390)
PMV BLD AUTO: 9.8 FL (ref 8.9–12.7)
RBC # BLD AUTO: 4.52 MILLION/UL (ref 3.88–5.62)
TSH SERPL DL<=0.05 MIU/L-ACNC: 3.94 UIU/ML (ref 0.45–4.5)
WBC # BLD AUTO: 5.35 THOUSAND/UL (ref 4.31–10.16)

## 2024-12-10 PROCEDURE — 36415 COLL VENOUS BLD VENIPUNCTURE: CPT

## 2024-12-10 PROCEDURE — 84443 ASSAY THYROID STIM HORMONE: CPT

## 2024-12-10 PROCEDURE — 80053 COMPREHEN METABOLIC PANEL: CPT

## 2024-12-10 PROCEDURE — 85025 COMPLETE CBC W/AUTO DIFF WBC: CPT

## 2024-12-10 PROCEDURE — 80061 LIPID PANEL: CPT

## 2024-12-11 ENCOUNTER — RESULTS FOLLOW-UP (OUTPATIENT)
Dept: INTERNAL MEDICINE CLINIC | Facility: CLINIC | Age: 74
End: 2024-12-11

## 2024-12-11 LAB
ALBUMIN SERPL BCG-MCNC: 4.1 G/DL (ref 3.5–5)
ALP SERPL-CCNC: 84 U/L (ref 34–104)
ALT SERPL W P-5'-P-CCNC: 14 U/L (ref 7–52)
ANION GAP SERPL CALCULATED.3IONS-SCNC: 6 MMOL/L (ref 4–13)
AST SERPL W P-5'-P-CCNC: 15 U/L (ref 13–39)
BILIRUB SERPL-MCNC: 0.68 MG/DL (ref 0.2–1)
BUN SERPL-MCNC: 30 MG/DL (ref 5–25)
CALCIUM SERPL-MCNC: 9.1 MG/DL (ref 8.4–10.2)
CHLORIDE SERPL-SCNC: 106 MMOL/L (ref 96–108)
CHOLEST SERPL-MCNC: 133 MG/DL (ref ?–200)
CO2 SERPL-SCNC: 28 MMOL/L (ref 21–32)
CREAT SERPL-MCNC: 1.26 MG/DL (ref 0.6–1.3)
GFR SERPL CREATININE-BSD FRML MDRD: 55 ML/MIN/1.73SQ M
GLUCOSE P FAST SERPL-MCNC: 89 MG/DL (ref 65–99)
HDLC SERPL-MCNC: 65 MG/DL
LDLC SERPL CALC-MCNC: 49 MG/DL (ref 0–100)
POTASSIUM SERPL-SCNC: 4.7 MMOL/L (ref 3.5–5.3)
PROT SERPL-MCNC: 6.1 G/DL (ref 6.4–8.4)
SODIUM SERPL-SCNC: 140 MMOL/L (ref 135–147)
TRIGL SERPL-MCNC: 94 MG/DL (ref ?–150)

## 2024-12-16 ENCOUNTER — RA CDI HCC (OUTPATIENT)
Dept: OTHER | Facility: HOSPITAL | Age: 74
End: 2024-12-16

## 2024-12-23 ENCOUNTER — OFFICE VISIT (OUTPATIENT)
Dept: INTERNAL MEDICINE CLINIC | Facility: CLINIC | Age: 74
End: 2024-12-23
Payer: MEDICARE

## 2024-12-23 VITALS
WEIGHT: 222.4 LBS | BODY MASS INDEX: 29.48 KG/M2 | HEART RATE: 59 BPM | DIASTOLIC BLOOD PRESSURE: 80 MMHG | OXYGEN SATURATION: 96 % | HEIGHT: 73 IN | SYSTOLIC BLOOD PRESSURE: 112 MMHG | TEMPERATURE: 96.8 F

## 2024-12-23 DIAGNOSIS — I48.19 ATRIAL FIBRILLATION, PERSISTENT (HCC): ICD-10-CM

## 2024-12-23 DIAGNOSIS — I10 ESSENTIAL HYPERTENSION: Primary | ICD-10-CM

## 2024-12-23 DIAGNOSIS — N18.31 STAGE 3A CHRONIC KIDNEY DISEASE (HCC): ICD-10-CM

## 2024-12-23 DIAGNOSIS — Z00.00 MEDICARE ANNUAL WELLNESS VISIT, SUBSEQUENT: ICD-10-CM

## 2024-12-23 DIAGNOSIS — E78.2 MIXED HYPERLIPIDEMIA: ICD-10-CM

## 2024-12-23 PROCEDURE — G0439 PPPS, SUBSEQ VISIT: HCPCS | Performed by: INTERNAL MEDICINE

## 2024-12-23 PROCEDURE — 99214 OFFICE O/P EST MOD 30 MIN: CPT | Performed by: INTERNAL MEDICINE

## 2024-12-23 NOTE — PATIENT INSTRUCTIONS
Problem List Items Addressed This Visit          Cardiovascular and Mediastinum    Essential hypertension    Blood pressure is controlled, continue current meds         Atrial fibrillation, persistent (HCC)    No palpitations or bleeding problems, continue anticoagulation            Genitourinary    Stage 3a chronic kidney disease (HCC)    Lab Results   Component Value Date    EGFR 55 12/10/2024    EGFR 65 08/22/2024    EGFR 65 05/13/2024    CREATININE 1.26 12/10/2024    CREATININE 1.17 08/22/2024    CREATININE 1.17 05/13/2024   Mild, avoid excessive use of NSAIDs, patient avoids these anyway, stay hydrated            Other    Medicare annual wellness visit, subsequent - Primary    Discussed preventative health, cancer screening, immunizations, and safety issues.  Last colonoscopy August 9, 2021 with recommendations to recheck again in 5 years.  Patient had the Shingrix vaccines.  Patient had Prevnar 20.  Patient had the flu shot this season.  Patient got the latest COVID-vaccine.  Patient is going to get the RSV vaccine at the pharmacy.         Mixed hyperlipidemia    Continue atorvastatin along with healthy diet

## 2024-12-23 NOTE — ASSESSMENT & PLAN NOTE
Lab Results   Component Value Date    EGFR 55 12/10/2024    EGFR 65 08/22/2024    EGFR 65 05/13/2024    CREATININE 1.26 12/10/2024    CREATININE 1.17 08/22/2024    CREATININE 1.17 05/13/2024   Mild, avoid excessive use of NSAIDs, patient avoids these anyway, stay hydrated

## 2024-12-23 NOTE — PROGRESS NOTES
Name: Smooth Jackson      : 1950      MRN: [id number]  Encounter Provider: Melecio Giraldo MD  Encounter Date: 2024   Encounter department: [facility] Assessment & Plan Medicare annual wellness visit, subsequent  Discussed preventative health, cancer screening, immunizations, and safety issues.  Last colonoscopy 2021 with recommendations to recheck again in 5 years.  Patient had the Shingrix vaccines.  Patient had Prevnar 20.  Patient had the flu shot this season.  Patient got the latest COVID-vaccine.  Patient is going to get the RSV vaccine at the pharmacy.       Stage 3a chronic kidney disease (HCC)  Lab Results   Component Value Date    EGFR 55 12/10/2024    EGFR 65 2024    EGFR 65 2024    CREATININE 1.26 12/10/2024    CREATININE 1.17 2024    CREATININE 1.17 2024   Mild, avoid excessive use of NSAIDs, patient avoids these anyway, stay hydrated       Essential hypertension  Blood pressure is controlled, continue current meds       Atrial fibrillation, persistent (HCC)  No palpitations or bleeding problems, continue anticoagulation       Mixed hyperlipidemia  Continue atorvastatin along with healthy diet          Preventive health issues were discussed with patient, and age appropriate screening tests were ordered as noted in patient's After Visit Summary. Personalized health advice and appropriate referrals for health education or preventive services given if needed, as noted in patient's After Visit Summary.    History of Present Illness     Patient here for annual wellness visit and follow-up       Patient Care Team:  Melecio Giraldo MD as PCP - General  MD Paco Vergara DO Stephen Olenchock, DO Jay Barry Fisher, MD David Shields, MD Steven Stevens, MD Emily Knauss, MA as Care Coordinator (Oncology)  Dulce Contreras MA as  (Oncology)    Review of Systems   Constitutional:  Positive for fatigue  (mild). Negative for chills and fever.   HENT:  Negative for congestion, nosebleeds, postnasal drip, sore throat and trouble swallowing.    Eyes:  Negative for pain.   Respiratory:  Negative for cough, chest tightness, shortness of breath and wheezing.    Cardiovascular:  Negative for chest pain, palpitations and leg swelling.   Gastrointestinal:  Negative for abdominal pain, constipation, diarrhea, nausea and vomiting.   Endocrine: Negative for polydipsia and polyuria.   Genitourinary:  Negative for dysuria, flank pain and hematuria.   Musculoskeletal:  Negative for arthralgias.   Skin:  Negative for rash.   Neurological:  Negative for dizziness, tremors, light-headedness and headaches.   Hematological:  Does not bruise/bleed easily.   Psychiatric/Behavioral:  Negative for confusion and dysphoric mood. The patient is not nervous/anxious.      Medical History Reviewed by provider this encounter:  Tobacco  Allergies  Meds  Problems  Med Hx  Surg Hx  Fam Hx       Annual Wellness Visit Questionnaire       Health Risk Assessment:   Patient rates overall health as good. Patient feels that their physical health rating is slightly better. Patient is satisfied with their life. Eyesight was rated as slightly worse. Hearing was rated as same. Patient feels that their emotional and mental health rating is same. Patients states they are never, rarely angry. Patient states they are sometimes unusually tired/fatigued. Pain experienced in the last 7 days has been none. Patient states that he has experienced no weight loss or gain in last 6 months.     Depression Screening:   PHQ-2 Score: 0      Fall Risk Screening:   In the past year, patient has experienced: no history of falling in past year      Home Safety:  Patient does not have trouble with stairs inside or outside of their home. Patient has working smoke alarms and has working carbon monoxide detector. Home safety hazards include: none.     Nutrition:   Current diet  is Regular.     Medications:   Patient is currently taking over-the-counter supplements. OTC medications include: see medication list. Patient is able to manage medications.     Activities of Daily Living (ADLs)/Instrumental Activities of Daily Living (IADLs):   Walk and transfer into and out of bed and chair?: Yes  Dress and groom yourself?: Yes    Bathe or shower yourself?: Yes    Feed yourself? Yes  Do your laundry/housekeeping?: Yes  Manage your money, pay your bills and track your expenses?: Yes  Make your own meals?: Yes    Do your own shopping?: Yes    Previous Hospitalizations:   Any hospitalizations or ED visits within the last 12 months?: Yes    How many hospitalizations have you had in the last year?: 1-2    Advance Care Planning:   Living will: Yes    Durable POA for healthcare: Yes    Advanced directive: Yes    Advanced directive counseling given: Yes      Cognitive Screening:   Provider or family/friend/caregiver concerned regarding cognition?: No    PREVENTIVE SCREENINGS      Cardiovascular Screening:    General: Screening Not Indicated, History Lipid Disorder, Risks and Benefits Discussed and Screening Current      Diabetes Screening:     General: Screening Current and Risks and Benefits Discussed      Colorectal Cancer Screening:     General: Screening Current      Abdominal Aortic Aneurysm (AAA) Screening:    Risk factors include: age between 65-74 yo        General: Screening Not Indicated and History AAA      Lung Cancer Screening:     General: Screening Not Indicated      Hepatitis C Screening:    General: Screening Current    Screening, Brief Intervention, and Referral to Treatment (SBIRT)    Screening    Typical number of drinks in a week: 7    Review of Current Opioid Use    Opioid Risk Tool (ORT) Interpretation: Complete Opioid Risk Tool (ORT)    Social Drivers of Health     Financial Resource Strain: Low Risk  (12/19/2023)    Overall Financial Resource Strain (CARDIA)    • Difficulty of  "Paying Living Expenses: Not hard at all   Food Insecurity: No Food Insecurity (12/23/2024)    Hunger Vital Sign    • Worried About Running Out of Food in the Last Year: Never true    • Ran Out of Food in the Last Year: Never true   Transportation Needs: No Transportation Needs (12/23/2024)    PRAPARE - Transportation    • Lack of Transportation (Medical): No    • Lack of Transportation (Non-Medical): No   Housing Stability: Low Risk  (12/23/2024)    Housing Stability Vital Sign    • Unable to Pay for Housing in the Last Year: No    • Number of Times Moved in the Last Year: 1    • Homeless in the Last Year: No   Utilities: Not At Risk (12/23/2024)    [facility] Utilities    • Threatened with loss of utilities: No     No results found.    Objective   /80 (BP Location: Left arm, Patient Position: Sitting, Cuff Size: Large)   Pulse 59   Temp (!) 96.8 °F (36 °C) (Tympanic)   Ht 6' 1\" (1.854 m)   Wt 101 kg (222 lb 6.4 oz)   SpO2 96%   BMI 29.34 kg/m²     Physical Exam  Vitals reviewed.   Constitutional:       General: He is not in acute distress.     Appearance: Normal appearance. He is well-developed.   HENT:      Head: Normocephalic and atraumatic.      Right Ear: External ear normal.      Left Ear: External ear normal.      Nose: Nose normal.   Eyes:      General: No scleral icterus.     Conjunctiva/sclera: Conjunctivae normal.   Neck:      Trachea: No tracheal deviation.   Cardiovascular:      Rate and Rhythm: Normal rate and regular rhythm.      Heart sounds: Normal heart sounds. No murmur heard.  Pulmonary:      Effort: Pulmonary effort is normal. No respiratory distress.      Breath sounds: Normal breath sounds. No wheezing or rales.   Abdominal:      General: Bowel sounds are normal.      Palpations: Abdomen is soft. There is no mass.      Tenderness: There is no abdominal tenderness. There is no guarding.   Musculoskeletal:      Cervical back: Normal range of motion and neck supple.      Right lower leg: No " edema.      Left lower leg: No edema.   Lymphadenopathy:      Cervical: No cervical adenopathy.   Skin:     Coloration: Skin is not jaundiced or pale.   Neurological:      General: No focal deficit present.      Mental Status: He is alert and oriented to person, place, and time.   Psychiatric:         Mood and Affect: Mood normal.         Behavior: Behavior normal.         Thought Content: Thought content normal.         Judgment: Judgment normal.

## 2024-12-23 NOTE — ASSESSMENT & PLAN NOTE
Discussed preventative health, cancer screening, immunizations, and safety issues.  Last colonoscopy August 9, 2021 with recommendations to recheck again in 5 years.  Patient had the Shingrix vaccines.  Patient had Prevnar 20.  Patient had the flu shot this season.  Patient got the latest COVID-vaccine.  Patient is going to get the RSV vaccine at the pharmacy.

## 2025-01-06 ENCOUNTER — TELEPHONE (OUTPATIENT)
Dept: CARDIAC SURGERY | Facility: CLINIC | Age: 75
End: 2025-01-06

## 2025-01-06 NOTE — TELEPHONE ENCOUNTER
Patient called about his overdue Aortic clinic appt w/ , he was last seen 12.15.21. Spoke w/ Anastasiia last year and he wanted to hold off due to bladder surgery @ [facility]. He states he had a Ct scan down last week, requested a cd and will bring it in sometime this week if not today due to weather. Will relay message to Anastasiia.

## 2025-01-22 PROBLEM — Z00.00 MEDICARE ANNUAL WELLNESS VISIT, SUBSEQUENT: Status: RESOLVED | Noted: 2018-08-01 | Resolved: 2025-01-22

## 2025-01-23 DIAGNOSIS — I10 HYPERTENSION, UNSPECIFIED TYPE: ICD-10-CM

## 2025-01-23 DIAGNOSIS — I48.0 PAROXYSMAL ATRIAL FIBRILLATION (HCC): ICD-10-CM

## 2025-01-23 DIAGNOSIS — E03.9 ACQUIRED HYPOTHYROIDISM: ICD-10-CM

## 2025-01-23 RX ORDER — LEVOTHYROXINE SODIUM 75 UG/1
TABLET ORAL
Qty: 90 TABLET | Refills: 1 | Status: SHIPPED | OUTPATIENT
Start: 2025-01-23

## 2025-01-23 RX ORDER — ATORVASTATIN CALCIUM 20 MG/1
20 TABLET, FILM COATED ORAL DAILY
Qty: 90 TABLET | Refills: 0 | OUTPATIENT
Start: 2025-01-23

## 2025-01-24 DIAGNOSIS — I10 HYPERTENSION, UNSPECIFIED TYPE: ICD-10-CM

## 2025-01-24 RX ORDER — ATORVASTATIN CALCIUM 20 MG/1
20 TABLET, FILM COATED ORAL DAILY
Qty: 90 TABLET | Refills: 3 | Status: SHIPPED | OUTPATIENT
Start: 2025-01-24

## 2025-01-27 RX ORDER — APIXABAN 5 MG/1
5 TABLET, FILM COATED ORAL 2 TIMES DAILY
Qty: 180 TABLET | Refills: 3 | Status: SHIPPED | OUTPATIENT
Start: 2025-01-27

## 2025-01-27 NOTE — ASSESSMENT & PLAN NOTE
Lab Results   Component Value Date    EGFR 58 (L) 01/02/2025    EGFR 55 12/10/2024    EGFR 65 08/22/2024    CREATININE 1.29 (H) 01/02/2025    CREATININE 1.26 12/10/2024    CREATININE 1.17 08/22/2024   CKD most recent creatinine 1.29 per labs 1/2025 -baseline appears to be around 1.0 - 1.2 --- managed by PCP

## 2025-01-27 NOTE — PROGRESS NOTES
Electrophysiology Follow Up    HE[facility] VASCULAR CENTER   [facility] [place]  [address]  [place] [address]      Smooth Jackson  : 1950  MRN: [id number]    Date of Visit: 2025       Assessment & Plan     1. Permanent atrial fibrillation (HCC)  POCT ECG      2. Essential hypertension        3. Acquired hypothyroidism        4. Stage 3a chronic kidney disease (HCC)        5. Mixed hyperlipidemia        6. Aneurysm of ascending aorta without rupture (HCC)        7. Malignant neoplasm of posterior wall of urinary bladder (HCC)        8. Hypertension, unspecified type  atorvastatin (LIPITOR) 20 mg tablet          ASSESSMENT:  Assessment & Plan  Permanent atrial fibrillation (HCC)  Permanent atrial fibrillation, maintained on Toprol-XL + Eliquis anticoagulation  Initially diagnosed in 2017 - cardioversion + flecainide initiation attempted, however he had marked sinus bradycardia with rates in the 40s  flecainide was discontinued  No other AV judy agents utilized  2017 - Holter monitor showed ongoing sinus rhythm with average heart rate 66 bpm, frequent PVCs (27% burden)  2018 - persistent atrial fibrillation noted with 100% burden, minimal PVCs  10/2021 - atrial fibrillation with questionable NSVT versus aberrancy noted during hospitalization following mechanical fall --- restarted on Toprol-XL low-dose   2021 - follow up Holter monitor with ongoing persistent atrial fibrillation noted, periods of slow ventricular response with pauses up to 3.5 seconds, frequent PVCs versus Maribel phenomenon noted (22%) --- Toprol-XL increased to 25 mg twice daily  2024 - last seen in EP follow-up, Event monitor showed atrial fibrillation with average heart rate 62 bpm, multiple pauses up to 3.8 seconds, 6.8% PVC burden with only 1 brief run of NSVT      Preserved LV systolic function with LVEF 60% per echo 2024  Severe left atrial dilation  Moderate MR  Moderately dilated ascending  aorta (4.8 cm)  Essential hypertension  Hypertension, maintained on Toprol-XL  Acquired hypothyroidism  Hypothyroidism, maintained on Synthroid --- managed by PCP  Stage 3a chronic kidney disease (HCC)  Lab Results   Component Value Date    EGFR 58 (L) 01/02/2025    EGFR 55 12/10/2024    EGFR 65 08/22/2024    CREATININE 1.29 (H) 01/02/2025    CREATININE 1.26 12/10/2024    CREATININE 1.17 08/22/2024   CKD most recent creatinine 1.29 per labs 1/2025 -baseline appears to be around 1.0 - 1.2 --- managed by PCP  Mixed hyperlipidemia  Hyperlipidemia, maintained on statin therapy  Most recent labs from 12/2024 showed total cholesterol 133, triglycerides 94, HDL 65, LDL 49  Managed by PCP  Aneurysm of ascending aorta without rupture (HCC)  Ascending aortic aneurysm  Moderately dilated ascending aorta (4.8 cm) per echo 1/2024  Last seen in our aortic clinic 12/2021, reports recent CT scan at [facility] and will try to obtain those records for review  Malignant neoplasm of posterior wall of urinary bladder (HCC)  Small cell bladder cancer status post neoadjuvant chemotherapy and prior radical cystoprostatectomy with ileal conduit urinary diversion, [facility]  Hypertension, unspecified type          DISCUSSION/SUMMARY:  EKG today shows ongoing atrial fibrillation with controlled ventricular response and frequent PVCs. This seems unchanged from his prior event monitor 1/2024, which showed atrial fibrillation with average heart rate 62 bpm, multiple pauses up to 3.8 seconds, and 7% PVC burden.  He has had multiple monitors in the past, all with similar findings.  He reports first being diagnosed with PVCs in his 40s, and denies any changes in symptoms over the recent past.    Thus, I will not make any changes at this time.  He will continue Toprol-XL 25 mg twice daily, which she has been taking for years.  He assured me that he was in fact taking this medication while wearing his most recent event monitor.    I discussed that  he may require pacemaker moving forward given his tendency towards bradycardia and pauses, however there is no clear indication at this time based on monitoring and his current symptoms.  We will continue to monitor for this moving forward.    He is maintained on Eliquis anticoagulation, which he will continue.  He is tolerating it well, no issues with bleeding and no recent falls.  He also has peripheral neuropathy, but reports that his balance and strength are all improving following recent treatment of bladder cancer.  We briefly discussed a Watchman, and its role in atrial fibrillation.  I explained that we often recommend this if someone is not a candidate for anticoagulation, or the risk of continue anticoagulation outweighs the benefits.  At this time, he reports that he is not a fall risk, but will keep this in mind if anything changes down the road.    His blood pressure today is well-controlled, and he will continue his current regimen.  His atorvastatin was previously managed by Dr. Guzman.  I reviewed his most recent blood work, and he will continue his current dosing.  A new prescription was provided.  Otherwise, his PCP will likely manage most of his other medications moving forward.    I will reach out to our [facility], as he is overdue for follow-up but reports recently providing them with a CD of her recent CT scan for review.  I will ask them to reach out to him to determine whether follow-up is needed.    We will follow-up with him again in 6 months for ongoing close monitoring of his atrial fibrillation, PVCs, and borderline bradycardia.  He knows to contact us sooner with any questions, concerns, or changes in symptoms.        History of Present Illness     CHIEF COMPLAINT: Routine follow-up atrial fibrillation    HPI/INTERVAL HISTORY: Smooth Jackson is a 75 y.o. male with permanent atrial fibrillation rate controlled on Toprol-XL, history of slow ventricular response as well as sick sinus  syndrome at times limiting up titration of medications, preserved LV systolic function with LVEF 60% per echo 1/2024 with severe left atrial dilatation, hypertension, hyperlipidemia, hypothyroidism maintained on Synthroid, CKD, ascending aortic aneurysm previously followed in our aortic clinic, and bladder cancer status post surgery/chemo followed at [facility].  He typically follows with Dr. Macedo as an outpatient.  Upon review of history, he was initially diagnosed with atrial fibrillation in 2017.  At that time, cardioversion with flecainide initiation was pursued however he was significantly bradycardic with sinus rates in the 40s following cardioversion and thus flecainide was discontinued.  He was continued on anticoagulation, and all AV judy agents were also held.  Follow-up Holter monitor 8/2017 showed ongoing sinus rhythm but with frequent PVCs.  Repeat Holter monitor 8/2018 showed 100% atrial fibrillation burden however with minimal PVCs.  In 10/2021 he was hospitalized with a mechanical fall, monitoring showed atrial fibrillation with either aberrancy or nonsustained VT, and thus he was started on low-dose Toprol-XL 25 mg daily.  Follow-up monitoring showed ngoing persistent atrial fibrillation noted, periods of slow ventricular response with pauses up to 3.5 seconds, frequent PVCs versus Maribel phenomenon noted (22%).  Following this monitor, his Toprol-XL was increased to 25 mg twice daily.  Repeat monitoring was recommended, however not pursued due to other medical needs.  More recently, he was seen for preoperative clearance 1/2024 and a repeat event monitor was recommended which showed atrial fibrillation with average heart rate 62 bpm, multiple pauses up to 3.8 seconds, 6.8% PVC burden with only 1 brief run of NSVT.  No changes were made to his medical regimen at that time, and he is being seen today in routine follow-up.    He reports feeling well overall, he continues to improve following recent  treatment of bladder cancer.  He denies chest pain or pressure, worsening shortness of breath, dizziness, lightheadedness, presyncope, syncope, or issues with edema/weight gain.  He knows that he is in permanent A-fib, reports that he occasionally notices A-fib but denies any significant palpitations.  He is walking up to 3 miles a day on a regular basis, and reports that his strength and stamina continue to improve.  He has been compliant with his medications, and has been on Toprol-XL 25 mg twice daily since started several years ago by Dr. Macedo after NSVT was noted on an event monitor.  He is tolerating this well.  Overall, he denies significant complaints and has no concerns today other than needing refills on his atorvastatin.      Review of Systems  ROS as noted above, otherwise 12 point review of systems was performed and is negative.       Historical Information  - I have personally reviewed and updated this information, including social history, medical history, and family history.  Social History     Socioeconomic History    Marital status: /Civil Union     Spouse name: Not on file    Number of children: Not on file    Years of education: Not on file    Highest education level: Not on file   Occupational History    Not on file   Tobacco Use    Smoking status: Never     Passive exposure: Past    Smokeless tobacco: Never   Vaping Use    Vaping status: Never Used   Substance and Sexual Activity    Alcohol use: Yes     Alcohol/week: 5.0 standard drinks of alcohol     Types: 2 Glasses of wine, 1 Cans of beer, 2 Standard drinks or equivalent per week    Drug use: Never    Sexual activity: Not Currently   Other Topics Concern    Not on file   Social History Narrative    Not on file     Social Drivers of Health     Financial Resource Strain: Low Risk  (12/19/2023)    Overall Financial Resource Strain (CARDIA)     Difficulty of Paying Living Expenses: Not hard at all   Food Insecurity: No Food Insecurity  (12/23/2024)    Hunger Vital Sign     Worried About Running Out of Food in the Last Year: Never true     Ran Out of Food in the Last Year: Never true   Transportation Needs: No Transportation Needs (12/23/2024)    PRAPARE - Transportation     Lack of Transportation (Medical): No     Lack of Transportation (Non-Medical): No   Physical Activity: Not on file   Stress: Not on file   Social Connections: Not on file   Intimate Partner Violence: Not on file   Housing Stability: Low Risk  (12/23/2024)    Housing Stability Vital Sign     Unable to Pay for Housing in the Last Year: No     Number of Times Moved in the Last Year: 1     Homeless in the Last Year: No     Past Medical History:   Diagnosis Date    Acute blood loss anemia 03/08/2019    Aneurysm of thoracic aorta (HCC)     Arrhythmia     Cancer (HCC)     Cholecystitis 03/05/2019    Added automatically from request for surgery [id number]    Detached retina, right     Disease of thyroid gland     Gastric wall thickening     Headache     Hematoma 10/10/2018    Hypertension     Iliac artery aneurysm, bilateral (HCC)     Irregular heart beat     afib cardioversion 1/30/17, x2     Neuropathy     bilateral feet    Paroxysmal A-fib (HCC)     Prostatic hypertrophy     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(HCC)      Past Surgical History:   Procedure Laterality Date    ABDOMINAL AORTIC ANEURYSM REPAIR, ENDOVASCULAR Right 04/13/2018    Procedure: REPAIR ANEURYSM ILIAC endovascular  with coil embolization right hypogastric artery.;  Surgeon: Guille Esquivel MD;  Location: [facility];  Service: Vascular    BLADDER CANCER BY FISH (HISTORICAL)      CARDIOVERSION      CATARACT EXTRACTION Left 10/20/2019    Right eye 11/17/2011    CHOLECYSTECTOMY      CHOLECYSTECTOMY LAPAROSCOPIC N/A 03/08/2019    Procedure: [id number] CHOLECYSTECTOMY;  Surgeon: Derik Case DO;  Location: [facility];  Service: General    COLONOSCOPY  07/13/2016    CYSTECTOMY, RADICAL WITH ILEOCONDUIT  02/12/2024     MOLE EXCISION      NE CYSTOURETHROSCOPY W/DEST &/RMVL TUMOR LARGE N/A 2023    Procedure: TURBT, gemcitabine instillation;  Surgeon: Juvenal Cruz MD;  Location: [facility];  Service: Urology    NE EDG US EXAM SURGICAL ALTER STOM DUODENUM/JEJUNUM N/A 11/10/2017    Procedure: RADIAL ENDOSCOPIC U/S;  Surgeon: Harvey Gallegos MD;  Location: [facility];  Service: Gastroenterology    RETINAL DETACHMENT SURGERY Right     onset , retinal detachment complete air fill confirmed     Social History     Substance and Sexual Activity   Alcohol Use Yes    Alcohol/week: 5.0 standard drinks of alcohol    Types: 2 Glasses of wine, 1 Cans of beer, 2 Standard drinks or equivalent per week     Social History     Substance and Sexual Activity   Drug Use Never     Social History     Tobacco Use   Smoking Status Never    Passive exposure: Past   Smokeless Tobacco Never     Family History   Problem Relation Age of Onset    Stroke Mother          at 91, failure to thrive    Aortic aneurysm Father         abdominal    Aortic aneurysm Brother     Basal cell carcinoma Brother     Schizophrenia Brother     Cancer Maternal Grandmother         smoker, heart attack, cancer    Cancer Maternal Grandfather         smoker cancer    Cancer Paternal Grandmother         malignant neoplasm    Cancer Paternal Grandfather         malignant neoplasm    Cancer Family         malignant neoplasm    Cancer Family         malignant neoplasm    Cancer Maternal Uncle         smoker, lots wrong with her    Cancer Paternal Uncle         melanoma at 92       Meds/Allergies       Current Outpatient Medications:     acetaminophen (TYLENOL) 325 mg tablet, Take 2 tablets (650 mg total) by mouth every 6 (six) hours as needed for mild pain or fever, Disp: 30 tablet, Rfl: 0    aspirin (ECOTRIN LOW STRENGTH) 81 mg EC tablet, Take 81 mg by mouth daily, Disp: , Rfl:     atorvastatin (LIPITOR) 20 mg tablet, Take 1 tablet (20 mg total) by mouth daily, Disp: 90  "tablet, Rfl: 3    cholecalciferol (VITAMIN D3) 1,000 units tablet, Take 1,000 Units by mouth daily, Disp: , Rfl:     Co-Enzyme Q-10 100 MG CAPS, Take 100 mg by mouth daily, Disp: , Rfl:     Eliquis 5 MG, TAKE 1 TABLET TWICE A DAY, Disp: 180 tablet, Rfl: 3    felodipine (PLENDIL) 5 mg 24 hr tablet, TAKE 1 TABLET DAILY, Disp: 90 tablet, Rfl: 1    gabapentin (NEURONTIN) 100 mg capsule, Take 100 mg by mouth as needed Takes 1 or 2 tablets PRN, Disp: , Rfl:     hydrocortisone 2.5 % cream, Apply 1 application. topically 2 (two) times a day To affected area as needed, Disp: , Rfl:     ketoconazole (NIZORAL) 2 % cream, As needed, Disp: , Rfl:     levothyroxine 75 mcg tablet, TAKE 1 TABLET DAILY FOR    ACQUIRED HYPOTHYROIDISM, Disp: 90 tablet, Rfl: 1    metoprolol succinate (TOPROL-XL) 25 mg 24 hr tablet, TAKE 1 TABLET TWICE A DAY, Disp: 180 tablet, Rfl: 1    Misc Natural Products (LUTEIN 20 PO), Take 20 mg by mouth daily., Disp: , Rfl:     multivitamin (THERAGRAN) TABS, Take 1 tablet by mouth daily., Disp: , Rfl:     simethicone (MYLICON) 80 mg chewable tablet, Chew 80 mg if needed, Disp: , Rfl:     tiZANidine (ZANAFLEX) 4 mg tablet, Take 1 tablet (4 mg total) by mouth every 8 (eight) hours as needed for muscle spasms, Disp: 30 tablet, Rfl: 1    Allergies   Allergen Reactions    Wound Dressing Adhesive Blisters       Objective   Vitals: Blood pressure 130/72, pulse 63, height 6' 1\" (1.854 m), weight 103 kg (226 lb 12.8 oz).        Physical Exam  GEN: NAD, alert and oriented x 3, well appearing  SKIN: dry without significant lesions or rashes  HEENT: NCAT, PERRL, EOMs intact  NECK: No JVD appreciated  CARDIOVASCULAR: Irregularly irregular, normal S1, S2 without murmurs, rubs, or gallops appreciated  LUNGS: Clear to auscultation bilaterally without wheezes, rhonchi, or rales  ABDOMEN: Soft, nontender, nondistended  EXTREMITIES/VASCULAR: perfused without clubbing, cyanosis, or LE edema b/l  PSYCH: Normal mood and affect  NEURO: " CN ll-Xll grossly intact      Labs:  Appointment on 12/10/2024   Component Date Value    Sodium 12/10/2024 140     Potassium 12/10/2024 4.7     Chloride 12/10/2024 106     CO2 12/10/2024 28     ANION GAP 12/10/2024 6     BUN 12/10/2024 30 (H)     Creatinine 12/10/2024 1.26     Glucose, Fasting 12/10/2024 89     Calcium 12/10/2024 9.1     AST 12/10/2024 15     ALT 12/10/2024 14     Alkaline Phosphatase 12/10/2024 84     Total Protein 12/10/2024 6.1 (L)     Albumin 12/10/2024 4.1     Total Bilirubin 12/10/2024 0.68     eGFR 12/10/2024 55     WBC 12/10/2024 5.35     RBC 12/10/2024 4.52     Hemoglobin 12/10/2024 13.5     Hematocrit 12/10/2024 41.6     MCV 12/10/2024 92     MCH 12/10/2024 29.9     MCHC 12/10/2024 32.5     RDW 12/10/2024 13.9     MPV 12/10/2024 9.8     Platelets 12/10/2024 248     nRBC 12/10/2024 0     Segmented % 12/10/2024 58     Immature Grans % 12/10/2024 0     Lymphocytes % 12/10/2024 25     Monocytes % 12/10/2024 11     Eosinophils Relative 12/10/2024 5     Basophils Relative 12/10/2024 1     Absolute Neutrophils 12/10/2024 3.08     Absolute Immature Grans 12/10/2024 0.01     Absolute Lymphocytes 12/10/2024 1.32     Absolute Monocytes 12/10/2024 0.60     Eosinophils Absolute 12/10/2024 0.28     Basophils Absolute 12/10/2024 0.06     TSH 3RD GENERATON 12/10/2024 3.939     Cholesterol 12/10/2024 133     Triglycerides 12/10/2024 94     HDL, Direct 12/10/2024 65     LDL Calculated 12/10/2024 49          Imaging: Results Review Statement: No pertinent imaging studies reviewed.    ECHO: No results found for this or any previous visit.      Results for orders placed during the hospital encounter of 01/22/24    Echo complete w/ contrast if indicated    Interpretation Summary    Left Ventricle: Left ventricular cavity size is normal. Wall thickness is mildly increased. The left ventricular ejection fraction is 60%. Systolic function is normal. Wall motion is normal.    Right Ventricle: Right ventricular  cavity size is mildly dilated. Systolic function is normal.    Left Atrium: The atrium is severely dilated (>48 mL/m2).    Right Atrium: The atrium is mildly dilated.    Aortic Valve: There is mild regurgitation. There is aortic valve sclerosis.    Mitral Valve: There is mild annular calcification. There is mild to moderate regurgitation.    Tricuspid Valve: There is mild regurgitation.    Pulmonic Valve: There is mild regurgitation.    Aorta: The aortic root is mildly dilated at 4.3cm. The ascending aorta is moderately dilated at 4.8cm.        NUCLEAR STRESS TEST 1/2024:   Interpretation Summary    Stress ECG: No ST deviation is noted. Arrhythmias during recovery: occasional PVCs. The ECG was negative for ischemia. The stress ECG is negative for ischemia with vasodilator infusion.    Stress Function: Left ventricular function post-stress is normal. Stress ejection fraction is 56%.    Stress Combined Conclusion: The ECG and SPECT imaging portions of the stress study are concordant with no evidence of stress induced myocardial ischemia. Left ventricular perfusion is normal.    Perfusion: There are no perfusion defects.     Normal pharmacologic nuclear stress test.      EKG: Atrial fibrillation with controlled ventricular response, frequent PVCs versus aberrant conduction

## 2025-01-27 NOTE — ASSESSMENT & PLAN NOTE
Ascending aortic aneurysm  Moderately dilated ascending aorta (4.8 cm) per echo 1/2024  Last seen in our aortic clinic 12/2021, [facility] and will try to obtain those records for review

## 2025-01-27 NOTE — ASSESSMENT & PLAN NOTE
Permanent atrial fibrillation, maintained on Toprol-XL + Eliquis anticoagulation  Initially diagnosed in 2017 1/2017 - cardioversion + flecainide initiation attempted, however he had marked sinus bradycardia with rates in the 40s  flecainide was discontinued  No other AV judy agents utilized  8/2017 - Holter monitor showed ongoing sinus rhythm with average heart rate 66 bpm, frequent PVCs (27% burden)  8/2018 - persistent atrial fibrillation noted with 100% burden, minimal PVCs  10/2021 - atrial fibrillation with questionable NSVT versus aberrancy noted during hospitalization following mechanical fall --- restarted on Toprol-XL low-dose   11/2021 - follow up Holter monitor with ongoing persistent atrial fibrillation noted, periods of slow ventricular response with pauses up to 3.5 seconds, frequent PVCs versus Maribel phenomenon noted (22%) --- Toprol-XL increased to 25 mg twice daily  1/2024 - last seen in EP follow-up, Event monitor showed atrial fibrillation with average heart rate 62 bpm, multiple pauses up to 3.8 seconds, 6.8% PVC burden with only 1 brief run of NSVT      Preserved LV systolic function with LVEF 60% per echo 1/2024  Severe left atrial dilation  Moderate MR  Moderately dilated ascending aorta (4.8 cm)

## 2025-01-27 NOTE — ASSESSMENT & PLAN NOTE
Small cell bladder cancer status post neoadjuvant chemotherapy and prior radical cystoprostatectomy with ileal conduit urinary diversion, followed up [facility]

## 2025-01-27 NOTE — ASSESSMENT & PLAN NOTE
Hyperlipidemia, maintained on statin therapy  Most recent labs from 12/2024 showed total cholesterol 133, triglycerides 94, HDL 65, LDL 49  Managed by PCP

## 2025-01-28 ENCOUNTER — OFFICE VISIT (OUTPATIENT)
Dept: CARDIOLOGY CLINIC | Facility: CLINIC | Age: 75
End: 2025-01-28
Payer: MEDICARE

## 2025-01-28 VITALS
BODY MASS INDEX: 30.06 KG/M2 | HEIGHT: 73 IN | SYSTOLIC BLOOD PRESSURE: 130 MMHG | DIASTOLIC BLOOD PRESSURE: 72 MMHG | WEIGHT: 226.8 LBS | HEART RATE: 63 BPM

## 2025-01-28 DIAGNOSIS — N18.31 STAGE 3A CHRONIC KIDNEY DISEASE (HCC): ICD-10-CM

## 2025-01-28 DIAGNOSIS — I48.21 PERMANENT ATRIAL FIBRILLATION (HCC): Primary | ICD-10-CM

## 2025-01-28 DIAGNOSIS — I10 HYPERTENSION, UNSPECIFIED TYPE: ICD-10-CM

## 2025-01-28 DIAGNOSIS — C67.4 MALIGNANT NEOPLASM OF POSTERIOR WALL OF URINARY BLADDER (HCC): ICD-10-CM

## 2025-01-28 DIAGNOSIS — E03.9 ACQUIRED HYPOTHYROIDISM: ICD-10-CM

## 2025-01-28 DIAGNOSIS — E78.2 MIXED HYPERLIPIDEMIA: ICD-10-CM

## 2025-01-28 DIAGNOSIS — I71.21 ANEURYSM OF ASCENDING AORTA WITHOUT RUPTURE (HCC): ICD-10-CM

## 2025-01-28 DIAGNOSIS — I10 ESSENTIAL HYPERTENSION: ICD-10-CM

## 2025-01-28 PROCEDURE — 93000 ELECTROCARDIOGRAM COMPLETE: CPT | Performed by: PHYSICIAN ASSISTANT

## 2025-01-28 PROCEDURE — 99214 OFFICE O/P EST MOD 30 MIN: CPT | Performed by: PHYSICIAN ASSISTANT

## 2025-01-28 RX ORDER — GABAPENTIN 100 MG/1
100 CAPSULE ORAL AS NEEDED
COMMUNITY

## 2025-01-28 RX ORDER — ATORVASTATIN CALCIUM 20 MG/1
20 TABLET, FILM COATED ORAL DAILY
Qty: 90 TABLET | Refills: 3 | Status: SHIPPED | OUTPATIENT
Start: 2025-01-28

## 2025-02-12 NOTE — PATIENT INSTRUCTIONS
Reduce gabapentin to 100 mg twice daily, if no change in neuropathic pain or headaches after a few weeks can reduce to 100 mg in the evening. If after a few weeks no worsening neuropathic pain or headaches can stop medication.    If you have any worsening symptoms would return to prior dosing.   
no

## 2025-03-10 ENCOUNTER — OFFICE VISIT (OUTPATIENT)
Dept: INTERNAL MEDICINE CLINIC | Facility: CLINIC | Age: 75
End: 2025-03-10
Payer: MEDICARE

## 2025-03-10 ENCOUNTER — APPOINTMENT (OUTPATIENT)
Dept: RADIOLOGY | Age: 75
End: 2025-03-10
Payer: MEDICARE

## 2025-03-10 ENCOUNTER — NURSE TRIAGE (OUTPATIENT)
Age: 75
End: 2025-03-10

## 2025-03-10 ENCOUNTER — RESULTS FOLLOW-UP (OUTPATIENT)
Dept: INTERNAL MEDICINE CLINIC | Facility: CLINIC | Age: 75
End: 2025-03-10

## 2025-03-10 VITALS
OXYGEN SATURATION: 96 % | BODY MASS INDEX: 29.66 KG/M2 | SYSTOLIC BLOOD PRESSURE: 112 MMHG | DIASTOLIC BLOOD PRESSURE: 78 MMHG | WEIGHT: 224.8 LBS | HEART RATE: 49 BPM | TEMPERATURE: 97.2 F

## 2025-03-10 DIAGNOSIS — S29.9XXA TRAUMATIC INJURY OF RIB: Primary | ICD-10-CM

## 2025-03-10 DIAGNOSIS — Z93.6 S/P ILEAL CONDUIT (HCC): ICD-10-CM

## 2025-03-10 DIAGNOSIS — S29.9XXA TRAUMATIC INJURY OF RIB: ICD-10-CM

## 2025-03-10 PROCEDURE — G2211 COMPLEX E/M VISIT ADD ON: HCPCS | Performed by: INTERNAL MEDICINE

## 2025-03-10 PROCEDURE — 99213 OFFICE O/P EST LOW 20 MIN: CPT | Performed by: INTERNAL MEDICINE

## 2025-03-10 PROCEDURE — 71046 X-RAY EXAM CHEST 2 VIEWS: CPT

## 2025-03-10 PROCEDURE — 71100 X-RAY EXAM RIBS UNI 2 VIEWS: CPT

## 2025-03-10 RX ORDER — BENZONATATE 100 MG/1
100 CAPSULE ORAL 3 TIMES DAILY PRN
Qty: 30 CAPSULE | Refills: 1 | Status: SHIPPED | OUTPATIENT
Start: 2025-03-10

## 2025-03-10 NOTE — PROGRESS NOTES
Name: Smooth Jackson      : 1950      MRN: [id number]  Encounter Provider: Melecio Giraldo MD  Encounter Date: 3/10/2025   Encounter department: [facility]  :  Assessment & Plan  Traumatic injury of rib  Breath sounds okay, will check chest x-ray and x-ray of the ribs, patient can try icing the area, Tylenol, can try some topical Aspercreme which has lidocaine in it  Orders:  •  XR chest pa and lateral; Future  •  XR ribs 2 vw left; Future  •  benzonatate (TESSALON PERLES) 100 mg capsule; Take 1 capsule (100 mg total) by mouth 3 (three) times a day as needed for cough    S/P ileal conduit (HCC)  This is functioning well              History of Present Illness   Patient fell 3 days ago, hit his left side and is concerned about ribs, he is on Eliquis, did not hit his head, no loss of consciousness, patient remembers the fall.  Patient has pain with coughing, with taking a deep breath.  Patient has difficulty getting up out of bed and lying back in bed.  No shortness of breath.    Fall  Pertinent negatives include no fever.     Review of Systems   Constitutional:  Negative for chills and fever.   Respiratory:  Negative for cough and shortness of breath.    Cardiovascular:  Positive for chest pain (wall).       Objective   /78 (BP Location: Left arm, Patient Position: Sitting, Cuff Size: Standard)   Pulse (!) 49   Temp (!) 97.2 °F (36.2 °C) (Tympanic)   Wt 102 kg (224 lb 12.8 oz)   SpO2 96%   BMI 29.66 kg/m²      Physical Exam  Constitutional:       General: He is not in acute distress.     Appearance: Normal appearance.   Pulmonary:      Effort: Pulmonary effort is normal. No respiratory distress.      Breath sounds: Normal breath sounds. No stridor. No wheezing, rhonchi or rales.   Abdominal:      Palpations: Abdomen is soft.      Tenderness: There is no abdominal tenderness.   Musculoskeletal:      Comments: Patient able to abduct left arm okay   Neurological:      Mental  Status: He is alert.

## 2025-03-10 NOTE — ASSESSMENT & PLAN NOTE
Breath sounds okay, will check chest x-ray and x-ray of the ribs, patient can try icing the area, Tylenol, can try some topical Aspercreme which has lidocaine in it  Orders:  •  XR chest pa and lateral; Future  •  XR ribs 2 vw left; Future  •  benzonatate (TESSALON PERLES) 100 mg capsule; Take 1 capsule (100 mg total) by mouth 3 (three) times a day as needed for cough

## 2025-03-10 NOTE — PATIENT INSTRUCTIONS
Problem List Items Addressed This Visit          Musculoskeletal and Integument    Traumatic injury of rib - Primary    Breath sounds okay, will check chest x-ray and x-ray of the ribs, patient can try icing the area, Tylenol, can try some topical Aspercreme which has lidocaine in it         Relevant Orders    XR chest pa and lateral    XR ribs 2 vw left       Surgery/Wound/Pain    S/P ileal conduit (HCC)    This is functioning well

## 2025-03-10 NOTE — TELEPHONE ENCOUNTER
"FOLLOW UP: ? xrays    REASON FOR CONVERSATION: Fall    SYMPTOMS: Patient states on 3/7/25 he was outside and tripped on a sidewalk crack and fell on left knee/side. Patient denies hitting head. Reports pain on left side especially when taking a deep breath, coughing. Reports has broken ribs before and it feels similar. Scrape on left knee, has been cleaning and keeping covered at home. Denies dizziness. On Eliquis for Paroxysmal atrial fibrillation. Had leftover oxycodone at home and took that at night to help sleep.     OTHER: Same day OV scheduled with PCP at 1130.    DISPOSITION: See Within 3 Days in Office    Reason for Disposition   Patient wants to be seen    Answer Assessment - Initial Assessment Questions  1. MECHANISM: \"How did the fall happen?\"      Was walking outside and tripped on sidewalk crack, landed on left side   2. DOMESTIC VIOLENCE AND ELDER ABUSE SCREENING: \"Did you fall because someone pushed you or tried to hurt you?\" If Yes, ask: \"Are you safe now?\"      denies  3. ONSET: \"When did the fall happen?\" (e.g., minutes, hours, or days ago)      3/7/25  4. LOCATION: \"What part of the body hit the ground?\" (e.g., back, buttocks, head, hips, knees, hands, head, stomach)      Left side,   5. INJURY: \"Did you hurt (injure) yourself when you fell?\" If Yes, ask: \"What did you injure? Tell me more about this?\" (e.g., body area; type of injury; pain severity)\"      Under left ribs   6. PAIN: \"Is there any pain?\" If Yes, ask: \"How bad is the pain?\" (e.g., Scale 0-10; or none, mild,       Oxycodone left over from surgery   7. SIZE: For cuts, bruises, or swelling, ask: \"How large is it?\" (e.g., inches or centimeters)       Small scrape on left knee   8. PREGNANCY: \"Is there any chance you are pregnant?\" \"When was your last menstrual period?\"      NA  9. OTHER SYMPTOMS: \"Do you have any other symptoms?\" (e.g., dizziness, fever, weakness; new-onset or worsening).       Pain with deep breath, scrape on left " "knee  10. CAUSE: \"What do you think caused the fall (or falling)?\" (e.g., dizzy spell, tripped)        Tripped on sidewalk crack    Protocols used: Falls and Falling-Adult-[place]    "

## 2025-03-23 DIAGNOSIS — I10 ESSENTIAL HYPERTENSION: ICD-10-CM

## 2025-03-23 RX ORDER — FELODIPINE 5 MG/1
5 TABLET, EXTENDED RELEASE ORAL DAILY
Qty: 90 TABLET | Refills: 1 | Status: SHIPPED | OUTPATIENT
Start: 2025-03-23

## 2025-04-17 ENCOUNTER — RA CDI HCC (OUTPATIENT)
Dept: OTHER | Facility: HOSPITAL | Age: 75
End: 2025-04-17

## 2025-04-19 DIAGNOSIS — I48.19 ATRIAL FIBRILLATION, PERSISTENT (HCC): ICD-10-CM

## 2025-04-21 RX ORDER — METOPROLOL SUCCINATE 25 MG/1
25 TABLET, EXTENDED RELEASE ORAL 2 TIMES DAILY
Qty: 180 TABLET | Refills: 1 | Status: SHIPPED | OUTPATIENT
Start: 2025-04-21

## 2025-04-24 ENCOUNTER — OFFICE VISIT (OUTPATIENT)
Dept: INTERNAL MEDICINE CLINIC | Facility: CLINIC | Age: 75
End: 2025-04-24
Payer: MEDICARE

## 2025-04-24 VITALS
WEIGHT: 224 LBS | SYSTOLIC BLOOD PRESSURE: 132 MMHG | OXYGEN SATURATION: 96 % | HEART RATE: 69 BPM | TEMPERATURE: 97.2 F | BODY MASS INDEX: 29.55 KG/M2 | DIASTOLIC BLOOD PRESSURE: 74 MMHG

## 2025-04-24 DIAGNOSIS — N18.31 STAGE 3A CHRONIC KIDNEY DISEASE (HCC): ICD-10-CM

## 2025-04-24 DIAGNOSIS — E03.9 ACQUIRED HYPOTHYROIDISM: ICD-10-CM

## 2025-04-24 DIAGNOSIS — I48.21 PERMANENT ATRIAL FIBRILLATION (HCC): ICD-10-CM

## 2025-04-24 DIAGNOSIS — E78.2 MIXED HYPERLIPIDEMIA: ICD-10-CM

## 2025-04-24 DIAGNOSIS — S29.9XXA TRAUMATIC INJURY OF RIB: ICD-10-CM

## 2025-04-24 DIAGNOSIS — I10 ESSENTIAL HYPERTENSION: Primary | ICD-10-CM

## 2025-04-24 PROCEDURE — G2211 COMPLEX E/M VISIT ADD ON: HCPCS | Performed by: INTERNAL MEDICINE

## 2025-04-24 PROCEDURE — 99214 OFFICE O/P EST MOD 30 MIN: CPT | Performed by: INTERNAL MEDICINE

## 2025-04-24 NOTE — PATIENT INSTRUCTIONS
Problem List Items Addressed This Visit          Cardiovascular and Mediastinum    Essential hypertension - Primary    Blood pressure is controlled, continue current meds         Permanent atrial fibrillation (HCC)    No palpitations, no lightheadedness, no bleeding problems            Endocrine    Acquired hypothyroidism    Continue levothyroxine along with montioring            Musculoskeletal and Integument    Traumatic injury of rib    Rib pain has improved            Genitourinary    Stage 3a chronic kidney disease (HCC)    Lab Results   Component Value Date    EGFR 58 (L) 01/02/2025    EGFR 55 12/10/2024    EGFR 65 08/22/2024    CREATININE 1.29 (H) 01/02/2025    CREATININE 1.26 12/10/2024    CREATININE 1.17 08/22/2024   Most recent GFR 58, avoid excessive use of NSAIDs            Other    Mixed hyperlipidemia    Continue atorvastatin along with healthy diet

## 2025-04-24 NOTE — ASSESSMENT & PLAN NOTE
Lab Results   Component Value Date    EGFR 58 (L) 01/02/2025    EGFR 55 12/10/2024    EGFR 65 08/22/2024    CREATININE 1.29 (H) 01/02/2025    CREATININE 1.26 12/10/2024    CREATININE 1.17 08/22/2024   Most recent GFR 58, avoid excessive use of NSAIDs

## 2025-04-24 NOTE — PROGRESS NOTES
Name: Smooth Jackson      : 1950      MRN: [id number]  Encounter Provider: Melecio iGraldo MD  Encounter Date: 2025   Encounter department: [facility]  :  Assessment & Plan  Essential hypertension  Blood pressure is controlled, continue current meds       Traumatic injury of rib  Rib pain has improved       Permanent atrial fibrillation (HCC)  No palpitations, no lightheadedness, no bleeding problems       Acquired hypothyroidism  Continue levothyroxine along with montioring       Mixed hyperlipidemia  Continue atorvastatin along with healthy diet       Stage 3a chronic kidney disease (HCC)  Lab Results   Component Value Date    EGFR 58 (L) 2025    EGFR 55 12/10/2024    EGFR 65 2024    CREATININE 1.29 (H) 2025    CREATININE 1.26 12/10/2024    CREATININE 1.17 2024   Most recent GFR 58, avoid excessive use of NSAIDs              History of Present Illness   Patient here for regular follow-up      Review of Systems   Constitutional:  Negative for chills, fatigue and fever.   HENT:  Negative for congestion, nosebleeds, postnasal drip, sore throat and trouble swallowing.    Eyes:  Negative for pain.   Respiratory:  Negative for cough, chest tightness, shortness of breath and wheezing.    Cardiovascular:  Negative for chest pain, palpitations and leg swelling.   Gastrointestinal:  Negative for abdominal pain, constipation, diarrhea, nausea and vomiting.   Endocrine: Negative for polydipsia and polyuria.   Genitourinary:  Negative for dysuria, flank pain and hematuria.   Musculoskeletal:  Negative for arthralgias.   Skin:  Negative for rash.   Neurological:  Negative for dizziness, tremors, light-headedness and headaches.   Hematological:  Does not bruise/bleed easily.   Psychiatric/Behavioral:  Negative for confusion and dysphoric mood. The patient is not nervous/anxious.        Objective   /74 (BP Location: Left arm, Patient Position: Sitting, Cuff Size:  Standard)   Pulse 69   Temp (!) 97.2 °F (36.2 °C) (Tympanic)   Wt 102 kg (224 lb)   SpO2 96%   BMI 29.55 kg/m²      Physical Exam  Vitals reviewed.   Constitutional:       General: He is not in acute distress.     Appearance: Normal appearance. He is well-developed.   HENT:      Head: Normocephalic and atraumatic.      Right Ear: External ear normal.      Left Ear: External ear normal.      Nose: Nose normal.   Eyes:      General: No scleral icterus.     Conjunctiva/sclera: Conjunctivae normal.   Neck:      Trachea: No tracheal deviation.   Cardiovascular:      Rate and Rhythm: Normal rate. Rhythm irregular.      Heart sounds: Normal heart sounds.   Pulmonary:      Effort: Pulmonary effort is normal. No respiratory distress.      Breath sounds: Normal breath sounds. No wheezing or rales.   Musculoskeletal:      Cervical back: Normal range of motion and neck supple.      Right lower leg: No edema.      Left lower leg: No edema.   Lymphadenopathy:      Cervical: No cervical adenopathy.   Skin:     Coloration: Skin is not jaundiced or pale.   Neurological:      General: No focal deficit present.      Mental Status: He is alert and oriented to person, place, and time.   Psychiatric:         Mood and Affect: Mood normal.         Behavior: Behavior normal.         Thought Content: Thought content normal.         Judgment: Judgment normal.

## 2025-05-05 ENCOUNTER — OFFICE VISIT (OUTPATIENT)
Dept: NEUROLOGY | Facility: CLINIC | Age: 75
End: 2025-05-05
Payer: MEDICARE

## 2025-05-05 VITALS
OXYGEN SATURATION: 98 % | DIASTOLIC BLOOD PRESSURE: 64 MMHG | SYSTOLIC BLOOD PRESSURE: 128 MMHG | WEIGHT: 223 LBS | BODY MASS INDEX: 29.55 KG/M2 | HEART RATE: 47 BPM | TEMPERATURE: 98.3 F | HEIGHT: 73 IN

## 2025-05-05 DIAGNOSIS — G60.9 HEREDITARY AND IDIOPATHIC NEUROPATHY, UNSPECIFIED: ICD-10-CM

## 2025-05-05 DIAGNOSIS — G62.9 NEUROPATHY: Primary | ICD-10-CM

## 2025-05-05 DIAGNOSIS — G44.89 OTHER HEADACHE SYNDROME: ICD-10-CM

## 2025-05-05 PROCEDURE — 99213 OFFICE O/P EST LOW 20 MIN: CPT | Performed by: NURSE PRACTITIONER

## 2025-05-05 NOTE — ASSESSMENT & PLAN NOTE
Patient denies any recent headaches.  In the past he had intermittent episodes of pain in the left temporal region that lasted for several seconds.  Workup in the past including MRI/MRI brain and temporal artery biopsy were all negative.  He is currently utilizing gabapentin as needed for his neuropathic pain.  He will contact the office if he notes any increasing headaches.

## 2025-05-05 NOTE — ASSESSMENT & PLAN NOTE
Patient's neuropathy symptoms stable since last visit, he continues with numbness in soles of the feet and toes, no progression since last visit.  He takes gabapentin as needed which is controlling his neuropathic pain.  Workup in the past was unrevealing other than borderline A1c, etiology may also be idiopathic or age-related.  He understands the importance of good blood sugar control.  He was encouraged to remain active.  Orders:    Vitamin B12; Future

## 2025-05-05 NOTE — PROGRESS NOTES
Name: Smooth Jackson      : 1950      MRN: [id number]  Encounter Provider: LORI Chavira  Encounter Date: 2025   Encounter department: [facility]  :  Assessment & Plan  Neuropathy  Patient's neuropathy symptoms stable since last visit, he continues with numbness in soles of the feet and toes, no progression since last visit.  He takes gabapentin as needed which is controlling his neuropathic pain.  Workup in the past was unrevealing other than borderline A1c, etiology may also be idiopathic or age-related.  He understands the importance of good blood sugar control.  He was encouraged to remain active.  Orders:    Vitamin B12; Future    Other headache syndrome  Patient denies any recent headaches.  In the past he had intermittent episodes of pain in the left temporal region that lasted for several seconds.  Workup in the past including MRI/MRI brain and temporal artery biopsy were all negative.  He is currently utilizing gabapentin as needed for his neuropathic pain.  He will contact the office if he notes any increasing headaches.             History of Present Illness   HPI   Smooth Jackson is a 75 y.o. male wiht PMH hypertension, thoracic aortic aneurysm, iliac artery aneurysm status post stent , history of migraines in the past, recent cholecystectomy, atrial fibrillation, hypothyroidism. who presents with history of neuropathy, and headaches in the left temporal region.       To review, he had been having intermittent sharp electric sensations in the left side of the temple region, had the workup including a temporal artery biopsy and MRI/MRA which were negative.     Last office visit 2024 in which no changes were made.      Interval History:  He can't remember his last headaches, perhaps 1-2 in the last year.   Stress was large trigger.   Headaches continue be in the left temple, pounding/pressure sensation, last for several seconds.   No new symptoms with  headache, no vision changes.   He has seen the eye dr in the past year.         Neuropathy symptoms are stable, continues to have numbness and tingling in the soles of the feet and the toes.    No worsening since last visit of these symptoms.  Some imbalance, he has one fall in the past tripped on uneven payment.   No muscle weakness.  No UE symptoms.   Is on surveillance for hx of bladder cancer. Follows at [facility].        He has changed his gabapentin to as needed-200 mg at bedtime. Typically needs 1-2 times a week.                 Prior work up:  Recent labs 11/2021: a1c 5.7, vit d 22.6, tsh normal  He had MRI of the brain in 2015, was reported to show mild small vessel disease and mild atrophy, age appropriate.  MRA of the brain was normal.      2 hour glucose tolerance 97, fasting glucose 96, folate greater than 24. B12 390, TSH normal, SPEP showed no monoclonal bands, vitamin B 6 32.6.    Review of Systems   Review of Systems   Constitutional:  Negative for appetite change, fatigue and fever.   HENT: Negative.  Negative for hearing loss, tinnitus, trouble swallowing and voice change.    Eyes: Negative.  Negative for photophobia, pain and visual disturbance.   Respiratory: Negative.  Negative for shortness of breath.    Cardiovascular: Negative.  Negative for palpitations.   Gastrointestinal: Negative.  Negative for nausea and vomiting.   Endocrine: Negative.  Negative for cold intolerance.   Genitourinary: Negative.  Negative for dysuria, frequency and urgency.   Musculoskeletal:  Negative for back pain, gait problem, myalgias, neck pain and neck stiffness.   Skin: Negative.  Negative for rash.   Allergic/Immunologic: Negative.    Neurological:  Negative for dizziness, tremors, seizures, syncope, facial asymmetry, speech difficulty, weakness, light-headedness, numbness and headaches.   Hematological: Negative.  Does not bruise/bleed easily.   Psychiatric/Behavioral: Negative.  Negative for confusion,  "hallucinations and sleep disturbance.    All other systems reviewed and are negative  I have personally reviewed the MA's review of systems and made changes as necessary.         Objective   /64 (BP Location: Left arm, Patient Position: Sitting, Cuff Size: Adult)   Pulse (!) 47   Temp 98.3 °F (36.8 °C) (Temporal)   Ht 6' 1\" (1.854 m)   Wt 101 kg (223 lb)   SpO2 98%   BMI 29.42 kg/m²     Physical Exam  Constitutional:       General: He is awake.   HENT:      Right Ear: Hearing normal.      Left Ear: Hearing normal.   Eyes:      General: Lids are normal.      Extraocular Movements: Extraocular movements intact.      Pupils: Pupils are equal, round, and reactive to light.   Neurological:      Mental Status: He is alert.      Deep Tendon Reflexes:      Reflex Scores:       Bicep reflexes are 2+ on the right side and 2+ on the left side.       Brachioradialis reflexes are 2+ on the right side and 2+ on the left side.       Patellar reflexes are 2+ on the right side and 2+ on the left side.       Achilles reflexes are 1+ on the right side and 1+ on the left side.  Psychiatric:         Speech: Speech normal.       Neurological Exam  Mental Status  Awake and alert. Speech is normal. Language is fluent with no aphasia.    Cranial Nerves  CN III, IV, VI: Extraocular movements intact bilaterally. Normal lids and orbits bilaterally. Pupils equal round and reactive to light bilaterally.  CN V:  Right: Facial sensation is normal.  Left: Facial sensation is normal on the left.  CN VII:  Right: There is no facial weakness.  Left: There is no facial weakness.  CN VIII:  Right: Hearing is normal.  Left: Hearing is normal.  CN XI:  Right: Trapezius strength is normal.  Left: Trapezius strength is normal.  CN XII: Tongue midline without atrophy or fasciculations.    Motor  Normal muscle bulk throughout.                                               Right                     Left  Elbow flexion                         5      "                     5  Elbow extension                    5                          5  Wrist flexion                           5                          5  Wrist extension                      5                          5  Finger abduction                    5                          5  Hip flexion                              5                          5  Knee flexion                           5                          5  Knee extension                      5                          5  Ankle inversion                      5                          5  Ankle eversion                       5                          5  Plantarflexion                         5                          5  Dorsiflexion                            5                          5    Sensory  Light touch is normal in upper and lower extremities. Pinprick abnormality: Normal in bilateral UE, diminished to the ankles in b/l LE  . Temperature abnormality: Normal in bilateral UE, diminished to the mid foot in b/l LE  . Vibration abnormality: Absent at the great toes, reduced at the ankles to 5-6 seconds.     Reflexes                                            Right                      Left  Brachioradialis                    2+                         2+  Biceps                                 2+                         2+  Patellar                                2+                         2+  Achilles                                1+                         1+    Gait  Casual gait is normal including stance, stride, and arm swing. Able to rise from chair without using arms.

## 2025-05-14 ENCOUNTER — TELEPHONE (OUTPATIENT)
Dept: CARDIAC SURGERY | Facility: CLINIC | Age: 75
End: 2025-05-14

## 2025-05-14 NOTE — TELEPHONE ENCOUNTER
Ruchi from [facility] called stating this patient needs to have a biopsy done, requires cardiac clearance, he has not seen  in quite some time, he does follow up with . Gave her the number and explained maybe his office may be able to clear him.

## 2025-05-16 ENCOUNTER — APPOINTMENT (OUTPATIENT)
Dept: LAB | Age: 75
End: 2025-05-16
Attending: NURSE PRACTITIONER
Payer: MEDICARE

## 2025-05-16 ENCOUNTER — RESULTS FOLLOW-UP (OUTPATIENT)
Dept: NEUROLOGY | Facility: CLINIC | Age: 75
End: 2025-05-16

## 2025-05-16 DIAGNOSIS — G60.9 HEREDITARY AND IDIOPATHIC NEUROPATHY, UNSPECIFIED: ICD-10-CM

## 2025-05-16 DIAGNOSIS — G62.9 NEUROPATHY: ICD-10-CM

## 2025-05-16 DIAGNOSIS — Z01.818 OTHER SPECIFIED PRE-OPERATIVE EXAMINATION: ICD-10-CM

## 2025-05-16 LAB
ANION GAP SERPL CALCULATED.3IONS-SCNC: 10 MMOL/L (ref 4–13)
APTT PPP: 32 SECONDS (ref 23–34)
BASOPHILS # BLD AUTO: 0.04 THOUSANDS/ÂΜL (ref 0–0.1)
BASOPHILS NFR BLD AUTO: 1 % (ref 0–1)
BUN SERPL-MCNC: 21 MG/DL (ref 5–25)
CALCIUM SERPL-MCNC: 9.4 MG/DL (ref 8.4–10.2)
CHLORIDE SERPL-SCNC: 107 MMOL/L (ref 96–108)
CO2 SERPL-SCNC: 26 MMOL/L (ref 21–32)
CREAT SERPL-MCNC: 1.25 MG/DL (ref 0.6–1.3)
EOSINOPHIL # BLD AUTO: 0.08 THOUSAND/ÂΜL (ref 0–0.61)
EOSINOPHIL NFR BLD AUTO: 2 % (ref 0–6)
ERYTHROCYTE [DISTWIDTH] IN BLOOD BY AUTOMATED COUNT: 12.9 % (ref 11.6–15.1)
GFR SERPL CREATININE-BSD FRML MDRD: 55 ML/MIN/1.73SQ M
GLUCOSE P FAST SERPL-MCNC: 103 MG/DL (ref 65–99)
HCT VFR BLD AUTO: 45.4 % (ref 36.5–49.3)
HGB BLD-MCNC: 15 G/DL (ref 12–17)
IMM GRANULOCYTES # BLD AUTO: 0.01 THOUSAND/UL (ref 0–0.2)
IMM GRANULOCYTES NFR BLD AUTO: 0 % (ref 0–2)
INR PPP: 1.22 (ref 0.85–1.19)
LYMPHOCYTES # BLD AUTO: 1.12 THOUSANDS/ÂΜL (ref 0.6–4.47)
LYMPHOCYTES NFR BLD AUTO: 23 % (ref 14–44)
MCH RBC QN AUTO: 30.5 PG (ref 26.8–34.3)
MCHC RBC AUTO-ENTMCNC: 33 G/DL (ref 31.4–37.4)
MCV RBC AUTO: 92 FL (ref 82–98)
MONOCYTES # BLD AUTO: 0.47 THOUSAND/ÂΜL (ref 0.17–1.22)
MONOCYTES NFR BLD AUTO: 10 % (ref 4–12)
NEUTROPHILS # BLD AUTO: 3.14 THOUSANDS/ÂΜL (ref 1.85–7.62)
NEUTS SEG NFR BLD AUTO: 64 % (ref 43–75)
NRBC BLD AUTO-RTO: 0 /100 WBCS
PLATELET # BLD AUTO: 243 THOUSANDS/UL (ref 149–390)
PMV BLD AUTO: 9.6 FL (ref 8.9–12.7)
POTASSIUM SERPL-SCNC: 4.3 MMOL/L (ref 3.5–5.3)
PROTHROMBIN TIME: 15.7 SECONDS (ref 12.3–15)
RBC # BLD AUTO: 4.92 MILLION/UL (ref 3.88–5.62)
SODIUM SERPL-SCNC: 143 MMOL/L (ref 135–147)
VIT B12 SERPL-MCNC: 324 PG/ML (ref 180–914)
WBC # BLD AUTO: 4.86 THOUSAND/UL (ref 4.31–10.16)

## 2025-05-16 PROCEDURE — 82607 VITAMIN B-12: CPT

## 2025-05-16 PROCEDURE — 85610 PROTHROMBIN TIME: CPT

## 2025-05-16 PROCEDURE — 80048 BASIC METABOLIC PNL TOTAL CA: CPT

## 2025-05-16 PROCEDURE — 85730 THROMBOPLASTIN TIME PARTIAL: CPT

## 2025-05-16 PROCEDURE — 85025 COMPLETE CBC W/AUTO DIFF WBC: CPT

## 2025-05-16 PROCEDURE — 36415 COLL VENOUS BLD VENIPUNCTURE: CPT

## 2025-05-28 ENCOUNTER — TELEPHONE (OUTPATIENT)
Age: 75
End: 2025-05-28

## 2025-05-28 NOTE — TELEPHONE ENCOUNTER
Received call from pt.  He is scheduled for a percutaneous nephrostolithotomy/pyelostolithotomy 6/5/25.  His surgeon is requesting him to hold Eliquis for 3-5 days, is asking for cardiology to advise on how long pt should hold it for.    Please advise.

## 2025-05-30 NOTE — TELEPHONE ENCOUNTER
Spoke with patient, returning call regarding holding Eliquis prior to a procedure next week, had not heard anything back after several calls.    Review of chart, dr Macedo approved a 3 day Eliquis hold pre procedure. Verbalized understanding.

## 2025-06-12 ENCOUNTER — TRANSITIONAL CARE MANAGEMENT (OUTPATIENT)
Dept: INTERNAL MEDICINE CLINIC | Facility: CLINIC | Age: 75
End: 2025-06-12

## 2025-06-18 ENCOUNTER — APPOINTMENT (OUTPATIENT)
Dept: LAB | Age: 75
End: 2025-06-18
Attending: NURSE PRACTITIONER
Payer: MEDICARE

## 2025-06-18 DIAGNOSIS — C67.9 MALIGNANT NEOPLASM OF URINARY BLADDER, UNSPECIFIED SITE (HCC): ICD-10-CM

## 2025-06-18 LAB
ERYTHROCYTE [DISTWIDTH] IN BLOOD BY AUTOMATED COUNT: 13 % (ref 11.6–15.1)
HCT VFR BLD AUTO: 42.6 % (ref 36.5–49.3)
HGB BLD-MCNC: 13.9 G/DL (ref 12–17)
MCH RBC QN AUTO: 30.1 PG (ref 26.8–34.3)
MCHC RBC AUTO-ENTMCNC: 32.6 G/DL (ref 31.4–37.4)
MCV RBC AUTO: 92 FL (ref 82–98)
PLATELET # BLD AUTO: 324 THOUSANDS/UL (ref 149–390)
PMV BLD AUTO: 10.4 FL (ref 8.9–12.7)
RBC # BLD AUTO: 4.62 MILLION/UL (ref 3.88–5.62)
WBC # BLD AUTO: 4.76 THOUSAND/UL (ref 4.31–10.16)

## 2025-06-18 PROCEDURE — 85027 COMPLETE CBC AUTOMATED: CPT

## 2025-06-18 PROCEDURE — 36415 COLL VENOUS BLD VENIPUNCTURE: CPT

## 2025-06-24 ENCOUNTER — OFFICE VISIT (OUTPATIENT)
Dept: INTERNAL MEDICINE CLINIC | Facility: CLINIC | Age: 75
End: 2025-06-24
Payer: MEDICARE

## 2025-06-24 VITALS
SYSTOLIC BLOOD PRESSURE: 110 MMHG | OXYGEN SATURATION: 97 % | BODY MASS INDEX: 28.67 KG/M2 | WEIGHT: 217.3 LBS | HEART RATE: 72 BPM | TEMPERATURE: 96.9 F | DIASTOLIC BLOOD PRESSURE: 80 MMHG

## 2025-06-24 DIAGNOSIS — R57.9 SHOCK (HCC): ICD-10-CM

## 2025-06-24 DIAGNOSIS — E78.2 MIXED HYPERLIPIDEMIA: ICD-10-CM

## 2025-06-24 DIAGNOSIS — I10 ESSENTIAL HYPERTENSION: Primary | ICD-10-CM

## 2025-06-24 DIAGNOSIS — I48.21 PERMANENT ATRIAL FIBRILLATION (HCC): ICD-10-CM

## 2025-06-24 DIAGNOSIS — E03.9 ACQUIRED HYPOTHYROIDISM: ICD-10-CM

## 2025-06-24 PROCEDURE — 99495 TRANSJ CARE MGMT MOD F2F 14D: CPT | Performed by: INTERNAL MEDICINE

## 2025-06-24 NOTE — ASSESSMENT & PLAN NOTE
Rate currently controlled, continue anticoagulation      Take Suboxone as directed.    Follow-up in the recovery clinic as planned.    Return if any concerns.

## 2025-06-24 NOTE — ASSESSMENT & PLAN NOTE
Recent hospitalization at [facility] for this, being seen in follow-up today for this

## 2025-06-24 NOTE — PATIENT INSTRUCTIONS
Problem List Items Addressed This Visit          Cardiovascular and Mediastinum    Essential hypertension - Primary    Blood pressure is controlled, continue meds         Permanent atrial fibrillation (HCC)    Rate currently controlled, continue anticoagulation            Endocrine    Acquired hypothyroidism    Continue levothyroxine along with monitoring            Surgery/Wound/Pain    Shock (HCC)    Recent hospitalization at [facility] for this, being seen in follow-up today for this            Other    Mixed hyperlipidemia    Continue atorvastatin

## 2025-06-24 NOTE — PROGRESS NOTES
"Transition of Care Visit:  Name: Smooth Jackson      : 1950      MRN: [id number]  Encounter Provider: Melecio Giraldo MD  Encounter Date: 2025   Enc[facility]    Assessment & Plan  Essential hypertension  Blood pressure is controlled, continue meds       Shock (HCC)  Recent hospitalization at [facility] for this, being seen in follow-up today for this       Acquired hypothyroidism  Continue levothyroxine along with monitoring       Permanent atrial fibrillation (HCC)  Rate currently controlled, continue anticoagulation       Mixed hyperlipidemia  Continue atorvastatin            History of Present Illness     Transitional Care Management Review:   Smooth Jackson is a 75 y.o. male here for TCM follow up.     During the TCM phone call patient stated:  TCM Call (since 6/10/2025)     Date and time call was made  2025 10:29 AM    Hospital care reviewed  Records reviewed    Patient was hospitialized at  Other (comment)    Comment  [facility]    Date of Admission  25    Date of discharge  06/10/25    Diagnosis  Urology    Disposition  Home      TCM Call (since 6/10/2025)     Scheduled for follow up?  Yes    Did you obtain your prescribed medications  Yes    I have advised the patient to call PCP with any new or worsening symptoms  Madonna George MR        I viewed patient's hospitalization for cystoscopy with subsequent admission for \"shock\" with low blood pressure and infection.  Patient returned home 6/10.  Since home,pt was feeling very tired at first and has been slowly improving.  Patient was able to mow the lawn.      Review of Systems   Constitutional:  Positive for fatigue (mild). Negative for chills and fever.   HENT:  Negative for congestion, nosebleeds, postnasal drip, sore throat and trouble swallowing.    Eyes:  Negative for pain.   Respiratory:  Negative for cough, chest tightness, shortness of breath and " wheezing.    Cardiovascular:  Negative for chest pain, palpitations and leg swelling.   Gastrointestinal:  Negative for abdominal pain, constipation, diarrhea, nausea and vomiting.   Endocrine: Negative for polydipsia and polyuria.   Genitourinary:  Negative for dysuria, flank pain and hematuria.   Musculoskeletal:  Negative for arthralgias.   Skin:  Negative for rash.   Neurological:  Positive for weakness (mild). Negative for dizziness, tremors, light-headedness and headaches.   Hematological:  Does not bruise/bleed easily.   Psychiatric/Behavioral:  Negative for confusion and dysphoric mood. The patient is not nervous/anxious.      Objective   /80 (BP Location: Left arm, Patient Position: Sitting, Cuff Size: Standard)   Pulse 72   Temp (!) 96.9 °F (36.1 °C) (Tympanic)   Wt 98.6 kg (217 lb 4.8 oz)   SpO2 97%   BMI 28.67 kg/m²     Physical Exam  Vitals reviewed.   Constitutional:       General: He is not in acute distress.     Appearance: Normal appearance. He is well-developed.   HENT:      Head: Normocephalic and atraumatic.      Right Ear: External ear normal.      Left Ear: External ear normal.      Nose: Nose normal.     Eyes:      General: No scleral icterus.     Conjunctiva/sclera: Conjunctivae normal.     Neck:      Trachea: No tracheal deviation.     Cardiovascular:      Rate and Rhythm: Normal rate. Rhythm irregular.      Heart sounds: Normal heart sounds.   Pulmonary:      Effort: Pulmonary effort is normal. No respiratory distress.      Breath sounds: Normal breath sounds. No wheezing or rales.   Abdominal:      General: Bowel sounds are normal.     Musculoskeletal:      Cervical back: Normal range of motion and neck supple.      Right lower leg: No edema.      Left lower leg: No edema.   Lymphadenopathy:      Cervical: No cervical adenopathy.     Skin:     Coloration: Skin is not jaundiced or pale.     Neurological:      General: No focal deficit present.      Mental Status: He is alert and  oriented to person, place, and time.     Psychiatric:         Mood and Affect: Mood normal.         Behavior: Behavior normal.         Thought Content: Thought content normal.         Judgment: Judgment normal.       Medications have been reviewed by provider in current encounter       23-Feb-2020

## 2025-07-22 DIAGNOSIS — E03.9 ACQUIRED HYPOTHYROIDISM: ICD-10-CM

## 2025-07-23 ENCOUNTER — RA CDI HCC (OUTPATIENT)
Dept: OTHER | Facility: HOSPITAL | Age: 75
End: 2025-07-23

## 2025-07-23 RX ORDER — LEVOTHYROXINE SODIUM 75 UG/1
TABLET ORAL
Qty: 90 TABLET | Refills: 1 | Status: SHIPPED | OUTPATIENT
Start: 2025-07-23

## 2025-07-30 ENCOUNTER — OFFICE VISIT (OUTPATIENT)
Dept: INTERNAL MEDICINE CLINIC | Facility: CLINIC | Age: 75
End: 2025-07-30
Payer: MEDICARE

## 2025-07-30 VITALS
SYSTOLIC BLOOD PRESSURE: 110 MMHG | WEIGHT: 216.4 LBS | BODY MASS INDEX: 28.55 KG/M2 | OXYGEN SATURATION: 98 % | HEART RATE: 66 BPM | TEMPERATURE: 97.4 F | DIASTOLIC BLOOD PRESSURE: 68 MMHG

## 2025-07-30 DIAGNOSIS — E03.9 ACQUIRED HYPOTHYROIDISM: ICD-10-CM

## 2025-07-30 DIAGNOSIS — I48.21 PERMANENT ATRIAL FIBRILLATION (HCC): ICD-10-CM

## 2025-07-30 DIAGNOSIS — I10 ESSENTIAL HYPERTENSION: ICD-10-CM

## 2025-07-30 DIAGNOSIS — E78.2 MIXED HYPERLIPIDEMIA: Primary | ICD-10-CM

## 2025-07-30 PROCEDURE — 99214 OFFICE O/P EST MOD 30 MIN: CPT | Performed by: INTERNAL MEDICINE

## 2025-07-30 PROCEDURE — G2211 COMPLEX E/M VISIT ADD ON: HCPCS | Performed by: INTERNAL MEDICINE

## 2025-07-31 ENCOUNTER — OFFICE VISIT (OUTPATIENT)
Dept: CARDIOLOGY CLINIC | Facility: CLINIC | Age: 75
End: 2025-07-31

## 2025-07-31 VITALS
DIASTOLIC BLOOD PRESSURE: 70 MMHG | HEIGHT: 73 IN | WEIGHT: 214 LBS | HEART RATE: 70 BPM | SYSTOLIC BLOOD PRESSURE: 130 MMHG | BODY MASS INDEX: 28.36 KG/M2

## 2025-07-31 DIAGNOSIS — I71.21 ANEURYSM OF ASCENDING AORTA WITHOUT RUPTURE (HCC): ICD-10-CM

## 2025-07-31 DIAGNOSIS — N18.31 STAGE 3A CHRONIC KIDNEY DISEASE (HCC): ICD-10-CM

## 2025-07-31 DIAGNOSIS — C67.4 MALIGNANT NEOPLASM OF POSTERIOR WALL OF URINARY BLADDER (HCC): ICD-10-CM

## 2025-07-31 DIAGNOSIS — I10 ESSENTIAL HYPERTENSION: ICD-10-CM

## 2025-07-31 DIAGNOSIS — I48.21 PERMANENT ATRIAL FIBRILLATION (HCC): Primary | ICD-10-CM

## 2025-07-31 LAB
QRS AXIS: -27 DEGREES
QRSD INTERVAL: 102 MS
QT INTERVAL: 418 MS
QTC INTERVAL: 451 MS
T WAVE AXIS: -15 DEGREES
VENTRICULAR RATE: 70 BPM

## 2025-08-01 ENCOUNTER — HOSPITAL ENCOUNTER (OUTPATIENT)
Dept: NON INVASIVE DIAGNOSTICS | Facility: HOSPITAL | Age: 75
Discharge: HOME | End: 2025-08-01
Payer: MEDICARE

## 2025-08-01 DIAGNOSIS — I72.3 ILIAC ARTERY ANEURYSM, BILATERAL (HCC): ICD-10-CM

## 2025-08-01 PROCEDURE — 93923 UPR/LXTR ART STDY 3+ LVLS: CPT

## 2025-08-01 PROCEDURE — 93925 LOWER EXTREMITY STUDY: CPT

## 2025-08-01 PROCEDURE — 93922 UPR/L XTREMITY ART 2 LEVELS: CPT | Performed by: SURGERY

## 2025-08-01 PROCEDURE — 93925 LOWER EXTREMITY STUDY: CPT | Performed by: SURGERY

## 2025-08-01 PROCEDURE — 93978 VASCULAR STUDY: CPT

## 2025-08-01 PROCEDURE — 93978 VASCULAR STUDY: CPT | Performed by: SURGERY

## 2025-08-05 ENCOUNTER — TELEPHONE (OUTPATIENT)
Dept: NEUROLOGY | Facility: CLINIC | Age: 75
End: 2025-08-05

## 2025-08-07 ENCOUNTER — TELEPHONE (OUTPATIENT)
Dept: VASCULAR SURGERY | Facility: CLINIC | Age: 75
End: 2025-08-07

## 2025-08-11 ENCOUNTER — APPOINTMENT (OUTPATIENT)
Dept: LAB | Age: 75
End: 2025-08-11
Payer: MEDICARE

## 2025-08-12 LAB
QRS AXIS: -27 DEGREES
QRSD INTERVAL: 102 MS
QT INTERVAL: 418 MS
QTC INTERVAL: 451 MS
T WAVE AXIS: -15 DEGREES
VENTRICULAR RATE: 70 BPM

## (undated) DEVICE — CHLORAPREP HI-LITE 26ML ORANGE

## (undated) DEVICE — ABDOMINAL PAD: Brand: DERMACEA

## (undated) DEVICE — LAPAROSCOPIC TROCAR SLEEVE/SINGLE USE: Brand: KII® OPTICAL ACCESS SYSTEM

## (undated) DEVICE — INTENDED FOR TISSUE SEPARATION, AND OTHER PROCEDURES THAT REQUIRE A SHARP SURGICAL BLADE TO PUNCTURE OR CUT.: Brand: BARD-PARKER SAFETY BLADES SIZE 11, STERILE

## (undated) DEVICE — COVER PROBE INTRAOPERATIVE 6 X 96 IN

## (undated) DEVICE — UROLOGIC DRAIN BAG: Brand: UNBRANDED

## (undated) DEVICE — EXIDINE 4 PCT

## (undated) DEVICE — SYRINGE 50ML LL

## (undated) DEVICE — PREMIUM DRY TRAY LF: Brand: MEDLINE INDUSTRIES, INC.

## (undated) DEVICE — SNAP KOVER: Brand: UNBRANDED

## (undated) DEVICE — SUT MONOCRYL 4-0 PS-2 18 IN Y496G

## (undated) DEVICE — STERILE ICS CARDIOVASCULAR PK: Brand: CARDINAL HEALTH

## (undated) DEVICE — ADHESIVE SKN CLSR HISTOACRYL FLEX 0.5ML LF

## (undated) DEVICE — ANGIOGRAPHIC CATHETER: Brand: IMAGER™ II

## (undated) DEVICE — SURGICEL 4 X 8

## (undated) DEVICE — PENCIL ELECTROSURG E-Z CLEAN -0035H

## (undated) DEVICE — LIGACLIP MCA MULTIPLE CLIP APPLIERS, 20 MEDIUM CLIPS: Brand: LIGACLIP

## (undated) DEVICE — GLOVE SRG BIOGEL 7

## (undated) DEVICE — 3M™ STERI-STRIP™ REINFORCED ADHESIVE SKIN CLOSURES, R1547, 1/2 IN X 4 IN (12 MM X 100 MM), 6 STRIPS/ENVELOPE: Brand: 3M™ STERI-STRIP™

## (undated) DEVICE — GLOVE INDICATOR PI UNDERGLOVE SZ 7.5 BLUE

## (undated) DEVICE — GAUZE SPONGES,16 PLY: Brand: CURITY

## (undated) DEVICE — SUT VICRYL 0 UR-6 27 IN J603H

## (undated) DEVICE — DECANTER: Brand: UNBRANDED

## (undated) DEVICE — X-RAY DETECTABLE SPONGES,16 PLY: Brand: VISTEC

## (undated) DEVICE — REM POLYHESIVE ADULT PATIENT RETURN ELECTRODE: Brand: VALLEYLAB

## (undated) DEVICE — INTENDED FOR TISSUE SEPARATION, AND OTHER PROCEDURES THAT REQUIRE A SHARP SURGICAL BLADE TO PUNCTURE OR CUT.: Brand: BARD-PARKER SAFETY BLADES SIZE 15, STERILE

## (undated) DEVICE — SI BRITE TIP 8F 11CM STR: Brand: BRITE TIP

## (undated) DEVICE — SUT PDS II 1 CTX 36 IN Z371T

## (undated) DEVICE — SYRINGE KIT,PACKAGED,,150FT,MK 7(ANGIO-ARTERION, 150ML SYR KIT W/QFT,MC)(60729385): Brand: MEDRAD® MARK 7 ARTERION DISPOSABLE SYRINGE 150 ML WITH QUICK FILL TUBE

## (undated) DEVICE — 4-PORT MANIFOLD: Brand: NEPTUNE 2

## (undated) DEVICE — IV SET 15 DROP STERILE 0/Y GRAVITY

## (undated) DEVICE — INVIEW CLEAR LEGGINGS: Brand: CONVERTORS

## (undated) DEVICE — PACK PBDS LAP CHOLE RF

## (undated) DEVICE — MICROPUNCTURE 501

## (undated) DEVICE — 3000CC GUARDIAN II: Brand: GUARDIAN

## (undated) DEVICE — SCD SEQUENTIAL COMPRESSION COMFORT SLEEVE MEDIUM KNEE LENGTH: Brand: KENDALL SCD

## (undated) DEVICE — PROXIMATE PLUS MD MULTI-DIRECTIONAL RELEASE SKIN STAPLERS CONTAINS 35 STAINLESS STEEL STAPLES APPROXIMATE CLOSED DIMENSIONS: 6.9MM X 3.9MM WIDE: Brand: PROXIMATE

## (undated) DEVICE — ENDOPATH 5MM CURVED SCISSORS WITH MONOPOLAR CAUTERY: Brand: ENDOPATH

## (undated) DEVICE — STERILE MAJOR GENERAL PACK: Brand: CARDINAL HEALTH

## (undated) DEVICE — GLOVE SRG BIOGEL 7.5

## (undated) DEVICE — GUIDEWIRE STRGHT TIP 0.035 IN  SOLO PLUS

## (undated) DEVICE — TROCAR: Brand: KII® SLEEVE

## (undated) DEVICE — STERILE SURGICAL LUBRICANT,  TUBE: Brand: SURGILUBE

## (undated) DEVICE — NEEDLE 25GA X 1 IN SAFETY GLIDE

## (undated) DEVICE — Device

## (undated) DEVICE — 1200CC GUARDIAN II: Brand: GUARDIAN

## (undated) DEVICE — BASIC SINGLE BASIN-LF: Brand: MEDLINE INDUSTRIES, INC.

## (undated) DEVICE — TRAY FOLEY 16FR URIMETER SILICONE SURESTEP

## (undated) DEVICE — PACK TUR

## (undated) DEVICE — CATH FOLEY COUDE 20FR 5ML 2 WAY TIEMANN LUBRICATH

## (undated) DEVICE — TUBING SUCTION 5MM X 12 FT

## (undated) DEVICE — NON-DEHP HIGH FLOW RATE EXTENSION SET, MALE LUER LOCK ADAPTER

## (undated) DEVICE — FLOSEAL HEMOSTATIC MATRIX, 10 ML: Brand: FLOSEAL

## (undated) DEVICE — TROCARS: Brand: KII® BALLOON BLUNT TIP SYSTEM

## (undated) DEVICE — INFUSER BAG 500ML

## (undated) DEVICE — CATH FOLEY 22FR 5ML 2 WAY SILICONE ELASTIMER

## (undated) DEVICE — FLUID MANAGEMENT KIT - IR

## (undated) DEVICE — CATH BALLOON STENT GRAFT 12FR 40MM X 65CM

## (undated) DEVICE — HF-RESECTION ELECTRODE PLASMALOOP LOOP, MEDIUM, 24 FR., 12°-30°, ESG TURIS: Brand: OLYMPUS

## (undated) DEVICE — BAG DRAINAGE URINARY W TOWER

## (undated) DEVICE — LIGAMAX 5 MM ENDOSCOPIC MULTIPLE CLIP APPLIER: Brand: LIGAMAX

## (undated) DEVICE — 3M™ IOBAN™ 2 ANTIMICROBIAL INCISE DRAPE 6651EZ: Brand: IOBAN™ 2

## (undated) DEVICE — CATH DIAG 5FR .035 70CM PIG CSC 20

## (undated) DEVICE — GUIDEWIRE AMPLATZ .035 180CM 6CM ST SS

## (undated) DEVICE — SHEATH INTRO DRY SEAL 12FR 33CM

## (undated) DEVICE — DILATOR: Brand: COOK

## (undated) DEVICE — CATH FOLEY 20FR 5ML 2WAY LUBRICATH